# Patient Record
Sex: FEMALE | Race: WHITE | NOT HISPANIC OR LATINO | Employment: PART TIME | URBAN - NONMETROPOLITAN AREA
[De-identification: names, ages, dates, MRNs, and addresses within clinical notes are randomized per-mention and may not be internally consistent; named-entity substitution may affect disease eponyms.]

---

## 2017-01-10 ENCOUNTER — OFFICE VISIT (OUTPATIENT)
Dept: FAMILY MEDICINE | Facility: OTHER | Age: 37
End: 2017-01-10

## 2017-01-10 VITALS
RESPIRATION RATE: 12 BRPM | WEIGHT: 150 LBS | HEART RATE: 99 BPM | SYSTOLIC BLOOD PRESSURE: 122 MMHG | DIASTOLIC BLOOD PRESSURE: 66 MMHG | BODY MASS INDEX: 23.54 KG/M2 | TEMPERATURE: 98 F | HEIGHT: 67 IN | OXYGEN SATURATION: 100 %

## 2017-01-10 DIAGNOSIS — G60.9 IDIOPATHIC PERIPHERAL NEUROPATHY: ICD-10-CM

## 2017-01-10 DIAGNOSIS — R51.9 CHRONIC NONINTRACTABLE HEADACHE, UNSPECIFIED HEADACHE TYPE: Primary | ICD-10-CM

## 2017-01-10 DIAGNOSIS — E03.9 ACQUIRED HYPOTHYROIDISM: ICD-10-CM

## 2017-01-10 DIAGNOSIS — G89.29 CHRONIC NONINTRACTABLE HEADACHE, UNSPECIFIED HEADACHE TYPE: Primary | ICD-10-CM

## 2017-01-10 PROCEDURE — 99213 OFFICE O/P EST LOW 20 MIN: CPT | Performed by: INTERNAL MEDICINE

## 2017-01-10 ASSESSMENT — PAIN SCALES - GENERAL: PAINLEVEL: MODERATE PAIN (5)

## 2017-01-10 NOTE — PROGRESS NOTES
SUBJECTIVE:                                                    Mattie Banda is a 36 year old female who presents to clinic today for the following health issues:      Chief Complaint   Patient presents with     RECHECK     Headache     getting a little better            CHIEF COMPLAINT:    The patient is a 36-year-old female who has been having some migraine type headaches in conjunction with muscle tension headaches. She was recently started on low doses of propranolol and this may have decreased the frequency of the migraines slightly. She has not yet had her eyes examined as cause for possible headaches as well. She had concurrent muscle tension headache and was placed on Cymbalta for her chronic pain and stretching exercises were recommended. She was unable to do the stretching exercises and the Cymbalta made her nauseated so she has not subsequently had to discontinue it. She does have some anxiety and depression which really hasn't changed. Recent laboratory work for her diffuse muscle and joint pain was performed by another provider and reviewed. It demonstrates no significant autoimmune or inflammatory disease. After lengthy discussion, we've decided to request neurology opinion regarding her ongoing headaches. She states that she has them every single day and the last most of the day. They do not wake her from sleep and are not always associated with auras or visual changes. She still has some pain in the back of her neck but not nearly as much. The tension headache portion does seem to have improved slightly.                         PAST, FAMILY,SOCIAL HISTORY:     Medical  History:   has a past medical history of Urinary tract infection, site not specified; Thyroid disease; and Papanicolaou smear of cervix with low grade squamous intraepithelial lesion (LGSIL).     Surgical History:   has past surgical history that includes TOOTH EXTRACTION W/FORCEP; colonoscopy (08/06/07); COLONOSCOPY W BIOPSY  (02/06/08); colonoscopy (08/05/08); Laparoscopic tubal ligation (11/27/2012); and back surgery (6/2015 ).     Social History:   reports that she has been smoking Cigarettes.  She has a 8 pack-year smoking history. She has never used smokeless tobacco. She reports that she drinks alcohol. She reports that she does not use illicit drugs.     Family History:  family history includes Arthritis in her maternal grandmother and paternal grandmother; Breast Cancer in an other family member; CANCER in her maternal grandfather and maternal grandmother; Cancer - colorectal in her maternal grandfather; Cardiovascular in her maternal grandfather; Depression in her maternal grandmother; Genitourinary Problems in her maternal grandmother; HEART DISEASE in her maternal grandfather; Hypertension in her maternal grandfather, maternal grandmother, and paternal grandmother; Lipids in her maternal grandmother; OSTEOPOROSIS in her maternal grandmother; Respiratory in her maternal grandmother; Thyroid Disease in her maternal aunt. There is no history of DIABETES.            MEDICATIONS  Current Outpatient Prescriptions   Medication Sig Dispense Refill     propranolol (INDERAL) 10 MG tablet Take 1 tablet (10 mg) by mouth 2 times daily 28 tablet 0     Cyanocobalamin (B-12) 100 MCG TABS        albuterol (PROAIR HFA, PROVENTIL HFA, VENTOLIN HFA) 108 (90 BASE) MCG/ACT inhaler Inhale 2 puffs into the lungs every 6 hours as needed for shortness of breath / dyspnea or wheezing 1 Inhaler 0     lidocaine (LIDODERM) 5 % patch Apply up to 3 patches to painful area at once for up to 12 h within a 24 h period.  Remove after 12 hours. 30 patch 0     levothyroxine (SYNTHROID, LEVOTHROID) 175 MCG tablet Take 1 tablet (175 mcg) by mouth daily 90 tablet 3     traZODone (DESYREL) 50 MG tablet TAKE 1 TO 3 TABLETS BY MOUTH EVERY NIGHT AT BEDTIME 270 tablet 5     Multiple Minerals-Vitamins (CALCIUM CITRATE +) TABS 500 mg twice a day            --------------------------------------------------------------------------------------------------------------------                          REVIEW OF SYSTEMS:  Unchanged from that of the last visit and once again reviewed                            EXAMINATION:          Vital Signs:  B/P: 122/66, T: 98, P: 99, R: 12, BMI: Body mass index is 23.49 kg/(m^2).   Constitutional: The patient appears to be in no acute distress. The patient appears to be adequately hydrated. No acute respiratory or hemodynamic distress is noted at this time.   LUNGS: clear bilaterally, airflow is brisk, no intercostal retraction or stridor is noted. No coughing is noted during visit.   HEART:  regular without rubs, clicks, gallops, or murmurs. PMI is nondisplaced. Upstrokes are brisk. S1,S2 are heard.   NEURO: Pt is alert and appropriate. No neurologic lateralization is noted. Cranial nerves 2-12 are intact. Peripheral sensory and motor function are grossly normal                        DECISION MAKING:       #1 recurrent headache suspect combination musculoskeletal with triggered migraine   Discontinue Cymbalta for side effects   Continue beta blocker at low dose   Follow-up with neurology  #2 chronic back pain   Discontinue Cymbalta for side effects   Continue lidocaine patch   No narcotics recommended  #3 hypothyroidism   Continue thyroid supplementation                           FOLLOW UP    I have asked the patient to make an appointment for follow up with me on an as-needed basis.    I have carefully explained the diagnosis and treatment options with the patient. The patient has displayed an understanding of the above, and all subsequent questions were answered.         DO JENARO Otto    Portions of this note were produced using [employer]  Although every attempt at real-time proof reading has been made, occasional grammar/syntax errors may have been missed.

## 2017-01-10 NOTE — MR AVS SNAPSHOT
"              After Visit Summary   1/10/2017    Mattie Banda    MRN: [id number]           Patient Information     Date Of Birth          1980        Visit Information        Provider Department      1/10/2017 3:20 PM Eduardo Castro DO [facility]         Follow-ups after your visit        Who to contact     If you have questions or need follow up information about today's clinic visit or your schedule please contact [facility] directly at [phone].  Normal or non-critical lab and imaging results will be communicated to you by MyChart, letter or phone within 4 business days after the clinic has received the results. If you do not hear from us within 7 days, please contact the clinic through [employer] or phone. If you have a critical or abnormal lab result, we will notify you by phone as soon as possible.  Submit refill requests through [employer] or call your pharmacy and they will forward the refill request to us. Please allow 3 business days for your refill to be completed.          Additional Information About Your Visit        [facility] Information     [facility] lets you send messages to your doctor, view your test results, renew your prescriptions, schedule appointments and more. To sign up, go to [email or web address] . Click on \"Log in\" on the left side of the screen, which will take you to the Welcome page. Then click on \"Sign up Now\" on the right side of the page.     You will be asked to enter the access code listed below, as well as some personal information. Please follow the directions to create your username and password.     Your access code is: [email or web address]  Expires: 4/10/2017  3:34 PM     Your access code will  in 90 days. If you need help or a new code, please call your [facility] or [phone].        Care EveryWhere ID     This is your Care EveryWhere ID. This could be used by other organizations to access your [place] medical " "records  [id number]        Your Vitals Were     Pulse Temperature Respirations Height BMI (Body Mass Index) Pulse Oximetry    99 98  F (36.7  C) (Tympanic) 12 5' 7\" (1.702 m) 23.49 kg/m2 100%       Blood Pressure from Last 3 Encounters:   01/10/17 122/66   12/27/16 120/70   11/04/16 122/70    Weight from Last 3 Encounters:   01/10/17 150 lb (68.04 kg)   12/27/16 147 lb (66.679 kg)   11/04/16 145 lb (65.772 kg)              Today, you had the following     No orders found for display       Primary Care Provider Office Phone # Fax #    Yuan Iain Schofield [phone]       [place] [id number] [place] [phone]        Thank you!     Thank you for choosing [facility]  for your care. Our goal is always to provide you with excellent care. Hearing back from our patients is one way we can continue to improve our services. Please take a few minutes to complete the written survey that you may receive in the mail after your visit with us. Thank you!             Your Updated Medication List - Protect others around you: Learn how to safely use, store and throw away your medicines at www.disposemymeds.org.          This list is accurate as of: 1/10/17  3:34 PM.  Always use your most recent med list.                   Brand Name Dispense Instructions for use    albuterol 108 (90 BASE) MCG/ACT Inhaler    PROAIR HFA/PROVENTIL HFA/VENTOLIN HFA    1 Inhaler    Inhale 2 puffs into the lungs every 6 hours as needed for shortness of breath / dyspnea or wheezing       B-12 100 MCG Tabs          calcium citrate + Tabs      500 mg twice a day       DULoxetine 30 MG EC capsule    CYMBALTA    28 capsule    Take 1 capsule (30 mg) by mouth 2 times daily       levothyroxine 175 MCG tablet    SYNTHROID/LEVOTHROID    90 tablet    Take 1 tablet (175 mcg) by mouth daily       lidocaine 5 % Patch    LIDODERM    30 patch    Apply up to 3 patches to painful area at once for up to 12 h within " a 24 h period.  Remove after 12 hours.       propranolol 10 MG tablet    INDERAL    28 tablet    Take 1 tablet (10 mg) by mouth 2 times daily       traZODone 50 MG tablet    DESYREL    270 tablet    TAKE 1 TO 3 TABLETS BY MOUTH EVERY NIGHT AT BEDTIME

## 2017-01-11 ASSESSMENT — PATIENT HEALTH QUESTIONNAIRE - PHQ9: SUM OF ALL RESPONSES TO PHQ QUESTIONS 1-9: 8

## 2017-01-27 PROBLEM — G43.109 MIGRAINE WITH AURA AND WITHOUT STATUS MIGRAINOSUS, NOT INTRACTABLE: Status: ACTIVE | Noted: 2017-01-27

## 2017-01-30 ENCOUNTER — TELEPHONE (OUTPATIENT)
Dept: FAMILY MEDICINE | Facility: CLINIC | Age: 37
End: 2017-01-30

## 2017-01-30 NOTE — TELEPHONE ENCOUNTER
Patient informed we are not able to see patient today and was transferred to the apt desk to see who else may have openings that she could see that would be convenient for her VORB Dr. Yuan Schofield/Elizabeth Robles CMA ([facility])

## 2017-01-30 NOTE — TELEPHONE ENCOUNTER
Patient called back requesting this message be sent to Dr. Castro's team. Patient is will to go to [place] tomorrow. Please advise

## 2017-01-30 NOTE — TELEPHONE ENCOUNTER
Reason for Call:  Same Day Appointment, Requested Provider:  Yuan Schofield M.D.    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 Yes    Additional comments:     Can we leave a detailed message on this number? YES    Phone number patient can be reached at: Home number on file [phone] (home)    Best Time: any    Call taken on 1/30/2017 at 1:20 PM by Shameka Liu

## 2017-01-31 ENCOUNTER — OFFICE VISIT (OUTPATIENT)
Dept: FAMILY MEDICINE | Facility: OTHER | Age: 37
End: 2017-01-31

## 2017-01-31 VITALS
WEIGHT: 146 LBS | TEMPERATURE: 97.1 F | HEART RATE: 90 BPM | BODY MASS INDEX: 22.86 KG/M2 | RESPIRATION RATE: 16 BRPM | OXYGEN SATURATION: 99 %

## 2017-01-31 DIAGNOSIS — Z98.1 S/P LUMBAR FUSION: ICD-10-CM

## 2017-01-31 DIAGNOSIS — M54.50 CHRONIC MIDLINE LOW BACK PAIN WITHOUT SCIATICA: Primary | ICD-10-CM

## 2017-01-31 DIAGNOSIS — G89.29 CHRONIC MIDLINE LOW BACK PAIN WITHOUT SCIATICA: Primary | ICD-10-CM

## 2017-01-31 PROCEDURE — 99213 OFFICE O/P EST LOW 20 MIN: CPT | Performed by: INTERNAL MEDICINE

## 2017-01-31 RX ORDER — PREDNISONE 20 MG/1
TABLET ORAL
Qty: 20 TABLET | Refills: 0 | Status: SHIPPED | OUTPATIENT
Start: 2017-01-31 | End: 2017-05-12

## 2017-01-31 ASSESSMENT — PAIN SCALES - GENERAL: PAINLEVEL: EXTREME PAIN (8)

## 2017-01-31 NOTE — PROGRESS NOTES
SUBJECTIVE:                                                    Mattie Banda is a 36 year old female who presents to clinic today for the following health issues:      Back Pain      Duration: 3 days now        Specific cause: none    Description:   Location of pain: low back both  Character of pain: sharp  Pain radiation:none  New numbness or weakness in legs, not attributed to pain:  no     Intensity: Currently 8/10    History:   Pain interferes with job: YES,   History of back problems: no prior back problems  Any previous MRI or X-rays: None  Sees a specialist for back pain:  No  Therapies tried without relief: nothing    Alleviating factors:   Improved by: none      Precipitating factors:  Worsened by: Lifting, Bending, Standing, Sitting, Lying Flat and Walking    Functional and Psychosocial Screen (Luciana STarT Back):      Not performed today          CHIEF COMPLAINT:    The patient is a 36-year-old female who has persistent back pain. She has a previous history of degenerative disc disease and has had a fusion previously. She does have hardware in place over the area of her pain. She was previously placed on Cymbalta but had to discontinue it. She currently takes no narcotics for pain management.. She notes that she has had spinal injections but it was before the surgery many years ago. She notes that the pain does not seem to be getting better. She does have Lidoderm patch in place and this brought about no benefit as well. The pain is worsened with any bending or twisting or even laying flat. She has no radicular involvement. She denies any pain in the legs or weakness.                         PAST, FAMILY,SOCIAL HISTORY:     Medical  History:   has a past medical history of Urinary tract infection, site not specified; Thyroid disease; and Papanicolaou smear of cervix with low grade squamous intraepithelial lesion (LGSIL).     Surgical History:   has past surgical history that includes TOOTH EXTRACTION  W/FORCEP; colonoscopy (08/06/07); COLONOSCOPY W BIOPSY (02/06/08); colonoscopy (08/05/08); Laparoscopic tubal ligation (11/27/2012); and back surgery (6/2015 ).     Social History:   reports that she has been smoking Cigarettes.  She has a 8 pack-year smoking history. She has never used smokeless tobacco. She reports that she drinks alcohol. She reports that she does not use illicit drugs.     Family History:  family history includes Arthritis in her maternal grandmother and paternal grandmother; Breast Cancer in an other family member; CANCER in her maternal grandfather and maternal grandmother; Cancer - colorectal in her maternal grandfather; Cardiovascular in her maternal grandfather; Depression in her maternal grandmother; Genitourinary Problems in her maternal grandmother; HEART DISEASE in her maternal grandfather; Hypertension in her maternal grandfather, maternal grandmother, and paternal grandmother; Lipids in her maternal grandmother; OSTEOPOROSIS in her maternal grandmother; Respiratory in her maternal grandmother; Thyroid Disease in her maternal aunt. There is no history of DIABETES.            MEDICATIONS  Current Outpatient Prescriptions   Medication Sig Dispense Refill     predniSONE (DELTASONE) 20 MG tablet Take 3 tabs (60 mg) by mouth daily x 3 days, 2 tabs (40 mg) daily x 3 days, 1 tab (20 mg) daily x 3 days, then 1/2 tab (10 mg) x 3 days. 20 tablet 0     propranolol (INDERAL) 10 MG tablet Take 1 tablet (10 mg) by mouth 2 times daily 28 tablet 0     Cyanocobalamin (B-12) 100 MCG TABS        albuterol (PROAIR HFA, PROVENTIL HFA, VENTOLIN HFA) 108 (90 BASE) MCG/ACT inhaler Inhale 2 puffs into the lungs every 6 hours as needed for shortness of breath / dyspnea or wheezing 1 Inhaler 0     lidocaine (LIDODERM) 5 % patch Apply up to 3 patches to painful area at once for up to 12 h within a 24 h period.  Remove after 12 hours. 30 patch 0     levothyroxine (SYNTHROID, LEVOTHROID) 175 MCG tablet Take 1  tablet (175 mcg) by mouth daily 90 tablet 3     traZODone (DESYREL) 50 MG tablet TAKE 1 TO 3 TABLETS BY MOUTH EVERY NIGHT AT BEDTIME 270 tablet 5     Multiple Minerals-Vitamins (CALCIUM CITRATE +) TABS 500 mg twice a day           --------------------------------------------------------------------------------------------------------------------                          REVIEW OF SYSTEMS:         LUNGS: Pt denies: cough,excess sputum, hemoptysis, or shortness of breath.   HEART: Pt denies: chest pain, arrythmia, syncope, tachy or bradyarrhythmia or excess edema.   GI: Pt denies: nausea, vomitting, diarrhea, constipation, melena, or hematochezia.   NEURO: Pt denies: seizures, strokes, diplopia, weakness, paraesthesias, or paralysis.                          EXAMINATION:         Pulse 90  Temp(Src) 97.1  F (36.2  C) (Tympanic)  Resp 16  Wt 146 lb (66.225 kg)  SpO2 99%   Constitutional: The patient appears to be in moderate acute distress. The patient appears to be adequately hydrated. No acute respiratory or hemodynamic distress is noted at this time.   LUNGS: clear bilaterally, airflow is brisk, no intercostal retraction or stridor is noted. No coughing is noted during visit.   HEART:  regular without rubs, clicks, gallops, or murmurs. PMI is nondisplaced. Upstrokes are brisk. S1,S2 are heard.   GI: Abdomen is soft, without rebound, guarding or tenderness. Bowel sounds are appropriate. No renal bruits are heard.    NEURO: Pt is alert and appropriate. No neurologic lateralization is noted. Cranial nerves 2-12 are intact. Peripheral sensory and motor function are grossly normal. Straight leg raising is negative.   MS: Minimal crepitance is noted in the extremities. No deformity is present. Muscle strength is appropriate and equal bilaterally. No acute joint erythema or swelling is present. Significant pain with palpation over the spinous process of L4-L5 is noted.                          DECISION MAKING:     #1  chronic midline low back pain without sciatica   We'll start a prednisone burst/taper for symptomatic relief   Recommend orthopedic/back evaluation for possible injection  #2 history of migraine cephalgia currently stable                         FOLLOW UP    I have asked the patient to make an appointment for follow up with me following the orthopedic evaluation as needed.    I have carefully explained the diagnosis and treatment options with the patient. The patient has displayed an understanding of the above, and all subsequent questions were answered.       DO JENARO Otto    Portions of this note were produced using [employer]  Although every attempt at real-time proof reading has been made, occasional grammar/syntax errors may have been missed.

## 2017-01-31 NOTE — NURSING NOTE
"Chief Complaint   Patient presents with     Back Pain     3 days now       Initial Pulse 90  Temp(Src) 97.1  F (36.2  C) (Tympanic)  Resp 16  Wt 146 lb (66.225 kg)  SpO2 99% Estimated body mass index is 22.86 kg/(m^2) as calculated from the following:    Height as of 1/10/17: 5' 7\" (1.702 m).    Weight as of this encounter: 146 lb (66.225 kg).  BP completed using cuff size: regular    "

## 2017-01-31 NOTE — MR AVS SNAPSHOT
After Visit Summary   1/31/2017    Mattie Banda    MRN: [id number]           Patient Information     Date Of Birth          1980        Visit Information        Provider Department      1/31/2017 10:20 AM Eduardo Castro DO [facility]        Today's Diagnoses     Chronic midline low back pain without sciatica    -  1        Follow-ups after your visit        Additional Services     ORTHO  REFERRAL       [facility] is referring you to the Orthopedic  Services at [facility].       The  Representative will assist you in the coordination of your Orthopedic and Musculoskeletal Care as prescribed by your physician.    The  Representative will call you within 1 business day to help schedule your appointment, or you may contact the  Representative at:    All areas ~ [phone]     Type of Referral : Spine: Lumbar  **Choose Medical Spine Specialist (unless patient was seen by a Medical Spine Specialist within the past 6 months).**  Surgical Evaluation is advised if the patient presents with one or more of the following red flags: Evidence of Spinal Tumor, Infection or Fracture, Cauda Equina Syndrome, Sudden or Progressive Weakness, Loss of Bowel or Bladder Control, or any other documented emergent neurological condition resulting from a Lumbar Spinal Condition. Medical Spine Specialist        Timeframe requested: Within 2 weeks    Coverage of these services is subject to the terms and limitations of your health insurance plan.  Please call member services at your health plan with any benefit or coverage questions.      If X-rays, CT or MRI's have been performed, please contact the facility where they were done to arrange for , prior to your scheduled appointment.  Please bring this referral request to your appointment and present it to your specialist.                  Who to contact   "   If you have questions or need follow up information about today's clinic visit or your schedule please contact [facility] directly at [phone].  Normal or non-critical lab and imaging results will be communicated to you by MyChart, letter or phone within 4 business days after the clinic has received the results. If you do not hear from us within 7 days, please contact the clinic through Chem[employer]art or phone. If you have a critical or abnormal lab result, we will notify you by phone as soon as possible.  Submit refill requests through [employer] or call your pharmacy and they will forward the refill request to us. Please allow 3 business days for your refill to be completed.          Additional Information About Your Visit        Chem[employer]iharFlixw[employer] Information     [employer]Source lets you send messages to your doctor, view your test results, renew your prescriptions, schedule appointments and more. To sign up, go to [email or web address] . Click on \"Log in\" on the left side of the screen, which will take you to the Welcome page. Then click on \"Sign up Now\" on the right side of the page.     You will be asked to enter the access code listed below, as well as some personal information. Please follow the directions to create your username and password.     Your access code is: [email or web address]  Expires: 4/10/2017  3:34 PM     Your access code will  in 90 days. If you need help or a new code, please call your [facility] or [phone].        Care EveryWhere ID     This is your Care EveryWhere ID. This could be used by other organizations to access your [place] medical records  [id number]        Your Vitals Were     Pulse Temperature Respirations Pulse Oximetry          90 97.1  F (36.2  C) (Tympanic) 16 99%         Blood Pressure from Last 3 Encounters:   01/10/17 122/66   16 120/70   16 122/70    Weight from Last 3 Encounters:   17 146 lb (66.225 kg)   01/10/17 150 lb (68.04 kg)   16 147 " lb (66.679 kg)              We Performed the Following     ORTHO  REFERRAL          Today's Medication Changes          These changes are accurate as of: 1/31/17  4:24 PM.  If you have any questions, ask your nurse or doctor.               Start taking these medicines.        Dose/Directions    predniSONE 20 MG tablet   Commonly known as:  DELTASONE   Used for:  Chronic midline low back pain without sciatica   Started by:  Eduardo Castro, DO        Take 3 tabs (60 mg) by mouth daily x 3 days, 2 tabs (40 mg) daily x 3 days, 1 tab (20 mg) daily x 3 days, then 1/2 tab (10 mg) x 3 days.   Quantity:  20 tablet   Refills:  0            Where to get your medicines      These medications were sent to [id number] - [place], [place] - [address] [place] [address]  [address] [place] [address] [place] [address]    Hours:  M-F 8:30-6:30; Sat 9-4; closed Sunday Phone:  [phone]    - predniSONE 20 MG tablet             Primary Care Provider Office Phone # Fax #    Yuan Iain Schofield [phone]       [facility] [address] [place] [phone]        Thank you!     Thank you for choosing [facility]  for your care. Our goal is always to provide you with excellent care. Hearing back from our patients is one way we can continue to improve our services. Please take a few minutes to complete the written survey that you may receive in the mail after your visit with us. Thank you!             Your Updated Medication List - Protect others around you: Learn how to safely use, store and throw away your medicines at www.disposemymeds.org.          This list is accurate as of: 1/31/17  4:24 PM.  Always use your most recent med list.                   Brand Name Dispense Instructions for use    albuterol 108 (90 BASE) MCG/ACT Inhaler    PROAIR HFA/PROVENTIL HFA/VENTOLIN HFA    1 Inhaler    Inhale 2 puffs into the lungs every 6 hours as needed for shortness of breath / dyspnea or  wheezing       B-12 100 MCG Tabs          calcium citrate + Tabs      500 mg twice a day       levothyroxine 175 MCG tablet    SYNTHROID/LEVOTHROID    90 tablet    Take 1 tablet (175 mcg) by mouth daily       lidocaine 5 % Patch    LIDODERM    30 patch    Apply up to 3 patches to painful area at once for up to 12 h within a 24 h period.  Remove after 12 hours.       predniSONE 20 MG tablet    DELTASONE    20 tablet    Take 3 tabs (60 mg) by mouth daily x 3 days, 2 tabs (40 mg) daily x 3 days, 1 tab (20 mg) daily x 3 days, then 1/2 tab (10 mg) x 3 days.       propranolol 10 MG tablet    INDERAL    28 tablet    Take 1 tablet (10 mg) by mouth 2 times daily       traZODone 50 MG tablet    DESYREL    270 tablet    TAKE 1 TO 3 TABLETS BY MOUTH EVERY NIGHT AT BEDTIME

## 2017-02-01 ASSESSMENT — PATIENT HEALTH QUESTIONNAIRE - PHQ9: SUM OF ALL RESPONSES TO PHQ QUESTIONS 1-9: 5

## 2017-02-09 ENCOUNTER — OFFICE VISIT (OUTPATIENT)
Dept: ORTHOPEDICS | Facility: CLINIC | Age: 37
End: 2017-02-09
Payer: OTHER MISCELLANEOUS

## 2017-02-09 VITALS
HEART RATE: 82 BPM | DIASTOLIC BLOOD PRESSURE: 86 MMHG | BODY MASS INDEX: 22.91 KG/M2 | SYSTOLIC BLOOD PRESSURE: 132 MMHG | HEIGHT: 67 IN | WEIGHT: 146 LBS

## 2017-02-09 DIAGNOSIS — R22.2 PALPABLE MASS OF LOWER BACK: ICD-10-CM

## 2017-02-09 DIAGNOSIS — G89.29 CHRONIC BILATERAL LOW BACK PAIN WITH BILATERAL SCIATICA: Primary | ICD-10-CM

## 2017-02-09 DIAGNOSIS — M54.41 CHRONIC BILATERAL LOW BACK PAIN WITH BILATERAL SCIATICA: Primary | ICD-10-CM

## 2017-02-09 DIAGNOSIS — M54.42 CHRONIC BILATERAL LOW BACK PAIN WITH BILATERAL SCIATICA: Primary | ICD-10-CM

## 2017-02-09 PROCEDURE — 99244 OFF/OP CNSLTJ NEW/EST MOD 40: CPT | Performed by: PHYSICIAN ASSISTANT

## 2017-02-09 RX ORDER — CYCLOBENZAPRINE HCL 5 MG
5 TABLET ORAL 3 TIMES DAILY PRN
Qty: 45 TABLET | Refills: 1 | Status: SHIPPED | OUTPATIENT
Start: 2017-02-09 | End: 2017-05-12

## 2017-02-09 NOTE — PROGRESS NOTES
Sports Medicine Clinic Visit    PCP: Yuan Schofield Amwinter Banda is a 36 year old female who is seen  in consultation at the request of Dr. Castro presenting with low back pain.    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    Family history, medical history and surgical history have all been discussed with patient and appended to medical chart below.    Past Medical History   Diagnosis Date     Urinary tract infection, site not specified      Recurrent UTI's     Thyroid disease      Papanicolaou smear of cervix with low grade squamous intraepithelial lesion (LGSIL)      Past Surgical History   Procedure Laterality Date     Hc tooth extraction w/forcep       wisdom teeth removed     Colonoscopy  08/06/07     Hc colonoscopy w biopsy  02/06/08     Colonoscopy  08/05/08     Laparoscopic tubal ligation  11/27/2012     Procedure: LAPAROSCOPIC TUBAL LIGATION;  Laparoscopic Bilateral tubal sterilization/ fulgaration;  Surgeon: Sarbjit Whittaker MD;  Location: [facility] OR     Back surgery  6/2015      Family History   Problem Relation Age of Onset     Breast Cancer Other      Maternal great aunt     DIABETES No family hx of      Hypertension Maternal Grandmother      Arthritis Maternal Grandmother      CANCER Maternal Grandmother   "    MGGM     Depression Maternal Grandmother      Lipids Maternal Grandmother      OSTEOPOROSIS Maternal Grandmother      Respiratory Maternal Grandmother      emphysema     Genitourinary Problems Maternal Grandmother      Kidney Failure, liver      Hypertension Maternal Grandfather      HEART DISEASE Maternal Grandfather      MI     Cardiovascular Maternal Grandfather      Cancer - colorectal Maternal Grandfather      CANCER Maternal Grandfather      Hodgins Lymphoma     Hypertension Paternal Grandmother      Arthritis Paternal Grandmother      RA     Thyroid Disease Maternal Aunt      two aunts     Objective  /86 mmHg  Pulse 82  Ht 5' 7\" (1.702 m)  Wt 146 lb (66.225 kg)  BMI 22.86 kg/m2  Constitutional:well-developed, well-nourished, and in no distress.   Cardiovascular: Intact distal pulses.    Neurological: alert. Gait normal  Skin: Skin is warm and dry.   Psychiatric: Mood and affect normal.   Respiratory: unlabored, speaks in full sentences  Gastrointestinal: masses neg, deformities neg  Hematologic/Lymphatic/Immunologic: neg lymphadenopathy, neg lymphedema    Exam:  Back Exam:    Inspection:       no visible deformity in the low back       normal skin       normal vascular       normal lymphatic    ROM:       limited flexion due to pain       limited extension due to pain       Limited lateral rotation and lateral flexion    Tender/ Tissue Texture Abnormality:       paraspinal muscles       Palpable mobile masses bilateral back - tender to palpation       Iliac crest    Non Tender:       remainder of lumbar spine    Strength:       hip flexion 5/5       knee extension 5/5       ankle dorsiflexion 5/5       ankle plantarflexion 5/5       dorsiflexion of the great toe 5/5      Hip abduction: 4/5      Hip adduction:  4/5    Reflexes:       patellar (L3, L4) symmetric normal       achilles tendons (S1) symmetric normal    Sensation:      grossly intact throughout lower extremities    Special tests:      "  straight leg raise left neg        straight leg raise right neg             Radiology:  Results for orders placed or performed in visit on 06/06/16   XR Lumbar Spine 2/3 Views    Narrative    LUMBAR SPINE TWO TO THREE VIEWS   6/6/2016 11:54 AM     HISTORY: Pseudarthrosis after fusion or arthrodesis    COMPARISON: Lumbar spine x-rays dated 11/2/2015.    FINDINGS: Stable anterior and posterior operative L5-S1 fusion with  intervertebral spacers since the prior study. Disc height loss L4-L5  has slightly progressed. Remaining disc spaces are grossly  unremarkable and stable as compared to the prior study. Limbus  vertebra on the anterior superior endplate of L3 is stable in  appearance. SI joints are unremarkable. Pedicles are intact and psoas  margins are maintained.      Impression    IMPRESSION:  1. Worsening disc height loss at L4-L5.  2. Stable postoperative changes at L5-S1.    SUNIL LAY MD       I have personally reviewed images with patient    Assessment:  1. Chronic bilateral low back pain with bilateral sciatica    2. Palpable mass of lower back        Plan:  Discussed the assessment with the patient.  Topical Treatments: Ice, Heat or Topical Analgesics  Over the counter medication: Acetaminophen (Tylenol) 1000mg every 6 hours with food (Maximum of 3000mg/day)  Prescription Medication as directed: Relafen and flexeril  MRI of the lumbar spine for further evaluation of palpable masses on low back  Observe and monitor symptoms  Activity Modification: as tolerated  Rehab: [facility]:  OMT, strain/ counter strain, myofacial relese  Follow up: 4-6 weeks  Clinic will contact patient with MRI results              Hallie Singh PA-C  [facility]  [facility]      Disclaimer: This note consists of symbols derived from keyboarding, dictation and/or voice recognition software. As a result, there may be errors in the script that have gone undetected. Please  consider this when interpreting information found in this chart.

## 2017-02-09 NOTE — NURSING NOTE
"Initial /86 mmHg  Pulse 82  Ht 5' 7\" (1.702 m)  Wt 146 lb (66.225 kg)  BMI 22.86 kg/m2 Estimated body mass index is 22.86 kg/(m^2) as calculated from the following:    Height as of this encounter: 5' 7\" (1.702 m).    Weight as of this encounter: 146 lb (66.225 kg). .      "

## 2017-02-09 NOTE — PATIENT INSTRUCTIONS
To schedule your MRI with [facility], please call [phone].  Please allow 2-3 business days for clinic to obtain results.  We will contact you, review your results with you and update your treatment plan at that time.    Physical Therapy:  [facility]:  [phone]

## 2017-02-09 NOTE — MR AVS SNAPSHOT
After Visit Summary   2/9/2017    Mattie Banda    MRN: [id number]           Patient Information     Date Of Birth          1980        Visit Information        Provider Department      2/9/2017 4:20 PM Hallie Singh PA-C [facility]        Today's Diagnoses     Chronic bilateral low back pain with bilateral sciatica    -  1       Care Instructions    To schedule your MRI with [facility], please call [phone].  Please allow 2-3 business days for clinic to obtain results.  We will contact you, review your results with you and update your treatment plan at that time.    Physical Therapy:  [facility]:  [phone]        Follow-ups after your visit        Additional Services     PHYSICAL THERAPY REFERRAL       [facility]: [phone]    Treatment: Evaluation & Treatment  Special Instructions/Modalities: OMT, As indicated by physical therapist    Special Programs: As indicated by physical therapist      Please be aware that coverage of these services is subject to the terms and limitations of your health insurance plan.  Call member services at your health plan with any benefit or coverage questions.      **Note to Provider:  If you are referring outside of [facility] for the therapy appointment, please list the name of the location in the  special instructions  above, print the referral and give to the patient to schedule the appointment.                  Future tests that were ordered for you today     Open Future Orders        Priority Expected Expires Ordered    MR Lumbar Spine w/o Contrast Routine  2/9/2018 2/9/2017            Who to contact     If you have questions or need follow up information about today's clinic visit or your schedule please contact [facility] directly at [phone].  Normal or non-critical lab and imaging results will be communicated to you by MyChart, letter or phone within 4  "business days after the clinic has received the results. If you do not hear from us within 7 days, please contact the clinic through Ele[employer] or phone. If you have a critical or abnormal lab result, we will notify you by phone as soon as possible.  Submit refill requests through [employer] or call your pharmacy and they will forward the refill request to us. Please allow 3 business days for your refill to be completed.          Additional Information About Your Visit        Elephanti Information     [employer] gives you secure access to your electronic health record. If you see a primary care provider, you can also send messages to your care team and make appointments. If you have questions, please call your primary care clinic.  If you do not have a primary care provider, please call [phone] and they will assist you.        Care EveryWhere ID     This is your Care EveryWhere ID. This could be used by other organizations to access your [place] medical records  [id number]        Your Vitals Were     Pulse Height BMI (Body Mass Index)             82 5' 7\" (1.702 m) 22.86 kg/m2          Blood Pressure from Last 3 Encounters:   02/09/17 132/86   01/10/17 122/66   12/27/16 120/70    Weight from Last 3 Encounters:   02/09/17 146 lb (66.225 kg)   01/31/17 146 lb (66.225 kg)   01/10/17 150 lb (68.04 kg)              We Performed the Following     PHYSICAL THERAPY REFERRAL        Primary Care Provider Office Phone # Fax #    Yuan Schofield [phone]       [place] [address] [place] [phone]        Thank you!     Thank you for choosing [facility]  for your care. Our goal is always to provide you with excellent care. Hearing back from our patients is one way we can continue to improve our services. Please take a few minutes to complete the written survey that you may receive in the mail after your visit with us. Thank you!             Your " Updated Medication List - Protect others around you: Learn how to safely use, store and throw away your medicines at www.disposemymeds.org.          This list is accurate as of: 2/9/17  4:47 PM.  Always use your most recent med list.                   Brand Name Dispense Instructions for use    albuterol 108 (90 BASE) MCG/ACT Inhaler    PROAIR HFA/PROVENTIL HFA/VENTOLIN HFA    1 Inhaler    Inhale 2 puffs into the lungs every 6 hours as needed for shortness of breath / dyspnea or wheezing       B-12 100 MCG Tabs          calcium citrate + Tabs      500 mg twice a day       levothyroxine 175 MCG tablet    SYNTHROID/LEVOTHROID    90 tablet    Take 1 tablet (175 mcg) by mouth daily       lidocaine 5 % Patch    LIDODERM    30 patch    Apply up to 3 patches to painful area at once for up to 12 h within a 24 h period.  Remove after 12 hours.       predniSONE 20 MG tablet    DELTASONE    20 tablet    Take 3 tabs (60 mg) by mouth daily x 3 days, 2 tabs (40 mg) daily x 3 days, 1 tab (20 mg) daily x 3 days, then 1/2 tab (10 mg) x 3 days.       propranolol 10 MG tablet    INDERAL    28 tablet    Take 1 tablet (10 mg) by mouth 2 times daily       traZODone 50 MG tablet    DESYREL    270 tablet    TAKE 1 TO 3 TABLETS BY MOUTH EVERY NIGHT AT BEDTIME

## 2017-02-16 DIAGNOSIS — E03.8 OTHER SPECIFIED HYPOTHYROIDISM: ICD-10-CM

## 2017-02-16 DIAGNOSIS — G47.9 SLEEP DISORDER: ICD-10-CM

## 2017-02-17 NOTE — TELEPHONE ENCOUNTER
traZODone (DESYREL) 50 MG tablet    Last Written Prescription Date: 2/8/16  Last Fill Quantity: 270; # refills: 5  Last Office Visit with [facility] or [facility] prescribing provider:  1/31/17        Last PHQ-9 score on record=   PHQ-9 SCORE 1/31/2017   Total Score -   Total Score 5       Lab Results   Component Value Date    AST 53 06/19/2015     Lab Results   Component Value Date    ALT 60 06/19/2015        levothyroxine (SYNTHROID, LEVOTHROID) 175 MCG tablet  Last Written Prescription Date: 2/8/16  Last Quantity: 90, # refills: 3  Last Office Visit with [facility] or [facility] prescribing provider: 1/31/17        TSH   Date Value Ref Range Status   07/06/2015 1.90 0.40 - 4.00 mU/L Final

## 2017-02-21 RX ORDER — LEVOTHYROXINE SODIUM 175 UG/1
TABLET ORAL
Qty: 30 TABLET | Refills: 0 | Status: SHIPPED | OUTPATIENT
Start: 2017-02-21 | End: 2017-06-13 | Stop reason: DRUGHIGH

## 2017-02-21 RX ORDER — TRAZODONE HYDROCHLORIDE 50 MG/1
TABLET, FILM COATED ORAL
Qty: 90 TABLET | Refills: 5 | Status: SHIPPED | OUTPATIENT
Start: 2017-02-21 | End: 2017-06-12

## 2017-02-21 NOTE — TELEPHONE ENCOUNTER
Trazodone  Prescription approved per [facility] Refill Protocol.    Synthroid  Medication is being filled for 1 time refill only due to:  Patient needs labs TSH. Future labs ordered TSH.--routing to schedulers also    Collin Canela RN, BSN

## 2017-05-12 ENCOUNTER — OFFICE VISIT (OUTPATIENT)
Dept: URGENT CARE | Facility: RETAIL CLINIC | Age: 37
End: 2017-05-12

## 2017-05-12 VITALS
OXYGEN SATURATION: 96 % | TEMPERATURE: 99.3 F | SYSTOLIC BLOOD PRESSURE: 107 MMHG | HEART RATE: 76 BPM | DIASTOLIC BLOOD PRESSURE: 63 MMHG

## 2017-05-12 DIAGNOSIS — J20.9 ACUTE BRONCHITIS WITH COEXISTING CONDITION REQUIRING PROPHYLACTIC TREATMENT: ICD-10-CM

## 2017-05-12 DIAGNOSIS — J01.90 ACUTE SINUSITIS WITH COEXISTING CONDITION REQUIRING PROPHYLACTIC TREATMENT: ICD-10-CM

## 2017-05-12 DIAGNOSIS — R05.9 COUGH: Primary | ICD-10-CM

## 2017-05-12 PROCEDURE — 99213 OFFICE O/P EST LOW 20 MIN: CPT | Performed by: NURSE PRACTITIONER

## 2017-05-12 RX ORDER — CEFDINIR 300 MG/1
300 CAPSULE ORAL 2 TIMES DAILY
Qty: 20 CAPSULE | Refills: 0 | Status: SHIPPED | OUTPATIENT
Start: 2017-05-12 | End: 2017-05-22

## 2017-05-12 RX ORDER — CODEINE PHOSPHATE AND GUAIFENESIN 10; 100 MG/5ML; MG/5ML
1 SOLUTION ORAL EVERY 4 HOURS PRN
Qty: 120 ML | Refills: 0 | Status: SHIPPED | OUTPATIENT
Start: 2017-05-12 | End: 2017-06-12

## 2017-05-12 RX ORDER — ALBUTEROL SULFATE 0.83 MG/ML
1 SOLUTION RESPIRATORY (INHALATION) EVERY 6 HOURS PRN
Qty: 1 BOX | Refills: 0 | Status: SHIPPED | OUTPATIENT
Start: 2017-05-12 | End: 2018-04-04

## 2017-05-12 NOTE — LETTER
[facility] [place]  [address]  [facility] [address]  Phone: [phone]    May 12, 2017        Mattie Banda  [address] [facility] [address]          To whom it may concern:    RE: Mattie Banda    Patient was seen and treated today at our clinic and missed work.  She is potentially contagious and told to stay away from other people for 1 to 2 days to allow her antibiotic to decrease risk to others.    Please contact me for questions or concerns.      Sincerely,        Hitesh Barba MSN, APRN, Family NP-C  [facility]

## 2017-05-12 NOTE — MR AVS SNAPSHOT
After Visit Summary   5/12/2017    Mattie Banda    MRN: [id number]           Patient Information     Date Of Birth          1980        Visit Information        Provider Department      5/12/2017 7:00 PM Hitesh Barba APRN [facility]        Today's Diagnoses     Cough    -  1    Acute sinusitis with coexisting condition requiring prophylactic treatment        Acute bronchitis with coexisting condition requiring prophylactic treatment          Care Instructions    Afrin/Dristan nasal spray as needed for up to 3 days.  Apply warm facial packs for 5-10 minutes three times daily.  Attempt to quit smoking.  Drink plenty of fluids- 6 to 10 glasses of liquids each day.  Elevate head of the bed.  Increase the humidity level in the home.  Rest.  Saline drops or nasal sprays as needed.  Sudafed 60mg every 4-6 hours are needed for congestion.  Take warm, steamy showers to reduce congestion.  Tylenol or ibuprofen as needed for discomfort.  Contact primary care clinic for increasing pain, high fever, vision changes, worsening symptoms, or no relief from symptoms after 7-10 days.  May drink tea /c honey to sooth throat.    Symptomatic treatment or other home remedies as discussed & reviewed.   Use of a nasal flush discussed with Mattie Banda as a good treatment option.   OTC Mucinex (or generic) may help to loosen congestion as well.  Rest as needed.  Avoid items which you are or maybe allergic.  Add moisture to the air with a humidifier or a vaporizer &/or inhale steam from a basin of hot water or shower.  Follow up at: with PCP        Follow-ups after your visit        Your next 10 appointments already scheduled     Jun 12, 2017  3:45 PM CDT   Office Visit with Yuan Schofield MD   [facility] ([facility])    [address] [place] [address] [phone]           Bring a current list of meds and any records pertaining to  this visit.  For Physicals, please bring immunization records and any forms needing to be filled out.  Please arrive 10 minutes early to complete paperwork.              Who to contact     You can reach your care team any time of the day by calling [phone].  Notification of test results:  If you have an abnormal lab result, we will notify you by phone as soon as possible.         Additional Information About Your Visit        MyChart Information     [employer] gives you secure access to your electronic health record. If you see a primary care provider, you can also send messages to your care team and make appointments. If you have questions, please call your primary care clinic.  If you do not have a primary care provider, please call [phone] and they will assist you.        Care EveryWhere ID     This is your Care EveryWhere ID. This could be used by other organizations to access your [place] medical records  [id number]        Your Vitals Were     Pulse Temperature Pulse Oximetry             76 99.3  F (37.4  C) (Tympanic) 96%          Blood Pressure from Last 3 Encounters:   05/12/17 107/63   02/09/17 132/86   01/10/17 122/66    Weight from Last 3 Encounters:   02/09/17 146 lb (66.2 kg)   01/31/17 146 lb (66.2 kg)   01/10/17 150 lb (68 kg)              Today, you had the following     No orders found for display         Today's Medication Changes          These changes are accurate as of: 5/12/17  7:36 PM.  If you have any questions, ask your nurse or doctor.               Start taking these medicines.        Dose/Directions    cefdinir 300 MG capsule   Commonly known as:  OMNICEF   Used for:  Acute sinusitis with coexisting condition requiring prophylactic treatment, Acute bronchitis with coexisting condition requiring prophylactic treatment   Started by:  Hitesh Barba APRN CNP        Dose:  300 mg   Take 1 capsule (300 mg) by mouth 2 times daily for 10 days   Quantity:  20 capsule   Refills:  0        guaiFENesin-codeine 100-10 MG/5ML Soln solution   Commonly known as:  ROBITUSSIN AC   Used for:  Cough   Started by:  Hitesh Barba APRN CNP        Dose:  1 tsp.   Take 5 mLs by mouth every 4 hours as needed for cough   Quantity:  120 mL   Refills:  0         These medicines have changed or have updated prescriptions.        Dose/Directions    * albuterol 108 (90 BASE) MCG/ACT Inhaler   Commonly known as:  PROAIR HFA/PROVENTIL HFA/VENTOLIN HFA   This may have changed:  Another medication with the same name was added. Make sure you understand how and when to take each.   Used for:  Cough, Acute bronchospasm   Changed by:  Elizabeth Newman PA-C        Dose:  2 puff   Inhale 2 puffs into the lungs every 6 hours as needed for shortness of breath / dyspnea or wheezing   Quantity:  1 Inhaler   Refills:  0       * albuterol (2.5 MG/3ML) 0.083% neb solution   This may have changed:  You were already taking a medication with the same name, and this prescription was added. Make sure you understand how and when to take each.   Used for:  Acute bronchitis with coexisting condition requiring prophylactic treatment, Cough   Changed by:  Hitesh Barba APRN CNP        Dose:  1 vial   Take 1 vial (2.5 mg) by nebulization every 6 hours as needed for shortness of breath / dyspnea or wheezing   Quantity:  1 Box   Refills:  0       * Notice:  This list has 2 medication(s) that are the same as other medications prescribed for you. Read the directions carefully, and ask your doctor or other care provider to review them with you.         Where to get your medicines      These medications were sent to [facility] [address] [place] [address] - [address]  [address], [facility] [address]     Phone:  [phone]     albuterol (2.5 MG/3ML) 0.083% neb solution    cefdinir 300 MG capsule         Some of these will need a paper prescription and others can be bought over the counter.  Ask your nurse if you have questions.     Bring  a paper prescription for each of these medications     guaiFENesin-codeine 100-10 MG/5ML Soln solution                Primary Care Provider Office Phone # Fax #    Yuan Schofield [phone]       [facility] [address] [place] [address]        Thank you!     Thank you for choosing [facility] [place]  for your care. Our goal is always to provide you with excellent care. Hearing back from our patients is one way we can continue to improve our services. Please take a few minutes to complete the written survey that you may receive in the mail after your visit with us. Thank you!             Your Updated Medication List - Protect others around you: Learn how to safely use, store and throw away your medicines at www.disposemymeds.org.          This list is accurate as of: 5/12/17  7:36 PM.  Always use your most recent med list.                   Brand Name Dispense Instructions for use    * albuterol 108 (90 BASE) MCG/ACT Inhaler    PROAIR HFA/PROVENTIL HFA/VENTOLIN HFA    1 Inhaler    Inhale 2 puffs into the lungs every 6 hours as needed for shortness of breath / dyspnea or wheezing       * albuterol (2.5 MG/3ML) 0.083% neb solution     1 Box    Take 1 vial (2.5 mg) by nebulization every 6 hours as needed for shortness of breath / dyspnea or wheezing       B-12 100 MCG Tabs      Reported on 5/12/2017       calcium citrate + Tabs      Reported on 5/12/2017       cefdinir 300 MG capsule    OMNICEF    20 capsule    Take 1 capsule (300 mg) by mouth 2 times daily for 10 days       guaiFENesin-codeine 100-10 MG/5ML Soln solution    ROBITUSSIN AC    120 mL    Take 5 mLs by mouth every 4 hours as needed for cough       levothyroxine 175 MCG tablet    SYNTHROID/LEVOTHROID    30 tablet    TAKE 1 TABLET BY MOUTH EVERY DAY       lidocaine 5 % Patch    LIDODERM    30 patch    Reported on 5/12/2017       traZODone 50 MG tablet    DESYREL    90 tablet    TAKE 1 TO 3 TABLETS  BY MOUTH EVERY NIGHT AT BEDTIME       * Notice:  This list has 2 medication(s) that are the same as other medications prescribed for you. Read the directions carefully, and ask your doctor or other care provider to review them with you.

## 2017-05-13 NOTE — PROGRESS NOTES
SUBJECTIVE:   Mattie Banda is a 36 year old female presenting with a chief complaint of fever, nasal congestion (alejandra blows), rhinorrhea, cough green and pink at times, post tussive emesis, ear pain right, sore throat, facial pain/pressure, hoarse voice, headache, myalgias, malaise, had stomach ache and seemed to start /c allergy like symptoms.  Onset of symptoms was 2 week(s) ago.  Course of illness is worsening.    Severity severe  Current and Associated symptoms: above noted, lump feeling in throat.  Treatment measures tried include OTC medications (many), Inhaler (name: albuterol) and was using nebulizer.  Predisposing factors include seasonal allergies, tobacco abuse and exposures in nursing home.    Past Medical History:   Diagnosis Date     Papanicolaou smear of cervix with low grade squamous intraepithelial lesion (LGSIL)      Thyroid disease      Urinary tract infection, site not specified     Recurrent UTI's     Current Outpatient Prescriptions   Medication Sig Dispense Refill     traZODone (DESYREL) 50 MG tablet TAKE 1 TO 3 TABLETS BY MOUTH EVERY NIGHT AT BEDTIME 90 tablet 5     albuterol (PROAIR HFA, PROVENTIL HFA, VENTOLIN HFA) 108 (90 BASE) MCG/ACT inhaler Inhale 2 puffs into the lungs every 6 hours as needed for shortness of breath / dyspnea or wheezing 1 Inhaler 0     levothyroxine (SYNTHROID/LEVOTHROID) 175 MCG tablet TAKE 1 TABLET BY MOUTH EVERY DAY (Patient not taking: Reported on 5/12/2017) 30 tablet 0     nabumetone (RELAFEN) 750 MG tablet Take 1 tablet (750 mg) by mouth 2 times daily as needed for moderate pain (Patient not taking: Reported on 5/12/2017) 30 tablet 1     cyclobenzaprine (FLEXERIL) 5 MG tablet Take 1 tablet (5 mg) by mouth 3 times daily as needed for muscle spasms (Patient not taking: Reported on 5/12/2017) 45 tablet 1     predniSONE (DELTASONE) 20 MG tablet Take 3 tabs (60 mg) by mouth daily x 3 days, 2 tabs (40 mg) daily x 3 days, 1 tab (20 mg) daily x 3 days, then 1/2  tab (10 mg) x 3 days. (Patient not taking: Reported on 5/12/2017) 20 tablet 0     propranolol (INDERAL) 10 MG tablet Take 1 tablet (10 mg) by mouth 2 times daily (Patient not taking: Reported on 5/12/2017) 28 tablet 0     Cyanocobalamin (B-12) 100 MCG TABS Reported on 5/12/2017       lidocaine (LIDODERM) 5 % patch Reported on 5/12/2017 30 patch 0     Multiple Minerals-Vitamins (CALCIUM CITRATE +) TABS Reported on 5/12/2017       Social History   Substance Use Topics     Smoking status: Current Some Day Smoker     Packs/day: 1.00     Years: 8.00     Types: Cigarettes     Smokeless tobacco: Never Used     Alcohol use 0.0 oz/week     0 Standard drinks or equivalent per week      Comment: 1/mo     ROS:  Review of systems negative except as stated above.    OBJECTIVE:  /63 (BP Location: Right arm, Patient Position: Chair, Cuff Size: Adult Regular)  Pulse 76  Temp 99.3  F (37.4  C) (Tympanic)  SpO2 96%  GENERAL APPEARANCE: alert, moderate distress and cooperative  EYES: EOMI,  PERRL, conjunctiva clear  HENT: ear canals and TM's mostly normal.  Mouth without ulcers, erythema or lesions  HENT: nasal turbinates erythematous, swollen and maxillary sinus tenderness   NECK: bilateral anterior cervical adenopathy  RESP: expiratory wheezes bibasilar and decreased breath sounds  CV: regular rates and rhythm, normal S1 S2, no murmur noted  ABDOMEN:  soft, nontender, no HSM or masses and bowel sounds normal  NEURO: Normal strength and tone, sensory exam grossly normal,  normal speech and mentation  SKIN: no suspicious lesions or rashes    ASSESSMENT:     Cough  Acute sinusitis with coexisting condition requiring prophylactic treatment  Acute bronchitis with coexisting condition requiring prophylactic treatment      PLAN:  Current Outpatient Prescriptions   Medication     albuterol (2.5 MG/3ML) 0.083% neb solution     cefdinir (OMNICEF) 300 MG capsule     guaiFENesin-codeine (ROBITUSSIN AC) 100-10 MG/5ML SOLN solution      traZODone (DESYREL) 50 MG tablet     albuterol (PROAIR HFA, PROVENTIL HFA, VENTOLIN HFA) 108 (90 BASE) MCG/ACT inhaler     levothyroxine (SYNTHROID/LEVOTHROID) 175 MCG tablet     Cyanocobalamin (B-12) 100 MCG TABS     lidocaine (LIDODERM) 5 % patch     Multiple Minerals-Vitamins (CALCIUM CITRATE +) TABS     No current facility-administered medications for this visit.      Get plenty of rest & drink plenty of fluids (mainly water).  Take OTC, or medications prescribed to treat symptoms.  Mucinex is product known to help loosen congestion (generics are available.).   Dark Honey, such as Sen Wheat Honey has been shown to be helpful in cough management.  Avoid smoke (cigarettes or fireplace/wood burning stoves).  If you develop trouble breathing, swallowing or cough-up blood, immediately go to ER.  Using a vaporizer, humidifier, or steam from hot water to add moisture to the air can help  Follow-up with primary care provider if not improving with in 3 days or symptoms worsen.  A cough may last up to 2 weeks.    Hitesh Barba MSN, APRN, Family NP-C  [facility]  May 12, 2017

## 2017-05-13 NOTE — NURSING NOTE
"Chief Complaint   Patient presents with     Sinus Problem     x 2 weeks, congestion in nasal and lungs.       Initial /63 (BP Location: Right arm, Patient Position: Chair, Cuff Size: Adult Regular)  Pulse 76  Temp 99.3  F (37.4  C) (Tympanic)  SpO2 96% Estimated body mass index is 22.87 kg/(m^2) as calculated from the following:    Height as of 2/9/17: 5' 7\" (1.702 m).    Weight as of 2/9/17: 146 lb (66.2 kg).  Medication Reconciliation: complete   Ericka Guillen CMA     "

## 2017-05-13 NOTE — PATIENT INSTRUCTIONS
Afrin/Dristan nasal spray as needed for up to 3 days.  Apply warm facial packs for 5-10 minutes three times daily.  Attempt to quit smoking.  Drink plenty of fluids- 6 to 10 glasses of liquids each day.  Elevate head of the bed.  Increase the humidity level in the home.  Rest.  Saline drops or nasal sprays as needed.  Sudafed 60mg every 4-6 hours are needed for congestion.  Take warm, steamy showers to reduce congestion.  Tylenol or ibuprofen as needed for discomfort.  Contact primary care clinic for increasing pain, high fever, vision changes, worsening symptoms, or no relief from symptoms after 7-10 days.  May drink tea /c honey to sooth throat.    Symptomatic treatment or other home remedies as discussed & reviewed.   Use of a nasal flush discussed with Mattie Banda as a good treatment option.   OTC Mucinex (or generic) may help to loosen congestion as well.  Rest as needed.  Avoid items which you are or maybe allergic.  Add moisture to the air with a humidifier or a vaporizer &/or inhale steam from a basin of hot water or shower.  Follow up at: with PCP

## 2017-06-12 ENCOUNTER — OFFICE VISIT (OUTPATIENT)
Dept: FAMILY MEDICINE | Facility: CLINIC | Age: 37
End: 2017-06-12

## 2017-06-12 VITALS
SYSTOLIC BLOOD PRESSURE: 108 MMHG | HEART RATE: 82 BPM | DIASTOLIC BLOOD PRESSURE: 60 MMHG | BODY MASS INDEX: 24.28 KG/M2 | WEIGHT: 155 LBS | TEMPERATURE: 97.3 F | RESPIRATION RATE: 16 BRPM | OXYGEN SATURATION: 98 %

## 2017-06-12 DIAGNOSIS — B36.0 TINEA VERSICOLOR: ICD-10-CM

## 2017-06-12 DIAGNOSIS — G47.9 SLEEP DISORDER: ICD-10-CM

## 2017-06-12 DIAGNOSIS — F41.8 MIXED ANXIETY DEPRESSIVE DISORDER: ICD-10-CM

## 2017-06-12 DIAGNOSIS — E03.9 ACQUIRED HYPOTHYROIDISM: Primary | ICD-10-CM

## 2017-06-12 DIAGNOSIS — F17.200 TOBACCO USE DISORDER: ICD-10-CM

## 2017-06-12 DIAGNOSIS — K52.832 LYMPHOCYTIC COLITIS: ICD-10-CM

## 2017-06-12 DIAGNOSIS — Z98.1 S/P LUMBAR FUSION: ICD-10-CM

## 2017-06-12 PROCEDURE — 99214 OFFICE O/P EST MOD 30 MIN: CPT | Performed by: FAMILY MEDICINE

## 2017-06-12 RX ORDER — LEVOTHYROXINE SODIUM 200 UG/1
200 TABLET ORAL DAILY
Qty: 90 TABLET | Refills: 1 | Status: SHIPPED | OUTPATIENT
Start: 2017-06-12 | End: 2018-02-07

## 2017-06-12 RX ORDER — LEVOTHYROXINE SODIUM 175 UG/1
175 TABLET ORAL DAILY
Qty: 90 TABLET | Refills: 0 | Status: CANCELLED | COMMUNITY
Start: 2017-06-12

## 2017-06-12 RX ORDER — OMEGA-3 FATTY ACIDS/FISH OIL 300-1000MG
800 CAPSULE ORAL EVERY 4 HOURS PRN
Qty: 120 CAPSULE | COMMUNITY
Start: 2017-06-12 | End: 2018-09-20

## 2017-06-12 RX ORDER — TRAZODONE HYDROCHLORIDE 50 MG/1
TABLET, FILM COATED ORAL
Qty: 90 TABLET | Refills: 5 | Status: SHIPPED | OUTPATIENT
Start: 2017-06-12 | End: 2018-03-22

## 2017-06-12 ASSESSMENT — ANXIETY QUESTIONNAIRES
5. BEING SO RESTLESS THAT IT IS HARD TO SIT STILL: NEARLY EVERY DAY
7. FEELING AFRAID AS IF SOMETHING AWFUL MIGHT HAPPEN: MORE THAN HALF THE DAYS
GAD7 TOTAL SCORE: 20
1. FEELING NERVOUS, ANXIOUS, OR ON EDGE: NEARLY EVERY DAY
3. WORRYING TOO MUCH ABOUT DIFFERENT THINGS: NEARLY EVERY DAY
6. BECOMING EASILY ANNOYED OR IRRITABLE: NEARLY EVERY DAY
IF YOU CHECKED OFF ANY PROBLEMS ON THIS QUESTIONNAIRE, HOW DIFFICULT HAVE THESE PROBLEMS MADE IT FOR YOU TO DO YOUR WORK, TAKE CARE OF THINGS AT HOME, OR GET ALONG WITH OTHER PEOPLE: EXTREMELY DIFFICULT
2. NOT BEING ABLE TO STOP OR CONTROL WORRYING: NEARLY EVERY DAY

## 2017-06-12 ASSESSMENT — PAIN SCALES - GENERAL: PAINLEVEL: SEVERE PAIN (7)

## 2017-06-12 ASSESSMENT — PATIENT HEALTH QUESTIONNAIRE - PHQ9: 5. POOR APPETITE OR OVEREATING: NEARLY EVERY DAY

## 2017-06-12 NOTE — MR AVS SNAPSHOT
After Visit Summary   6/12/2017    Mattie Banda    MRN: [id number]           Patient Information     Date Of Birth          1980        Visit Information        Provider Department      6/12/2017 3:45 PM Yuan Schofield MD [facility]        Today's Diagnoses     Mixed anxiety depressive disorder    -  1    Other specified hypothyroidism        Sleep disorder        Tinea versicolor          Care Instructions    Use selenium containing shampoo daily for 2-3 minutes  Start new dose levothyroxine  Have blood work in late July  With your colitis, thyroid issues, and joint pain, there may be a rheumatologic component still.  When you have insurance let me know so I can consider some treatment options for you.   Sertraline 25 mg for 4 days at least, may increase to 50 mg any time.  This may still be a small dose. It should help anxiety, perhaps improve sleep and headache. Dose could be increased.          Follow-ups after your visit        Who to contact     If you have questions or need follow up information about today's clinic visit or your schedule please contact [facility] directly at [phone].  Normal or non-critical lab and imaging results will be communicated to you by [employer], letter or phone within 4 business days after the clinic has received the results. If you do not hear from us within 7 days, please contact the clinic through [employer] or phone. If you have a critical or abnormal lab result, we will notify you by phone as soon as possible.  Submit refill requests through [employer] or call your pharmacy and they will forward the refill request to us. Please allow 3 business days for your refill to be completed.          Additional Information About Your Visit        MyChart Information     [employer] gives you secure access to your electronic health record. If you see a primary care provider, you can also send messages to your care team and make  appointments. If you have questions, please call your primary care clinic.  If you do not have a primary care provider, please call [phone] and they will assist you.        Care EveryWhere ID     This is your Care EveryWhere ID. This could be used by other organizations to access your [place] medical records  [id number]        Your Vitals Were     Pulse Temperature Respirations Last Period Pulse Oximetry BMI (Body Mass Index)    82 97.3  F (36.3  C) (Temporal) 16 05/27/2017 98% 24.28 kg/m2       Blood Pressure from Last 3 Encounters:   06/12/17 108/60   05/12/17 107/63   02/09/17 132/86    Weight from Last 3 Encounters:   06/12/17 155 lb (70.3 kg)   02/09/17 146 lb (66.2 kg)   01/31/17 146 lb (66.2 kg)              We Performed the Following     DEPRESSION ACTION PLAN (DAP)          Today's Medication Changes          These changes are accurate as of: 6/12/17  5:04 PM.  If you have any questions, ask your nurse or doctor.               Start taking these medicines.        Dose/Directions    sertraline 50 MG tablet   Commonly known as:  ZOLOFT   Used for:  Mixed anxiety depressive disorder   Started by:  Yuan Schofield MD        Take 1/2 tablet (25 mg) for 1-2 weeks, then increase to 1 tablet orally daily   Quantity:  30 tablet   Refills:  0         These medicines have changed or have updated prescriptions.        Dose/Directions    * levothyroxine 175 MCG tablet   Commonly known as:  SYNTHROID/LEVOTHROID   This may have changed:  Another medication with the same name was added. Make sure you understand how and when to take each.   Used for:  Other specified hypothyroidism   Changed by:  Yuan Schofield MD        TAKE 1 TABLET BY MOUTH EVERY DAY   Quantity:  30 tablet   Refills:  0       * levothyroxine 200 MCG tablet   Commonly known as:  SYNTHROID/LEVOTHROID   This may have changed:  You were already taking a medication with the same name, and this prescription was added. Make sure you understand  how and when to take each.   Used for:  Other specified hypothyroidism, Tinea versicolor   Changed by:  Yuan Schofield MD        Dose:  200 mcg   Take 1 tablet (200 mcg) by mouth daily   Quantity:  90 tablet   Refills:  1       traZODone 50 MG tablet   Commonly known as:  DESYREL   This may have changed:  See the new instructions.   Used for:  Sleep disorder   Changed by:  Yuan Schofield MD        1 -3 tablets by mouth nightlty   Quantity:  90 tablet   Refills:  5       * Notice:  This list has 2 medication(s) that are the same as other medications prescribed for you. Read the directions carefully, and ask your doctor or other care provider to review them with you.         Where to get your medicines      These medications were sent to [id number] - [place], [place]  [address] [place] [address]  [address] [place] [address] [place] [address]    Hours:  M-F 8:30-6:30; Sat 9-4; closed Sunday Phone:  [phone]     levothyroxine 200 MCG tablet    sertraline 50 MG tablet    traZODone 50 MG tablet                Primary Care Provider Office Phone # Fax #    Yuan Schofield [phone]       [facility] [address] [place] [address]        Thank you!     Thank you for choosing [facility]  for your care. Our goal is always to provide you with excellent care. Hearing back from our patients is one way we can continue to improve our services. Please take a few minutes to complete the written survey that you may receive in the mail after your visit with us. Thank you!             Your Updated Medication List - Protect others around you: Learn how to safely use, store and throw away your medicines at www.disposemymeds.org.          This list is accurate as of: 6/12/17  5:04 PM.  Always use your most recent med list.                   Brand Name Dispense Instructions for use    * albuterol 108 (90 BASE) MCG/ACT Inhaler    PROAIR HFA/PROVENTIL HFA/VENTOLIN HFA    1 Inhaler     Inhale 2 puffs into the lungs every 6 hours as needed for shortness of breath / dyspnea or wheezing       * albuterol (2.5 MG/3ML) 0.083% neb solution     1 Box    Take 1 vial (2.5 mg) by nebulization every 6 hours as needed for shortness of breath / dyspnea or wheezing       ibuprofen 200 MG capsule     120 capsule    Take 800 mg by mouth every 4 hours as needed for fever       * levothyroxine 175 MCG tablet    SYNTHROID/LEVOTHROID    30 tablet    TAKE 1 TABLET BY MOUTH EVERY DAY       * levothyroxine 200 MCG tablet    SYNTHROID/LEVOTHROID    90 tablet    Take 1 tablet (200 mcg) by mouth daily       lidocaine 5 % Patch    LIDODERM    30 patch    Reported on 5/12/2017       sertraline 50 MG tablet    ZOLOFT    30 tablet    Take 1/2 tablet (25 mg) for 1-2 weeks, then increase to 1 tablet orally daily       traZODone 50 MG tablet    DESYREL    90 tablet    1 -3 tablets by mouth nightlty       * Notice:  This list has 4 medication(s) that are the same as other medications prescribed for you. Read the directions carefully, and ask your doctor or other care provider to review them with you.

## 2017-06-12 NOTE — NURSING NOTE
"Chief Complaint   Patient presents with     Anxiety     Recheck     Depression     Recheck     Thyroid Disease     Recheck       Initial /60 (BP Location: Right arm, Patient Position: Chair, Cuff Size: Adult Regular)  Pulse 82  Temp 97.3  F (36.3  C) (Temporal)  Resp 16  Wt 155 lb (70.3 kg)  LMP 05/27/2017  SpO2 98%  BMI 24.28 kg/m2 Estimated body mass index is 24.28 kg/(m^2) as calculated from the following:    Height as of 2/9/17: 5' 7\" (1.702 m).    Weight as of this encounter: 155 lb (70.3 kg).  Medication Reconciliation: complete  "

## 2017-06-12 NOTE — LETTER
My Depression Action Plan  Name: Mattie Banda   Date of Birth 1980  Date: 6/12/2017    My doctor: Yuan Schofield   My clinic: [address] [address] [phone]          GREEN    ZONE   Good Control    What it looks like:     Things are going generally well. You have normal up s and down s. You may even feel depressed from time to time, but bad moods usually last less than a day.   What you need to do:  1. Continue to care for yourself (see self care plan)  2. Check your depression survival kit and update it as needed  3. Follow your physician s recommendations including any medication.  4. Do not stop taking medication unless you consult with your physician first.           YELLOW         ZONE Getting Worse    What it looks like:     Depression is starting to interfere with your life.     It may be hard to get out of bed; you may be starting to isolate yourself from others.    Symptoms of depression are starting to last most all day and this has happened for several days.     You may have suicidal thoughts but they are not constant.   What you need to do:     1. Call your care team, your response to treatment will improve if you keep your care team informed of your progress. Yellow periods are signs an adjustment may need to be made.     2. Continue your self-care, even if you have to fake it!    3. Talk to someone in your support network    4. Open up your depression survival kit           RED    ZONE Medical Alert - Get Help    What it looks like:     Depression is seriously interfering with your life.     You may experience these or other symptoms: You can t get out of bed most days, can t work or engage in other necessary activities, you have trouble taking care of basic hygiene, or basic responsibilities, thoughts of suicide or death that will not go away, self-injurious behavior.     What you need to do:  1. Call your care team  and request a same-day appointment. If they are not available (weekends or after hours) call your local crisis line, emergency room or 911.      Electronically signed by: Elizabeth Robles, June 12, 2017    Depression Self Care Plan / Survival Kit    Self-Care for Depression  Here s the deal. Your body and mind are really not as separate as most people think.  What you do and think affects how you feel and how you feel influences what you do and think. This means if you do things that people who feel good do, it will help you feel better.  Sometimes this is all it takes.  There is also a place for medication and therapy depending on how severe your depression is, so be sure to consult with your medical provider and/ or Behavioral Health Consultant if your symptoms are worsening or not improving.     In order to better manage my stress, I will:    Exercise  Get some form of exercise, every day. This will help reduce pain and release endorphins, the  feel good  chemicals in your brain. This is almost as good as taking antidepressants!  This is not the same as joining a gym and then never going! (they count on that by the way ) It can be as simple as just going for a walk or doing some gardening, anything that will get you moving.      Hygiene   Maintain good hygiene (Get out of bed in the morning, Make your bed, Brush your teeth, Take a shower, and Get dressed like you were going to work, even if you are unemployed).  If your clothes don't fit try to get ones that do.    Diet  I will strive to eat foods that are good for me, drink plenty of water, and avoid excessive sugar, caffeine, alcohol, and other mood-altering substances.  Some foods that are helpful in depression are: complex carbohydrates, B vitamins, flaxseed, fish or fish oil, fresh fruits and vegetables.    Psychotherapy  I agree to participate in Individual Therapy (if recommended).    Medication  If prescribed medications, I agree to take them.  Missing doses  can result in serious side effects.  I understand that drinking alcohol, or other illicit drug use, may cause potential side effects.  I will not stop my medication abruptly without first discussing it with my provider.    Staying Connected With Others  I will stay in touch with my friends, family members, and my primary care provider/team.    Use your imagination  Be creative.  We all have a creative side; it doesn t matter if it s oil painting, sand castles, or mud pies! This will also kick up the endorphins.    Witness Beauty  (AKA stop and smell the roses) Take a look outside, even in mid-winter. Notice colors, textures. Watch the squirrels and birds.     Service to others  Be of service to others.  There is always someone else in need.  By helping others we can  get out of ourselves  and remember the really important things.  This also provides opportunities for practicing all the other parts of the program.    Humor  Laugh and be silly!  Adjust your TV habits for less news and crime-drama and more comedy.    Control your stress  Try breathing deep, massage therapy, biofeedback, and meditation. Find time to relax each day.     My support system    Clinic Contact:  Phone number:    Contact 1:  Phone number:    Contact 2:  Phone number:    [facility]/:  Phone number:    Therapist:  Phone number:    Local crisis center:    Phone number:    Other community support:  Phone number:

## 2017-06-12 NOTE — PROGRESS NOTES
SUBJECTIVE:                                                    Mattie Banda is a 36 year old female who presents to clinic today for the following health issues:        Depression and Anxiety Follow-Up    Status since last visit: Worse    Other associated symptoms:None    Complicating factors:     Significant life event: No     Current substance abuse: None    PHQ-9 SCORE 11/4/2016 1/10/2017 1/31/2017   Total Score - - -   Total Score 12 8 5     CRISTIANO-7 SCORE 2/8/2016 11/4/2016   Total Score 16 14        PHQ-9  English      PHQ-9   Any Language     GAD7     Hypothyroidism Follow-up      Since last visit, patient describes the following symptoms: Weight gain, no hair loss, skin changes, no constipation, no loose stools       Amount of exercise or physical activity: 6-7 days/week for an average of less than 15 minutes    Problems taking medications regularly: Yes,  Out of meds    Medication side effects: none    Diet: regular (no restrictions)      Patient's anxiety at times is very profound. It will affect her sleep. Trazodone as necessary for sleep.    Patient did run out of her thyroid medication more than one month ago. Apparently nobody made arrangements for her to have enough medication until she could be seen today. She has felt more tired and listless since then. Sometime in the last month she has noted that she has discoloration of the skin of her face especially her forehead. Someone suggested Selsun Blue shampoo but she does not know how she should be using this. She has tried nothing else. It does not itch. She has no other rash like this on her body. She continues with back pain and just tolerates it at this point. Mostly his low back but some does go into the legs. He continues to ache more than she desires. She did have a rheumatologic workup in the last year. This was all negative. She has on and off again problems with bowel movements with cramping in diarrhea. Sometimes there is blood. She has had  previous colonoscopies which indicated a lymphocytic colitis. She is wondering if we might do anything for these.    Problem list and histories reviewed & adjusted, as indicated.  Additional history: as documented    Patient Active Problem List   Diagnosis     Contraceptive management     CARDIOVASCULAR SCREENING; LDL GOAL LESS THAN 160     Degeneration of lumbar intervertebral disc     S/P lumbar fusion and discectomy June 2015     Low grade squamous intraepithelial lesion on cytologic smear of cervix (lgsil)     Labial lesion     Mixed anxiety depressive disorder     Tobacco use disorder     Idiopathic peripheral neuropathy     Acquired hypothyroidism     Migraine with aura and without status migrainosus, not intractable     Tinea versicolor     Past Surgical History:   Procedure Laterality Date     BACK SURGERY  6/2015      COLONOSCOPY  08/06/07     COLONOSCOPY  08/05/08     HC COLONOSCOPY W BIOPSY  02/06/08     HC TOOTH EXTRACTION W/FORCEP      wisdom teeth removed     LAPAROSCOPIC TUBAL LIGATION  11/27/2012    Procedure: LAPAROSCOPIC TUBAL LIGATION;  Laparoscopic Bilateral tubal sterilization/ fulgaration;  Surgeon: Sarbjit Whittaker MD;  Location:  OR       Social History   Substance Use Topics     Smoking status: Current Some Day Smoker     Packs/day: 1.00     Years: 8.00     Types: Cigarettes     Smokeless tobacco: Never Used     Alcohol use 0.0 oz/week     0 Standard drinks or equivalent per week      Comment: 1/mo     Family History   Problem Relation Age of Onset     Breast Cancer Other      Maternal great aunt     DIABETES No family hx of      Hypertension Maternal Grandmother      Arthritis Maternal Grandmother      CANCER Maternal Grandmother      MGGM     Depression Maternal Grandmother      Lipids Maternal Grandmother      OSTEOPOROSIS Maternal Grandmother      Respiratory Maternal Grandmother      emphysema     Genitourinary Problems Maternal Grandmother      Kidney Failure, liver       Hypertension Maternal Grandfather      HEART DISEASE Maternal Grandfather      MI     Cardiovascular Maternal Grandfather      Cancer - colorectal Maternal Grandfather      CANCER Maternal Grandfather      [place] Lymphoma     Hypertension Paternal Grandmother      Arthritis Paternal Grandmother      RA     Thyroid Disease Maternal Aunt      two aunts         Current Outpatient Prescriptions   Medication Sig Dispense Refill     ibuprofen 200 MG capsule Take 800 mg by mouth every 4 hours as needed for fever 120 capsule      traZODone (DESYREL) 50 MG tablet 1 -3 tablets by mouth nightlty 90 tablet 5     levothyroxine (SYNTHROID/LEVOTHROID) 200 MCG tablet Take 1 tablet (200 mcg) by mouth daily 90 tablet 1     sertraline (ZOLOFT) 50 MG tablet Take 1/2 tablet (25 mg) for 1-2 weeks, then increase to 1 tablet orally daily 30 tablet 0     albuterol (2.5 MG/3ML) 0.083% neb solution Take 1 vial (2.5 mg) by nebulization every 6 hours as needed for shortness of breath / dyspnea or wheezing 1 Box 0     albuterol (PROAIR HFA, PROVENTIL HFA, VENTOLIN HFA) 108 (90 BASE) MCG/ACT inhaler Inhale 2 puffs into the lungs every 6 hours as needed for shortness of breath / dyspnea or wheezing 1 Inhaler 0     [DISCONTINUED] levothyroxine (SYNTHROID/LEVOTHROID) 175 MCG tablet TAKE 1 TABLET BY MOUTH EVERY DAY 30 tablet 0     lidocaine (LIDODERM) 5 % patch Reported on 5/12/2017 30 patch 0       Reviewed and updated as needed this visit by clinical staff       Reviewed and updated as needed this visit by Provider         ROS:  Constitutional, HEENT, cardiovascular, pulmonary, gi and gu systems are negative, except as otherwise noted.    OBJECTIVE:                                                    /60 (BP Location: Right arm, Patient Position: Chair, Cuff Size: Adult Regular)  Pulse 82  Temp 97.3  F (36.3  C) (Temporal)  Resp 16  Wt 155 lb (70.3 kg)  LMP 05/27/2017  SpO2 98%  BMI 24.28 kg/m2  Body mass index is 24.28  kg/(m^2).  GENERAL: healthy, alert and no distress  EYES: Eyes grossly normal to inspection, PERRL and conjunctivae and sclerae normal  HENT: Moist mucous membranes  NECK: no adenopathy, no asymmetry, masses, or scars and thyroid normal to palpation  RESP: lungs clear to auscultation - no rales, rhonchi or wheezes  CV: regular rate and rhythm, normal S1 S2, no S3 or S4, no murmur, click or rub, no peripheral edema and peripheral pulses strong  ABDOMEN: soft, nontender, no hepatosplenomegaly, no masses and bowel sounds normal  MS: Grossly negative limbs. She does favor her low back with position changes. Does have normal straight leg raising  SKIN: Especially on her forehead but elsewhere slightly on the cheeks and chin area she has varying colors of skin with dark patches and light patches. Borders are slightly irregular/indistinct. There is no redness or inflammation  NEURO: Grossly negative  PSYCH: mentation appears normal, affect flat, fatigued, judgement and insight intact and appearance well groomed    Diagnostic Test Results:  Results for orders placed or performed in visit on 11/04/16   Antinuclear antibody screen by EIA   Result Value Ref Range    SAIDA Screen by EIA <1.0  Interpretation:  Negative   <1.0   CRP, inflammation   Result Value Ref Range    CRP Inflammation <2.9 0.0 - 8.0 mg/L   ESR: Erythrocyte sedimentation rate   Result Value Ref Range    Sed Rate 14 0 - 20 mm/h   CBC with platelets and differential   Result Value Ref Range    WBC 8.2 4.0 - 11.0 10e9/L    RBC Count 3.70 (L) 3.8 - 5.2 10e12/L    Hemoglobin 11.8 11.7 - 15.7 g/dL    Hematocrit 36.0 35.0 - 47.0 %    MCV 97 78 - 100 fl    MCH 31.9 26.5 - 33.0 pg    MCHC 32.8 31.5 - 36.5 g/dL    RDW 13.3 10.0 - 15.0 %    Platelet Count 287 150 - 450 10e9/L    Diff Method Automated Method     % Neutrophils 64.4 %    % Lymphocytes 25.6 %    % Monocytes 7.4 %    % Eosinophils 2.1 %    % Basophils 0.5 %    Absolute Neutrophil 5.3 1.6 - 8.3 10e9/L     "Absolute Lymphocytes 2.1 0.8 - 5.3 10e9/L    Absolute Monocytes 0.6 0.0 - 1.3 10e9/L    Absolute Eosinophils 0.2 0.0 - 0.7 10e9/L    Absolute Basophils 0.0 0.0 - 0.2 10e9/L   Lyme Disease Samanta with reflex to WB Serum   Result Value Ref Range    Lyme Disease Antibodies Serum 0.05 0.00 - 0.89   Rheumatoid factor   Result Value Ref Range    Rheumatoid Factor <20 <20 IU/mL   Basic metabolic panel  (Ca, Cl, CO2, Creat, Gluc, K, Na, BUN)   Result Value Ref Range    Sodium 137 133 - 144 mmol/L    Potassium 3.8 3.4 - 5.3 mmol/L    Chloride 104 94 - 109 mmol/L    Carbon Dioxide 28 20 - 32 mmol/L    Anion Gap 5 3 - 14 mmol/L    Glucose 76 70 - 99 mg/dL    Urea Nitrogen 13 7 - 30 mg/dL    Creatinine 0.97 0.52 - 1.04 mg/dL    GFR Estimate 65 >60 mL/min/1.7m2    GFR Estimate If Black 79 >60 mL/min/1.7m2    Calcium 8.8 8.5 - 10.1 mg/dL        ASSESSMENT/PLAN:                                                          Tobacco Cessation:   reports that she has been smoking Cigarettes.  She has a 8.00 pack-year smoking history. She has never used smokeless tobacco.  Tobacco Cessation Action Plan: Information offered: Patient not interested at this time    BMI:   Estimated body mass index is 24.28 kg/(m^2) as calculated from the following:    Height as of 2/9/17: 5' 7\" (1.702 m).    Weight as of this encounter: 155 lb (70.3 kg).         1. Acquired hypothyroidism  With out having thyroid for a month would not be appropriate to check TSH. Even when she was on thyroid she felt like the dose might be insufficient. I'm starting 200  g today with a slight increase from before. We will recheck TSH in about 2 months. Patient does not have insurance I do not want to incur great charges for her if I do not need to. Ideally I would have her come back sometime this summer as well. Because of our changes at work she has not qualified for insurance but she hopes to be able to have insurance later.  - levothyroxine (SYNTHROID/LEVOTHROID) 200 MCG " tablet; Take 1 tablet (200 mcg) by mouth daily  Dispense: 90 tablet; Refill: 1    2. Mixed anxiety depressive disorder  Sertraline is added because of the anxiety depressive disorder. She has been on SSRI medications in the past. This is a better choice than a benzodiazepine.  - DEPRESSION ACTION PLAN (DAP)  - sertraline (ZOLOFT) 50 MG tablet; Take 1/2 tablet (25 mg) for 1-2 weeks, then increase to 1 tablet orally daily  Dispense: 30 tablet; Refill: 0    3. Sleep disorder  Continue trazodone which has helped her sleep well.  - traZODone (DESYREL) 50 MG tablet; 1 -3 tablets by mouth nightlty  Dispense: 90 tablet; Refill: 5    4. Tinea versicolor  Skin rash appears to be tinea versicolor and she will use Selsun containing shampoo and instructed to lather it on and leave it in place. One of the reasons it may have just showed up as it developed now during the spring when she spends time outdoors. She understands now why it it developed in that it probably is not intake is now spreading.    5. S/P lumbar fusion and discectomy June 2015  Symptomatic treatment    6. Tobacco use disorder  At some point may need to have her consider being a nonsmoker. 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    7. Lymphocytic colitis  Perhaps sertraline will help this if she is less anxious and her thyroid also gets in control. I do suspect her aches and pains perhaps even her thyroid disorder are some type of immune disorder. This would be along with this problem. Perhaps some form of immune modulator like methotrexate, Plaquenil, other might be an appropriate choice to help with some of her symptoms. At this point I'll wait to see what happens with the thyroid dose increase. Also more workup might be considered if and when she gets insurance      Patient Instructions   Use selenium containing shampoo daily for 2-3 minutes  Start new dose levothyroxine  Have blood work in late July  With your colitis,  thyroid issues, and joint pain, there may be a rheumatologic component still.  When you have insurance let me know so I can consider some treatment options for you.   Sertraline 25 mg for 4 days at least, may increase to 50 mg any time.  This may still be a small dose. It should help anxiety, perhaps improve sleep and headache. Dose could be increased.      Yuan Schofield MD  [facility]

## 2017-06-12 NOTE — PATIENT INSTRUCTIONS
Use selenium containing shampoo daily for 2-3 minutes  Start new dose levothyroxine  Have blood work in late July  With your colitis, thyroid issues, and joint pain, there may be a rheumatologic component still.  When you have insurance let me know so I can consider some treatment options for you.   Sertraline 25 mg for 4 days at least, may increase to 50 mg any time.  This may still be a small dose. It should help anxiety, perhaps improve sleep and headache. Dose could be increased.

## 2017-06-13 PROBLEM — K52.832 LYMPHOCYTIC COLITIS: Status: ACTIVE | Noted: 2017-06-13

## 2017-06-13 ASSESSMENT — ANXIETY QUESTIONNAIRES: GAD7 TOTAL SCORE: 20

## 2017-06-13 ASSESSMENT — PATIENT HEALTH QUESTIONNAIRE - PHQ9: SUM OF ALL RESPONSES TO PHQ QUESTIONS 1-9: 9

## 2017-07-13 ENCOUNTER — TELEPHONE (OUTPATIENT)
Dept: FAMILY MEDICINE | Facility: CLINIC | Age: 37
End: 2017-07-13

## 2017-07-13 NOTE — TELEPHONE ENCOUNTER
": 1980  PHONE #'s: [phone] (home)     PRESENTING PROBLEM:  \" I have a hx of hemorrhoids. Has been dealing with a bout of colitis- solid diarrhea for the past 2 weeks and it is very bad. My Hemorrhoids are aggravated by the loose stools.  I feel a large lump by my rectum that looks purple and really hurts?  It is uncomfortable to walk.\"    NURSING ASSESSMENT  Description: \" My stools have been black for 2 weeks.  I am tired and it is very painful. I have been trying to use Hemorrhoid cream and soaking in a tub, but NOT helping. I am wondering if I need something for the colitis , too?\"  Onset/duration:  2 weeks, but BAD today.   Precip. factors:   As above.   Assoc. Sx:  As above.   Improves/worsens Sx:   WORSE  Pain scale (1-10)   6 to 7 out of 10  Sx specific meds:  Pepto Bismol and Kaopectate.   LMP/preg/breast feeding:   NA  Last exam/Tx:  Has NOT been seen for this.     RECOMMENDED DISPOSITION:  To ED, another person to drive - there an NO available appt in clinic. Her PCP is out of office today as well. [facility] is NOT the place for this either.   Will comply with recommendation: YES   If further questions/concerns or if Sx do not improve, worsen or new Sx develop, call your PCP or [facility] as soon as possible.    NOTES:  Disposition was determined by the first positive assessment question, therefore all previous assessment questions were negative.  Informed to check provider manual or call insurance company to assure coverage.    Guideline used:Hemorrhoids.  Telephone Triage Protocols for Nurses, Fifth Edition, Myesha Rocha RN    "

## 2018-02-05 ENCOUNTER — TELEPHONE (OUTPATIENT)
Dept: FAMILY MEDICINE | Facility: CLINIC | Age: 38
End: 2018-02-05

## 2018-02-05 NOTE — TELEPHONE ENCOUNTER
Reason for Call:  Same Day Appointment, Requested Provider:  Yuan Schofield M.D.    PCP: Yuan Schofield    Reason for visit: severe back pain, neck and shoulder pain.  She has had back surgery in the past but it is getting worse and she just can't take it and states it is effecting her job.    Duration of symptoms: ongoing    Have you been treated for this in the past? Yes    Additional comments: would like to be seen asap    Can we leave a detailed message on this number? YES    Phone number patient can be reached at: Home number on file [phone] (home)    Best Time: any    Call taken on 2/5/2018 at 1:00 PM by Jesus Hartley

## 2018-02-07 ENCOUNTER — OFFICE VISIT (OUTPATIENT)
Dept: FAMILY MEDICINE | Facility: CLINIC | Age: 38
End: 2018-02-07
Payer: MEDICAID

## 2018-02-07 VITALS
DIASTOLIC BLOOD PRESSURE: 60 MMHG | WEIGHT: 161 LBS | HEIGHT: 66 IN | RESPIRATION RATE: 16 BRPM | BODY MASS INDEX: 25.88 KG/M2 | HEART RATE: 72 BPM | TEMPERATURE: 97 F | SYSTOLIC BLOOD PRESSURE: 112 MMHG | OXYGEN SATURATION: 98 %

## 2018-02-07 DIAGNOSIS — F17.200 TOBACCO USE DISORDER: ICD-10-CM

## 2018-02-07 DIAGNOSIS — M50.30 DDD (DEGENERATIVE DISC DISEASE), CERVICAL: Primary | ICD-10-CM

## 2018-02-07 DIAGNOSIS — M51.369 DEGENERATION OF LUMBAR INTERVERTEBRAL DISC: ICD-10-CM

## 2018-02-07 DIAGNOSIS — F41.8 MIXED ANXIETY DEPRESSIVE DISORDER: ICD-10-CM

## 2018-02-07 DIAGNOSIS — N92.0 EXCESSIVE OR FREQUENT MENSTRUATION: ICD-10-CM

## 2018-02-07 DIAGNOSIS — Z98.1 S/P LUMBAR FUSION: ICD-10-CM

## 2018-02-07 DIAGNOSIS — E03.9 ACQUIRED HYPOTHYROIDISM: ICD-10-CM

## 2018-02-07 PROCEDURE — 99215 OFFICE O/P EST HI 40 MIN: CPT | Performed by: FAMILY MEDICINE

## 2018-02-07 RX ORDER — HYDROCODONE BITARTRATE AND ACETAMINOPHEN 5; 325 MG/1; MG/1
1-2 TABLET ORAL EVERY 8 HOURS PRN
Qty: 42 TABLET | Refills: 0 | Status: SHIPPED | OUTPATIENT
Start: 2018-02-07 | End: 2018-02-19

## 2018-02-07 RX ORDER — LORAZEPAM 1 MG/1
1 TABLET ORAL EVERY 8 HOURS PRN
Qty: 20 TABLET | Refills: 0 | Status: SHIPPED | OUTPATIENT
Start: 2018-02-07 | End: 2018-02-19

## 2018-02-07 RX ORDER — ESCITALOPRAM OXALATE 10 MG/1
10 TABLET ORAL DAILY
Qty: 30 TABLET | Refills: 1 | Status: SHIPPED | OUTPATIENT
Start: 2018-02-07 | End: 2018-02-26 | Stop reason: DRUGHIGH

## 2018-02-07 RX ORDER — LEVOTHYROXINE SODIUM 200 UG/1
200 TABLET ORAL DAILY
Qty: 90 TABLET | Refills: 1 | Status: SHIPPED | OUTPATIENT
Start: 2018-02-07 | End: 2018-07-11

## 2018-02-07 ASSESSMENT — ANXIETY QUESTIONNAIRES
7. FEELING AFRAID AS IF SOMETHING AWFUL MIGHT HAPPEN: NEARLY EVERY DAY
2. NOT BEING ABLE TO STOP OR CONTROL WORRYING: NEARLY EVERY DAY
6. BECOMING EASILY ANNOYED OR IRRITABLE: NEARLY EVERY DAY
GAD7 TOTAL SCORE: 21
3. WORRYING TOO MUCH ABOUT DIFFERENT THINGS: NEARLY EVERY DAY
1. FEELING NERVOUS, ANXIOUS, OR ON EDGE: NEARLY EVERY DAY
IF YOU CHECKED OFF ANY PROBLEMS ON THIS QUESTIONNAIRE, HOW DIFFICULT HAVE THESE PROBLEMS MADE IT FOR YOU TO DO YOUR WORK, TAKE CARE OF THINGS AT HOME, OR GET ALONG WITH OTHER PEOPLE: EXTREMELY DIFFICULT
5. BEING SO RESTLESS THAT IT IS HARD TO SIT STILL: NEARLY EVERY DAY

## 2018-02-07 ASSESSMENT — PAIN SCALES - GENERAL: PAINLEVEL: SEVERE PAIN (7)

## 2018-02-07 ASSESSMENT — PATIENT HEALTH QUESTIONNAIRE - PHQ9: 5. POOR APPETITE OR OVEREATING: NEARLY EVERY DAY

## 2018-02-07 NOTE — PATIENT INSTRUCTIONS
Schedule mri with [facility] as soon as you know for sure you have insurance  Use pain meds sparingly  Take lorazepam if a melt down and before MRI exam. There is some abuse potential with this medication is used daily. It also may be so effective, all other meds do not seem to work  Escitalopram once with food ok, use half tab if desired

## 2018-02-07 NOTE — MR AVS SNAPSHOT
After Visit Summary   2/7/2018    Mattie Banda    MRN: [id number]           Patient Information     Date Of Birth          1980        Visit Information        Provider Department      2/7/2018 1:15 PM Yuan Schofield MD [facility]        Today's Diagnoses     DDD (degenerative disc disease), cervical    -  1    Acquired hypothyroidism        Degeneration of lumbar intervertebral disc        S/P lumbar fusion and discectomy June 2015        Tobacco use disorder        Mixed anxiety depressive disorder        Excessive or frequent menstruation          Care Instructions    Schedule mri with x ray as soon as you know for sure you have insurance  Use pain meds sparingly  Take lorazepam if a melt down and before MRI exam. There is some abuse potential with this medication is used daily. It also may be so effective, all other meds do not seem to work  Escitalopram once with food ok, use half tab if desired          Follow-ups after your visit        Your next 10 appointments already scheduled     Feb 28, 2018  2:45 PM CST   Office Visit with Yuan Schofield MD   [facility] ([facility])    [address] [place] [id number] [phone]           Bring a current list of meds and any records pertaining to this visit. For Physicals, please bring immunization records and any forms needing to be filled out. Please arrive 10 minutes early to complete paperwork.              Future tests that were ordered for you today     Open Future Orders        Priority Expected Expires Ordered    MR Cervical Spine w/o Contrast Routine  2/7/2019 2/7/2018    MR Lumbar Spine w/o Contrast Routine  2/7/2019 2/7/2018            Who to contact     If you have questions or need follow up information about today's clinic visit or your schedule please contact [facility] directly at [phone].  Normal or non-critical lab and imaging results  "will be communicated to you by LinguaSyshart, letter or phone within 4 business days after the clinic has received the results. If you do not hear from us within 7 days, please contact the clinic through [employer] or phone. If you have a critical or abnormal lab result, we will notify you by phone as soon as possible.  Submit refill requests through [employer] or call your pharmacy and they will forward the refill request to us. Please allow 3 business days for your refill to be completed.          Additional Information About Your Visit        [employer] Information     [employer] gives you secure access to your electronic health record. If you see a primary care provider, you can also send messages to your care team and make appointments. If you have questions, please call your primary care clinic.  If you do not have a primary care provider, please call [phone] and they will assist you.        Care EveryWhere ID     This is your Care EveryWhere ID. This could be used by other organizations to access your [place] medical records  [id number]        Your Vitals Were     Pulse Temperature Respirations Height Last Period Pulse Oximetry    72 97  F (36.1  C) (Temporal) 16 5' 6\" (1.676 m) 01/24/2018 98%    BMI (Body Mass Index)                   25.99 kg/m2            Blood Pressure from Last 3 Encounters:   02/07/18 112/60   06/12/17 108/60   05/12/17 107/63    Weight from Last 3 Encounters:   02/07/18 161 lb (73 kg)   06/12/17 155 lb (70.3 kg)   02/09/17 146 lb (66.2 kg)                 Today's Medication Changes          These changes are accurate as of 2/7/18  4:56 PM.  If you have any questions, ask your nurse or doctor.               Start taking these medicines.        Dose/Directions    escitalopram 10 MG tablet   Commonly known as:  LEXAPRO   Used for:  Mixed anxiety depressive disorder   Started by:  Yuan Schofield MD        Dose:  10 mg   Take 1 tablet (10 mg) by mouth daily   Quantity:  30 tablet   Refills:  1 "       HYDROcodone-acetaminophen 5-325 MG per tablet   Commonly known as:  NORCO   Started by:  Yuan Schofield MD        Dose:  1-2 tablet   Take 1-2 tablets by mouth every 8 hours as needed for moderate to severe pain   Quantity:  42 tablet   Refills:  0       LORazepam 1 MG tablet   Commonly known as:  ATIVAN   Used for:  Mixed anxiety depressive disorder   Started by:  Yuan Schofield MD        Dose:  1 mg   Take 1 tablet (1 mg) by mouth every 8 hours as needed for anxiety   Quantity:  20 tablet   Refills:  0         Stop taking these medicines if you haven't already. Please contact your care team if you have questions.     sertraline 50 MG tablet   Commonly known as:  ZOLOFT   Stopped by:  Yuan Schofield MD                Where to get your medicines      These medications were sent to [id number] - [place], [place] - [address] [place] [address]  [address] [place] [address], [facility] [address]    Hours:  M-F 8:30-6:30; Sat 9-4; closed Sunday Phone:  [phone]     escitalopram 10 MG tablet    levothyroxine 200 MCG tablet         Some of these will need a paper prescription and others can be bought over the counter.  Ask your nurse if you have questions.     Bring a paper prescription for each of these medications     HYDROcodone-acetaminophen 5-325 MG per tablet    LORazepam 1 MG tablet                Primary Care Provider Office Phone # Fax #    Yuan Schofield [phone] [address] [place] [address]        Equal Access to Services     [facility] AH: Hadii aad ku hadasho Soomaali, waaxda luqadaha, qaybta kaalmada adeegyada, waxay idiin hayaan gena oglesbyarabrianne shaffer. So [facility] [phone].    ATENCIÓN: Si habla español, tiene a caruso disposición servicios gratuitos de asistencia lingüística. Llame al [phone].    We comply with applicable federal civil rights laws and [place] laws. We do not discriminate on the basis of race, color, national origin, age, disability, sex, sexual  orientation, or gender identity.            Thank you!     Thank you for choosing [facility]  for your care. Our goal is always to provide you with excellent care. Hearing back from our patients is one way we can continue to improve our services. Please take a few minutes to complete the written survey that you may receive in the mail after your visit with us. Thank you!             Your Updated Medication List - Protect others around you: Learn how to safely use, store and throw away your medicines at www.disposemymeds.org.          This list is accurate as of 2/7/18  4:56 PM.  Always use your most recent med list.                   Brand Name Dispense Instructions for use Diagnosis    * albuterol 108 (90 BASE) MCG/ACT Inhaler    PROAIR HFA/PROVENTIL HFA/VENTOLIN HFA    1 Inhaler    Inhale 2 puffs into the lungs every 6 hours as needed for shortness of breath / dyspnea or wheezing    Cough, Acute bronchospasm       * albuterol (2.5 MG/3ML) 0.083% neb solution     1 Box    Take 1 vial (2.5 mg) by nebulization every 6 hours as needed for shortness of breath / dyspnea or wheezing    Acute bronchitis with coexisting condition requiring prophylactic treatment, Cough       escitalopram 10 MG tablet    LEXAPRO    30 tablet    Take 1 tablet (10 mg) by mouth daily    Mixed anxiety depressive disorder       HYDROcodone-acetaminophen 5-325 MG per tablet    NORCO    42 tablet    Take 1-2 tablets by mouth every 8 hours as needed for moderate to severe pain        ibuprofen 200 MG capsule     120 capsule    Take 800 mg by mouth every 4 hours as needed for fever        levothyroxine 200 MCG tablet    SYNTHROID/LEVOTHROID    90 tablet    Take 1 tablet (200 mcg) by mouth daily    Acquired hypothyroidism       lidocaine 5 % Patch    LIDODERM    30 patch    Reported on 5/12/2017        LORazepam 1 MG tablet    ATIVAN    20 tablet    Take 1 tablet (1 mg) by mouth every 8 hours as needed for anxiety    Mixed anxiety  depressive disorder       NEW MED      Kratom daily        traZODone 50 MG tablet    DESYREL    90 tablet    1 -3 tablets by mouth nightlty    Sleep disorder       * Notice:  This list has 2 medication(s) that are the same as other medications prescribed for you. Read the directions carefully, and ask your doctor or other care provider to review them with you.

## 2018-02-07 NOTE — NURSING NOTE
"Chief Complaint   Patient presents with     Shoulder Pain     Back Pain     Anxiety     Recheck     Depression     Recheck     Thyroid Disease     Recheck       Initial /60 (BP Location: Left arm, Patient Position: Sitting, Cuff Size: Adult Regular)  Pulse 72  Temp 97  F (36.1  C) (Temporal)  Resp 16  Ht 5' 6\" (1.676 m)  Wt 161 lb (73 kg)  LMP 01/24/2018  SpO2 98%  BMI 25.99 kg/m2 Estimated body mass index is 25.99 kg/(m^2) as calculated from the following:    Height as of this encounter: 5' 6\" (1.676 m).    Weight as of this encounter: 161 lb (73 kg).   Patient declines flu injection today   Medication Reconciliation: complete  "

## 2018-02-07 NOTE — PROGRESS NOTES
SUBJECTIVE:   Mattie Banda is a 37 year old female who presents to clinic today for the following health issues:      Depression and Anxiety Follow-Up    Status since last visit: Worsened     Other associated symptoms:forgetting    Complicating factors:     Significant life event: No     Current substance abuse: Cannabis     PHQ-9 1/10/2017 1/31/2017 6/12/2017   Total Score 8 5 9   Q9: Suicide Ideation Not at all Not at all Not at all     CRISTIANO-7 SCORE 2/8/2016 11/4/2016 6/12/2017   Total Score 16 14 20     Patient has long history of anxiety.  She is not depressed except for her just general back pain, anxiety which inhibits what she can do.  She remains at her job and only in the last couple days and they noticed she has not been able to perform her work.  She has previous surgery on her low back and is developed moderate pain again which is very inhibiting.  She is having grinding in her neck with some radiating pain into both shoulders and down the arms.  There  was no injury here.  She does not notice weakness in hands but there is some tingling in them.  Pain radiates mostly down the right lower extremity to mid calf at times.  Urination is unremarkable but patient has been having irregular periods with bleeding for weeks at a time.  It is heavy.  Also is causing work performance issues.  Patient states that today she almost had a meltdown because she did not want to come out in public with her gray socks.  She states this as an example of how overwhelmed she can be.  Family is noticing, work is noticing and she knows something needs to be done.  PHQ-9  English  PHQ-9   Any Language  CRISTIANO-7  Suicide Assessment Five-step Evaluation and Treatment (SAFE-T)  Hypothyroidism Follow-up      Since last visit, patient describes the following symptoms: Weight changes,  hair loss, no skin changes,  constipation, no loose stools    C/o neck and shoulder pain    Amount of exercise or physical activity: 4-5 days/week for  "an average of 15-30 minutes    Problems taking medications regularly: Yes,  problems remembering to take    Medication side effects: none    Diet: regular (no restrictions)            Problem list and histories reviewed & adjusted, as indicated.  Additional history: as documented    BP Readings from Last 3 Encounters:   02/07/18 112/60   06/12/17 108/60   05/12/17 107/63    Wt Readings from Last 3 Encounters:   02/07/18 161 lb (73 kg)   06/12/17 155 lb (70.3 kg)   02/09/17 146 lb (66.2 kg)                  Labs reviewed in EPIC    Reviewed and updated as needed this visit by clinical staff       Reviewed and updated as needed this visit by Provider         ROS:  Constitutional, HEENT, cardiovascular, pulmonary, gi and gu systems are negative, except as otherwise noted.    OBJECTIVE:     /60 (BP Location: Left arm, Patient Position: Sitting, Cuff Size: Adult Regular)  Pulse 72  Temp 97  F (36.1  C) (Temporal)  Resp 16  Ht 5' 6\" (1.676 m)  Wt 161 lb (73 kg)  LMP 01/24/2018  SpO2 98%  BMI 25.99 kg/m2  Body mass index is 25.99 kg/(m^2).  GENERAL: healthy, alert and no distress  EYES: Eyes grossly normal to inspection, PERRL and conjunctivae and sclerae normal  NECK: no adenopathy, no asymmetry, masses, or scars and thyroid normal to palpation  RESP: lungs clear to auscultation - no rales, rhonchi or wheezes  CV: regular rate and rhythm, normal S1 S2, no S3 or S4, no murmur, click or rub, no peripheral edema and peripheral pulses strong  ABDOMEN: Diffuse tenderness with normal bowel sounds no masses  MS: Tender low back and it is apparent as she lays back and sits up that she is protecting her right lower back.  Straight leg raising on the right is more positive at about 15  and left is negative but pain is felt going down the right lower extremity.  Also tenderness in the paraspinal muscles into the shoulders and occiput.  Patient notes pain down into the shoulders perhaps to the elbows and the arms when " "she flexes her head.  There is more pain immediately in the neck when she hyper-extends.  Limbs otherwise seem unremarkable  NEURO: Reflexes present elbows and knees.  I did not check ankles.  She is able to stand and walk.  Moves upper extremities okay and strong grasp.  PSYCH: mentation appears normal, affect flat, tearful, judgement and insight intact and appearance well groomed  LYMPH: no cervical, supraclavicular, axillary, or inguinal adenopathy    Diagnostic Test Results:  No results found for this or any previous visit (from the past 24 hour(s)).    ASSESSMENT/PLAN:           Tobacco Cessation:   reports that she has been smoking Cigarettes.  She has a 8.00 pack-year smoking history. She has never used smokeless tobacco.  Tobacco Cessation Action Plan: Information offered: Patient not interested at this time    BMI:   Estimated body mass index is 25.99 kg/(m^2) as calculated from the following:    Height as of this encounter: 5' 6\" (1.676 m).    Weight as of this encounter: 161 lb (73 kg).   Weight management plan: Discussed healthy diet and exercise guidelines and patient will follow up in 3 months in clinic to re-evaluate.      1. Acquired hypothyroidism  Continue current medication for thyroid but in the near future we will do laboratory testing.  - levothyroxine (SYNTHROID/LEVOTHROID) 200 MCG tablet; Take 1 tablet (200 mcg) by mouth daily  Dispense: 90 tablet; Refill: 1    2. DDD (degenerative disc disease), cervical  With the testing below, we will decide next step which will most probably include consultation with surgical spine specialist  - MR Cervical Spine w/o Contrast; Future    3. Degeneration of lumbar intervertebral disc  Previous history of surgery so we need to determine what is happening with this right lower extremity discomfort.  Also surgical spine specialist consult  - MR Cervical Spine w/o Contrast; Future    4. S/P lumbar fusion and discectomy June 2015  As above.  Until we have " slightly better ideas of what happens with her spine, some necrotic pain medications are given.  She has previously used these and gotten off of them without difficulty.  She has no desire to be on them long-term.  - MR Lumbar Spine w/o Contrast; Future    5. Tobacco use disorder  She will have cessation of smoking in mind but understands with her current anxiety this is not a good choice to attempt at this time    6. Mixed anxiety depressive disorder  She has had nausea with other SSRIs but we will try Lexapro once again.  Occasional use lorazepam for the extreme is made for her.  She understands this is not to be a daily use medication  - LORazepam (ATIVAN) 1 MG tablet; Take 1 tablet (1 mg) by mouth every 8 hours as needed for anxiety  Dispense: 20 tablet; Refill: 0  - escitalopram (LEXAPRO) 10 MG tablet; Take 1 tablet (10 mg) by mouth daily  Dispense: 30 tablet; Refill: 1    7. Excessive or frequent menstruation  Eventually we will have her see gynecology.      Patient Instructions   Schedule mri with x ray as soon as you know for sure you have insurance  Use pain meds sparingly  Take lorazepam if a melt down and before MRI exam. There is some abuse potential with this medication is used daily. It also may be so effective, all other meds do not seem to work  Escitalopram once with food ok, use half tab if desired    Time spent with greater than 50% spent in discussion, making the plan, or filling out paperwork with patient for illness/treatments was 40 minutes or more.  I had not seen this patient in some time.  She now has insurance and this will allow me to review symptoms, do exam, make a plan and give her hope that things can be better.    Yuan Schofield MD  [facility]

## 2018-02-08 ASSESSMENT — ANXIETY QUESTIONNAIRES: GAD7 TOTAL SCORE: 21

## 2018-02-08 ASSESSMENT — PATIENT HEALTH QUESTIONNAIRE - PHQ9: SUM OF ALL RESPONSES TO PHQ QUESTIONS 1-9: 19

## 2018-02-14 ENCOUNTER — HOSPITAL ENCOUNTER (OUTPATIENT)
Dept: MRI IMAGING | Facility: CLINIC | Age: 38
Discharge: HOME | End: 2018-02-14
Attending: FAMILY MEDICINE | Admitting: FAMILY MEDICINE
Payer: MEDICAID

## 2018-02-14 DIAGNOSIS — M50.30 DDD (DEGENERATIVE DISC DISEASE), CERVICAL: ICD-10-CM

## 2018-02-14 DIAGNOSIS — Z98.1 S/P LUMBAR FUSION: ICD-10-CM

## 2018-02-14 DIAGNOSIS — M51.369 DEGENERATION OF LUMBAR INTERVERTEBRAL DISC: ICD-10-CM

## 2018-02-14 PROCEDURE — 25000128 H RX IP 250 OP 636: Performed by: RADIOLOGY

## 2018-02-14 PROCEDURE — 72158 MRI LUMBAR SPINE W/O & W/DYE: CPT

## 2018-02-14 PROCEDURE — A9585 GADOBUTROL INJECTION: HCPCS | Performed by: RADIOLOGY

## 2018-02-14 PROCEDURE — 72141 MRI NECK SPINE W/O DYE: CPT

## 2018-02-14 RX ORDER — GADOBUTROL 604.72 MG/ML
7.5 INJECTION INTRAVENOUS ONCE
Status: COMPLETED | OUTPATIENT
Start: 2018-02-14 | End: 2018-02-14

## 2018-02-14 RX ADMIN — GADOBUTROL 7.5 ML: 604.72 INJECTION INTRAVENOUS at 17:12

## 2018-02-16 NOTE — PROGRESS NOTES
Patient needs neurosurgical consultation for possible cervical spine surgery.  Please also see lumbar spine report.

## 2018-02-16 NOTE — PROGRESS NOTES
With these findings, patient should be seen by back surgery for consultation.  There is not appear to be acute disc or surgical findings today

## 2018-02-19 ENCOUNTER — MYC REFILL (OUTPATIENT)
Dept: FAMILY MEDICINE | Facility: CLINIC | Age: 38
End: 2018-02-19

## 2018-02-19 ENCOUNTER — TELEPHONE (OUTPATIENT)
Dept: FAMILY MEDICINE | Facility: CLINIC | Age: 38
End: 2018-02-19

## 2018-02-19 ENCOUNTER — TELEPHONE (OUTPATIENT)
Dept: INTERNAL MEDICINE | Facility: CLINIC | Age: 38
End: 2018-02-19

## 2018-02-19 DIAGNOSIS — F41.8 MIXED ANXIETY DEPRESSIVE DISORDER: ICD-10-CM

## 2018-02-19 DIAGNOSIS — M51.369 DDD (DEGENERATIVE DISC DISEASE), LUMBAR: ICD-10-CM

## 2018-02-19 DIAGNOSIS — M50.30 DDD (DEGENERATIVE DISC DISEASE), CERVICAL: Primary | ICD-10-CM

## 2018-02-19 DIAGNOSIS — M51.369 DEGENERATION OF LUMBAR INTERVERTEBRAL DISC: ICD-10-CM

## 2018-02-19 DIAGNOSIS — M50.30 DDD (DEGENERATIVE DISC DISEASE), CERVICAL: ICD-10-CM

## 2018-02-19 DIAGNOSIS — Z98.1 S/P LUMBAR FUSION: Primary | ICD-10-CM

## 2018-02-19 RX ORDER — LIDOCAINE 50 MG/G
PATCH TOPICAL
Qty: 30 PATCH | Refills: 0 | Status: SHIPPED | OUTPATIENT
Start: 2018-02-19 | End: 2018-03-02

## 2018-02-19 RX ORDER — HYDROCODONE BITARTRATE AND ACETAMINOPHEN 5; 325 MG/1; MG/1
1-2 TABLET ORAL EVERY 8 HOURS PRN
Qty: 42 TABLET | Refills: 0 | Status: SHIPPED | OUTPATIENT
Start: 2018-02-19 | End: 2018-02-26

## 2018-02-19 RX ORDER — LORAZEPAM 1 MG/1
1 TABLET ORAL EVERY 8 HOURS PRN
Qty: 20 TABLET | Refills: 0 | Status: SHIPPED | OUTPATIENT
Start: 2018-02-19 | End: 2018-03-08

## 2018-02-19 NOTE — TELEPHONE ENCOUNTER
Left message for patient to call  Back to [phone] to schedule a appointment with Mike Matson.

## 2018-02-19 NOTE — TELEPHONE ENCOUNTER
Patient returned call. I relayed message below and transferred to Specialty to schedule with Mike Matson.     Thank you  Fabio Jefferson  Patient Representative

## 2018-02-19 NOTE — TELEPHONE ENCOUNTER
Patient returned call. I relayed results message below and transferred to Specialty to schedule a consult. She had no further questions or concerns.     Thank you  Fabio Jefferson  Patient Representative

## 2018-02-19 NOTE — TELEPHONE ENCOUNTER
"Requested Prescriptions   Signed Prescriptions Disp Refills     lidocaine (LIDODERM) 5 % Patch 30 patch 0     Sig: Reported on 5/12/2017    Topical Anesthetic Agents Passed    2/19/2018 11:49 AM       Passed - Recent or future visit with authorizing provider's specialty    Patient had office visit in the last year or has a visit in the next 30 days with authorizing provider.  See \"Patient Info\" tab in inbasket, or \"Choose Columns\" in Meds & Orders section of the refill encounter.            Passed - Patient is age 2 or older        LORazepam (ATIVAN) 1 MG tablet 20 tablet 0     Sig: Take 1 tablet (1 mg) by mouth every 8 hours as needed for anxiety    There is no refill protocol information for this order        HYDROcodone-acetaminophen (NORCO) 5-325 MG per tablet 42 tablet 0     Sig: Take 1-2 tablets by mouth every 8 hours as needed for moderate to severe pain    There is no refill protocol information for this order        .  "

## 2018-02-19 NOTE — TELEPHONE ENCOUNTER
Notes Recorded by Yuan Schofield MD on 2/16/2018 at 12:17 PM  With these findings, patient should be seen by back surgery for consultation.  There is not appear to be acute disc or surgical findings today

## 2018-02-19 NOTE — TELEPHONE ENCOUNTER
Message from Gregoryhart:  Original authorizing provider: Yuan Schofield MD    Mattie CADETGerald Veronika would like a refill of the following medications:  lidocaine (LIDODERM) 5 % patch [Yuan Schofield MD]  LORazepam (ATIVAN) 1 MG tablet [Yuan Schofield MD]  HYDROcodone-acetaminophen (NORCO) 5-325 MG per tablet [Yuan Schofield MD]    Preferred pharmacy: [id number] - [place], [place] - [address] [place] [address]    Comment:        Last Written Prescription Date:  Patient reported for lidocaine, 2/7/18 for both lorazepam and hydrocodone  Last Fill Quantity: patient reported, 20, 42,   # refills: n/a, 0,0  Last Office Visit: 02/07/18  Future Office visit:    Next 5 appointments (look out 90 days)     Feb 28, 2018  2:45 PM CST   [facility]    [address] [place] [id number]   [phone]                   Routing refill request to provider for review/approval because:  Drug not on the [facility], [facility] or [facility] refill protocol or controlled substance

## 2018-02-19 NOTE — TELEPHONE ENCOUNTER
Patient is requesting to pick the hard copies of the controlled substance prescriptions up at the clinic when they have been approved. Please advise.     Thank you  Fabio Jefferson  Patient Representative

## 2018-02-21 ENCOUNTER — TELEPHONE (OUTPATIENT)
Dept: FAMILY MEDICINE | Facility: CLINIC | Age: 38
End: 2018-02-21

## 2018-02-21 NOTE — TELEPHONE ENCOUNTER
Prior Authorization Retail Medication Request  Medication/Dose: lidocaine (LIDODERM) 5 % Patch  Diagnosis and ICD code:   Degeneration of lumbar intervertebral disc [M51.36]       DDD (degenerative disc disease), cervical [M50.30]           New/Renewal/Insurance Change PA: ?  Previously Tried and Failed Therapies: norco, roxicodone, dilaudid, oxycontin, ultram    Covermymeds info  Key: [id number]  Patient last name: Veronika  : 1980

## 2018-02-22 ENCOUNTER — TELEPHONE (OUTPATIENT)
Dept: SURGERY | Facility: CLINIC | Age: 38
End: 2018-02-22

## 2018-02-22 ENCOUNTER — OFFICE VISIT (OUTPATIENT)
Dept: NEUROSURGERY | Facility: OTHER | Age: 38
End: 2018-02-22
Payer: MEDICAID

## 2018-02-22 DIAGNOSIS — M47.812 SPONDYLOSIS OF CERVICAL REGION WITHOUT MYELOPATHY OR RADICULOPATHY: Primary | ICD-10-CM

## 2018-02-22 PROCEDURE — 99243 OFF/OP CNSLTJ NEW/EST LOW 30: CPT | Performed by: NEUROLOGICAL SURGERY

## 2018-02-22 ASSESSMENT — PAIN SCALES - GENERAL: PAINLEVEL: SEVERE PAIN (6)

## 2018-02-22 NOTE — LETTER
2/22/2018         RE: Mattie Banda  [address] [facility] [address]        Dear Colleague,    Thank you for referring your patient, Mattie Banda, to the [facility]. Please see a copy of my visit note below.    I was asked by Dr. Schofield to see this patient in consultation    37F w/ neck pain, low back pain.  Prior L5-S1 ALIF with [facility].  Intermittent radiation to right arm and entire hand.  Worsening over last few months.  No conservative care.  No motor changes.       Past Medical History:   Diagnosis Date     Papanicolaou smear of cervix with low grade squamous intraepithelial lesion (LGSIL)      Thyroid disease      Urinary tract infection, site not specified     Recurrent UTI's     Past Surgical History:   Procedure Laterality Date     BACK SURGERY  6/2015      COLONOSCOPY  08/06/07     COLONOSCOPY  08/05/08     HC COLONOSCOPY W BIOPSY  02/06/08     HC TOOTH EXTRACTION W/FORCEP      wisdom teeth removed     LAPAROSCOPIC TUBAL LIGATION  11/27/2012    Procedure: LAPAROSCOPIC TUBAL LIGATION;  Laparoscopic Bilateral tubal sterilization/ fulgaration;  Surgeon: Sarbjit Whittaker MD;  Location:  OR     Social History     Social History     Marital status:      Spouse name: Darien     Number of children: 1     Years of education: 12     Occupational History     Unit Worker      [employer]     Social History Main Topics     Smoking status: Current Some Day Smoker     Packs/day: 1.00     Years: 8.00     Types: Cigarettes     Smokeless tobacco: Never Used     Alcohol use 0.0 oz/week     0 Standard drinks or equivalent per week      Comment: 1/mo     Drug use: No     Sexual activity: Yes     Partners: Male     Other Topics Concern      Service No     Blood Transfusions No     Caffeine Concern Yes     coffee/tea: 2c/d     Occupational Exposure No     Hobby Hazards No     Sleep Concern Yes     On meds     Stress Concern No     Weight Concern Yes     desire  wt loss     Special Diet No     Back Care Yes     hx surgery     Exercise No     Bike Helmet No     Seat Belt Yes     Self-Exams Yes     Social History Narrative     Family History   Problem Relation Age of Onset     Breast Cancer Other      Maternal great aunt     DIABETES No family hx of      Hypertension Maternal Grandmother      Arthritis Maternal Grandmother      CANCER Maternal Grandmother      MGGM     Depression Maternal Grandmother      Lipids Maternal Grandmother      OSTEOPOROSIS Maternal Grandmother      Respiratory Maternal Grandmother      emphysema     Genitourinary Problems Maternal Grandmother      Kidney Failure, liver      Hypertension Maternal Grandfather      HEART DISEASE Maternal Grandfather      MI     Cardiovascular Maternal Grandfather      Cancer - colorectal Maternal Grandfather      CANCER Maternal Grandfather      Hodgins Lymphoma     Hypertension Paternal Grandmother      Arthritis Paternal Grandmother      RA     Thyroid Disease Maternal Aunt      two aunts        ROS: 10 point ROS neg other than the symptoms noted above in the HPI.    Physical Exam  LMP 01/24/2018  HEENT:  Normocephalic, atraumatic.  PERRLA.  EOM s intact.  Visual fields full to gross exam  Neck:  Supple, non-tender, without lymphadenopathy.  Heart:  No peripheral edema  Lungs:  No SOB  Abdomen:  Non-distended.   Skin:  Warm and dry.  Extremities:  No edema, cyanosis or clubbing.  Psychiatric:  No apparent distress  Musculoskeletal:  Normal bulk and tone    NEUROLOGICAL EXAMINATION:     Mental status:  Alert and Oriented x 3, speech is fluent.  Cranial nerves:  II-XII intact.   Motor:    Shoulder Abduction:  Right:  5/5   Left:  5/5  Biceps:                      Right:  5/5   Left:  5/5  Triceps:                     Right:  5/5   Left:  5/5  Wrist Extensors:       Right:  5/5   Left:  5/5  Wrist Flexors:           Right:  5/5   Left:  5/5  interosseus :            Right:  5/5   Left:  5/5   Hip Flexor:                 Right: 5/5  Left:  5/5  Hip Adductor:             Right:  5/5  Left:  5/5  Hip Abductor:             Right:  5/5  Left:  5/5  Gastroc Soleus:        Right:  5/5  Left:  5/5  Tib/Ant:                      Right:  5/5  Left:  5/5  EHL:                     Right:  5/5  Left:  5/5  Sensation:  Intact  Reflexes:  Negative Babinski.  Negative Clonus.  Negative Saleh's.  Coordination:  Smooth finger to nose testing.   Negative pronator drift.  Smooth tandem walking.    A/P:  37F w/ neck pain, low back pain    I had a discussion with the patient, reviewing the history, symptoms, and imaging  MR L Spine without stenosis  MR C Spine with C5-6, C6-7 spondylotic change  Will start PT/Traction  C5-6 LEATHA         Again, thank you for allowing me to participate in the care of your patient.        Sincerely,        Everett Kingsley MD

## 2018-02-22 NOTE — PROGRESS NOTES
I was asked by Dr. Schofield to see this patient in consultation    37F w/ neck pain, low back pain.  Prior L5-S1 ALIF with [facility].  Intermittent radiation to right arm and entire hand.  Worsening over last few months.  No conservative care.  No motor changes.       Past Medical History:   Diagnosis Date     Papanicolaou smear of cervix with low grade squamous intraepithelial lesion (LGSIL)      Thyroid disease      Urinary tract infection, site not specified     Recurrent UTI's     Past Surgical History:   Procedure Laterality Date     BACK SURGERY  6/2015      COLONOSCOPY  08/06/07     COLONOSCOPY  08/05/08     HC COLONOSCOPY W BIOPSY  02/06/08     HC TOOTH EXTRACTION W/FORCEP      wisdom teeth removed     LAPAROSCOPIC TUBAL LIGATION  11/27/2012    Procedure: LAPAROSCOPIC TUBAL LIGATION;  Laparoscopic Bilateral tubal sterilization/ fulgaration;  Surgeon: Sarbjit Whittaker MD;  Location:  OR     Social History     Social History     Marital status:      Spouse name: Darien     Number of children: 1     Years of education: 12     Occupational History     Unit Worker      [employer]     Social History Main Topics     Smoking status: Current Some Day Smoker     Packs/day: 1.00     Years: 8.00     Types: Cigarettes     Smokeless tobacco: Never Used     Alcohol use 0.0 oz/week     0 Standard drinks or equivalent per week      Comment: 1/mo     Drug use: No     Sexual activity: Yes     Partners: Male     Other Topics Concern      Service No     Blood Transfusions No     Caffeine Concern Yes     coffee/tea: 2c/d     Occupational Exposure No     Hobby Hazards No     Sleep Concern Yes     On meds     Stress Concern No     Weight Concern Yes     desire wt loss     Special Diet No     Back Care Yes     hx surgery     Exercise No     Bike Helmet No     Seat Belt Yes     Self-Exams Yes     Social History Narrative     Family History   Problem Relation Age of Onset     Breast Cancer Other       Maternal great aunt     DIABETES No family hx of      Hypertension Maternal Grandmother      Arthritis Maternal Grandmother      CANCER Maternal Grandmother      MGGM     Depression Maternal Grandmother      Lipids Maternal Grandmother      OSTEOPOROSIS Maternal Grandmother      Respiratory Maternal Grandmother      emphysema     Genitourinary Problems Maternal Grandmother      Kidney Failure, liver      Hypertension Maternal Grandfather      HEART DISEASE Maternal Grandfather      MI     Cardiovascular Maternal Grandfather      Cancer - colorectal Maternal Grandfather      CANCER Maternal Grandfather      Hodgins Lymphoma     Hypertension Paternal Grandmother      Arthritis Paternal Grandmother      RA     Thyroid Disease Maternal Aunt      two aunts        ROS: 10 point ROS neg other than the symptoms noted above in the HPI.    Physical Exam  LMP 01/24/2018  HEENT:  Normocephalic, atraumatic.  PERRLA.  EOM s intact.  Visual fields full to gross exam  Neck:  Supple, non-tender, without lymphadenopathy.  Heart:  No peripheral edema  Lungs:  No SOB  Abdomen:  Non-distended.   Skin:  Warm and dry.  Extremities:  No edema, cyanosis or clubbing.  Psychiatric:  No apparent distress  Musculoskeletal:  Normal bulk and tone    NEUROLOGICAL EXAMINATION:     Mental status:  Alert and Oriented x 3, speech is fluent.  Cranial nerves:  II-XII intact.   Motor:    Shoulder Abduction:  Right:  5/5   Left:  5/5  Biceps:                      Right:  5/5   Left:  5/5  Triceps:                     Right:  5/5   Left:  5/5  Wrist Extensors:       Right:  5/5   Left:  5/5  Wrist Flexors:           Right:  5/5   Left:  5/5  interosseus :            Right:  5/5   Left:  5/5   Hip Flexor:                Right: 5/5  Left:  5/5  Hip Adductor:             Right:  5/5  Left:  5/5  Hip Abductor:             Right:  5/5  Left:  5/5  Gastroc Soleus:        Right:  5/5  Left:  5/5  Tib/Ant:                      Right:  5/5  Left:  5/5  EHL:                      Right:  5/5  Left:  5/5  Sensation:  Intact  Reflexes:  Negative Babinski.  Negative Clonus.  Negative Saleh's.  Coordination:  Smooth finger to nose testing.   Negative pronator drift.  Smooth tandem walking.    A/P:  37F w/ neck pain, low back pain    I had a discussion with the patient, reviewing the history, symptoms, and imaging  MR L Spine without stenosis  MR C Spine with C5-6, C6-7 spondylotic change  Will start PT/Traction  C5-6 LEATHA

## 2018-02-22 NOTE — NURSING NOTE
"Mattie Banda is a 37 year old female who presents for:  Chief Complaint   Patient presents with     Neurologic Problem     cervical radiculopathy        Initial Vitals:  LMP 01/24/2018 Estimated body mass index is 25.99 kg/(m^2) as calculated from the following:    Height as of 2/7/18: 5' 6\" (1.676 m).    Weight as of 2/7/18: 161 lb (73 kg).. There is no height or weight on file to calculate BSA. BP completed using cuff size: NA (Not Taken)  Severe Pain (6)    Do you feel safe in your environment?  Yes  Do you need any refills today?     Nursing Comments: pain in neck and shoulders, sometimes radiates to right arm. [facility] No PT/inj.        Cara Reynoso    "

## 2018-02-22 NOTE — MR AVS SNAPSHOT
"              After Visit Summary   2/22/2018    Mattie Banda    MRN: [id number]           Patient Information     Date Of Birth          1980        Visit Information        Provider Department      2/22/2018 10:20 AM Everett Kingsley MD [facility] Diagnoses     Cervical radiculopathy    -  1      Care Instructions    1. Referral for physical therapy and injection. They will call you to schedule.   2. If no improvement in symptoms, please call our clinic at [phone]            Follow-ups after your visit        Additional Services     PHYSICAL THERAPY REFERRAL       *This therapy referral will be filtered to a centralized scheduling office at [facility] and the patient will receive a call to schedule an appointment at a [place] location most convenient for them. *     [facility] provides Physical Therapy evaluation and treatment and many specialty services across the [place] system.  If requesting a specialty program, please choose from the list below.    If you have not heard from the scheduling office within 2 business days, please call [phone] for all locations, with the exception of [place], please call [phone] and [facility], please call [phone]  Treatment: Evaluation & Treatment  Special Instructions/Modalities:   Special Programs: None    Please be aware that coverage of these services is subject to the terms and limitations of your health insurance plan.  Call member services at your health plan with any benefit or coverage questions.      **Note to Provider:  If you are referring outside of [place] for the therapy appointment, please list the name of the location in the \"special instructions\" above, print the referral and give to the patient to schedule the appointment.                  Your next 10 appointments already scheduled     Feb 28, 2018  2:45 PM CST   [facility] MD "   [facility]    [address] [place] [id number] [phone]           Bring a current list of meds and any records pertaining to this visit. For Physicals, please bring immunization records and any forms needing to be filled out. Please arrive 10 minutes early to complete paperwork.              Future tests that were ordered for you today     Open Future Orders        Priority Expected Expires Ordered    XR Cervical/Thoracic Epidural Inj Incl Imaging Routine 2/22/2018 2/22/2019 2/22/2018            Who to contact     If you have questions or need follow up information about today's clinic visit or your schedule please contact [facility] directly at [phone].  Normal or non-critical lab and imaging results will be communicated to you by The car easily beathart, letter or phone within 4 business days after the clinic has received the results. If you do not hear from us within 7 days, please contact the clinic through The car easily beathart or phone. If you have a critical or abnormal lab result, we will notify you by phone as soon as possible.  Submit refill requests through [employer] or call your pharmacy and they will forward the refill request to us. Please allow 3 business days for your refill to be completed.          Additional Information About Your Visit        The car easily beatharHungry Local Information     [employer] gives you secure access to your electronic health record. If you see a primary care provider, you can also send messages to your care team and make appointments. If you have questions, please call your primary care clinic.  If you do not have a primary care provider, please call [phone] and they will assist you.        Care EveryWhere ID     This is your Care EveryWhere ID. This could be used by other organizations to access your [place] medical records  [id number]        Your Vitals Were     Last Period                   01/24/2018            Blood Pressure from  Last 3 Encounters:   02/07/18 112/60   06/12/17 108/60   05/12/17 107/63    Weight from Last 3 Encounters:   02/07/18 161 lb (73 kg)   06/12/17 155 lb (70.3 kg)   02/09/17 146 lb (66.2 kg)              We Performed the Following     PHYSICAL THERAPY REFERRAL        Primary Care Provider Office Phone # Fax #    Yuan Iain Schofield [phone]       [address] [place] [address]        Equal Access to Services     [facility]: Hadii aad ku hadasho Soomaali, waaxda luqadaha, qaybta kaalmada adeegyada, waxay julius hayleslye borges . So [facility] [phone].    ATENCIÓN: Si habla español, tiene a caruso disposición servicios gratuitos de asistencia lingüística. [facility] [phone].    We comply with applicable federal civil rights laws and [place] laws. We do not discriminate on the basis of race, color, national origin, age, disability, sex, sexual orientation, or gender identity.            Thank you!     Thank you for choosing [facility]  for your care. Our goal is always to provide you with excellent care. Hearing back from our patients is one way we can continue to improve our services. Please take a few minutes to complete the written survey that you may receive in the mail after your visit with us. Thank you!             Your Updated Medication List - Protect others around you: Learn how to safely use, store and throw away your medicines at www.disposemymeds.org.          This list is accurate as of 2/22/18 10:55 AM.  Always use your most recent med list.                   Brand Name Dispense Instructions for use Diagnosis    * albuterol 108 (90 BASE) MCG/ACT Inhaler    PROAIR HFA/PROVENTIL HFA/VENTOLIN HFA    1 Inhaler    Inhale 2 puffs into the lungs every 6 hours as needed for shortness of breath / dyspnea or wheezing    Cough, Acute bronchospasm       * albuterol (2.5 MG/3ML) 0.083% neb solution     1 Box    Take 1 vial (2.5 mg) by nebulization every 6  hours as needed for shortness of breath / dyspnea or wheezing    Acute bronchitis with coexisting condition requiring prophylactic treatment, Cough       escitalopram 10 MG tablet    LEXAPRO    30 tablet    Take 1 tablet (10 mg) by mouth daily    Mixed anxiety depressive disorder       HYDROcodone-acetaminophen 5-325 MG per tablet    NORCO    42 tablet    Take 1-2 tablets by mouth every 8 hours as needed for moderate to severe pain    Degeneration of lumbar intervertebral disc, DDD (degenerative disc disease), cervical       ibuprofen 200 MG capsule     120 capsule    Take 800 mg by mouth every 4 hours as needed for fever        levothyroxine 200 MCG tablet    SYNTHROID/LEVOTHROID    90 tablet    Take 1 tablet (200 mcg) by mouth daily    Acquired hypothyroidism       lidocaine 5 % Patch    LIDODERM    30 patch    Reported on 5/12/2017    Degeneration of lumbar intervertebral disc, DDD (degenerative disc disease), cervical       LORazepam 1 MG tablet    ATIVAN    20 tablet    Take 1 tablet (1 mg) by mouth every 8 hours as needed for anxiety    Mixed anxiety depressive disorder       NEW MED      Kratom daily        traZODone 50 MG tablet    DESYREL    90 tablet    1 -3 tablets by mouth nightlty    Sleep disorder       * Notice:  This list has 2 medication(s) that are the same as other medications prescribed for you. Read the directions carefully, and ask your doctor or other care provider to review them with you.

## 2018-02-22 NOTE — PATIENT INSTRUCTIONS
1. Referral for physical therapy and injection. They will call you to schedule.   2. If no improvement in symptoms, please call our clinic at [phone]

## 2018-02-23 NOTE — TELEPHONE ENCOUNTER
PRIOR AUTHORIZATION DENIED    Medication: lidocaine (LIDODERM) 5 % Patch - Denied     Denial Date: 2/23/2018    Denial Rational:          Appeal Information:  If provider would like to appeal please provide a letter of medical necessity and route back to PA team.

## 2018-02-23 NOTE — TELEPHONE ENCOUNTER
PA Initiation    Medication: lidocaine (LIDODERM) 5 % Patch - INITIATED  Insurance Company: [place] Medicaid ([facility]) - Phone [phone] Fax [phone]  Pharmacy Filling the Rx: [id number] - [place] - [address] [place] [address]  Filling Pharmacy Phone: [phone]  Filling Pharmacy Fax:    Start Date: 2/23/2018

## 2018-02-26 ENCOUNTER — TELEPHONE (OUTPATIENT)
Dept: FAMILY MEDICINE | Facility: CLINIC | Age: 38
End: 2018-02-26

## 2018-02-26 ENCOUNTER — MYC REFILL (OUTPATIENT)
Dept: FAMILY MEDICINE | Facility: CLINIC | Age: 38
End: 2018-02-26

## 2018-02-26 DIAGNOSIS — M51.369 DEGENERATION OF LUMBAR INTERVERTEBRAL DISC: ICD-10-CM

## 2018-02-26 DIAGNOSIS — F41.8 MIXED ANXIETY DEPRESSIVE DISORDER: ICD-10-CM

## 2018-02-26 DIAGNOSIS — M50.30 DDD (DEGENERATIVE DISC DISEASE), CERVICAL: ICD-10-CM

## 2018-02-26 RX ORDER — ESCITALOPRAM OXALATE 20 MG/1
20 TABLET ORAL DAILY
Qty: 30 TABLET | Refills: 1 | Status: SHIPPED | OUTPATIENT
Start: 2018-02-26 | End: 2018-05-17

## 2018-02-26 RX ORDER — HYDROCODONE BITARTRATE AND ACETAMINOPHEN 5; 325 MG/1; MG/1
1-2 TABLET ORAL EVERY 8 HOURS PRN
Qty: 42 TABLET | Refills: 0 | Status: SHIPPED | OUTPATIENT
Start: 2018-02-26 | End: 2018-03-05

## 2018-02-26 NOTE — TELEPHONE ENCOUNTER
Message from Gregoryhart:  Original authorizing provider: Yuan Schofield MD    Mattie CADETGerald Reecelito would like a refill of the following medications:  HYDROcodone-acetaminophen (NORCO) 5-325 MG per tablet [Yuan Schofield MD]    Preferred pharmacy: [id number] - [place], [place] - [address] [place] [address]    Comment:  I m also wondering If I can bump up the Lexapro to 20 msg daily? I will come to the clinic to  refill prescriptions. Thank you  Hydrocodone      Last Written Prescription Date:  02/19/18  Last Fill Quantity: 42,   # refills: 0  Last Office Visit: 02/07/18  Future Office visit:    Next 5 appointments (look out 90 days)     Feb 28, 2018  2:45 PM CST   [facility]    [address] [place] [id number]   [phone]                   Routing refill request to provider for review/approval because:  Drug not on the [facility], [facility] or [facility] refill protocol or controlled substance

## 2018-02-26 NOTE — TELEPHONE ENCOUNTER
Patient scheduled for MAGDA with Dr. Rubio on 3/8/18 at 200pm.  Patient instructed to have a  and an H&P.

## 2018-02-26 NOTE — TELEPHONE ENCOUNTER
I spoke with patient requesting she come earlier on Weds to complete anxiety, depression and preop forms and informed her we would see her on Weds for all three//Elizabeth Robles/JUNIOR(AAMA)

## 2018-02-26 NOTE — TELEPHONE ENCOUNTER
Reason for Call:  Other call back    Detailed comments: pt has injection scheduled for 3/8, and has appt on 2/28 with Schofield to recheck meds. Can you also do a pre op on 2/28 appt for the 3/8 appt? Do them both, or does she need a separate appt? Please call and advise.     Phone Number Patient can be reached at: Home number on file [phone] (home)    Best Time: anytime    Can we leave a detailed message on this number? YES    Call taken on 2/26/2018 at 11:03 AM by Savita Garvin

## 2018-02-28 ENCOUNTER — TELEPHONE (OUTPATIENT)
Dept: FAMILY MEDICINE | Facility: CLINIC | Age: 38
End: 2018-02-28

## 2018-02-28 NOTE — TELEPHONE ENCOUNTER
I scheduled patient for 3/5/18, Monday at 9:00 am but was unable to contact patient - I left her a message to call back and confirm appointment at that time//Elizabeth Robles/JUNIOR(MA)

## 2018-02-28 NOTE — TELEPHONE ENCOUNTER
Reason for Call:  Other appointment    Detailed comments: Pt cannot make her appt today due to work issues.  She will need to be rescheduled as a work in somewhere.  Pt is coming in for a few issues Med Check and Pre op - DOS 03/08.  Please call pt back with appt date and time.    Phone Number Patient can be reached at: Home number on file [phone] (home)    Best Time: any - Please leave detailed message on  Phone as she cannot answer during work  Hours.    Can we leave a detailed message on this number? YES    Call taken on 2/28/2018 at 11:29 AM by Mercy Oreilly

## 2018-03-02 ASSESSMENT — PATIENT HEALTH QUESTIONNAIRE - PHQ9: 5. POOR APPETITE OR OVEREATING: NEARLY EVERY DAY

## 2018-03-02 ASSESSMENT — ANXIETY QUESTIONNAIRES
3. WORRYING TOO MUCH ABOUT DIFFERENT THINGS: NEARLY EVERY DAY
6. BECOMING EASILY ANNOYED OR IRRITABLE: NEARLY EVERY DAY
GAD7 TOTAL SCORE: 19
1. FEELING NERVOUS, ANXIOUS, OR ON EDGE: NEARLY EVERY DAY
5. BEING SO RESTLESS THAT IT IS HARD TO SIT STILL: SEVERAL DAYS
7. FEELING AFRAID AS IF SOMETHING AWFUL MIGHT HAPPEN: NEARLY EVERY DAY
2. NOT BEING ABLE TO STOP OR CONTROL WORRYING: NEARLY EVERY DAY
IF YOU CHECKED OFF ANY PROBLEMS ON THIS QUESTIONNAIRE, HOW DIFFICULT HAVE THESE PROBLEMS MADE IT FOR YOU TO DO YOUR WORK, TAKE CARE OF THINGS AT HOME, OR GET ALONG WITH OTHER PEOPLE: VERY DIFFICULT

## 2018-03-02 NOTE — PROGRESS NOTES
[address] [address]  [phone]  Dept: [phone]    PRE-OP EVALUATION:  Today's date: 3/5/2018    Mattie Banda (: 1980) presents for pre-operative evaluation assessment as requested by Dr. Rubio.  She requires evaluation and anesthesia risk assessment prior to undergoing surgery/procedure for treatment of neck problem .    General Information   Date: 3/8/2018 Time:  2:00 PM Status: Scheduled   Location:  OR Room: [facility]: Anesthesiology   Patient class: Same Day Surgery Case classification:      Panel Information   Panel 1   Surgeon Role   Edgardo Rubio MD Primary    Procedure Laterality Anesthesia   INJECT EPIDURAL CERVICAL N/A Monitor Anesthesia Care   cervical 5-6 interlaminar epidural steroid injection             Primary Physician: Yuan Schofield  Type of Anesthesia Anticipated: Local with MAC    Patient has a Health Care Directive or Living Will:  NO    1. NO - Do you have a history of heart attack, stroke, stent, bypass or surgery on an artery in the head, neck, heart or legs?  2. Yes - Do you ever have any pain or discomfort in your chest?  3. NO - Do you have a history of  Heart Failure?  4. Yes - Are you troubled by shortness of breath when: walking on the level, up a slight hill or at night?  5. NO - Do you currently have a cold, bronchitis or other respiratory infection?  6. NO - Do you have a cough, shortness of breath or wheezing?  7. Yes - Do you sometimes get pains in the calves of your legs when you walk?  8. Yes - Do you or anyone in your family have previous history of blood clots?  9. NO - Do you or does anyone in your family have a serious bleeding problem such as prolonged bleeding following surgeries or cuts?  10. Yes - Have you ever had problems with anemia or been told to take iron pills?  11. Yes - Have you had any abnormal blood loss such as black, tarry or bloody stools, or abnormal vaginal  "bleeding?  12. NO - Have you ever had a blood transfusion?  13. NO - Have you or any of your relatives ever had problems with anesthesia?  14. Yes - Do you have sleep apnea, excessive snoring or daytime drowsiness?  15. NO - Do you have any prosthetic heart valves?  16. Yes (?) - Do you have prosthetic joints? \"back surgery\"  17. NO - Is there any chance that you may be pregnant?      HPI:     HPI related to upcoming procedure: Patient has degenerative cervical spine and will be having a injection      See problem list for active medical problems.  Problems all longstanding and stable, except as noted/documented.  See ROS for pertinent symptoms related to these conditions.                                                                                                  .    MEDICAL HISTORY:     Patient Active Problem List    Diagnosis Date Noted     Excessive or frequent menstruation 02/07/2018     Priority: Medium     DDD (degenerative disc disease), cervical 02/07/2018     Priority: Medium     Lymphocytic colitis 06/13/2017     Priority: Medium     Tinea versicolor 06/12/2017     Priority: Medium     Idiopathic peripheral neuropathy 01/10/2017     Priority: Medium     Acquired hypothyroidism 01/10/2017     Priority: Medium     Migraine with aura and without status migrainosus, not intractable 12/27/2016     Priority: Medium     Tobacco use disorder 09/12/2016     Priority: Medium     Low grade squamous intraepithelial lesion on cytologic smear of cervix (LGSIL) 02/08/2016     Priority: Medium     2015Possibly high-grade lesion. Done in [place] in 2015 2/8/16 pap NIL/neg HR HPV. Plan: cotest in 3 years. Tracking.       Labial lesion 02/08/2016     Priority: Medium     Superior folds of the vulva/labia right side       Mixed anxiety depressive disorder 02/08/2016     Priority: Medium     S/P lumbar fusion and discectomy June 2015 06/28/2015     Priority: Medium     Degeneration of lumbar intervertebral disc 04/05/2014 "     Priority: Medium     newly added to list on 4/4//14  but present for last months       CARDIOVASCULAR SCREENING; LDL GOAL LESS THAN 160 10/31/2010     Priority: Medium     Contraceptive management 07/02/2009     Priority: Medium      Past Medical History:   Diagnosis Date     Papanicolaou smear of cervix with low grade squamous intraepithelial lesion (LGSIL)      Thyroid disease      Urinary tract infection, site not specified     Recurrent UTI's     Past Surgical History:   Procedure Laterality Date     BACK SURGERY  6/2015      COLONOSCOPY  08/06/07     COLONOSCOPY  08/05/08     HC COLONOSCOPY W BIOPSY  02/06/08     HC TOOTH EXTRACTION W/FORCEP      wisdom teeth removed     LAPAROSCOPIC TUBAL LIGATION  11/27/2012    Procedure: LAPAROSCOPIC TUBAL LIGATION;  Laparoscopic Bilateral tubal sterilization/ fulgaration;  Surgeon: Sarbjit Whittaker MD;  Location:  OR     Current Outpatient Prescriptions   Medication Sig Dispense Refill     HYDROcodone-acetaminophen (NORCO) 5-325 MG per tablet Take 1-2 tablets by mouth every 8 hours as needed for moderate to severe pain 42 tablet 0     escitalopram (LEXAPRO) 20 MG tablet Take 1 tablet (20 mg) by mouth daily 30 tablet 1     lidocaine (LIDODERM) 5 % Patch Reported on 5/12/2017 30 patch 0     LORazepam (ATIVAN) 1 MG tablet Take 1 tablet (1 mg) by mouth every 8 hours as needed for anxiety 20 tablet 0     NEW MED Kratom daily       levothyroxine (SYNTHROID/LEVOTHROID) 200 MCG tablet Take 1 tablet (200 mcg) by mouth daily 90 tablet 1     ibuprofen 200 MG capsule Take 800 mg by mouth every 4 hours as needed for fever 120 capsule      traZODone (DESYREL) 50 MG tablet 1 -3 tablets by mouth nightlty 90 tablet 5     albuterol (2.5 MG/3ML) 0.083% neb solution Take 1 vial (2.5 mg) by nebulization every 6 hours as needed for shortness of breath / dyspnea or wheezing 1 Box 0     albuterol (PROAIR HFA, PROVENTIL HFA, VENTOLIN HFA) 108 (90 BASE) MCG/ACT inhaler Inhale 2 puffs  into the lungs every 6 hours as needed for shortness of breath / dyspnea or wheezing 1 Inhaler 0     OTC products: None, except as noted above    Allergies   Allergen Reactions     Bupropion Hcl      Wellbutrin      Latex Allergy: NO    Social History   Substance Use Topics     Smoking status: Current Some Day Smoker     Packs/day: 1.00     Years: 8.00     Types: Cigarettes     Smokeless tobacco: Never Used     Alcohol use 0.0 oz/week     0 Standard drinks or equivalent per week      Comment: 1/mo     History   Drug Use No       REVIEW OF SYSTEMS:   CONSTITUTIONAL: NEGATIVE for fever, chills, change in weight  ENT/MOUTH: NEGATIVE for ear, mouth and throat problems  RESP: NEGATIVE for significant cough or SOB  CV: NEGATIVE for chest pain, palpitations or peripheral edema  MUSCULOSKELETAL: Continues with both significant neck and back pain.  Hopes at some point we addressed the lumbar spine as well  PSYCHIATRIC: Current medications have helped some but she would like more help with anxiety and depression-like symptoms and anxiety    EXAM:   /62 (BP Location: Left arm, Patient Position: Sitting, Cuff Size: Adult Regular)  Pulse 72  Temp 96.8  F (36  C) (Temporal)  Resp 16  Wt 152 lb (68.9 kg)  LMP 02/26/2018  SpO2 96%  BMI 24.53 kg/m2  GENERAL APPEARANCE: healthy, alert and no distress  EYES: Eyes grossly normal to inspection, PERRL and conjunctivae and sclerae normal  HENT: ear canals and TM's normal and nose and mouth without ulcers or lesions  RESP: lungs clear to auscultation - no rales, rhonchi or wheezes  CV: regular rate and rhythm, normal S1 S2, no S3 or S4 and no murmur, click or rub   ABDOMEN: soft, nontender, no HSM or masses and bowel sounds normal  SKIN: no suspicious lesions or rashes  NEURO: Normal strength and tone, mentation intact and speech normal  PSYCH: mentation appears normal, anxious and fatigued    DIAGNOSTICS:   No labs or EKG required for low risk surgery (cataract, skin  procedure, breast biopsy, etc)    Recent Labs   Lab Test  11/04/16   1432  06/28/15   1135  06/19/15   2140   HGB  11.8  11.4*  9.8*   PLT  287  830*  389   NA  137   --   136   POTASSIUM  3.8   --   4.0   CR  0.97   --   0.70        IMPRESSION:   Reason for surgery/procedure: Chronic cervical disc issues requiring injection, epidural  Diagnosis/reason for consult:     ICD-10-CM    1. Preop general physical exam Z01.818    2. DDD (degenerative disc disease), cervical M50.30 HYDROcodone-acetaminophen (NORCO) 5-325 MG per tablet   3. Mixed anxiety depressive disorder F41.8 risperiDONE (RISPERDAL) 1 MG tablet   4. Degeneration of lumbar intervertebral disc M51.36 HYDROcodone-acetaminophen (NORCO) 5-325 MG per tablet         The proposed surgical procedure is considered LOW risk.    REVISED CARDIAC RISK INDEX  The patient has the following serious cardiovascular risks for perioperative complications such as (MI, PE, VFib and 3  AV Block):  No serious cardiac risks  INTERPRETATION: 0 risks: Class I (very low risk - 0.4% complication rate)    The patient has the following additional risks for perioperative complications:  No identified additional risks      ICD-10-CM    1. Preop general physical exam Z01.818    2. DDD (degenerative disc disease), cervical M50.30 HYDROcodone-acetaminophen (NORCO) 5-325 MG per tablet   3. Mixed anxiety depressive disorder F41.8 risperiDONE (RISPERDAL) 1 MG tablet   4. Degeneration of lumbar intervertebral disc M51.36 HYDROcodone-acetaminophen (NORCO) 5-325 MG per tablet       RECOMMENDATIONS:         --Patient is to take all scheduled medications on the day of surgery EXCEPT for modifications listed below.    APPROVAL GIVEN to proceed with proposed procedure, without further diagnostic evaluation       Signed Electronically by: Yuan Schofield MD    Copy of this evaluation report is provided to requesting physician.    [place] Preop Guidelines      SUBJECTIVE:   Mattie Banda is a 37  year old female who presents to clinic today for the following health issues:      Depression and Anxiety Follow-Up    Status since last visit: No change    Other associated symptoms:None    Complicating factors:     Significant life event: No     Current substance abuse: None    PHQ-9 6/12/2017 2/7/2018 3/2/2018   Total Score 9 19 8   Q9: Suicide Ideation Not at all Not at all Not at all     CRISTIANO-7 SCORE 6/12/2017 2/7/2018 3/2/2018   Total Score 20 21 19       PHQ-9  English  PHQ-9   Any Language  CRISTIANO-7  Suicide Assessment Five-step Evaluation and Treatment (SAFE-T)    Amount of exercise or physical activity: None    Problems taking medications regularly: Yes,  problems remembering to take    Medication side effects: none    Diet: regular (no restrictions)        Doing okay with current medications.  Remains a bit anxious    Problem list and histories reviewed & adjusted, as indicated.  Additional history: as documented        Reviewed and updated as needed this visit by clinical staff  Allergies       Reviewed and updated as needed this visit by Provider         ROS:  Constitutional, HEENT, cardiovascular, pulmonary, gi and gu systems are negative, except as otherwise noted.  Or as way above in the preop    OBJECTIVE:     /62 (BP Location: Left arm, Patient Position: Sitting, Cuff Size: Adult Regular)  Pulse 72  Temp 96.8  F (36  C) (Temporal)  Resp 16  Wt 152 lb (68.9 kg)  LMP 02/26/2018  SpO2 96%  BMI 24.53 kg/m2  Body mass index is 24.53 kg/(m^2).  Patient does appear slightly anxious today.  See above in the preop for physical exam    Diagnostic Test Results:  none     ASSESSMENT/PLAN:               ICD-10-CM    1. Preop general physical exam Z01.818    2. DDD (degenerative disc disease), cervical M50.30 HYDROcodone-acetaminophen (NORCO) 5-325 MG per tablet   3. Mixed anxiety depressive disorder F41.8 risperiDONE (RISPERDAL) 1 MG tablet   4. Degeneration of lumbar intervertebral disc M51.36  HYDROcodone-acetaminophen (NORCO) 5-325 MG per tablet       Added Risperdal for anxiety.  Starting at relatively low dose.    We will also add the patient has lost weight.  We need to monitor this.  She was given possible side effects of the medication above but did not talk about possible weight gain.  Patient Instructions     Before Your Surgery      Call your surgeon if there is any change in your health. This includes signs of a cold or flu (such as a sore throat, runny nose, cough, rash or fever).    Do not smoke, drink alcohol or take over the counter medicine (unless your surgeon or primary care doctor tells you to) for the 24 hours before and after surgery.    If you take prescribed drugs: Follow your doctor s orders about which medicines to take and which to stop until after surgery.    Eating and drinking prior to surgery: follow the instructions from your surgeon    Take a shower or bath the night before surgery. Use the soap your surgeon gave you to gently clean your skin. If you do not have soap from your surgeon, use your regular soap. Do not shave or scrub the surgery site.  Wear clean pajamas and have clean sheets on your bed.       Yuan Schofield MD  [facility]

## 2018-03-03 ASSESSMENT — ANXIETY QUESTIONNAIRES: GAD7 TOTAL SCORE: 19

## 2018-03-03 ASSESSMENT — PATIENT HEALTH QUESTIONNAIRE - PHQ9: SUM OF ALL RESPONSES TO PHQ QUESTIONS 1-9: 8

## 2018-03-05 ENCOUNTER — OFFICE VISIT (OUTPATIENT)
Dept: FAMILY MEDICINE | Facility: CLINIC | Age: 38
End: 2018-03-05
Payer: MEDICAID

## 2018-03-05 VITALS
DIASTOLIC BLOOD PRESSURE: 62 MMHG | SYSTOLIC BLOOD PRESSURE: 122 MMHG | RESPIRATION RATE: 16 BRPM | OXYGEN SATURATION: 96 % | HEART RATE: 72 BPM | TEMPERATURE: 96.8 F | BODY MASS INDEX: 24.53 KG/M2 | WEIGHT: 152 LBS

## 2018-03-05 DIAGNOSIS — M50.30 DDD (DEGENERATIVE DISC DISEASE), CERVICAL: ICD-10-CM

## 2018-03-05 DIAGNOSIS — Z01.818 PREOP GENERAL PHYSICAL EXAM: Primary | ICD-10-CM

## 2018-03-05 DIAGNOSIS — F41.8 MIXED ANXIETY DEPRESSIVE DISORDER: ICD-10-CM

## 2018-03-05 DIAGNOSIS — M51.369 DEGENERATION OF LUMBAR INTERVERTEBRAL DISC: ICD-10-CM

## 2018-03-05 PROCEDURE — 99214 OFFICE O/P EST MOD 30 MIN: CPT | Performed by: FAMILY MEDICINE

## 2018-03-05 RX ORDER — HYDROCODONE BITARTRATE AND ACETAMINOPHEN 5; 325 MG/1; MG/1
1-2 TABLET ORAL EVERY 8 HOURS PRN
Qty: 50 TABLET | Refills: 0 | Status: SHIPPED | OUTPATIENT
Start: 2018-03-05 | End: 2018-03-12

## 2018-03-05 RX ORDER — RISPERIDONE 1 MG/1
0.5 TABLET ORAL AT BEDTIME
Qty: 30 TABLET | Refills: 1 | Status: SHIPPED | OUTPATIENT
Start: 2018-03-05 | End: 2018-04-18

## 2018-03-05 ASSESSMENT — PAIN SCALES - GENERAL: PAINLEVEL: EXTREME PAIN (8)

## 2018-03-05 NOTE — LETTER
[address] [address]  Phone: [phone]  Fax: [phone]    March 5, 2018        Mattie Banda  [address] [place] [id number]          To whom it may concern:    RE: Mattie Banda    Patient was seen and treated today at our clinic.  She needed this exam and it was for a legitimate purpose.     Please contact me for questions or concerns.      Sincerely,        Yuan Iain Schofield MD

## 2018-03-05 NOTE — MR AVS SNAPSHOT
After Visit Summary   3/5/2018    Mattie Banda    MRN: [id number]           Patient Information     Date Of Birth          1980        Visit Information        Provider Department      3/5/2018 9:00 AM Yuan Schofield MD [facility]        Today's Diagnoses     Preop general physical exam    -  1    Mixed anxiety depressive disorder        Degeneration of lumbar intervertebral disc        DDD (degenerative disc disease), cervical          Care Instructions      Before Your Surgery      Call your surgeon if there is any change in your health. This includes signs of a cold or flu (such as a sore throat, runny nose, cough, rash or fever).    Do not smoke, drink alcohol or take over the counter medicine (unless your surgeon or primary care doctor tells you to) for the 24 hours before and after surgery.    If you take prescribed drugs: Follow your doctor s orders about which medicines to take and which to stop until after surgery.    Eating and drinking prior to surgery: follow the instructions from your surgeon    Take a shower or bath the night before surgery. Use the soap your surgeon gave you to gently clean your skin. If you do not have soap from your surgeon, use your regular soap. Do not shave or scrub the surgery site.  Wear clean pajamas and have clean sheets on your bed.           Follow-ups after your visit        Your next 10 appointments already scheduled     Mar 07, 2018  1:00 PM CST   Ortho Eval with Shalini Blakely, CASSIUS   [facility]    [address] [facility] [id number] [phone]            Mar 08, 2018   Procedure with Edgardo Rubio MD   [facility] ([facility])    [address] [place] [address] [place] [address]   [phone]           From [address]: Exit at [address] on south side of [place]. Turn right on [address]. Turn left at stoplight on [address] [facility] [address]. [place]  [facility] will be in view two blocks ahead            Mar 08, 2018  2:00 PM CST   XR SURGERY JESU LESS THAN 5 MIN FLUORO W STILLS with [email or web address]   [facility] ([facility])    [address] [place] [address] [phone]           Please bring a list of your current medicines to your exam. (Include vitamins, minerals and over-thecounter medicines.) Leave your valuables at home.  Tell your doctor if there is a chance you may be pregnant.  You do not need to do anything special for this exam.              Who to contact     If you have questions or need follow up information about today's clinic visit or your schedule please contact [facility] directly at [phone].  Normal or non-critical lab and imaging results will be communicated to you by BuildingLayerhart, letter or phone within 4 business days after the clinic has received the results. If you do not hear from us within 7 days, please contact the clinic through Staff Rankert or phone. If you have a critical or abnormal lab result, we will notify you by phone as soon as possible.  Submit refill requests through Almond[employer] or call your pharmacy and they will forward the refill request to us. Please allow 3 business days for your refill to be completed.          Additional Information About Your Visit        Almondy Information     [employer] gives you secure access to your electronic health record. If you see a primary care provider, you can also send messages to your care team and make appointments. If you have questions, please call your primary care clinic.  If you do not have a primary care provider, please call [phone] and they will assist you.        Care EveryWhere ID     This is your Care EveryWhere ID. This could be used by other organizations to access your [facility] medical records  [id number]        Your Vitals Were     Pulse Temperature Respirations Last Period Pulse Oximetry BMI (Body Mass Index)    72  96.8  F (36  C) (Temporal) 16 02/26/2018 96% 24.53 kg/m2       Blood Pressure from Last 3 Encounters:   03/05/18 122/62   02/07/18 112/60   06/12/17 108/60    Weight from Last 3 Encounters:   03/05/18 152 lb (68.9 kg)   02/07/18 161 lb (73 kg)   06/12/17 155 lb (70.3 kg)              Today, you had the following     No orders found for display         Today's Medication Changes          These changes are accurate as of 3/5/18  9:23 AM.  If you have any questions, ask your nurse or doctor.               Start taking these medicines.        Dose/Directions    risperiDONE 1 MG tablet   Commonly known as:  risperDAL   Used for:  Mixed anxiety depressive disorder   Started by:  Yuan Schofield MD        Dose:  0.5 mg   Take 0.5 tablets (0.5 mg) by mouth At Bedtime   Quantity:  30 tablet   Refills:  1            Where to get your medicines      These medications were sent to [id number] - [place], [place] - [address] [place] [address]  [address] [place] [id number]    Hours:  M-F 8:30-6:30; Sat 9-4; closed Sunday Phone:  [phone]     risperiDONE 1 MG tablet         Some of these will need a paper prescription and others can be bought over the counter.  Ask your nurse if you have questions.     Bring a paper prescription for each of these medications     HYDROcodone-acetaminophen 5-325 MG per tablet                Primary Care Provider Office Phone # Fax #    Yuan Schofield [phone]       [address] [place] [address]        Equal Access to Services     [facility]: Hadii myla francois hadasho Soomaali, waaxda luqadaha, qaybta kaalmada adeegsondra, adolfo idiin hayaan adeeg kharash la'aan . So [facility] [phone].    ATENCIÓN: Si habla español, tiene a caruso disposición servicios gratuitos de asistencia lingüística. Llame al [phone].    We comply with applicable federal civil rights laws and [place] laws. We do not discriminate on the basis of race, color, national origin, age,  disability, sex, sexual orientation, or gender identity.            Thank you!     Thank you for choosing [facility]  for your care. Our goal is always to provide you with excellent care. Hearing back from our patients is one way we can continue to improve our services. Please take a few minutes to complete the written survey that you may receive in the mail after your visit with us. Thank you!             Your Updated Medication List - Protect others around you: Learn how to safely use, store and throw away your medicines at www.disposemymeds.org.          This list is accurate as of 3/5/18  9:23 AM.  Always use your most recent med list.                   Brand Name Dispense Instructions for use Diagnosis    * albuterol 108 (90 BASE) MCG/ACT Inhaler    PROAIR HFA/PROVENTIL HFA/VENTOLIN HFA    1 Inhaler    Inhale 2 puffs into the lungs every 6 hours as needed for shortness of breath / dyspnea or wheezing    Cough, Acute bronchospasm       * albuterol (2.5 MG/3ML) 0.083% neb solution     1 Box    Take 1 vial (2.5 mg) by nebulization every 6 hours as needed for shortness of breath / dyspnea or wheezing    Acute bronchitis with coexisting condition requiring prophylactic treatment, Cough       escitalopram 20 MG tablet    LEXAPRO    30 tablet    Take 1 tablet (20 mg) by mouth daily    Mixed anxiety depressive disorder       HYDROcodone-acetaminophen 5-325 MG per tablet    NORCO    50 tablet    Take 1-2 tablets by mouth every 8 hours as needed for moderate to severe pain    Degeneration of lumbar intervertebral disc, DDD (degenerative disc disease), cervical       ibuprofen 200 MG capsule     120 capsule    Take 800 mg by mouth every 4 hours as needed for fever        levothyroxine 200 MCG tablet    SYNTHROID/LEVOTHROID    90 tablet    Take 1 tablet (200 mcg) by mouth daily    Acquired hypothyroidism       LORazepam 1 MG tablet    ATIVAN    20 tablet    Take 1 tablet (1 mg) by mouth every 8 hours as  needed for anxiety    Mixed anxiety depressive disorder       NEW MED      Kratom daily        risperiDONE 1 MG tablet    risperDAL    30 tablet    Take 0.5 tablets (0.5 mg) by mouth At Bedtime    Mixed anxiety depressive disorder       traZODone 50 MG tablet    DESYREL    90 tablet    1 -3 tablets by mouth nightlty    Sleep disorder       * Notice:  This list has 2 medication(s) that are the same as other medications prescribed for you. Read the directions carefully, and ask your doctor or other care provider to review them with you.

## 2018-03-05 NOTE — NURSING NOTE
"Chief Complaint   Patient presents with     Pre-Op Exam     Anxiety     recheck     Depression     Recheck       Initial /62 (BP Location: Left arm, Patient Position: Sitting, Cuff Size: Adult Regular)  Pulse 72  Temp 96.8  F (36  C) (Temporal)  Resp 16  Wt 152 lb (68.9 kg)  LMP 02/26/2018  SpO2 96%  BMI 24.53 kg/m2 Estimated body mass index is 24.53 kg/(m^2) as calculated from the following:    Height as of 2/7/18: 5' 6\" (1.676 m).    Weight as of this encounter: 152 lb (68.9 kg).  Medication Reconciliation: complete  "

## 2018-03-07 ENCOUNTER — HOSPITAL ENCOUNTER (OUTPATIENT)
Dept: PHYSICAL THERAPY | Facility: OTHER | Age: 38
Setting detail: THERAPIES SERIES
End: 2018-03-07
Attending: NEUROLOGICAL SURGERY
Payer: MEDICAID

## 2018-03-07 ENCOUNTER — ANESTHESIA EVENT (OUTPATIENT)
Dept: SURGERY | Facility: CLINIC | Age: 38
End: 2018-03-07
Payer: MEDICAID

## 2018-03-07 PROCEDURE — 97010 HOT OR COLD PACKS THERAPY: CPT | Mod: GP | Performed by: PHYSICAL THERAPIST

## 2018-03-07 PROCEDURE — 97161 PT EVAL LOW COMPLEX 20 MIN: CPT | Mod: GP | Performed by: PHYSICAL THERAPIST

## 2018-03-07 PROCEDURE — 97110 THERAPEUTIC EXERCISES: CPT | Mod: GP | Performed by: PHYSICAL THERAPIST

## 2018-03-07 PROCEDURE — 97012 MECHANICAL TRACTION THERAPY: CPT | Mod: GP | Performed by: PHYSICAL THERAPIST

## 2018-03-07 PROCEDURE — 40000718 ZZHC STATISTIC PT DEPARTMENT ORTHO VISIT: Performed by: PHYSICAL THERAPIST

## 2018-03-07 ASSESSMENT — LIFESTYLE VARIABLES: TOBACCO_USE: 1

## 2018-03-07 NOTE — PROGRESS NOTES
03/07/18 1300   General Information   Type of Visit Initial OP Ortho PT Evaluation   Start of Care Date 03/07/18   Referring Physician yamile murphy MD   Patient/Family Goals Statement neck pain    Orders Evaluate and Treat   Date of Order 02/22/18   Insurance Type MA   Medical Diagnosis spondylosis of cervical region without myelopathy or radiculopathy M47.812   Surgical/Medical history reviewed Yes   Precautions/Limitations no known precautions/limitations   Body Part(s)   Body Part(s) Cervical Spine   Presentation and Etiology   Pertinent history of current problem (include personal factors and/or comorbidities that impact the POC) patient reports that she has had neck pain 5-6 months and will have pain in neck and will have pain into right to hand / fingers and will numb/tingling 1x day inconsistent may 5minutes to 1-2 hours . but neck pain is constant . is right handed . PMH had lumbar fusion in 2015 . 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 Pain;L. Tingling;K. Numbness   Functional Limitations perform activities of daily living;perform required work activities   Symptom Location neck and right UE    Onset date of current episode/exacerbation 09/07/17   Chronicity Chronic   Pain rating (0-10 point scale) Best (/10);Worst (/10)   Best (/10) 5/10   Worst (/10) 9/10   Pain quality A. Sharp;B. Dull;C. Aching   Frequency of pain/symptoms A. Constant   Pain/symptoms exacerbated by L. Work tasks   Pain/symptoms eased by I. OTC medication(s);G. Heat   Progression of symptoms since onset: Worsened  (more pain and now constant)   Prior Level of Function   Functional Level Prior Comment walks 5 miles a day at work    Current Level of Function   Current Community Support Family/friend caregiver   Patient role/employment history A. Employed   Fall Risk Screen   Fall screen completed by PT   Have you fallen 2 or more times in the past year? No   Have you fallen and had an injury in the  past year? No   Is patient a fall risk? No   Cervical Spine   Cervical Flexion ROM 42   Cervical Extension ROM 47 more pain then flexion    Cervical Right Side Bending ROM 25 tight and pain    Cervical Left Side Bending ROM 25 tight and pain    Cervical Right Rotation ROM 62 tight and pain    Cervical Left Rotation ROM 62 tight and pain    Shoulder AROM Screen full and good strength in B UE    Spurling Test pain in neck but no radicular pain B    Palpation tender B neck and into B upper shoulders    Biceps Reflexes B equal    Brachioradialis Reflexes Bequal    Triceps Reflexes Bequal    Planned Therapy Interventions   Planned Therapy Interventions ADL retraining;ROM;strengthening   Planned Modality Interventions   Planned Modality Interventions Comments hot pack with traction    Clinical Impression   Criteria for Skilled Therapeutic Interventions Met yes, treatment indicated   PT Diagnosis neck pain with right UE pain and tingling in hand    Functional limitations due to impairments spadi 46/100   Clinical Presentation Stable/Uncomplicated   Clinical Decision Making (Complexity) Low complexity   Risk & Benefits of therapy have been explained Yes   Patient, Family & other staff in agreement with plan of care Yes   Clinical Impression Comments patient seen due to chronic neck and right UE pain and tingling in hand , Rx is trial of traction and instruction in HEP    Education Assessment   Preferred Learning Style Listening;Reading;Demonstration   Barriers to Learning No barriers   ORTHO GOALS   PT Ortho Eval Goals 1;2   Ortho Goal 1   Goal Identifier 1   Goal Description patient to have improved posture and body mechanics 90% of the time   Target Date 05/07/18   Ortho Goal 2   Goal Identifier 2   Goal Description patient to have reduction in pain level currently 5-9/10 and improved tolerance to activity NDI 46/100 goal is 1-5/10 NDI 20    Target Date 05/07/18   Total Evaluation Time   Total Evaluation Time 15   Therapy  Certification   Certification date from 03/07/18   Certification date to 05/07/18   Medical Diagnosis spondylosis of cervical region without myelopathy or radiculopathy M47.812

## 2018-03-07 NOTE — H&P (VIEW-ONLY)
[address] [address]  [phone]  Dept: [phone]    PRE-OP EVALUATION:  Today's date: 3/5/2018    Mattie Banda (: 1980) presents for pre-operative evaluation assessment as requested by Dr. Rubio.  She requires evaluation and anesthesia risk assessment prior to undergoing surgery/procedure for treatment of neck problem .    General Information   Date: 3/8/2018 Time:  2:00 PM Status: Scheduled   Location:  OR Room: [facility] Service: Anesthesiology   Patient class: Same Day Surgery Case classification:      Panel Information   Panel 1   Surgeon Role   Edgardo Rubio MD Primary    Procedure Laterality Anesthesia   INJECT EPIDURAL CERVICAL N/A Monitor Anesthesia Care   cervical 5-6 interlaminar epidural steroid injection             Primary Physician: Yuan Schofield  Type of Anesthesia Anticipated: Local with MAC    Patient has a Health Care Directive or Living Will:  NO    1. NO - Do you have a history of heart attack, stroke, stent, bypass or surgery on an artery in the head, neck, heart or legs?  2. Yes - Do you ever have any pain or discomfort in your chest?  3. NO - Do you have a history of  Heart Failure?  4. Yes - Are you troubled by shortness of breath when: walking on the level, up a slight hill or at night?  5. NO - Do you currently have a cold, bronchitis or other respiratory infection?  6. NO - Do you have a cough, shortness of breath or wheezing?  7. Yes - Do you sometimes get pains in the calves of your legs when you walk?  8. Yes - Do you or anyone in your family have previous history of blood clots?  9. NO - Do you or does anyone in your family have a serious bleeding problem such as prolonged bleeding following surgeries or cuts?  10. Yes - Have you ever had problems with anemia or been told to take iron pills?  11. Yes - Have you had any abnormal blood loss such as black, tarry or bloody stools, or abnormal vaginal  "bleeding?  12. NO - Have you ever had a blood transfusion?  13. NO - Have you or any of your relatives ever had problems with anesthesia?  14. Yes - Do you have sleep apnea, excessive snoring or daytime drowsiness?  15. NO - Do you have any prosthetic heart valves?  16. Yes (?) - Do you have prosthetic joints? \"back surgery\"  17. NO - Is there any chance that you may be pregnant?      HPI:     HPI related to upcoming procedure: Patient has degenerative cervical spine and will be having a injection      See problem list for active medical problems.  Problems all longstanding and stable, except as noted/documented.  See ROS for pertinent symptoms related to these conditions.                                                                                                  .    MEDICAL HISTORY:     Patient Active Problem List    Diagnosis Date Noted     Excessive or frequent menstruation 02/07/2018     Priority: Medium     DDD (degenerative disc disease), cervical 02/07/2018     Priority: Medium     Lymphocytic colitis 06/13/2017     Priority: Medium     Tinea versicolor 06/12/2017     Priority: Medium     Idiopathic peripheral neuropathy 01/10/2017     Priority: Medium     Acquired hypothyroidism 01/10/2017     Priority: Medium     Migraine with aura and without status migrainosus, not intractable 12/27/2016     Priority: Medium     Tobacco use disorder 09/12/2016     Priority: Medium     Low grade squamous intraepithelial lesion on cytologic smear of cervix (LGSIL) 02/08/2016     Priority: Medium     2015Possibly high-grade lesion. Done in [place] in 2015 2/8/16 pap NIL/neg HR HPV. Plan: cotest in 3 years. Tracking.       Labial lesion 02/08/2016     Priority: Medium     Superior folds of the vulva/labia right side       Mixed anxiety depressive disorder 02/08/2016     Priority: Medium     S/P lumbar fusion and discectomy June 2015 06/28/2015     Priority: Medium     Degeneration of lumbar intervertebral disc 04/05/2014 "     Priority: Medium     newly added to list on 4/4//14  but present for last months       CARDIOVASCULAR SCREENING; LDL GOAL LESS THAN 160 10/31/2010     Priority: Medium     Contraceptive management 07/02/2009     Priority: Medium      Past Medical History:   Diagnosis Date     Papanicolaou smear of cervix with low grade squamous intraepithelial lesion (LGSIL)      Thyroid disease      Urinary tract infection, site not specified     Recurrent UTI's     Past Surgical History:   Procedure Laterality Date     BACK SURGERY  6/2015      COLONOSCOPY  08/06/07     COLONOSCOPY  08/05/08     HC COLONOSCOPY W BIOPSY  02/06/08     HC TOOTH EXTRACTION W/FORCEP      wisdom teeth removed     LAPAROSCOPIC TUBAL LIGATION  11/27/2012    Procedure: LAPAROSCOPIC TUBAL LIGATION;  Laparoscopic Bilateral tubal sterilization/ fulgaration;  Surgeon: Sarbjit Whittaker MD;  Location:  OR     Current Outpatient Prescriptions   Medication Sig Dispense Refill     HYDROcodone-acetaminophen (NORCO) 5-325 MG per tablet Take 1-2 tablets by mouth every 8 hours as needed for moderate to severe pain 42 tablet 0     escitalopram (LEXAPRO) 20 MG tablet Take 1 tablet (20 mg) by mouth daily 30 tablet 1     lidocaine (LIDODERM) 5 % Patch Reported on 5/12/2017 30 patch 0     LORazepam (ATIVAN) 1 MG tablet Take 1 tablet (1 mg) by mouth every 8 hours as needed for anxiety 20 tablet 0     NEW MED Kratom daily       levothyroxine (SYNTHROID/LEVOTHROID) 200 MCG tablet Take 1 tablet (200 mcg) by mouth daily 90 tablet 1     ibuprofen 200 MG capsule Take 800 mg by mouth every 4 hours as needed for fever 120 capsule      traZODone (DESYREL) 50 MG tablet 1 -3 tablets by mouth nightlty 90 tablet 5     albuterol (2.5 MG/3ML) 0.083% neb solution Take 1 vial (2.5 mg) by nebulization every 6 hours as needed for shortness of breath / dyspnea or wheezing 1 Box 0     albuterol (PROAIR HFA, PROVENTIL HFA, VENTOLIN HFA) 108 (90 BASE) MCG/ACT inhaler Inhale 2 puffs  into the lungs every 6 hours as needed for shortness of breath / dyspnea or wheezing 1 Inhaler 0     OTC products: None, except as noted above    Allergies   Allergen Reactions     Bupropion Hcl      Wellbutrin      Latex Allergy: NO    Social History   Substance Use Topics     Smoking status: Current Some Day Smoker     Packs/day: 1.00     Years: 8.00     Types: Cigarettes     Smokeless tobacco: Never Used     Alcohol use 0.0 oz/week     0 Standard drinks or equivalent per week      Comment: 1/mo     History   Drug Use No       REVIEW OF SYSTEMS:   CONSTITUTIONAL: NEGATIVE for fever, chills, change in weight  ENT/MOUTH: NEGATIVE for ear, mouth and throat problems  RESP: NEGATIVE for significant cough or SOB  CV: NEGATIVE for chest pain, palpitations or peripheral edema  MUSCULOSKELETAL: Continues with both significant neck and back pain.  Hopes at some point we addressed the lumbar spine as well  PSYCHIATRIC: Current medications have helped some but she would like more help with anxiety and depression-like symptoms and anxiety    EXAM:   /62 (BP Location: Left arm, Patient Position: Sitting, Cuff Size: Adult Regular)  Pulse 72  Temp 96.8  F (36  C) (Temporal)  Resp 16  Wt 152 lb (68.9 kg)  LMP 02/26/2018  SpO2 96%  BMI 24.53 kg/m2  GENERAL APPEARANCE: healthy, alert and no distress  EYES: Eyes grossly normal to inspection, PERRL and conjunctivae and sclerae normal  HENT: ear canals and TM's normal and nose and mouth without ulcers or lesions  RESP: lungs clear to auscultation - no rales, rhonchi or wheezes  CV: regular rate and rhythm, normal S1 S2, no S3 or S4 and no murmur, click or rub   ABDOMEN: soft, nontender, no HSM or masses and bowel sounds normal  SKIN: no suspicious lesions or rashes  NEURO: Normal strength and tone, mentation intact and speech normal  PSYCH: mentation appears normal, anxious and fatigued    DIAGNOSTICS:   No labs or EKG required for low risk surgery (cataract, skin  procedure, breast biopsy, etc)    Recent Labs   Lab Test  11/04/16   1432  06/28/15   1135  06/19/15   2140   HGB  11.8  11.4*  9.8*   PLT  287  830*  389   NA  137   --   136   POTASSIUM  3.8   --   4.0   CR  0.97   --   0.70        IMPRESSION:   Reason for surgery/procedure: Chronic cervical disc issues requiring injection, epidural  Diagnosis/reason for consult:     ICD-10-CM    1. Preop general physical exam Z01.818    2. DDD (degenerative disc disease), cervical M50.30 HYDROcodone-acetaminophen (NORCO) 5-325 MG per tablet   3. Mixed anxiety depressive disorder F41.8 risperiDONE (RISPERDAL) 1 MG tablet   4. Degeneration of lumbar intervertebral disc M51.36 HYDROcodone-acetaminophen (NORCO) 5-325 MG per tablet         The proposed surgical procedure is considered LOW risk.    REVISED CARDIAC RISK INDEX  The patient has the following serious cardiovascular risks for perioperative complications such as (MI, PE, VFib and 3  AV Block):  No serious cardiac risks  INTERPRETATION: 0 risks: Class I (very low risk - 0.4% complication rate)    The patient has the following additional risks for perioperative complications:  No identified additional risks      ICD-10-CM    1. Preop general physical exam Z01.818    2. DDD (degenerative disc disease), cervical M50.30 HYDROcodone-acetaminophen (NORCO) 5-325 MG per tablet   3. Mixed anxiety depressive disorder F41.8 risperiDONE (RISPERDAL) 1 MG tablet   4. Degeneration of lumbar intervertebral disc M51.36 HYDROcodone-acetaminophen (NORCO) 5-325 MG per tablet       RECOMMENDATIONS:         --Patient is to take all scheduled medications on the day of surgery EXCEPT for modifications listed below.    APPROVAL GIVEN to proceed with proposed procedure, without further diagnostic evaluation       Signed Electronically by: Yuan Schofield MD    Copy of this evaluation report is provided to requesting physician.    [facility] Preop Guidelines      SUBJECTIVE:   Mattie Banda is a 37  year old female who presents to clinic today for the following health issues:      Depression and Anxiety Follow-Up    Status since last visit: No change    Other associated symptoms:None    Complicating factors:     Significant life event: No     Current substance abuse: None    PHQ-9 6/12/2017 2/7/2018 3/2/2018   Total Score 9 19 8   Q9: Suicide Ideation Not at all Not at all Not at all     CRISTIANO-7 SCORE 6/12/2017 2/7/2018 3/2/2018   Total Score 20 21 19       PHQ-9  English  PHQ-9   Any Language  CRISTIANO-7  Suicide Assessment Five-step Evaluation and Treatment (SAFE-T)    Amount of exercise or physical activity: None    Problems taking medications regularly: Yes,  problems remembering to take    Medication side effects: none    Diet: regular (no restrictions)        Doing okay with current medications.  Remains a bit anxious    Problem list and histories reviewed & adjusted, as indicated.  Additional history: as documented        Reviewed and updated as needed this visit by clinical staff  Allergies       Reviewed and updated as needed this visit by Provider         ROS:  Constitutional, HEENT, cardiovascular, pulmonary, gi and gu systems are negative, except as otherwise noted.  Or as way above in the preop    OBJECTIVE:     /62 (BP Location: Left arm, Patient Position: Sitting, Cuff Size: Adult Regular)  Pulse 72  Temp 96.8  F (36  C) (Temporal)  Resp 16  Wt 152 lb (68.9 kg)  LMP 02/26/2018  SpO2 96%  BMI 24.53 kg/m2  Body mass index is 24.53 kg/(m^2).  Patient does appear slightly anxious today.  See above in the preop for physical exam    Diagnostic Test Results:  none     ASSESSMENT/PLAN:               ICD-10-CM    1. Preop general physical exam Z01.818    2. DDD (degenerative disc disease), cervical M50.30 HYDROcodone-acetaminophen (NORCO) 5-325 MG per tablet   3. Mixed anxiety depressive disorder F41.8 risperiDONE (RISPERDAL) 1 MG tablet   4. Degeneration of lumbar intervertebral disc M51.36  HYDROcodone-acetaminophen (NORCO) 5-325 MG per tablet       Added Risperdal for anxiety.  Starting at relatively low dose.    We will also add the patient has lost weight.  We need to monitor this.  She was given possible side effects of the medication above but did not talk about possible weight gain.  Patient Instructions     Before Your Surgery      Call your surgeon if there is any change in your health. This includes signs of a cold or flu (such as a sore throat, runny nose, cough, rash or fever).    Do not smoke, drink alcohol or take over the counter medicine (unless your surgeon or primary care doctor tells you to) for the 24 hours before and after surgery.    If you take prescribed drugs: Follow your doctor s orders about which medicines to take and which to stop until after surgery.    Eating and drinking prior to surgery: follow the instructions from your surgeon    Take a shower or bath the night before surgery. Use the soap your surgeon gave you to gently clean your skin. If you do not have soap from your surgeon, use your regular soap. Do not shave or scrub the surgery site.  Wear clean pajamas and have clean sheets on your bed.       Yuan Schofield MD  [facility]

## 2018-03-07 NOTE — PROGRESS NOTES
[facility]          OUTPATIENT PHYSICAL THERAPY ORTHOPEDIC EVALUATION  PLAN OF TREATMENT FOR OUTPATIENT REHABILITATION  (COMPLETE FOR INITIAL CLAIMS ONLY)  Patient's Last Name, First Name, M.I.  YOB: 1980  VeronikaMattie  HELIO    Provider s Name:  [facility]   Medical Record No.  [id number]   Start of Care Date:  03/07/18   Onset Date:  09/07/17   Type:     _X__PT   ___OT   ___SLP Medical Diagnosis:  spondylosis of cervical region without myelopathy or radiculopathy M47.812     PT Diagnosis:  neck pain with right UE pain and tingling in hand    Visits from SOC:  1      _________________________________________________________________________________  Plan of Treatment/Functional Goals:  ADL retraining, ROM, strengthening        hot pack with traction   Goals  Goal Identifier: 1  Goal Description: patient to have improved posture and body mechanics 90% of the time  Target Date: 05/07/18    Goal Identifier: 2  Goal Description: patient to have reduction in pain level currently 5-9/10 and improved tolerance to activity NDI 46/100 goal is 1-5/10 NDI 20   Target Date: 05/07/18                                                                      Therapy Frequency:     Predicted Duration of Therapy Intervention:       Shalini Blakely, PT                 I CERTIFY THE NEED FOR THESE SERVICES FURNISHED UNDER        THIS PLAN OF TREATMENT AND WHILE UNDER MY CARE     (Physician co-signature of this document indicates review and certification of the therapy plan).                       Certification Date From:  03/07/18   Certification Date To:  05/07/18    Referring Provider:  yamile murphy MD    Initial Assessment        See Epic Evaluation Start of Care Date: 03/07/18

## 2018-03-07 NOTE — PROGRESS NOTES
03/07/18 1300   Neck Disability Index (  [employer]ALLACE. and Brent BLANK. 1991. All rights reserved.; used with permission)   SECTION 1 - PAIN INTENSITY 3   SECTION 2 - PERSONAL CARE 1   SECTION 3 - LIFTING 1   SECTION 4 - READING 3   SECTION 5 - HEADACHES 5  (has had headaches almost daily since child )   SECTION 6 - CONCENTRATION 1   SECTION 7 - WORK 2   SECTION 8 - DRIVING 2   SECTION 9 - SLEEPING 2   SECTION 10 - RECREATION 3   Count 10   Sum 23   Raw Score: /50 23   Neck Disability Index Score: (%) 46 %

## 2018-03-08 ENCOUNTER — TELEPHONE (OUTPATIENT)
Dept: FAMILY MEDICINE | Facility: CLINIC | Age: 38
End: 2018-03-08

## 2018-03-08 ENCOUNTER — HOSPITAL ENCOUNTER (OUTPATIENT)
Facility: CLINIC | Age: 38
Discharge: HOME | End: 2018-03-08
Attending: ANESTHESIOLOGY | Admitting: ANESTHESIOLOGY
Payer: MEDICAID

## 2018-03-08 ENCOUNTER — MYC REFILL (OUTPATIENT)
Dept: FAMILY MEDICINE | Facility: CLINIC | Age: 38
End: 2018-03-08

## 2018-03-08 ENCOUNTER — ANESTHESIA (OUTPATIENT)
Dept: SURGERY | Facility: CLINIC | Age: 38
End: 2018-03-08
Payer: MEDICAID

## 2018-03-08 ENCOUNTER — HOSPITAL ENCOUNTER (OUTPATIENT)
Dept: GENERAL RADIOLOGY | Facility: CLINIC | Age: 38
End: 2018-03-08
Attending: ANESTHESIOLOGY | Admitting: ANESTHESIOLOGY
Payer: MEDICAID

## 2018-03-08 VITALS
SYSTOLIC BLOOD PRESSURE: 105 MMHG | RESPIRATION RATE: 16 BRPM | TEMPERATURE: 98.2 F | DIASTOLIC BLOOD PRESSURE: 68 MMHG | HEART RATE: 61 BPM | OXYGEN SATURATION: 97 %

## 2018-03-08 DIAGNOSIS — M54.16 LUMBAR RADICULOPATHY: ICD-10-CM

## 2018-03-08 DIAGNOSIS — F41.8 MIXED ANXIETY DEPRESSIVE DISORDER: ICD-10-CM

## 2018-03-08 PROCEDURE — 37000008 ZZH ANESTHESIA TECHNICAL FEE, 1ST 30 MIN: Performed by: ANESTHESIOLOGY

## 2018-03-08 PROCEDURE — 25000128 H RX IP 250 OP 636: Performed by: NURSE ANESTHETIST, CERTIFIED REGISTERED

## 2018-03-08 PROCEDURE — 62321 NJX INTERLAMINAR CRV/THRC: CPT | Performed by: ANESTHESIOLOGY

## 2018-03-08 PROCEDURE — 25000128 H RX IP 250 OP 636: Performed by: ANESTHESIOLOGY

## 2018-03-08 PROCEDURE — 25000125 ZZHC RX 250: Performed by: NURSE ANESTHETIST, CERTIFIED REGISTERED

## 2018-03-08 PROCEDURE — 40000277 XR SURGERY CARM FLUORO LESS THAN 5 MIN W STILLS: Mod: TC

## 2018-03-08 RX ORDER — NALOXONE HYDROCHLORIDE 0.4 MG/ML
.1-.4 INJECTION, SOLUTION INTRAMUSCULAR; INTRAVENOUS; SUBCUTANEOUS
Status: DISCONTINUED | OUTPATIENT
Start: 2018-03-08 | End: 2018-03-08 | Stop reason: HOSPADM

## 2018-03-08 RX ORDER — SODIUM CHLORIDE, SODIUM LACTATE, POTASSIUM CHLORIDE, CALCIUM CHLORIDE 600; 310; 30; 20 MG/100ML; MG/100ML; MG/100ML; MG/100ML
INJECTION, SOLUTION INTRAVENOUS CONTINUOUS
Status: DISCONTINUED | OUTPATIENT
Start: 2018-03-08 | End: 2018-03-08 | Stop reason: HOSPADM

## 2018-03-08 RX ORDER — TRIAMCINOLONE ACETONIDE 40 MG/ML
INJECTION, SUSPENSION INTRA-ARTICULAR; INTRAMUSCULAR PRN
Status: DISCONTINUED | OUTPATIENT
Start: 2018-03-08 | End: 2018-03-08 | Stop reason: HOSPADM

## 2018-03-08 RX ORDER — ONDANSETRON 2 MG/ML
4 INJECTION INTRAMUSCULAR; INTRAVENOUS EVERY 30 MIN PRN
Status: DISCONTINUED | OUTPATIENT
Start: 2018-03-08 | End: 2018-03-08 | Stop reason: HOSPADM

## 2018-03-08 RX ORDER — PROPOFOL 10 MG/ML
INJECTION, EMULSION INTRAVENOUS PRN
Status: DISCONTINUED | OUTPATIENT
Start: 2018-03-08 | End: 2018-03-08

## 2018-03-08 RX ORDER — ONDANSETRON 4 MG/1
4 TABLET, ORALLY DISINTEGRATING ORAL EVERY 30 MIN PRN
Status: DISCONTINUED | OUTPATIENT
Start: 2018-03-08 | End: 2018-03-08 | Stop reason: HOSPADM

## 2018-03-08 RX ORDER — MEPERIDINE HYDROCHLORIDE 25 MG/ML
12.5 INJECTION INTRAMUSCULAR; INTRAVENOUS; SUBCUTANEOUS
Status: DISCONTINUED | OUTPATIENT
Start: 2018-03-08 | End: 2018-03-08 | Stop reason: HOSPADM

## 2018-03-08 RX ORDER — LIDOCAINE 40 MG/G
CREAM TOPICAL
Status: DISCONTINUED | OUTPATIENT
Start: 2018-03-08 | End: 2018-03-08 | Stop reason: HOSPADM

## 2018-03-08 RX ORDER — IOPAMIDOL 612 MG/ML
INJECTION, SOLUTION INTRATHECAL PRN
Status: DISCONTINUED | OUTPATIENT
Start: 2018-03-08 | End: 2018-03-08 | Stop reason: HOSPADM

## 2018-03-08 RX ADMIN — PROPOFOL 50 MG: 10 INJECTION, EMULSION INTRAVENOUS at 14:01

## 2018-03-08 RX ADMIN — LIDOCAINE HYDROCHLORIDE 1 ML: 10 INJECTION, SOLUTION EPIDURAL; INFILTRATION; INTRACAUDAL; PERINEURAL at 13:26

## 2018-03-08 NOTE — ANESTHESIA PREPROCEDURE EVALUATION
Anesthesia Evaluation     . Pt has had prior anesthetic. Type: General and MAC    No history of anesthetic complications          ROS/MED HX    ENT/Pulmonary:     (+)tobacco use, Current use , . .    Neurologic:  - neg neurologic ROS     Cardiovascular:  - neg cardiovascular ROS       METS/Exercise Tolerance:     Hematologic:     (+) History of blood clots pt is not anticoagulated, -      Musculoskeletal:  - neg musculoskeletal ROS       GI/Hepatic:  - neg GI/hepatic ROS      (-) GERD   Renal/Genitourinary:  - ROS Renal section negative       Endo:     (+) thyroid problem hypothyroidism, .      Psychiatric:  - neg psychiatric ROS       Infectious Disease:  - neg infectious disease ROS       Malignancy:      - no malignancy   Other:    (+) Possibly pregnant LMP: 2-, C-spine cleared: N/A, H/O Chronic Pain,H/O chronic opiod use , no other significant disability                    Physical Exam  Normal systems: cardiovascular, pulmonary and dental    Airway   Mallampati: II  TM distance: <3 FB  Neck ROM: full    Dental     Cardiovascular   Rhythm and rate: regular and normal      Pulmonary    breath sounds clear to auscultation                    Anesthesia Plan      History & Physical Review  History and physical reviewed and following examination; no interval change.    ASA Status:  2 .    NPO Status:  > 8 hours    Plan for MAC with Intravenous and Propofol induction. Maintenance will be TIVA.  Reason for MAC:  Deep or markedly invasive procedure (G8)  PONV prophylaxis:  Ondansetron (or other 5HT-3)       Postoperative Care  Postoperative pain management:  IV analgesics.      Consents  Anesthetic plan, risks, benefits and alternatives discussed with:  Patient.  Use of blood products discussed: No .   .                          .

## 2018-03-08 NOTE — IP AVS SNAPSHOT
[facility] Phase II    [address]     [place] [address]    Phone:  [phone]                                       After Visit Summary   3/8/2018    Mattie Banda    MRN: [id number]           After Visit Summary Signature Page     I have received my discharge instructions, and my questions have been answered. I have discussed any challenges I see with this plan with the nurse or doctor.    ..........................................................................................................................................  Patient/Patient Representative Signature      ..........................................................................................................................................  Patient Representative Print Name and Relationship to Patient    ..................................................               ................................................  Date                                            Time    ..........................................................................................................................................  Reviewed by Signature/Title    ...................................................              ..............................................  Date                                                            Time

## 2018-03-08 NOTE — ANESTHESIA CARE TRANSFER NOTE
Patient: Mattie Banda    Procedure(s):  cervical 6-7 interlaminar epidural steroid injection - Wound Class: I-Clean    Diagnosis: spondylosis of cervical region  Diagnosis Additional Information: No value filed.    Anesthesia Type:   MAC     Note:  Airway :Room Air  Patient transferred to:Phase II  Handoff Report: Identifed the Patient, Identified the Reponsible Provider, Reviewed the pertinent medical history, Discussed the surgical course, Reviewed Intra-OP anesthesia mangement and issues during anesthesia, Set expectations for post-procedure period and Allowed opportunity for questions and acknowledgement of understanding      Vitals: (Last set prior to Anesthesia Care Transfer)    CRNA VITALS  3/8/2018 1341 - 3/8/2018 1411      3/8/2018             Pulse: 56    SpO2: 98 %    Resp Rate (observed): 27                Electronically Signed By: SAUD Escobedo CRNA  March 8, 2018  2:11 PM

## 2018-03-08 NOTE — TELEPHONE ENCOUNTER
Reason for Call:  Form, our goal is to have forms completed with 72 hours, however, some forms may require a visit or additional information.    Type of letter, form or note:  FMLA    Who is the form from?:  [employer]    Where did the form come from: Patient or family brought in       What clinic location was the form placed at?: [facility]    Where the form was placed: 's Box    What number is listed as a contact on the form?:        Additional comments:     Call taken on 3/8/2018 at 2:53 PM by Natalie Briceño

## 2018-03-08 NOTE — IP AVS SNAPSHOT
MRN:[id number]                      After Visit Summary   3/8/2018    Mattie Banda    MRN: [id number]           Thank you!     Thank you for choosing [facility] for your care. Our goal is always to provide you with excellent care. Hearing back from our patients is one way we can continue to improve our services. Please take a few minutes to complete the written survey that you may receive in the mail after you visit with us. Thank you!        Patient Information     Date Of Birth          1980        About your hospital stay     You were admitted on:  March 8, 2018 You last received care in the:  [facility] Phase II    You were discharged on:  March 8, 2018       Who to Call     For medical emergencies, please call 911.  For non-urgent questions about your medical care, please call your primary care provider or clinic, [phone]  For questions related to your surgery, please call your surgery clinic        Attending Provider     Provider Specialty    Edgardo Rubio MD [facility]       Primary Care Provider Office Phone # Fax #    Yuan Schofield [phone]      After Care Instructions     Discharge Instructions       Review outpatient procedure discharge instructions as directed by Provider.                  Your next 10 appointments already scheduled     Mar 12, 2018 10:00 AM CDT   Ortho Treatment with Shalini F Peel, PT   [facility])    [address] [facility] [address]   [phone]            Mar 22, 2018  1:30 PM CDT   Ortho Treatment [facility]alini F [facility]el, PT   [facility]    [address] [facility] [address]   [phone]            Mar 26, 2018  1:00 PM CDT   Ortho Treatment with Shalini F Peel, PT   [facility]    [address] [facility] [address]   [phone]              Further instructions from your care team        Home Care Instructions                Procedure:  Epidural Steroid Injection or Joint injection    Activity:    Rest today    Do not work today    Resume normal activity tomorrow    Pain:    You may experience soreness at the injection site for one or two days    You may use an ice pack for 20 minutes every 2 hours for the first 24 hours    You may use a heating pad after the first 24 hours    You may use Tylenol  (acetaminophen) every 4 hours or other pain medicines as directed by your physician    Safety  Sedation medicine, if given may remain active for many hours.    It is important for the next 24 hours that you do not:    Drive a car    Operate machines or power tools    Consume alcohol, including beer    Sign any important papers or legal documents    You may experience numbness radiating into your legs or arms, (depending on the procedure location)  This numbness may last several hours.  Until the numb sensation returns to normal please use caution in walking, climbing stairs, stepping out of your vehicle, etc.    Common side effects of steroids:  Not everyone will experience corticosteroid side effects. If side effects are experienced they will gradually subside in the 7-10 day period following an injection.    Most common side effects include:    Flushed face and/or chest    Feeling of warmth, particularly in face but could be overall feeling of warmth    Increased blood sugar in diabetic patients    Menstrual irregularities may occur.  If taking hormone based birth control an alternate method of birth control is recommended    Sleep disturbances and/or mood swings are possible    Leg cramps    Please contact us if you have:  Severe pain   Fever more than 101.5 degrees Fahrenheit  Signs of infection (redness, swelling or drainage)      After office hours you can contact the on call provider by dialing [phone].  If you need immediate attention we recommend that you go to a hospital emergency room or  dial 911.                 Pending Results     No orders found from 3/6/2018 to 3/9/2018.            Admission Information     Date & Time Provider Department Dept. Phone    3/8/2018 Edgardo Rubio MD [facility] Phase [phone]      Your Vitals Were     Blood Pressure Pulse Temperature Respirations Last Period Pulse Oximetry    104/72 69 98.2  F (36.8  C) (Oral) 16 02/26/2018 97%      MyChart Information     [employer] gives you secure access to your electronic health record. If you see a primary care provider, you can also send messages to your care team and make appointments. If you have questions, please call your primary care clinic.  If you do not have a primary care provider, please call [phone] and they will assist you.        Care EveryWhere ID     This is your Care EveryWhere ID. This could be used by other organizations to access your [facility] medical records  [id number]        Equal Access to Services     KAELYN MYERS : Hadii myla Cortes, waarmando null, luis enrique garcia, adolfo shaffer. So [facility] [phone].    ATENCIÓN: Si habla español, tiene a caruso disposición servicios gratuitos de asistencia lingüística. Llame al [phone].    We comply with applicable federal civil rights laws and [place] laws. We do not discriminate on the basis of race, color, national origin, age, disability, sex, sexual orientation, or gender identity.               Review of your medicines      CONTINUE these medicines which have NOT CHANGED        Dose / Directions    * albuterol 108 (90 BASE) MCG/ACT Inhaler   Commonly known as:  PROAIR HFA/PROVENTIL HFA/VENTOLIN HFA   Used for:  Cough, Acute bronchospasm        Dose:  2 puff   Inhale 2 puffs into the lungs every 6 hours as needed for shortness of breath / dyspnea or wheezing   Quantity:  1 Inhaler   Refills:  0       * albuterol (2.5 MG/3ML) 0.083% neb solution   Used for:  Acute bronchitis with  coexisting condition requiring prophylactic treatment, Cough        Dose:  1 vial   Take 1 vial (2.5 mg) by nebulization every 6 hours as needed for shortness of breath / dyspnea or wheezing   Quantity:  1 Box   Refills:  0       escitalopram 20 MG tablet   Commonly known as:  LEXAPRO   Used for:  Mixed anxiety depressive disorder        Dose:  20 mg   Take 1 tablet (20 mg) by mouth daily   Quantity:  30 tablet   Refills:  1       HYDROcodone-acetaminophen 5-325 MG per tablet   Commonly known as:  NORCO   Used for:  Degeneration of lumbar intervertebral disc, DDD (degenerative disc disease), cervical        Dose:  1-2 tablet   Take 1-2 tablets by mouth every 8 hours as needed for moderate to severe pain   Quantity:  50 tablet   Refills:  0       ibuprofen 200 MG capsule        Dose:  800 mg   Take 800 mg by mouth every 4 hours as needed for fever   Quantity:  120 capsule   Refills:  0       levothyroxine 200 MCG tablet   Commonly known as:  SYNTHROID/LEVOTHROID   Used for:  Acquired hypothyroidism        Dose:  200 mcg   Take 1 tablet (200 mcg) by mouth daily   Quantity:  90 tablet   Refills:  1       LORazepam 1 MG tablet   Commonly known as:  ATIVAN   Used for:  Mixed anxiety depressive disorder        Dose:  1 mg   Take 1 tablet (1 mg) by mouth every 8 hours as needed for anxiety   Quantity:  20 tablet   Refills:  0       NEW MED        Kratom daily   Refills:  0       risperiDONE 1 MG tablet   Commonly known as:  risperDAL   Used for:  Mixed anxiety depressive disorder        Dose:  0.5 mg   Take 0.5 tablets (0.5 mg) by mouth At Bedtime   Quantity:  30 tablet   Refills:  1       traZODone 50 MG tablet   Commonly known as:  DESYREL   Used for:  Sleep disorder        1 -3 tablets by mouth nightlty   Quantity:  90 tablet   Refills:  5       * Notice:  This list has 2 medication(s) that are the same as other medications prescribed for you. Read the directions carefully, and ask your doctor or other care provider to  review them with you.             Protect others around you: Learn how to safely use, store and throw away your medicines at www.disposemymeds.org.             Medication List: This is a list of all your medications and when to take them. Check marks below indicate your daily home schedule. Keep this list as a reference.      Medications           Morning Afternoon Evening Bedtime As Needed    * albuterol 108 (90 BASE) MCG/ACT Inhaler   Commonly known as:  PROAIR HFA/PROVENTIL HFA/VENTOLIN HFA   Inhale 2 puffs into the lungs every 6 hours as needed for shortness of breath / dyspnea or wheezing                                * albuterol (2.5 MG/3ML) 0.083% neb solution   Take 1 vial (2.5 mg) by nebulization every 6 hours as needed for shortness of breath / dyspnea or wheezing                                escitalopram 20 MG tablet   Commonly known as:  LEXAPRO   Take 1 tablet (20 mg) by mouth daily                                HYDROcodone-acetaminophen 5-325 MG per tablet   Commonly known as:  NORCO   Take 1-2 tablets by mouth every 8 hours as needed for moderate to severe pain                                ibuprofen 200 MG capsule   Take 800 mg by mouth every 4 hours as needed for fever                                levothyroxine 200 MCG tablet   Commonly known as:  SYNTHROID/LEVOTHROID   Take 1 tablet (200 mcg) by mouth daily                                LORazepam 1 MG tablet   Commonly known as:  ATIVAN   Take 1 tablet (1 mg) by mouth every 8 hours as needed for anxiety                                NEW MED   Kratom daily                                risperiDONE 1 MG tablet   Commonly known as:  risperDAL   Take 0.5 tablets (0.5 mg) by mouth At Bedtime                                traZODone 50 MG tablet   Commonly known as:  DESYREL   1 -3 tablets by mouth nightlty                                * Notice:  This list has 2 medication(s) that are the same as other medications prescribed for you. Read the  directions carefully, and ask your doctor or other care provider to review them with you.

## 2018-03-08 NOTE — DISCHARGE INSTRUCTIONS
Home Care Instructions                Procedure:  Epidural Steroid Injection or Joint injection    Activity:    Rest today    Do not work today    Resume normal activity tomorrow    Pain:    You may experience soreness at the injection site for one or two days    You may use an ice pack for 20 minutes every 2 hours for the first 24 hours    You may use a heating pad after the first 24 hours    You may use Tylenol  (acetaminophen) every 4 hours or other pain medicines as directed by your physician    Safety  Sedation medicine, if given may remain active for many hours.    It is important for the next 24 hours that you do not:    Drive a car    Operate machines or power tools    Consume alcohol, including beer    Sign any important papers or legal documents    You may experience numbness radiating into your legs or arms, (depending on the procedure location)  This numbness may last several hours.  Until the numb sensation returns to normal please use caution in walking, climbing stairs, stepping out of your vehicle, etc.    Common side effects of steroids:  Not everyone will experience corticosteroid side effects. If side effects are experienced they will gradually subside in the 7-10 day period following an injection.    Most common side effects include:    Flushed face and/or chest    Feeling of warmth, particularly in face but could be overall feeling of warmth    Increased blood sugar in diabetic patients    Menstrual irregularities may occur.  If taking hormone based birth control an alternate method of birth control is recommended    Sleep disturbances and/or mood swings are possible    Leg cramps    Please contact us if you have:  Severe pain   Fever more than 101.5 degrees Fahrenheit  Signs of infection (redness, swelling or drainage)      After office hours you can contact the on call provider by dialing [phone].  If you need immediate attention we recommend that you go to a hospital emergency room or  dial 911.

## 2018-03-08 NOTE — OP NOTE
CHIEF COMPLAINT: Neck pain secondary to cervical spondylosis and cervical disc degeneration  PROCEDURE: C6-7 Interlaminar epidural steroid injection using fluoroscopic guidance with contrast dye.   PROCEDURE DETAILS: After written informed consent was obtained from the patient, the patient was escorted to the procedure room.  The patient was placed in the prone position.  A  time out  was conducted to verify patient identity, procedure to be performed, side, site, allergies and any special requirements.  The skin over the neck and upper back region were prepped and draped in normal sterile fashion. Fluoroscopy was used to identify the C6-7 interspace in an AP view and the skin was anesthetized with 2 mL of 1% lidocaine with bicarbonate buffer.  A 20-gauge 3-1/2 inch Tuohy needle was advanced using the loss of resistance technique with preservative free normal saline with fluoroscopic guidance. After negative aspiration for CSF and blood, 1.5 cc of Isovue contrast dye was injected revealing the appropriate cervical epidurogram without evidence of intrathecal or intravascular spread. Following this, a 3-mL solution of 40 mg of triamcinolone with 2 cc preservative-free normal saline was slowly injected.  After injection of the medication, as the needle tip was withdrawn, it was flushed with local anesthetic.  The patient was monitored with blood pressure and pulse oximetry machines with the assistance of an RN throughout the procedure.  The patient was alert and responsive to questions throughout the procedure.   The patient tolerated the procedure well and was observed in the post-procedural area.  The patient was dismissed without apparent complications.     DIAGNOSIS:  1. Neck pain secondary to cervical spondylosis and cervical disc degeneration  PLAN:  1. Performed a C6-7 interlaminar epidural steroid injection.   2. The patient was instructed to call [facility] at [phone] in  one week with a progress update.      Edgardo Rubio MD  Diplomate of the American Board of Anesthesiology, Pain Medicine

## 2018-03-08 NOTE — ANESTHESIA POSTPROCEDURE EVALUATION
Patient: Mattie Banda    Procedure(s):  cervical 6-7 interlaminar epidural steroid injection - Wound Class: I-Clean    Diagnosis:spondylosis of cervical region  Diagnosis Additional Information: No value filed.    Anesthesia Type:  MAC    Note:  Anesthesia Post Evaluation    Patient location during evaluation: Phase 2 and Bedside  Patient participation: Able to fully participate in evaluation  Level of consciousness: awake and alert  Pain management: adequate  Airway patency: patent  Cardiovascular status: acceptable  Respiratory status: acceptable  Hydration status: acceptable  PONV: none     Anesthetic complications: None    Comments: Pt was pleased with her care today. No complications noted. VSS. DC to home with grandmother.        Last vitals:  Vitals:    03/08/18 1314   BP: 106/63   Pulse: 69   Resp: 16   Temp: 98.2  F (36.8  C)   SpO2: 97%         Electronically Signed By: SAUD Escobedo CRNA  March 8, 2018  2:34 PM

## 2018-03-09 RX ORDER — LORAZEPAM 1 MG/1
1 TABLET ORAL EVERY 8 HOURS PRN
Qty: 20 TABLET | Refills: 0 | Status: SHIPPED | OUTPATIENT
Start: 2018-03-09 | End: 2018-03-22

## 2018-03-09 NOTE — TELEPHONE ENCOUNTER
Message from [place]:  Original authorizing provider: Yuan Schofield MD [place] CADETGerald Banda would like a refill of the following medications:  LORazepam (ATIVAN) 1 MG tablet [Yuanjuanita Schofield MD]    Preferred pharmacy: [id number] - [place], [place] - [address] [place] [address]    Comment:        Last Written Prescription Date:  02/19/18  Last Fill Quantity: 20,   # refills: 0  Last Office Visit: 03/05/18  Future Office visit:       Routing refill request to provider for review/approval because:  Drug not on the [facility], [facility] or [facility] refill protocol or controlled substance

## 2018-03-12 ENCOUNTER — MYC REFILL (OUTPATIENT)
Dept: FAMILY MEDICINE | Facility: CLINIC | Age: 38
End: 2018-03-12

## 2018-03-12 ENCOUNTER — HOSPITAL ENCOUNTER (OUTPATIENT)
Dept: PHYSICAL THERAPY | Facility: OTHER | Age: 38
Setting detail: THERAPIES SERIES
End: 2018-03-12
Attending: NEUROLOGICAL SURGERY
Payer: MEDICAID

## 2018-03-12 DIAGNOSIS — M51.369 DEGENERATION OF LUMBAR INTERVERTEBRAL DISC: ICD-10-CM

## 2018-03-12 DIAGNOSIS — J98.01 ACUTE BRONCHOSPASM: ICD-10-CM

## 2018-03-12 DIAGNOSIS — R05.9 COUGH: ICD-10-CM

## 2018-03-12 DIAGNOSIS — M50.30 DDD (DEGENERATIVE DISC DISEASE), CERVICAL: ICD-10-CM

## 2018-03-12 PROCEDURE — 97012 MECHANICAL TRACTION THERAPY: CPT | Mod: GP | Performed by: PHYSICAL THERAPIST

## 2018-03-12 PROCEDURE — 40000718 ZZHC STATISTIC PT DEPARTMENT ORTHO VISIT: Performed by: PHYSICAL THERAPIST

## 2018-03-12 PROCEDURE — 97010 HOT OR COLD PACKS THERAPY: CPT | Mod: GP | Performed by: PHYSICAL THERAPIST

## 2018-03-12 RX ORDER — HYDROCODONE BITARTRATE AND ACETAMINOPHEN 5; 325 MG/1; MG/1
1-2 TABLET ORAL EVERY 8 HOURS PRN
Qty: 50 TABLET | Refills: 0 | Status: CANCELLED | OUTPATIENT
Start: 2018-03-12

## 2018-03-12 NOTE — TELEPHONE ENCOUNTER
Message from [place]:  Original authorizing provider: Yuan Schofield MD    Mattie CADETGerald Aguilarblaynelito would like a refill of the following medications:  HYDROcodone-acetaminophen (NORCO) 5-325 MG per tablet [Yuanjuanita Schofield MD]    [id number] - [facility] [address] [place] [address]    Comment:        Last Written Prescription Date:  03/05/18  Last Fill Quantity: 30,   # refills: 0  Last Office Visit: 03/05/18  Future Office visit:       Routing refill request to provider for review/approval because:  Drug not on the [facility], [facility] or [facility] refill protocol or controlled substance

## 2018-03-12 NOTE — TELEPHONE ENCOUNTER
Message from Roseannt:  Original authorizing provider: MD Mattie Murray would like a refill of the following medications:  HYDROcodone-acetaminophen (NORCO) 5-325 MG per tablet [Yuanjuanita Schofield MD]    Pref[facility]: [facility] [address]    Comment:  I can  prescription, could I also get a refill on my albuteral inhaler? Thank you

## 2018-03-13 RX ORDER — ALBUTEROL SULFATE 90 UG/1
2 AEROSOL, METERED RESPIRATORY (INHALATION) EVERY 6 HOURS PRN
Qty: 1 INHALER | Refills: 0 | Status: SHIPPED | OUTPATIENT
Start: 2018-03-13 | End: 2018-04-26

## 2018-03-13 RX ORDER — HYDROCODONE BITARTRATE AND ACETAMINOPHEN 5; 325 MG/1; MG/1
1-2 TABLET ORAL EVERY 8 HOURS PRN
Qty: 50 TABLET | Refills: 0 | Status: SHIPPED | OUTPATIENT
Start: 2018-03-13 | End: 2018-03-19

## 2018-03-19 ENCOUNTER — MYC REFILL (OUTPATIENT)
Dept: FAMILY MEDICINE | Facility: CLINIC | Age: 38
End: 2018-03-19

## 2018-03-19 DIAGNOSIS — M50.30 DDD (DEGENERATIVE DISC DISEASE), CERVICAL: ICD-10-CM

## 2018-03-19 DIAGNOSIS — M51.369 DEGENERATION OF LUMBAR INTERVERTEBRAL DISC: ICD-10-CM

## 2018-03-19 NOTE — TELEPHONE ENCOUNTER
This needs to be mailed so so she is asking for this early so it gets there in time.     Message from [employer]:  Original authorizing provider: SAUD Newton CNP would like a refill of the following medications:  HYDROcodone-acetaminophen (NORCO) 5-325 MG per tablet [SAUD Newton CNP]    Preferred pharmacy: [id number] - [address]    Comment:        Last Written Prescription Date:  03/13/18  Last Fill Quantity: 50,   # refills: 0  Last Office Visit: 03/05/18  Future Office visit:       Routing refill request to provider for review/approval because:  Drug not on the [facility], [facility] or [facility] refill protocol or controlled substance

## 2018-03-20 RX ORDER — HYDROCODONE BITARTRATE AND ACETAMINOPHEN 5; 325 MG/1; MG/1
1-2 TABLET ORAL EVERY 8 HOURS PRN
Qty: 50 TABLET | Refills: 0 | Status: SHIPPED | OUTPATIENT
Start: 2018-04-13 | End: 2018-03-28

## 2018-03-22 ENCOUNTER — MYC REFILL (OUTPATIENT)
Dept: FAMILY MEDICINE | Facility: CLINIC | Age: 38
End: 2018-03-22

## 2018-03-22 DIAGNOSIS — G47.9 SLEEP DISORDER: ICD-10-CM

## 2018-03-22 DIAGNOSIS — E03.9 ACQUIRED HYPOTHYROIDISM: ICD-10-CM

## 2018-03-22 DIAGNOSIS — F41.8 MIXED ANXIETY DEPRESSIVE DISORDER: ICD-10-CM

## 2018-03-22 RX ORDER — LEVOTHYROXINE SODIUM 200 UG/1
200 TABLET ORAL DAILY
Qty: 90 TABLET | Refills: 1 | Status: CANCELLED | OUTPATIENT
Start: 2018-03-22

## 2018-03-22 RX ORDER — ESCITALOPRAM OXALATE 20 MG/1
20 TABLET ORAL DAILY
Qty: 30 TABLET | Refills: 1 | Status: CANCELLED | OUTPATIENT
Start: 2018-03-22

## 2018-03-22 NOTE — TELEPHONE ENCOUNTER
ativan      Last Written Prescription Date:  3/09/18  Last Fill Quantity: 20,   # refills: 0  Last Office Visit: 3/05/18  Future Office visit:    Next 5 appointments (look out 90 days)     Apr 18, 2018  9:30 AM CDT   Office Visit with Yuan Schofield MD   [address] [address]   [phone]                   desyrel  Last Written Prescription Date:  6/12/17  Last Fill Quantity: 90,  # refills: 5   Last office visit: 3/5/2018 with prescribing provider:  3/05/18   Future Office Visit:   Next 5 appointments (look out 90 days)     Apr 18, 2018  9:30 AM CDT   [facility] Yuan [facility]   [facility]    [address] [place] [address]   [phone]

## 2018-03-22 NOTE — TELEPHONE ENCOUNTER
Message from [place]:  Original authorizing provider: MD Mattie Murray would like a refill of the following medications:  traZODone (DESYREL) 50 MG tablet [Yuan Schofield MD]  levothyroxine (SYNTHROID/LEVOTHROID) 200 MCG tablet [Yuan Schofield MD]  escitalopram (LEXAPRO) 20 MG tablet [Yuan Schofield MD]  LORazepam (ATIVAN) 1 MG tablet [Yuan Schofield MD]    Preferred pharmacy: [id number] - [address]    Comment:

## 2018-03-26 ENCOUNTER — MYC MEDICAL ADVICE (OUTPATIENT)
Dept: FAMILY MEDICINE | Facility: CLINIC | Age: 38
End: 2018-03-26

## 2018-03-26 ENCOUNTER — HOSPITAL ENCOUNTER (OUTPATIENT)
Dept: PHYSICAL THERAPY | Facility: OTHER | Age: 38
Setting detail: THERAPIES SERIES
End: 2018-03-26
Attending: NEUROLOGICAL SURGERY
Payer: MEDICAID

## 2018-03-26 PROCEDURE — 40000718 ZZHC STATISTIC PT DEPARTMENT ORTHO VISIT: Performed by: PHYSICAL THERAPIST

## 2018-03-26 PROCEDURE — 97010 HOT OR COLD PACKS THERAPY: CPT | Mod: GP | Performed by: PHYSICAL THERAPIST

## 2018-03-26 PROCEDURE — 97012 MECHANICAL TRACTION THERAPY: CPT | Mod: GP | Performed by: PHYSICAL THERAPIST

## 2018-03-26 RX ORDER — TRAZODONE HYDROCHLORIDE 50 MG/1
TABLET, FILM COATED ORAL
Qty: 90 TABLET | Refills: 5 | Status: SHIPPED | OUTPATIENT
Start: 2018-03-26 | End: 2019-06-12

## 2018-03-26 RX ORDER — LORAZEPAM 1 MG/1
1 TABLET ORAL EVERY 8 HOURS PRN
Qty: 20 TABLET | Refills: 0 | Status: SHIPPED | OUTPATIENT
Start: 2018-03-26 | End: 2018-04-09

## 2018-03-26 NOTE — ADDENDUM NOTE
Encounter addended by: Shalini Blakely, PT on: 3/26/2018  1:19 PM<BR>     Actions taken: Flowsheet accepted

## 2018-03-26 NOTE — TELEPHONE ENCOUNTER
Patient called and is wondering if this prescription for the ativan is able to be faxed to her pharmacy or if a hard copy needs to be mailed. She is wondering if it needs to be mailed if someone could call her so she can pick a hard copy up to bring to her pharmacy. Please advise.     Thank you  Fabio Jefferson  Patient Representative

## 2018-03-26 NOTE — TELEPHONE ENCOUNTER
Routing refill request to provider for review/approval because:  Drug not on the [facility] refill protocol   Heaven Duke RN

## 2018-03-28 ENCOUNTER — MYC MEDICAL ADVICE (OUTPATIENT)
Dept: FAMILY MEDICINE | Facility: CLINIC | Age: 38
End: 2018-03-28

## 2018-03-28 DIAGNOSIS — M50.30 DDD (DEGENERATIVE DISC DISEASE), CERVICAL: ICD-10-CM

## 2018-03-28 DIAGNOSIS — M51.369 DEGENERATION OF LUMBAR INTERVERTEBRAL DISC: ICD-10-CM

## 2018-03-28 RX ORDER — HYDROCODONE BITARTRATE AND ACETAMINOPHEN 5; 325 MG/1; MG/1
1-2 TABLET ORAL EVERY 8 HOURS PRN
Qty: 50 TABLET | Refills: 0 | Status: SHIPPED | OUTPATIENT
Start: 2018-03-28 | End: 2018-04-04

## 2018-03-28 RX ORDER — HYDROCODONE BITARTRATE AND ACETAMINOPHEN 5; 325 MG/1; MG/1
1-2 TABLET ORAL EVERY 8 HOURS PRN
Qty: 50 TABLET | Refills: 0 | Status: CANCELLED | OUTPATIENT
Start: 2018-04-13

## 2018-03-28 NOTE — TELEPHONE ENCOUNTER
NORCO      Last Written Prescription Date:  3/20/18  Last Fill Quantity: 50,   # refills: 0  Last Office Visit: 3/05/18  Future Office visit:    Next 5 appointments (look out 90 days)     Apr 18, 2018  9:30 AM CDT   Office Visit with Yuan Schofield MD   [facility]    [address] [place] [id number]   [phone]                   Routing refill request to provider for review/approval because:  Drug not on the [facility], [facility] or  Health refill protocol or controlled substance

## 2018-03-28 NOTE — TELEPHONE ENCOUNTER
March 20, 2018   Annie Perales        9:15 AM   Note      Script brought up front for patient to .

## 2018-03-28 NOTE — TELEPHONE ENCOUNTER
After clarification from patient, she is appropriately requesting pain medication today.  Prescription signed and given to TC.

## 2018-03-28 NOTE — TELEPHONE ENCOUNTER
Patient called requesting the status of this. She was messaged back on Vsnap on 3/26 that the script was up front and ready for her to  but when she came to pick it up they said she was too early. She is wondering if this could be approved to she can pick it up after work. Please send a Vsnap message back to let her know when it's ready. Please advise.     Thank you  Fabio Jefferson  Patient Representative

## 2018-04-04 ENCOUNTER — MYC REFILL (OUTPATIENT)
Dept: FAMILY MEDICINE | Facility: CLINIC | Age: 38
End: 2018-04-04

## 2018-04-04 DIAGNOSIS — R05.9 COUGH: ICD-10-CM

## 2018-04-04 DIAGNOSIS — M51.369 DEGENERATION OF LUMBAR INTERVERTEBRAL DISC: ICD-10-CM

## 2018-04-04 DIAGNOSIS — M50.30 DDD (DEGENERATIVE DISC DISEASE), CERVICAL: ICD-10-CM

## 2018-04-04 DIAGNOSIS — J20.9 ACUTE BRONCHITIS WITH COEXISTING CONDITION REQUIRING PROPHYLACTIC TREATMENT: ICD-10-CM

## 2018-04-04 RX ORDER — ALBUTEROL SULFATE 0.83 MG/ML
1 SOLUTION RESPIRATORY (INHALATION) EVERY 6 HOURS PRN
Qty: 1 BOX | Refills: 0 | Status: SHIPPED | OUTPATIENT
Start: 2018-04-04 | End: 2019-10-01

## 2018-04-04 RX ORDER — HYDROCODONE BITARTRATE AND ACETAMINOPHEN 5; 325 MG/1; MG/1
1-2 TABLET ORAL EVERY 8 HOURS PRN
Qty: 50 TABLET | Refills: 0 | Status: SHIPPED | OUTPATIENT
Start: 2018-04-04 | End: 2018-04-09

## 2018-04-04 NOTE — TELEPHONE ENCOUNTER
Message from [employer]:  Original authorizing provider: MD Mattie Murray would like a refill of the following medications:  HYDROcodone-acetaminophen (NORCO) 5-325 MG per tablet [Yuanjuanita Schofield MD]    Preferred [facility]: [facility] [address]    Comment:  I can  hard copy.    LOV-03/05/18

## 2018-04-04 NOTE — TELEPHONE ENCOUNTER
Prescription approved per [facility] Refill Protocol.    Chasidy Lamb RN. . .  4/4/2018, 3:18 PM

## 2018-04-04 NOTE — TELEPHONE ENCOUNTER
"Requested Prescriptions   Pending Prescriptions Disp Refills     albuterol (2.5 MG/3ML) 0.083% neb solution 1 Box 0     Sig: Take 1 vial (2.5 mg) by nebulization every 6 hours as needed for shortness of breath / dyspnea or wheezing    Asthma Maintenance Inhalers - Anticholinergics Passed    4/4/2018  1:23 PM       Passed - Patient is age 12 years or older       Passed - Recent (12 mo) or future (30 days) visit within the authorizing provider's specialty    Patient had office visit in the last 12 months or has a visit in the next 30 days with authorizing provider or within the authorizing provider's specialty.  See \"Patient Info\" tab in inbasket, or \"Choose Columns\" in Meds & Orders section of the refill encounter.            Last Written Prescription Date:  3/13/18  Last Fill Quantity: 1,  # refills: 0   Last office visit: 3/5/2018 with prescribing provider:     Future Office Visit:   Next 5 appointments (look out 90 days)     Apr 18, 2018  9:30 AM CDT   [facility]    [address] [place] [address] [phone]                   "

## 2018-04-05 ENCOUNTER — HOSPITAL ENCOUNTER (OUTPATIENT)
Dept: PHYSICAL THERAPY | Facility: OTHER | Age: 38
Setting detail: THERAPIES SERIES
End: 2018-04-05
Attending: NEUROLOGICAL SURGERY
Payer: COMMERCIAL

## 2018-04-05 PROCEDURE — 97110 THERAPEUTIC EXERCISES: CPT | Mod: GP | Performed by: PHYSICAL THERAPIST

## 2018-04-05 PROCEDURE — 97010 HOT OR COLD PACKS THERAPY: CPT | Mod: GP | Performed by: PHYSICAL THERAPIST

## 2018-04-05 PROCEDURE — 40000718 ZZHC STATISTIC PT DEPARTMENT ORTHO VISIT: Performed by: PHYSICAL THERAPIST

## 2018-04-05 PROCEDURE — 97012 MECHANICAL TRACTION THERAPY: CPT | Mod: GP | Performed by: PHYSICAL THERAPIST

## 2018-04-05 NOTE — PROGRESS NOTES
04/05/18 0930   Signing Clinician's Name / Credentials   Signing clinician's name / credentials tamy royal PT   Session Number   Session Number 4   Ortho Goal 1   Goal Identifier 1   Goal Description patient to have improved posture and body mechanics 90% of the time   Target Date 05/07/18   Ortho Goal 2   Goal Identifier 2   Goal Description patient to have reduction in pain level currently 5-9/10 and improved tolerance to activity NDI 46/100 goal is 1-5/10 NDI 20    Target Date 05/07/18   Subjective Report   Subjective Report overall neck pain feels about the same but arm pain is better , has a pump up traction unit at home and does this now 2x , is very mindful of her posture and body mechanics and does HEP every 2 hours    Mechanical Traction   Minutes 25   Skilled Intervention instruction in use of modalities and decrease pain    Patient Response decrease pain    Treatment Detail supine hooklying cervical intermittent traction hold 30 rest 10 at 23 lbs with hot pack    Progress increase lbs on traction    Hydrocollator Packs   Minutes 25   Skilled Intervention instruction in use of modalities and decrease pain    Patient Response decrease pain    Treatment Detail supine hooklying cervical intermittent traction hold 30 rest 10 at 23 lbs with hot pack    Therapeutic Procedure/exercise   Minutes 10   Skilled Intervention instruction in HEP and POC   Patient Response very compliant with HEP    Treatment Detail 1 avoid flexion posture , 2 every 2 hours chin tucks and scapular squeezes 10x    Progress no significant change in NDI    Assessments Completed   Assessments Completed overall arm pain is better but neck pain mostly unchanged , is going to contact Dr Kaufman office    Education   Learner Patient   Readiness Acceptance   Method Booklet/handout;Explanation;Demonstration   Response Verbalizes Understanding;Demonstrates Understanding   Plan   Homework HEP   Home program 1 avoid flexion posture , 2 every 2 hours  chin tucks and scapular squeezes 10x    Plan for next session traction and HEP    Total Session Time   Timed Code Treatment Minutes 10   Total Treatment Time (sum of timed and untimed services) 35

## 2018-04-09 ENCOUNTER — MYC REFILL (OUTPATIENT)
Dept: FAMILY MEDICINE | Facility: CLINIC | Age: 38
End: 2018-04-09

## 2018-04-09 DIAGNOSIS — F41.8 MIXED ANXIETY DEPRESSIVE DISORDER: ICD-10-CM

## 2018-04-09 DIAGNOSIS — M50.30 DDD (DEGENERATIVE DISC DISEASE), CERVICAL: ICD-10-CM

## 2018-04-09 DIAGNOSIS — M51.369 DEGENERATION OF LUMBAR INTERVERTEBRAL DISC: ICD-10-CM

## 2018-04-09 RX ORDER — LORAZEPAM 1 MG/1
1 TABLET ORAL EVERY 8 HOURS PRN
Qty: 20 TABLET | Refills: 0 | Status: CANCELLED | OUTPATIENT
Start: 2018-04-09

## 2018-04-09 RX ORDER — HYDROCODONE BITARTRATE AND ACETAMINOPHEN 5; 325 MG/1; MG/1
1-2 TABLET ORAL EVERY 8 HOURS PRN
Qty: 50 TABLET | Refills: 0 | Status: SHIPPED | OUTPATIENT
Start: 2018-04-09 | End: 2018-04-12

## 2018-04-09 RX ORDER — LORAZEPAM 1 MG/1
1 TABLET ORAL EVERY 8 HOURS PRN
Qty: 20 TABLET | Refills: 0 | Status: SHIPPED | OUTPATIENT
Start: 2018-04-09 | End: 2018-04-18

## 2018-04-09 NOTE — TELEPHONE ENCOUNTER
Message from [facility]:  Original authorizing provider: Yuan Schofield MD    Mattie CADETGerald Banda would like a refill of the following medications:  LORazepam (ATIVAN) 1 MG tablet [Yuan Schofield MD]    Preferred pharmacy: [id number] - [address]    Comment:      Medication renewals requested in this message routed to other providers:  HYDROcodone-acetaminophen (NORCO) 5-325 MG per tablet [Dave Duke MD]

## 2018-04-09 NOTE — TELEPHONE ENCOUNTER
Message from [facility]:  Original authorizing provider: Dave Duke MD    Mattie ROSALBA Banda would like a refill of the following medications:  HYDROcodone-acetaminophen (NORCO) 5-325 MG per tablet [Dave uDke MD]    [id number] [address]    Comment:      Medication renewals requested in this message routed to other providers:  LORazepam (ATIVAN) 1 MG tablet [Yuan Iain Schofield MD]

## 2018-04-11 NOTE — TELEPHONE ENCOUNTER
Patient called requesting status of this. Was advised prescription was mailed.     Thank you  Fabio Jefferson  Patient Representative

## 2018-04-12 RX ORDER — HYDROCODONE BITARTRATE AND ACETAMINOPHEN 5; 325 MG/1; MG/1
1-2 TABLET ORAL EVERY 8 HOURS PRN
Qty: 50 TABLET | Refills: 0 | Status: SHIPPED | OUTPATIENT
Start: 2018-04-12 | End: 2018-04-18

## 2018-04-12 NOTE — TELEPHONE ENCOUNTER
Patient returned call. She is wondering if a covering provider is able to help with this today so she can  a hard copy of this prescription and have the script that was mailed to the pharmacy cancelled. She said she always picks it up, is currently out of the medication and it could take a week to get to the pharmacy. Please advise.     Thank you  Fabio Jefferson  Patient Representative

## 2018-04-18 ENCOUNTER — OFFICE VISIT (OUTPATIENT)
Dept: FAMILY MEDICINE | Facility: CLINIC | Age: 38
End: 2018-04-18
Payer: COMMERCIAL

## 2018-04-18 VITALS
SYSTOLIC BLOOD PRESSURE: 112 MMHG | HEART RATE: 79 BPM | BODY MASS INDEX: 25.63 KG/M2 | WEIGHT: 158.8 LBS | DIASTOLIC BLOOD PRESSURE: 62 MMHG | TEMPERATURE: 97.8 F | OXYGEN SATURATION: 98 %

## 2018-04-18 DIAGNOSIS — K52.832 LYMPHOCYTIC COLITIS: ICD-10-CM

## 2018-04-18 DIAGNOSIS — M50.30 DDD (DEGENERATIVE DISC DISEASE), CERVICAL: ICD-10-CM

## 2018-04-18 DIAGNOSIS — N92.1 MENOMETRORRHAGIA: ICD-10-CM

## 2018-04-18 DIAGNOSIS — F41.8 MIXED ANXIETY DEPRESSIVE DISORDER: Primary | ICD-10-CM

## 2018-04-18 DIAGNOSIS — M51.369 DEGENERATION OF LUMBAR INTERVERTEBRAL DISC: ICD-10-CM

## 2018-04-18 PROCEDURE — 99214 OFFICE O/P EST MOD 30 MIN: CPT | Performed by: FAMILY MEDICINE

## 2018-04-18 RX ORDER — RISPERIDONE 1 MG/1
1 TABLET ORAL DAILY
Qty: 30 TABLET | Refills: 1
Start: 2018-04-18 | End: 2018-07-09

## 2018-04-18 RX ORDER — HYDROCODONE BITARTRATE AND ACETAMINOPHEN 5; 325 MG/1; MG/1
1-2 TABLET ORAL EVERY 8 HOURS PRN
Qty: 100 TABLET | Refills: 0 | Status: SHIPPED | OUTPATIENT
Start: 2018-04-18 | End: 2018-04-30

## 2018-04-18 RX ORDER — LORAZEPAM 1 MG/1
1-2 TABLET ORAL EVERY 8 HOURS PRN
Qty: 20 TABLET | Refills: 0
Start: 2018-04-18 | End: 2018-05-06

## 2018-04-18 RX ORDER — MESALAMINE 800 MG/1
1600 TABLET, DELAYED RELEASE ORAL 3 TIMES DAILY
Qty: 90 TABLET | Refills: 1 | Status: SHIPPED | OUTPATIENT
Start: 2018-04-18 | End: 2019-10-01

## 2018-04-18 ASSESSMENT — PAIN SCALES - GENERAL: PAINLEVEL: SEVERE PAIN (6)

## 2018-04-18 NOTE — MR AVS SNAPSHOT
After Visit Summary   4/18/2018    Mattie Banda    MRN: [id number]           Patient Information     Date Of Birth          1980        Visit Information        Provider Department      4/18/2018 9:30 AM Yuan Schofield MD [facility]        Today's Diagnoses     DDD (degenerative disc disease), cervical    -  1    Mixed anxiety depressive disorder        Degeneration of lumbar intervertebral disc        Lymphocytic colitis        Menometrorrhagia          Care Instructions    Lorazepam may be 1-2 tabs as needed.  Try 2 next episode  risperidone take a full tab at bedtime, stop trazodone.  If this works it should help sleep, anxiety and improve depression.  Dose could go higher.   We will arrange back/neck follow up and gynecology consult.  Restart mesalamine for colitis.             Follow-ups after your visit        Additional Services     OB/GYN REFERRAL       Your provider has referred you to:  [facility]: [facility] [phone]   [email or web address]    Please be aware that coverage of these services is subject to the terms and limitations of your health insurance plan.  Call member services at your health plan with any benefit or coverage questions.      Please bring the following to your appointment:  >>   Any x-rays, CTs or MRIs which have been performed.  Contact the facility where they were done to arrange for  prior to your scheduled appointment.  Any new CT, MRI or other procedures ordered by your specialist must be performed at a [facility] facility or coordinated by your clinic's referral office.    >>   List of current medications   >>   This referral request   >>   Any documents/labs given to you for this referral                  Your next 10 appointments already scheduled     Apr 27, 2018 12:50 PM CDT   [facility]     [address] [place] [address] [id number] [phone]            May 10, 2018 11:30 AM CDT   Office Visit with Sarbjit Whittaker MD   [facility]    [address] [place] [id number] [phone]           Bring a current list of meds and any records pertaining to this visit. For Physicals, please bring immunization records and any forms needing to be filled out. Please arrive 10 minutes early to complete paperwork.              Who to contact     If you have questions or need follow up information about today's clinic visit or your schedule please contact [facility] directly at [phone].  Normal or non-critical lab and imaging results will be communicated to you by Ward[employer] Housekeeperhart, letter or phone within 4 business days after the clinic has received the results. If you do not hear from us within 7 days, please contact the clinic through Hotreadert or phone. If you have a critical or abnormal lab result, we will notify you by phone as soon as possible.  Submit refill requests through [employer] or call your pharmacy and they will forward the refill request to us. Please allow 3 business days for your refill to be completed.          Additional Information About Your Visit        [employer] Information     [employer] gives you secure access to your electronic health record. If you see a primary care provider, you can also send messages to your care team and make appointments. If you have questions, please call your primary care clinic.  If you do not have a primary care provider, please call [phone] and they will assist you.        Care EveryWhere ID     This is your Care EveryWhere ID. This could be used by other organizations to access your [facility] medical records  [id number]        Your Vitals Were     Pulse Temperature Last Period Pulse Oximetry BMI (Body Mass Index)       79 97.8  F (36.6  C) (Temporal) 04/05/2018 98% 25.63 kg/m2         Blood Pressure from Last 3 Encounters:   04/18/18 112/62   03/08/18 105/68   03/05/18 122/62    Weight from Last 3 Encounters:   04/18/18 158 lb 12.8 oz (72 kg)   03/05/18 152 lb (68.9 kg)   02/07/18 161 lb (73 kg)              We Performed the Following     OB/GYN REFERRAL          Today's Medication Changes          These changes are accurate as of 4/18/18 10:27 AM.  If you have any questions, ask your nurse or doctor.               Start taking these medicines.        Dose/Directions    mesalamine 800 MG EC tablet   Commonly known as:  ASACOL HD   Used for:  Lymphocytic colitis   Started by:  Yuan Schofield MD        Dose:  1600 mg   Take 2 tablets (1,600 mg) by mouth 3 times daily   Quantity:  90 tablet   Refills:  1         These medicines have changed or have updated prescriptions.        Dose/Directions    LORazepam 1 MG tablet   Commonly known as:  ATIVAN   This may have changed:  how much to take   Used for:  Mixed anxiety depressive disorder   Changed by:  Yuan Schofield MD        Dose:  1-2 mg   Take 1-2 tablets (1-2 mg) by mouth every 8 hours as needed for anxiety   Quantity:  20 tablet   Refills:  0       risperiDONE 1 MG tablet   Commonly known as:  risperDAL   This may have changed:    - how much to take  - when to take this   Used for:  Mixed anxiety depressive disorder   Changed by:  Yuan Schofield MD        Dose:  1 mg   Take 1 tablet (1 mg) by mouth daily   Quantity:  30 tablet   Refills:  1            Where to get your medicines      These medications were sent to [id number] - [place] - [address] [place] [address]  [address] [place] [address] [place] [address]    Hours:  M-F 8:30-6:30; Sat 9-4; closed Sunday Phone:  [phone]     mesalamine 800 MG EC tablet         Some of these will need a paper prescription and others can be bought over the counter.  Ask your nurse if you have questions.     Bring a paper prescription for each of these medications     HYDROcodone-acetaminophen 5-325  MG per tablet       You don't need a prescription for these medications     LORazepam 1 MG tablet    risperiDONE 1 MG tablet               Information about OPIOIDS     PRESCRIPTION OPIOIDS: WHAT YOU NEED TO KNOW   You have a prescription for an opioid (narcotic) pain medicine. Opioids can cause addiction. If you have a history of chemical dependency of any type, you are at a higher risk of becoming addicted to opioids. Only take this medicine after all other options have been tried. Take it for as short a time and as few doses as possible.     Do not:    Drive. If you drive while taking these medicines, you could be arrested for driving under the influence (DUI).    Operate heavy machinery    Do any other dangerous activities while taking these medicines.     Drink any alcohol while taking these medicines.      Take with any other medicines that contain acetaminophen. Read all labels carefully. Look for the word  acetaminophen  or  Tylenol.  Ask your pharmacist if you have questions or are unsure.    Store your pills in a secure place, locked if possible. We will not replace any lost or stolen medicine. If you don t finish your medicine, please throw away (dispose) as directed by your pharmacist. The [place] Pollution Control Agency has more information about safe disposal: https://www.pca.[facility].[place].us/living-green/managing-unwanted-medications    All opioids tend to cause constipation. Drink plenty of water and eat foods that have a lot of fiber, such as fruits, vegetables, prune juice, apple juice and high-fiber cereal. Take a laxative (Miralax, milk of magnesia, Colace, Senna) if you don t move your bowels at least every other day.          Primary Care Provider Office Phone # Fax #    Yuan Iain Schofield [phone]       [address] [place] [phone]        Equal Access to Services     [facility] AH: josé luis Malone qaybta kaalmada adeegyada, waxay  julius gutierreztaqueria erwin'aan ah. So [facility] [phone].    ATENCIÓN: Si luxla marco antonio, tiene a caruso disposición servicios gratuitos de asistencia lingüística. Melanie al [phone].    We comply with applicable federal civil rights laws and [place] laws. We do not discriminate on the basis of race, color, national origin, age, disability, sex, sexual orientation, or gender identity.            Thank you!     Thank you for choosing [facility]  for your care. Our goal is always to provide you with excellent care. Hearing back from our patients is one way we can continue to improve our services. Please take a few minutes to complete the written survey that you may receive in the mail after your visit with us. Thank you!             Your Updated Medication List - Protect others around you: Learn how to safely use, store and throw away your medicines at www.disposemymeds.org.          This list is accurate as of 4/18/18 10:27 AM.  Always use your most recent med list.                   Brand Name Dispense Instructions for use Diagnosis    * albuterol 108 (90 Base) MCG/ACT Inhaler    PROAIR HFA/PROVENTIL HFA/VENTOLIN HFA    1 Inhaler    Inhale 2 puffs into the lungs every 6 hours as needed for shortness of breath / dyspnea or wheezing    Cough, Acute bronchospasm       * albuterol (2.5 MG/3ML) 0.083% neb solution     1 Box    Take 1 vial (2.5 mg) by nebulization every 6 hours as needed for shortness of breath / dyspnea or wheezing    Acute bronchitis with coexisting condition requiring prophylactic treatment, Cough       escitalopram 20 MG tablet    LEXAPRO    30 tablet    Take 1 tablet (20 mg) by mouth daily    Mixed anxiety depressive disorder       HYDROcodone-acetaminophen 5-325 MG per tablet    NORCO    100 tablet    Take 1-2 tablets by mouth every 8 hours as needed for severe pain maximum 6 tablet(s) per day    Degeneration of lumbar intervertebral disc, DDD (degenerative disc disease), cervical        ibuprofen 200 MG capsule     120 capsule    Take 800 mg by mouth every 4 hours as needed for fever        levothyroxine 200 MCG tablet    SYNTHROID/LEVOTHROID    90 tablet    Take 1 tablet (200 mcg) by mouth daily    Acquired hypothyroidism       LORazepam 1 MG tablet    ATIVAN    20 tablet    Take 1-2 tablets (1-2 mg) by mouth every 8 hours as needed for anxiety    Mixed anxiety depressive disorder       mesalamine 800 MG EC tablet    ASACOL HD    90 tablet    Take 2 tablets (1,600 mg) by mouth 3 times daily    Lymphocytic colitis       NEW MED      [facility] daily        risperiDONE 1 MG tablet    risperDAL    30 tablet    Take 1 tablet (1 mg) by mouth daily    Mixed anxiety depressive disorder       traZODone 50 MG tablet    DESYREL    90 tablet    1 -3 tablets by mouth nightlty    Sleep disorder       * Notice:  This list has 2 medication(s) that are the same as other medications prescribed for you. Read the directions carefully, and ask your doctor or other care provider to review them with you.

## 2018-04-18 NOTE — PROGRESS NOTES
SUBJECTIVE:   Mattie Banda is a 37 year old female who presents to clinic today for the following health issues:      Follow up neck injection    Would like to increase dose of Lexapro. Ativan does not work, is there a different med to try.    Would like to start a med for colitis     Is it possible to get pain meds refilled every 2 weeks instead of every 1?      As noted above patient is continuing to have neck pain which is moderately limiting.  Also having the lumbar pain.  She would like to be reestablished with MDs that can offer epidural or other type injections.  She has been more functional after the last injections.  She has limited her hours and her workload to keep from having overwhelming pain.  She does continue to use narcotic pain medications.  She does not feel that these cause overwhelming problems.  She and I have discussed long-term use and addiction.  Previously on these medications and was able to easily get off them when pain was addressed adequately with injections or surgical treatment.    Depression seems to be present but anxiety is more of a problem.  1 lorazepam does not seem to help.  She still has some left.  Risperidone has not helped with anxiety.  He does not cause side effects either.  She has been using trazodone to continue help with sleep.    Patient has known lymphocytic colitis which improved with is a call.  She is wondering if this might happen will be started again.  She is having bowel movements at any time which she cannot control.  He even awaken her from sleep.  There is some blood mixed with them.    Problem list and histories reviewed & adjusted, as indicated.  Additional history:     BP Readings from Last 3 Encounters:   04/18/18 112/62   03/08/18 105/68   03/05/18 122/62    Wt Readings from Last 3 Encounters:   04/18/18 158 lb 12.8 oz (72 kg)   03/05/18 152 lb (68.9 kg)   02/07/18 161 lb (73 kg)                  Labs reviewed in EPIC    Reviewed and updated as  "needed this visit by clinical staff  Tobacco  Allergies  Meds  Med Hx  Surg Hx  Fam Hx  Soc Hx      Reviewed and updated as needed this visit by Provider         ROS:  Constitutional, HEENT, cardiovascular, pulmonary, gi and gu systems are negative, except as otherwise noted.    OBJECTIVE:     /62 (Cuff Size: Adult Regular)  Pulse 79  Temp 97.8  F (36.6  C) (Temporal)  Wt 158 lb 12.8 oz (72 kg)  LMP 04/05/2018  SpO2 98%  BMI 25.63 kg/m2  Body mass index is 25.63 kg/(m^2).  GENERAL: healthy, alert and mild physical distress  EYES: Eyes grossly normal to inspection, PERRL and conjunctivae and sclerae normal  HENT: Mostly negative  NECK: no adenopathy, no asymmetry, masses, or scars and thyroid normal to palpation  RESP: lungs clear to auscultation - no rales, rhonchi or wheezes  CV: regular rate and rhythm, normal S1 S2, no S3 or S4, no murmur, click or rub, no peripheral edema and peripheral pulses strong  ABDOMEN: Diffusely tender especially left abdominal area and into the suprapubic region.  Any bowel sounds  MS: Grossly negative extremities.  Protects neck and low back.  NEURO: Very grossly negative  PSYCH: mentation appears normal, fatigued, judgement and insight intact, appearance well groomed and it does not look to be overwhelmingly depressed and affect is only mildly flat, appears better than before    Diagnostic Test Results:  none     ASSESSMENT/PLAN:           Tobacco Cessation:   reports that she has been smoking Cigarettes.  She has a 8.00 pack-year smoking history. She has never used smokeless tobacco.  Tobacco Cessation Action Plan: Information offered: Patient not interested at this time    BMI:   Estimated body mass index is 25.63 kg/(m^2) as calculated from the following:    Height as of 2/7/18: 5' 6\" (1.676 m).    Weight as of this encounter: 158 lb 12.8 oz (72 kg).         1. Mixed anxiety depressive disorder  We will increase risperidone dosing and allow increased lorazepam " dose.  Does not appear that she is misusing the second medication.  She will continue on Lexapro at 20 mg daily.  Follow-up in about 1 month  - risperiDONE (RISPERDAL) 1 MG tablet; Take 1 tablet (1 mg) by mouth daily  Dispense: 30 tablet; Refill: 1  - LORazepam (ATIVAN) 1 MG tablet; Take 1-2 tablets (1-2 mg) by mouth every 8 hours as needed for anxiety  Dispense: 20 tablet; Refill: 0    2. DDD (degenerative disc disease), cervical  Continues with pain medication and given previous history with this patient in use of medication, abuses low potential.  I will authorize 2 weeks at a time and she will follow-up with spine clinic for further interventions.  - HYDROcodone-acetaminophen (NORCO) 5-325 MG per tablet; Take 1-2 tablets by mouth every 8 hours as needed for severe pain maximum 6 tablet(s) per day  Dispense: 100 tablet; Refill: 0    3. Degeneration of lumbar intervertebral disc  As above  - HYDROcodone-acetaminophen (NORCO) 5-325 MG per tablet; Take 1-2 tablets by mouth every 8 hours as needed for severe pain maximum 6 tablet(s) per day  Dispense: 100 tablet; Refill: 0    4. Lymphocytic colitis  Given known history and current symptoms, is a call will be started.  She is asked to read the potential side effect limitations.  - mesalamine (ASACOL HD) 800 MG EC tablet; Take 2 tablets (1,600 mg) by mouth 3 times daily  Dispense: 90 tablet; Refill: 1    5. Menometrorrhagia  Patient does continue with heavy periods and wants something done.  OB/GYN referral generated.  - OB/GYN REFERRAL    MEDICATIONS:  Continue current medications without change  Patient Instructions   Lorazepam may be 1-2 tabs as needed.  Try 2 next episode  risperidone take a full tab at bedtime, stop trazodone.  If this works it should help sleep, anxiety and improve depression.  Dose could go higher.   We will arrange back/neck follow up and gynecology consult.  Restart mesalamine for colitis.       Time spent with greater than 50% spent in  discussion, making the plan, or filling out paperwork with patient for illness/treatments was 25 minutes or more.    Yuan Schofield MD  [employer]

## 2018-04-18 NOTE — NURSING NOTE
"Chief Complaint   Patient presents with     RECHECK     neck injection        Initial /62 (Cuff Size: Adult Regular)  Pulse 79  Temp 97.8  F (36.6  C) (Temporal)  Wt 158 lb 12.8 oz (72 kg)  LMP 04/05/2018  SpO2 98%  BMI 25.63 kg/m2 Estimated body mass index is 25.63 kg/(m^2) as calculated from the following:    Height as of 2/7/18: 5' 6\" (1.676 m).    Weight as of this encounter: 158 lb 12.8 oz (72 kg).  Medication Reconciliation: complete   Vy Vidal CMA (AAMA)      "

## 2018-04-18 NOTE — PATIENT INSTRUCTIONS
Lorazepam may be 1-2 tabs as needed.  Try 2 next episode  risperidone take a full tab at bedtime, stop trazodone.  If this works it should help sleep, anxiety and improve depression.  Dose could go higher.   We will arrange back/neck follow up and gynecology consult.  Restart mesalamine for colitis.

## 2018-04-25 ENCOUNTER — TELEPHONE (OUTPATIENT)
Dept: FAMILY MEDICINE | Facility: CLINIC | Age: 38
End: 2018-04-25

## 2018-04-25 NOTE — TELEPHONE ENCOUNTER
Prior Authorization Retail Medication Request (Started on CoverMy Meds KEY: [id number])    Medication/Dose: Mesalamine  mg  ICD code (if different than what is on RX):    Previously Tried and Failed:    Rationale:      Insurance Name:  CASSI TothN: TWIN, BIN: [id number]  Insurance ID:  [id number]      Pharmacy Information (if different than what is on RX)  Name:    Phone:

## 2018-04-26 DIAGNOSIS — R05.9 COUGH: ICD-10-CM

## 2018-04-26 DIAGNOSIS — J98.01 ACUTE BRONCHOSPASM: ICD-10-CM

## 2018-04-26 NOTE — TELEPHONE ENCOUNTER
"Last Written Prescription Date:  3/13/18  Last Fill Quantity: 1,  # refills: 0   Last office visit: 4/18/2018 with prescribing provider:  4/18/18   Future Office Visit:   Next 5 appointments (look out 90 days)     Apr 27, 2018 12:50 PM CDT   Return Visit with Mike Matson PA-C   [facility]    [address] [place] [phone]   [phone]            May 10, 2018 11:30 AM CDT   Office Visit with Sarbjit Whittaker MD   [facility] ([facility])    [address] [place] [phone]   [phone]                 Requested Prescriptions   Pending Prescriptions Disp Refills     VENTOLIN  (90 Base) MCG/ACT Inhaler [Pharmacy Med Name: VENTOLIN HFA 90MCG/ACT INHALER] 18 g      Sig: INHALE 2 PUFFS INTO THE LUNGS EVERY 6 HOURS AS NEEDED FOR SHORTNESSOF BREATH / DYSPNEA OR WHEEZING    Asthma Maintenance Inhalers - Anticholinergics Passed    4/26/2018  1:55 PM       Passed - Patient is age 12 years or older       Passed - Recent (12 mo) or future (30 days) visit within the authorizing provider's specialty    Patient had office visit in the last 12 months or has a visit in the next 30 days with authorizing provider or within the authorizing provider's specialty.  See \"Patient Info\" tab in inbasket, or \"Choose Columns\" in Meds & Orders section of the refill encounter.              "

## 2018-04-27 ENCOUNTER — OFFICE VISIT (OUTPATIENT)
Dept: NEUROSURGERY | Facility: CLINIC | Age: 38
End: 2018-04-27
Payer: COMMERCIAL

## 2018-04-27 VITALS
DIASTOLIC BLOOD PRESSURE: 64 MMHG | HEIGHT: 66 IN | SYSTOLIC BLOOD PRESSURE: 110 MMHG | WEIGHT: 157.4 LBS | TEMPERATURE: 98 F | BODY MASS INDEX: 25.3 KG/M2

## 2018-04-27 DIAGNOSIS — M54.2 CERVICALGIA: Primary | ICD-10-CM

## 2018-04-27 DIAGNOSIS — G89.29 CHRONIC BILATERAL LOW BACK PAIN WITHOUT SCIATICA: ICD-10-CM

## 2018-04-27 DIAGNOSIS — M54.50 CHRONIC BILATERAL LOW BACK PAIN WITHOUT SCIATICA: ICD-10-CM

## 2018-04-27 PROCEDURE — 99213 OFFICE O/P EST LOW 20 MIN: CPT | Performed by: PHYSICIAN ASSISTANT

## 2018-04-27 RX ORDER — ALBUTEROL SULFATE 90 UG/1
AEROSOL, METERED RESPIRATORY (INHALATION)
Qty: 52 G | Refills: 3 | Status: SHIPPED | OUTPATIENT
Start: 2018-04-27 | End: 2019-11-14

## 2018-04-27 ASSESSMENT — PAIN SCALES - GENERAL: PAINLEVEL: SEVERE PAIN (6)

## 2018-04-27 NOTE — PROGRESS NOTES
"[facility]  Neurosurgery:    HPI: Mattie Banda is a 37 year old female who presents for follow up in regards to low back and neck issues.  She had previously seen Dr. Kingsley, and was started with physical therapy and sent for an epidural injection.  She states that she received good symptomatically relief from her injection, and would like to discuss further injections.    The patient denies any changes in bowel or bladder function, or any new problems with balance or coordination.     ROS negative for fever, chills, chest pain or shortness of breath.     Exam:  /64 (BP Location: Right arm, Patient Position: Chair, Cuff Size: Adult Regular)  Temp 98  F (36.7  C) (Temporal)  Ht 5' 6\" (1.676 m)  Wt 157 lb 6.4 oz (71.4 kg)  LMP 04/05/2018  BMI 25.41 kg/m2  Constitutional:  Alert, well nourished, NAD.  HEENT: Normocephalic, atraumatic.   Pulm:  Without shortness of breath, normal effort.   CV:  No pitting edema of BLE.   Skin: No rashes or lesions.     Neurological:  Awake  Alert  Oriented x 3  CN II-XII grossly intact.     Motor exam:     Shoulder Abduction:  Right:  5/5    Left:  5/5  Biceps:                      Right:  5/5    Left:  5/5  Triceps:                     Right:  5/5    Left:  5/5  Wrist Extensors:       Right:  5/5    Left:  5/5  Wrist Flexors:           Right:  5/5    Left:  5/5  Intrinsics:                  Right:  5/5    Left:  5/5     Hip Flexor:                Right: 5/5  Left:  5/5  Hip Adductor:             Right:  5/5  Left:  5/5  Hip Abductor:             Right:  5/5  Left:  5/5  Gastroc Soleus:        Right:  5/5  Left:  5/5  Tib/Ant:                      Right:  5/5  Left:  5/5  EHL:                     Right:  5/5  Left:  5/5     Able to spontaneously move U/E bilaterally  Sensation intact throughout all U/E dermatomes    There is no tenderness to palpation of the cervical/lumbar paraspinous musculature. No tenderness to SI joints or greater trochanters bilaterally. "     A/P:   1) cervicalgia  2) lumbalgia      I did have a discussed with the patient regarding her symptoms.  She is having increased neck pain and back pain.  She did respond well to injections previously.  I think she would benefit from consultation with a comprehensive pain service.  I [facility] [place].  She did agree with this and wished to proceed.  She will return to see Dr. Kingsley if conservative measures fail to provide her with symptomatic relief.    The patient voiced agreement and understanding of the plan of care. All questions were answered today.         Mike Matson PA-C  [facility]  [address] [address]    Tel [phone]

## 2018-04-27 NOTE — MR AVS SNAPSHOT
After Visit Summary   4/27/2018    Mattie Banda    MRN: [id number]           Patient Information     Date Of Birth          1980        Visit Information        Provider Department      4/27/2018 12:50 PM Mike Matson PA-C [facility]        Today's Diagnoses     Cervicalgia    -  1    Chronic bilateral low back pain without sciatica           Follow-ups after your visit        Additional Services     PAIN MANAGEMENT REFERRAL       [facility]                  Your next 10 appointments already scheduled     May 10, 2018 11:30 AM CDT   Office Visit with Sarbjit Whittaker MD   [facility]    [address] [place] [id number] [phone]           Bring a current list of meds and any records pertaining to this visit. For Physicals, please bring immunization records and any forms needing to be filled out. Please arrive 10 minutes early to complete paperwork.              Who to contact     If you have questions or need follow up information about today's clinic visit or your schedule please contact [facility] directly at [phone].  Normal or non-critical lab and imaging results will be communicated to you by [employer], letter or phone within 4 business days after the clinic has received the results. If you do not hear from us within 7 days, please contact the clinic through [employer] or phone. If you have a critical or abnormal lab result, we will notify you by phone as soon as possible.  Submit refill requests through [employer] or call your pharmacy and they will forward the refill request to us. Please allow 3 business days for your refill to be completed.          Additional Information About Your Visit        MyChart Information     [employer] gives you secure access to your electronic health record. If you see a primary care provider, you can also send messages to your care  "team and make appointments. If you have questions, please call your primary care clinic.  If you do not have a primary care provider, please call [phone] and they will assist you.        Care EveryWhere ID     This is your Care EveryWhere ID. This could be used by other organizations to access your [facility] medical records  [id number]        Your Vitals Were     Temperature Height Last Period BMI (Body Mass Index)          98  F (36.7  C) (Temporal) 5' 6\" (1.676 m) 04/05/2018 25.41 kg/m2         Blood Pressure from Last 3 Encounters:   04/27/18 110/64   04/18/18 112/62   03/08/18 105/68    Weight from Last 3 Encounters:   04/27/18 157 lb 6.4 oz (71.4 kg)   04/18/18 158 lb 12.8 oz (72 kg)   03/05/18 152 lb (68.9 kg)              We Performed the Following     PAIN MANAGEMENT REFERRAL        Primary Care Provider Office Phone # Fax #    Yuan Iain Schofield [phone]       [address] [place] [address]        Equal Access to Services     [facility] : Hadii myla ku hadasho Sozoila, waaxda luqadaha, qaybta kaalmada ademichael, adolfo borges . So [facility] [phone].    ATENCIÓN: Si habla español, tiene a caruso disposición servicios gratuitos de asistencia lingüística. [facility] [phone].    We comply with applicable federal civil rights laws and [place] laws. We do not discriminate on the basis of race, color, national origin, age, disability, sex, sexual orientation, or gender identity.            Thank you!     Thank you for choosing [facility]  for your care. Our goal is always to provide you with excellent care. Hearing back from our patients is one way we can continue to improve our services. Please take a few minutes to complete the written survey that you may receive in the mail after your visit with us. Thank you!             Your Updated Medication List - Protect others around you: Learn how to safely use, store and throw away your " medicines at www.disposemymeds.org.          This list is accurate as of 4/27/18  1:20 PM.  Always use your most recent med list.                   Brand Name Dispense Instructions for use Diagnosis    * albuterol (2.5 MG/3ML) 0.083% neb solution     1 Box    Take 1 vial (2.5 mg) by nebulization every 6 hours as needed for shortness of breath / dyspnea or wheezing    Acute bronchitis with coexisting condition requiring prophylactic treatment, Cough       * VENTOLIN  (90 Base) MCG/ACT Inhaler   Generic drug:  albuterol     52 g    INHALE 2 PUFFS INTO THE LUNGS EVERY 6 HOURS AS NEEDED FOR SHORTNESSOF BREATH / DYSPNEA OR WHEEZING    Cough, Acute bronchospasm       escitalopram 20 MG tablet    LEXAPRO    30 tablet    Take 1 tablet (20 mg) by mouth daily    Mixed anxiety depressive disorder       HYDROcodone-acetaminophen 5-325 MG per tablet    NORCO    100 tablet    Take 1-2 tablets by mouth every 8 hours as needed for severe pain maximum 6 tablet(s) per day    Degeneration of lumbar intervertebral disc, DDD (degenerative disc disease), cervical       ibuprofen 200 MG capsule     120 capsule    Take 800 mg by mouth every 4 hours as needed for fever        levothyroxine 200 MCG tablet    SYNTHROID/LEVOTHROID    90 tablet    Take 1 tablet (200 mcg) by mouth daily    Acquired hypothyroidism       LORazepam 1 MG tablet    ATIVAN    20 tablet    Take 1-2 tablets (1-2 mg) by mouth every 8 hours as needed for anxiety    Mixed anxiety depressive disorder       mesalamine 800 MG EC tablet    ASACOL HD    90 tablet    Take 2 tablets (1,600 mg) by mouth 3 times daily    Lymphocytic colitis       NEW MED      Kratom daily        risperiDONE 1 MG tablet    risperDAL    30 tablet    Take 1 tablet (1 mg) by mouth daily    Mixed anxiety depressive disorder       traZODone 50 MG tablet    DESYREL    90 tablet    1 -3 tablets by mouth nightlty    Sleep disorder       * Notice:  This list has 2 medication(s) that are the same as  other medications prescribed for you. Read the directions carefully, and ask your doctor or other care provider to review them with you.

## 2018-04-27 NOTE — LETTER
"    4/27/2018         RE: Mattie Banda  [address] [facility] [address]        Dear Colleague,    Thank you for referring your patient, Mattie Banda, to the [facility]. Please see a copy of my visit note below.    [facility]  Neurosurgery:    HPI: Mattie Banda is a 37 year old female who presents for follow up in regards to low back and neck issues.  She had previously seen Dr. Kingsley, and was started with physical therapy and sent for an epidural injection.  She states that she received good symptomatically relief from her injection, and would like to discuss further injections.    The patient denies any changes in bowel or bladder function, or any new problems with balance or coordination.     ROS negative for fever, chills, chest pain or shortness of breath.     Exam:  /64 (BP Location: Right arm, Patient Position: Chair, Cuff Size: Adult Regular)  Temp 98  F (36.7  C) (Temporal)  Ht 5' 6\" (1.676 m)  Wt 157 lb 6.4 oz (71.4 kg)  LMP 04/05/2018  BMI 25.41 kg/m2  Constitutional:  Alert, well nourished, NAD.  HEENT: Normocephalic, atraumatic.   Pulm:  Without shortness of breath, normal effort.   CV:  No pitting edema of BLE.   Skin: No rashes or lesions.     Neurological:  Awake  Alert  Oriented x 3  CN II-XII grossly intact.     Motor exam:     Shoulder Abduction:  Right:  5/5    Left:  5/5  Biceps:                      Right:  5/5    Left:  5/5  Triceps:                     Right:  5/5    Left:  5/5  Wrist Extensors:       Right:  5/5    Left:  5/5  Wrist Flexors:           Right:  5/5    Left:  5/5  Intrinsics:                  Right:  5/5    Left:  5/5     Hip Flexor:                Right: 5/5  Left:  5/5  Hip Adductor:             Right:  5/5  Left:  5/5  Hip Abductor:             Right:  5/5  Left:  5/5  Gastroc Soleus:        Right:  5/5  Left:  5/5  Tib/Ant:                      Right:  5/5  Left:  5/5  EHL:                     Right:  5/5  Left:  5/5   "   Able to spontaneously move U/E bilaterally  Sensation intact throughout all U/E dermatomes    There is no tenderness to palpation of the cervical/lumbar paraspinous musculature. No tenderness to SI joints or greater trochanters bilaterally.     A/P:   1) cervicalgia  2) lumbalgia      I did have a discussed with the patient regarding her symptoms.  She is having increased neck pain and back pain.  She did respond well to injections previously.  I think she would benefit from consultation with a comprehensive pain service.  I will [facility] [place].  She did agree with this and wished to proceed.  She will return to see Dr. Kingsley if conservative measures fail to provide her with symptomatic relief.    The patient voiced agreement and understanding of the plan of care. All questions were answered today.         Mike Matson PA-C  [facility]  [address] [address]    Tel [phone]      Again, thank you for allowing me to participate in the care of your patient.        Sincerely,        Mike Matson PA-C

## 2018-04-27 NOTE — TELEPHONE ENCOUNTER
Central Prior Authorization Team   Phone: [phone]      PA Initiation    Medication: Mesalamine  mg-Initiated  Insurance Company: [employer] - Phone [phone] Fax [phone]  Pharmacy Filling the Rx: [id number] - [place], [place] - [address] [place] [address]  Filling Pharmacy Phone: [phone]  Filling Pharmacy Fax:    Start Date: 4/27/2018

## 2018-04-30 ENCOUNTER — MYC REFILL (OUTPATIENT)
Dept: FAMILY MEDICINE | Facility: CLINIC | Age: 38
End: 2018-04-30

## 2018-04-30 DIAGNOSIS — M51.369 DEGENERATION OF LUMBAR INTERVERTEBRAL DISC: ICD-10-CM

## 2018-04-30 DIAGNOSIS — M50.30 DDD (DEGENERATIVE DISC DISEASE), CERVICAL: ICD-10-CM

## 2018-04-30 RX ORDER — HYDROCODONE BITARTRATE AND ACETAMINOPHEN 5; 325 MG/1; MG/1
1-2 TABLET ORAL EVERY 8 HOURS PRN
Qty: 100 TABLET | Refills: 0 | Status: SHIPPED | OUTPATIENT
Start: 2018-04-30 | End: 2018-05-15

## 2018-04-30 NOTE — TELEPHONE ENCOUNTER
Prior Authorization Approval    Authorization Effective Date: 4/26/2018  Authorization Expiration Date: 4/26/2019  Medication: Mesalamine  mg-APPROVED  Approved Dose/Quantity:   Reference #:     Insurance Company: [employer]AP - Phone [phone] Fax [phone]  Expected CoPay:       CoPay Card Available:      Foundation Assistance Needed:    Which Pharmacy is filling the prescription (Not needed for infusion/clinic administered): [id number] - [place], [place] - [address] [place] [address]  Pharmacy Notified: Yes  Patient Notified: No    Pharmacy will notify patient when medication is ready.

## 2018-04-30 NOTE — TELEPHONE ENCOUNTER
Message from MyChart:  Original authorizing provider: Yuan Schofield MD    Mattie Banda would like a refill of the following medications:  HYDROcodone-acetaminophen (NORCO) 5-325 MG per tablet [Yuanjuanita Schofield MD]    Preferred pharmacy: Other - I will be ip at [facility] on Friday. Thank you    Comment:  I can pick this up at the [facility] on friday. Thanks        Last Written Prescription Date:  04/18/18  Last Fill Quantity: 100,   # refills: 0  Last Office Visit: 04/18/18  Future Office visit:    Next 5 appointments (look out 90 days)     May 10, 2018 11:30 AM CDT   Office Visit with Sarbjit [facility]aker MD   [facility]    [address] [place] [id number] [phone]                   Routing refill request to provider for review/approval because:  Drug not on the [facility], UMP or  Health refill protocol or controlled substance

## 2018-05-06 ENCOUNTER — MYC REFILL (OUTPATIENT)
Dept: FAMILY MEDICINE | Facility: CLINIC | Age: 38
End: 2018-05-06

## 2018-05-06 DIAGNOSIS — F41.8 MIXED ANXIETY DEPRESSIVE DISORDER: ICD-10-CM

## 2018-05-07 RX ORDER — LORAZEPAM 1 MG/1
1-2 TABLET ORAL EVERY 8 HOURS PRN
Qty: 20 TABLET | Refills: 0 | Status: SHIPPED | OUTPATIENT
Start: 2018-05-07 | End: 2018-05-17

## 2018-05-07 NOTE — TELEPHONE ENCOUNTER
Message from MyChart:  Original authorizing provider: MD Mattie Murray would like a refill of the following medications:  LORazepam (ATIVAN) 1 MG tablet [Yuanjuanita Schofield MD]    Preferred pharmacy: [id number] - [place], [place] - [address] [place] [address]    Comment:

## 2018-05-07 NOTE — TELEPHONE ENCOUNTER
Ativan      Last Written Prescription Date:  4/18/2018  Last Fill Quantity: 20,   # refills: 0  Last Office Visit: 4/18/2018  Future Office visit:    Next 5 appointments (look out 90 days)     May 10, 2018 11:30 AM CDT   Office Visit with Sarbjit Whittaker MD   [facility]    [address] [place] [id number]   [phone]                   Routing refill request to provider for review/approval because:  Drug not on the [facility], UMP or  Health refill protocol or controlled substance

## 2018-05-08 NOTE — PROGRESS NOTES
Outpatient Physical Therapy Discharge Note     Patient: Mattie Banda  : 1980    Beginning/End Dates of Reporting Period:  3/7/2018 to 2018    Referring Provider: yamile murphy MD     Therapy Diagnosis: neck and arm pain      Client Self Report: overall neck pain feels about the same but arm pain is better , has a pump up traction unit at home and does this now 2x , is very mindful of her posture and body mechanics and does HEP every 2 hours     Objective Measurements:      patient seen for 4 Rx sessions for traction in clinic and progression of HEP 1 avoid flexion posture , 2 every 2 hours chin tucks and scapular squeezes 10x     She cancelled last Rx and has not followed up with PT      Goals:  Goal Identifier 1   Goal Description patient to have improved posture and body mechanics 90% of the time   Target Date 18   Date Met      Progress:     Goal Identifier 2   Goal Description patient to have reduction in pain level currently 5-9/10 and improved tolerance to activity NDI 46/100 goal is 1-5/10 NDI 20    Target Date 18   Date Met      Progress:       Progress Toward Goals:   Progress this reporting period: patient last seen in clinic on 2018 she cancelled her appt 2018 and as of 2018 she has made no further contact with PT           Plan:  Discharge from therapy.    Discharge:    Reason for Discharge: No further expectation of progress.    Equipment Issued: none    Discharge Plan: Patient to continue home program.

## 2018-05-08 NOTE — ADDENDUM NOTE
Encounter addended by: Shalini Blakely, PT on: 5/8/2018 11:17 AM<BR>     Actions taken: Episode resolved, Sign clinical note

## 2018-05-10 ENCOUNTER — OFFICE VISIT (OUTPATIENT)
Dept: FAMILY MEDICINE | Facility: CLINIC | Age: 38
End: 2018-05-10
Payer: COMMERCIAL

## 2018-05-10 VITALS
BODY MASS INDEX: 25.55 KG/M2 | HEIGHT: 66 IN | HEART RATE: 62 BPM | OXYGEN SATURATION: 97 % | WEIGHT: 159 LBS | TEMPERATURE: 96.9 F | SYSTOLIC BLOOD PRESSURE: 110 MMHG | DIASTOLIC BLOOD PRESSURE: 68 MMHG | RESPIRATION RATE: 14 BRPM

## 2018-05-10 DIAGNOSIS — R10.2 PELVIC PAIN IN FEMALE: ICD-10-CM

## 2018-05-10 DIAGNOSIS — N92.1 MENORRHAGIA WITH IRREGULAR CYCLE: Primary | ICD-10-CM

## 2018-05-10 LAB
ERYTHROCYTE [DISTWIDTH] IN BLOOD BY AUTOMATED COUNT: 14.8 % (ref 10–15)
HCT VFR BLD AUTO: 37.2 % (ref 35–47)
HGB BLD-MCNC: 11.9 G/DL (ref 11.7–15.7)
MCH RBC QN AUTO: 32.3 PG (ref 26.5–33)
MCHC RBC AUTO-ENTMCNC: 32 G/DL (ref 31.5–36.5)
MCV RBC AUTO: 101 FL (ref 78–100)
PLATELET # BLD AUTO: 301 10E9/L (ref 150–450)
RBC # BLD AUTO: 3.68 10E12/L (ref 3.8–5.2)
WBC # BLD AUTO: 10.7 10E9/L (ref 4–11)

## 2018-05-10 PROCEDURE — 88305 TISSUE EXAM BY PATHOLOGIST: CPT | Performed by: OBSTETRICS & GYNECOLOGY

## 2018-05-10 PROCEDURE — 85027 COMPLETE CBC AUTOMATED: CPT | Performed by: OBSTETRICS & GYNECOLOGY

## 2018-05-10 PROCEDURE — 88305 TISSUE EXAM BY PATHOLOGIST: CPT | Mod: 26 | Performed by: OBSTETRICS & GYNECOLOGY

## 2018-05-10 PROCEDURE — 58100 BIOPSY OF UTERUS LINING: CPT | Performed by: OBSTETRICS & GYNECOLOGY

## 2018-05-10 PROCEDURE — 36415 COLL VENOUS BLD VENIPUNCTURE: CPT | Performed by: OBSTETRICS & GYNECOLOGY

## 2018-05-10 PROCEDURE — 99214 OFFICE O/P EST MOD 30 MIN: CPT | Mod: 25 | Performed by: OBSTETRICS & GYNECOLOGY

## 2018-05-10 ASSESSMENT — PAIN SCALES - GENERAL: PAINLEVEL: NO PAIN (0)

## 2018-05-10 NOTE — PROGRESS NOTES
SUBJECTIVE:                                                      Referral from Yuan Schofield       Reason for consultation:  Menorrhagia and pain    History:  Mattie  Is a 37 year old female  3 para 2012( 2 SVDs )   who presents today because of  Menorrhagia for the past 5 years. Smokes- cant use ocps. She has severe pain with menses- for a week- and has menses every 2-3 weeks.            Patient Active Problem List   Diagnosis     Contraceptive management     CARDIOVASCULAR SCREENING; LDL GOAL LESS THAN 160     Degeneration of lumbar intervertebral disc     S/P lumbar fusion and discectomy 2015     Low grade squamous intraepithelial lesion on cytologic smear of cervix (LGSIL)     Labial lesion     Mixed anxiety depressive disorder     Tobacco use disorder     Idiopathic peripheral neuropathy     Acquired hypothyroidism     Migraine with aura and without status migrainosus, not intractable     Tinea versicolor     Lymphocytic colitis     Excessive or frequent menstruation     DDD (degenerative disc disease), cervical     Past Surgical History:   Procedure Laterality Date     BACK SURGERY  2015      COLONOSCOPY  07     COLONOSCOPY  08     HC COLONOSCOPY W BIOPSY  08     HC TOOTH EXTRACTION W/FORCEP      wisdom teeth removed     INJECT EPIDURAL CERVICAL N/A 3/8/2018    Procedure: INJECT EPIDURAL CERVICAL;  cervical 6-7 interlaminar epidural steroid injection;  Surgeon: Edgardo Rubio MD;  Location:  OR     LAPAROSCOPIC TUBAL LIGATION  2012    Procedure: LAPAROSCOPIC TUBAL LIGATION;  Laparoscopic Bilateral tubal sterilization/ fulgaration;  Surgeon: Sarbjit Whittaker MD;  Location:  OR       Social History   Substance Use Topics     Smoking status: Current Some Day Smoker     Packs/day: 1.00     Years: 8.00     Types: Cigarettes     Smokeless tobacco: Never Used     Alcohol use 0.0 oz/week     0 Standard drinks or equivalent per week      Comment:       Family History   Problem Relation Age of Onset     Hypertension Maternal Grandmother      Arthritis Maternal Grandmother      CANCER Maternal Grandmother      MGGM     Depression Maternal Grandmother      Lipids Maternal Grandmother      OSTEOPOROSIS Maternal Grandmother      Respiratory Maternal Grandmother      emphysema     Genitourinary Problems Maternal Grandmother      Kidney Failure, liver      Hypertension Maternal Grandfather      HEART DISEASE Maternal Grandfather      MI     Cardiovascular Maternal Grandfather      Cancer - colorectal Maternal Grandfather      CANCER Maternal Grandfather      [place] Lymphoma     Hypertension Paternal Grandmother      Arthritis Paternal Grandmother      RA     Breast Cancer Other      Maternal great aunt     Thyroid Disease Maternal Aunt      two aunts     DIABETES No family hx of          Current Outpatient Prescriptions   Medication Sig Dispense Refill     albuterol (2.5 MG/3ML) 0.083% neb solution Take 1 vial (2.5 mg) by nebulization every 6 hours as needed for shortness of breath / dyspnea or wheezing 1 Box 0     escitalopram (LEXAPRO) 20 MG tablet Take 1 tablet (20 mg) by mouth daily 30 tablet 1     HYDROcodone-acetaminophen (NORCO) 5-325 MG per tablet Take 1-2 tablets by mouth every 8 hours as needed for severe pain maximum 6 tablet(s) per day 100 tablet 0     ibuprofen 200 MG capsule Take 800 mg by mouth every 4 hours as needed for fever 120 capsule      levothyroxine (SYNTHROID/LEVOTHROID) 200 MCG tablet Take 1 tablet (200 mcg) by mouth daily 90 tablet 1     LORazepam (ATIVAN) 1 MG tablet Take 1-2 tablets (1-2 mg) by mouth every 8 hours as needed for anxiety 20 tablet 0     mesalamine (ASACOL HD) 800 MG EC tablet Take 2 tablets (1,600 mg) by mouth 3 times daily 90 tablet 1     NEW MED Kratom daily       risperiDONE (RISPERDAL) 1 MG tablet Take 1 tablet (1 mg) by mouth daily 30 tablet 1     traZODone (DESYREL) 50 MG tablet 1 -3 tablets by mouth nightlty 90 tablet 5  "    VENTOLIN  (90 Base) MCG/ACT Inhaler INHALE 2 PUFFS INTO THE LUNGS EVERY 6 HOURS AS NEEDED FOR SHORTNESSOF BREATH / DYSPNEA OR WHEEZING 52 g 3       ROS:  A 12 point systems review is negative except for what is listed above in the Subjective history.            OBJECTIVE:                                                    Vital signs: Blood pressure 110/68, pulse 62, temperature 96.9  F (36.1  C), temperature source Temporal, resp. rate 14, height 5' 6\" (1.676 m), weight 159 lb (72.1 kg), last menstrual period 04/26/2018, SpO2 97 %, not currently breastfeeding.        HEENT is normal.      Neck is supple, mobile, no adenoapthy or masses palpable. Normal range of motion noted.     Chest is clear to auscultation. No wheezes, rales or rhonchi heard.  cardiac exam is normal with s1, s2, no murmurs or adventitious sounds.Normal rate and rhythm is heard.     Abdomen is soft,  nondistended, No masses felt.No HSM. No guarding or rigidity or rebound noted. Palpation reveals  no    tenderness   Normal bowel sounds heard.     SHE HAS A HEALED MIDLINE INCISION FROM UMBILICUS TO PUBIS- FROM SPINE SURGERY    Pelvic exam:My nurse eJnifer   was present to chaperone the exam.    The external genitalia appeared normal.      The vaginal vault was without bleeding  or   discharge or odor.      The cervix was smooth and shiny and normal in appearance.      No vaginal support defects were noted,      Bimanual exam revealed a 7 week  sized uterus. It does  descend  Somewhat  well in the vaginal vault.      No adnexal masses were felt.      There was no  cervical motion tenderness.      Exam was NOT   limited by the patient's body habitus.        With my nurse present, and after receiving informed consent from the patient, I performed the endometrial biopsy in the usual fashion using a standard pipelle. The pipelle sounded to 6 cm. I sent the biopsy for pathology. She tolerated the procedure well. She was instructed to call or " present to clinic or ER if she has unusual amount of cramping, bleeding, fever or vaginal discharge.                 ASSESSMENT/PLAN:                                                        1.37 year old female  with  Menorrhagia. Differential diagnosis would include:  A. thyroid dysfunction(hypothroid or hyperthroid)- will check TSH      B. uterine leiomyomata(fibroids)- -will check pelvic US     C. endometrial hyperplasia -  await the endometrial biopsy results    D. anovulatory cycles, such as from PCO, stress, weight gain or loss, aging, etc  E.  With the menorrhagia, one must rule out anemia.- will check hgb            2. Severe pain with menses.  We discussed options for management of these problems. Ordinarily I would suggest Total laparoscopic hysterectomy  However she has a long midline incision in the lower abdomen from spine surgery so I anticipate significant lower abdominal adhesions. Therefore I suggested Hysteroscopy, dilatation and curettage and insertion of Mirena IUD.   At the same time we could place a laparoscope to evaluate just how severe the adhesions in the pelvis appear so that we can determine whether she is a candidate for future noninvasive approach to hysterectomy    -will discuss this furhter next week and make plans.                                                                   Thank you for this consultation.    Copy to  Yuan Schofield MD  [facility]

## 2018-05-10 NOTE — MR AVS SNAPSHOT
After Visit Summary   5/10/2018    Mattie Banda    MRN: [id number]           Patient Information     Date Of Birth          1980        Visit Information        Provider Department      5/10/2018 11:30 AM Sarbjit Whittaker MD [facility]        Today's Diagnoses     Menorrhagia with irregular cycle    -  1    Pelvic pain in female           Follow-ups after your visit        Your next 10 appointments already scheduled     May 21, 2018 11:30 AM CDT   US PELVIC COMPLETE W TRANSVAGINAL with PHUS1   [facility] ([facility])    [address] [id number] [phone]           Please bring a list of your medicines (including vitamins, minerals and over-the-counter drugs). Also, tell your doctor about any allergies you may have. Wear comfortable clothes and leave your valuables at home.  Adults: Drink six 8-ounce glasses of fluid one hour before your exam. Do NOT empty your bladder.  If you need to empty your bladder before your exam, try to release only a little bit of urine. Then, drink another 8oz glass of fluid.  Children: Children who are potty trained should drink at least 4 cups (32 oz) of liquid 45 minutes to one hour prior to the exam. The child s bladder must be full in order to achieve a diagnostic exam. If your child is very uncomfortable or has an urgent need to pee, please notify a technologist; they will try to find out how much longer the wait may be and provide instructions to help relieve the pressure. Occasionally it is medically necessary to insert a urinary catheter to fill the bladder.  Please call the Imaging Department at your exam site with any questions.            May 21, 2018  2:00 PM CDT   Office Visit with Sarbjit Whittaker MD   [facility]    [address] [id number] [phone]           Bring a current list of meds  "and any records pertaining to this visit. For Physicals, please bring immunization records and any forms needing to be filled out. Please arrive 10 minutes early to complete paperwork.              Future tests that were ordered for you today     Open Future Orders        Priority Expected Expires Ordered    US Pelvic Complete with Transvaginal Routine  5/11/2019 5/10/2018            Who to contact     If you have questions or need follow up information about today's clinic visit or your schedule please contact [facility] directly at [phone].  Normal or non-critical lab and imaging results will be communicated to you by Formotushart, letter or phone within 4 business days after the clinic has received the results. If you do not hear from us within 7 days, please contact the clinic through Slingjott or phone. If you have a critical or abnormal lab result, we will notify you by phone as soon as possible.  Submit refill requests through myZamana or call your pharmacy and they will forward the refill request to us. Please allow 3 business days for your refill to be completed.          Additional Information About Your Visit        [email or web address] Information     myZamana gives you secure access to your electronic health record. If you see a primary care provider, you can also send messages to your care team and make appointments. If you have questions, please call your primary care clinic.  If you do not have a primary care provider, please call [phone] and they will assist you.        Care EveryWhere ID     This is your Care EveryWhere ID. This could be used by other organizations to access your [place] medical records  [id number]        Your Vitals Were     Pulse Temperature Respirations Height Last Period Pulse Oximetry    62 96.9  F (36.1  C) (Temporal) 14 5' 6\" (1.676 m) 04/26/2018 97%    Breastfeeding? BMI (Body Mass Index)                No 25.66 kg/m2           Blood Pressure from Last 3 Encounters: "   05/10/18 110/68   04/27/18 110/64   04/18/18 112/62    Weight from Last 3 Encounters:   05/10/18 159 lb (72.1 kg)   04/27/18 157 lb 6.4 oz (71.4 kg)   04/18/18 158 lb 12.8 oz (72 kg)              We Performed the Following     CBC with platelets     ENDOMETRIAL BIOPSY W/O CERVICAL DILATION        Primary Care Provider Office Phone # Fax #    Yuan Iain Schofield [phone]       [address] [place] [address]        Equal Access to Services     [facility]: Hadii aad ku hadasho Soomaali, waaxda luqadaha, qaybta kaalmada ademichael, adolfo borges . So [facility] [phone].    ATENCIÓN: Si habla español, tiene a caruso disposición servicios gratuitos de asistencia lingüística. [facility] [phone].    We comply with applicable federal civil rights laws and [place] laws. We do not discriminate on the basis of race, color, national origin, age, disability, sex, sexual orientation, or gender identity.            Thank you!     Thank you for choosing [facility]  for your care. Our goal is always to provide you with excellent care. Hearing back from our patients is one way we can continue to improve our services. Please take a few minutes to complete the written survey that you may receive in the mail after your visit with us. Thank you!             Your Updated Medication List - Protect others around you: Learn how to safely use, store and throw away your medicines at www.disposemymeds.org.          This list is accurate as of 5/10/18 12:15 PM.  Always use your most recent med list.                   Brand Name Dispense Instructions for use Diagnosis    * albuterol (2.5 MG/3ML) 0.083% neb solution     1 Box    Take 1 vial (2.5 mg) by nebulization every 6 hours as needed for shortness of breath / dyspnea or wheezing    Acute bronchitis with coexisting condition requiring prophylactic treatment, Cough       * VENTOLIN  (90 Base) MCG/ACT Inhaler    Generic drug:  albuterol     52 g    INHALE 2 PUFFS INTO THE LUNGS EVERY 6 HOURS AS NEEDED FOR SHORTNESSOF BREATH / DYSPNEA OR WHEEZING    Cough, Acute bronchospasm       escitalopram 20 MG tablet    LEXAPRO    30 tablet    Take 1 tablet (20 mg) by mouth daily    Mixed anxiety depressive disorder       HYDROcodone-acetaminophen 5-325 MG per tablet    NORCO    100 tablet    Take 1-2 tablets by mouth every 8 hours as needed for severe pain maximum 6 tablet(s) per day    Degeneration of lumbar intervertebral disc, DDD (degenerative disc disease), cervical       ibuprofen 200 MG capsule     120 capsule    Take 800 mg by mouth every 4 hours as needed for fever        levothyroxine 200 MCG tablet    SYNTHROID/LEVOTHROID    90 tablet    Take 1 tablet (200 mcg) by mouth daily    Acquired hypothyroidism       LORazepam 1 MG tablet    ATIVAN    20 tablet    Take 1-2 tablets (1-2 mg) by mouth every 8 hours as needed for anxiety    Mixed anxiety depressive disorder       mesalamine 800 MG EC tablet    ASACOL HD    90 tablet    Take 2 tablets (1,600 mg) by mouth 3 times daily    Lymphocytic colitis       NEW MED      Kratom daily        risperiDONE 1 MG tablet    risperDAL    30 tablet    Take 1 tablet (1 mg) by mouth daily    Mixed anxiety depressive disorder       traZODone 50 MG tablet    DESYREL    90 tablet    1 -3 tablets by mouth nightlty    Sleep disorder       * Notice:  This list has 2 medication(s) that are the same as other medications prescribed for you. Read the directions carefully, and ask your doctor or other care provider to review them with you.

## 2018-05-15 ENCOUNTER — MYC REFILL (OUTPATIENT)
Dept: FAMILY MEDICINE | Facility: CLINIC | Age: 38
End: 2018-05-15

## 2018-05-15 DIAGNOSIS — M50.30 DDD (DEGENERATIVE DISC DISEASE), CERVICAL: ICD-10-CM

## 2018-05-15 DIAGNOSIS — M51.369 DEGENERATION OF LUMBAR INTERVERTEBRAL DISC: ICD-10-CM

## 2018-05-15 LAB — COPATH REPORT: NORMAL

## 2018-05-15 RX ORDER — OXYCODONE HYDROCHLORIDE 5 MG/1
5-10 TABLET ORAL EVERY 6 HOURS PRN
Qty: 30 TABLET | Refills: 0 | Status: SHIPPED | OUTPATIENT
Start: 2018-05-15 | End: 2018-05-23

## 2018-05-15 RX ORDER — HYDROCODONE BITARTRATE AND ACETAMINOPHEN 5; 325 MG/1; MG/1
1-2 TABLET ORAL EVERY 8 HOURS PRN
Qty: 100 TABLET | Refills: 0 | Status: SHIPPED | OUTPATIENT
Start: 2018-05-15 | End: 2018-06-04

## 2018-05-15 NOTE — TELEPHONE ENCOUNTER
Message from Triductorhart:  Original authorizing provider: Yuan Schofield MD    Mattie Banda would like a refill of the following medications:  HYDROcodone-acetaminophen (NORCO) 5-325 MG per tablet [Yuan Schofield MD]  oxyCODONE IR (ROXICODONE) 5 MG tablet [Yuan Schofield MD]    Preferred pharmacy: Other - I will  and bring to [facility]    Comment:  Can i get a longer perscription? I can  hard copy when ready . Thank you        Last Written Prescription Date:  04/30/18-hydrocodone, 05/11/18-oxycodone  Last Fill Quantity: 100, 30  # refills: 0, 0  Last Office Visit: 04/18/18  Future Office visit:    Next 5 appointments (look out 90 days)     May 21, 2018  2:00 PM CDT   Office Visit with Sarbjit Whittaker MD   [facility] ([address] [address]   [phone]            Jun 08, 2018 10:30 AM CDT   Office Visit with Yuan Schofield MD   [facility] ([address] [address]   [phone]                   Routing refill request to provider for review/approval because:  Drug not on the [facility], [facility] or [facility] refill protocol or controlled substance

## 2018-05-17 ENCOUNTER — MYC REFILL (OUTPATIENT)
Dept: FAMILY MEDICINE | Facility: CLINIC | Age: 38
End: 2018-05-17

## 2018-05-17 DIAGNOSIS — F41.8 MIXED ANXIETY DEPRESSIVE DISORDER: ICD-10-CM

## 2018-05-17 RX ORDER — LORAZEPAM 1 MG/1
1-2 TABLET ORAL EVERY 8 HOURS PRN
Qty: 20 TABLET | Refills: 0 | Status: SHIPPED | OUTPATIENT
Start: 2018-05-17 | End: 2018-05-30

## 2018-05-17 RX ORDER — ESCITALOPRAM OXALATE 20 MG/1
20 TABLET ORAL DAILY
Qty: 30 TABLET | Refills: 1 | Status: SHIPPED | OUTPATIENT
Start: 2018-05-17 | End: 2018-07-09 | Stop reason: DRUGHIGH

## 2018-05-17 NOTE — TELEPHONE ENCOUNTER
"Requested Prescriptions   Pending Prescriptions Disp Refills     risperiDONE (RISPERDAL) 1 MG tablet [Pharmacy Med Name: RISPERIDONE 1MG TAB] 15 tablet      Sig: TAKE 1/2 TABLET (0.5MG) BY MOUTH DAILY AT BEDTIME    Antipsychotic Medications Failed    5/17/2018  7:58 AM       Failed - Lipid panel on file within the past 12 months    Recent Labs   Lab Test 01/15/15   CHOL  222*   TRIG  129   HDL  38   LDL  158              Failed - A1c or Glucose on file in past 12 months    Recent Labs   Lab Test  11/04/16   1432   GLC  76       Please review patients last 3 weights. If a weight gain of >10 lbs exists, you may refill the prescription once after instructing the patient to schedule an appointment within the next 30 days.    Wt Readings from Last 3 Encounters:   05/10/18 159 lb (72.1 kg)   04/27/18 157 lb 6.4 oz (71.4 kg)   04/18/18 158 lb 12.8 oz (72 kg)            Passed - Blood pressure under 140/90 in past 12 months    BP Readings from Last 3 Encounters:   05/10/18 110/68   04/27/18 110/64   04/18/18 112/62                Passed - Patient is 12 years of age or older       Passed - CBC on file in past 12 months    Recent Labs   Lab Test  05/10/18   1215   WBC  10.7   RBC  3.68*   HGB  11.9   HCT  37.2   PLT  301            Passed - Heart Rate on file within past 12 months    Pulse Readings from Last 3 Encounters:   05/10/18 62   04/18/18 79   03/08/18 61              Passed - Patient is not pregnant       Passed - No positve pregnancy test on file in past 12 months       Passed - Recent (6 mo) or future (30 days) visit within the authorizing provider's specialty    Patient had office visit in the last 6 months or has a visit in the next 30 days with authorizing provider or within the authorizing provider's specialty.  See \"Patient Info\" tab in inbasket, or \"Choose Columns\" in Meds & Orders section of the refill encounter.            escitalopram (LEXAPRO) 20 MG tablet [Pharmacy Med Name: ESCITALOPRAM 20MG TABLET] 30 " "tablet      Sig: TAKE 1 TABLET BY MOUTH EVERY DAY    SSRIs Protocol Passed    5/17/2018  7:58 AM       Passed - Recent (12 mo) or future (30 days) visit within the authorizing provider's specialty    Patient had office visit in the last 12 months or has a visit in the next 30 days with authorizing provider or within the authorizing provider's specialty.  See \"Patient Info\" tab in inbasket, or \"Choose Columns\" in Meds & Orders section of the refill encounter.           Passed - Patient is age 18 or older       Passed - No active pregnancy on record       Passed - No positive pregnancy test in last 12 months          Last Written Prescription Date:  4/18/18  Last Fill Quantity: 30,  # refills: 1   Last Office Visit with [facility] or [facility] prescribing provider:  *5/10/18**   Future Office Visit:    Next 5 appointments (look out 90 days)     May 21, 2018  2:00 PM CDT   Office Visit with Sarbjit Whittaker MD   [facility] ([address] [address]   [phone]            Jun 08, 2018 10:30 AM CDT   Office Visit with Yuan Schofield MD   [facility] ([address] [address]   [phone]                 Lexapro       Last Written Prescription Date:  2/26/18  Last Fill Quantity: 30,   # refills: 1  Last Office Visit: 5/10/18  Future Office visit:        "

## 2018-05-17 NOTE — TELEPHONE ENCOUNTER
Message from Gregoryhart:  Original authorizing provider: Yuan Schofield MD    Mattie CADETGerald Veronika would like a refill of the following medications:  escitalopram (LEXAPRO) 20 MG tablet [Yuan Schofield MD]  LORazepam (ATIVAN) 1 MG tablet [Yuan Schofield MD]    [id number] [place] [place] [facility] [address] [place] [address]    Comment:        Last Written Prescription Date:  02/26/18-Escitalopram,  5/07/18-Lorazepam  Last Fill Quantity: 30, 20  # refills: 1, 0  Last Office Visit: 04/18/18  Future Office visit:    Next 5 appointments (look out 90 days)     May 21, 2018  2:00 PM CDT   Office Visit with Sarbjit Whittaker MD   [facility] ([address] [address]   [phone]            Jun 08, 2018 10:30 AM CDT   Office Visit with Yuan Schofield MD   [facility] ([address] [address]   [phone]                   Routing refill request to provider for review/approval because:  Drug not on the [facility], P or [facility] refill protocol or controlled substance

## 2018-05-21 ENCOUNTER — HOSPITAL ENCOUNTER (OUTPATIENT)
Dept: ULTRASOUND IMAGING | Facility: CLINIC | Age: 38
Discharge: HOME | End: 2018-05-21
Attending: OBSTETRICS & GYNECOLOGY | Admitting: OBSTETRICS & GYNECOLOGY
Payer: COMMERCIAL

## 2018-05-21 ENCOUNTER — OFFICE VISIT (OUTPATIENT)
Dept: FAMILY MEDICINE | Facility: CLINIC | Age: 38
End: 2018-05-21
Payer: COMMERCIAL

## 2018-05-21 VITALS
DIASTOLIC BLOOD PRESSURE: 62 MMHG | TEMPERATURE: 96.8 F | HEIGHT: 66 IN | WEIGHT: 164 LBS | OXYGEN SATURATION: 100 % | BODY MASS INDEX: 26.36 KG/M2 | HEART RATE: 72 BPM | RESPIRATION RATE: 16 BRPM | SYSTOLIC BLOOD PRESSURE: 106 MMHG

## 2018-05-21 DIAGNOSIS — R10.2 PELVIC PAIN IN FEMALE: ICD-10-CM

## 2018-05-21 DIAGNOSIS — N92.1 MENORRHAGIA WITH IRREGULAR CYCLE: Primary | ICD-10-CM

## 2018-05-21 DIAGNOSIS — N92.1 MENORRHAGIA WITH IRREGULAR CYCLE: ICD-10-CM

## 2018-05-21 PROCEDURE — 99213 OFFICE O/P EST LOW 20 MIN: CPT | Performed by: OBSTETRICS & GYNECOLOGY

## 2018-05-21 PROCEDURE — 93976 VASCULAR STUDY: CPT

## 2018-05-21 RX ORDER — RISPERIDONE 1 MG/1
TABLET ORAL
Qty: 15 TABLET | Refills: 3 | Status: SHIPPED | OUTPATIENT
Start: 2018-05-21 | End: 2018-07-09

## 2018-05-21 RX ORDER — ESCITALOPRAM OXALATE 20 MG/1
TABLET ORAL
Qty: 30 TABLET | Refills: 3 | Status: SHIPPED | OUTPATIENT
Start: 2018-05-21 | End: 2018-07-09

## 2018-05-21 ASSESSMENT — PAIN SCALES - GENERAL: PAINLEVEL: NO PAIN (0)

## 2018-05-21 NOTE — MR AVS SNAPSHOT
After Visit Summary   5/21/2018    Mattie Banda    MRN: [id number]           Patient Information     Date Of Birth          1980        Visit Information        Provider Department      5/21/2018 2:00 PM Sarbjit Whittaker MD [facility]        Today's Diagnoses     Menorrhagia with irregular cycle    -  1    Pelvic pain in female           Follow-ups after your visit        Additional Services     OB/GYN REFERRAL       Your provider has referred you to:  [facility]: [facility] [phone]   [email or web address]    Please be aware that coverage of these services is subject to the terms and limitations of your health insurance plan.  Call member services at your health plan with any benefit or coverage questions.      Please bring the following with you to your appointment:    (1) Any X-Rays, CTs or MRIs which have been performed.  Contact the facility where they were done to arrange for  prior to your scheduled appointment.   (2) List of current medications   (3) This referral request   (4) Any documents/labs given to you for this referral                  Your next 10 appointments already scheduled     Jun 08, 2018 10:30 AM CDT   [facility]    [address] [place] [address] [phone]           Bring a current list of meds and any records pertaining to this visit. For Physicals, please bring immunization records and any forms needing to be filled out. Please arrive 10 minutes early to complete paperwork.              Future tests that were ordered for you today     Open Future Orders        Priority Expected Expires Ordered    US Pelvic Complete w Transvaginal & Abd/Pel Duplex Limited Routine  5/11/2019 5/10/2018            Who to contact     If you have questions or need follow up  "information about today's clinic visit or your schedule please contact [facility] directly at [phone].  Normal or non-critical lab and imaging results will be communicated to you by MyChart, letter or phone within 4 business days after the clinic has received the results. If you do not hear from us within 7 days, please contact the clinic through GRR Systemshart or phone. If you have a critical or abnormal lab result, we will notify you by phone as soon as possible.  Submit refill requests through [employer] or call your pharmacy and they will forward the refill request to us. Please allow 3 business days for your refill to be completed.          Additional Information About Your Visit        GRR Systemshart Information     [employer] gives you secure access to your electronic health record. If you see a primary care provider, you can also send messages to your care team and make appointments. If you have questions, please call your primary care clinic.  If you do not have a primary care provider, please call [phone] and they will assist you.        Care EveryWhere ID     This is your Care EveryWhere ID. This could be used by other organizations to access your [place] medical records  [id number]        Your Vitals Were     Pulse Temperature Respirations Height Last Period Pulse Oximetry    72 96.8  F (36  C) (Temporal) 16 5' 6\" (1.676 m) 04/26/2018 100%    Breastfeeding? BMI (Body Mass Index)                No 26.47 kg/m2           Blood Pressure from Last 3 Encounters:   05/21/18 106/62   05/10/18 110/68   04/27/18 110/64    Weight from Last 3 Encounters:   05/21/18 164 lb (74.4 kg)   05/10/18 159 lb (72.1 kg)   04/27/18 157 lb 6.4 oz (71.4 kg)              We Performed the Following     OB/GYN REFERRAL        Primary Care Provider Office Phone # Fax #    Yuan Schofield [phone]       [address] [address]        Equal Access to Services     KAELYN MYERS AH: Hadii " myla Cortes, wachiquida luqadaha, qaybta kaalrenuka garcia, adolfo julius dwainejuanita gutierreztaqueria mendelmaynor laZanaleslye edmund. So [facility] [phone].    ATENCIÓN: Si habla marco antonio, tiene a caruso disposición servicios gratuitos de asistencia lingüística. Melanie al [phone].    We comply with applicable federal civil rights laws and [place] laws. We do not discriminate on the basis of race, color, national origin, age, disability, sex, sexual orientation, or gender identity.            Thank you!     Thank you for choosing [facility]  for your care. Our goal is always to provide you with excellent care. Hearing back from our patients is one way we can continue to improve our services. Please take a few minutes to complete the written survey that you may receive in the mail after your visit with us. Thank you!             Your Updated Medication List - Protect others around you: Learn how to safely use, store and throw away your medicines at www.disposemymeds.org.          This list is accurate as of 5/21/18  2:18 PM.  Always use your most recent med list.                   Brand Name Dispense Instructions for use Diagnosis    * albuterol (2.5 MG/3ML) 0.083% neb solution     1 Box    Take 1 vial (2.5 mg) by nebulization every 6 hours as needed for shortness of breath / dyspnea or wheezing    Acute bronchitis with coexisting condition requiring prophylactic treatment, Cough       * VENTOLIN  (90 Base) MCG/ACT Inhaler   Generic drug:  albuterol     52 g    INHALE 2 PUFFS INTO THE LUNGS EVERY 6 HOURS AS NEEDED FOR SHORTNESSOF BREATH / DYSPNEA OR WHEEZING    Cough, Acute bronchospasm       escitalopram 20 MG tablet    LEXAPRO    30 tablet    Take 1 tablet (20 mg) by mouth daily    Mixed anxiety depressive disorder       HYDROcodone-acetaminophen 5-325 MG per tablet    NORCO    100 tablet    Take 1-2 tablets by mouth every 8 hours as needed for severe pain maximum 6 tablet(s) per day    Degeneration of lumbar  intervertebral disc, DDD (degenerative disc disease), cervical       ibuprofen 200 MG capsule     120 capsule    Take 800 mg by mouth every 4 hours as needed for fever        levothyroxine 200 MCG tablet    SYNTHROID/LEVOTHROID    90 tablet    Take 1 tablet (200 mcg) by mouth daily    Acquired hypothyroidism       LORazepam 1 MG tablet    ATIVAN    20 tablet    Take 1-2 tablets (1-2 mg) by mouth every 8 hours as needed for anxiety    Mixed anxiety depressive disorder       mesalamine 800 MG EC tablet    ASACOL HD    90 tablet    Take 2 tablets (1,600 mg) by mouth 3 times daily    Lymphocytic colitis       NEW MED      Krato daily        oxyCODONE IR 5 MG tablet    ROXICODONE    30 tablet    Take 1-2 tablets (5-10 mg) by mouth every 6 hours as needed for severe pain Use sparingly    DDD (degenerative disc disease), cervical       risperiDONE 1 MG tablet    risperDAL    30 tablet    Take 1 tablet (1 mg) by mouth daily    Mixed anxiety depressive disorder       traZODone 50 MG tablet    DESYREL    90 tablet    1 -3 tablets by mouth nightlty    Sleep disorder       * Notice:  This list has 2 medication(s) that are the same as other medications prescribed for you. Read the directions carefully, and ask your doctor or other care provider to review them with you.

## 2018-05-21 NOTE — PROGRESS NOTES
Subjective: she is here to discuss the results of the US and embx. The embx is benign. The US showed:     FINDINGS:  The uterus measures 8.5 x 5.8 x 4.2 cm and is grossly of  normal echogenicity. Endometrial stripe measures 0.7 cm in thickness  and is within normal limits for a premenopausal female.     The ovaries measure 2.8 x 1.7 x 1.6 cm on the right and 2.9 x 2.0 x  2.1 cm on the left. Color and spectral Doppler evaluation demonstrate  arterial and venous waveforms in the bilateral ovaries. No abnormal  free fluid collections are identified.         IMPRESSION: Unremarkable pelvic ultrasound. Etiology for patient's  symptoms of pelvic pain and menorrhagia is not definitely seen.      The past medical history, social history, past surgical history and family history as shown below have been reviewed by me today.  Past Medical History:   Diagnosis Date     Papanicolaou smear of cervix with low grade squamous intraepithelial lesion (LGSIL)      Thyroid disease      Urinary tract infection, site not specified     Recurrent UTI's        Allergies   Allergen Reactions     Bupropion Hcl      Wellbutrin     Current Outpatient Prescriptions   Medication Sig Dispense Refill     albuterol (2.5 MG/3ML) 0.083% neb solution Take 1 vial (2.5 mg) by nebulization every 6 hours as needed for shortness of breath / dyspnea or wheezing 1 Box 0     escitalopram (LEXAPRO) 20 MG tablet Take 1 tablet (20 mg) by mouth daily 30 tablet 1     HYDROcodone-acetaminophen (NORCO) 5-325 MG per tablet Take 1-2 tablets by mouth every 8 hours as needed for severe pain maximum 6 tablet(s) per day 100 tablet 0     ibuprofen 200 MG capsule Take 800 mg by mouth every 4 hours as needed for fever 120 capsule      levothyroxine (SYNTHROID/LEVOTHROID) 200 MCG tablet Take 1 tablet (200 mcg) by mouth daily 90 tablet 1     LORazepam (ATIVAN) 1 MG tablet Take 1-2 tablets (1-2 mg) by mouth every 8 hours as needed for anxiety 20 tablet 0     mesalamine (ASACOL  HD) 800 MG EC tablet Take 2 tablets (1,600 mg) by mouth 3 times daily 90 tablet 1     NEW MED Kratom daily       oxyCODONE IR (ROXICODONE) 5 MG tablet Take 1-2 tablets (5-10 mg) by mouth every 6 hours as needed for severe pain Use sparingly 30 tablet 0     risperiDONE (RISPERDAL) 1 MG tablet Take 1 tablet (1 mg) by mouth daily 30 tablet 1     traZODone (DESYREL) 50 MG tablet 1 -3 tablets by mouth nightlty 90 tablet 5     VENTOLIN  (90 Base) MCG/ACT Inhaler INHALE 2 PUFFS INTO THE LUNGS EVERY 6 HOURS AS NEEDED FOR SHORTNESSOF BREATH / DYSPNEA OR WHEEZING 52 g 3     Past Surgical History:   Procedure Laterality Date     BACK SURGERY  6/2015      COLONOSCOPY  08/06/07     COLONOSCOPY  08/05/08     HC COLONOSCOPY W BIOPSY  02/06/08     HC TOOTH EXTRACTION W/FORCEP      wisdom teeth removed     INJECT EPIDURAL CERVICAL N/A 3/8/2018    Procedure: INJECT EPIDURAL CERVICAL;  cervical 6-7 interlaminar epidural steroid injection;  Surgeon: Edgardo Rubio MD;  Location:  OR     LAPAROSCOPIC TUBAL LIGATION  11/27/2012    Procedure: LAPAROSCOPIC TUBAL LIGATION;  Laparoscopic Bilateral tubal sterilization/ fulgaration;  Surgeon: Sarbjit Whittaker MD;  Location:  OR     Social History     Social History     Marital status:      Spouse name: Darien     Number of children: 1     Years of education: 12     Occupational History     Unit Worker      [employer]     Social History Main Topics     Smoking status: Current Some Day Smoker     Packs/day: 1.00     Years: 8.00     Types: Cigarettes     Smokeless tobacco: Never Used     Alcohol use 0.0 oz/week     0 Standard drinks or equivalent per week      Comment: 1/mo     Drug use: No     Sexual activity: Yes     Partners: Male     Other Topics Concern      Service No     Blood Transfusions No     Caffeine Concern Yes     coffee/tea: 2c/d     Occupational Exposure No     Hobby Hazards No     Sleep Concern Yes     On meds     Stress Concern No  "    Weight Concern Yes     desire wt loss     Special Diet No     Back Care Yes     hx surgery     Exercise No     Bike Helmet No     Seat Belt Yes     Self-Exams Yes     Social History Narrative     Family History   Problem Relation Age of Onset     Hypertension Maternal Grandmother      Arthritis Maternal Grandmother      CANCER Maternal Grandmother      MGGM     Depression Maternal Grandmother      Lipids Maternal Grandmother      OSTEOPOROSIS Maternal Grandmother      Respiratory Maternal Grandmother      emphysema     Genitourinary Problems Maternal Grandmother      Kidney Failure, liver      Hypertension Maternal Grandfather      HEART DISEASE Maternal Grandfather      MI     Cardiovascular Maternal Grandfather      Cancer - colorectal Maternal Grandfather      CANCER Maternal Grandfather      Hodgins Lymphoma     Hypertension Paternal Grandmother      Arthritis Paternal Grandmother      RA     Breast Cancer Other      Maternal great aunt     Thyroid Disease Maternal Aunt      two aunts     DIABETES No family hx of        ROS: A 12 point review of systems was done. Except for what is listed above in the HPI, the systems review is negative .      Objective: Vital signs: Blood pressure 106/62, pulse 72, temperature 96.8  F (36  C), temperature source Temporal, resp. rate 16, height 5' 6\" (1.676 m), weight 164 lb (74.4 kg), last menstrual period 04/26/2018, SpO2 100 %, not currently breastfeeding.            Assessment/Plan:A total of 20 minutes were spent face-to-face with this patient during today's consultation, with more than 50% of that time devoted to conversation and counseling about the management decisions.      1. 37 year old female with menorrhagia and chronic pelvic pain. She declined a D and C and Mirena IUD. In light of her previous spine surgery and long midline incision, I told her that if she wants a hysterectomy, it is less likely that I can complete this laparoscopically. She would much prefer " this laparoscopic route  if possible. Therefore I will make a referral to [facility] where perhaps they can offer her da harry surgery and have surgeon on standby for anticipated adhesions. I would not take this on by myself out here without an OB/GYN partner at this time.  Referral has been made. She will call to set up an appointment.        RIKKI Whittaker MD

## 2018-05-21 NOTE — TELEPHONE ENCOUNTER
Routing  Risperdal and Celexa refill request to provider for review/approval because:  Labs not current: according to refill protocol, when on Risperdal, need to have annual AIC or Blood sugar, Lipid, CBC, and BP done yearly.    PHQ9 score = 8   AnahyRN

## 2018-05-23 ENCOUNTER — MYC REFILL (OUTPATIENT)
Dept: FAMILY MEDICINE | Facility: CLINIC | Age: 38
End: 2018-05-23

## 2018-05-23 DIAGNOSIS — M50.30 DDD (DEGENERATIVE DISC DISEASE), CERVICAL: ICD-10-CM

## 2018-05-23 RX ORDER — OXYCODONE HYDROCHLORIDE 5 MG/1
5-10 TABLET ORAL EVERY 6 HOURS PRN
Qty: 30 TABLET | Refills: 0 | Status: SHIPPED | OUTPATIENT
Start: 2018-05-23 | End: 2018-05-30

## 2018-05-23 RX ORDER — OXYCODONE HYDROCHLORIDE 5 MG/1
5-10 TABLET ORAL EVERY 6 HOURS PRN
Qty: 30 TABLET | Refills: 0 | Status: CANCELLED | OUTPATIENT
Start: 2018-05-23

## 2018-05-23 NOTE — TELEPHONE ENCOUNTER
*ROXICODONE**      Last Written Prescription Date:  5/15/18  Last Fill Quantity: 30,   # refills: 0  Last Office Visit: 4/18/18  Future Office visit:    Next 5 appointments (look out 90 days)     Jun 08, 2018 10:30 AM CDT   Office Visit with Yuan Schofield MD   [facility]    [address] [place] [address]   [phone]                   Routing refill request to provider for review/approval because:  Drug not on the FMG, UMP or  Health refill protocol or controlled substance

## 2018-05-23 NOTE — TELEPHONE ENCOUNTER
Message from NGIhart:  Original authorizing provider: SAUD Newton CNP would like a refill of the following medications:  oxyCODONE IR (ROXICODONE) 5 MG tablet [SAUD Newton CNP]    Preferred pharmacy: [id number] - [address]    Comment:  I can  hars copy when ready Thanks

## 2018-05-23 NOTE — TELEPHONE ENCOUNTER
Message from Roseannt:  Original authorizing provider: SAUD Newton CNP would like a refill of the following medications:  oxyCODONE IR (ROXICODONE) 5 MG tablet [SAUD Newton CNP]    Preferred pharmacy: Other - [facility] .    Comment:  I have the presc for hydrocodone still its not effective thats why i am taking yhe oxycodone. I dont understand why it cant be refilled? Thanks

## 2018-05-23 NOTE — TELEPHONE ENCOUNTER
Message from Reading Rainbowhart:  Original authorizing provider: SAUD Newton CNP would like a refill of the following medications:  oxyCODONE IR (ROXICODONE) 5 MG tablet [SAUD Newton CNP]    Preferred pharmacy: [id number] - [facility] [address] [place] [address]    Comment:  I will  hard copy when ready. Thanks!        Last Written Prescription Date:  05/15/18  Last Fill Quantity: 30,   # refills: 0  Last Office Visit: 05/21/18  Future Office visit:    Next 5 appointments (look out 90 days)     Jun 08, 2018 10:30 AM CDT   Office Visit with Yuan Schofield MD   [facility] ([facility])    [address] [place] [phone] [phone]                   Routing refill request to provider for review/approval because:  Drug not on the FMG, UMP or  Health refill protocol or controlled substance

## 2018-05-23 NOTE — TELEPHONE ENCOUNTER
Find out if she will use Vicodin/hydrocodone medication at any time.  We needed remind her that pain medication has caused great concern throughout the nation and anytime someone gets to pain medications on the same day, red flags are raised.  It appears she received both hydrocodone-containing and oxycodone-containing medication.  Ideally she should be on one or the other but not both.  I am willing to approve either medication and then we should take the other off her list.  If she chooses oxycodone, there may be some concern because she had a 17 day supply of hydrocodone-containing medication.  We may not be able to change things within the system given state monitoring these days.

## 2018-05-23 NOTE — TELEPHONE ENCOUNTER
I have attempted to call the pt with the message below. I left message for pt to call back. I will call back another time. Elaina Ramos CMA ([facility])

## 2018-05-30 ENCOUNTER — MYC REFILL (OUTPATIENT)
Dept: FAMILY MEDICINE | Facility: CLINIC | Age: 38
End: 2018-05-30

## 2018-05-30 ENCOUNTER — TELEPHONE (OUTPATIENT)
Dept: FAMILY MEDICINE | Facility: CLINIC | Age: 38
End: 2018-05-30

## 2018-05-30 DIAGNOSIS — F41.8 MIXED ANXIETY DEPRESSIVE DISORDER: ICD-10-CM

## 2018-05-30 DIAGNOSIS — M50.30 DDD (DEGENERATIVE DISC DISEASE), CERVICAL: ICD-10-CM

## 2018-05-30 RX ORDER — OXYCODONE HYDROCHLORIDE 5 MG/1
5-10 TABLET ORAL EVERY 6 HOURS PRN
Qty: 30 TABLET | Refills: 0 | Status: SHIPPED | OUTPATIENT
Start: 2018-05-30 | End: 2018-06-04

## 2018-05-30 RX ORDER — LORAZEPAM 1 MG/1
1-2 TABLET ORAL EVERY 8 HOURS PRN
Qty: 20 TABLET | Refills: 0 | Status: SHIPPED | OUTPATIENT
Start: 2018-05-30 | End: 2018-06-11

## 2018-05-30 NOTE — TELEPHONE ENCOUNTER
Message from CloudTagshart:  Original authorizing provider: Yuan Schofield MD    Mattie CADETGerald Veronika would like a refill of the following medications:  oxyCODONE IR (ROXICODONE) 5 MG tablet [Yuan Schofield MD]    Preferred pharmacy: Other - I will  hard copy.    Comment:  I will only be using yhe oxycode. I will  hard copy. Yhanks        Last Written Prescription Date:  05/23/18  Last Fill Quantity: 30,   # refills: 0  Last Office Visit: 05/21/18  Future Office visit:    Next 5 appointments (look out 90 days)     Jun 08, 2018 10:30 AM CDT   Office Visit with Yuan Sch[facility]ield MD   [facility]    [address] [place] [id number] [phone]                   Routing refill request to provider for review/approval because:  Drug not on the FMG, UMP or  Health refill protocol or controlled substance

## 2018-05-31 NOTE — TELEPHONE ENCOUNTER
Mattie calls this morning to let someone know that her grandmother, Mary Aguilar, will be picking up this prescription for her.  Mattie dental surgery today.

## 2018-06-04 ENCOUNTER — MYC REFILL (OUTPATIENT)
Dept: FAMILY MEDICINE | Facility: CLINIC | Age: 38
End: 2018-06-04

## 2018-06-04 ENCOUNTER — TELEPHONE (OUTPATIENT)
Dept: NEUROSURGERY | Facility: CLINIC | Age: 38
End: 2018-06-04

## 2018-06-04 DIAGNOSIS — M50.30 DDD (DEGENERATIVE DISC DISEASE), CERVICAL: ICD-10-CM

## 2018-06-04 DIAGNOSIS — F41.8 MIXED ANXIETY DEPRESSIVE DISORDER: ICD-10-CM

## 2018-06-04 DIAGNOSIS — M51.369 DEGENERATION OF LUMBAR INTERVERTEBRAL DISC: ICD-10-CM

## 2018-06-04 RX ORDER — HYDROCODONE BITARTRATE AND ACETAMINOPHEN 5; 325 MG/1; MG/1
1-2 TABLET ORAL EVERY 8 HOURS PRN
Qty: 100 TABLET | Refills: 0 | Status: SHIPPED | OUTPATIENT
Start: 2018-06-04 | End: 2018-06-18

## 2018-06-04 RX ORDER — OXYCODONE HYDROCHLORIDE 5 MG/1
5-10 TABLET ORAL EVERY 6 HOURS PRN
Qty: 30 TABLET | Refills: 0 | Status: SHIPPED | OUTPATIENT
Start: 2018-06-04 | End: 2018-06-11

## 2018-06-04 NOTE — TELEPHONE ENCOUNTER
Message from [facility]:  Original authorizing provider: Yuan Schofield MD    Mattie CADETGerald Reecelito would like a refill of the following medications:  oxyCODONE IR (ROXICODONE) 5 MG tablet [Yuan Schofield MD]    Preferred pharmacy: Other - [facility]  hard copys    Comment:      Medication renewals requested in this message routed to other providers:  HYDROcodone-acetaminophen (NORCO) 5-325 MG per tablet [Zhanna Bustos, APRN CNP]

## 2018-06-04 NOTE — TELEPHONE ENCOUNTER
ROXICODONE      Last Written Prescription Date:  5/30/18  Last Fill Quantity: 30,   # refills: 0  Last Office Visit: 4/18/18  Future Office visit:    Next 5 appointments (look out 90 days)     Jun 08, 2018 10:30 AM CDT   Office Visit with Yuan Schofield MD   [facility])    [address] [place] [address]   [phone]                   Routing refill request to provider for review/approval because:  Drug not on the FMG, UMP or  Health refill protocol or controlled substance

## 2018-06-04 NOTE — TELEPHONE ENCOUNTER
Patient called to follow up on RX refills and states her message did not go through correctly she wanted to add notes-she takes the norco for the pain and if it doesn't help enough then she take the oxycodone.  She had dental surgery on Friday and had to take the oxycodone around the clock and needs that refilled  (has about 6 or 8 left from receiving 30 on Friday, took 2 every 6-8 hours).  Thank you,  Мария Gibbs  Patient Representative

## 2018-06-04 NOTE — TELEPHONE ENCOUNTER
Patient also requested her Norco.  I do understand her explanation that she takes 1 for lesser pain in the Percocet for greater degree of pain relief.  I will ask staff just to confirm that she is seeing specialty about her neck pain and a treatment plan is in place.  I also would suggest that I see her in June if I am continuing to prescribe pain medication.  Once I know we have contacted her, appointment has been made, I will approve this medication.  Norco has already been approved.  Yuan Schofield MD

## 2018-06-04 NOTE — TELEPHONE ENCOUNTER
Patient was calling because she was not understanding what the message said in Newzstandhart.  I did let her know that the Norco prescription was available for her to  at the , however the Percocet has not been approved by Dr Schofield yet.  I did speak with Kathi regarding her prescriptions and the messages and re-explained everything to Mattie and asked her about if she has been following up with the specialist.  She stated that she did see Dr Kingsley back in April and he referred her to the [facility], but they never called her and she never followed up.  She said she does have an appointment with Dr Schofield on Friday, but I did explain that he wants her to see specialists for her neck pain as she has been referred.  I explained that I would be updating Dr Schofield and that he would most likely not be able to even review this until he was back in clinic on Wednesday and that again, she needs to reach out to the specialists.  I transferred her to the specialty clinic to follow up with Dr Kingsley to verify if the referral was indeed sent, or to get the phone number for the pain clinic.    Thank you,  Naz SMITH

## 2018-06-04 NOTE — TELEPHONE ENCOUNTER
norco      Last Written Prescription Date:  5/15/18  Last Fill Quantity: 100,   # refills: 0  Last Office Visit: 04/18/18  Future Office visit:    Next 5 appointments (look out 90 days)     Jun 08, 2018 10:30 AM CDT   Office Visit with Yuan Schofield MD   [facility] ([facility])    [address] [place] [address]   [phone]                   Routing refill request to provider for review/approval because:  Drug not on the FMG, UMP or  Health refill protocol or controlled substance

## 2018-06-04 NOTE — TELEPHONE ENCOUNTER
Reason for Call:  Other call back    Detailed comments: Patient states the clinic in [place] has not yet received the referral for pain management.  Please send again.  Thanks    Phone Number Patient can be reached at: Home number on file [phone] (home)    Best Time: any    Can we leave a detailed message on this number? YES    Call taken on 6/4/2018 at 4:46 PM by Jennifer Ramirez

## 2018-06-05 ENCOUNTER — TELEPHONE (OUTPATIENT)
Dept: FAMILY MEDICINE | Facility: CLINIC | Age: 38
End: 2018-06-05

## 2018-06-05 NOTE — TELEPHONE ENCOUNTER
Reason for Call:  Same Day Appointment, Requested Provider:  Yuan Schofield M.D.    PCP: Yuan Schofield    Reason for visit: Patient was scheduled for a med FU this coming Friday, but she started a new job and is unable to come at that time. Could come later in the day if able. Appt was rescheduled, but she is wondering if it is OK with PCP that she waits that long to be seen for this. Please call her back once this has been addressed.     Duration of symptoms:     Have you been treated for this in the past? Yes    Additional comments: Is aware that PCP is out today and is OK with waiting for his return to hear anything back    Can we leave a detailed message on this number? YES    Phone number patient can be reached at: Home number on file [phone] (home)    Best Time: any    Call taken on 6/5/2018 at 8:42 AM by Candice Hubbard

## 2018-06-05 NOTE — TELEPHONE ENCOUNTER
As long as I see her this month, this will be ok.  Pain meds, both, have been approved and signed. I will continue to approve with planned visit.  Yuan Schofield MD

## 2018-06-08 ENCOUNTER — MYC REFILL (OUTPATIENT)
Dept: FAMILY MEDICINE | Facility: CLINIC | Age: 38
End: 2018-06-08

## 2018-06-08 DIAGNOSIS — F41.8 MIXED ANXIETY DEPRESSIVE DISORDER: ICD-10-CM

## 2018-06-08 NOTE — TELEPHONE ENCOUNTER
Message from MyChart:  Original authorizing provider: MD Mattie Murray would like a refill of the following medications:  LORazepam (ATIVAN) 1 MG tablet [Yuanjuanita Schofield MD]    Preferred pharmacy: [facility] - [place], [place] - [address] [place] [address]    Comment:

## 2018-06-11 ENCOUNTER — MYC REFILL (OUTPATIENT)
Dept: FAMILY MEDICINE | Facility: CLINIC | Age: 38
End: 2018-06-11

## 2018-06-11 DIAGNOSIS — M50.30 DDD (DEGENERATIVE DISC DISEASE), CERVICAL: ICD-10-CM

## 2018-06-11 DIAGNOSIS — F41.8 MIXED ANXIETY DEPRESSIVE DISORDER: ICD-10-CM

## 2018-06-11 RX ORDER — LORAZEPAM 1 MG/1
1-2 TABLET ORAL EVERY 8 HOURS PRN
Qty: 20 TABLET | Refills: 0 | Status: SHIPPED | OUTPATIENT
Start: 2018-06-11 | End: 2018-07-25

## 2018-06-11 RX ORDER — OXYCODONE HYDROCHLORIDE 5 MG/1
5-10 TABLET ORAL EVERY 6 HOURS PRN
Qty: 30 TABLET | Refills: 0 | Status: SHIPPED | OUTPATIENT
Start: 2018-06-11 | End: 2018-06-18

## 2018-06-11 RX ORDER — LORAZEPAM 1 MG/1
1-2 TABLET ORAL EVERY 8 HOURS PRN
Qty: 20 TABLET | Refills: 0 | Status: SHIPPED | OUTPATIENT
Start: 2018-06-11 | End: 2018-06-28

## 2018-06-11 NOTE — TELEPHONE ENCOUNTER
Message from Roseannt:  Original authorizing provider: Yuan Schofield MD    Mattie Banda would like a refill of the following medications:  LORazepam (ATIVAN) 1 MG tablet [Yuan Schofield MD]  oxyCODONE IR (ROXICODONE) 5 MG tablet [Yuna Schofield MD]    Preferred pharmacy: [facility] - [place], [place] - [address] [place] [address]    Comment:  I can  hard copy for oxy when ready, I will be leaving for vacation early Thursday and will only be taking this pain med with. Do I need a  note to fly/ travel with these types of meds? Thank you        Last Written Prescription Date:  05/30/18-lorazapam, 06/04/18  Last Fill Quantity: 20, 30   # refills: 0, 0  Last Office Visit: 05/21/18  Future Office visit:    Next 5 appointments (look out 90 days)     Jun 22, 2018  9:30 AM CDT   Office Visit with Yuan Schofield MD   [facility]    [address] [place] [id number]   [phone]                   Routing refill request to provider for review/approval because:  Drug not on the FMG, P or  Health refill protocol or controlled substance

## 2018-06-18 ENCOUNTER — MYC REFILL (OUTPATIENT)
Dept: FAMILY MEDICINE | Facility: CLINIC | Age: 38
End: 2018-06-18

## 2018-06-18 DIAGNOSIS — M51.369 DEGENERATION OF LUMBAR INTERVERTEBRAL DISC: ICD-10-CM

## 2018-06-18 DIAGNOSIS — M50.30 DDD (DEGENERATIVE DISC DISEASE), CERVICAL: ICD-10-CM

## 2018-06-19 NOTE — TELEPHONE ENCOUNTER
Message from Roseannt:  Original authorizing provider: MD Mattie Murray would like a refill of the following medications:  HYDROcodone-acetaminophen (NORCO) 5-325 MG per tablet [Yuan Schofield MD]  oxyCODONE IR (ROXICODONE) 5 MG tablet [Yuan Schofield MD]    Preferred pharmacy: Other - I will  hard copies     Comment:  I can  hard copies when ready thanks you

## 2018-06-19 NOTE — TELEPHONE ENCOUNTER
PAWANCO      Last Written Prescription Date:  6/04/18  Last Fill Quantity: 100,   # refills: 0  Last Office Visit: 5/21/18  Future Office visit:    Next 5 appointments (look out 90 days)     Jun 22, 2018  9:30 AM CDT   Office Visit with Yuan Schofield MD   [address] [address]   [phone]                   Routing refill request to provider for review/approval because:  Drug not on the FMG, [facility] or [facility] refill protocol or controlled substance    roxicodone      Last Written Prescription Date:  6/11/18  Last Fill Quantity: 30,   # refills: 0  Last Office Visit: 5/21/18  Future Office visit:    Next 5 appointments (look out 90 days)     Jun 22, 2018  9:30 AM CDT   Office Visit with Yuan Schofield MD   [facility] ([address] [address]   [phone]                   Routing refill request to provider for review/approval because:  Drug not on the FMG, UMP or [facility] refill protocol or controlled substance

## 2018-06-20 RX ORDER — OXYCODONE HYDROCHLORIDE 5 MG/1
5-10 TABLET ORAL EVERY 6 HOURS PRN
Qty: 30 TABLET | Refills: 0 | Status: SHIPPED | OUTPATIENT
Start: 2018-06-20 | End: 2018-06-28

## 2018-06-20 RX ORDER — HYDROCODONE BITARTRATE AND ACETAMINOPHEN 5; 325 MG/1; MG/1
1-2 TABLET ORAL EVERY 8 HOURS PRN
Qty: 100 TABLET | Refills: 0 | Status: SHIPPED | OUTPATIENT
Start: 2018-06-20 | End: 2018-07-02

## 2018-06-28 ENCOUNTER — MYC REFILL (OUTPATIENT)
Dept: FAMILY MEDICINE | Facility: CLINIC | Age: 38
End: 2018-06-28

## 2018-06-28 DIAGNOSIS — M50.30 DDD (DEGENERATIVE DISC DISEASE), CERVICAL: ICD-10-CM

## 2018-06-28 DIAGNOSIS — F41.8 MIXED ANXIETY DEPRESSIVE DISORDER: ICD-10-CM

## 2018-06-28 NOTE — TELEPHONE ENCOUNTER
Message from Gregoryhart:  Original authorizing provider: Yuan Schofield MD    Mattie CADETGerald Aguilarblaynelito would like a refill of the following medications:  oxyCODONE IR (ROXICODONE) 5 MG tablet [Yuan Schofield MD]    Preferred pharmacy: Other - I will  hard copy when ready, thank you!!    Comment:  I will  hard copy when ready, thank you!        Last Written Prescription Date:  06/20/18  Last Fill Quantity: 30,   # refills: 0  Last Office Visit: 05/21/18  Future Office visit:    Next 5 appointments (look out 90 days)     Jul 09, 2018  8:00 AM CDT   Office Visit with Yuan Schofield MD   [facility]    [address] [place] [address] [phone]                   Routing refill request to provider for review/approval because:  Drug not on the FMG, UMP or  Health refill protocol or controlled substance

## 2018-06-29 ENCOUNTER — MYC REFILL (OUTPATIENT)
Dept: FAMILY MEDICINE | Facility: CLINIC | Age: 38
End: 2018-06-29

## 2018-06-29 DIAGNOSIS — M50.30 DDD (DEGENERATIVE DISC DISEASE), CERVICAL: ICD-10-CM

## 2018-06-29 DIAGNOSIS — F41.8 MIXED ANXIETY DEPRESSIVE DISORDER: ICD-10-CM

## 2018-06-29 RX ORDER — OXYCODONE HYDROCHLORIDE 5 MG/1
5-10 TABLET ORAL EVERY 6 HOURS PRN
Qty: 30 TABLET | Refills: 0 | Status: CANCELLED | OUTPATIENT
Start: 2018-06-29

## 2018-06-29 RX ORDER — LORAZEPAM 1 MG/1
1-2 TABLET ORAL EVERY 8 HOURS PRN
Qty: 20 TABLET | Refills: 0 | Status: SHIPPED | OUTPATIENT
Start: 2018-06-29 | End: 2018-07-27

## 2018-06-29 RX ORDER — OXYCODONE HYDROCHLORIDE 5 MG/1
5-10 TABLET ORAL EVERY 6 HOURS PRN
Qty: 30 TABLET | Refills: 0 | Status: SHIPPED | OUTPATIENT
Start: 2018-06-29 | End: 2018-07-09

## 2018-06-29 RX ORDER — LORAZEPAM 1 MG/1
1-2 TABLET ORAL EVERY 8 HOURS PRN
Qty: 20 TABLET | Refills: 0 | Status: CANCELLED | OUTPATIENT
Start: 2018-06-29

## 2018-06-29 NOTE — TELEPHONE ENCOUNTER
Message from Roseannt:  Original authorizing provider: MD Mattie Murray would like a refill of the following medications:  LORazepam (ATIVAN) 1 MG tablet [Yuan Schofield MD]  oxyCODONE IR (ROXICODONE) 5 MG tablet [Yuan Schofield MD]    Preferred pharmacy: [facility] - [address]    Comment:  I will  hard copy. Thank you!

## 2018-06-29 NOTE — TELEPHONE ENCOUNTER
Message from MyChart:  Original authorizing provider: MD Mattie Murray would like a refill of the following medications:  LORazepam (ATIVAN) 1 MG tablet [Yuanjuanita Schofield MD]    Preferred pharmacy: [facility] - [place], [place] - [address] [place] [address]    Comment:

## 2018-06-29 NOTE — TELEPHONE ENCOUNTER
Ativan       Last Written Prescription Date:  6-11-18  Last Fill Quantity: 20,   # refills: 0  Last Office Visit: 5/10/18  Future Office visit:    Next 5 appointments (look out 90 days)     Jul 09, 2018  8:00 AM CDT   Office Visit with Yuan Schofield MD   [facility] ([facility])    [address] [place] [address]   [phone]                   Routing refill request to provider for review/approval because:  Drug not on the FMG, UMP or  Health refill protocol or controlled substance

## 2018-07-02 ENCOUNTER — MYC REFILL (OUTPATIENT)
Dept: FAMILY MEDICINE | Facility: CLINIC | Age: 38
End: 2018-07-02

## 2018-07-02 DIAGNOSIS — M51.369 DEGENERATION OF LUMBAR INTERVERTEBRAL DISC: ICD-10-CM

## 2018-07-02 DIAGNOSIS — M50.30 DDD (DEGENERATIVE DISC DISEASE), CERVICAL: ICD-10-CM

## 2018-07-02 RX ORDER — HYDROCODONE BITARTRATE AND ACETAMINOPHEN 5; 325 MG/1; MG/1
1-2 TABLET ORAL EVERY 8 HOURS PRN
Qty: 100 TABLET | Refills: 0 | Status: SHIPPED | OUTPATIENT
Start: 2018-07-02 | End: 2018-07-13

## 2018-07-02 NOTE — TELEPHONE ENCOUNTER
norco      Last Written Prescription Date:  6/20/18  Last Fill Quantity: 100,   # refills: 0  Last Office Visit: 4/18/18  Future Office visit:    Next 5 appointments (look out 90 days)     Jul 09, 2018  8:00 AM CDT   Office Visit with Yuan Schofield MD   [facility]    [address] [place] [address]   [phone]                   Routing refill request to provider for review/approval because:  Drug not on the FMG, UMP or [facility] refill protocol or controlled substance

## 2018-07-02 NOTE — TELEPHONE ENCOUNTER
Message from Roseannt:  Original authorizing provider: Yuan Schofield MD Amwinter CADETGerald Banda would like a refill of the following medications:  HYDROcodone-acetaminophen (NORCO) 5-325 MG per tablet [Yuan Schofield MD]    Preferred pharmacy: Other - I will  hard copy when ready. Thank you!    Comment:  I will  hard copy when ready, thank you!

## 2018-07-09 ENCOUNTER — OFFICE VISIT (OUTPATIENT)
Dept: FAMILY MEDICINE | Facility: CLINIC | Age: 38
End: 2018-07-09
Payer: COMMERCIAL

## 2018-07-09 VITALS
RESPIRATION RATE: 16 BRPM | TEMPERATURE: 96.7 F | WEIGHT: 166.7 LBS | OXYGEN SATURATION: 98 % | DIASTOLIC BLOOD PRESSURE: 68 MMHG | BODY MASS INDEX: 26.91 KG/M2 | HEART RATE: 74 BPM | SYSTOLIC BLOOD PRESSURE: 108 MMHG

## 2018-07-09 DIAGNOSIS — G89.4 CHRONIC PAIN SYNDROME: Primary | ICD-10-CM

## 2018-07-09 DIAGNOSIS — E03.9 HYPOTHYROIDISM, UNSPECIFIED TYPE: ICD-10-CM

## 2018-07-09 DIAGNOSIS — F41.8 MIXED ANXIETY DEPRESSIVE DISORDER: ICD-10-CM

## 2018-07-09 DIAGNOSIS — G43.109 MIGRAINE WITH AURA AND WITHOUT STATUS MIGRAINOSUS, NOT INTRACTABLE: ICD-10-CM

## 2018-07-09 DIAGNOSIS — M50.30 DDD (DEGENERATIVE DISC DISEASE), CERVICAL: ICD-10-CM

## 2018-07-09 DIAGNOSIS — N92.0 EXCESSIVE OR FREQUENT MENSTRUATION: ICD-10-CM

## 2018-07-09 LAB
T4 FREE SERPL-MCNC: 0.32 NG/DL (ref 0.76–1.46)
TSH SERPL DL<=0.005 MIU/L-ACNC: 93.69 MU/L (ref 0.4–4)

## 2018-07-09 PROCEDURE — 80307 DRUG TEST PRSMV CHEM ANLYZR: CPT | Mod: 90 | Performed by: FAMILY MEDICINE

## 2018-07-09 PROCEDURE — 84439 ASSAY OF FREE THYROXINE: CPT | Performed by: FAMILY MEDICINE

## 2018-07-09 PROCEDURE — 84443 ASSAY THYROID STIM HORMONE: CPT | Performed by: FAMILY MEDICINE

## 2018-07-09 PROCEDURE — 99000 SPECIMEN HANDLING OFFICE-LAB: CPT | Performed by: FAMILY MEDICINE

## 2018-07-09 PROCEDURE — 99214 OFFICE O/P EST MOD 30 MIN: CPT | Performed by: FAMILY MEDICINE

## 2018-07-09 PROCEDURE — 36415 COLL VENOUS BLD VENIPUNCTURE: CPT | Performed by: FAMILY MEDICINE

## 2018-07-09 RX ORDER — ESCITALOPRAM OXALATE 20 MG/1
20 TABLET ORAL DAILY
Qty: 90 TABLET | Refills: 3 | Status: SHIPPED | OUTPATIENT
Start: 2018-07-09 | End: 2018-09-26

## 2018-07-09 RX ORDER — RISPERIDONE 1 MG/1
TABLET ORAL
Qty: 45 TABLET | Refills: 3 | Status: SHIPPED | OUTPATIENT
Start: 2018-07-09 | End: 2019-08-05

## 2018-07-09 RX ORDER — OXYCODONE HYDROCHLORIDE 5 MG/1
5-10 TABLET ORAL EVERY 6 HOURS PRN
Qty: 30 TABLET | Refills: 0 | Status: SHIPPED | OUTPATIENT
Start: 2018-07-09 | End: 2018-07-13

## 2018-07-09 RX ORDER — TRAZODONE HYDROCHLORIDE 100 MG/1
TABLET ORAL
Qty: 45 TABLET | Refills: 3 | Status: SHIPPED | OUTPATIENT
Start: 2018-07-09 | End: 2018-07-25

## 2018-07-09 ASSESSMENT — ANXIETY QUESTIONNAIRES
GAD7 TOTAL SCORE: 20
7. FEELING AFRAID AS IF SOMETHING AWFUL MIGHT HAPPEN: NEARLY EVERY DAY
6. BECOMING EASILY ANNOYED OR IRRITABLE: MORE THAN HALF THE DAYS
1. FEELING NERVOUS, ANXIOUS, OR ON EDGE: NEARLY EVERY DAY
3. WORRYING TOO MUCH ABOUT DIFFERENT THINGS: NEARLY EVERY DAY
2. NOT BEING ABLE TO STOP OR CONTROL WORRYING: NEARLY EVERY DAY
IF YOU CHECKED OFF ANY PROBLEMS ON THIS QUESTIONNAIRE, HOW DIFFICULT HAVE THESE PROBLEMS MADE IT FOR YOU TO DO YOUR WORK, TAKE CARE OF THINGS AT HOME, OR GET ALONG WITH OTHER PEOPLE: VERY DIFFICULT
5. BEING SO RESTLESS THAT IT IS HARD TO SIT STILL: NEARLY EVERY DAY

## 2018-07-09 ASSESSMENT — PAIN SCALES - GENERAL: PAINLEVEL: SEVERE PAIN (6)

## 2018-07-09 ASSESSMENT — PATIENT HEALTH QUESTIONNAIRE - PHQ9: 5. POOR APPETITE OR OVEREATING: NEARLY EVERY DAY

## 2018-07-09 NOTE — PATIENT INSTRUCTIONS
Keep the same and if you have not heard from pain center by end of next week call. Once you start seeing them, the goal is to find different pain treatment so you will need less or no pain meds.

## 2018-07-09 NOTE — LETTER
My Migraine Action Plan      Date: 7/9/2018     My Name: Mattie Banda   YOB: 1980  My Pharmacy: [id number] - [facility], [place] - [address] [place] [address]       My (Preventative) Control Medicine: None        My Rescue Medicine: Over-the-counter medication   My Doctor: Yuan Schofield     My Clinic: [address] [address] [phone]        GREEN ZONE = Good Control    My headache plan is working.   I can do what I need to do.           I WILL:     ? Keep managing my triggers.  ? Write in my migraine diary each time I have a headache.  ? Keep taking my preventive (controller) medicine daily.  ? Take my relief and rescue medicine as needed.             YELLOW ZONE = Not Enough Control    My headache plan isn t always working.   My headaches keep me from doing   some of the things I need to do.       I WILL:     ? Set goals to control my triggers and act on them.  ? Write in my migraine diary each time I have a headache and review it for                      patterns or new triggers.  ? Keep taking my preventive (controller) medicine daily.  ? Take my relief and rescue medicine as needed.  ? Call my doctor or clinic at if I stay in the Yellow Zone.             RED ZONE = Poor or No Control    My headache plan has  failed. I can t do anything  when I have one. My  medicines aren t working.           I WILL:   ? Set goals to control my triggers and act on them.  ? Write in my migraine diary each time I have a headache and review it for                      patterns or new triggers.  ? Keep taking my preventive (controller) medicine daily.  ? Take my relief and rescue medicine as needed.  ? Call my doctor or clinic or go to urgent care or an ER if I m having the worst                  headache of my life.  ? Call my doctor or clinic or go to urgent care or an ER if my medicine doesn t work.  ? Let my doctor or clinic know within 2 weeks if I have gone to  an urgent care or             emergency department.          Provider specific instructions: None

## 2018-07-09 NOTE — PROGRESS NOTES
SUBJECTIVE:   Mattie Banda is a 37 year old female who presents to clinic today for the following health issues:    Chronic Pain Follow-Up       Type / Location of Pain: neck and back  Analgesia/pain control:       Recent changes:  same      Overall control: Fully effective control  Activity level/function:      Daily activities:  Can do most things most days, with some rest    Work:  Pain does not limit any  work  Adverse effects:  No  Adherance    Taking medication as directed?  Yes    Participating in other treatments: no -patient was to have received contact from the pain center.  She has not.  She is more than interested in going.  Risk Factors:    Sleep:  Poor    Mood/anxiety:  controlled    Recent family or social stressors:  none noted    Other aggravating factors: prolonged sitting, prolonged standing and repetitive activities - lifting  PHQ-9 SCORE 6/12/2017 2/7/2018 3/2/2018   Total Score - - -   Total Score 9 19 8     CRISTIANO-7 SCORE 6/12/2017 2/7/2018 3/2/2018   Total Score 20 21 19     Encounter-Level CSA:     There are no encounter-level csa.          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  This has helped.  She does not feel she could function without medication.  She does not feel any drug effect from it.  She is using medication as ordered.  Previously patient has used narcotics for her pain and desires to be off of them again.  She was off for a long time.    Problem list and histories reviewed & adjusted, as indicated.  Additional history: as documented    BP Readings from Last 3 Encounters:   07/09/18 108/68   05/21/18 106/62   05/10/18 110/68    Wt Readings from Last 3 Encounters:   07/09/18 166 lb 11.2 oz (75.6 kg)   05/21/18 164 lb (74.4 kg)    05/10/18 159 lb (72.1 kg)                  Labs reviewed in EPIC    Reviewed and updated as needed this visit by clinical staff  Tobacco  Allergies  Meds  Med Hx  Surg Hx  Fam Hx  Soc Hx      Reviewed and updated as needed this visit by Provider         ROS:  Constitutional, HEENT, cardiovascular, pulmonary, gi and gu systems are negative, except as otherwise noted.  Continues to have heavy periods and thought she might be referred to the [facility].  She has not heard anything on this either.  Patient's thyroid seems to be okay but has not had blood work now for some time.    OBJECTIVE:     /68  Pulse 74  Temp 96.7  F (35.9  C) (Temporal)  Resp 16  Wt 166 lb 11.2 oz (75.6 kg)  LMP 07/03/2018  SpO2 98%  BMI 26.91 kg/m2  Body mass index is 26.91 kg/(m^2).  GENERAL: healthy, alert and no distress  EYES: Eyes grossly normal to inspection, PERRL and conjunctivae and sclerae normal  NECK: no adenopathy, no asymmetry, masses, or scars and thyroid normal to palpation  RESP: lungs clear to auscultation - no rales, rhonchi or wheezes  CV: regular rate and rhythm, normal S1 S2, no S3 or S4, no murmur, click or rub, no peripheral edema and peripheral pulses strong  ABDOMEN: soft, nontender, no hepatosplenomegaly, no masses and bowel sounds normal  MS: Grossly normal extremities.  Nearly full range of motion cervical spine with some discomfort.  Protects back changing position.  SKIN: no suspicious lesions or rashes  PSYCH: mentation appears normal, affect normal/bright    Diagnostic Test Results:  Results for orders placed or performed in visit on 07/09/18 (from the past 24 hour(s))   TSH with free T4 reflex   Result Value Ref Range    TSH 93.69 (H) 0.40 - 4.00 mU/L   T4 free   Result Value Ref Range    T4 Free 0.32 (L) 0.76 - 1.46 ng/dL       ASSESSMENT/PLAN:     Hypothyroidism; patient remains hypothyroid   Plan:  Medications: Will confirm that patient is taking 200 mcg of Synthroid daily.   "If she is, dose adjustment very much needed.        Tobacco Cessation:   reports that she has been smoking Cigarettes.  She has a 8.00 pack-year smoking history. She has never used smokeless tobacco.  Tobacco Cessation Action Plan: Information offered: Patient not interested at this time    BMI:   Estimated body mass index is 26.91 kg/(m^2) as calculated from the following:    Height as of 5/21/18: 5' 6\" (1.676 m).    Weight as of this encounter: 166 lb 11.2 oz (75.6 kg).   Weight management plan: Discussed healthy diet and exercise guidelines and patient will follow up in 6 months in clinic to re-evaluate.      1. Chronic pain syndrome  Currently using pain medications appropriately but she needs to have better treatment for problems #2 below.  - Drug  Screen Comprehensive , Urine with Reported Meds (MedTox) (Pain Care Package)  - T4 free    2. DDD (degenerative disc disease), cervical  Continue current plan but have her get in contact with the pain center in [place]  - oxyCODONE IR (ROXICODONE) 5 MG tablet; Take 1-2 tablets (5-10 mg) by mouth every 6 hours as needed for severe pain Use sparingly  Dispense: 30 tablet; Refill: 0    3. Mixed anxiety depressive disorder  Still some anxiety.  Adding trazodone back in to help her sleep.  - risperiDONE (RISPERDAL) 1 MG tablet; TAKE 1/2 TABLET (0.5MG) BY MOUTH DAILY AT BEDTIME  Dispense: 45 tablet; Refill: 3  - escitalopram (LEXAPRO) 20 MG tablet; Take 1 tablet (20 mg) by mouth daily  Dispense: 90 tablet; Refill: 3  - traZODone (DESYREL) 100 MG tablet; 1-1 1/2 tabs nightly at bedtime as needed.  Dispense: 45 tablet; Refill: 3    4. Hypothyroidism, unspecified type  Markedly elevated TSH.  Adjustment plan but confirmation of dosing and break in treatment will be determined first.  - TSH with free T4 reflex    5. Migraine with aura and without status migrainosus, not intractable  No issues i given migraine plan basically to treat as she does  6. Excessive or frequent " menstruation  Truly if her thyroid is out of balance, but is no wonder she is having excessive menstruation.  Otherwise we will make sure she gets in contact with the [facility].      Patient Instructions   Keep the same and if you have not heard from pain center by end of next week call. Once you start seeing them, the goal is to find different pain treatment so you will need less or no pain meds.       I will see patient in 3 months regardless of any of the above.  Yuan Schofield MD  [facility]

## 2018-07-09 NOTE — MR AVS SNAPSHOT
After Visit Summary   7/9/2018    Mattie Banda    MRN: [id number]           Patient Information     Date Of Birth          1980        Visit Information        Provider Department      7/9/2018 8:00 AM Yuan Schofield MD [facility]        Today's Diagnoses     Chronic pain syndrome    -  1    DDD (degenerative disc disease), cervical        Mixed anxiety depressive disorder        Hypothyroidism, unspecified type        Migraine with aura and without status migrainosus, not intractable        Excessive or frequent menstruation          Care Instructions    Keep the same and if you have not heard from pain center by end of next week call. Once you start seeing them, the goal is to find different pain treatment so you will need less or no pain meds.             Follow-ups after your visit        Additional Services     PAIN MANAGEMENT REFERRAL       Your provider [facility] to: [facility] Sheri Ko [phone]   [email or web address]       Please call clinic directly to schedule appointment.    **ANY DIAGNOSTIC TESTS THAT ARE NOT IN EPIC SHOULD BE SENT TO THE PAIN CENTER**    REGARDING OPIOID MEDICATIONS:  The discussion of opioids management, appropriateness of therapy, and dosing will be discussed in patients being seen for evaluation.  The pain management clinics are not long-term prescribing clinics, with transition of prescribing of medications ultimately going back to the referring provider/PCP.  If prescribing is taken over at the pain clinic, it is in actively involved patients whom are appropriate for opioids, urine drug screening is completed, and long-term prescribing plan has been determined.  Therefore, we will not be automatically taking over prescribing at the patient's first visit.  Is this agreeable to you? agrees.     Please be aware that coverage of these services is subject to the terms and limitations of your  health insurance plan.  Call member services at your health plan with any benefit or coverage questions.      Please bring the following with you to your appointment:    (1) Any X-Rays, CTs or MRIs which have been performed.  Contact the facility where they were done to arrange for  prior to your scheduled appointment.    (2) List of current medications   (3) This referral request   (4) Any documents/labs given to you for this referral                  Follow-up notes from your care team     Return in about 3 months (around 10/9/2018).      Your next 10 appointments already scheduled     Jul 17, 2018 11:00 AM CDT   New Patient Visit with SAUD Valdes [facility] ([facility])    [facility] [address]  [address]  [facility] [address] [phone]              Who to contact     If you have questions or need follow up information about today's clinic visit or your schedule please contact [facility] directly at [phone].  Normal or non-critical lab and imaging results will be communicated to you by MyChart, letter or phone within 4 business days after the clinic has received the results. If you do not hear from us within 7 days, please contact the clinic through MyChart or phone. If you have a critical or abnormal lab result, we will notify you by phone as soon as possible.  Submit refill requests through [facility] or call your pharmacy and they will forward the refill request to us. Please allow 3 business days for your refill to be completed.          Additional Information About Your Visit        [employer] Information     [facility] gives you secure access to your electronic health record. If you see a primary care provider, you can also send messages to your care team and make appointments. If you have questions, please call your primary care clinic.  If you do not have a primary care provider, please call  [phone] and they will assist you.        Care EveryWhere ID     This is your Care EveryWhere ID. This could be used by other organizations to access your [facility] medical records  [id number]        Your Vitals Were     Pulse Temperature Respirations Last Period Pulse Oximetry BMI (Body Mass Index)    74 96.7  F (35.9  C) (Temporal) 16 07/03/2018 98% 26.91 kg/m2       Blood Pressure from Last 3 Encounters:   07/09/18 108/68   05/21/18 106/62   05/10/18 110/68    Weight from Last 3 Encounters:   07/09/18 166 lb 11.2 oz (75.6 kg)   05/21/18 164 lb (74.4 kg)   05/10/18 159 lb (72.1 kg)              We Performed the Following     Drug  Screen Comprehensive , Urine with Reported Meds (MedTox) (Pain Care Package)     PAIN MANAGEMENT REFERRAL     T4 free     TSH with free T4 reflex          Today's Medication Changes          These changes are accurate as of 7/9/18 11:59 PM.  If you have any questions, ask your nurse or doctor.               These medicines have changed or have updated prescriptions.        Dose/Directions    escitalopram 20 MG tablet   Commonly known as:  LEXAPRO   This may have changed:    - See the new instructions.  - Another medication with the same name was removed. Continue taking this medication, and follow the directions you see here.   Used for:  Mixed anxiety depressive disorder   Changed by:  Yuan Schofield MD        Dose:  20 mg   Take 1 tablet (20 mg) by mouth daily   Quantity:  90 tablet   Refills:  3       * traZODone 50 MG tablet   Commonly known as:  DESYREL   This may have changed:  Another medication with the same name was added. Make sure you understand how and when to take each.   Used for:  Sleep disorder   Changed by:  Yuan Schofield MD        1 -3 tablets by mouth nightlty   Quantity:  90 tablet   Refills:  5       * traZODone 100 MG tablet   Commonly known as:  DESYREL   This may have changed:  You were already taking a medication with the same name, and this  prescription was added. Make sure you understand how and when to take each.   Used for:  Mixed anxiety depressive disorder   Changed by:  Yuan Schofield MD        1-1 1/2 tabs nightly at bedtime as needed.   Quantity:  45 tablet   Refills:  3       * Notice:  This list has 2 medication(s) that are the same as other medications prescribed for you. Read the directions carefully, and ask your doctor or other care provider to review them with you.         Where to get your medicines      These medications were sent to [id number] - [place], [place] - [address] [place] [address]  [address] [place] [id number]    Hours:  M-F 8:30-6:30; Sat 9-4; closed Sunday Phone:  [phone]     escitalopram 20 MG tablet    risperiDONE 1 MG tablet    traZODone 100 MG tablet         Some of these will need a paper prescription and others can be bought over the counter.  Ask your nurse if you have questions.     Bring a paper prescription for each of these medications     oxyCODONE IR 5 MG tablet               Information about OPIOIDS     PRESCRIPTION OPIOIDS: WHAT YOU NEED TO KNOW   We gave you an opioid (narcotic) pain medicine. It is important to manage your pain, but opioids are not always the best choice. You should first try all the other options your care team gave you. Take this medicine for as short a time (and as few doses) as possible.     These medicines have risks:    DO NOT drive when on new or higher doses of pain medicine. These medicines can affect your alertness and reaction times, and you could be arrested for driving under the influence (DUI). If you need to use opioids long-term, talk to your care team about driving.    DO NOT operate heave machinery    DO NOT do any other dangerous activities while taking these medicines.     DO NOT drink any alcohol while taking these medicines.      If the opioid prescribed includes acetaminophen, DO NOT take with any other medicines that contain acetaminophen. Read all labels  carefully. Look for the word  acetaminophen  or  Tylenol.  Ask your pharmacist if you have questions or are unsure.    You can get addicted to pain medicines, especially if you have a history of addiction (chemical, alcohol or substance dependence). Talk to your care team about ways to reduce this risk.    Store your pills in a secure place, locked if possible. We will not replace any lost or stolen medicine. If you don t finish your medicine, please throw away (dispose) as directed by your pharmacist. The [place] Pollution Control Agency has more information about safe disposal: https://www.pca.[facility].[place].us/living-green/managing-unwanted-medications.     All opioids tend to cause constipation. Drink plenty of water and eat foods that have a lot of fiber, such as fruits, vegetables, prune juice, apple juice and high-fiber cereal. Take a laxative (Miralax, milk of magnesia, Colace, Senna) if you don t move your bowels at least every other day.          Primary Care Provider Office Phone # Fax #    Yuan Iain Schofield [phone]       [address] [place] [address]        Equal Access to Services     [facility] HospitalRIKKI : Hadii myla francois hadasho Sozoila, waaxda luqadaha, qaybta kaalmada ademichael, adolfo borges . So [facility] [phone].    ATENCIÓN: Si habla español, tiene a caruso disposición servicios gratuitos de asistencia lingüística. Melanie al [phone].    We comply with applicable federal civil rights laws and [place] laws. We do not discriminate on the basis of race, color, national origin, age, disability, sex, sexual orientation, or gender identity.            Thank you!     Thank you for choosing [facility]  for your care. Our goal is always to provide you with excellent care. Hearing back from our patients is one way we can continue to improve our services. Please take a few minutes to complete the written survey that you may receive in the mail  after your visit with us. Thank you!             Your Updated Medication List - Protect others around you: Learn how to safely use, store and throw away your medicines at www.disposemymeds.org.          This list is accurate as of 7/9/18 11:59 PM.  Always use your most recent med list.                   Brand Name Dispense Instructions for use Diagnosis    * albuterol (2.5 MG/3ML) 0.083% neb solution     1 Box    Take 1 vial (2.5 mg) by nebulization every 6 hours as needed for shortness of breath / dyspnea or wheezing    Acute bronchitis with coexisting condition requiring prophylactic treatment, Cough       * VENTOLIN  (90 Base) MCG/ACT Inhaler   Generic drug:  albuterol     52 g    INHALE 2 PUFFS INTO THE LUNGS EVERY 6 HOURS AS NEEDED FOR SHORTNESSOF BREATH / DYSPNEA OR WHEEZING    Cough, Acute bronchospasm       escitalopram 20 MG tablet    LEXAPRO    90 tablet    Take 1 tablet (20 mg) by mouth daily    Mixed anxiety depressive disorder       HYDROcodone-acetaminophen 5-325 MG per tablet    NORCO    100 tablet    Take 1-2 tablets by mouth every 8 hours as needed for severe pain maximum 6 tablet(s) per day    Degeneration of lumbar intervertebral disc, DDD (degenerative disc disease), cervical       ibuprofen 200 MG capsule     120 capsule    Take 800 mg by mouth every 4 hours as needed for fever        levothyroxine 200 MCG tablet    SYNTHROID/LEVOTHROID    90 tablet    Take 1 tablet (200 mcg) by mouth daily    Acquired hypothyroidism       * LORazepam 1 MG tablet    ATIVAN    20 tablet    Take 1-2 tablets (1-2 mg) by mouth every 8 hours as needed for anxiety    Mixed anxiety depressive disorder       * LORazepam 1 MG tablet    ATIVAN    20 tablet    Take 1-2 tablets (1-2 mg) by mouth every 8 hours as needed for anxiety    Mixed anxiety depressive disorder       mesalamine 800 MG EC tablet    ASACOL HD    90 tablet    Take 2 tablets (1,600 mg) by mouth 3 times daily    Lymphocytic colitis       NEW MED       Kratom daily        oxyCODONE IR 5 MG tablet    ROXICODONE    30 tablet    Take 1-2 tablets (5-10 mg) by mouth every 6 hours as needed for severe pain Use sparingly    DDD (degenerative disc disease), cervical       risperiDONE 1 MG tablet    risperDAL    45 tablet    TAKE 1/2 TABLET (0.5MG) BY MOUTH DAILY AT BEDTIME    Mixed anxiety depressive disorder       * traZODone 50 MG tablet    DESYREL    90 tablet    1 -3 tablets by mouth nightlty    Sleep disorder       * traZODone 100 MG tablet    DESYREL    45 tablet    1-1 1/2 tabs nightly at bedtime as needed.    Mixed anxiety depressive disorder       * Notice:  This list has 6 medication(s) that are the same as other medications prescribed for you. Read the directions carefully, and ask your doctor or other care provider to review them with you.

## 2018-07-10 ASSESSMENT — ANXIETY QUESTIONNAIRES: GAD7 TOTAL SCORE: 20

## 2018-07-10 ASSESSMENT — PATIENT HEALTH QUESTIONNAIRE - PHQ9: SUM OF ALL RESPONSES TO PHQ QUESTIONS 1-9: 12

## 2018-07-11 ENCOUNTER — TELEPHONE (OUTPATIENT)
Dept: FAMILY MEDICINE | Facility: CLINIC | Age: 38
End: 2018-07-11

## 2018-07-11 DIAGNOSIS — E03.9 ACQUIRED HYPOTHYROIDISM: ICD-10-CM

## 2018-07-11 LAB — PAIN DRUG SCR UR W RPTD MEDS: NORMAL

## 2018-07-11 RX ORDER — LEVOTHYROXINE SODIUM 100 UG/1
100 TABLET ORAL DAILY
Qty: 90 TABLET | Refills: 3 | Status: SHIPPED | OUTPATIENT
Start: 2018-07-11 | End: 2019-01-08

## 2018-07-11 RX ORDER — LEVOTHYROXINE SODIUM 200 UG/1
200 TABLET ORAL DAILY
Qty: 90 TABLET | Refills: 1 | Status: SHIPPED | OUTPATIENT
Start: 2018-07-11 | End: 2019-01-08

## 2018-07-11 NOTE — TELEPHONE ENCOUNTER
Patient informed she stated that she was off the medication for 1 week but has been back on it now. She would like to do the increase dose and sent to [facility] in [place].   Bhavani Ochoa MA

## 2018-07-11 NOTE — TELEPHONE ENCOUNTER
Patient's TSH is markedly elevated which implies inadequate replacement of her thyroid.  I will have staff contact her and make sure that there has not been a lapse in her levothyroxine medication.  If a lapse was present and she was not taking it for a long time, this could account for a high TSH.  If she has recently resumed, no changes need to be made at this time.  If she has been taking it, we need to increase her dose and given this value, I would increase dose to 300 mcg daily.  This lab value remains low thyroid may account for a number of her symptoms and her lack of improvement.  We should recheck a TSH in about 6 weeks regardless whether she keeps the same dose or we increase.  Yuan Schofield MD

## 2018-07-11 NOTE — TELEPHONE ENCOUNTER
I have tried many time to get Marquis to set an appt up to see Dr. Whittaker.  Will hope she calls back.

## 2018-07-13 ENCOUNTER — MYC REFILL (OUTPATIENT)
Dept: FAMILY MEDICINE | Facility: CLINIC | Age: 38
End: 2018-07-13

## 2018-07-13 DIAGNOSIS — M51.369 DEGENERATION OF LUMBAR INTERVERTEBRAL DISC: ICD-10-CM

## 2018-07-13 DIAGNOSIS — M50.30 DDD (DEGENERATIVE DISC DISEASE), CERVICAL: ICD-10-CM

## 2018-07-13 RX ORDER — HYDROCODONE BITARTRATE AND ACETAMINOPHEN 5; 325 MG/1; MG/1
1-2 TABLET ORAL EVERY 8 HOURS PRN
Qty: 100 TABLET | Refills: 0 | Status: SHIPPED | OUTPATIENT
Start: 2018-07-13 | End: 2018-07-25 | Stop reason: ALTCHOICE

## 2018-07-13 RX ORDER — OXYCODONE HYDROCHLORIDE 5 MG/1
5-10 TABLET ORAL EVERY 6 HOURS PRN
Qty: 30 TABLET | Refills: 0 | Status: SHIPPED | OUTPATIENT
Start: 2018-07-13 | End: 2018-07-24

## 2018-07-13 NOTE — TELEPHONE ENCOUNTER
PAWANCO      Last Written Prescription Date:  7/2/18  Last Fill Quantity: 100,   # refills: 0  Last Office Visit: 7/9/18  Future Office visit:       Routing refill request to provider for review/approval because:  Drug not on the [facility], [facility] or [facility] refill protocol or controlled substance      OXYCODONE IR      Last Written Prescription Date:  7/9/18  Last Fill Quantity: 30,   # refills: 0  Last Office Visit: 7/9/18  Future Office visit:       Routing refill request to provider for review/approval because:  Drug not on the [facility], [facility] or [facility] refill protocol or controlled substance

## 2018-07-13 NOTE — TELEPHONE ENCOUNTER
Message from Gregoryhart:  Original authorizing provider: Yuan Schofield MD Amanda ROSALBA Banda would like a refill of the following medications:  HYDROcodone-acetaminophen (NORCO) 5-325 MG per tablet [Yuan Schofield MD]  oxyCODONE IR (ROXICODONE) 5 MG tablet [Yuan Schofield MD]    Preferred pharmacy: Other - I will  hard copies, thanks.    Comment:

## 2018-07-20 ENCOUNTER — TRANSFERRED RECORDS (OUTPATIENT)
Dept: HEALTH INFORMATION MANAGEMENT | Facility: CLINIC | Age: 38
End: 2018-07-20

## 2018-07-24 ENCOUNTER — MYC MEDICAL ADVICE (OUTPATIENT)
Dept: FAMILY MEDICINE | Facility: CLINIC | Age: 38
End: 2018-07-24

## 2018-07-24 ENCOUNTER — MYC REFILL (OUTPATIENT)
Dept: FAMILY MEDICINE | Facility: CLINIC | Age: 38
End: 2018-07-24

## 2018-07-24 DIAGNOSIS — M50.30 DDD (DEGENERATIVE DISC DISEASE), CERVICAL: ICD-10-CM

## 2018-07-24 NOTE — TELEPHONE ENCOUNTER
ROXICODONE      Last Written Prescription Date:  7/13/18  Last Fill Quantity: 30,   # refills: 0  Last Office Visit: 7/09/18  Future Office visit:       Routing refill request to provider for review/approval because:  Drug not on the [facility], [facility] or [facility] refill protocol or controlled substance

## 2018-07-24 NOTE — TELEPHONE ENCOUNTER
Message from MyChart:  Original authorizing provider: Yuan Schofield MD Amanda ROSALBA Banda would like a refill of the following medications:  oxyCODONE IR (ROXICODONE) 5 MG tablet [Yuanjuanita Schofield MD]    Preferred pharmacy: Other - I will  hard copy when ready, thank you.    Comment:

## 2018-07-25 ENCOUNTER — OFFICE VISIT (OUTPATIENT)
Dept: URGENT CARE | Facility: RETAIL CLINIC | Age: 38
End: 2018-07-25
Payer: COMMERCIAL

## 2018-07-25 VITALS
HEART RATE: 74 BPM | DIASTOLIC BLOOD PRESSURE: 65 MMHG | OXYGEN SATURATION: 91 % | SYSTOLIC BLOOD PRESSURE: 100 MMHG | TEMPERATURE: 96.3 F

## 2018-07-25 DIAGNOSIS — M50.30 DDD (DEGENERATIVE DISC DISEASE), CERVICAL: ICD-10-CM

## 2018-07-25 DIAGNOSIS — M51.369 DEGENERATION OF LUMBAR INTERVERTEBRAL DISC: ICD-10-CM

## 2018-07-25 DIAGNOSIS — N30.00 ACUTE CYSTITIS WITHOUT HEMATURIA: ICD-10-CM

## 2018-07-25 DIAGNOSIS — R30.0 DYSURIA: Primary | ICD-10-CM

## 2018-07-25 PROCEDURE — 99213 OFFICE O/P EST LOW 20 MIN: CPT | Performed by: FAMILY MEDICINE

## 2018-07-25 PROCEDURE — 87086 URINE CULTURE/COLONY COUNT: CPT | Performed by: FAMILY MEDICINE

## 2018-07-25 RX ORDER — OXYCODONE HYDROCHLORIDE 5 MG/1
5-10 TABLET ORAL EVERY 6 HOURS PRN
Qty: 30 TABLET | Refills: 0 | Status: SHIPPED | OUTPATIENT
Start: 2018-07-25 | End: 2018-07-30

## 2018-07-25 RX ORDER — NITROFURANTOIN 25; 75 MG/1; MG/1
100 CAPSULE ORAL 2 TIMES DAILY
Qty: 14 CAPSULE | Refills: 0 | Status: SHIPPED | OUTPATIENT
Start: 2018-07-25 | End: 2018-09-04

## 2018-07-25 RX ORDER — HYDROCODONE BITARTRATE AND ACETAMINOPHEN 5; 325 MG/1; MG/1
1-2 TABLET ORAL EVERY 8 HOURS PRN
Qty: 100 TABLET | Refills: 0 | COMMUNITY
Start: 2018-07-25 | End: 2018-09-04

## 2018-07-25 ASSESSMENT — PAIN SCALES - GENERAL: PAINLEVEL: MODERATE PAIN (5)

## 2018-07-25 NOTE — MR AVS SNAPSHOT
"              After Visit Summary   7/25/2018    Mattie Banda    MRN: [id number]           Patient Information     Date Of Birth          1980        Visit Information        Provider Department      7/25/2018 5:20 PM Benji Casarez MD [facility]        Today's Diagnoses     Dysuria    -  1    Degeneration of lumbar intervertebral disc        DDD (degenerative disc disease), cervical        Acute cystitis without hematuria          Care Instructions      Dysuria     Painful urination (dysuria) is often caused by a problem in the urinary tract.   Dysuria is pain felt during urination. It is often described as a burning. Learn more about this problem and how it can be treated.  What causes dysuria?  Possible causes include:    Infection with a bacteria or virus such as a urinary tract infection (UTI or a sexually transmitted infection (STI)    Sensitivity or allergy to chemicals such as those found in lotions and other products    Prostate or bladder problems    Radiation therapy to the pelvic area  How is dysuria diagnosed?  Your healthcare provider will examine you. He or she will ask about your symptoms and health. After talking with you and doing a physical exam, your healthcare provider may know what is causing your dysuria. He or she will usually request  a sample of your urine. Tests of your urine, or a \"urinalysis,\" are done. A urinalysis may include:    Looking at the urine sample (visual exam)    Checking for substances (chemical exam)    Looking at a small amount under a microscope (microscopic exam)  Some parts of the urinalysis may be done in the provider's office and some in a lab. And, the urine sample may be checked for bacteria and yeast (urine culture). Your healthcare provider will tell you more about these tests if they are needed.  How is dysuria treated?  Treatment depends on the cause. If you have a bacterial infection, you may need antibiotics. You may be given " "medicines to make it easier for you to urinate and help relieve pain. Your healthcare provider can tell you more about your treatment options. Untreated, symptoms may get worse.  When to call your healthcare provider  Call the healthcare provider right away if you have any of the following:    Fever of 100.4 F (38 C) or higher     No improvement after three days of treatment    Trouble urinating because of pain    New or increased discharge from the vagina or penis    Rash or joint pain    Increased back or abdominal pain    Enlarged painful lymph nodes (lumps) in the groin   Date Last Reviewed: 1/1/2017 2000-2017 [employer]. [address] [place], [place] [id number]. All rights reserved. This information is not intended as a substitute for professional medical care. Always follow your healthcare professional's instructions.        Dysuria     Painful urination (dysuria) is often caused by a problem in the urinary tract.   Dysuria is pain felt during urination. It is often described as a burning. Learn more about this problem and how it can be treated.  What causes dysuria?  Possible causes include:    Infection with a bacteria or virus such as a urinary tract infection (UTI or a sexually transmitted infection (STI)    Sensitivity or allergy to chemicals such as those found in lotions and other products    Prostate or bladder problems    Radiation therapy to the pelvic area  How is dysuria diagnosed?  Your healthcare provider will examine you. He or she will ask about your symptoms and health. After talking with you and doing a physical exam, your healthcare provider may know what is causing your dysuria. He or she will usually request  a sample of your urine. Tests of your urine, or a \"urinalysis,\" are done. A urinalysis may include:    Looking at the urine sample (visual exam)    Checking for substances (chemical exam)    Looking at a small amount under a microscope (microscopic exam)  Some parts " of the urinalysis may be done in the provider's office and some in a lab. And, the urine sample may be checked for bacteria and yeast (urine culture). Your healthcare provider will tell you more about these tests if they are needed.  How is dysuria treated?  Treatment depends on the cause. If you have a bacterial infection, you may need antibiotics. You may be given medicines to make it easier for you to urinate and help relieve pain. Your healthcare provider can tell you more about your treatment options. Untreated, symptoms may get worse.  When to call your healthcare provider  Call the healthcare provider right away if you have any of the following:    Fever of 100.4 F (38 C) or higher     No improvement after three days of treatment    Trouble urinating because of pain    New or increased discharge from the vagina or penis    Rash or joint pain    Increased back or abdominal pain    Enlarged painful lymph nodes (lumps) in the groin   Date Last Reviewed: 1/1/2017 2000-2017 [employer]. [address] [place] [address]. All rights reserved. This information is not intended as a substitute for professional medical care. Always follow your healthcare professional's instructions.                Follow-ups after your visit        Who to contact     You can reach your care team any time of the day by calling [phone].  Notification of test results:  If you have an abnormal lab result, we will notify you by phone as soon as possible.         Additional Information About Your Visit        MyChart Information     [facility] gives you secure access to your electronic health record. If you see a primary care provider, you can also send messages to your care team and make appointments. If you have questions, please call your primary care clinic.  If you do not have a primary care provider, please call [phone] and they will assist you.        Care EveryWhere ID     This is your Care EveryWhere  ID. This could be used by other organizations to access your [facility] medical records  [id number]        Your Vitals Were     Pulse Temperature Last Period Pulse Oximetry          74 96.3  F (35.7  C) (Temporal) 07/03/2018 91%         Blood Pressure from Last 3 Encounters:   07/25/18 100/65   07/09/18 108/68   05/21/18 106/62    Weight from Last 3 Encounters:   07/09/18 166 lb 11.2 oz (75.6 kg)   05/21/18 164 lb (74.4 kg)   05/10/18 159 lb (72.1 kg)              We Performed the Following     Urine Culture Aerobic Bacterial          Today's Medication Changes          These changes are accurate as of 7/25/18  5:39 PM.  If you have any questions, ask your nurse or doctor.               Start taking these medicines.        Dose/Directions    nitroFURantoin (macrocrystal-monohydrate) 100 MG capsule   Commonly known as:  MACROBID   Used for:  Acute cystitis without hematuria   Started by:  Benji Casarez MD        Dose:  100 mg   Take 1 capsule (100 mg) by mouth 2 times daily   Quantity:  14 capsule   Refills:  0            Where to get your medicines      These medications were sent to [id number] - [place], [place] - [address] [place] [address]  [address] [place] [id number]    Hours:  M-F 8:30-6:30; Sat 9-4; closed Sunday Phone:  [phone]     nitroFURantoin (macrocrystal-monohydrate) 100 MG capsule               Information about OPIOIDS     PRESCRIPTION OPIOIDS: WHAT YOU NEED TO KNOW   We gave you an opioid (narcotic) pain medicine. It is important to manage your pain, but opioids are not always the best choice. You should first try all the other options your care team gave you. Take this medicine for as short a time (and as few doses) as possible.     These medicines have risks:    DO NOT drive when on new or higher doses of pain medicine. These medicines can affect your alertness and reaction times, and you could be arrested for driving under the influence (DUI). If you need to use opioids long-term, talk to  your care team about driving.    DO NOT operate heave machinery    DO NOT do any other dangerous activities while taking these medicines.     DO NOT drink any alcohol while taking these medicines.      If the opioid prescribed includes acetaminophen, DO NOT take with any other medicines that contain acetaminophen. Read all labels carefully. Look for the word  acetaminophen  or  Tylenol.  Ask your pharmacist if you have questions or are unsure.    You can get addicted to pain medicines, especially if you have a history of addiction (chemical, alcohol or substance dependence). Talk to your care team about ways to reduce this risk.    Store your pills in a secure place, locked if possible. We will not replace any lost or stolen medicine. If you don t finish your medicine, please throw away (dispose) as directed by your pharmacist. The [place] Pollution Control Agency has more information about safe disposal: https://www.pca.[facility].[place]/living-green/managing-unwanted-medications.     All opioids tend to cause constipation. Drink plenty of water and eat foods that have a lot of fiber, such as fruits, vegetables, prune juice, apple juice and high-fiber cereal. Take a laxative (Miralax, milk of magnesia, Colace, Senna) if you don t move your bowels at least every other day.          Primary Care Provider Office Phone # Fax #    Yuan Iain Schofield [phone]       [address] [place] [address]        Equal Access to Services     [facility] : Hadii myla francois hadasho Sopenelopeali, waaxda luqadaha, qaybta kaalmada ademichael, adolfo shaffer. So [facility] [phone].    ATENCIÓN: Si habla español, tiene a caruso disposición servicios gratuitos de asistencia lingüística. Melanie al [phone].    We comply with applicable federal civil rights laws and [place] laws. We do not discriminate on the basis of race, color, national origin, age, disability, sex, sexual orientation, or  gender identity.            Thank you!     Thank you for choosing [facility]  for your care. Our goal is always to provide you with excellent care. Hearing back from our patients is one way we can continue to improve our services. Please take a few minutes to complete the written survey that you may receive in the mail after your visit with us. Thank you!             Your Updated Medication List - Protect others around you: Learn how to safely use, store and throw away your medicines at www.disposemymeds.org.          This list is accurate as of 7/25/18  5:39 PM.  Always use your most recent med list.                   Brand Name Dispense Instructions for use Diagnosis    * albuterol (2.5 MG/3ML) 0.083% neb solution     1 Box    Take 1 vial (2.5 mg) by nebulization every 6 hours as needed for shortness of breath / dyspnea or wheezing    Acute bronchitis with coexisting condition requiring prophylactic treatment, Cough       * VENTOLIN  (90 Base) MCG/ACT Inhaler   Generic drug:  albuterol     52 g    INHALE 2 PUFFS INTO THE LUNGS EVERY 6 HOURS AS NEEDED FOR SHORTNESSOF BREATH / DYSPNEA OR WHEEZING    Cough, Acute bronchospasm       escitalopram 20 MG tablet    LEXAPRO    90 tablet    Take 1 tablet (20 mg) by mouth daily    Mixed anxiety depressive disorder       HYDROcodone-acetaminophen 5-325 MG per tablet    NORCO    100 tablet    Take 1-2 tablets by mouth every 8 hours as needed for severe pain maximum 6 tablet(s) per day    Degeneration of lumbar intervertebral disc, DDD (degenerative disc disease), cervical       ibuprofen 200 MG capsule     120 capsule    Take 800 mg by mouth every 4 hours as needed for fever        * levothyroxine 200 MCG tablet    SYNTHROID/LEVOTHROID    90 tablet    Take 1 tablet (200 mcg) by mouth daily Take with 100 mcg tablet for daily total of 300 mcg    Acquired hypothyroidism       * levothyroxine 100 MCG tablet    SYNTHROID/LEVOTHROID    90 tablet    Take 1  tablet (100 mcg) by mouth daily Take with 200 mcg tablet for daily total of 300 mcg    Acquired hypothyroidism       LORazepam 1 MG tablet    ATIVAN    20 tablet    Take 1-2 tablets (1-2 mg) by mouth every 8 hours as needed for anxiety    Mixed anxiety depressive disorder       mesalamine 800 MG EC tablet    ASACOL HD    90 tablet    Take 2 tablets (1,600 mg) by mouth 3 times daily    Lymphocytic colitis       NEW MED      Kratom daily        nitroFURantoin (macrocrystal-monohydrate) 100 MG capsule    MACROBID    14 capsule    Take 1 capsule (100 mg) by mouth 2 times daily    Acute cystitis without hematuria       oxyCODONE IR 5 MG tablet    ROXICODONE    30 tablet    Take 1-2 tablets (5-10 mg) by mouth every 6 hours as needed for severe pain Use sparingly    DDD (degenerative disc disease), cervical       risperiDONE 1 MG tablet    risperDAL    45 tablet    TAKE 1/2 TABLET (0.5MG) BY MOUTH DAILY AT BEDTIME    Mixed anxiety depressive disorder       traZODone 50 MG tablet    DESYREL    90 tablet    1 -3 tablets by mouth nightlty    Sleep disorder       * Notice:  This list has 4 medication(s) that are the same as other medications prescribed for you. Read the directions carefully, and ask your doctor or other care provider to review them with you.

## 2018-07-25 NOTE — NURSING NOTE
Chief Complaint   Patient presents with     Dysuria     3 days now. She is taking the Azo and took it this morning.     MP/MA

## 2018-07-25 NOTE — PROGRESS NOTES
SUBJECTIVE:  Mattie Banda is a 37 year old female who  presents today for a possible UTI. Symptoms of dysuria, frequency, burning, suprapubic pain and pressure and voiding in small amounts have been going on for 3day(s).  Hematuria no.  gradual onsetand moderate.  There is no history of fever, chills, nausea or vomiting.  No history of vaginal or penile discharge. This patient does  have a history of urinary tract infections. Patient denies long duration, flank pain, temperature > 101 degrees F. and Vomiting, significant nausea or diarrhea or vaginal discharge     Past Medical History:   Diagnosis Date     Papanicolaou smear of cervix with low grade squamous intraepithelial lesion (LGSIL)      Thyroid disease      Urinary tract infection, site not specified     Recurrent UTI's     Current Outpatient Prescriptions   Medication Sig Dispense Refill     albuterol (2.5 MG/3ML) 0.083% neb solution Take 1 vial (2.5 mg) by nebulization every 6 hours as needed for shortness of breath / dyspnea or wheezing 1 Box 0     escitalopram (LEXAPRO) 20 MG tablet Take 1 tablet (20 mg) by mouth daily 90 tablet 3     HYDROcodone-acetaminophen (NORCO) 5-325 MG per tablet Take 1-2 tablets by mouth every 8 hours as needed for severe pain maximum 6 tablet(s) per day 100 tablet 0     ibuprofen 200 MG capsule Take 800 mg by mouth every 4 hours as needed for fever 120 capsule      levothyroxine (SYNTHROID/LEVOTHROID) 100 MCG tablet Take 1 tablet (100 mcg) by mouth daily Take with 200 mcg tablet for daily total of 300 mcg 90 tablet 3     levothyroxine (SYNTHROID/LEVOTHROID) 200 MCG tablet Take 1 tablet (200 mcg) by mouth daily Take with 100 mcg tablet for daily total of 300 mcg 90 tablet 1     LORazepam (ATIVAN) 1 MG tablet Take 1-2 tablets (1-2 mg) by mouth every 8 hours as needed for anxiety 20 tablet 0     mesalamine (ASACOL HD) 800 MG EC tablet Take 2 tablets (1,600 mg) by mouth 3 times daily 90 tablet 1     NEW MED Kratom daily        nitroFURantoin, macrocrystal-monohydrate, (MACROBID) 100 MG capsule Take 1 capsule (100 mg) by mouth 2 times daily 14 capsule 0     oxyCODONE IR (ROXICODONE) 5 MG tablet Take 1-2 tablets (5-10 mg) by mouth every 6 hours as needed for severe pain Use sparingly 30 tablet 0     risperiDONE (RISPERDAL) 1 MG tablet TAKE 1/2 TABLET (0.5MG) BY MOUTH DAILY AT BEDTIME 45 tablet 3     traZODone (DESYREL) 50 MG tablet 1 -3 tablets by mouth nightlty 90 tablet 5     VENTOLIN  (90 Base) MCG/ACT Inhaler INHALE 2 PUFFS INTO THE LUNGS EVERY 6 HOURS AS NEEDED FOR SHORTNESSOF BREATH / DYSPNEA OR WHEEZING 52 g 3     [DISCONTINUED] traZODone (DESYREL) 100 MG tablet 1-1 1/2 tabs nightly at bedtime as needed. 45 tablet 3     Social History   Substance Use Topics     Smoking status: Current Some Day Smoker     Packs/day: 1.00     Years: 8.00     Types: Cigarettes     Smokeless tobacco: Never Used     Alcohol use 0.0 oz/week     0 Standard drinks or equivalent per week      Comment: 1/mo       ROS:   MUSCULOSKELETAL: NEGATIVE for significant arthralgias or myalgia and back pain    OBJECTIVE:  /65  Pulse 74  Temp 96.3  F (35.7  C) (Temporal)  LMP 07/03/2018  SpO2 91%  GENERAL APPEARANCE: healthy, alert and no distress  RESP: lungs clear to auscultation - no rales, rhonchi or wheezes  CV: regular rates and rhythm, normal S1 S2, no murmur noted  ABDOMEN: tenderness moderate suprapubic  BACK: No CVA tenderness  SKIN: no suspicious lesions or rashes    ASSESSMENT:   Lower, uncomplicated urinary tract infection. Based on sx and exam.  UA unreliable 2ndary to AZO    PLAN:  Drink plenty of fluids.  Prevention and treatment of UTI's discussed.Signs and symptoms of pyelonephritis mentioned.  Follow up with primary care provider if not improving.

## 2018-07-25 NOTE — TELEPHONE ENCOUNTER
One cannot trust voice recognition.  I signed a prescription for her pain medication. I do not know how numerous was interpreted from what is said.  So basically she needs to know the medicine is approved. [facility]ill should go to [facility].  If it did not, let her know where.  Yuan Schofield MD .

## 2018-07-25 NOTE — PATIENT INSTRUCTIONS
"  Dysuria     Painful urination (dysuria) is often caused by a problem in the urinary tract.   Dysuria is pain felt during urination. It is often described as a burning. Learn more about this problem and how it can be treated.  What causes dysuria?  Possible causes include:    Infection with a bacteria or virus such as a urinary tract infection (UTI or a sexually transmitted infection (STI)    Sensitivity or allergy to chemicals such as those found in lotions and other products    Prostate or bladder problems    Radiation therapy to the pelvic area  How is dysuria diagnosed?  Your healthcare provider will examine you. He or she will ask about your symptoms and health. After talking with you and doing a physical exam, your healthcare provider may know what is causing your dysuria. He or she will usually request  a sample of your urine. Tests of your urine, or a \"urinalysis,\" are done. A urinalysis may include:    Looking at the urine sample (visual exam)    Checking for substances (chemical exam)    Looking at a small amount under a microscope (microscopic exam)  Some parts of the urinalysis may be done in the provider's office and some in a lab. And, the urine sample may be checked for bacteria and yeast (urine culture). Your healthcare provider will tell you more about these tests if they are needed.  How is dysuria treated?  Treatment depends on the cause. If you have a bacterial infection, you may need antibiotics. You may be given medicines to make it easier for you to urinate and help relieve pain. Your healthcare provider can tell you more about your treatment options. Untreated, symptoms may get worse.  When to call your healthcare provider  Call the healthcare provider right away if you have any of the following:    Fever of 100.4 F (38 C) or higher     No improvement after three days of treatment    Trouble urinating because of pain    New or increased discharge from the vagina or penis    Rash or joint " "pain    Increased back or abdominal pain    Enlarged painful lymph nodes (lumps) in the groin   Date Last Reviewed: 1/1/2017 2000-2017 [employer]. [address] [place] [address], [place] [address]. All rights reserved. This information is not intended as a substitute for professional medical care. Always follow your healthcare professional's instructions.        Dysuria     Painful urination (dysuria) is often caused by a problem in the urinary tract.   Dysuria is pain felt during urination. It is often described as a burning. Learn more about this problem and how it can be treated.  What causes dysuria?  Possible causes include:    Infection with a bacteria or virus such as a urinary tract infection (UTI or a sexually transmitted infection (STI)    Sensitivity or allergy to chemicals such as those found in lotions and other products    Prostate or bladder problems    Radiation therapy to the pelvic area  How is dysuria diagnosed?  Your healthcare provider will examine you. He or she will ask about your symptoms and health. After talking with you and doing a physical exam, your healthcare provider may know what is causing your dysuria. He or she will usually request  a sample of your urine. Tests of your urine, or a \"urinalysis,\" are done. A urinalysis may include:    Looking at the urine sample (visual exam)    Checking for substances (chemical exam)    Looking at a small amount under a microscope (microscopic exam)  Some parts of the urinalysis may be done in the provider's office and some in a lab. And, the urine sample may be checked for bacteria and yeast (urine culture). Your healthcare provider will tell you more about these tests if they are needed.  How is dysuria treated?  Treatment depends on the cause. If you have a bacterial infection, you may need antibiotics. You may be given medicines to make it easier for you to urinate and help relieve pain. Your healthcare provider can tell you more " about your treatment options. Untreated, symptoms may get worse.  When to call your healthcare provider  Call the healthcare provider right away if you have any of the following:    Fever of 100.4 F (38 C) or higher     No improvement after three days of treatment    Trouble urinating because of pain    New or increased discharge from the vagina or penis    Rash or joint pain    Increased back or abdominal pain    Enlarged painful lymph nodes (lumps) in the groin   Date Last Reviewed: 1/1/2017 2000-2017 [employer]. [address] [place] [address]. All rights reserved. This information is not intended as a substitute for professional medical care. Always follow your healthcare professional's instructions.

## 2018-07-27 ENCOUNTER — MYC REFILL (OUTPATIENT)
Dept: FAMILY MEDICINE | Facility: CLINIC | Age: 38
End: 2018-07-27

## 2018-07-27 ENCOUNTER — TRANSFERRED RECORDS (OUTPATIENT)
Dept: HEALTH INFORMATION MANAGEMENT | Facility: CLINIC | Age: 38
End: 2018-07-27

## 2018-07-27 DIAGNOSIS — F41.8 MIXED ANXIETY DEPRESSIVE DISORDER: Primary | ICD-10-CM

## 2018-07-27 LAB
BACTERIA SPEC CULT: NO GROWTH
Lab: NORMAL
SPECIMEN SOURCE: NORMAL

## 2018-07-27 RX ORDER — LORAZEPAM 1 MG/1
1-2 TABLET ORAL EVERY 8 HOURS PRN
Qty: 20 TABLET | Refills: 0 | Status: SHIPPED | OUTPATIENT
Start: 2018-07-27 | End: 2018-08-10

## 2018-07-27 NOTE — TELEPHONE ENCOUNTER
Message from MyChart:  Original authorizing provider: MD Mattie Murray would like a refill of the following medications:  LORazepam (ATIVAN) 1 MG tablet [Yuanjuanita Schofield MD]    Preferred pharmacy: [id number] - [place], [place] - [address] [place] [address]    Comment:

## 2018-07-27 NOTE — TELEPHONE ENCOUNTER
Ativan        Last Written Prescription Date:  6/29/18  Last Fill Quantity: 20,   # refills: 0  Last Office Visit: 6/29/18  Future Office visit:       Routing refill request to provider for review/approval because:  Drug not on the [facility], [facility] or [facility] refill protocol or controlled substance

## 2018-07-30 ENCOUNTER — MYC REFILL (OUTPATIENT)
Dept: FAMILY MEDICINE | Facility: CLINIC | Age: 38
End: 2018-07-30

## 2018-07-30 DIAGNOSIS — M51.369 DEGENERATION OF LUMBAR INTERVERTEBRAL DISC: Primary | ICD-10-CM

## 2018-07-30 DIAGNOSIS — M50.30 DDD (DEGENERATIVE DISC DISEASE), CERVICAL: ICD-10-CM

## 2018-07-30 RX ORDER — OXYCODONE HYDROCHLORIDE 5 MG/1
5-10 TABLET ORAL EVERY 6 HOURS PRN
Qty: 42 TABLET | Refills: 0 | Status: SHIPPED | OUTPATIENT
Start: 2018-07-30 | End: 2018-08-03

## 2018-07-30 RX ORDER — OXYCODONE HYDROCHLORIDE 5 MG/1
5-10 TABLET ORAL EVERY 6 HOURS PRN
Qty: 30 TABLET | Refills: 0 | Status: CANCELLED | OUTPATIENT
Start: 2018-07-30

## 2018-07-30 NOTE — TELEPHONE ENCOUNTER
Message from [facility]:  Original authorizing provider: Yuan Schofield MD [place] CADETGerald Banda would like a refill of the following medications:  oxyCODONE IR (ROXICODONE) 5 MG tablet [Yuan Schofield MD]    Preferred pharmacy: Other - I will  hard copy. Thank you     Comment:  I will  hard copy. Thank you

## 2018-07-30 NOTE — TELEPHONE ENCOUNTER
Pt calling to check status of this refill. Please call when ready for  at .   Thank you,  Lakesha Goodwin- Pt Rep.

## 2018-07-30 NOTE — TELEPHONE ENCOUNTER
Pt was contacted and advised that her RX is ready to be picked up at . Pt was in agreement. RX placed at .

## 2018-07-30 NOTE — TELEPHONE ENCOUNTER
ROXICODONE      Last Written Prescription Date:  7/25/18  Last Fill Quantity: 30,   # refills: 0  Last Office Visit: 7/09/18  Future Office visit:       Routing refill request to provider for review/approval because:  Drug not on the [facility], [facility] or [facility] refill protocol or controlled substance

## 2018-08-01 NOTE — TELEPHONE ENCOUNTER
Patient wanted me to send a reminder that the Oxycodone is due as well. She would like to come and  the hard copy at the .  Thank you,  Marcela Hubbard   for [facility]

## 2018-08-02 ENCOUNTER — TELEPHONE (OUTPATIENT)
Dept: FAMILY MEDICINE | Facility: CLINIC | Age: 38
End: 2018-08-02

## 2018-08-02 DIAGNOSIS — M50.30 DDD (DEGENERATIVE DISC DISEASE), CERVICAL: ICD-10-CM

## 2018-08-02 NOTE — TELEPHONE ENCOUNTER
Reason for Call:  Medication or medication refill:    Do you use a [facility]?  Name of the pharmacy and phone number for the current request: patient wants to pick script up    Name of the medication requested: HYDROcodone-acetaminophen (NORCO) 5-325 MG per tablet    Other request: patient states that she takes this along with the oxycodone because it works better. Would like to know if another provider would address this today as she is going back to [place] for a few days     Can we leave a detailed message on this number? YES    Phone number patient can be reached at: Home number on file [phone] (home)    Best Time: any    Call taken on 8/2/2018 at 12:18 PM by Shameka Liu

## 2018-08-02 NOTE — TELEPHONE ENCOUNTER
Per last Mychart note pt reported to you that the Norco wasn't working with the Oxycodone. She is now requesting a refill on Norco because it does work better with the Oxycodone.

## 2018-08-03 NOTE — TELEPHONE ENCOUNTER
I have attempted to call the pt with the following message. I left message for pt to call back. I will call back another time. Elaina Ramos CMA ([facility])

## 2018-08-03 NOTE — TELEPHONE ENCOUNTER
She is correct from our discussion during her last visit but I may have misunderstood some of our interactions last week.  I thought we chose to use only one of the two medications to prevent concerns.   I will refill oxycodone approved today for Monday. It will be at the desk Monday morning. Yuan Schofield MD

## 2018-08-03 NOTE — TELEPHONE ENCOUNTER
"Mattie states that she had been taking both the Beatrice and the Norco because \"that is what Dr schofield had been prescribing me to do.\"  RN explained that the combination is not safe for her and that was not what Dr Schofield intent.  She would like to continue taking the TAMEKA and will be out soon.  She will  the paper script on Monday at the clinic.    Appt has been made for one week to review neck and back pain/meds.    Heaven Duke RN      "

## 2018-08-03 NOTE — TELEPHONE ENCOUNTER
Pt notified of the message below. She is fine with waiting until Monday to . Elaina Ramos CMA ([facility])

## 2018-08-03 NOTE — TELEPHONE ENCOUNTER
I will have RN team call to discuss.  I really am not comfortable to continue both Nroco with hydrocodone and oxycodone. She may need a visit. If we can determine how she is using these medications and why both seem to work well for her but individually they are not, a better plan could be developed. This is not a concern for abuse with her but one which we need to keep her regimen simple and in compliance with current concerns for narcotic use. Yuan Schofield MD

## 2018-08-06 RX ORDER — OXYCODONE HYDROCHLORIDE 5 MG/1
5-10 TABLET ORAL EVERY 6 HOURS PRN
Qty: 42 TABLET | Refills: 0 | Status: SHIPPED | OUTPATIENT
Start: 2018-08-06 | End: 2018-08-20

## 2018-08-08 ENCOUNTER — MYC MEDICAL ADVICE (OUTPATIENT)
Dept: FAMILY MEDICINE | Facility: CLINIC | Age: 38
End: 2018-08-08

## 2018-08-08 DIAGNOSIS — M51.369 DEGENERATION OF LUMBAR INTERVERTEBRAL DISC: ICD-10-CM

## 2018-08-08 DIAGNOSIS — M50.30 DDD (DEGENERATIVE DISC DISEASE), CERVICAL: Primary | ICD-10-CM

## 2018-08-08 NOTE — TELEPHONE ENCOUNTER
Spoke with the patient and read her Dr. Schofield message. She is okay with trying the long acting medication with a short acting PRN. Please respond with which medications you would like to prescribe - she will pick these up at the pharmacy here in the clinic.     Chasidy Lamb RN. . .  8/8/2018, 1:25 PM

## 2018-08-08 NOTE — TELEPHONE ENCOUNTER
RN tired calling pt, but NA. Left message to please call us back at [phone]. She may speak with any RN.  SHMUEL Higginbotham

## 2018-08-08 NOTE — TELEPHONE ENCOUNTER
Please see if you can reach her. I would like to use only one short acting narcotic medication and that is all that has been used so far.   I would like to use only oxycodone as her narcotic or hydrocodone but not both together.  If we can determine what is ineffective about oxycodone alone or what makes the combination work for her I can decide.  It may be that she needs an extended release oxycodone-containing medication with short acting supplementation.  Hydrocodone does not offer the  long-acting choice.  If I would use them together it would be a long-acting oxycodone with a short acting supplement hydrocodone. Perhaps though it could be both types of oxycodone. We need to explain this to her.Yuan Schofield MD

## 2018-08-09 ENCOUNTER — MYC REFILL (OUTPATIENT)
Dept: FAMILY MEDICINE | Facility: CLINIC | Age: 38
End: 2018-08-09

## 2018-08-09 DIAGNOSIS — M50.30 DDD (DEGENERATIVE DISC DISEASE), CERVICAL: ICD-10-CM

## 2018-08-09 RX ORDER — OXYCODONE HYDROCHLORIDE 5 MG/1
5-10 TABLET ORAL EVERY 6 HOURS PRN
Qty: 42 TABLET | Refills: 0 | Status: CANCELLED | OUTPATIENT
Start: 2018-08-09

## 2018-08-09 NOTE — TELEPHONE ENCOUNTER
Patient calling back, is confused about what medications she is getting, as she wants both a long and short acting medication, patient is aware that provider is out of the office today, patient is ok to wait until tomorrow.

## 2018-08-09 NOTE — TELEPHONE ENCOUNTER
Message from CAILabshart:  Original authorizing provider: MD Mattie Murray would like a refill of the following medications:  oxyCODONE IR (ROXICODONE) 5 MG tablet [Yuanjuanita Schofield MD]    Preferred pharmacy: Other - Will  hard copy.     Comment:

## 2018-08-10 DIAGNOSIS — F41.8 MIXED ANXIETY DEPRESSIVE DISORDER: ICD-10-CM

## 2018-08-10 RX ORDER — LORAZEPAM 1 MG/1
1-2 TABLET ORAL EVERY 8 HOURS PRN
Qty: 20 TABLET | Refills: 0 | Status: SHIPPED | OUTPATIENT
Start: 2018-08-10 | End: 2018-11-05

## 2018-08-10 RX ORDER — OXYCODONE HCL 10 MG/1
10 TABLET, FILM COATED, EXTENDED RELEASE ORAL EVERY 12 HOURS
Qty: 60 TABLET | Refills: 0 | Status: SHIPPED | OUTPATIENT
Start: 2018-08-10 | End: 2018-08-20

## 2018-08-10 RX ORDER — OXYCODONE HYDROCHLORIDE 5 MG/1
TABLET ORAL
Qty: 60 TABLET | Refills: 0 | Status: SHIPPED | OUTPATIENT
Start: 2018-08-10 | End: 2018-08-20

## 2018-08-10 NOTE — TELEPHONE ENCOUNTER
She originally was on today's schedule, I believe.  I was going to speak to her in person and clarify requests and medication.  Today I will order both medications with long-acting to be taken twice daily and to use the short acting exactly as ordered and only when needed.  These will be ordered for 4 tablets daily but fewer tablets if she can manage.  I would like to see her sometime in the next month due to these changes.    Yuan Schofield MD

## 2018-08-10 NOTE — TELEPHONE ENCOUNTER
Lorazepam  Last Written Prescription Date:  7/27/18  Last Fill Quantity: 20,  # refills: 0   Last office visit: 7/9/2018 with prescribing provider:  Chandu   Future Office Visit:  None  Requested Prescriptions   Pending Prescriptions Disp Refills     LORazepam (ATIVAN) 1 MG tablet 20 tablet 0     Sig: Take 1-2 tablets (1-2 mg) by mouth every 8 hours as needed for anxiety    There is no refill protocol information for this order          Ciera Ochoa CMA ([facility])

## 2018-08-15 ENCOUNTER — MYC MEDICAL ADVICE (OUTPATIENT)
Dept: FAMILY MEDICINE | Facility: CLINIC | Age: 38
End: 2018-08-15

## 2018-08-15 NOTE — TELEPHONE ENCOUNTER
Please see My Chart message sent the patient.  I would like to see her next Monday and please help arrange this appointment.  Please ask how she is doing with her current short acting medication and what we can do for her until I see her.  On another message, there is information that we have to document multiple things for extended release oxycodone and only some of these things for extended release morphine.  Unfortunately these medications are not equivalent and I will renew patient tolerated oxycodone.  Therefore extended release oxycodone was asked for first.

## 2018-08-15 NOTE — TELEPHONE ENCOUNTER
RN TRIAGE CALL:    Patient Contact    Attempt # 1    Was call answered?  No.  Left message on voicemail with information to call me back.  Heaven Duke RN

## 2018-08-20 ENCOUNTER — OFFICE VISIT (OUTPATIENT)
Dept: FAMILY MEDICINE | Facility: CLINIC | Age: 38
End: 2018-08-20
Payer: COMMERCIAL

## 2018-08-20 VITALS
HEART RATE: 121 BPM | DIASTOLIC BLOOD PRESSURE: 68 MMHG | RESPIRATION RATE: 18 BRPM | TEMPERATURE: 97.1 F | OXYGEN SATURATION: 99 % | WEIGHT: 166.6 LBS | BODY MASS INDEX: 26.89 KG/M2 | SYSTOLIC BLOOD PRESSURE: 100 MMHG

## 2018-08-20 DIAGNOSIS — E03.9 ACQUIRED HYPOTHYROIDISM: ICD-10-CM

## 2018-08-20 DIAGNOSIS — M50.30 DDD (DEGENERATIVE DISC DISEASE), CERVICAL: ICD-10-CM

## 2018-08-20 DIAGNOSIS — M51.369 DEGENERATION OF LUMBAR INTERVERTEBRAL DISC: ICD-10-CM

## 2018-08-20 DIAGNOSIS — G89.4 CHRONIC PAIN SYNDROME: ICD-10-CM

## 2018-08-20 LAB
T4 FREE SERPL-MCNC: 1.58 NG/DL (ref 0.76–1.46)
TSH SERPL DL<=0.005 MIU/L-ACNC: 0.13 MU/L (ref 0.4–4)

## 2018-08-20 PROCEDURE — 84439 ASSAY OF FREE THYROXINE: CPT | Performed by: FAMILY MEDICINE

## 2018-08-20 PROCEDURE — 99215 OFFICE O/P EST HI 40 MIN: CPT | Performed by: FAMILY MEDICINE

## 2018-08-20 PROCEDURE — 84443 ASSAY THYROID STIM HORMONE: CPT | Performed by: FAMILY MEDICINE

## 2018-08-20 PROCEDURE — 36415 COLL VENOUS BLD VENIPUNCTURE: CPT | Performed by: FAMILY MEDICINE

## 2018-08-20 RX ORDER — MORPHINE SULFATE 15 MG/1
15 TABLET, FILM COATED, EXTENDED RELEASE ORAL EVERY 12 HOURS
Qty: 60 TABLET | Refills: 0 | Status: SHIPPED | OUTPATIENT
Start: 2018-08-20 | End: 2018-09-12

## 2018-08-20 RX ORDER — OXYCODONE HYDROCHLORIDE 5 MG/1
TABLET ORAL
Qty: 60 TABLET | Refills: 0 | Status: SHIPPED | OUTPATIENT
Start: 2018-08-20 | End: 2018-08-29

## 2018-08-20 ASSESSMENT — PAIN SCALES - GENERAL: PAINLEVEL: SEVERE PAIN (6)

## 2018-08-20 NOTE — PATIENT INSTRUCTIONS
Follow the rules, and see if MS ER every 12 hours.  If it does what we want, you will have more consistent pain control and only needed oxycodone containing tabs when pain is overwhelming.   Come back in about one month to review.

## 2018-08-20 NOTE — MR AVS SNAPSHOT
After Visit Summary   8/20/2018    Mattie Banda    MRN: [id number]           Patient Information     Date Of Birth          1980        Visit Information        Provider Department      8/20/2018 2:45 PM Yuan Schofield MD [facility]        Today's Diagnoses     DDD (degenerative disc disease), cervical        Degeneration of lumbar intervertebral disc        Acquired hypothyroidism          Care Instructions    Follow the rules, and see if MS ER every 12 hours.  If it does what we want, you will have more consistent pain control and only needed oxycodone containing tabs when pain is overwhelming.   Come back in about one month to review.            Follow-ups after your visit        Who to contact     If you have questions or need follow up information about today's clinic visit or your schedule please contact [facility] directly at [phone].  Normal or non-critical lab and imaging results will be communicated to you by [employer], letter or phone within 4 business days after the clinic has received the results. If you do not hear from us within 7 days, please contact the clinic through [employer] or phone. If you have a critical or abnormal lab result, we will notify you by phone as soon as possible.  Submit refill requests through [employer] or call your pharmacy and they will forward the refill request to us. Please allow 3 business days for your refill to be completed.          Additional Information About Your Visit        [employer] Information     [employer] gives you secure access to your electronic health record. If you see a primary care provider, you can also send messages to your care team and make appointments. If you have questions, please call your primary care clinic.  If you do not have a primary care provider, please call [phone] and they will assist you.        Care EveryWhere ID     This is your Care EveryWhere ID. This could be used by other  organizations to access your [place] medical records  [id number]        Your Vitals Were     Pulse Temperature Respirations Last Period Pulse Oximetry BMI (Body Mass Index)    121 97.1  F (36.2  C) (Temporal) 18 08/03/2018 (Approximate) 99% 26.89 kg/m2       Blood Pressure from Last 3 Encounters:   08/20/18 100/68   07/25/18 100/65   07/09/18 108/68    Weight from Last 3 Encounters:   08/20/18 166 lb 9.6 oz (75.6 kg)   07/09/18 166 lb 11.2 oz (75.6 kg)   05/21/18 164 lb (74.4 kg)              We Performed the Following     **TSH with free T4 reflex FUTURE 2mo          Today's Medication Changes          These changes are accurate as of 8/20/18  4:01 PM.  If you have any questions, ask your nurse or doctor.               Start taking these medicines.        Dose/Directions    morphine 15 MG 12 hr tablet   Commonly known as:  MS CONTIN   Used for:  DDD (degenerative disc disease), cervical, Degeneration of lumbar intervertebral disc   Started by:  Yuan Schofield MD        Dose:  15 mg   Take 1 tablet (15 mg) by mouth every 12 hours maximum 2 tablet(s) per day   Quantity:  60 tablet   Refills:  0         These medicines have changed or have updated prescriptions.        Dose/Directions    oxyCODONE IR 5 MG tablet   Commonly known as:  ROXICODONE   This may have changed:  Another medication with the same name was removed. Continue taking this medication, and follow the directions you see here.   Used for:  DDD (degenerative disc disease), cervical, Degeneration of lumbar intervertebral disc   Changed by:  Yuan Schofield MD        1-2 tablets 4 times daily for breakthrough pain.  Use sparingly.  No more than 4 daily.   Quantity:  60 tablet   Refills:  0            Where to get your medicines      Some of these will need a paper prescription and others can be bought over the counter.  Ask your nurse if you have questions.     Bring a paper prescription for each of these medications     morphine 15 MG 12 hr  tablet    oxyCODONE IR 5 MG tablet               Information about OPIOIDS     PRESCRIPTION OPIOIDS: WHAT YOU NEED TO KNOW   We gave you an opioid (narcotic) pain medicine. It is important to manage your pain, but opioids are not always the best choice. You should first try all the other options your care team gave you. Take this medicine for as short a time (and as few doses) as possible.    Some activities can increase your pain, such as bandage changes or therapy sessions. It may help to take your pain medicine 30 to 60 minutes before these activities. Reduce your stress by getting enough sleep, working on hobbies you enjoy and practicing relaxation or meditation. Talk to your care team about ways to manage your pain beyond prescription opioids.    These medicines have risks:    DO NOT drive when on new or higher doses of pain medicine. These medicines can affect your alertness and reaction times, and you could be arrested for driving under the influence (DUI). If you need to use opioids long-term, talk to your care team about driving.    DO NOT operate heavy machinery    DO NOT do any other dangerous activities while taking these medicines.    DO NOT drink any alcohol while taking these medicines.     If the opioid prescribed includes acetaminophen, DO NOT take with any other medicines that contain acetaminophen. Read all labels carefully. Look for the word  acetaminophen  or  Tylenol.  Ask your pharmacist if you have questions or are unsure.    You can get addicted to pain medicines, especially if you have a history of addiction (chemical, alcohol or substance dependence). Talk to your care team about ways to reduce this risk.    All opioids tend to cause constipation. Drink plenty of water and eat foods that have a lot of fiber, such as fruits, vegetables, prune juice, apple juice and high-fiber cereal. Take a laxative (Miralax, milk of magnesia, Colace, Senna) if you don t move your bowels at least every  other day. Other side effects include upset stomach, sleepiness, dizziness, throwing up, tolerance (needing more of the medicine to have the same effect), physical dependence and slowed breathing.    Store your pills in a secure place, locked if possible. We will not replace any lost or stolen medicine. If you don t finish your medicine, please throw away (dispose) as directed by your pharmacist. The [place] Pollution Control Agency has more information about safe disposal: https://[email or web address].[place].us/living-green/managing-unwanted-medications         Primary Care Provider Office Phone # Fax #    Yuan Iain Schofield [phone]        [address] [place] [address]        Equal Access to Services     KAELYN MYERS : Zakia Cortes, wachiquida tennille, qaybta kaalmada radha, adolfo borges . So [facility] [phone].    ATENCIÓN: Si habla español, tiene a caruso disposición servicios gratuitos de asistencia lingüística. [facility] [phone].    We comply with applicable federal civil rights laws and [place] laws. We do not discriminate on the basis of race, color, national origin, age, disability, sex, sexual orientation, or gender identity.            Thank you!     Thank you for choosing [facility]  for your care. Our goal is always to provide you with excellent care. Hearing back from our patients is one way we can continue to improve our services. Please take a few minutes to complete the written survey that you may receive in the mail after your visit with us. Thank you!             Your Updated Medication List - Protect others around you: Learn how to safely use, store and throw away your medicines at www.disposemymeds.org.          This list is accurate as of 8/20/18  4:01 PM.  Always use your most recent med list.                   Brand Name Dispense Instructions for use Diagnosis    * albuterol (2.5 MG/3ML) 0.083% neb solution      1 Box    Take 1 vial (2.5 mg) by nebulization every 6 hours as needed for shortness of breath / dyspnea or wheezing    Acute bronchitis with coexisting condition requiring prophylactic treatment, Cough       * VENTOLIN  (90 Base) MCG/ACT inhaler   Generic drug:  albuterol     52 g    INHALE 2 PUFFS INTO THE LUNGS EVERY 6 HOURS AS NEEDED FOR SHORTNESSOF BREATH / DYSPNEA OR WHEEZING    Cough, Acute bronchospasm       escitalopram 20 MG tablet    LEXAPRO    90 tablet    Take 1 tablet (20 mg) by mouth daily    Mixed anxiety depressive disorder       HYDROcodone-acetaminophen 5-325 MG per tablet    NORCO    100 tablet    Take 1-2 tablets by mouth every 8 hours as needed for severe pain maximum 6 tablet(s) per day    Degeneration of lumbar intervertebral disc, DDD (degenerative disc disease), cervical       ibuprofen 200 MG capsule     120 capsule    Take 800 mg by mouth every 4 hours as needed for fever        * levothyroxine 200 MCG tablet    SYNTHROID/LEVOTHROID    90 tablet    Take 1 tablet (200 mcg) by mouth daily Take with 100 mcg tablet for daily total of 300 mcg    Acquired hypothyroidism       * levothyroxine 100 MCG tablet    SYNTHROID/LEVOTHROID    90 tablet    Take 1 tablet (100 mcg) by mouth daily Take with 200 mcg tablet for daily total of 300 mcg    Acquired hypothyroidism       LORazepam 1 MG tablet    ATIVAN    20 tablet    Take 1-2 tablets (1-2 mg) by mouth every 8 hours as needed for anxiety    Mixed anxiety depressive disorder       mesalamine 800 MG EC tablet    ASACOL HD    90 tablet    Take 2 tablets (1,600 mg) by mouth 3 times daily    Lymphocytic colitis       morphine 15 MG 12 hr tablet    MS CONTIN    60 tablet    Take 1 tablet (15 mg) by mouth every 12 hours maximum 2 tablet(s) per day    DDD (degenerative disc disease), cervical, Degeneration of lumbar intervertebral disc       NEW MED      Armanitozandra daily        nitroFURantoin (macrocrystal-monohydrate) 100 MG capsule    MACROBID     14 capsule    Take 1 capsule (100 mg) by mouth 2 times daily    Acute cystitis without hematuria       oxyCODONE IR 5 MG tablet    ROXICODONE    60 tablet    1-2 tablets 4 times daily for breakthrough pain.  Use sparingly.  No more than 4 daily.    DDD (degenerative disc disease), cervical, Degeneration of lumbar intervertebral disc       risperiDONE 1 MG tablet    risperDAL    45 tablet    TAKE 1/2 TABLET (0.5MG) BY MOUTH DAILY AT BEDTIME    Mixed anxiety depressive disorder       traZODone 50 MG tablet    DESYREL    90 tablet    1 -3 tablets by mouth nightlty    Sleep disorder       * Notice:  This list has 4 medication(s) that are the same as other medications prescribed for you. Read the directions carefully, and ask your doctor or other care provider to review them with you.

## 2018-08-20 NOTE — LETTER
[facility]    08/20/18    Patient: Mattie Banda  YOB: 1980  Medical Record Number: [id number]                                                                  Controlled Substance Agreement  I understand that my care provider has prescribed controlled substances (narcotics, tranquilizers, and/or stimulants) to help manage my condition(s).  I am taking this medicine to help me function or work.  I know that this is strong medicine.  It could have serious side effects and even cause a dependency on the drug.  If I stop these medicines suddenly, I could have severe withdrawal symptoms.    The risks, benefits, and side effects of these medication(s) were explained to me.  I agree that:  1. I will take part in other treatments as advised by my provider.  This may be psychiatry or counseling, physical therapy, behavioral therapy, group treatment, or a referral to a pain clinic.  I will reduce or stop my medicine when my provider tells me to do so.   2. I will take my medicines as prescribed.  I will not change the dose or schedule unless my provider tells me to.  There will be no refills if I  run out early.   I may be contacted at any time without warning and asked to complete a drug test or pill count.   3. I will keep all my appointments at the clinic.  If I miss appointments or fail to follow instructions, my provider may stop my medicine.  4. I will not ask other providers to prescribe controlled substances. And I will not accept controlled substances from other people. If I need another prescribed controlled substance for a new reason, I will notify my provider within one business day.  5. If I enroll in the [place] Medical Marijuana program, I will tell my provider.  I will also sign an agreement to share my medical records with my provider.  6. I will use one pharmacy to fill all of my controlled substance prescriptions.  If my prescription is mailed to my  pharmacy, it may take 5 to 7 days for my medicine to be ready.  7. I understand that my provider, clinic care team, and pharmacy can track controlled substance prescriptions from other providers through a central database (prescription monitoring program).  8. I will bring in my list of medications (or my medicine bottles) each time I come to the clinic.  [id number] REV-  07/2018                                                                                                                                   Page 1 of 2      [facility]    08/20/18    Patient: Mattie Banda  YOB: 1980  Medical Record Number: [id number]    9. Refills of controlled substances will be made only during office hours.  It is up to me to make sure that I do not run out of my medicines on weekends or holidays.    10. I am responsible for my prescriptions.  If the medicine/prescription is lost or stolen, it will not be replaced.   I also agree not to share these medicines with anyone.  11. I agree to not use ANY illegal or recreational drugs.  This includes marijuana, cocaine, bath salts or other drugs.  I agree not to use alcohol unless my provider says I may.  I agree to give urine samples whenever asked.  If I fail to give a urine sample, the provider may stop my medicine.     12. I will tell my nurse or provider right away if I become pregnant or have a new medical problem treated outside of [facility].  13. I understand that this medicine can affect my thinking and judgment.  It may be unsafe for me to drive, use machinery and do dangerous tasks.  I will not do any of these things until I know how the medicine affects me.  If my dose changes, I will wait to see how it affects me.  I will contact my provider if I have concerns about medicine side effects.  I understand that if I do not follow any of the conditions above, my prescriptions or treatment may be stopped.    I agree that my  provider, clinic care team, and pharmacy may work with any city, state or federal law enforcement agency that investigates the misuse, sale, or other diversion of my controlled medicine. I will allow my provider to discuss my care with or share a copy of this agreement with any other treating provider, pharmacy or emergency room where I receive care.  I agree to give up (waive) any right of privacy or confidentiality with respect to these authorizations.   I have read this agreement and have asked questions about anything I did not understand.   ___________________________________    ___________________________  Patient Signature                                                           Date and Time  ___________________________________     ____________________________  Witness                                                                            Date and Time  ___________________________________  Yuan Schofield MD  [id number] REV-  07/2018                                                                                                                                                   Page 2 of 2

## 2018-08-20 NOTE — PROGRESS NOTES
SUBJECTIVE:   Mattie Banda is a 37 year old female who presents to clinic today for the following health issues:      Medication Followup of  Pain medication     Taking Medication as prescribed: yes    Side Effects:  None    Medication Helping Symptoms:  yes     Patient has had off-and-on significant degenerative spine disease in both cervical and lumbar spine.  She has been using alternating Norco versus Percocet.  She finds Percocet works better but does not last long.  In in attempt to get OxyContin and oxycodone medication, denied secondary to insurance regulations.  They want us to try morphine standard release first.  Patient is here today to discuss long-term chronic pain and use narcotics.  We have had this discussion before.  She continues to have pain relief with the medication.  When she runs short she limits the amount she is using and understands she will be in pain.  Her activity is improved when she takes the pain pills.  She does not have any drug effect from them.  In the past she has discontinued medication when pain levels became more tolerable.  Patient has changed her job to allow her to have less pain.  She has found a job change appropriate.  She is very aware of how narcotics can be abused and does not want to be that person.  She does not use illicit chemicals.    Patient is feeling better with change in thyroid.  She has been using her medication routinely now and would like thyroid to be tested today as well.      Problem list and histories reviewed & adjusted, as indicated.  Additional history: as documented    BP Readings from Last 3 Encounters:   08/20/18 100/68   07/25/18 100/65   07/09/18 108/68    Wt Readings from Last 3 Encounters:   08/20/18 166 lb 9.6 oz (75.6 kg)   07/09/18 166 lb 11.2 oz (75.6 kg)   05/21/18 164 lb (74.4 kg)                  Labs reviewed in EPIC    Reviewed and updated as needed this visit by clinical staff  Tobacco  Allergies  Meds       Reviewed and  updated as needed this visit by Provider         ROS:  Constitutional, HEENT, cardiovascular, pulmonary, gi and gu systems are negative, except as otherwise noted.    OBJECTIVE:     /68  Pulse 121  Temp 97.1  F (36.2  C) (Temporal)  Resp 18  Wt 166 lb 9.6 oz (75.6 kg)  LMP 08/03/2018 (Approximate)  SpO2 99%  BMI 26.89 kg/m2  Body mass index is 26.89 kg/(m^2).  GENERAL: healthy, alert and no distress  EYES: Eyes grossly normal to inspection, PERRL and conjunctivae and sclerae normal  HENT: normal cephalic/atraumatic and oral mucous membranes moist  NECK: no adenopathy, no asymmetry, masses, or scars and thyroid normal to palpation  RESP: lungs clear to auscultation - no rales, rhonchi or wheezes  CV: regular rate and rhythm, normal S1 S2, no S3 or S4, no murmur, click or rub, no peripheral edema and peripheral pulses strong  ABDOMEN: soft, nontender, no hepatosplenomegaly, no masses and bowel sounds normal  MS: She is limited in flexion and extension and other head motion.  She is limited in lumbar flexion and extension.  Straight leg raising does cause some pain into her extremities the feet bilaterally.  Strength seems okay.  She does have positive MRI findings both neck and low back.  NEURO: mentation intact and reflexes are equal lower extremities  PSYCH: mentation appears normal, affect normal/bright  LYMPH: no cervical, supraclavicular, axillary, or inguinal adenopathy    Diagnostic Test Results:  Results for orders placed or performed in visit on 08/20/18   **TSH with free T4 reflex FUTURE 2mo   Result Value Ref Range    TSH 0.13 (L) 0.40 - 4.00 mU/L   T4 free   Result Value Ref Range    T4 Free 1.58 (H) 0.76 - 1.46 ng/dL       ASSESSMENT/PLAN:     Hypothyroidism; controlled euthyroid now but slightly at risk for overmedication.  Patient has been or will be contacted the lower dose with recheck in about 8 weeks or less   Plan:  Other: As above    1. DDD (degenerative disc disease), cervical  1  discussion initiating both letter for chronic pain medication use and use of pain medications long-term.  I have known this woman for more than 20 years and she has used pain medications appropriately intermittently over time and sometimes for long-term.  We will recheck everything as needed.  Today dire score is good.  Checking BMP site indicates that she only receives medications at one pharmacy and from our clinic.  - oxyCODONE IR (ROXICODONE) 5 MG tablet; 1-2 tablets 4 times daily for breakthrough pain.  Use sparingly.  No more than 4 daily.  Dispense: 60 tablet; Refill: 0  - morphine (MS CONTIN) 15 MG 12 hr tablet; Take 1 tablet (15 mg) by mouth every 12 hours maximum 2 tablet(s) per day  Dispense: 60 tablet; Refill: 0  - T4 free    2. Degeneration of lumbar intervertebral disc    - oxyCODONE IR (ROXICODONE) 5 MG tablet; 1-2 tablets 4 times daily for breakthrough pain.  Use sparingly.  No more than 4 daily.  Dispense: 60 tablet; Refill: 0  - morphine (MS CONTIN) 15 MG 12 hr tablet; Take 1 tablet (15 mg) by mouth every 12 hours maximum 2 tablet(s) per day  Dispense: 60 tablet; Refill: 0    3. Acquired hypothyroidism  See discussion above  - **TSH with free T4 reflex FUTURE 2mo    MEDICATIONS:        - Trial of for insurance purposes we will try MS extended release tablets and see if she can get satisfactory pain with just occasional use of the oxycodone.  We might consider increasing morphine dose if she is getting partial relief.  He may ask for PA for OxyContin if necessary.  The goal is to have her take routine doses of extended release medication.  She is comfortable with our plan.  She will continue physical therapy       -   Patient Instructions   Follow the rules, and see if MS ER every 12 hours.  If it does what we want, you will have more consistent pain control and only needed oxycodone containing tabs when pain is overwhelming.   Come back in about one month to review.      Time spent with greater  than 50% spent in discussion, making the plan, or filling out paperwork with patient for illness/treatments was 40 minutes or more.    Yuan Iain Schofield MD  [facility]

## 2018-08-21 ASSESSMENT — PATIENT HEALTH QUESTIONNAIRE - PHQ9: SUM OF ALL RESPONSES TO PHQ QUESTIONS 1-9: 16

## 2018-08-29 ENCOUNTER — MYC REFILL (OUTPATIENT)
Dept: FAMILY MEDICINE | Facility: CLINIC | Age: 38
End: 2018-08-29

## 2018-08-29 DIAGNOSIS — M50.30 DDD (DEGENERATIVE DISC DISEASE), CERVICAL: ICD-10-CM

## 2018-08-29 DIAGNOSIS — M51.369 DEGENERATION OF LUMBAR INTERVERTEBRAL DISC: ICD-10-CM

## 2018-08-29 RX ORDER — OXYCODONE HYDROCHLORIDE 5 MG/1
TABLET ORAL
Qty: 60 TABLET | Refills: 0 | Status: SHIPPED | OUTPATIENT
Start: 2018-08-29 | End: 2018-09-12

## 2018-08-29 NOTE — TELEPHONE ENCOUNTER
Message from [employer]:  Original authorizing provider: Yuan Schofield MD    Mattie Banda would like a refill of the following medications:  oxyCODONE IR (ROXICODONE) 5 MG tablet [Yuanjuanita Schofield MD]    Preferred pharmacy: Other - I will  hard copy when ready. Thank you     Comment:  I will  hard copy when ready. Thank you        Last Written Prescription Date:  08/20/18  Last Fill Quantity: 60,   # refills: 0  Last Office Visit: 8/20/18  Future Office visit:       Routing refill request to provider for review/approval because:  Drug not on the [facility], [facility] or [facility] refill protocol or controlled substance

## 2018-09-04 ENCOUNTER — OFFICE VISIT (OUTPATIENT)
Dept: URGENT CARE | Facility: RETAIL CLINIC | Age: 38
End: 2018-09-04
Payer: COMMERCIAL

## 2018-09-04 VITALS
OXYGEN SATURATION: 97 % | HEART RATE: 74 BPM | TEMPERATURE: 97.9 F | SYSTOLIC BLOOD PRESSURE: 108 MMHG | DIASTOLIC BLOOD PRESSURE: 70 MMHG

## 2018-09-04 DIAGNOSIS — R11.2 NON-INTRACTABLE VOMITING WITH NAUSEA, UNSPECIFIED VOMITING TYPE: Primary | ICD-10-CM

## 2018-09-04 DIAGNOSIS — R11.0 NAUSEA: ICD-10-CM

## 2018-09-04 PROCEDURE — 99213 OFFICE O/P EST LOW 20 MIN: CPT | Performed by: PHYSICIAN ASSISTANT

## 2018-09-04 RX ORDER — ONDANSETRON 4 MG/1
4 TABLET, FILM COATED ORAL EVERY 8 HOURS PRN
Qty: 15 TABLET | Refills: 0 | Status: SHIPPED | OUTPATIENT
Start: 2018-09-04 | End: 2018-09-20

## 2018-09-04 RX ORDER — MUPIROCIN 20 MG/G
OINTMENT TOPICAL
Refills: 0 | COMMUNITY
Start: 2018-08-22 | End: 2019-01-08

## 2018-09-04 NOTE — PROGRESS NOTES
Chief Complaint   Patient presents with     Vomiting     x 3 days     Derm Problem     impetigo, is on meds for this         SUBJECTIVE:   Pt. presenting to [facility] -  with a chief complaint of nausea with vomiting x 3 days. Symptoms wax and wane but appetite is low. No diarrhea. Afebrile. Energy level is <    Denies preg - has her period and SP tubal ligation.    See CC.  Onset of symptoms gradual  Course of illness is waxing and waning.    Severity moderate  Current and Associated symptoms: nausea and vomiting  Treatment measures tried include None tried.  Predisposing factors include None.    Last V few hours ago.    ROS:  Afebrile   Energy level is <  ENT - denies ear pain, throat pain. No nasal congestion  CP - no cough,SOB or chest pain   GI- - appetite decreased. No  diarrhea.   No bowel or bladder changes. States urine is clear to light yellow.  MSK - no joint pain or swelling   Skin: No rashes  Past Medical History:   Diagnosis Date     Papanicolaou smear of cervix with low grade squamous intraepithelial lesion (LGSIL)      Thyroid disease      Urinary tract infection, site not specified     Recurrent UTI's     Past Surgical History:   Procedure Laterality Date     BACK SURGERY  6/2015      COLONOSCOPY  08/06/07     COLONOSCOPY  08/05/08      COLONOSCOPY W BIOPSY  02/06/08      TOOTH EXTRACTION W/FORCEP      wisdom teeth removed     INJECT EPIDURAL CERVICAL N/A 3/8/2018    Procedure: INJECT EPIDURAL CERVICAL;  cervical 6-7 interlaminar epidural steroid injection;  Surgeon: Edgardo Rubio MD;  Location:  OR     LAPAROSCOPIC TUBAL LIGATION  11/27/2012    Procedure: LAPAROSCOPIC TUBAL LIGATION;  Laparoscopic Bilateral tubal sterilization/ fulgaration;  Surgeon: Sarbjit Whittaker MD;  Location:  OR     Patient Active Problem List   Diagnosis     Contraceptive management     CARDIOVASCULAR SCREENING; LDL GOAL LESS THAN 160     Degeneration of lumbar intervertebral disc      S/P lumbar fusion and discectomy June 2015     Low grade squamous intraepithelial lesion on cytologic smear of cervix (LGSIL)     Labial lesion     Mixed anxiety depressive disorder     Tobacco use disorder     Idiopathic peripheral neuropathy     Hypothyroidism, unspecified type     Migraine with aura and without status migrainosus, not intractable     Tinea versicolor     Lymphocytic colitis     Excessive or frequent menstruation     DDD (degenerative disc disease), cervical     Chronic pain syndrome     Current Outpatient Prescriptions   Medication     albuterol (2.5 MG/3ML) 0.083% neb solution     escitalopram (LEXAPRO) 20 MG tablet     ibuprofen 200 MG capsule     levothyroxine (SYNTHROID/LEVOTHROID) 100 MCG tablet     levothyroxine (SYNTHROID/LEVOTHROID) 200 MCG tablet     LORazepam (ATIVAN) 1 MG tablet     mesalamine (ASACOL HD) 800 MG EC tablet     morphine (MS CONTIN) 15 MG 12 hr tablet     NEW MED     risperiDONE (RISPERDAL) 1 MG tablet     VENTOLIN  (90 Base) MCG/ACT Inhaler     mupirocin (BACTROBAN) 2 % ointment     oxyCODONE IR (ROXICODONE) 5 MG tablet     traZODone (DESYREL) 50 MG tablet     No current facility-administered medications for this visit.        OBJECTIVE:  /70  Pulse 74  Temp 97.9  F (36.6  C) (Oral)  SpO2 97%    GENERAL APPEARANCE: cooperative, alert and no distress. Appears well hydrated.  EYES: conjunctiva clear  HENT: Rt ear canal  clear and TM normal   Lt ear canal clear and TM normal   Nose no congestion. no discharge  Mouth without ulcers or lesions. no erythema. no exudate.   NECK: supple, no palp  ant nodes. No  posterior nodes.  RESP: lungs clear to auscultation - no rales, rhonchi or wheezes. Breathing easily.  CV: regular rates and rhythm  ABDOMEN:  soft, nontender, no HSM or masses and bowel sounds normal   SKIN: sl raised pink areas aeround her mouth - appears to be resolving impetigo. No open or crusty areas.  No tenderness to palpate over  sinus  areas.      ASSESSMENT:     Non-intractable vomiting with nausea, unspecified vomiting type  Nausea        PLAN:  Symptomatic measures   Prescriptions as below. Discussed indications, dosing, side affects and adverse reactions of medications with  Patient -zofran.    Hold all liquids and solids until no vomiting x 1 hour. Then start with small sips of clear liquids - wait 10 minutes and if no vomiting gradually increase amount of fluids every 10 minutes and advance diet as tolerated.    If having diarrhea continue offering fluids in small amounts and frequently. Try to eat some yogurt daily (or take a probiotic). Advance diet as tolerated.    Gatorade     Contagiousness and hygiene discussed.  Fever and pain  control measures discussed.   If unable to swallow, any breathing difficulty to go to ED . Reviewed s/s dehydration with patient - to UC/ED.  Should be seen for FOLLOW UP if not improving next 24 hours.  See letter for work  AVS given and discussed:  Patient Instructions   Hold all liquids and solids until no vomiting x 1 hour. Then start with small sips of clear liquids - wait 10 minutes and if no vomiting gradually increase amount of fluids every 10 minutes and advance diet as tolerated.    If having diarrhea continue offering fluids in small amounts and frequently. Try to eat some yogurt daily (or take a probiotic). Advance diet as tolerated.    Gatorade    .................................    Please FOLLOW UP at primary care clinic if not improving, new symptoms, worse or this does not resolve.  [facility]  [phone]       Patient is comfortable with this plan.  Electronically signed,  HERBIE Newman, PAC

## 2018-09-04 NOTE — MR AVS SNAPSHOT
After Visit Summary   9/4/2018    Mattie Banda    MRN: [id number]           Patient Information     Date Of Birth          1980        Visit Information        Provider Department      9/4/2018 12:00 PM Elizabeth Newman PA-C [facility] [place]        Today's Diagnoses     Non-intractable vomiting with nausea, unspecified vomiting type    -  1    Nausea          Care Instructions    Hold all liquids and solids until no vomiting x 1 hour. Then start with small sips of clear liquids - wait 10 minutes and if no vomiting gradually increase amount of fluids every 10 minutes and advance diet as tolerated.    If having diarrhea continue offering fluids in small amounts and frequently. Try to eat some yogurt daily (or take a probiotic). Advance diet as tolerated.    Gatorade    .................................    Please FOLLOW UP at primary care clinic if not improving, new symptoms, worse or this does not resolve.  [facility]  [phone]            Follow-ups after your visit        Who to contact     You can reach your care team any time of the day by calling [phone].  Notification of test results:  If you have an abnormal lab result, we will notify you by phone as soon as possible.         Additional Information About Your Visit        MyChart Information     [employer] gives you secure access to your electronic health record. If you see a primary care provider, you can also send messages to your care team and make appointments. If you have questions, please call your primary care clinic.  If you do not have a primary care provider, please call [phone] and they will assist you.        Care EveryWhere ID     This is your Care EveryWhere ID. This could be used by other organizations to access your [facility] medical records  [id number]        Your Vitals Were     Pulse Temperature Pulse Oximetry             74 97.9  F (36.6  C) (Oral) 97%          Blood Pressure  from Last 3 Encounters:   09/04/18 108/70   08/20/18 100/68   07/25/18 100/65    Weight from Last 3 Encounters:   08/20/18 166 lb 9.6 oz (75.6 kg)   07/09/18 166 lb 11.2 oz (75.6 kg)   05/21/18 164 lb (74.4 kg)              Today, you had the following     No orders found for display         Today's Medication Changes          These changes are accurate as of 9/4/18 12:40 PM.  If you have any questions, ask your nurse or doctor.               Start taking these medicines.        Dose/Directions    ondansetron 4 MG tablet   Commonly known as:  ZOFRAN   Used for:  Nausea   Started by:  Elizabeth Newman PA-C        Dose:  4 mg   Take 1 tablet (4 mg) by mouth every 8 hours as needed for nausea   Quantity:  15 tablet   Refills:  0            Where to get your medicines      These medications were sent to [id number] - [place], [place] - [address] [place] [address]  [address] [place] [id number]    Hours:  M-F 8:30-6:30; Sat 9-4; closed Sunday Phone:  [phone]     ondansetron 4 MG tablet                Primary Care Provider Office Phone # Fax #    Yuan Iain Schofield [phone]       [address] [place] [address]        Equal Access to Services     [facility] : Hadii myla francois hadasho Soomaali, waaxda luqadaha, qaybta kaalmada adeegyada, waxay julius shaffer. So [facility] [phone].    ATENCIÓN: Si habla español, tiene a caruso disposición servicios gratuitos de asistencia lingüística. Melanie al [phone].    We comply with applicable federal civil rights laws and [place] laws. We do not discriminate on the basis of race, color, national origin, age, disability, sex, sexual orientation, or gender identity.            Thank you!     Thank you for choosing [facility]  for your care. Our goal is always to provide you with excellent care. Hearing back from our patients is one way we can continue to improve our services. Please take a few minutes to complete  the written survey that you may receive in the mail after your visit with us. Thank you!             Your Updated Medication List - Protect others around you: Learn how to safely use, store and throw away your medicines at www.disposemymeds.org.          This list is accurate as of 9/4/18 12:40 PM.  Always use your most recent med list.                   Brand Name Dispense Instructions for use Diagnosis    * albuterol (2.5 MG/3ML) 0.083% neb solution     1 Box    Take 1 vial (2.5 mg) by nebulization every 6 hours as needed for shortness of breath / dyspnea or wheezing    Acute bronchitis with coexisting condition requiring prophylactic treatment, Cough       * VENTOLIN  (90 Base) MCG/ACT inhaler   Generic drug:  albuterol     52 g    INHALE 2 PUFFS INTO THE LUNGS EVERY 6 HOURS AS NEEDED FOR SHORTNESSOF BREATH / DYSPNEA OR WHEEZING    Cough, Acute bronchospasm       escitalopram 20 MG tablet    LEXAPRO    90 tablet    Take 1 tablet (20 mg) by mouth daily    Mixed anxiety depressive disorder       ibuprofen 200 MG capsule     120 capsule    Take 800 mg by mouth every 4 hours as needed for fever        * levothyroxine 200 MCG tablet    SYNTHROID/LEVOTHROID    90 tablet    Take 1 tablet (200 mcg) by mouth daily Take with 100 mcg tablet for daily total of 300 mcg    Acquired hypothyroidism       * levothyroxine 100 MCG tablet    SYNTHROID/LEVOTHROID    90 tablet    Take 1 tablet (100 mcg) by mouth daily Take with 200 mcg tablet for daily total of 300 mcg    Acquired hypothyroidism       LORazepam 1 MG tablet    ATIVAN    20 tablet    Take 1-2 tablets (1-2 mg) by mouth every 8 hours as needed for anxiety    Mixed anxiety depressive disorder       mesalamine 800 MG EC tablet    ASACOL HD    90 tablet    Take 2 tablets (1,600 mg) by mouth 3 times daily    Lymphocytic colitis       morphine 15 MG 12 hr tablet    MS CONTIN    60 tablet    Take 1 tablet (15 mg) by mouth every 12 hours maximum 2 tablet(s) per day    DDD  (degenerative disc disease), cervical, Degeneration of lumbar intervertebral disc       mupirocin 2 % ointment    BACTROBAN     APPLY TO AFFECTED AREA(S) TOPICALLY TWO TIMES A DAY FOR 10 DAYS        NEW MED      Kratom daily        ondansetron 4 MG tablet    ZOFRAN    15 tablet    Take 1 tablet (4 mg) by mouth every 8 hours as needed for nausea    Nausea       oxyCODONE IR 5 MG tablet    ROXICODONE    60 tablet    1-2 tablets 4 times daily for breakthrough pain.  Use sparingly.  No more than 4 daily.    DDD (degenerative disc disease), cervical, Degeneration of lumbar intervertebral disc       risperiDONE 1 MG tablet    risperDAL    45 tablet    TAKE 1/2 TABLET (0.5MG) BY MOUTH DAILY AT BEDTIME    Mixed anxiety depressive disorder       traZODone 50 MG tablet    DESYREL    90 tablet    1 -3 tablets by mouth nightlty    Sleep disorder       * Notice:  This list has 4 medication(s) that are the same as other medications prescribed for you. Read the directions carefully, and ask your doctor or other care provider to review them with you.

## 2018-09-04 NOTE — LETTER
[address] [address]        9/4/2018    Mattie Phelps was seen 9/4/2018 at the [facility] [place]. Please excuse Mattie from work today and possibly tomorrow  due to illness.         Cordially,        Elizabeth Newman, PAC

## 2018-09-04 NOTE — PATIENT INSTRUCTIONS
Hold all liquids and solids until no vomiting x 1 hour. Then start with small sips of clear liquids - wait 10 minutes and if no vomiting gradually increase amount of fluids every 10 minutes and advance diet as tolerated.    If having diarrhea continue offering fluids in small amounts and frequently. Try to eat some yogurt daily (or take a probiotic). Advance diet as tolerated.    Gatorade    .................................    Please FOLLOW UP at primary care clinic if not improving, new symptoms, worse or this does not resolve.  [facility]  [phone]

## 2018-09-12 ENCOUNTER — TELEPHONE (OUTPATIENT)
Dept: FAMILY MEDICINE | Facility: CLINIC | Age: 38
End: 2018-09-12

## 2018-09-12 ENCOUNTER — MYC REFILL (OUTPATIENT)
Dept: FAMILY MEDICINE | Facility: CLINIC | Age: 38
End: 2018-09-12

## 2018-09-12 DIAGNOSIS — M50.30 DDD (DEGENERATIVE DISC DISEASE), CERVICAL: ICD-10-CM

## 2018-09-12 DIAGNOSIS — M51.369 DEGENERATION OF LUMBAR INTERVERTEBRAL DISC: ICD-10-CM

## 2018-09-12 RX ORDER — OXYCODONE HYDROCHLORIDE 5 MG/1
TABLET ORAL
Qty: 60 TABLET | Refills: 0 | Status: SHIPPED | OUTPATIENT
Start: 2018-09-12 | End: 2018-09-26

## 2018-09-12 RX ORDER — MORPHINE SULFATE 15 MG/1
15 TABLET, FILM COATED, EXTENDED RELEASE ORAL EVERY 12 HOURS
Qty: 60 TABLET | Refills: 0 | Status: SHIPPED | OUTPATIENT
Start: 2018-09-12 | End: 2018-10-04

## 2018-09-12 NOTE — TELEPHONE ENCOUNTER
Mattie Banda is a 37 year old who calls today with constipation.    NURSING ASSESSMENT:  Patient reports that 10 days ago she developed vomiting that lasted 5 days.  Her last normal BM was 13 days ago (3 days prior to the vomiting). She has since had 5 days with no vomiting but still feels nauseated intermittently. She states that a few days ago she used a suppository which produced a very small amount of yael colored, soft stool.  She states she has not been passing gas for a few days now and feels like her belly is very distended (but not firm). She denies any abdominal pain aside from when she feels nauseated.  She has barely eaten any food the past week as she has not felt hungry but feels she is staying hydrated. She drinks  oz of fluids/day but notes only urinating 2 times per day.  She notes having headaches daily.  Denies any fevers, dizziness, SOB, etc. She also notes that she has had vaginal bleeding for a month and a half that varies from heavy to light which she states is abnormal for her.  She has not tried anything OTC for the constipation with the exception of the 1 suppository. Will route to PCP to advise given she is not passing gas and stool was yael colored.     Last exam/Treatment:  8/20/18     ALLERGIES:      Allergies   Allergen Reactions     Bupropion Hcl      Wellbutrin         NURSING PLAN: Routed to provider Yes    RECOMMENDED DISPOSITION PER PROTOCOL:  Routing to PCP to advise   Will the patient comply with the recommendation: Yes    I educated the patient about the signs/symptoms that would warrant seeking emergent care. The patient verbalized understanding.     The patient was instructed that if further questions or concerns arise, or if symptoms do not improve, worsen or new symptoms develop, they should call their PCP or a [facility] Nurse Advisor as soon as possible.    Guideline used:  Telephone Triage Protocols for Nurses, Fifth Edition, Myesha Lamb,  RN

## 2018-09-12 NOTE — TELEPHONE ENCOUNTER
Went straight to voicemail, will try back in a few minutes.     Chasidy Lamb RN. . .  9/12/2018, 10:16 AM

## 2018-09-12 NOTE — TELEPHONE ENCOUNTER
Reason for call:  Patient reporting a symptom    Symptom or request: Having trouble with bowel movements.  Was vomiting for 5 days couldn't keep anything down.  Has been able to eat little amounts for about 5 days now.  Has only had one very small and soft BM since.  Had to use suppository for this.  Pt states she has a history of Colitis so is very uncommon for her.    Duration (how long have symptoms been present): all of this approx 10days    Have you been treated for this before? No    Additional comments: NA    Phone Number patient can be reached at:  Home number on file [phone] (home)    Best Time:  any    Can we leave a detailed message on this number:  YES    Call taken on 9/12/2018 at 10:06 AM by Mercy Oreilly

## 2018-09-12 NOTE — TELEPHONE ENCOUNTER
Message from [employer]:  Original authorizing provider: Yuan Schofield MD    Mattie Banda would like a refill of the following medications:  morphine (MS CONTIN) 15 MG 12 hr tablet [Yuan Schofield MD]  oxyCODONE IR (ROXICODONE) 5 MG tablet [Yuan Schofield MD]    Preferred pharmacy: Other - I will  hard copy when ready.    Comment:  I will  christina copies when ready, Thank you!        Last Written Prescription Date:  08/20/18-morphine, 08/29/18-oxy  Last Fill Quantity: 60, 60  # refills: 0, 0  Last Office Visit: 08/20/18  Future Office visit:       Routing refill request to provider for review/approval because:  Drug not on the [facility], [facility] or [facility] refill protocol or controlled substance

## 2018-09-12 NOTE — TELEPHONE ENCOUNTER
Per verbal request during last conversation with pt a detailed message was left with Dr. Schofield recommendations and was told to call back with any questions or concerns.     Chasidy Lamb RN. . .  9/12/2018, 2:10 PM

## 2018-09-12 NOTE — TELEPHONE ENCOUNTER
Because she is using narcotic pain medications and not using something to soften stools, constipation may be a factor.  She also is on mesalamine/Asacol for GI problems.  Generally the mesalamine may cause some nausea or constipation or diarrhea.  However if she tolerated it initially then it should not be a problem.  I would suggest she try an enema as a fleets, soapsuds, other type before anything else.  We could order a fleets or 2 for her.  If she has some results and feels better, then she should take Senokot-S 2-4 tablets daily while she is taking narcotics. She may need to see somebody in the next couple days regardless if she does not improve or seems very uncomfortable.      Regarding the vaginal bleeding, it may or may not be related and may need evaluation by someone if persistently heavy.    Since I will be out of the office for almost 10 days, and will have a full schedule when I come back, she should not wait to see somebody if issues persist.  Yuan Schofield MD

## 2018-09-20 ENCOUNTER — OFFICE VISIT (OUTPATIENT)
Dept: FAMILY MEDICINE | Facility: OTHER | Age: 38
End: 2018-09-20
Payer: COMMERCIAL

## 2018-09-20 ENCOUNTER — RADIANT APPOINTMENT (OUTPATIENT)
Dept: GENERAL RADIOLOGY | Facility: OTHER | Age: 38
End: 2018-09-20
Attending: NURSE PRACTITIONER
Payer: COMMERCIAL

## 2018-09-20 ENCOUNTER — TELEPHONE (OUTPATIENT)
Dept: FAMILY MEDICINE | Facility: OTHER | Age: 38
End: 2018-09-20

## 2018-09-20 VITALS
BODY MASS INDEX: 28.08 KG/M2 | DIASTOLIC BLOOD PRESSURE: 70 MMHG | RESPIRATION RATE: 20 BRPM | OXYGEN SATURATION: 97 % | SYSTOLIC BLOOD PRESSURE: 106 MMHG | HEART RATE: 79 BPM | TEMPERATURE: 97.2 F | WEIGHT: 174 LBS

## 2018-09-20 DIAGNOSIS — K59.00 CONSTIPATION, UNSPECIFIED CONSTIPATION TYPE: Primary | ICD-10-CM

## 2018-09-20 DIAGNOSIS — K59.00 CONSTIPATION, UNSPECIFIED CONSTIPATION TYPE: ICD-10-CM

## 2018-09-20 DIAGNOSIS — R10.84 ABDOMINAL PAIN, GENERALIZED: ICD-10-CM

## 2018-09-20 DIAGNOSIS — R11.0 NAUSEA: ICD-10-CM

## 2018-09-20 DIAGNOSIS — L01.00 IMPETIGO: Primary | ICD-10-CM

## 2018-09-20 LAB
ALBUMIN SERPL-MCNC: 3.7 G/DL (ref 3.4–5)
ALP SERPL-CCNC: 65 U/L (ref 40–150)
ALT SERPL W P-5'-P-CCNC: 20 U/L (ref 0–50)
AMYLASE SERPL-CCNC: 44 U/L (ref 30–110)
ANION GAP SERPL CALCULATED.3IONS-SCNC: 6 MMOL/L (ref 3–14)
AST SERPL W P-5'-P-CCNC: 13 U/L (ref 0–45)
BASOPHILS # BLD AUTO: 0 10E9/L (ref 0–0.2)
BASOPHILS NFR BLD AUTO: 0.4 %
BILIRUB SERPL-MCNC: 0.1 MG/DL (ref 0.2–1.3)
BUN SERPL-MCNC: 12 MG/DL (ref 7–30)
CALCIUM SERPL-MCNC: 8.9 MG/DL (ref 8.5–10.1)
CHLORIDE SERPL-SCNC: 106 MMOL/L (ref 94–109)
CO2 SERPL-SCNC: 28 MMOL/L (ref 20–32)
CREAT SERPL-MCNC: 0.83 MG/DL (ref 0.52–1.04)
DIFFERENTIAL METHOD BLD: ABNORMAL
EOSINOPHIL # BLD AUTO: 0.3 10E9/L (ref 0–0.7)
EOSINOPHIL NFR BLD AUTO: 2.9 %
ERYTHROCYTE [DISTWIDTH] IN BLOOD BY AUTOMATED COUNT: 13.7 % (ref 10–15)
GFR SERPL CREATININE-BSD FRML MDRD: 77 ML/MIN/1.7M2
GLUCOSE SERPL-MCNC: 85 MG/DL (ref 70–99)
HCT VFR BLD AUTO: 40.9 % (ref 35–47)
HGB BLD-MCNC: 13.2 G/DL (ref 11.7–15.7)
LIPASE SERPL-CCNC: 125 U/L (ref 73–393)
LYMPHOCYTES # BLD AUTO: 1.7 10E9/L (ref 0.8–5.3)
LYMPHOCYTES NFR BLD AUTO: 19.7 %
MCH RBC QN AUTO: 32.4 PG (ref 26.5–33)
MCHC RBC AUTO-ENTMCNC: 32.3 G/DL (ref 31.5–36.5)
MCV RBC AUTO: 101 FL (ref 78–100)
MONOCYTES # BLD AUTO: 0.6 10E9/L (ref 0–1.3)
MONOCYTES NFR BLD AUTO: 6.6 %
NEUTROPHILS # BLD AUTO: 6 10E9/L (ref 1.6–8.3)
NEUTROPHILS NFR BLD AUTO: 70.4 %
PLATELET # BLD AUTO: 338 10E9/L (ref 150–450)
POTASSIUM SERPL-SCNC: 4.5 MMOL/L (ref 3.4–5.3)
PROT SERPL-MCNC: 7 G/DL (ref 6.8–8.8)
RBC # BLD AUTO: 4.07 10E12/L (ref 3.8–5.2)
SODIUM SERPL-SCNC: 140 MMOL/L (ref 133–144)
WBC # BLD AUTO: 8.5 10E9/L (ref 4–11)

## 2018-09-20 PROCEDURE — 80053 COMPREHEN METABOLIC PANEL: CPT | Performed by: NURSE PRACTITIONER

## 2018-09-20 PROCEDURE — 85025 COMPLETE CBC W/AUTO DIFF WBC: CPT | Performed by: NURSE PRACTITIONER

## 2018-09-20 PROCEDURE — 82150 ASSAY OF AMYLASE: CPT | Performed by: NURSE PRACTITIONER

## 2018-09-20 PROCEDURE — 83690 ASSAY OF LIPASE: CPT | Performed by: NURSE PRACTITIONER

## 2018-09-20 PROCEDURE — 36415 COLL VENOUS BLD VENIPUNCTURE: CPT | Performed by: NURSE PRACTITIONER

## 2018-09-20 PROCEDURE — 99214 OFFICE O/P EST MOD 30 MIN: CPT | Performed by: NURSE PRACTITIONER

## 2018-09-20 PROCEDURE — 74018 RADEX ABDOMEN 1 VIEW: CPT | Mod: FY

## 2018-09-20 RX ORDER — MUPIROCIN 20 MG/G
OINTMENT TOPICAL 3 TIMES DAILY
Qty: 22 G | Refills: 1 | Status: SHIPPED | OUTPATIENT
Start: 2018-09-20 | End: 2018-09-25

## 2018-09-20 RX ORDER — ONDANSETRON 4 MG/1
4 TABLET, FILM COATED ORAL EVERY 8 HOURS PRN
Qty: 30 TABLET | Refills: 1 | Status: SHIPPED | OUTPATIENT
Start: 2018-09-20 | End: 2019-01-08

## 2018-09-20 ASSESSMENT — PAIN SCALES - GENERAL: PAINLEVEL: SEVERE PAIN (6)

## 2018-09-20 NOTE — TELEPHONE ENCOUNTER
----- Message from SAUD Stephen CNP sent at 9/20/2018 12:32 PM CDT -----  Please call patient and advise that all her labs were normal.  She should use the Zofran and treat the constipation as advised and follow up if her symptoms fail to resolve.     Electronically signed by Catherine Calderon CNP.

## 2018-09-20 NOTE — TELEPHONE ENCOUNTER
Please call her.  Is she asking for an antibiotic cream?  We don't typically do oral antibiotics for eczema.     Electronically signed by Catherine Calderon CNP.

## 2018-09-20 NOTE — TELEPHONE ENCOUNTER
Reason for Call:  Other prescription    Detailed comments: antibiotic requested for eczema.  She forgot to ask Kareen when she was here this morning.  Uses [facility].    Phone Number Patient can be reached at: Home number on file [phone] (home)    Best Time: any time.  Please notify patient if called in.    Can we leave a detailed message on this number? YES    Call taken on 9/20/2018 at 7:52 AM by Lexii Staley

## 2018-09-20 NOTE — TELEPHONE ENCOUNTER
Mattie returns call, is given both results. She continues to struggle with constipation and will call if no improvement.

## 2018-09-20 NOTE — MR AVS SNAPSHOT
After Visit Summary   9/20/2018    Mattie Banda    MRN: [id number]           Patient Information     Date Of Birth          1980        Visit Information        Provider Department      9/20/2018 7:00 AM Catherine Calderon APRN [facility]        Today's Diagnoses     Abdominal pain, generalized    -  1    Constipation, unspecified constipation type          Care Instructions    Come back at 8:30 for x-ray          Follow-ups after your visit        Follow-up notes from your care team     Return in about 1 year (around 9/20/2019) for Physical Exam.      Future tests that were ordered for you today     Open Future Orders        Priority Expected Expires Ordered    XR Abdomen 1 View Routine 9/20/2018 9/20/2019 9/20/2018            Who to contact     If you have questions or need follow up information about today's clinic visit or your schedule please contact [facility] directly at [phone].  Normal or non-critical lab and imaging results will be communicated to you by MyChart, letter or phone within 4 business days after the clinic has received the results. If you do not hear from us within 7 days, please contact the clinic through [employer] or phone. If you have a critical or abnormal lab result, we will notify you by phone as soon as possible.  Submit refill requests through [employer] or call your pharmacy and they will forward the refill request to us. Please allow 3 business days for your refill to be completed.          Additional Information About Your Visit        MyChart Information     [employer] gives you secure access to your electronic health record. If you see a primary care provider, you can also send messages to your care team and make appointments. If you have questions, please call your primary care clinic.  If you do not have a primary care provider, please call [phone] and they will assist you.        Care EveryWhere ID     This is your Care  EveryWhere ID. This could be used by other organizations to access your [place] medical records  [id number]        Your Vitals Were     Pulse Temperature Respirations Last Period Pulse Oximetry Breastfeeding?    79 97.2  F (36.2  C) (Tympanic) 20 09/03/2018 97% No    BMI (Body Mass Index)                   28.08 kg/m2            Blood Pressure from Last 3 Encounters:   09/20/18 106/70   09/04/18 108/70   08/20/18 100/68    Weight from Last 3 Encounters:   09/20/18 174 lb (78.9 kg)   08/20/18 166 lb 9.6 oz (75.6 kg)   07/09/18 166 lb 11.2 oz (75.6 kg)              We Performed the Following     Amylase     CBC with platelets and differential     Comprehensive metabolic panel (BMP + Alb, Alk Phos, ALT, AST, Total. Bili, TP)     Lipase        Primary Care Provider Office Phone # Fax #    Yuan Iain Schofield [phone]       [address] [place] [address]        Equal Access to Services     J[facility] Hospital[facility]KK[facility] : Hadii aad ku hadasho Soomaali, waaxda luqadaha, qaybta kaalmada adeegyada, adolfo borges . So [facility] [phone].    ATENCIÓN: Si habla español, tiene a caruso disposición servicios gratuitos de asistencia lingüística. Llame al [phone].    We comply with applicable federal civil rights laws and [place] laws. We do not discriminate on the basis of race, color, national origin, age, disability, sex, sexual orientation, or gender identity.            Thank you!     Thank you for choosing [facility]  for your care. Our goal is always to provide you with excellent care. Hearing back from our patients is one way we can continue to improve our services. Please take a few minutes to complete the written survey that you may receive in the mail after your visit with us. Thank you!             Your Updated Medication List - Protect others around you: Learn how to safely use, store and throw away your medicines at www.disposemymeds.org.          This list is  accurate as of 9/20/18  7:35 AM.  Always use your most recent med list.                   Brand Name Dispense Instructions for use Diagnosis    * albuterol (2.5 MG/3ML) 0.083% neb solution     1 Box    Take 1 vial (2.5 mg) by nebulization every 6 hours as needed for shortness of breath / dyspnea or wheezing    Acute bronchitis with coexisting condition requiring prophylactic treatment, Cough       * VENTOLIN  (90 Base) MCG/ACT inhaler   Generic drug:  albuterol     52 g    INHALE 2 PUFFS INTO THE LUNGS EVERY 6 HOURS AS NEEDED FOR SHORTNESSOF BREATH / DYSPNEA OR WHEEZING    Cough, Acute bronchospasm       escitalopram 20 MG tablet    LEXAPRO    90 tablet    Take 1 tablet (20 mg) by mouth daily    Mixed anxiety depressive disorder       * levothyroxine 200 MCG tablet    SYNTHROID/LEVOTHROID    90 tablet    Take 1 tablet (200 mcg) by mouth daily Take with 100 mcg tablet for daily total of 300 mcg    Acquired hypothyroidism       * levothyroxine 100 MCG tablet    SYNTHROID/LEVOTHROID    90 tablet    Take 1 tablet (100 mcg) by mouth daily Take with 200 mcg tablet for daily total of 300 mcg    Acquired hypothyroidism       LORazepam 1 MG tablet    ATIVAN    20 tablet    Take 1-2 tablets (1-2 mg) by mouth every 8 hours as needed for anxiety    Mixed anxiety depressive disorder       mesalamine 800 MG EC tablet    ASACOL HD    90 tablet    Take 2 tablets (1,600 mg) by mouth 3 times daily    Lymphocytic colitis       morphine 15 MG 12 hr tablet    MS CONTIN    60 tablet    Take 1 tablet (15 mg) by mouth every 12 hours maximum 2 tablet(s) per day    DDD (degenerative disc disease), cervical, Degeneration of lumbar intervertebral disc       mupirocin 2 % ointment    BACTROBAN     APPLY TO AFFECTED AREA(S) TOPICALLY TWO TIMES A DAY FOR 10 DAYS        ondansetron 4 MG tablet    ZOFRAN    15 tablet    Take 1 tablet (4 mg) by mouth every 8 hours as needed for nausea    Nausea       oxyCODONE IR 5 MG tablet    ROXICODONE     60 tablet    1-2 tablets 4 times daily for breakthrough pain.  Use sparingly.  No more than 4 daily.    DDD (degenerative disc disease), cervical, Degeneration of lumbar intervertebral disc       risperiDONE 1 MG tablet    risperDAL    45 tablet    TAKE 1/2 TABLET (0.5MG) BY MOUTH DAILY AT BEDTIME    Mixed anxiety depressive disorder       traZODone 50 MG tablet    DESYREL    90 tablet    1 -3 tablets by mouth nightlty    Sleep disorder       * Notice:  This list has 4 medication(s) that are the same as other medications prescribed for you. Read the directions carefully, and ask your doctor or other care provider to review them with you.

## 2018-09-20 NOTE — TELEPHONE ENCOUNTER
Rx sent.  Please also advise her her x-ray did not show any obstruction, just a large amount of stool.  She should continue to use laxatives as needed.  I will let her know about the lab tests when we get them.     Electronically signed by Catherine Calderon CNP.

## 2018-09-20 NOTE — PROGRESS NOTES
SUBJECTIVE:   Mattie Banda is a 37 year old female who presents to clinic today for the following health issues:      Gastrointestinal symptoms      Duration: 2 weeks    Description:           N&V, soft stools    Intensity:  moderate    Accompanying signs and symptoms:  nausea, vomitting, constipation, bloating     History  Previous similar problem: YES- hx colitis    Aggravating factors: none    Alleviating factors: lying down    Other Therapies tried: stool softeners, TUMS, zantac daily    She has been ill for about 2 weeks with intermittent nausea, vomiting and abdominal pain.  Only two bowel movements in the last 2 weeks.  They were soft.  She used laxatives to have these.  Is on chronic narcotics, however has not had issues with constipation in the past.  Typically will have loose bowel movements as she has a history of colitis.  Vomiting is 1-4 times daily, nothing yet today.  No blood in the emesis or stool.      No fevers, has been chilled.      Problem list and histories reviewed & adjusted, as indicated.  Additional history: as documented    Patient Active Problem List   Diagnosis     Contraceptive management     CARDIOVASCULAR SCREENING; LDL GOAL LESS THAN 160     Degeneration of lumbar intervertebral disc     S/P lumbar fusion and discectomy June 2015     Low grade squamous intraepithelial lesion on cytologic smear of cervix (LGSIL)     Labial lesion     Mixed anxiety depressive disorder     Tobacco use disorder     Idiopathic peripheral neuropathy     Hypothyroidism, unspecified type     Migraine with aura and without status migrainosus, not intractable     Tinea versicolor     Lymphocytic colitis     Excessive or frequent menstruation     DDD (degenerative disc disease), cervical     Chronic pain syndrome     Past Surgical History:   Procedure Laterality Date     BACK SURGERY  6/2015      COLONOSCOPY  08/06/07     COLONOSCOPY  08/05/08      COLONOSCOPY W BIOPSY  02/06/08      TOOTH EXTRACTION  W/FORCEP      wisdom teeth removed     INJECT EPIDURAL CERVICAL N/A 3/8/2018    Procedure: INJECT EPIDURAL CERVICAL;  cervical 6-7 interlaminar epidural steroid injection;  Surgeon: Edgardo Rubio MD;  Location: [facility] OR     LAPAROSCOPIC TUBAL LIGATION  11/27/2012    Procedure: LAPAROSCOPIC TUBAL LIGATION;  Laparoscopic Bilateral tubal sterilization/ fulgaration;  Surgeon: Sarbjit Whittaker MD;  Location: [facility] OR       Social History   Substance Use Topics     Smoking status: Current Some Day Smoker     Packs/day: 1.00     Years: 8.00     Types: Cigarettes     Smokeless tobacco: Never Used     Alcohol use 0.0 oz/week     0 Standard drinks or equivalent per week      Comment: 1/mo     Family History   Problem Relation Age of Onset     Hypertension Maternal Grandmother      Arthritis Maternal Grandmother      Cancer Maternal Grandmother      MGGM     Depression Maternal Grandmother      Lipids Maternal Grandmother      Osteoporosis Maternal Grandmother      Respiratory Maternal Grandmother      emphysema     Genitourinary Problems Maternal Grandmother      Kidney Failure, liver      Hypertension Maternal Grandfather      HEART DISEASE Maternal Grandfather      MI     Cardiovascular Maternal Grandfather      Cancer - colorectal Maternal Grandfather      Cancer Maternal Grandfather      [place]od[place] Lymphoma     Hypertension Paternal Grandmother      Arthritis Paternal Grandmother      RA     Breast Cancer Other      Maternal great aunt     Thyroid Disease Maternal Aunt      two aunts     Diabetes No family hx of          Current Outpatient Prescriptions   Medication Sig Dispense Refill     albuterol (2.5 MG/3ML) 0.083% neb solution Take 1 vial (2.5 mg) by nebulization every 6 hours as needed for shortness of breath / dyspnea or wheezing 1 Box 0     escitalopram (LEXAPRO) 20 MG tablet Take 1 tablet (20 mg) by mouth daily 90 tablet 3     levothyroxine (SYNTHROID/LEVOTHROID) 200 MCG tablet Take 1 tablet (200 mcg) by mouth  daily Take with 100 mcg tablet for daily total of 300 mcg 90 tablet 1     LORazepam (ATIVAN) 1 MG tablet Take 1-2 tablets (1-2 mg) by mouth every 8 hours as needed for anxiety 20 tablet 0     mesalamine (ASACOL HD) 800 MG EC tablet Take 2 tablets (1,600 mg) by mouth 3 times daily 90 tablet 1     morphine (MS CONTIN) 15 MG 12 hr tablet Take 1 tablet (15 mg) by mouth every 12 hours maximum 2 tablet(s) per day 60 tablet 0     mupirocin (BACTROBAN) 2 % ointment APPLY TO AFFECTED AREA(S) TOPICALLY TWO TIMES A DAY FOR 10 DAYS  0     ondansetron (ZOFRAN) 4 MG tablet Take 1 tablet (4 mg) by mouth every 8 hours as needed for nausea 15 tablet 0     oxyCODONE IR (ROXICODONE) 5 MG tablet 1-2 tablets 4 times daily for breakthrough pain.  Use sparingly.  No more than 4 daily. 60 tablet 0     risperiDONE (RISPERDAL) 1 MG tablet TAKE 1/2 TABLET (0.5MG) BY MOUTH DAILY AT BEDTIME 45 tablet 3     traZODone (DESYREL) 50 MG tablet 1 -3 tablets by mouth nightlty 90 tablet 5     VENTOLIN  (90 Base) MCG/ACT Inhaler INHALE 2 PUFFS INTO THE LUNGS EVERY 6 HOURS AS NEEDED FOR SHORTNESSOF BREATH / DYSPNEA OR WHEEZING 52 g 3     levothyroxine (SYNTHROID/LEVOTHROID) 100 MCG tablet Take 1 tablet (100 mcg) by mouth daily Take with 200 mcg tablet for daily total of 300 mcg 90 tablet 3     Allergies   Allergen Reactions     Bupropion Hcl      Wellbutrin     BP Readings from Last 3 Encounters:   09/20/18 106/70   09/04/18 108/70   08/20/18 100/68    Wt Readings from Last 3 Encounters:   09/20/18 174 lb (78.9 kg)   08/20/18 166 lb 9.6 oz (75.6 kg)   07/09/18 166 lb 11.2 oz (75.6 kg)                    Reviewed and updated as needed this visit by clinical staff  Tobacco  Allergies  Meds  Med Hx  Surg Hx  Fam Hx  Soc Hx      Reviewed and updated as needed this visit by Provider         ROS:  Constitutional, HEENT, cardiovascular, pulmonary, GI, , musculoskeletal, neuro, skin, endocrine and psych systems are negative, except as otherwise  noted.    OBJECTIVE:     /70  Pulse 79  Temp 97.2  F (36.2  C) (Tympanic)  Resp 20  Wt 174 lb (78.9 kg)  LMP 09/03/2018  SpO2 97%  Breastfeeding? No  BMI 28.08 kg/m2  Body mass index is 28.08 kg/(m^2).  GENERAL: healthy, alert and no distress  NECK: no adenopathy, no asymmetry, masses, or scars and thyroid normal to palpation  RESP: lungs clear to auscultation - no rales, rhonchi or wheezes  CV: regular rate and rhythm, normal S1 S2, no S3 or S4, no murmur, click or rub, no peripheral edema and peripheral pulses strong  ABDOMEN: soft, nontender, no hepatosplenomegaly, no masses and bowel sounds normal  MS: no gross musculoskeletal defects noted, no edema    Diagnostic Test Results:  Results for orders placed or performed in visit on 09/20/18 (from the past 24 hour(s))   CBC with platelets and differential   Result Value Ref Range    WBC 8.5 4.0 - 11.0 10e9/L    RBC Count 4.07 3.8 - 5.2 10e12/L    Hemoglobin 13.2 11.7 - 15.7 g/dL    Hematocrit 40.9 35.0 - 47.0 %     (H) 78 - 100 fl    MCH 32.4 26.5 - 33.0 pg    MCHC 32.3 31.5 - 36.5 g/dL    RDW 13.7 10.0 - 15.0 %    Platelet Count 338 150 - 450 10e9/L    % Neutrophils 70.4 %    % Lymphocytes 19.7 %    % Monocytes 6.6 %    % Eosinophils 2.9 %    % Basophils 0.4 %    Absolute Neutrophil 6.0 1.6 - 8.3 10e9/L    Absolute Lymphocytes 1.7 0.8 - 5.3 10e9/L    Absolute Monocytes 0.6 0.0 - 1.3 10e9/L    Absolute Eosinophils 0.3 0.0 - 0.7 10e9/L    Absolute Basophils 0.0 0.0 - 0.2 10e9/L    Diff Method Automated Method    Comprehensive metabolic panel (BMP + Alb, Alk Phos, ALT, AST, Total. Bili, TP)   Result Value Ref Range    Sodium 140 133 - 144 mmol/L    Potassium 4.5 3.4 - 5.3 mmol/L    Chloride 106 94 - 109 mmol/L    Carbon Dioxide 28 20 - 32 mmol/L    Anion Gap 6 3 - 14 mmol/L    Glucose 85 70 - 99 mg/dL    Urea Nitrogen 12 7 - 30 mg/dL    Creatinine 0.83 0.52 - 1.04 mg/dL    GFR Estimate 77 >60 mL/min/1.7m2    GFR Estimate If Black >90 >60  mL/min/1.7m2    Calcium 8.9 8.5 - 10.1 mg/dL    Bilirubin Total 0.1 (L) 0.2 - 1.3 mg/dL    Albumin 3.7 3.4 - 5.0 g/dL    Protein Total 7.0 6.8 - 8.8 g/dL    Alkaline Phosphatase 65 40 - 150 U/L    ALT 20 0 - 50 U/L    AST 13 0 - 45 U/L   Lipase   Result Value Ref Range    Lipase 125 73 - 393 U/L   Amylase   Result Value Ref Range    Amylase 44 30 - 110 U/L     X-ray abdomen: moderate amount of stool, no obstruction, see report.     ASSESSMENT/PLAN:         1. Constipation, unspecified constipation type  Labs and x-ray reassuring, no sign of obstruction or infection.  Will treat with OTC laxatives and Zofran PRN for nausea.  Follow up with PCP if symptoms fail to resolve within the next week.    - XR Abdomen 1 View; Future    2. Abdominal pain, generalized  - CBC with platelets and differential  - Comprehensive metabolic panel (BMP + Alb, Alk Phos, ALT, AST, Total. Bili, TP)  - Lipase  - Amylase    3. Nausea  - ondansetron (ZOFRAN) 4 MG tablet; Take 1 tablet (4 mg) by mouth every 8 hours as needed for nausea  Dispense: 30 tablet; Refill: 1    Follow up with PCP in 1-2 weeks if symptoms not improving.  Return sooner for worsening symptoms.     SAUD Stephen [facility]

## 2018-09-20 NOTE — LETTER
[facility]  [address] [facility] [address]  Phone: [phone]    September 20, 2018        Mattie Banda  [address] [facility] [address]          To whom it may concern:    RE: Mattie Banda    Patient was seen and treated today at our clinic and missed work as she has been ill.    Please contact me for questions or concerns.      Sincerely,        SAUD Stephen CNP

## 2018-09-20 NOTE — TELEPHONE ENCOUNTER
----- Message from SAUD Stephen CNP sent at 9/20/2018 12:35 PM CDT -----  Please notify patient, call or letter    X-ray only showed a large amount of stool, no obstruction.  Continue on over the counter laxatives.     Electronically signed by Catherine Calderon CNP.

## 2018-09-26 ENCOUNTER — MYC REFILL (OUTPATIENT)
Dept: FAMILY MEDICINE | Facility: CLINIC | Age: 38
End: 2018-09-26

## 2018-09-26 DIAGNOSIS — M50.30 DDD (DEGENERATIVE DISC DISEASE), CERVICAL: ICD-10-CM

## 2018-09-26 DIAGNOSIS — M51.369 DEGENERATION OF LUMBAR INTERVERTEBRAL DISC: ICD-10-CM

## 2018-09-26 DIAGNOSIS — F41.8 MIXED ANXIETY DEPRESSIVE DISORDER: ICD-10-CM

## 2018-09-27 NOTE — TELEPHONE ENCOUNTER
Message from [place]:  Original authorizing provider: Yuan Schofield MD    Mattie CADETGerald Veronika would like a refill of the following medications:   oxyCODONE IR (ROXICODONE) 5 MG tablet [Yuanjuanita Schofield MD]    Preferred pharmacy: [id number] - [place], [place] - [address] [place] [address]    Comment:  I will  hard copies when ready, thank you!        Last Written Prescription Date:  09/12/18  Last Fill Quantity: 60,   # refills: 0  Last Office Visit: 09/20/18  Future Office visit:       Routing refill request to provider for review/approval because:  Drug not on the [facility], P or  Health refill protocol or controlled substance

## 2018-09-28 RX ORDER — OXYCODONE HYDROCHLORIDE 5 MG/1
TABLET ORAL
Qty: 60 TABLET | Refills: 0 | Status: SHIPPED | OUTPATIENT
Start: 2018-09-28 | End: 2018-10-04

## 2018-09-28 RX ORDER — ESCITALOPRAM OXALATE 20 MG/1
20 TABLET ORAL DAILY
Qty: 90 TABLET | Refills: 3 | Status: SHIPPED | OUTPATIENT
Start: 2018-09-28 | End: 2019-08-05 | Stop reason: DRUGHIGH

## 2018-10-04 ENCOUNTER — MYC REFILL (OUTPATIENT)
Dept: FAMILY MEDICINE | Facility: CLINIC | Age: 38
End: 2018-10-04

## 2018-10-04 ENCOUNTER — APPOINTMENT (OUTPATIENT)
Dept: CT IMAGING | Facility: CLINIC | Age: 38
End: 2018-10-04
Attending: FAMILY MEDICINE
Payer: COMMERCIAL

## 2018-10-04 ENCOUNTER — HOSPITAL ENCOUNTER (EMERGENCY)
Facility: CLINIC | Age: 38
Discharge: HOME | End: 2018-10-04
Attending: FAMILY MEDICINE | Admitting: FAMILY MEDICINE
Payer: COMMERCIAL

## 2018-10-04 VITALS
SYSTOLIC BLOOD PRESSURE: 107 MMHG | OXYGEN SATURATION: 98 % | DIASTOLIC BLOOD PRESSURE: 77 MMHG | TEMPERATURE: 97 F | RESPIRATION RATE: 14 BRPM | HEART RATE: 68 BPM

## 2018-10-04 DIAGNOSIS — M51.369 DEGENERATION OF LUMBAR INTERVERTEBRAL DISC: ICD-10-CM

## 2018-10-04 DIAGNOSIS — M50.30 DDD (DEGENERATIVE DISC DISEASE), CERVICAL: ICD-10-CM

## 2018-10-04 DIAGNOSIS — S16.1XXA STRAIN OF NECK MUSCLE, INITIAL ENCOUNTER: ICD-10-CM

## 2018-10-04 PROCEDURE — 70491 CT SOFT TISSUE NECK W/DYE: CPT

## 2018-10-04 PROCEDURE — 99285 EMERGENCY DEPT VISIT HI MDM: CPT | Mod: 25 | Performed by: FAMILY MEDICINE

## 2018-10-04 PROCEDURE — 99282 EMERGENCY DEPT VISIT SF MDM: CPT | Mod: Z6 | Performed by: FAMILY MEDICINE

## 2018-10-04 PROCEDURE — 25000128 H RX IP 250 OP 636: Performed by: FAMILY MEDICINE

## 2018-10-04 PROCEDURE — 25000125 ZZHC RX 250: Performed by: FAMILY MEDICINE

## 2018-10-04 RX ORDER — IOPAMIDOL 755 MG/ML
500 INJECTION, SOLUTION INTRAVASCULAR ONCE
Status: COMPLETED | OUTPATIENT
Start: 2018-10-04 | End: 2018-10-04

## 2018-10-04 RX ADMIN — SODIUM CHLORIDE 68 ML: 9 INJECTION, SOLUTION INTRAVENOUS at 16:25

## 2018-10-04 RX ADMIN — IOPAMIDOL 97 ML: 755 INJECTION, SOLUTION INTRAVENOUS at 16:26

## 2018-10-04 NOTE — DISCHARGE INSTRUCTIONS
As we discussed, I believe that you had some tenderness involving the muscle in her neck all the sterno clieno mastoid muscle.  Is could have been irritated while you are sleeping and is just feeling tight and sore since then.  At this time I would recommend what you are doing, apply heat, rest it, and to take ibuprofen for the discomfort as well.  If is getting worse, he might benefit with some physical therapy so if doing so, please contact your primary care doctor to get this set up.

## 2018-10-04 NOTE — ED PROVIDER NOTES
History     Chief Complaint   Patient presents with     Neck Pain     HPI  Mattie Banda is a 37 year old female who presents with several day history of increasing neck pain.  The pain will radiate from her right neck on the front up to the right ear.  She also came on after sleeping funny on her side a few days ago.Otherwise feels well, denies fever, chills, sore throat, ear pain.  She has chronic low back pain for which she is seeing neurosurgery and has had procedures as well as takes chronic pain meds.    Problem List:    Patient Active Problem List    Diagnosis Date Noted     Chronic pain syndrome 07/09/2018     Priority: Medium     Patient is very low risk to abuse  Pain medication.       Excessive or frequent menstruation 02/07/2018     Priority: Medium     DDD (degenerative disc disease), cervical 02/07/2018     Priority: Medium     Lymphocytic colitis 06/13/2017     Priority: Medium     Tinea versicolor 06/12/2017     Priority: Medium     Idiopathic peripheral neuropathy 01/10/2017     Priority: Medium     Hypothyroidism, unspecified type 01/10/2017     Priority: Medium     Migraine with aura and without status migrainosus, not intractable 12/27/2016     Priority: Medium     Tobacco use disorder 09/12/2016     Priority: Medium     Low grade squamous intraepithelial lesion on cytologic smear of cervix (LGSIL) 02/08/2016     Priority: Medium     2015Possibly high-grade lesion. Done in [place] in 2015 2/8/16 pap NIL/neg HR HPV. Plan: cotest in 3 years. Tracking.       Labial lesion 02/08/2016     Priority: Medium     Superior folds of the vulva/labia right side       Mixed anxiety depressive disorder 02/08/2016     Priority: Medium     S/P lumbar fusion and discectomy June 2015 06/28/2015     Priority: Medium     Degeneration of lumbar intervertebral disc 04/05/2014     Priority: Medium     newly added to list on 4/4//14  but present for last months       CARDIOVASCULAR SCREENING; LDL GOAL LESS THAN 160  10/31/2010     Priority: Medium     Contraceptive management 07/02/2009     Priority: Medium        Past Medical History:    Past Medical History:   Diagnosis Date     Papanicolaou smear of cervix with low grade squamous intraepithelial lesion (LGSIL)      Thyroid disease      Urinary tract infection, site not specified        Past Surgical History:    Past Surgical History:   Procedure Laterality Date     BACK SURGERY  6/2015      COLONOSCOPY  08/06/07     COLONOSCOPY  08/05/08      COLONOSCOPY W BIOPSY  02/06/08      TOOTH EXTRACTION W/FORCEP      wisdom teeth removed     INJECT EPIDURAL CERVICAL N/A 3/8/2018    Procedure: INJECT EPIDURAL CERVICAL;  cervical 6-7 interlaminar epidural steroid injection;  Surgeon: Edgardo Rubio MD;  Location: [facility] OR     LAPAROSCOPIC TUBAL LIGATION  11/27/2012    Procedure: LAPAROSCOPIC TUBAL LIGATION;  Laparoscopic Bilateral tubal sterilization/ fulgaration;  Surgeon: Sarbjit Whittaker MD;  Location: [facility] OR       Family History:    Family History   Problem Relation Age of Onset     Hypertension Maternal Grandmother      Arthritis Maternal Grandmother      Cancer Maternal Grandmother      MGGM     Depression Maternal Grandmother      Lipids Maternal Grandmother      Osteoporosis Maternal Grandmother      Respiratory Maternal Grandmother      emphysema     Genitourinary Problems Maternal Grandmother      Kidney Failure, liver      Hypertension Maternal Grandfather      HEART DISEASE Maternal Grandfather      MI     Cardiovascular Maternal Grandfather      Cancer - colorectal Maternal Grandfather      Cancer Maternal Grandfather      Hodgins Lymphoma     Hypertension Paternal Grandmother      Arthritis Paternal Grandmother      RA     Breast Cancer Other      Maternal great aunt     Thyroid Disease Maternal Aunt      two aunts     Diabetes No family hx of        Social History:  Marital Status:   [4]  Social History   Substance Use Topics     Smoking status:  Current Some Day Smoker     Packs/day: 1.00     Years: 8.00     Types: Cigarettes     Smokeless tobacco: Never Used     Alcohol use 0.0 oz/week     0 Standard drinks or equivalent per week      Comment: 1/mo        Medications:      albuterol (2.5 MG/3ML) 0.083% neb solution   escitalopram (LEXAPRO) 20 MG tablet   levothyroxine (SYNTHROID/LEVOTHROID) 100 MCG tablet   levothyroxine (SYNTHROID/LEVOTHROID) 200 MCG tablet   LORazepam (ATIVAN) 1 MG tablet   mesalamine (ASACOL HD) 800 MG EC tablet   morphine (MS CONTIN) 15 MG 12 hr tablet   mupirocin (BACTROBAN) 2 % ointment   ondansetron (ZOFRAN) 4 MG tablet   oxyCODONE IR (ROXICODONE) 5 MG tablet   risperiDONE (RISPERDAL) 1 MG tablet   traZODone (DESYREL) 50 MG tablet   VENTOLIN  (90 Base) MCG/ACT Inhaler         Review of Systems   All other systems reviewed and are negative.      Physical Exam   BP: 108/62  Heart Rate: 61  Temp: 97  F (36.1  C)  Resp: 14  SpO2: 99 %      Physical Exam   Constitutional: She appears well-developed and well-nourished. No distress.   HENT:   Head: Normocephalic and atraumatic.   Right Ear: External ear normal.   Left Ear: External ear normal.   Nose: Nose normal.   Mouth/Throat: Oropharynx is clear and moist.   Eyes: Conjunctivae and EOM are normal. Pupils are equal, round, and reactive to light.   Neck: Normal range of motion.   Tender as I palpate along the sternocleidomastoid muscle.  There is no obvious swelling, redness or heat to the area.  Palpation reproduces her pain.  She does have possibly some tenderness as I palpate over the carotid artery lymph nodes do not seem to be particularly enlarged.   Cardiovascular: Normal rate and regular rhythm.    Pulmonary/Chest: Effort normal and breath sounds normal. No respiratory distress. She has no wheezes.   Abdominal: Soft. Bowel sounds are normal.   Skin: Skin is warm and dry. No rash noted.   Psychiatric: She has a normal mood and affect. Her behavior is normal. Judgment and  thought content normal.   Nursing note and vitals reviewed.      ED Course     ED Course     Procedures              Critical Care time:               Results for orders placed or performed during the hospital encounter of 10/04/18 (from the past 24 hour(s))   Soft tissue neck CT w contrast    Narrative    CT OF THE NECK WITH CONTRAST  10/4/2018 4:36 PM     COMPARISON: None.    HISTORY: Increasing neck pain.    TECHNIQUE: Axial CT images of the neck were acquired after the  intravenous administration of 97mL, Isovue-370 nonionic iodinated  contrast material. Coronal reconstructions were created.    FINDINGS: There is no cervical lymphadenopathy. There are no fluid  collections or abscesses in the neck. The salivary glands are  unremarkable. There is no evidence for mucosal space lesion. The  thyroid gland appears atrophied. The lung apices are clear. There is  no sinusitis or mastoiditis. The cervical arterial vasculature is  within normal limits. There are no significant degenerative changes of  the cervical spine.      Impression    IMPRESSION: Probable atrophy of the thyroid gland. Otherwise, normal  soft tissue neck CT.      Radiation dose for this scan was reduced using automated exposure  control, adjustment of the mA and/or kV according to patient size, or  iterative reconstruction technique       Medications   sodium chloride (PF) 0.9% PF flush 3 mL (23 mLs Intravenous Given 10/4/18 1625)   iopamidol (ISOVUE-370) solution 500 mL (97 mLs Intravenous Given 10/4/18 1626)   Saline 100mL Bag (68 mLs Intravenous Given 10/4/18 1625)       Assessments & Plan (with Medical Decision Making)  37-year-old female with persistent pain in her right neck that she has had for the past couple days.  Came on after sleeping funny.  Was referred to the emergency room by urgent care.  Exam she has tenderness over the sternocleidomastoid muscle.  Range of motion neck was full although with stretching of the muscle it does become  sore.  CT imaging of the neck is showing no other abnormalities.  She will be discharged home to continue symptomatic cares with rest, heat, use of ibuprofen.  She is chronic pain meds including MS Contin and oxycodone that she takes for chronic low back pain.  If not improving, would recommend follow-up with primary care, and might benefit with some physical therapy.     I have reviewed the nursing notes.    I have reviewed the findings, diagnosis, plan and need for follow up with the patient.      New Prescriptions    No medications on file       Final diagnoses:   Strain of neck muscle, initial encounter       10/4/2018   [facility] EMERGENCY DEPARTMENT     Alpesh Guevara MD  10/04/18 0805

## 2018-10-04 NOTE — ED AVS SNAPSHOT
[facility] Emergency Department    [address] [place] [address]    Phone:  [phone]    Fax:  [phone]                                       Mattie Banda   MRN: [id number]    Department:  [facility] Emergency Department   Date of Visit:  10/4/2018           Patient Information     Date Of Birth          1980        Your diagnoses for this visit were:     Strain of neck muscle, initial encounter        You were seen by Alpesh Guevara MD.      Follow-up Information     Follow up with Yuan Schofield MD.    Specialty:  Family Practice    Why:  If symptoms worsen    Contact information:    [address] [place] [id number] [phone]          Discharge Instructions       As we discussed, I believe that you had some tenderness involving the muscle in her neck all the sterno clieno mastoid muscle.  Is could have been irritated while you are sleeping and is just feeling tight and sore since then.  At this time I would recommend what you are doing, apply heat, rest it, and to take ibuprofen for the discomfort as well.  If is getting worse, he might benefit with some physical therapy so if doing so, please contact your primary care doctor to get this set up.    Discharge References/Attachments     (S) GOING HOME WITH A NECK INJURY (ENGLISH)      24 Hour Appointment Hotline       To make an appointment at any [facility], call [phone] ([phone]). If you don't have a family doctor or clinic, we will help you find one. [facility] clinics are conveniently located to serve the needs of you and your family.             Review of your medicines      Our records show that you are taking the medicines listed below. If these are incorrect, please call your family doctor or clinic.        Dose / Directions Last dose taken    * albuterol (2.5 MG/3ML) 0.083% neb solution   Dose:  1 vial   Quantity:  1 Box        Take 1 vial (2.5 mg) by nebulization every  6 hours as needed for shortness of breath / dyspnea or wheezing   Refills:  0        * VENTOLIN  (90 Base) MCG/ACT inhaler   Quantity:  52 g   Generic drug:  albuterol        INHALE 2 PUFFS INTO THE LUNGS EVERY 6 HOURS AS NEEDED FOR SHORTNESSOF BREATH / DYSPNEA OR WHEEZING   Refills:  3        escitalopram 20 MG tablet   Commonly known as:  LEXAPRO   Dose:  20 mg   Quantity:  90 tablet        Take 1 tablet (20 mg) by mouth daily   Refills:  3        * levothyroxine 200 MCG tablet   Commonly known as:  SYNTHROID/LEVOTHROID   Dose:  200 mcg   Quantity:  90 tablet        Take 1 tablet (200 mcg) by mouth daily Take with 100 mcg tablet for daily total of 300 mcg   Refills:  1        * levothyroxine 100 MCG tablet   Commonly known as:  SYNTHROID/LEVOTHROID   Dose:  100 mcg   Quantity:  90 tablet        Take 1 tablet (100 mcg) by mouth daily Take with 200 mcg tablet for daily total of 300 mcg   Refills:  3        LORazepam 1 MG tablet   Commonly known as:  ATIVAN   Dose:  1-2 mg   Quantity:  20 tablet        Take 1-2 tablets (1-2 mg) by mouth every 8 hours as needed for anxiety   Refills:  0        mesalamine 800 MG EC tablet   Commonly known as:  ASACOL HD   Dose:  1600 mg   Quantity:  90 tablet        Take 2 tablets (1,600 mg) by mouth 3 times daily   Refills:  1        morphine 15 MG 12 hr tablet   Commonly known as:  MS CONTIN   Dose:  15 mg   Quantity:  60 tablet        Take 1 tablet (15 mg) by mouth every 12 hours maximum 2 tablet(s) per day   Refills:  0        mupirocin 2 % ointment   Commonly known as:  BACTROBAN        APPLY TO AFFECTED AREA(S) TOPICALLY TWO TIMES A DAY FOR 10 DAYS   Refills:  0        ondansetron 4 MG tablet   Commonly known as:  ZOFRAN   Dose:  4 mg   Quantity:  30 tablet        Take 1 tablet (4 mg) by mouth every 8 hours as needed for nausea   Refills:  1        oxyCODONE IR 5 MG tablet   Commonly known as:  ROXICODONE   Quantity:  60 tablet        1-2 tablets 4 times daily for  breakthrough pain.  Use sparingly.  No more than 4 daily.   Refills:  0        risperiDONE 1 MG tablet   Commonly known as:  risperDAL   Quantity:  45 tablet        TAKE 1/2 TABLET (0.5MG) BY MOUTH DAILY AT BEDTIME   Refills:  3        traZODone 50 MG tablet   Commonly known as:  DESYREL   Quantity:  90 tablet        1 -3 tablets by mouth nightlty   Refills:  5        * Notice:  This list has 4 medication(s) that are the same as other medications prescribed for you. Read the directions carefully, and ask your doctor or other care provider to review them with you.            Procedures and tests performed during your visit     Peripheral IV catheter    Soft tissue neck CT w contrast      Orders Needing Specimen Collection     None      Pending Results     Date and Time Order Name Status Description    10/4/2018 1542 Soft tissue neck CT w contrast Preliminary             Pending Culture Results     No orders found from 10/2/2018 to 10/5/2018.            Pending Results Instructions     If you had any lab results that were not finalized at the time of your Discharge, you can call the ED Lab Result RN at [phone]. You will be contacted by this team for any positive Lab results or changes in treatment. The nurses are available 7 days a week from 10A to 6:30P.  You can leave a message 24 hours per day and they will return your call.        Thank you for choosing [facility]       Thank you for choosing [facility] for your care. Our goal is always to provide you with excellent care. Hearing back from our patients is one way we can continue to improve our services. Please take a few minutes to complete the written survey that you may receive in the mail after you visit with us. Thank you!        [facility] Information     [facility] gives you secure access to your electronic health record. If you see a primary care provider, you can also send messages to your care team and make appointments. If you have questions, please call your  primary care clinic.  If you do not have a primary care provider, please call [phone] and they will assist you.        Care EveryWhere ID     This is your Care EveryWhere ID. This could be used by other organizations to access your [facility] medical records  [id number]        Equal Access to Services     KAELYN MYERS : Zakia Cortes, josé luis null, adolfo presley. So [facility] [phone].    ATENCIÓN: Si habla español, tiene a caruso disposición servicios gratuitos de asistencia lingüística. Llame al [phone].    We comply with applicable federal civil rights laws and [place] laws. We do not discriminate on the basis of race, color, national origin, age, disability, sex, sexual orientation, or gender identity.            After Visit Summary       This is your record. Keep this with you and show to your community pharmacist(s) and doctor(s) at your next visit.

## 2018-10-04 NOTE — TELEPHONE ENCOUNTER
Message from Gregoryhart:  Original authorizing provider: Yuan Schofield MD    Mattie Banda would like a refill of the following medications:  morphine (MS CONTIN) 15 MG 12 hr tablet [Yuan Schofield MD]  oxyCODONE IR (ROXICODONE) 5 MG tablet [Yuan Schofield MD]    Preferred pharmacy: Other - I will  hard copies when they are ready.     Comment:  I will  hard copies when they are ready. Thank You!        Last Written Prescription Date:  09/12/18=morphine, 09/28/18  Last Fill Quantity: 60, 60  # refills: 0, 0  Last Office Visit: 09/20/18  Future Office visit:       Routing refill request to provider for review/approval because:  Drug not on the [facility], [facility] or [facility] refill protocol or controlled substance

## 2018-10-04 NOTE — ED NOTES
Patient wondering if she can take her own pain meds that she has with her.  Takes 15 mg long acting morphine and 5 mg oxycodone.  Dr. Guevara aware and patient is taking her own medication at this time. Marisol Infante RN

## 2018-10-04 NOTE — ED AVS SNAPSHOT
[facility] Emergency Department    [address] [place] [address]    Phone:  [phone]    Fax:  [phone]                                       Mattie Banda   MRN: [id number]    Department:  [facility] Emergency Department   Date of Visit:  10/4/2018           After Visit Summary Signature Page     I have received my discharge instructions, and my questions have been answered. I have discussed any challenges I see with this plan with the nurse or doctor.    ..........................................................................................................................................  Patient/Patient Representative Signature      ..........................................................................................................................................  Patient Representative Print Name and Relationship to Patient    ..................................................               ................................................  Date                                   Time    ..........................................................................................................................................  Reviewed by Signature/Title    ...................................................              ..............................................  Date                                               Time          22EPIC Rev 08/18

## 2018-10-04 NOTE — ED TRIAGE NOTES
Here with neck pain that started a few days ago. States she was sent from [facility] and they stated she may need an MRI. Reports discomfort to anterior neck and radiates up behind right ear

## 2018-10-04 NOTE — LETTER
[facility] EMERGENCY DEPARTMENT  [address] [place] [address]  Phone: [phone]  Fax: [phone]    October 4, 2018        Mattie Banda  [address] [place] [address]          To whom it may concern:    RE: Mattie Banda    Patient was seen and treated today at our emergency department and will miss work today October 4 and tomorrow October 5.    Please contact me for questions or concerns.      Sincerely,        KAITLIN Guevara MD

## 2018-10-05 RX ORDER — MORPHINE SULFATE 15 MG/1
15 TABLET, FILM COATED, EXTENDED RELEASE ORAL EVERY 12 HOURS
Qty: 60 TABLET | Refills: 0 | Status: SHIPPED | OUTPATIENT
Start: 2018-10-05 | End: 2018-11-05

## 2018-10-05 RX ORDER — OXYCODONE HYDROCHLORIDE 5 MG/1
TABLET ORAL
Qty: 60 TABLET | Refills: 0 | Status: SHIPPED | OUTPATIENT
Start: 2018-10-05 | End: 2018-10-22

## 2018-10-22 ENCOUNTER — MYC REFILL (OUTPATIENT)
Dept: FAMILY MEDICINE | Facility: CLINIC | Age: 38
End: 2018-10-22

## 2018-10-22 DIAGNOSIS — M50.30 DDD (DEGENERATIVE DISC DISEASE), CERVICAL: ICD-10-CM

## 2018-10-22 DIAGNOSIS — M51.369 DEGENERATION OF LUMBAR INTERVERTEBRAL DISC: ICD-10-CM

## 2018-10-22 NOTE — TELEPHONE ENCOUNTER
ROXICODONE      Last Written Prescription Date:  10/05/18  Last Fill Quantity: 60,   # refills: 0  Last Office Visit: 8/20/18  Future Office visit:       Routing refill request to provider for review/approval because:  Drug not on the [facility], [facility] or [facility] refill protocol or controlled substance

## 2018-10-22 NOTE — TELEPHONE ENCOUNTER
Message from Scurrihart:  Original authorizing provider: Yuan Schofield MD [place] CADETGerald Banda would like a refill of the following medications:  oxyCODONE IR (ROXICODONE) 5 MG tablet [Yuanjuanita Schofield MD]    Preferred pharmacy: Other - I will  hard copy when ready. Thanks!    Comment:  I will  hard copy when ready.

## 2018-10-24 RX ORDER — OXYCODONE HYDROCHLORIDE 5 MG/1
TABLET ORAL
Qty: 60 TABLET | Refills: 0 | Status: SHIPPED | OUTPATIENT
Start: 2018-10-24 | End: 2018-11-05

## 2018-11-05 ENCOUNTER — MYC REFILL (OUTPATIENT)
Dept: FAMILY MEDICINE | Facility: CLINIC | Age: 38
End: 2018-11-05

## 2018-11-05 DIAGNOSIS — F41.8 MIXED ANXIETY DEPRESSIVE DISORDER: ICD-10-CM

## 2018-11-05 DIAGNOSIS — M51.369 DEGENERATION OF LUMBAR INTERVERTEBRAL DISC: ICD-10-CM

## 2018-11-05 DIAGNOSIS — M50.30 DDD (DEGENERATIVE DISC DISEASE), CERVICAL: ICD-10-CM

## 2018-11-05 RX ORDER — LORAZEPAM 1 MG/1
1-2 TABLET ORAL EVERY 8 HOURS PRN
Qty: 20 TABLET | Refills: 0 | Status: SHIPPED | OUTPATIENT
Start: 2018-11-05 | End: 2018-12-19

## 2018-11-05 RX ORDER — OXYCODONE HYDROCHLORIDE 5 MG/1
TABLET ORAL
Qty: 60 TABLET | Refills: 0 | Status: SHIPPED | OUTPATIENT
Start: 2018-11-05 | End: 2018-11-20

## 2018-11-05 RX ORDER — MORPHINE SULFATE 15 MG/1
15 TABLET, FILM COATED, EXTENDED RELEASE ORAL EVERY 12 HOURS
Qty: 60 TABLET | Refills: 0 | Status: SHIPPED | OUTPATIENT
Start: 2018-11-05 | End: 2018-11-28

## 2018-11-05 NOTE — TELEPHONE ENCOUNTER
Ativan       Last Written Prescription Date:  8/10/2018  Last Fill Quantity: 20,   # refills: 0  Last Office Visit: 8/20/2018  Future Office visit:       Routing refill request to provider for review/approval because:  Drug not on the [facility], [facility] or [facility] refill protocol or controlled substance    MS Contin       Last Written Prescription Date:  10/5/2018  Last Fill Quantity: 60,   # refills: 0    Routing refill request to provider for review/approval because:  Drug not on the [facility], [facility] or [facility] refill protocol or controlled substance    Oxycodone      Last Written Prescription Date:  10/24/2018  Last Fill Quantity: 60,   # refills: 0      Routing refill request to provider for review/approval because:  Drug not on the [facility], [facility] or [facility] refill protocol or controlled substance

## 2018-11-05 NOTE — TELEPHONE ENCOUNTER
Message from Roseannt:  Original authorizing provider: Yuan Schofield MD [place] CADETGerald Reecelito would like a refill of the following medications:  LORazepam (ATIVAN) 1 MG tablet [Yuan Schofield MD]  morphine (MS CONTIN) 15 MG 12 hr tablet [Yuan Schofield MD]  oxyCODONE IR (ROXICODONE) 5 MG tablet [Yuan Schofield MD]    Preferred pharmacy: Other - I will  hard copies when ready. Thanks     Comment:  I will  the hard copies when they are ready. Thanks

## 2018-11-16 ENCOUNTER — TELEPHONE (OUTPATIENT)
Dept: FAMILY MEDICINE | Facility: CLINIC | Age: 38
End: 2018-11-16

## 2018-11-16 NOTE — TELEPHONE ENCOUNTER
Mattie Banda is a 38 year old female who calls with multiple concerns.    1.  Back pain   2. Back of calf discolored and feels like a leno horse.  She went tanning and burnt the area so is unable to tell if still discolored. Does not notice any red streaking coming off the area and is able to walk.  Smokes and is a bit SOB.   3.She states she has gained 15lbs in 5 days.  Swelling is feet, calves, abdomen, face.      Allergies:   Allergies   Allergen Reactions     Bupropion Hcl      Wellbutrin       RECOMMENDED DISPOSITION:  See in 72 hours - Would recommend she be seem Monday and in ED over weekend if worsens.   Will comply with recommendation: No- Barriers to comply with plan of care She is unable to make an appt next week due to employer issues..  If further questions/concerns or if symptoms do not improve, worsen or new symptoms develop, call your PCP or [facility] as soon as possible.      Guideline used:  Telephone Triage Protocols for Nurses, Fifth Edition, Myesha Duke RN

## 2018-11-20 ENCOUNTER — MYC REFILL (OUTPATIENT)
Dept: FAMILY MEDICINE | Facility: CLINIC | Age: 38
End: 2018-11-20

## 2018-11-20 DIAGNOSIS — M50.30 DDD (DEGENERATIVE DISC DISEASE), CERVICAL: ICD-10-CM

## 2018-11-20 DIAGNOSIS — M51.369 DEGENERATION OF LUMBAR INTERVERTEBRAL DISC: ICD-10-CM

## 2018-11-20 NOTE — TELEPHONE ENCOUNTER
ROXICODONE      Last Written Prescription Date:  11/05/18  Last Fill Quantity: 60,   # refills: 0  Last Office Visit: 8/20/18  Future Office visit:       Routing refill request to provider for review/approval because:  Drug not on the [facility], [facility] or [facility] refill protocol or controlled substance

## 2018-11-20 NOTE — TELEPHONE ENCOUNTER
Message from The Resumator[employer]:  Original authorizing provider: Yuan Schofield MD    Mattie CADETGerald Reecelito would like a refill of the following medications:  oxyCODONE IR (ROXICODONE) 5 MG tablet [Yuanjuanita Schofield MD]    Preferred pharmacy: Other - I will  hard copy when ready. Thank you.    Comment:  I will  hard copy when ready. Thank you

## 2018-11-21 RX ORDER — OXYCODONE HYDROCHLORIDE 5 MG/1
TABLET ORAL
Qty: 60 TABLET | Refills: 0 | Status: SHIPPED | OUTPATIENT
Start: 2018-11-21 | End: 2018-12-03

## 2018-11-28 ENCOUNTER — MYC REFILL (OUTPATIENT)
Dept: FAMILY MEDICINE | Facility: CLINIC | Age: 38
End: 2018-11-28

## 2018-11-28 DIAGNOSIS — M51.369 DEGENERATION OF LUMBAR INTERVERTEBRAL DISC: ICD-10-CM

## 2018-11-28 DIAGNOSIS — M50.30 DDD (DEGENERATIVE DISC DISEASE), CERVICAL: ICD-10-CM

## 2018-11-28 RX ORDER — MORPHINE SULFATE 15 MG/1
15 TABLET, FILM COATED, EXTENDED RELEASE ORAL EVERY 12 HOURS
Qty: 60 TABLET | Refills: 0 | Status: SHIPPED | OUTPATIENT
Start: 2018-11-28 | End: 2018-12-26

## 2018-11-28 NOTE — TELEPHONE ENCOUNTER
Message from AdYouNetchristinat:  Original authorizing provider: Yuan Schofield MD    Mattie Banda would like a refill of the following medications:  morphine (MS CONTIN) 15 MG 12 hr tablet [Yuanjuanita Schofield MD]    Preferred pharmacy: Other - I will  hard copies when ready. Thanks    Comment:  I will  hard copy when ready. Thanks        Last Written Prescription Date:  11/05/18  Last Fill Quantity: 60,   # refills: 0  Last Office Visit: 09/20/18  Future Office visit:       Routing refill request to provider for review/approval because:  Drug not on the [facility], [facility] or [facility] refill protocol or controlled substance

## 2018-12-03 ENCOUNTER — MYC REFILL (OUTPATIENT)
Dept: FAMILY MEDICINE | Facility: CLINIC | Age: 38
End: 2018-12-03

## 2018-12-03 DIAGNOSIS — M51.369 DEGENERATION OF LUMBAR INTERVERTEBRAL DISC: ICD-10-CM

## 2018-12-03 DIAGNOSIS — M50.30 DDD (DEGENERATIVE DISC DISEASE), CERVICAL: ICD-10-CM

## 2018-12-03 RX ORDER — OXYCODONE HYDROCHLORIDE 5 MG/1
TABLET ORAL
Qty: 60 TABLET | Refills: 0 | Status: SHIPPED | OUTPATIENT
Start: 2018-12-03 | End: 2018-12-19

## 2018-12-03 NOTE — TELEPHONE ENCOUNTER
ROXICODONE      Last Written Prescription Date:  11/21/18  Last Fill Quantity: 60,   # refills: 0  Last Office Visit: 8/20/18  Future Office visit:       Routing refill request to provider for review/approval because:  Drug not on the [facility], [facility] or [facility] refill protocol or controlled substance

## 2018-12-16 ENCOUNTER — MYC REFILL (OUTPATIENT)
Dept: FAMILY MEDICINE | Facility: CLINIC | Age: 38
End: 2018-12-16

## 2018-12-16 DIAGNOSIS — F41.8 MIXED ANXIETY DEPRESSIVE DISORDER: ICD-10-CM

## 2018-12-16 DIAGNOSIS — M51.369 DEGENERATION OF LUMBAR INTERVERTEBRAL DISC: ICD-10-CM

## 2018-12-16 DIAGNOSIS — M50.30 DDD (DEGENERATIVE DISC DISEASE), CERVICAL: ICD-10-CM

## 2018-12-18 NOTE — TELEPHONE ENCOUNTER
lorazepam  Last Written Prescription Date:  2108  Last Fill Quantity: 20,  # refills: 0   Last office visit: 2018 with prescribing provider:      Future Office Visit:      Requested Prescriptions   Pending Prescriptions Disp Refills     LORazepam (ATIVAN) 1 MG tablet 20 tablet 0     Sig: Take 1-2 tablets (1-2 mg) by mouth every 8 hours as needed for anxiety    There is no refill protocol information for this order     Oxycodone  Last Written Prescription Date:  12/3/2018  Last Fill Quantity: 60,  # refills: 0   Last office visit: 2018 with prescribing provider:      Future Office Visit:           oxyCODONE (ROXICODONE) 5 MG tablet 60 tablet 0     Si-2 tablets 4 times daily for breakthrough pain.  Use sparingly.  No more than 4 daily.    There is no refill protocol information for this order          Routing refill request to provider for review/approval because:  Drug not on the [facility] refill protocol           Heaven Duke RN on 2018 at 2:34 PM

## 2018-12-19 ENCOUNTER — MYC REFILL (OUTPATIENT)
Dept: FAMILY MEDICINE | Facility: CLINIC | Age: 38
End: 2018-12-19

## 2018-12-19 DIAGNOSIS — M50.30 DDD (DEGENERATIVE DISC DISEASE), CERVICAL: ICD-10-CM

## 2018-12-19 DIAGNOSIS — F41.8 MIXED ANXIETY DEPRESSIVE DISORDER: ICD-10-CM

## 2018-12-19 DIAGNOSIS — M51.369 DEGENERATION OF LUMBAR INTERVERTEBRAL DISC: ICD-10-CM

## 2018-12-20 RX ORDER — LORAZEPAM 1 MG/1
1-2 TABLET ORAL EVERY 8 HOURS PRN
Qty: 20 TABLET | Refills: 0 | Status: SHIPPED | OUTPATIENT
Start: 2018-12-20 | End: 2019-02-08

## 2018-12-20 RX ORDER — OXYCODONE HYDROCHLORIDE 5 MG/1
TABLET ORAL
Qty: 60 TABLET | Refills: 0 | Status: SHIPPED | OUTPATIENT
Start: 2018-12-20 | End: 2018-12-26

## 2018-12-20 NOTE — TELEPHONE ENCOUNTER
Oxycodone       Last Written Prescription Date:  12/3/2018  Last Fill Quantity: 60,   # refills: 0  Last Office Visit: 8/20/2018  Future Office visit:       Routing refill request to provider for review/approval because:  Drug not on the [facility], [facility] or [facility] refill protocol or controlled substance      Ativan       Last Written Prescription Date:  11/5/2018  Last Fill Quantity: 20,   # refills: 0  Last Office Visit: 8/20/2018  Future Office visit:       Routing refill request to provider for review/approval because:  Drug not on the [facility], [facility] or [facility] refill protocol or controlled substance

## 2018-12-20 NOTE — TELEPHONE ENCOUNTER
Mattie calls to inquire about the status of this refill request.  Mattie states this is the second request she has submitted.    Mattie is out of her anxiety meds and only has one more dose of her pain meds.    Mattie is asking if these prescriptions can be sent to [facility].  Please call when they are ready.

## 2018-12-21 RX ORDER — LORAZEPAM 1 MG/1
1-2 TABLET ORAL EVERY 8 HOURS PRN
Qty: 20 TABLET | Refills: 0 | OUTPATIENT
Start: 2018-12-21

## 2018-12-21 RX ORDER — OXYCODONE HYDROCHLORIDE 5 MG/1
TABLET ORAL
Qty: 60 TABLET | Refills: 0 | OUTPATIENT
Start: 2018-12-21

## 2018-12-26 ENCOUNTER — MYC REFILL (OUTPATIENT)
Dept: FAMILY MEDICINE | Facility: CLINIC | Age: 38
End: 2018-12-26

## 2018-12-26 DIAGNOSIS — M51.369 DEGENERATION OF LUMBAR INTERVERTEBRAL DISC: ICD-10-CM

## 2018-12-26 DIAGNOSIS — M50.30 DDD (DEGENERATIVE DISC DISEASE), CERVICAL: ICD-10-CM

## 2018-12-26 NOTE — TELEPHONE ENCOUNTER
Routing refill request to provider for review/approval because:  Drug not on the [facility] refill protocol     oxycodone  Last Written Prescription Date:  2018  Last Fill Quantity: 60,  # refills: 0   Last office visit: 2018 with prescribing provider:   Future Office Visit:      Requested Prescriptions   Pending Prescriptions Disp Refills     oxyCODONE (ROXICODONE) 5 MG tablet 60 tablet 0     Si-2 tablets 4 times daily for breakthrough pain.  Use sparingly.  No more than 4 daily.    There is no refill protocol information for this order        Steph Champion RN on 2018 at 11:12 AM

## 2018-12-26 NOTE — TELEPHONE ENCOUNTER
Routing refill request to provider for review/approval because:  Drug not on the [facility] refill protocol     MS Contin  Last Written Prescription Date:  11/28/2018  Last Fill Quantity: 60,  # refills: 0   Last office visit: 9/20/2018 with prescribing provider:  8/20/2018   Future Office Visit:      Requested Prescriptions   Pending Prescriptions Disp Refills     morphine (MS CONTIN) 15 MG CR tablet 60 tablet 0     Sig: Take 1 tablet (15 mg) by mouth every 12 hours maximum 2 tablet(s) per day    There is no refill protocol information for this order        Steph Champion RN on 12/26/2018 at 11:14 AM

## 2018-12-27 RX ORDER — OXYCODONE HYDROCHLORIDE 5 MG/1
TABLET ORAL
Qty: 60 TABLET | Refills: 0 | Status: SHIPPED | OUTPATIENT
Start: 2018-12-27 | End: 2019-01-14

## 2018-12-27 RX ORDER — MORPHINE SULFATE 15 MG/1
15 TABLET, FILM COATED, EXTENDED RELEASE ORAL EVERY 12 HOURS
Qty: 60 TABLET | Refills: 0 | Status: SHIPPED | OUTPATIENT
Start: 2018-12-27 | End: 2019-01-14

## 2019-01-08 ENCOUNTER — TELEPHONE (OUTPATIENT)
Dept: ORTHOPEDICS | Facility: CLINIC | Age: 39
End: 2019-01-08

## 2019-01-08 ENCOUNTER — OFFICE VISIT (OUTPATIENT)
Dept: FAMILY MEDICINE | Facility: OTHER | Age: 39
End: 2019-01-08
Payer: COMMERCIAL

## 2019-01-08 ENCOUNTER — HOSPITAL ENCOUNTER (OUTPATIENT)
Dept: MRI IMAGING | Facility: CLINIC | Age: 39
Discharge: HOME | End: 2019-01-08
Attending: PHYSICAL MEDICINE & REHABILITATION | Admitting: PHYSICAL MEDICINE & REHABILITATION
Payer: COMMERCIAL

## 2019-01-08 ENCOUNTER — ANCILLARY PROCEDURE (OUTPATIENT)
Dept: GENERAL RADIOLOGY | Facility: OTHER | Age: 39
End: 2019-01-08
Payer: COMMERCIAL

## 2019-01-08 ENCOUNTER — OFFICE VISIT (OUTPATIENT)
Dept: ORTHOPEDICS | Facility: CLINIC | Age: 39
End: 2019-01-08
Payer: COMMERCIAL

## 2019-01-08 VITALS
BODY MASS INDEX: 29.01 KG/M2 | HEART RATE: 84 BPM | SYSTOLIC BLOOD PRESSURE: 107 MMHG | HEIGHT: 66 IN | DIASTOLIC BLOOD PRESSURE: 56 MMHG | TEMPERATURE: 97.4 F | WEIGHT: 180.5 LBS

## 2019-01-08 VITALS
HEART RATE: 88 BPM | RESPIRATION RATE: 16 BRPM | TEMPERATURE: 97.6 F | DIASTOLIC BLOOD PRESSURE: 66 MMHG | SYSTOLIC BLOOD PRESSURE: 107 MMHG

## 2019-01-08 DIAGNOSIS — M25.552 ACUTE HIP PAIN, LEFT: ICD-10-CM

## 2019-01-08 DIAGNOSIS — M25.552 HIP PAIN, LEFT: Primary | ICD-10-CM

## 2019-01-08 DIAGNOSIS — M25.552 HIP PAIN, LEFT: ICD-10-CM

## 2019-01-08 DIAGNOSIS — R10.11 RIGHT UPPER QUADRANT PAIN: ICD-10-CM

## 2019-01-08 DIAGNOSIS — E03.9 ACQUIRED HYPOTHYROIDISM: ICD-10-CM

## 2019-01-08 DIAGNOSIS — M25.552 ACUTE HIP PAIN, LEFT: Primary | ICD-10-CM

## 2019-01-08 DIAGNOSIS — M25.552 LEFT HIP PAIN: Primary | ICD-10-CM

## 2019-01-08 LAB
BASOPHILS # BLD AUTO: 0 10E9/L (ref 0–0.2)
BASOPHILS NFR BLD AUTO: 0.2 %
DIFFERENTIAL METHOD BLD: ABNORMAL
EOSINOPHIL # BLD AUTO: 0.3 10E9/L (ref 0–0.7)
EOSINOPHIL NFR BLD AUTO: 1.9 %
ERYTHROCYTE [DISTWIDTH] IN BLOOD BY AUTOMATED COUNT: 14.8 % (ref 10–15)
ERYTHROCYTE [SEDIMENTATION RATE] IN BLOOD BY WESTERGREN METHOD: 21 MM/H (ref 0–20)
HCT VFR BLD AUTO: 39.7 % (ref 35–47)
HGB BLD-MCNC: 12.8 G/DL (ref 11.7–15.7)
LYMPHOCYTES # BLD AUTO: 2.1 10E9/L (ref 0.8–5.3)
LYMPHOCYTES NFR BLD AUTO: 12.1 %
MCH RBC QN AUTO: 31.5 PG (ref 26.5–33)
MCHC RBC AUTO-ENTMCNC: 32.2 G/DL (ref 31.5–36.5)
MCV RBC AUTO: 98 FL (ref 78–100)
MONOCYTES # BLD AUTO: 0.9 10E9/L (ref 0–1.3)
MONOCYTES NFR BLD AUTO: 5.3 %
NEUTROPHILS # BLD AUTO: 13.7 10E9/L (ref 1.6–8.3)
NEUTROPHILS NFR BLD AUTO: 80.5 %
PLATELET # BLD AUTO: 355 10E9/L (ref 150–450)
RBC # BLD AUTO: 4.06 10E12/L (ref 3.8–5.2)
WBC # BLD AUTO: 17.1 10E9/L (ref 4–11)

## 2019-01-08 PROCEDURE — 85025 COMPLETE CBC W/AUTO DIFF WBC: CPT | Performed by: FAMILY MEDICINE

## 2019-01-08 PROCEDURE — 73721 MRI JNT OF LWR EXTRE W/O DYE: CPT | Mod: LT

## 2019-01-08 PROCEDURE — 85652 RBC SED RATE AUTOMATED: CPT | Performed by: FAMILY MEDICINE

## 2019-01-08 PROCEDURE — 73502 X-RAY EXAM HIP UNI 2-3 VIEWS: CPT | Mod: FY

## 2019-01-08 PROCEDURE — 99213 OFFICE O/P EST LOW 20 MIN: CPT | Performed by: FAMILY MEDICINE

## 2019-01-08 PROCEDURE — 36415 COLL VENOUS BLD VENIPUNCTURE: CPT | Performed by: FAMILY MEDICINE

## 2019-01-08 PROCEDURE — 99244 OFF/OP CNSLTJ NEW/EST MOD 40: CPT | Performed by: PHYSICAL MEDICINE & REHABILITATION

## 2019-01-08 RX ORDER — LEVOTHYROXINE SODIUM 200 UG/1
200 TABLET ORAL DAILY
Qty: 90 TABLET | Refills: 1 | COMMUNITY
Start: 2019-01-08 | End: 2019-06-05

## 2019-01-08 ASSESSMENT — MIFFLIN-ST. JEOR: SCORE: 1516.49

## 2019-01-08 ASSESSMENT — PAIN SCALES - GENERAL: PAINLEVEL: SEVERE PAIN (6)

## 2019-01-08 NOTE — LETTER
January 8, 2019      Mattie Banda  [address] [place] [id number]        To Whom It May Concern:    Mattie Banda  was seen on 1/8/19 for hip pain. She should remain off work until her follow up appointment on 1/10/19.  At that time, workability will be reassessed. Thank you for your help in advance.       Sincerely,        Sierra Mcmillan MD, CAQ

## 2019-01-08 NOTE — PROGRESS NOTES
"Sports Medicine Clinic Visit    PCP: Yuan Schofield    CC: Patient presents with:  Left Hip - Pain      HPI:  Mattie Banda is a 38 year old female who is seen in consultation at the request of Dr. Wiggins.   She notes left hip pain that began 1-2 months ago. The pain began just at night when she was changing positions. She notes that last night the pain became excruciating. She notes today is the first time the pain has lasted into the day. She notes that she has \"lighting strikes\" that go down the leg that last 30-60 seconds. She also notes an altered sensation in the upper lateral thigh.  She rates the pain at a 10/10 at its worst and a 7/10 currently.  Symptoms are relieved with nothing. Symptoms are worsened by changing positions while landing. She endorses cold sweats with shooting pain, popping (has been there for years), numbness and tingling.   She denies fevers, swelling, bruising, grinding, catching, locking, instability, weakness.  Other treatment has included cold compresses, heat, ibuprofen and pain medication. Of note, she has had L5-S1 discectomy and fusion. She does have chronic pain syndrome.      Review of Systems:  Musculoskeletal: as above  Remainder of review of systems is negative including constitutional, eyes, ENT, CV, pulmonary, GI, , endocrine, skin, hematologic, and neurologic except as noted in HPI or medical history.    History reviewed. No pertinent past surgical/medical/family/social history other than as mentioned in HPI.    Patient Active Problem List   Diagnosis     Contraceptive management     CARDIOVASCULAR SCREENING; LDL GOAL LESS THAN 160     Degeneration of lumbar intervertebral disc     S/P lumbar fusion and discectomy June 2015     Low grade squamous intraepithelial lesion on cytologic smear of cervix (LGSIL)     Labial lesion     Mixed anxiety depressive disorder     Tobacco use disorder     Idiopathic peripheral neuropathy     Hypothyroidism, unspecified type     " Migraine with aura and without status migrainosus, not intractable     Tinea versicolor     Lymphocytic colitis     Excessive or frequent menstruation     DDD (degenerative disc disease), cervical     Chronic pain syndrome     Past Medical History:   Diagnosis Date     Papanicolaou smear of cervix with low grade squamous intraepithelial lesion (LGSIL)      Thyroid disease      Urinary tract infection, site not specified     Recurrent UTI's     Past Surgical History:   Procedure Laterality Date     BACK SURGERY  6/2015      COLONOSCOPY  08/06/07     COLONOSCOPY  08/05/08     HC COLONOSCOPY W BIOPSY  02/06/08     HC TOOTH EXTRACTION W/FORCEP      wisdom teeth removed     INJECT EPIDURAL CERVICAL N/A 3/8/2018    Procedure: INJECT EPIDURAL CERVICAL;  cervical 6-7 interlaminar epidural steroid injection;  Surgeon: Edgardo Rubio MD;  Location: [facility] OR     LAPAROSCOPIC TUBAL LIGATION  11/27/2012    Procedure: LAPAROSCOPIC TUBAL LIGATION;  Laparoscopic Bilateral tubal sterilization/ fulgaration;  Surgeon: Sarbjit Whittaker MD;  Location: [facility] OR     Family History   Problem Relation Age of Onset     Hypertension Maternal Grandmother      Arthritis Maternal Grandmother      Cancer Maternal Grandmother         MGGM     Depression Maternal Grandmother      Lipids Maternal Grandmother      Osteoporosis Maternal Grandmother      Respiratory Maternal Grandmother         emphysema     Genitourinary Problems Maternal Grandmother         Kidney Failure, liver      Hypertension Maternal Grandfather      Heart Disease Maternal Grandfather         MI     Cardiovascular Maternal Grandfather      Cancer - colorectal Maternal Grandfather      Cancer Maternal Grandfather         Hodgins Lymphoma     Hypertension Paternal Grandmother      Arthritis Paternal Grandmother         RA     Breast Cancer Other         Maternal great aunt     Thyroid Disease Maternal Aunt         two aunts     Diabetes No family hx of      Social History      Socioeconomic History     Marital status:      Spouse name: Darien     Number of children: 1     Years of education: 12     Highest education level: Not on file   Social Needs     Financial resource strain: Not on file     Food insecurity - worry: Not on file     Food insecurity - inability: Not on file     Transportation needs - medical: Not on file     Transportation needs - non-medical: Not on file   Occupational History     Occupation: Unit Worker     Comment: [employer]   Tobacco Use     Smoking status: Current Every Day Smoker     Packs/day: 1.00     Years: 8.00     Pack years: 8.00     Types: Cigarettes     Smokeless tobacco: Never Used   Substance and Sexual Activity     Alcohol use: Yes     Alcohol/week: 0.0 oz     Comment: 1/mo     Drug use: No     Sexual activity: Yes     Partners: Male   Other Topics Concern     Parent/sibling w/ CABG, MI or angioplasty before 65F 55M? 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    Current Outpatient Medications   Medication     albuterol (2.5 MG/3ML) 0.083% neb solution     escitalopram (LEXAPRO) 20 MG tablet     levothyroxine (SYNTHROID/LEVOTHROID) 200 MCG tablet     LORazepam (ATIVAN) 1 MG tablet     mesalamine (ASACOL HD) 800 MG EC tablet     morphine (MS CONTIN) 15 MG CR tablet     oxyCODONE (ROXICODONE) 5 MG tablet     risperiDONE (RISPERDAL) 1 MG tablet     traZODone (DESYREL) 50 MG tablet     VENTOLIN  (90 Base) MCG/ACT Inhaler     No current facility-administered medications for this  "visit.      Allergies   Allergen Reactions     Bupropion Hcl      Wellbutrin         Objective:  /56   Pulse 84   Temp 97.4  F (36.3  C)   Ht 1.678 m (5' 6.06\")   Wt 81.9 kg (180 lb 8 oz)   BMI 29.08 kg/m      General: Alert and in mild distress  Head: Normocephalic, atraumatic  Eyes: no scleral icterus or conjunctival erythema   Oropharynx:  Mucous membranes moist  Skin: no erythema, petechiae, or jaundice  CV: regular rhythm by palpation, 2+ distal pulses  Resp: normal respiratory effort without conversational dyspnea   Psych: normal mood and affect    Gait: Antalgic  Neuro: Motor strength and sensation as noted below    Musculoskeletal:    Bilateral hip exam    Inspection:      no edema or ecchymosis in hip area    Tender:   Left anterolateral hip, greater trochanteric bursa, and left lateral thigh    ROM: Left hip ROM is full but extremely painful.    Strength: 5/5 bilateral hip extension/abduction/adduction, knee extension/flexion, ankle dorsiflexion/plantarflexion, great toe extension, toe flexion.  Left hip flexion 4+/5, right hip flexion 5/5.    Sensation: grossly intact to light touch in the lower extremities bilaterally    Radiology:  Independent visualization of images performed.    Recent Results (from the past 24 hour(s))   XR Pelvis and Hip Left 1 View    Narrative    PELVIS AND HIP LEFT ONE VIEW   1/8/2019 11:14 AM     HISTORY: Hip pain, left.    COMPARISON: Abdominal x-rays dated 9/20/2018    FINDINGS: No acute fractures or dislocations. Alignment is anatomic.  Soft tissues are normal. Hip and SI joints are normal in appearance.  Lower lumbosacral spine fusion hardware is stable in appearance.      Impression    IMPRESSION:   1. No significant abnormality of the left hip is identified. No acute  fracture or malalignment is seen.  2. Postop changes lower lumbar spine/sacrum are stable.  3. No abnormality of the pelvis is identified.    I discussed these findings with Dr. Wiggins on 1/8/2019 at " 1117 a.m.       Assessment:  1. Acute hip pain, left        Plan:  Discussed the assessment with the patient and developed a plan together:  -Given acute severe left hip pain and leukocytosis, will order a stat left hip MRI to evaluate for infection.  She is scheduled this afternoon.  We will call her with the results.  She is in agreement with this plan.      Noy Mcmillan MD, [facility]  [facility]

## 2019-01-08 NOTE — TELEPHONE ENCOUNTER
Sierra Mcmillan MD Pederson, Carly, ATC             No findings to suggest injection.  Possible inflammation around the SI joints.  Trace amount of fluid in the right trochanteric bursa could indicate mild bursitis.  Would recommend physical therapy.  Could also consider SI joint injection or greater trochanteric bursa injection.      Spoke with patient regarding results. She would like to try the GTB injection and is scheduled for Thursday in [place]. She also wanted a note to be off of work until then. Dr. Mcmillan was okay with this. I will leave the letter at the registration desk at [facility] for patient . She also agreed to begin physical therapy. Order placed.     Iris Ozuna M.Ed., ATR, ATC  Clinic Coordinator for Dr. Noy Mcmillan

## 2019-01-08 NOTE — PROGRESS NOTES
SUBJECTIVE:   Mattie Banda is a 38 year old female who presents to clinic today for the following health issues:  Chief Complaint   Patient presents with     Musculoskeletal Problem     lt hip pain x's 2 months, no known injury     Severe pain left hip area and down leg to just above the knee, no obvious reason. She does have some kind of diffuse neuropathy and does have  a chronic pain syndrome.  Exam is negative, including vital signs, CVR, abdomen;   xray nl. Wbc [id number].  IMP severe left hip and leg pain, wbc [id number]    Concern for septic joint, necrotizing fascitis   PLAN: ortho consult, may need MR, hip jt aspiration  dbue    Add: see ortho note  still not clear why the wbc is up, pt denies HA, respiratory, urinary or bowel symptoms   will repeat wbc thurs

## 2019-01-08 NOTE — LETTER
"    1/8/2019         RE: Mattie Banda  [address] [facility] [address]        Dear Colleague,    Thank you for referring your patient, Mattie Banda, to the [facility]. Please see a copy of my visit note below.    Sports Medicine Clinic Visit    PCP: Yuan Schofield    CC: Patient presents with:  Left Hip - Pain      HPI:  Mattie Banda is a 38 year old female who is seen in consultation at the request of Dr. Wiggins.   She notes left hip pain that began 1-2 months ago. The pain began just at night when she was changing positions. She notes that last night the pain became excruciating. She notes today is the first time the pain has lasted into the day. She notes that she has \"lighting strikes\" that go down the leg that last 30-60 seconds. She also notes an altered sensation in the upper lateral thigh.  She rates the pain at a 10/10 at its worst and a 7/10 currently.  Symptoms are relieved with nothing. Symptoms are worsened by changing positions while landing. She endorses cold sweats with shooting pain, popping (has been there for years), numbness and tingling.   She denies fevers, swelling, bruising, grinding, catching, locking, instability, weakness.  Other treatment has included cold compresses, heat, ibuprofen and pain medication. Of note, she has had L5-S1 discectomy and fusion. She does have chronic pain syndrome.      Review of Systems:  Musculoskeletal: as above  Remainder of review of systems is negative including constitutional, eyes, ENT, CV, pulmonary, GI, , endocrine, skin, hematologic, and neurologic except as noted in HPI or medical history.    History reviewed. No pertinent past surgical/medical/family/social history other than as mentioned in HPI.    Patient Active Problem List   Diagnosis     Contraceptive management     CARDIOVASCULAR SCREENING; LDL GOAL LESS THAN 160     Degeneration of lumbar intervertebral disc     S/P lumbar fusion and discectomy June 2015     Low " grade squamous intraepithelial lesion on cytologic smear of cervix (LGSIL)     Labial lesion     Mixed anxiety depressive disorder     Tobacco use disorder     Idiopathic peripheral neuropathy     Hypothyroidism, unspecified type     Migraine with aura and without status migrainosus, not intractable     Tinea versicolor     Lymphocytic colitis     Excessive or frequent menstruation     DDD (degenerative disc disease), cervical     Chronic pain syndrome     Past Medical History:   Diagnosis Date     Papanicolaou smear of cervix with low grade squamous intraepithelial lesion (LGSIL)      Thyroid disease      Urinary tract infection, site not specified     Recurrent UTI's     Past Surgical History:   Procedure Laterality Date     BACK SURGERY  6/2015      COLONOSCOPY  08/06/07     COLONOSCOPY  08/05/08      COLONOSCOPY W BIOPSY  02/06/08      TOOTH EXTRACTION W/FORCEP      wisdom teeth removed     INJECT EPIDURAL CERVICAL N/A 3/8/2018    Procedure: INJECT EPIDURAL CERVICAL;  cervical 6-7 interlaminar epidural steroid injection;  Surgeon: Edgardo Rubio MD;  Location:  OR     LAPAROSCOPIC TUBAL LIGATION  11/27/2012    Procedure: LAPAROSCOPIC TUBAL LIGATION;  Laparoscopic Bilateral tubal sterilization/ fulgaration;  Surgeon: Sarbjit Whittaker MD;  Location:  OR     Family History   Problem Relation Age of Onset     Hypertension Maternal Grandmother      Arthritis Maternal Grandmother      Cancer Maternal Grandmother         MGGM     Depression Maternal Grandmother      Lipids Maternal Grandmother      Osteoporosis Maternal Grandmother      Respiratory Maternal Grandmother         emphysema     Genitourinary Problems Maternal Grandmother         Kidney Failure, liver      Hypertension Maternal Grandfather      Heart Disease Maternal Grandfather         MI     Cardiovascular Maternal Grandfather      Cancer - colorectal Maternal Grandfather      Cancer Maternal Grandfather         Hodgins Lymphoma      Hypertension Paternal Grandmother      Arthritis Paternal Grandmother         RA     Breast Cancer Other         Maternal great aunt     Thyroid Disease Maternal Aunt         two aunts     Diabetes No family hx of      Social History     Socioeconomic History     Marital status:      Spouse name: Darien     Number of children: Kostas     Years of education: 12     Highest education level: Not on file   Social Needs     Financial resource strain: Not on file     Food insecurity - worry: Not on file     Food insecurity - inability: Not on file     Transportation needs - medical: Not on file     Transportation needs - non-medical: Not on file   Occupational History     Occupation: Unit Worker     Comment: [employer]   Tobacco Use     Smoking status: Current Every Day Smoker     Packs/day: 1.00     Years: 8.00     Pack years: 8.00     Types: Cigarettes     Smokeless tobacco: Never Used   Substance and Sexual Activity     Alcohol use: Yes     Alcohol/week: 0.0 oz     Comment: 1/mo     Drug use: No     Sexual activity: Yes     Partners: Male   Other Topics Concern     Parent/sibling w/ CABG, MI or angioplasty before 65F 55M? 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    Current Outpatient Medications   Medication     albuterol (2.5 MG/3ML) 0.083% neb solution     escitalopram (LEXAPRO) 20 MG tablet     levothyroxine (SYNTHROID/LEVOTHROID) 200 MCG tablet     LORazepam (ATIVAN) 1 MG tablet     mesalamine (ASACOL HD) 800 MG EC tablet     morphine (MS CONTIN) 15  "MG CR tablet     oxyCODONE (ROXICODONE) 5 MG tablet     risperiDONE (RISPERDAL) 1 MG tablet     traZODone (DESYREL) 50 MG tablet     VENTOLIN  (90 Base) MCG/ACT Inhaler     No current facility-administered medications for this visit.      Allergies   Allergen Reactions     Bupropion Hcl      Wellbutrin         Objective:  /56   Pulse 84   Temp 97.4  F (36.3  C)   Ht 1.678 m (5' 6.06\")   Wt 81.9 kg (180 lb 8 oz)   BMI 29.08 kg/m       General: Alert and in mild distress  Head: Normocephalic, atraumatic  Eyes: no scleral icterus or conjunctival erythema   Oropharynx:  Mucous membranes moist  Skin: no erythema, petechiae, or jaundice  CV: regular rhythm by palpation, 2+ distal pulses  Resp: normal respiratory effort without conversational dyspnea   Psych: normal mood and affect    Gait: Antalgic  Neuro: Motor strength and sensation as noted below    Musculoskeletal:    Bilateral hip exam    Inspection:      no edema or ecchymosis in hip area    Tender:   Left anterolateral hip, greater trochanteric bursa, and left lateral thigh    ROM: Left hip ROM is full but extremely painful.    Strength: 5/5 bilateral hip extension/abduction/adduction, knee extension/flexion, ankle dorsiflexion/plantarflexion, great toe extension, toe flexion.  Left hip flexion 4+/5, right hip flexion 5/5.    Sensation: grossly intact to light touch in the lower extremities bilaterally    Radiology:  Independent visualization of images performed.    Recent Results (from the past 24 hour(s))   XR Pelvis and Hip Left 1 View    Narrative    PELVIS AND HIP LEFT ONE VIEW   1/8/2019 11:14 AM     HISTORY: Hip pain, left.    COMPARISON: Abdominal x-rays dated 9/20/2018    FINDINGS: No acute fractures or dislocations. Alignment is anatomic.  Soft tissues are normal. Hip and SI joints are normal in appearance.  Lower lumbosacral spine fusion hardware is stable in appearance.      Impression    IMPRESSION:   1. No significant abnormality of " the left hip is identified. No acute  fracture or malalignment is seen.  2. Postop changes lower lumbar spine/sacrum are stable.  3. No abnormality of the pelvis is identified.    I discussed these findings with Dr. Wiggins on 1/8/2019 at 1117 a.m.       Assessment:  1. Acute hip pain, left        Plan:  Discussed the assessment with the patient and developed a plan together:  -Given acute severe left hip pain and leukocytosis, will order a stat left hip MRI to evaluate for infection.  She is scheduled this afternoon.  We will call her with the results.  She is in agreement with this plan.      Noy Mcmillan MD, [facility] Medicine  [facility]      Again, thank you for allowing me to participate in the care of your patient.        Sincerely,        Sierra Mcmillan MD

## 2019-01-08 NOTE — PATIENT INSTRUCTIONS
Today's Plan of Care:  -MRI of the left hip @ 2:45 today.     Follow Up: Will call with the results. Please call with any questions or concerns.

## 2019-01-10 ENCOUNTER — TELEPHONE (OUTPATIENT)
Dept: FAMILY MEDICINE | Facility: OTHER | Age: 39
End: 2019-01-10

## 2019-01-10 ENCOUNTER — OFFICE VISIT (OUTPATIENT)
Dept: ORTHOPEDICS | Facility: OTHER | Age: 39
End: 2019-01-10
Payer: COMMERCIAL

## 2019-01-10 ENCOUNTER — HOSPITAL ENCOUNTER (OUTPATIENT)
Dept: ULTRASOUND IMAGING | Facility: CLINIC | Age: 39
Discharge: HOME | End: 2019-01-10
Attending: NURSE PRACTITIONER | Admitting: NURSE PRACTITIONER
Payer: COMMERCIAL

## 2019-01-10 VITALS
BODY MASS INDEX: 29.01 KG/M2 | SYSTOLIC BLOOD PRESSURE: 94 MMHG | DIASTOLIC BLOOD PRESSURE: 64 MMHG | HEIGHT: 66 IN | WEIGHT: 180.5 LBS

## 2019-01-10 DIAGNOSIS — M25.552 ACUTE HIP PAIN, LEFT: Primary | ICD-10-CM

## 2019-01-10 DIAGNOSIS — R10.11 RIGHT UPPER QUADRANT PAIN: ICD-10-CM

## 2019-01-10 DIAGNOSIS — M70.62 GREATER TROCHANTERIC BURSITIS OF LEFT HIP: ICD-10-CM

## 2019-01-10 DIAGNOSIS — M25.552 HIP PAIN, LEFT: ICD-10-CM

## 2019-01-10 LAB — WBC # BLD AUTO: 10.3 10E9/L (ref 4–11)

## 2019-01-10 PROCEDURE — 20610 DRAIN/INJ JOINT/BURSA W/O US: CPT | Mod: LT | Performed by: PHYSICAL MEDICINE & REHABILITATION

## 2019-01-10 PROCEDURE — 36415 COLL VENOUS BLD VENIPUNCTURE: CPT | Performed by: FAMILY MEDICINE

## 2019-01-10 PROCEDURE — 76705 ECHO EXAM OF ABDOMEN: CPT

## 2019-01-10 PROCEDURE — 99213 OFFICE O/P EST LOW 20 MIN: CPT | Mod: 25 | Performed by: PHYSICAL MEDICINE & REHABILITATION

## 2019-01-10 PROCEDURE — 85048 AUTOMATED LEUKOCYTE COUNT: CPT | Performed by: FAMILY MEDICINE

## 2019-01-10 RX ORDER — BUPIVACAINE HYDROCHLORIDE 5 MG/ML
3 INJECTION, SOLUTION EPIDURAL; INTRACAUDAL
Status: DISCONTINUED | OUTPATIENT
Start: 2019-01-10 | End: 2019-02-13

## 2019-01-10 RX ORDER — LIDOCAINE HYDROCHLORIDE 10 MG/ML
3 INJECTION, SOLUTION INFILTRATION; PERINEURAL
Status: DISCONTINUED | OUTPATIENT
Start: 2019-01-10 | End: 2019-02-13

## 2019-01-10 RX ORDER — TRIAMCINOLONE ACETONIDE 40 MG/ML
40 INJECTION, SUSPENSION INTRA-ARTICULAR; INTRAMUSCULAR
Status: DISCONTINUED | OUTPATIENT
Start: 2019-01-10 | End: 2019-02-13

## 2019-01-10 RX ADMIN — TRIAMCINOLONE ACETONIDE 40 MG: 40 INJECTION, SUSPENSION INTRA-ARTICULAR; INTRAMUSCULAR at 12:04

## 2019-01-10 RX ADMIN — LIDOCAINE HYDROCHLORIDE 3 ML: 10 INJECTION, SOLUTION INFILTRATION; PERINEURAL at 12:04

## 2019-01-10 RX ADMIN — BUPIVACAINE HYDROCHLORIDE 3 ML: 5 INJECTION, SOLUTION EPIDURAL; INTRACAUDAL at 12:04

## 2019-01-10 ASSESSMENT — MIFFLIN-ST. JEOR: SCORE: 1516.44

## 2019-01-10 NOTE — LETTER
January 8, 2019      Mattie Banda  [address] [place] [id number]        To Whom It May Concern:    Mattie Banda  was seen on 1/8/19 and 1/10/19 for hip pain. Please excuse her absence for work due to this issue from 1/8 to 1/10.  Thank you for your help in advance.       Sincerely,        Sierra Mcmillan MD, CAQ

## 2019-01-10 NOTE — TELEPHONE ENCOUNTER
Called   wbc now nl; cause of this is unclear. She has no new symptoms, and no  symptoms of infection  GB US was also nl  Recommended to  call back  if new or increased symptoms.   recommended to f/u with Dr Schofield in 7-10 dd

## 2019-01-10 NOTE — PATIENT INSTRUCTIONS
Today's Plan of Care:  -Steroid injection performed today.  Take it easy over the next few days. Keep in mind that the steroid may take up to 3 days to start working and up to 2 weeks to reach maximal effect.  Ice 15-20 minutes as needed for soreness.  Patient's preferred over the counter medication as needed for pain as directed on packaging.      Follow Up: As needed if symptoms fail to improve or worsen. Please call with any questions or concerns.

## 2019-01-10 NOTE — PROGRESS NOTES
"Sports Medicine Clinic Visit - Interim History January 10, 2019    Initial Visit Date 1/8/2019    PCP: Yuan Schofield    Mattie Banda is a 38 year old female who is seen in follow up for acute left hip pain. Since last visit on 1/8/2019 patient has not had any changes in her pain.  She continues to have pain over the anterior/lateral hip and down the lateral thigh. She notes that she has had more constant \"lightning bolt\" sensations down the thigh.  She rates the pain at a 7/10 currently.  Symptoms are relieved with nothing.  Symptoms are worsened by changing positions while lying down. She has tomorrow through Sunday off and returns to work on Monday.     Review of Systems  Musculoskeletal: as above  Remainder of review of systems is negative including constitutional, eyes, ENT, CV, pulmonary, GI, , endocrine, skin, hematologic, and neurologic except as noted in HPI or medical history.    History reviewed. No pertinent past surgical/medical/family/social history other than as mentioned in HPI.    Patient Active Problem List   Diagnosis     Contraceptive management     CARDIOVASCULAR SCREENING; LDL GOAL LESS THAN 160     Degeneration of lumbar intervertebral disc     S/P lumbar fusion and discectomy June 2015     Low grade squamous intraepithelial lesion on cytologic smear of cervix (LGSIL)     Labial lesion     Mixed anxiety depressive disorder     Tobacco use disorder     Idiopathic peripheral neuropathy     Hypothyroidism, unspecified type     Migraine with aura and without status migrainosus, not intractable     Tinea versicolor     Lymphocytic colitis     Excessive or frequent menstruation     DDD (degenerative disc disease), cervical     Chronic pain syndrome     Past Medical History:   Diagnosis Date     Papanicolaou smear of cervix with low grade squamous intraepithelial lesion (LGSIL)      Thyroid disease      Urinary tract infection, site not specified     Recurrent UTI's     Past Surgical " History:   Procedure Laterality Date     BACK SURGERY  6/2015      COLONOSCOPY  08/06/07     COLONOSCOPY  08/05/08     HC COLONOSCOPY W BIOPSY  02/06/08     HC TOOTH EXTRACTION W/FORCEP      wisdom teeth removed     INJECT EPIDURAL CERVICAL N/A 3/8/2018    Procedure: INJECT EPIDURAL CERVICAL;  cervical 6-7 interlaminar epidural steroid injection;  Surgeon: Edgardo Rubio MD;  Location: [facility] OR     LAPAROSCOPIC TUBAL LIGATION  11/27/2012    Procedure: LAPAROSCOPIC TUBAL LIGATION;  Laparoscopic Bilateral tubal sterilization/ fulgaration;  Surgeon: Sarbjit Whittaker MD;  Location: [facility] OR     Family History   Problem Relation Age of Onset     Hypertension Maternal Grandmother      Arthritis Maternal Grandmother      Cancer Maternal Grandmother         MGGM     Depression Maternal Grandmother      Lipids Maternal Grandmother      Osteoporosis Maternal Grandmother      Respiratory Maternal Grandmother         emphysema     Genitourinary Problems Maternal Grandmother         Kidney Failure, liver      Hypertension Maternal Grandfather      Heart Disease Maternal Grandfather         MI     Cardiovascular Maternal Grandfather      Cancer - colorectal Maternal Grandfather      Cancer Maternal Grandfather         [place] Lymphoma     Hypertension Paternal Grandmother      Arthritis Paternal Grandmother         RA     Breast Cancer Other         Maternal great aunt     Thyroid Disease Maternal Aunt         two aunts     Diabetes No family hx of      Social History     Socioeconomic History     Marital status:      Spouse name: Darien     Number of children: 1     Years of education: 12     Highest education level: Not on file   Social Needs     Financial resource strain: Not on file     Food insecurity - worry: Not on file     Food insecurity - inability: Not on file     Transportation needs - medical: Not on file     Transportation needs - non-medical: Not on file   Occupational History     Occupation: Unit Worker  "    Comment: [employer]   Tobacco Use     Smoking status: Current Every Day Smoker     Packs/day: 1.00     Years: 8.00     Pack years: 8.00     Types: Cigarettes     Smokeless tobacco: Never Used   Substance and Sexual Activity     Alcohol use: Yes     Alcohol/week: 0.0 oz     Comment: 1/mo     Drug use: No     Sexual activity: Yes     Partners: Male   Other Topics Concern     Parent/sibling w/ CABG, MI or angioplasty before 65F 55M? 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    Current Outpatient Medications   Medication     albuterol (2.5 MG/3ML) 0.083% neb solution     escitalopram (LEXAPRO) 20 MG tablet     levothyroxine (SYNTHROID/LEVOTHROID) 200 MCG tablet     LORazepam (ATIVAN) 1 MG tablet     mesalamine (ASACOL HD) 800 MG EC tablet     morphine (MS CONTIN) 15 MG CR tablet     oxyCODONE (ROXICODONE) 5 MG tablet     risperiDONE (RISPERDAL) 1 MG tablet     traZODone (DESYREL) 50 MG tablet     VENTOLIN  (90 Base) MCG/ACT Inhaler     No current facility-administered medications for this visit.      Allergies   Allergen Reactions     Bupropion Hcl      Wellbutrin         Objective:  BP 94/64   Ht 1.678 m (5' 6.06\")   Wt 81.9 kg (180 lb 8 oz)   BMI 29.08 kg/m      General: Alert and in no distress    Head: Normocephalic, atraumatic  Eyes: no scleral icterus or conjunctival erythema   Oropharynx:  Mucous membranes moist  Skin: no erythema, petechiae, or jaundice  CV: regular rhythm by palpation, 2+ distal pulses  Resp: normal respiratory effort " without conversational dyspnea   Psych: normal mood and affect    Gait: Antalgic    Radiology:  Independent visualization of images performed and reviewed with Mattie.    MR LEFT HIP WITHOUT CONTRAST  1/8/2019 3:39 PM     HISTORY:  Two-month history of left hip pain with elevated white blood  cell count. No specific injury. Evaluate for septic arthritis.     TECHNIQUE:  Multiplanar, multisequence without contrast.     COMPARISON: None.     FINDINGS:   Osseous and Cartilaginous Structures:  No acute-appearing bony  abnormality. In particular, the left hip shows no evidence for  fracture, bone marrow edema or significant degenerative change. The  right hip also shows no significant degenerative change. However,  there is some mild bone marrow edema about the sacroiliac joints  bilaterally which may be related to sacroiliitis (images 14 and 15 of  series 5 and images 24 and 25 of series 3). Metallic artifact from  prior anterior interbody fusion at the lumbosacral junction. The  remainder of the bony structures are unremarkable.      Acetabular Labrum: No juxtaacetabular cyst.  No obvious labral tear is  appreciated, allowing for the large FOV technique.  If indicated  clinically, MR arthrography would be considered the study of choice in  this regard.     Hip joint space:  No significant joint effusion.      Trochanteric and Iliopsoas Bursae: A trace amount of fluid in the  right trochanteric bursa could indicate trochanteric bursitis, and  clinical correlation is recommended. No fluid in the left trochanteric  bursa or iliopsoas bursa bilaterally.     Common Hamstring Tendon: No evidence of tear or significant  tendinosis.     Additional Findings: Muscles and other soft tissues around the hips  and pelvis appear normal.  The gluteus medius and minimus tendons  appear unremarkable.      Internal Pelvic Structures: No acute-appearing abnormality or free  fluid.                                                                       IMPRESSION:   1. There are no MRI findings to suggest septic arthritis of the left  hip. In particular, there is no evidence for joint space effusion or  bone marrow edema.  2. Possible bilateral sacroiliitis.  3. Trace amount of fluid in the right trochanteric bursa could  indicate mild bursitis, and clinical correlation is recommended.     MOLLY DONOVAN MD    Large Joint Injection/Arthocentesis: L greater trochanteric bursa  Date/Time: 1/10/2019 12:04 PM  Performed by: Sierra Mcmillan MD  Authorized by: Sierra Mcmillan MD     Indications:  Pain  Needle Size:  22 G  Guidance: landmark guided    Approach:  Lateral  Location:  Hip  Site:  L greater trochanteric bursa  Medications:  3 mL lidocaine 1 %; 3 mL bupivacaine (PF) 0.5 %; 40 mg triamcinolone 40 MG/ML  Outcome:  Tolerated well, no immediate complications  Procedure discussed: discussed risks, benefits, and alternatives    Consent Given by:  Patient            Assessment:  1. Acute hip pain, left    2. Greater trochanteric bursitis of left hip        Plan:  Discussed the assessment with the patient and developed a plan together:  -Steroid injection performed today.  Take it easy over the next few days. Keep in mind that the steroid may take up to 3 days to start working and up to 2 weeks to reach maximal effect.  Ice 15-20 minutes as needed for soreness.  Patient's preferred over the counter medication as needed for pain as directed on packaging.  -Updated work letter provided.    -Follow Up: As needed if symptoms fail to improve or worsen. Please call with any questions or concerns.     Noy Mcmillan MD, CAQ Sports Medicine  [facility]

## 2019-01-14 ENCOUNTER — MYC REFILL (OUTPATIENT)
Dept: FAMILY MEDICINE | Facility: CLINIC | Age: 39
End: 2019-01-14

## 2019-01-14 DIAGNOSIS — M51.369 DEGENERATION OF LUMBAR INTERVERTEBRAL DISC: ICD-10-CM

## 2019-01-14 DIAGNOSIS — M50.30 DDD (DEGENERATIVE DISC DISEASE), CERVICAL: ICD-10-CM

## 2019-01-14 RX ORDER — MORPHINE SULFATE 15 MG/1
15 TABLET, FILM COATED, EXTENDED RELEASE ORAL EVERY 12 HOURS
Qty: 60 TABLET | Refills: 0 | Status: SHIPPED | OUTPATIENT
Start: 2019-01-14 | End: 2019-02-08

## 2019-01-14 RX ORDER — OXYCODONE HYDROCHLORIDE 5 MG/1
TABLET ORAL
Qty: 60 TABLET | Refills: 0 | Status: SHIPPED | OUTPATIENT
Start: 2019-01-14 | End: 2019-01-28

## 2019-01-14 RX ORDER — OXYCODONE HYDROCHLORIDE 5 MG/1
TABLET ORAL
Qty: 60 TABLET | Refills: 0 | Status: SHIPPED | OUTPATIENT
Start: 2019-01-14 | End: 2019-02-13

## 2019-01-14 RX ORDER — MORPHINE SULFATE 15 MG/1
15 TABLET, FILM COATED, EXTENDED RELEASE ORAL EVERY 12 HOURS
Qty: 60 TABLET | Refills: 0 | Status: SHIPPED | OUTPATIENT
Start: 2019-01-14 | End: 2019-02-13

## 2019-01-14 NOTE — TELEPHONE ENCOUNTER
Morphine 15 MG       Last Written Prescription Date:  12/27/18  Last Fill Quantity: 60,   # refills: 0  Last Office Visit: 8/20/18  Future Office visit:       Routing refill request to provider for review/approval because:  Drug not on the [facility], [facility] or [facility] refill protocol or controlled substance  Oxycodone       Last Written Prescription Date:  12/27/18  Last Fill Quantity: 60,   # refills: 0  Last Office Visit: 8/20/18  Future Office visit:       Routing refill request to provider for review/approval because:  Drug not on the [facility], [facility] or [facility] refill protocol or controlled substance

## 2019-01-14 NOTE — TELEPHONE ENCOUNTER
Oxycodone and morphine      Last Written Prescription Date:  12/27/18 for both  Last Fill Quantity: 60,   # refills: 0  Last Office Visit: 01/08/19  Future Office visit:       Routing refill request to provider for review/approval because:  Drug not on the [facility], [facility] or [facility] refill protocol or controlled substance

## 2019-01-16 ENCOUNTER — TELEPHONE (OUTPATIENT)
Dept: FAMILY MEDICINE | Facility: CLINIC | Age: 39
End: 2019-01-16

## 2019-01-16 DIAGNOSIS — M25.552 HIP PAIN, LEFT: Primary | ICD-10-CM

## 2019-01-16 NOTE — TELEPHONE ENCOUNTER
Reason for Call:  Other prescription    Detailed comments:pt called and states her pain meds are not working for her anymore. Sill in a lot of pain even after hip injection. Was advised by Dr. Mcmillan to see PCP if pain not better. Would like to be worked in to see Dr. Alberto.     Phone Number Patient can be reached at: Home number on file [phone] (home)    Best Time: any    Can we leave a detailed message on this number? YES    Call taken on 1/16/2019 at 8:41 AM by Candice Chun

## 2019-01-16 NOTE — TELEPHONE ENCOUNTER
We should order left hip arthrogram and include in clinical info recent MRI report suggested this further exam but need to know if she is claustrophobic and needs treatment for it.  Yuan Schofield MD

## 2019-01-16 NOTE — TELEPHONE ENCOUNTER
I have contacted pt and scheduled an appt for Monday, 1/21/2019. She is wondering what else she can do in the meantime for the pain. She said it is unbearable. Elaina Ramos CMA ([facility])

## 2019-01-16 NOTE — TELEPHONE ENCOUNTER
I have talked to pt. She would like to go ahead with the arthrography. She said she has tried everything else. Please place the order then we will call to schedule pt. Elaina Ramos CMA ([facility])

## 2019-01-16 NOTE — TELEPHONE ENCOUNTER
If pain is not relieved when inactive and non weight bearing, and a  Position cannot be found that results in pain relief, I am not sure what I can add  if I if I see her. I have no openings this week and Monday would be the first I could see her. What relief do her chronic meds give?  it reads as if none. Maybe Orthopedics would be a choice for visit.  MRI suggests possible need for joint arthrography.  Maybe we need to arrange this test and should do so and/or steroid joint injection or both. This might be therapeutic and diagnostic. Please see if she can see orthopedics or the tests and/or injections can be ordered.  This hip pain might be coming from her back. Does she think so? This may need to be considered and she would see neurosurgical team this week or have MRI/CT scan of lumbar spine depending on what type she can have after previous surgery. Yuan Schofield MD

## 2019-01-24 ENCOUNTER — HOSPITAL ENCOUNTER (OUTPATIENT)
Dept: MRI IMAGING | Facility: CLINIC | Age: 39
End: 2019-01-24
Attending: FAMILY MEDICINE
Payer: COMMERCIAL

## 2019-01-24 ENCOUNTER — HOSPITAL ENCOUNTER (OUTPATIENT)
Dept: GENERAL RADIOLOGY | Facility: CLINIC | Age: 39
Discharge: HOME | End: 2019-01-24
Attending: FAMILY MEDICINE | Admitting: FAMILY MEDICINE
Payer: COMMERCIAL

## 2019-01-24 DIAGNOSIS — M25.552 HIP PAIN, LEFT: ICD-10-CM

## 2019-01-24 PROCEDURE — A9585 GADOBUTROL INJECTION: HCPCS | Performed by: RADIOLOGY

## 2019-01-24 PROCEDURE — 73722 MRI JOINT OF LWR EXTR W/DYE: CPT | Mod: LT

## 2019-01-24 PROCEDURE — 25500064 ZZH RX 255 OP 636: Performed by: RADIOLOGY

## 2019-01-24 PROCEDURE — 25000125 ZZHC RX 250: Performed by: RADIOLOGY

## 2019-01-24 PROCEDURE — 27093 INJECTION FOR HIP X-RAY: CPT

## 2019-01-24 RX ORDER — IOPAMIDOL 408 MG/ML
50 INJECTION, SOLUTION INTRAVASCULAR ONCE
Status: COMPLETED | OUTPATIENT
Start: 2019-01-24 | End: 2019-01-24

## 2019-01-24 RX ORDER — GADOBUTROL 604.72 MG/ML
7.5 INJECTION INTRAVENOUS ONCE
Status: COMPLETED | OUTPATIENT
Start: 2019-01-24 | End: 2019-01-24

## 2019-01-24 RX ORDER — LIDOCAINE HYDROCHLORIDE 10 MG/ML
20 INJECTION, SOLUTION INFILTRATION; PERINEURAL ONCE
Status: COMPLETED | OUTPATIENT
Start: 2019-01-24 | End: 2019-01-24

## 2019-01-24 RX ADMIN — LIDOCAINE HYDROCHLORIDE 6.25 ML: 10 INJECTION, SOLUTION INFILTRATION; PERINEURAL at 14:39

## 2019-01-24 RX ADMIN — IOPAMIDOL 5 ML: 408 INJECTION, SOLUTION INTRAVASCULAR at 14:42

## 2019-01-24 RX ADMIN — GADOBUTROL 0.05 ML: 604.72 INJECTION INTRAVENOUS at 14:42

## 2019-01-28 ENCOUNTER — MYC REFILL (OUTPATIENT)
Dept: FAMILY MEDICINE | Facility: CLINIC | Age: 39
End: 2019-01-28

## 2019-01-28 DIAGNOSIS — M51.369 DEGENERATION OF LUMBAR INTERVERTEBRAL DISC: ICD-10-CM

## 2019-01-28 DIAGNOSIS — M50.30 DDD (DEGENERATIVE DISC DISEASE), CERVICAL: ICD-10-CM

## 2019-01-28 RX ORDER — OXYCODONE HYDROCHLORIDE 5 MG/1
TABLET ORAL
Qty: 60 TABLET | Refills: 0 | Status: SHIPPED | OUTPATIENT
Start: 2019-01-28 | End: 2019-02-08

## 2019-01-28 NOTE — TELEPHONE ENCOUNTER
Oxycodone-there is 2 of these on her med list with the same dates.   Last Written Prescription Date:  01/14/19  Last Fill Quantity: 60,   # refills: 0  Last Office Visit: 01/08/19  Future Office visit:    Next 5 appointments (look out 90 days)    Feb 13, 2019 10:00 AM VIDYA Gonzalez with Yuan Iain Schofield MD  [facility] ([facility]) [address] [place] [address] [id number] [phone]           Routing refill request to provider for review/approval because:  Drug not on the [facility], [facility] or  [facility] refill protocol or controlled substance

## 2019-02-08 ENCOUNTER — MYC REFILL (OUTPATIENT)
Dept: FAMILY MEDICINE | Facility: CLINIC | Age: 39
End: 2019-02-08

## 2019-02-08 DIAGNOSIS — M50.30 DDD (DEGENERATIVE DISC DISEASE), CERVICAL: ICD-10-CM

## 2019-02-08 DIAGNOSIS — M51.369 DEGENERATION OF LUMBAR INTERVERTEBRAL DISC: ICD-10-CM

## 2019-02-08 DIAGNOSIS — F41.8 MIXED ANXIETY DEPRESSIVE DISORDER: ICD-10-CM

## 2019-02-08 RX ORDER — LORAZEPAM 1 MG/1
1-2 TABLET ORAL EVERY 8 HOURS PRN
Qty: 20 TABLET | Refills: 0 | Status: CANCELLED | OUTPATIENT
Start: 2019-02-08

## 2019-02-08 RX ORDER — OXYCODONE HYDROCHLORIDE 5 MG/1
TABLET ORAL
Qty: 60 TABLET | Refills: 0 | Status: SHIPPED | OUTPATIENT
Start: 2019-02-08 | End: 2019-02-18

## 2019-02-08 RX ORDER — LORAZEPAM 1 MG/1
1-2 TABLET ORAL EVERY 8 HOURS PRN
Qty: 20 TABLET | Refills: 0 | Status: SHIPPED | OUTPATIENT
Start: 2019-02-08 | End: 2019-02-08

## 2019-02-08 RX ORDER — LORAZEPAM 1 MG/1
1-2 TABLET ORAL EVERY 8 HOURS PRN
Qty: 20 TABLET | Refills: 0 | Status: SHIPPED | OUTPATIENT
Start: 2019-02-08 | End: 2019-03-04

## 2019-02-08 RX ORDER — MORPHINE SULFATE 15 MG/1
15 TABLET, FILM COATED, EXTENDED RELEASE ORAL EVERY 12 HOURS
Qty: 60 TABLET | Refills: 0 | Status: CANCELLED | OUTPATIENT
Start: 2019-02-08

## 2019-02-08 RX ORDER — MORPHINE SULFATE 15 MG/1
15 TABLET, FILM COATED, EXTENDED RELEASE ORAL EVERY 12 HOURS
Qty: 60 TABLET | Refills: 0 | Status: SHIPPED | OUTPATIENT
Start: 2019-02-08 | End: 2019-03-04

## 2019-02-08 RX ORDER — OXYCODONE HYDROCHLORIDE 5 MG/1
TABLET ORAL
Qty: 60 TABLET | Refills: 0 | Status: CANCELLED | OUTPATIENT
Start: 2019-02-08

## 2019-02-08 NOTE — TELEPHONE ENCOUNTER
Requested Prescriptions   Pending Prescriptions Disp Refills     morphine (MS CONTIN) 15 MG CR tablet 60 tablet 0     Sig: Take 1 tablet (15 mg) by mouth every 12 hours maximum 2 tablet(s) per day    There is no refill protocol information for this order   Last Written Prescription Date:  19  Last Fill Quantity: 60,  # refills: 0   Last office visit :18    Future Office Visit:   Next 5 appointments (look out 90 days)    2019 10:00 AM VIDYA Gonzalez with Yuan Schofield MD  [facility] ([address] [id number]  [phone]       Routing refill request to provider for review/approval because:  Drug not on the [facility] refill protocol              oxyCODONE (ROXICODONE) 5 MG tablet 60 tablet 0     Si-2 tablets 4 times daily for breakthrough pain.  Use sparingly.  No more than 4 daily.    There is no refill protocol information for this order      Last Written Prescription Date:  19  Last Fill Quantity: 60,  # refills: 0   Last office visit: 18    Future Office Visit:   Next 5 appointments (look out 90 days)    2019 10:00 AM VIDYA Gonzalez with Yuan Schofield MD  [facility] ([address] [id number]  [phone]       Routing refill request to provider for review/approval because:  Drug not on the [facility] refill protocol     Estefanía Prado RN

## 2019-02-08 NOTE — TELEPHONE ENCOUNTER
lorazepam      Last Written Prescription Date:  12/20/18  Last Fill Quantity: 20,   # refills: 0  Last Office Visit: 01/08/19  Future Office visit:    Next 5 appointments (look out 90 days)    Feb 13, 2019 10:00 AM VIDYA Gonzalez with Yuan Iani Schofield MD  [facility] ([facility]) [address] [place] [id number]  [phone]           Routing refill request to provider for review/approval because:  Drug not on the [facility], [facility] or [facility] refill protocol or controlled substance

## 2019-02-13 ENCOUNTER — OFFICE VISIT (OUTPATIENT)
Dept: FAMILY MEDICINE | Facility: CLINIC | Age: 39
End: 2019-02-13
Payer: COMMERCIAL

## 2019-02-13 VITALS
TEMPERATURE: 96.3 F | WEIGHT: 183.9 LBS | BODY MASS INDEX: 29.63 KG/M2 | DIASTOLIC BLOOD PRESSURE: 62 MMHG | SYSTOLIC BLOOD PRESSURE: 98 MMHG | OXYGEN SATURATION: 97 % | RESPIRATION RATE: 18 BRPM | HEART RATE: 89 BPM

## 2019-02-13 DIAGNOSIS — Z23 NEED FOR PROPHYLACTIC VACCINATION AND INOCULATION AGAINST INFLUENZA: ICD-10-CM

## 2019-02-13 DIAGNOSIS — M50.30 DDD (DEGENERATIVE DISC DISEASE), CERVICAL: ICD-10-CM

## 2019-02-13 DIAGNOSIS — M51.369 DEGENERATION OF LUMBAR INTERVERTEBRAL DISC: Primary | ICD-10-CM

## 2019-02-13 PROCEDURE — 90471 IMMUNIZATION ADMIN: CPT | Performed by: FAMILY MEDICINE

## 2019-02-13 PROCEDURE — 90688 IIV4 VACCINE SPLT 0.5 ML IM: CPT | Performed by: FAMILY MEDICINE

## 2019-02-13 PROCEDURE — 99214 OFFICE O/P EST MOD 30 MIN: CPT | Mod: 25 | Performed by: FAMILY MEDICINE

## 2019-02-13 ASSESSMENT — ANXIETY QUESTIONNAIRES
6. BECOMING EASILY ANNOYED OR IRRITABLE: SEVERAL DAYS
2. NOT BEING ABLE TO STOP OR CONTROL WORRYING: NEARLY EVERY DAY
GAD7 TOTAL SCORE: 18
3. WORRYING TOO MUCH ABOUT DIFFERENT THINGS: NEARLY EVERY DAY
IF YOU CHECKED OFF ANY PROBLEMS ON THIS QUESTIONNAIRE, HOW DIFFICULT HAVE THESE PROBLEMS MADE IT FOR YOU TO DO YOUR WORK, TAKE CARE OF THINGS AT HOME, OR GET ALONG WITH OTHER PEOPLE: EXTREMELY DIFFICULT
5. BEING SO RESTLESS THAT IT IS HARD TO SIT STILL: MORE THAN HALF THE DAYS
1. FEELING NERVOUS, ANXIOUS, OR ON EDGE: NEARLY EVERY DAY
7. FEELING AFRAID AS IF SOMETHING AWFUL MIGHT HAPPEN: NEARLY EVERY DAY

## 2019-02-13 ASSESSMENT — PATIENT HEALTH QUESTIONNAIRE - PHQ9
5. POOR APPETITE OR OVEREATING: NEARLY EVERY DAY
SUM OF ALL RESPONSES TO PHQ QUESTIONS 1-9: 14

## 2019-02-13 ASSESSMENT — PAIN SCALES - GENERAL: PAINLEVEL: SEVERE PAIN (7)

## 2019-02-13 NOTE — PATIENT INSTRUCTIONS
Citalopram take every day for at least 8 weeks  See [facility] chiropractor and she will keep us informed with your progrss

## 2019-02-13 NOTE — PROGRESS NOTES
SUBJECTIVE:   Mattie Banda is a 38 year old female who presents to clinic today for the following health issues:      Chief Complaint   Patient presents with     Pain     hip and thigh      Results     hip scan     Orders     injections for back and neck          Amount of exercise or physical activity: None    Problems taking medications regularly: No    Medication side effects: none    Diet: regular (no restrictions)    Patient describes this hip area groin pain as being sudden, stabbing and lightninglike and extending down the anterior thigh even beyond the knee.  It would happen most often at night in bed when she was shifting position.  Injection of the hip has helped some of this pain.  There would be a numb sensation and/or creepy crawly type sensation on the anterior thigh and into the lateral thigh at the same time more lasting for hours after.  Mostly now just the now slightly creepy crawly sensation lateral thigh to the knee on the left.  Groin pain has nearly disappeared.  She continues to have somewhat limiting neck and shoulder pain.  She has been seeing pain clinic's but currently just dealing with it.  She is wondering what other options we have for her at this time.  Pain medication still doesHelp her.  She does not feel she has side effects from it.  Going forward she might like to have injections again.        Problem list and histories reviewed & adjusted, as indicated.  Additional history: as documented    Patient Active Problem List   Diagnosis     Contraceptive management     CARDIOVASCULAR SCREENING; LDL GOAL LESS THAN 160     Degeneration of lumbar intervertebral disc     S/P lumbar fusion and discectomy June 2015     Low grade squamous intraepithelial lesion on cytologic smear of cervix (LGSIL)     Labial lesion     Mixed anxiety depressive disorder     Tobacco use disorder     Idiopathic peripheral neuropathy     Hypothyroidism, unspecified type     Migraine with aura and without  status migrainosus, not intractable     Tinea versicolor     Lymphocytic colitis     Excessive or frequent menstruation     DDD (degenerative disc disease), cervical     Chronic pain syndrome     Past Surgical History:   Procedure Laterality Date     BACK SURGERY  6/2015      COLONOSCOPY  08/06/07     COLONOSCOPY  08/05/08     HC COLONOSCOPY W BIOPSY  02/06/08     HC TOOTH EXTRACTION W/FORCEP      wisdom teeth removed     INJECT EPIDURAL CERVICAL N/A 3/8/2018    Procedure: INJECT EPIDURAL CERVICAL;  cervical 6-7 interlaminar epidural steroid injection;  Surgeon: Edgardo Rubio MD;  Location: [facility]     LAPAROSCOPIC TUBAL LIGATION  11/27/2012    Procedure: LAPAROSCOPIC TUBAL LIGATION;  Laparoscopic Bilateral tubal sterilization/ fulgaration;  Surgeon: Sarbjit Whittaker MD;  Location: [facility] OR       Social History     Tobacco Use     Smoking status: Current Every Day Smoker     Packs/day: 1.00     Years: 8.00     Pack years: 8.00     Types: Cigarettes     Smokeless tobacco: Never Used   Substance Use Topics     Alcohol use: Yes     Alcohol/week: 0.0 oz     Comment: 1/mo     Family History   Problem Relation Age of Onset     Hypertension Maternal Grandmother      Arthritis Maternal Grandmother      Cancer Maternal Grandmother         MGGM     Depression Maternal Grandmother      Lipids Maternal Grandmother      Osteoporosis Maternal Grandmother      Respiratory Maternal Grandmother         emphysema     Genitourinary Problems Maternal Grandmother         Kidney Failure, liver      Hypertension Maternal Grandfather      Heart Disease Maternal Grandfather         MI     Cardiovascular Maternal Grandfather      Cancer - colorectal Maternal Grandfather      Cancer Maternal Grandfather         [place] Lymphoma     Hypertension Paternal Grandmother      Arthritis Paternal Grandmother         RA     Breast Cancer Other         Maternal great aunt     Thyroid Disease Maternal Aunt         two aunts     Diabetes No family  hx of          Current Outpatient Medications   Medication Sig Dispense Refill     albuterol (2.5 MG/3ML) 0.083% neb solution Take 1 vial (2.5 mg) by nebulization every 6 hours as needed for shortness of breath / dyspnea or wheezing 1 Box 0     escitalopram (LEXAPRO) 20 MG tablet Take 1 tablet (20 mg) by mouth daily 90 tablet 3     levothyroxine (SYNTHROID/LEVOTHROID) 200 MCG tablet Take 1 tablet (200 mcg) by mouth daily 90 tablet 1     LORazepam (ATIVAN) 1 MG tablet Take 1-2 tablets (1-2 mg) by mouth every 8 hours as needed for anxiety 20 tablet 0     mesalamine (ASACOL HD) 800 MG EC tablet Take 2 tablets (1,600 mg) by mouth 3 times daily 90 tablet 1     morphine (MS CONTIN) 15 MG CR tablet Take 1 tablet (15 mg) by mouth every 12 hours maximum 2 tablet(s) per day 60 tablet 0     oxyCODONE (ROXICODONE) 5 MG tablet 1-2 tablets 4 times daily for breakthrough pain.  Use sparingly.  No more than 4 daily. 60 tablet 0     risperiDONE (RISPERDAL) 1 MG tablet TAKE 1/2 TABLET (0.5MG) BY MOUTH DAILY AT BEDTIME 45 tablet 3     traZODone (DESYREL) 50 MG tablet 1 -3 tablets by mouth nightlty 90 tablet 5     VENTOLIN  (90 Base) MCG/ACT Inhaler INHALE 2 PUFFS INTO THE LUNGS EVERY 6 HOURS AS NEEDED FOR SHORTNESSOF BREATH / DYSPNEA OR WHEEZING 52 g 3     BP Readings from Last 3 Encounters:   02/13/19 98/62   01/10/19 94/64   01/08/19 107/56    Wt Readings from Last 3 Encounters:   02/13/19 83.4 kg (183 lb 14.4 oz)   01/10/19 81.9 kg (180 lb 8 oz)   01/08/19 81.9 kg (180 lb 8 oz)                  Labs reviewed in EPIC    Reviewed and updated as needed this visit by clinical staff  Tobacco  Allergies  Meds       Reviewed and updated as needed this visit by Provider         ROS:  Constitutional, HEENT, cardiovascular, pulmonary, gi and gu systems are negative, except as otherwise noted.  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  She tolerates this okay regarding pain.  He does not require lifting bending and  "twisting  Patient is not some much depressed is just anxious about things.  She has not taken citalopram routinely and never more than a couple weeks at a time.    OBJECTIVE:     BP 98/62   Pulse 89   Temp 96.3  F (35.7  C) (Temporal)   Resp 18   Wt 83.4 kg (183 lb 14.4 oz)   LMP 02/02/2019 (Exact Date)   SpO2 97%   BMI 29.63 kg/m     Body mass index is 29.63 kg/m .  GENERAL: healthy, alert and no distress  NECK: no adenopathy, no asymmetry, masses, or scars and thyroid normal to palpation  RESP: lungs clear to auscultation - no rales, rhonchi or wheezes  MS: There is tightness in her posterior neck muscles but she does have full range of motion.  She states it feels to her as if it should crack.  Regarding lumbar spine rocking the pelvis is a little bit discomforting.  SI joint palpation was not.  Flexing extending and internally and externally rotating the right hip was negative.  Little more pain on the internal and external rotation left hip.  No masses in either hip or groin region.  There is no true straight leg raising difficulty but she preferred having her knees bent when she was on her back.  She was slow to go down and come back up.  SKIN: no suspicious lesions or rashes  NEURO: Grossly negative with good strength symmetrically lower extremities  PSYCH: mentation appears normal, affect normal/bright    Diagnostic Test Results:  No results found for this or any previous visit (from the past 24 hour(s)).    ASSESSMENT/PLAN:           Tobacco Cessation:   reports that she has been smoking cigarettes.  She has a 8.00 pack-year smoking history. she has never used smokeless tobacco.  Tobacco Cessation Action Plan: Information offered: Patient not interested at this time    BMI:   Estimated body mass index is 29.63 kg/m  as calculated from the following:    Height as of 1/10/19: 1.678 m (5' 6.06\").    Weight as of this encounter: 83.4 kg (183 lb 14.4 oz).   Weight management plan: Discussed healthy diet " and exercise guidelines Patient acknowledges she has gained weight with decreased activity secondary to pain.      1. Degeneration of lumbar intervertebral disc  Referral to [facility] chiropractor.  We could consider referral for injections again locally.  She would be willing to do this.  We might consider pain management locally as well.  I expect this was the source of the stabbing shooting fiery pain that extended down the leg when she tried to turn over.  Since it has improved we will treat more symptomatically.  We discussed long-term use of pain medications again.  They seem to be allowing her to function better.  She is due for CSA later this year.  We will follow-up more specifically at that time.  I have known this individual for a long time and she really is low risk to abuse.  - CHIROPRACTIC REFERRAL    2. DDD (degenerative disc disease), cervical  As above  - CHIROPRACTIC REFERRAL    3. Need for prophylactic vaccination and inoculation against influenza  Immunized appropriately  - FLU VACCINE, IM (QUADRIVALENT W/PRESERVATIVES/MULTI-DOSE) [72891]- >3 YRS  - Vaccine Administration, Initial [91791]    Patient Instructions   Citalopram take every day for at least 8 weeks  See [facility] chiropractor and she will keep us informed with your progrss    Time spent with greater than 50% spent in discussion, making the plan, or filling out paperwork with patient for illness/treatments was 25 minutes or more.  I am not seen her for some time and we needed to review hip pain that she was having and treatments started in completed by others.  Also how effective the pain medication may or may not be for her.  We discussed how chronic pain medication sometimes becomes ineffective and at some point it does make sense to stop it.  We will see how chiropractic care helps.    Yuan Schofield MD  [facility]  Injectable Influenza Immunization Documentation    1.  Is the person to be vaccinated sick today?    No    2. Does the person to be vaccinated have an allergy to a component   of the vaccine?   No  Egg Allergy Algorithm Link    3. Has the person to be vaccinated ever had a serious reaction   to influenza vaccine in the past?   No    4. Has the person to be vaccinated ever had Guillain-Barré syndrome?   No    Form completed by Brittney Higgins CMA

## 2019-02-14 ASSESSMENT — ANXIETY QUESTIONNAIRES: GAD7 TOTAL SCORE: 18

## 2019-02-18 ENCOUNTER — MYC REFILL (OUTPATIENT)
Dept: FAMILY MEDICINE | Facility: CLINIC | Age: 39
End: 2019-02-18

## 2019-02-18 DIAGNOSIS — M50.30 DDD (DEGENERATIVE DISC DISEASE), CERVICAL: ICD-10-CM

## 2019-02-18 DIAGNOSIS — M51.369 DEGENERATION OF LUMBAR INTERVERTEBRAL DISC: ICD-10-CM

## 2019-02-18 RX ORDER — OXYCODONE HYDROCHLORIDE 5 MG/1
TABLET ORAL
Qty: 60 TABLET | Refills: 0 | Status: SHIPPED | OUTPATIENT
Start: 2019-02-18 | End: 2019-03-04

## 2019-02-18 NOTE — TELEPHONE ENCOUNTER
Oxycodone 5 MG       Last Written Prescription Date:  2/8/19  Last Fill Quantity: 60,   # refills: 0  Last Office Visit: 2/13/19  Future Office visit:       Routing refill request to provider for review/approval because:  Drug not on the [facility], [facility] or [facility] refill protocol or controlled substance

## 2019-03-04 ENCOUNTER — MYC REFILL (OUTPATIENT)
Dept: FAMILY MEDICINE | Facility: CLINIC | Age: 39
End: 2019-03-04

## 2019-03-04 DIAGNOSIS — F41.8 MIXED ANXIETY DEPRESSIVE DISORDER: ICD-10-CM

## 2019-03-04 DIAGNOSIS — M50.30 DDD (DEGENERATIVE DISC DISEASE), CERVICAL: ICD-10-CM

## 2019-03-04 DIAGNOSIS — M51.369 DEGENERATION OF LUMBAR INTERVERTEBRAL DISC: ICD-10-CM

## 2019-03-04 RX ORDER — OXYCODONE HYDROCHLORIDE 5 MG/1
TABLET ORAL
Qty: 60 TABLET | Refills: 0 | Status: SHIPPED | OUTPATIENT
Start: 2019-03-04 | End: 2019-03-20

## 2019-03-04 RX ORDER — MORPHINE SULFATE 15 MG/1
15 TABLET, FILM COATED, EXTENDED RELEASE ORAL EVERY 12 HOURS
Qty: 60 TABLET | Refills: 0 | Status: SHIPPED | OUTPATIENT
Start: 2019-03-04 | End: 2019-04-05

## 2019-03-04 RX ORDER — LORAZEPAM 1 MG/1
1-2 TABLET ORAL EVERY 8 HOURS PRN
Qty: 20 TABLET | Refills: 0 | Status: SHIPPED | OUTPATIENT
Start: 2019-03-04 | End: 2019-04-09

## 2019-03-04 NOTE — TELEPHONE ENCOUNTER
Oxycodone 5 MG       Last Written Prescription Date:  2/18/19  Last Fill Quantity: 60,   # refills: 0  Last Office Visit: 2/13/19  Future Office visit:       Routing refill request to provider for review/approval because:  Drug not on the [facility], [facility] or [facility] refill protocol or controlled substance  Lorazepam 1 MG       Last Written Prescription Date:  2/8/19  Last Fill Quantity: 20,   # refills: 0  Last Office Visit: 2/13/19  Future Office visit:       Routing refill request to provider for review/approval because:  Drug not on the [facility], [facility] or [facility] refill protocol or controlled substance  Morphine 15 MG       Last Written Prescription Date:  2/8/19  Last Fill Quantity: 60,   # refills: 0  Last Office Visit: 2/13/19  Future Office visit:       Routing refill request to provider for review/approval because:  Drug not on the [facility], [facility] or [facility] refill protocol or controlled substance

## 2019-03-20 ENCOUNTER — MYC REFILL (OUTPATIENT)
Dept: FAMILY MEDICINE | Facility: CLINIC | Age: 39
End: 2019-03-20

## 2019-03-20 DIAGNOSIS — M50.30 DDD (DEGENERATIVE DISC DISEASE), CERVICAL: ICD-10-CM

## 2019-03-20 DIAGNOSIS — M51.369 DEGENERATION OF LUMBAR INTERVERTEBRAL DISC: ICD-10-CM

## 2019-03-20 RX ORDER — OXYCODONE HYDROCHLORIDE 5 MG/1
TABLET ORAL
Qty: 60 TABLET | Refills: 0 | Status: SHIPPED | OUTPATIENT
Start: 2019-03-20 | End: 2019-04-05

## 2019-03-20 NOTE — TELEPHONE ENCOUNTER
Roxicodon3 5 MG       Last Written Prescription Date:  3/4/19  Last Fill Quantity: 60,   # refills: 0  Last Office Visit: 2/13/19  Future Office visit:       Routing refill request to provider for review/approval because:  Drug not on the [facility], [facility] or [facility] refill protocol or controlled substance

## 2019-03-26 DIAGNOSIS — F41.8 MIXED ANXIETY DEPRESSIVE DISORDER: ICD-10-CM

## 2019-03-27 RX ORDER — TRAZODONE HYDROCHLORIDE 100 MG/1
TABLET ORAL
Qty: 45 TABLET | Refills: 3 | Status: SHIPPED | OUTPATIENT
Start: 2019-03-27 | End: 2019-08-05

## 2019-03-27 NOTE — TELEPHONE ENCOUNTER
TRAZADONE( Desyrel)  Last Written Prescription Date:  3/26/18  Last Fill Quantity: 90,( taking 3 at HS)   # refills: 5   Last office visit: 2/13/2019 with prescribing provider:     Future Office Visit:  NONE  Routing refill request to provider for review/approval because:  A break in medication ( see above )  SHMUEL Higginbotham

## 2019-04-05 ENCOUNTER — MYC REFILL (OUTPATIENT)
Dept: FAMILY MEDICINE | Facility: CLINIC | Age: 39
End: 2019-04-05

## 2019-04-05 DIAGNOSIS — M51.369 DEGENERATION OF LUMBAR INTERVERTEBRAL DISC: ICD-10-CM

## 2019-04-05 DIAGNOSIS — M50.30 DDD (DEGENERATIVE DISC DISEASE), CERVICAL: ICD-10-CM

## 2019-04-08 ENCOUNTER — APPOINTMENT (OUTPATIENT)
Dept: CT IMAGING | Facility: CLINIC | Age: 39
End: 2019-04-08
Attending: FAMILY MEDICINE
Payer: COMMERCIAL

## 2019-04-08 ENCOUNTER — HOSPITAL ENCOUNTER (EMERGENCY)
Facility: CLINIC | Age: 39
Discharge: HOME | End: 2019-04-08
Attending: FAMILY MEDICINE | Admitting: FAMILY MEDICINE
Payer: COMMERCIAL

## 2019-04-08 VITALS
SYSTOLIC BLOOD PRESSURE: 117 MMHG | HEART RATE: 60 BPM | DIASTOLIC BLOOD PRESSURE: 70 MMHG | OXYGEN SATURATION: 95 % | RESPIRATION RATE: 20 BRPM | TEMPERATURE: 97.9 F

## 2019-04-08 DIAGNOSIS — R10.12 ABDOMINAL PAIN, LEFT UPPER QUADRANT: ICD-10-CM

## 2019-04-08 LAB
ALBUMIN SERPL-MCNC: 4.1 G/DL (ref 3.4–5)
ALBUMIN UR-MCNC: NEGATIVE MG/DL
ALP SERPL-CCNC: 61 U/L (ref 40–150)
ALT SERPL W P-5'-P-CCNC: 18 U/L (ref 0–50)
ANION GAP SERPL CALCULATED.3IONS-SCNC: 8 MMOL/L (ref 3–14)
APPEARANCE UR: CLEAR
AST SERPL W P-5'-P-CCNC: 8 U/L (ref 0–45)
BASOPHILS # BLD AUTO: 0.1 10E9/L (ref 0–0.2)
BASOPHILS NFR BLD AUTO: 0.5 %
BILIRUB SERPL-MCNC: 0.2 MG/DL (ref 0.2–1.3)
BILIRUB UR QL STRIP: NEGATIVE
BUN SERPL-MCNC: 12 MG/DL (ref 7–30)
CALCIUM SERPL-MCNC: 9.1 MG/DL (ref 8.5–10.1)
CHLORIDE SERPL-SCNC: 104 MMOL/L (ref 94–109)
CO2 SERPL-SCNC: 27 MMOL/L (ref 20–32)
COLOR UR AUTO: NORMAL
CREAT SERPL-MCNC: 0.86 MG/DL (ref 0.52–1.04)
DIFFERENTIAL METHOD BLD: ABNORMAL
EOSINOPHIL NFR BLD AUTO: 2.4 %
ERYTHROCYTE [DISTWIDTH] IN BLOOD BY AUTOMATED COUNT: 14.8 % (ref 10–15)
GFR SERPL CREATININE-BSD FRML MDRD: 86 ML/MIN/{1.73_M2}
GLUCOSE SERPL-MCNC: 104 MG/DL (ref 70–99)
GLUCOSE UR STRIP-MCNC: NEGATIVE MG/DL
HCG UR QL: NEGATIVE
HCT VFR BLD AUTO: 36 % (ref 35–47)
HGB BLD-MCNC: 12 G/DL (ref 11.7–15.7)
HGB UR QL STRIP: NEGATIVE
IMM GRANULOCYTES # BLD: 0 10E9/L (ref 0–0.4)
IMM GRANULOCYTES NFR BLD: 0.3 %
KETONES UR STRIP-MCNC: NEGATIVE MG/DL
LEUKOCYTE ESTERASE UR QL STRIP: NEGATIVE
LIPASE SERPL-CCNC: 185 U/L (ref 73–393)
LYMPHOCYTES # BLD AUTO: 2.5 10E9/L (ref 0.8–5.3)
LYMPHOCYTES NFR BLD AUTO: 21.8 %
MCH RBC QN AUTO: 32.5 PG (ref 26.5–33)
MCHC RBC AUTO-ENTMCNC: 33.3 G/DL (ref 31.5–36.5)
MCV RBC AUTO: 98 FL (ref 78–100)
MONOCYTES # BLD AUTO: 1 10E9/L (ref 0–1.3)
MONOCYTES NFR BLD AUTO: 8.7 %
NEUTROPHILS # BLD AUTO: 7.6 10E9/L (ref 1.6–8.3)
NEUTROPHILS NFR BLD AUTO: 66.3 %
NITRATE UR QL: NEGATIVE
NRBC # BLD AUTO: 0 10*3/UL
NRBC BLD AUTO-RTO: 0 /100
PH UR STRIP: 7 PH (ref 5–7)
PLATELET # BLD AUTO: 347 10E9/L (ref 150–450)
POTASSIUM SERPL-SCNC: 4 MMOL/L (ref 3.4–5.3)
PROT SERPL-MCNC: 7.1 G/DL (ref 6.8–8.8)
RBC # BLD AUTO: 3.69 10E12/L (ref 3.8–5.2)
SODIUM SERPL-SCNC: 139 MMOL/L (ref 133–144)
SOURCE: NORMAL
SP GR UR STRIP: 1.02 (ref 1–1.03)
UROBILINOGEN UR STRIP-MCNC: 0 MG/DL (ref 0–2)
WBC # BLD AUTO: 11.5 10E9/L (ref 4–11)

## 2019-04-08 PROCEDURE — 85025 COMPLETE CBC W/AUTO DIFF WBC: CPT | Performed by: FAMILY MEDICINE

## 2019-04-08 PROCEDURE — 25000128 H RX IP 250 OP 636: Performed by: FAMILY MEDICINE

## 2019-04-08 PROCEDURE — 80053 COMPREHEN METABOLIC PANEL: CPT | Performed by: FAMILY MEDICINE

## 2019-04-08 PROCEDURE — 96375 TX/PRO/DX INJ NEW DRUG ADDON: CPT | Performed by: FAMILY MEDICINE

## 2019-04-08 PROCEDURE — 81025 URINE PREGNANCY TEST: CPT | Performed by: FAMILY MEDICINE

## 2019-04-08 PROCEDURE — 83690 ASSAY OF LIPASE: CPT | Performed by: FAMILY MEDICINE

## 2019-04-08 PROCEDURE — 81003 URINALYSIS AUTO W/O SCOPE: CPT | Performed by: FAMILY MEDICINE

## 2019-04-08 PROCEDURE — 74177 CT ABD & PELVIS W/CONTRAST: CPT

## 2019-04-08 PROCEDURE — 96361 HYDRATE IV INFUSION ADD-ON: CPT | Performed by: FAMILY MEDICINE

## 2019-04-08 PROCEDURE — 99285 EMERGENCY DEPT VISIT HI MDM: CPT | Mod: 25 | Performed by: FAMILY MEDICINE

## 2019-04-08 PROCEDURE — 96374 THER/PROPH/DIAG INJ IV PUSH: CPT | Mod: 59 | Performed by: FAMILY MEDICINE

## 2019-04-08 PROCEDURE — 99285 EMERGENCY DEPT VISIT HI MDM: CPT | Mod: Z6 | Performed by: FAMILY MEDICINE

## 2019-04-08 RX ORDER — IOPAMIDOL 755 MG/ML
100 INJECTION, SOLUTION INTRAVASCULAR ONCE
Status: COMPLETED | OUTPATIENT
Start: 2019-04-08 | End: 2019-04-08

## 2019-04-08 RX ORDER — KETOROLAC TROMETHAMINE 30 MG/ML
30 INJECTION, SOLUTION INTRAMUSCULAR; INTRAVENOUS ONCE
Status: COMPLETED | OUTPATIENT
Start: 2019-04-08 | End: 2019-04-08

## 2019-04-08 RX ORDER — ONDANSETRON 2 MG/ML
4 INJECTION INTRAMUSCULAR; INTRAVENOUS EVERY 30 MIN PRN
Status: DISCONTINUED | OUTPATIENT
Start: 2019-04-08 | End: 2019-04-09 | Stop reason: HOSPADM

## 2019-04-08 RX ORDER — MORPHINE SULFATE 15 MG/1
15 TABLET, FILM COATED, EXTENDED RELEASE ORAL EVERY 12 HOURS
Qty: 60 TABLET | Refills: 0 | Status: SHIPPED | OUTPATIENT
Start: 2019-04-08 | End: 2019-05-08

## 2019-04-08 RX ORDER — ONDANSETRON 4 MG/1
4 TABLET, ORALLY DISINTEGRATING ORAL EVERY 8 HOURS PRN
Qty: 10 TABLET | Refills: 0 | Status: SHIPPED | OUTPATIENT
Start: 2019-04-08 | End: 2019-08-05

## 2019-04-08 RX ORDER — SODIUM CHLORIDE 9 MG/ML
1000 INJECTION, SOLUTION INTRAVENOUS CONTINUOUS
Status: DISCONTINUED | OUTPATIENT
Start: 2019-04-08 | End: 2019-04-09 | Stop reason: HOSPADM

## 2019-04-08 RX ORDER — HYDROMORPHONE HYDROCHLORIDE 1 MG/ML
0.5 INJECTION, SOLUTION INTRAMUSCULAR; INTRAVENOUS; SUBCUTANEOUS
Status: DISCONTINUED | OUTPATIENT
Start: 2019-04-08 | End: 2019-04-09 | Stop reason: HOSPADM

## 2019-04-08 RX ORDER — OXYCODONE HYDROCHLORIDE 5 MG/1
TABLET ORAL
Qty: 60 TABLET | Refills: 0 | Status: SHIPPED | OUTPATIENT
Start: 2019-04-08 | End: 2019-04-17

## 2019-04-08 RX ADMIN — ONDANSETRON 4 MG: 2 INJECTION INTRAMUSCULAR; INTRAVENOUS at 21:06

## 2019-04-08 RX ADMIN — HYDROMORPHONE HYDROCHLORIDE 0.5 MG: 1 INJECTION, SOLUTION INTRAMUSCULAR; INTRAVENOUS; SUBCUTANEOUS at 21:54

## 2019-04-08 RX ADMIN — SODIUM CHLORIDE 1000 ML: 9 INJECTION, SOLUTION INTRAVENOUS at 20:59

## 2019-04-08 RX ADMIN — KETOROLAC TROMETHAMINE 30 MG: 30 INJECTION, SOLUTION INTRAMUSCULAR; INTRAVENOUS at 21:07

## 2019-04-08 RX ADMIN — IOPAMIDOL 90 ML: 755 INJECTION, SOLUTION INTRAVENOUS at 21:55

## 2019-04-08 NOTE — TELEPHONE ENCOUNTER
Oxycodone 5 MG        Last Written Prescription Date:  3/20/19  Last Fill Quantity: 60,   # refills: 0  Last Office Visit: 2/13/19  Future Office visit:       Routing refill request to provider for review/approval because:  Drug not on the [facility], [facility] or [facility] refill protocol or controlled substance  Morphine 15 MG       Last Written Prescription Date:  3/4/19  Last Fill Quantity: 60,   # refills: 0  Last Office Visit: 2/13/19  Future Office visit:       Routing refill request to provider for review/approval because:  Drug not on the [facility], [facility] or [facility] refill protocol or controlled substance

## 2019-04-08 NOTE — ED AVS SNAPSHOT
[facility]  [address] [place] [address]  Phone:  [phone]  Fax:  [phone]                                    Mattie Banda   MRN: [id number]    Department:  [facility]   Date of Visit:  4/8/2019           After Visit Summary Signature Page    I have received my discharge instructions, and my questions have been answered. I have discussed any challenges I see with this plan with the nurse or doctor.    ..........................................................................................................................................  Patient/Patient Representative Signature      ..........................................................................................................................................  Patient Representative Print Name and Relationship to Patient    ..................................................               ................................................  Date                                   Time    ..........................................................................................................................................  Reviewed by Signature/Title    ...................................................              ..............................................  Date                                               Time          22EPIC Rev 08/18

## 2019-04-09 ENCOUNTER — TELEPHONE (OUTPATIENT)
Dept: FAMILY MEDICINE | Facility: CLINIC | Age: 39
End: 2019-04-09

## 2019-04-09 DIAGNOSIS — F41.8 MIXED ANXIETY DEPRESSIVE DISORDER: ICD-10-CM

## 2019-04-09 RX ORDER — LORAZEPAM 1 MG/1
1-2 TABLET ORAL EVERY 8 HOURS PRN
Qty: 20 TABLET | Refills: 0 | Status: SHIPPED | OUTPATIENT
Start: 2019-04-09 | End: 2019-04-12

## 2019-04-09 NOTE — ED PROVIDER NOTES
History     Chief Complaint   Patient presents with     Abdominal Pain     HPI  Mattie Banda is a 38 year old female who presents with concerns of pretty severe left-sided abdominal pain that started a few hours ago.  Patient has a history of colitis but is not currently on any medications.  She states that she has been having problems with constipation recently but in the last day or 2 she developed what she thought was a stomach flu and has had a lot of diarrhea.  She thought the diarrhea was starting to slow down and then this evening she ate some regular food and then after that the pain got worse.  She has had some nausea but no vomiting.  Denies any fevers or chills.  Patient denies any dysuria or hematuria.  Patient denies any increased blood in her stools although she states she always has some blood.    Allergies:  Allergies   Allergen Reactions     Bupropion Hcl      Wellbutrin       Problem List:    Patient Active Problem List    Diagnosis Date Noted     Chronic pain syndrome 07/09/2018     Priority: Medium     Patient is very low risk to abuse  Pain medication.       Excessive or frequent menstruation 02/07/2018     Priority: Medium     DDD (degenerative disc disease), cervical 02/07/2018     Priority: Medium     Lymphocytic colitis 06/13/2017     Priority: Medium     Tinea versicolor 06/12/2017     Priority: Medium     Idiopathic peripheral neuropathy 01/10/2017     Priority: Medium     Hypothyroidism, unspecified type 01/10/2017     Priority: Medium     Migraine with aura and without status migrainosus, not intractable 12/27/2016     Priority: Medium     Tobacco use disorder 09/12/2016     Priority: Medium     Low grade squamous intraepithelial lesion on cytologic smear of cervix (LGSIL) 02/08/2016     Priority: Medium     2015Possibly high-grade lesion. Done in [place] in 2015 2/8/16 pap NIL/neg HR HPV. Plan: cotest in 3 years. Tracking.       Labial lesion 02/08/2016     Priority: Medium      Superior folds of the vulva/labia right side       Mixed anxiety depressive disorder 02/08/2016     Priority: Medium     S/P lumbar fusion and discectomy June 2015 06/28/2015     Priority: Medium     Degeneration of lumbar intervertebral disc 04/05/2014     Priority: Medium     newly added to list on 4/4//14  but present for last months       CARDIOVASCULAR SCREENING; LDL GOAL LESS THAN 160 10/31/2010     Priority: Medium     Contraceptive management 07/02/2009     Priority: Medium        Past Medical History:    Past Medical History:   Diagnosis Date     Papanicolaou smear of cervix with low grade squamous intraepithelial lesion (LGSIL)      Thyroid disease      Urinary tract infection, site not specified        Past Surgical History:    Past Surgical History:   Procedure Laterality Date     BACK SURGERY  6/2015      COLONOSCOPY  08/06/07     COLONOSCOPY  08/05/08      COLONOSCOPY W BIOPSY  02/06/08      TOOTH EXTRACTION W/FORCEP      wisdom teeth removed     INJECT EPIDURAL CERVICAL N/A 3/8/2018    Procedure: INJECT EPIDURAL CERVICAL;  cervical 6-7 interlaminar epidural steroid injection;  Surgeon: Edgardo Rubio MD;  Location:  OR     LAPAROSCOPIC TUBAL LIGATION  11/27/2012    Procedure: LAPAROSCOPIC TUBAL LIGATION;  Laparoscopic Bilateral tubal sterilization/ fulgaration;  Surgeon: Sarbjit Whittaker MD;  Location: [facility]       Family History:    Family History   Problem Relation Age of Onset     Hypertension Maternal Grandmother      Arthritis Maternal Grandmother      Cancer Maternal Grandmother         MGGM     Depression Maternal Grandmother      Lipids Maternal Grandmother      Osteoporosis Maternal Grandmother      Respiratory Maternal Grandmother         emphysema     Genitourinary Problems Maternal Grandmother         Kidney Failure, liver      Hypertension Maternal Grandfather      Heart Disease Maternal Grandfather         MI     Cardiovascular Maternal Grandfather      Cancer -  colorectal Maternal Grandfather      Cancer Maternal Grandfather         [place] Lymphoma     Hypertension Paternal Grandmother      Arthritis Paternal Grandmother         RA     Breast Cancer Other         Maternal great aunt     Thyroid Disease Maternal Aunt         two aunts     Diabetes No family hx of        Social History:  Marital Status:   [4]  Social History     Tobacco Use     Smoking status: Current Every Day Smoker     Packs/day: 1.00     Years: 8.00     Pack years: 8.00     Types: Cigarettes     Smokeless tobacco: Never Used   Substance Use Topics     Alcohol use: Yes     Alcohol/week: 0.0 oz     Comment: 1/mo     Drug use: No        Medications:      LORazepam (ATIVAN) 1 MG tablet   morphine (MS CONTIN) 15 MG CR tablet   oxyCODONE (ROXICODONE) 5 MG tablet   albuterol (2.5 MG/3ML) 0.083% neb solution   escitalopram (LEXAPRO) 20 MG tablet   levothyroxine (SYNTHROID/LEVOTHROID) 200 MCG tablet   mesalamine (ASACOL HD) 800 MG EC tablet   risperiDONE (RISPERDAL) 1 MG tablet   traZODone (DESYREL) 100 MG tablet   traZODone (DESYREL) 50 MG tablet   VENTOLIN  (90 Base) MCG/ACT Inhaler         Review of Systems   All other systems reviewed and are negative.      Physical Exam   BP: 128/82  Pulse: 63  Heart Rate: 70  Temp: 98.1  F (36.7  C)  Resp: 18  SpO2: 97 %      Physical Exam   Constitutional: She is oriented to person, place, and time. She appears well-developed and well-nourished. No distress.   HENT:   Head: Normocephalic and atraumatic.   Nose: Nose normal.   Mouth/Throat: Oropharynx is clear and moist. No oropharyngeal exudate.   Eyes: Conjunctivae are normal.   Neck: Normal range of motion. Neck supple.   Cardiovascular: Normal rate, regular rhythm and normal heart sounds. Exam reveals no friction rub.   No murmur heard.  Pulmonary/Chest: Effort normal and breath sounds normal. No stridor. No respiratory distress. She has no wheezes. She has no rales.   Abdominal: Soft. Bowel sounds are  normal. She exhibits no distension and no mass. There is tenderness (luq). There is no guarding.   Musculoskeletal: Normal range of motion. She exhibits no tenderness.   Neurological: She is alert and oriented to person, place, and time.   Skin: Skin is warm and dry. Capillary refill takes less than 2 seconds. She is not diaphoretic. No erythema.   Psychiatric: Judgment normal.   Nursing note and vitals reviewed.      ED Course        Procedures        Results for orders placed or performed during the hospital encounter of 04/08/19 (from the past 24 hour(s))   CBC with platelets differential   Result Value Ref Range    WBC 11.5 (H) 4.0 - 11.0 10e9/L    RBC Count 3.69 (L) 3.8 - 5.2 10e12/L    Hemoglobin 12.0 11.7 - 15.7 g/dL    Hematocrit 36.0 35.0 - 47.0 %    MCV 98 78 - 100 fl    MCH 32.5 26.5 - 33.0 pg    MCHC 33.3 31.5 - 36.5 g/dL    RDW 14.8 10.0 - 15.0 %    Platelet Count 347 150 - 450 10e9/L    Diff Method Automated Method     % Neutrophils 66.3 %    % Lymphocytes 21.8 %    % Monocytes 8.7 %    % Eosinophils 2.4 %    % Basophils 0.5 %    % Immature Granulocytes 0.3 %    Nucleated RBCs 0 0 /100    Absolute Neutrophil 7.6 1.6 - 8.3 10e9/L    Absolute Lymphocytes 2.5 0.8 - 5.3 10e9/L    Absolute Monocytes 1.0 0.0 - 1.3 10e9/L    Absolute Basophils 0.1 0.0 - 0.2 10e9/L    Abs Immature Granulocytes 0.0 0 - 0.4 10e9/L    Absolute Nucleated RBC 0.0    Comprehensive metabolic panel   Result Value Ref Range    Sodium 139 133 - 144 mmol/L    Potassium 4.0 3.4 - 5.3 mmol/L    Chloride 104 94 - 109 mmol/L    Carbon Dioxide 27 20 - 32 mmol/L    Anion Gap 8 3 - 14 mmol/L    Glucose 104 (H) 70 - 99 mg/dL    Urea Nitrogen 12 7 - 30 mg/dL    Creatinine 0.86 0.52 - 1.04 mg/dL    GFR Estimate 86 >60 mL/min/[1.73_m2]    GFR Estimate If Black >90 >60 mL/min/[1.73_m2]    Calcium 9.1 8.5 - 10.1 mg/dL    Bilirubin Total 0.2 0.2 - 1.3 mg/dL    Albumin 4.1 3.4 - 5.0 g/dL    Protein Total 7.1 6.8 - 8.8 g/dL    Alkaline Phosphatase 61 40  - 150 U/L    ALT 18 0 - 50 U/L    AST 8 0 - 45 U/L   Lipase   Result Value Ref Range    Lipase 185 73 - 393 U/L   CT Abdomen Pelvis w Contrast    Narrative    CT ABDOMEN PELVIS WITH CONTRAST 4/8/2019 9:56 PM     HISTORY: Left upper quadrant abdominal pain, bloating, vomiting.    CONTRAST DOSE:  Isovue 370, 90mL    Radiation dose for this scan was reduced using automated exposure  control, adjustment of the mA and/or kV according to patient size, or  iterative reconstruction technique.    FINDINGS:  The gallbladder wall is mildly prominent, likely related to  relative contraction. The liver, spleen, adrenal glands, kidneys, and  pancreas appear within normal limits. There is no pericolonic  inflammatory stranding. No evidence of bowel obstruction. A normal  gas-filled appendix is noted. No free peritoneal fluid or air.  Bilateral ovarian follicles are suspected. The endometrium appears  prominent with endometrial hypodensity which may represent fluid.  There is no apparent free pelvic fluid. Pelvic contents are otherwise  unremarkable.      Impression    IMPRESSION:  1. Endometrial prominence and hypodensity which may represent  endometrial fluid. This would be better evaluated with ultrasound if  clinically warranted.  2. No CT evidence of an acute inflammatory process in the abdomen or  pelvis.    UA reflex to Microscopic and Culture   Result Value Ref Range    Color Urine Straw     Appearance Urine Clear     Glucose Urine Negative NEG^Negative mg/dL    Bilirubin Urine Negative NEG^Negative    Ketones Urine Negative NEG^Negative mg/dL    Specific Gravity Urine 1.021 1.003 - 1.035    Blood Urine Negative NEG^Negative    pH Urine 7.0 5.0 - 7.0 pH    Protein Albumin Urine Negative NEG^Negative mg/dL    Urobilinogen mg/dL 0.0 0.0 - 2.0 mg/dL    Nitrite Urine Negative NEG^Negative    Leukocyte Esterase Urine Negative NEG^Negative    Source Unspecified Urine    HCG qualitative urine (UPT)   Result Value Ref Range    HCG  Qual Urine Negative NEG^Negative       Medications   0.9% sodium chloride BOLUS (0 mLs Intravenous Stopped 4/8/19 2132)     Followed by   sodium chloride 0.9% infusion (has no administration in time range)   ondansetron (ZOFRAN) injection 4 mg (4 mg Intravenous Given 4/8/19 2106)   HYDROmorphone (PF) (DILAUDID) injection 0.5 mg (0.5 mg Intravenous Given 4/8/19 2154)   ketorolac (TORADOL) injection 30 mg (30 mg Intravenous Given 4/8/19 2107)   sodium chloride (PF) 0.9% PF flush 100 mL (60 mLs Intracatheter Given 4/8/19 2155)   sodium chloride (PF) 0.9% PF flush 3 mL (3 mLs Intravenous Given 4/8/19 2155)   iopamidol (ISOVUE-370) solution 100 mL (90 mLs Intravenous Given 4/8/19 2155)     Labs are reviewed and patient has had a barely elevated white count.  I did a CT scan because of the severe left-sided abdominal pain but there was no acute findings noted in the abdomen.  They did notice a thickened endometrium and fluid in the uterus.  Patient has been having some increasing irregular periods.  I recommend that she follow-up with her doctor about this.  Concerning her abdominal pain, this could be a resolving gastroenteritis.  Recommend that she stick with clear liquids for the next 24 hours and then slowly advance as tolerated.  At this point I think it is safe to discharge the patient home.  I will send her home with some Zofran to use if her nausea does return.  I think sticking with the clear liquids will improve this.  Assessments & Plan (with Medical Decision Making)  Abdominal pain     I have reviewed the nursing notes.    I have reviewed the findings, diagnosis, plan and need for follow up with the patient.              4/8/2019   [facility] EMERGENCY DEPARTMENT     Davion Fontana MD  04/08/19 1532

## 2019-04-09 NOTE — DISCHARGE INSTRUCTIONS
1.  I would follow up with your doctor about that finding on the CT scan of the fluid in your uterus and the thickened endometrium.  I do not think this has anything to do with the pain that she had today though.  2.  Please do a clear liquid diet for the next 12-24 hours and then slowly advance as tolerated.  3.  If things have not improve with this in the next couple days please follow-up with your doctor.

## 2019-04-09 NOTE — NURSING NOTE
"Chief Complaint   Patient presents with     RECHECK     Headache     getting a little better        Initial /66 mmHg  Pulse 99  Temp(Src) 98  F (36.7  C) (Tympanic)  Resp 12  Ht 5' 7\" (1.702 m)  Wt 150 lb (68.04 kg)  BMI 23.49 kg/m2  SpO2 100% Estimated body mass index is 23.49 kg/(m^2) as calculated from the following:    Height as of this encounter: 5' 7\" (1.702 m).    Weight as of this encounter: 150 lb (68.04 kg).  BP completed using cuff size: regular    " Otitis media

## 2019-04-09 NOTE — TELEPHONE ENCOUNTER
Patient is calling giving consent for her daughter Phong Donovan to come and  the prescriptions.   Thank you,  Marcela Hubbard   for [facility]

## 2019-04-09 NOTE — ED TRIAGE NOTES
Pt presents with concerns abdominal pain.  Pt states that she has been sick for the last week (diarrhea, headache, vomiting).  Pt states that she was constipated prior for the past 6 months.  Pt complaining of diarrhea and left sided abdominal pain.  Pt states that her abdomen is bloated.  Pt states that she has colitis.

## 2019-04-09 NOTE — TELEPHONE ENCOUNTER
"Reason for Call:  Medication or medication refill:    Do you use a [facility]?  Name of the pharmacy and phone number for the current request:  [facility] - [phone]    Name of the medication requested: Lorazapam     Other request: Pt states she had a death in the family this morning and wondering if she can get this filled today? She knows Dr. Schofield out today. She took her last one this morning. \"If there's anything stronger you can prescribe, I will take it\". \"I am going out of my mind right now\". Please call her and advise. She knows the 3 business day rule.     Can we leave a detailed message on this number? YES    Phone number patient can be reached at: Home number on file [phone] (home)    Best Time: anytime    Call taken on 4/9/2019 at 1:50 PM by Savita Garvin      "

## 2019-04-12 ENCOUNTER — TELEPHONE (OUTPATIENT)
Dept: FAMILY MEDICINE | Facility: CLINIC | Age: 39
End: 2019-04-12

## 2019-04-12 DIAGNOSIS — G47.9 SLEEP DISORDER: ICD-10-CM

## 2019-04-12 DIAGNOSIS — F41.8 MIXED ANXIETY DEPRESSIVE DISORDER: Primary | ICD-10-CM

## 2019-04-12 RX ORDER — ALPRAZOLAM 2 MG
TABLET ORAL
Qty: 21 TABLET | Refills: 0 | Status: SHIPPED | OUTPATIENT
Start: 2019-04-12 | End: 2019-05-07

## 2019-04-12 NOTE — TELEPHONE ENCOUNTER
"Reason for Call:  Medication or medication refill:    Do you use a [facility]?  Name of the pharmacy and phone number for the current request:  [facility] - [phone]    Name of the medication requested: Something similar Ativan     Other request: Pt calling and states is currently taking Ativan but she would like to be prescribed something else that is \"stronger\". States her grandma passed away on Tuesday and she needs something to help her cope. Please advise.     Can we leave a detailed message on this number? YES    Phone number patient can be reached at: Home number on file [phone] (home)    Best Time: any    Call taken on 4/12/2019 at 10:12 AM by Natalie Briceño    "

## 2019-04-12 NOTE — TELEPHONE ENCOUNTER
Patient returned call.  She states that she is currently needing to use two tablets of the Ativan every 8 hours to help with the anxiety and coping due to the death of her Grandmother.  Its was explained that a stronger medication would not be given at this time and message from Dr. Schofield was given.  Patient is again asking provider to reconsider.  She is wondering if he would consider Xanax instead of Ativan.     Will route to PCP.     Rehana Preciado RN

## 2019-04-12 NOTE — TELEPHONE ENCOUNTER
"RN has attempted to contact this patient by phone to return their call, but there is no response.  Left message to \"call clinic at earliest convenience\".  Will try again later.     EFRAIN JacksonN, RN      "

## 2019-04-12 NOTE — TELEPHONE ENCOUNTER
Pt notified that script will be sent to her pharmacy for Xanax. She was told to take either the Xanax or Ativan but not both. She uses [facility] in [place]. Elaina Ramos CMA (FADI)

## 2019-04-12 NOTE — TELEPHONE ENCOUNTER
I am sorry to hear her grandmother passed away.  Please call and have a discussion with her about medication.  I most probably did discuss benzodiazepine and narcotic medications used together could result in lack of breathing and therefore death.  At current dosing this felt to be a minimal issue.  Increasing a dose or using something stronger or longer acting in this family medication may be obviously very harmful to her.  Many other potential sedatives may have similar effect although not quite as directed correlation.  Yuan Schofield MD

## 2019-04-16 NOTE — TELEPHONE ENCOUNTER
Endocrinology follow-up-diabetes mellitus       Patient: Sylvia Parnell Date of Service: 2019   : 1951 MRN: [id number]     SUBJECTIVE:   HISTORY OF PRESENT ILLNESS:  Sylvia Parnell is a 68 year old female who presents today for a follow-up for management of diabetes mellitus   Type II since .  Patient seen about 2 weeks ago for initial consult.    Referring physician:Sugey Ngo MD    HPI     Blood Glucose Monitoring : checks BG 1-2 times per day   Bought Log/ glucometer - no. Recall   Blood glucose range : Fasting RANGE - pre/Post meals before dinner 200s  RANGE -  Hypoglycemia : none recently.  Oral medication : Now tolerating metformin extended release 2 tablets twice daily without diarrhea.  Insulin use : Novolin 70/30 40 units before breakfast on days of prednisone and 30 units on days without prednisone.  30 units before dinner.    Side effects and compliance with medication: Did not tolerate metformin in the past.    Hemoglobin A1C (%)   Date Value   2019 8.7 (H)   2018 6 (H)   10/30/2017 8.6 (H)      No results found for: [id number]    Following Carb restriction -has been trying to do carb restriction.  Does not eat a big portion size.    Getting enough protein and veggies.   No snacking in  between meals.  Exercise :   Very limited.  Recent hernia surgery.  Factors affecting  glycemic control: Obesity, recent start of prednisone for possible polymyalgia.  Presently taking prednisone 2.5 mg  every other day.    Patient with history of diabetic nephropathy -has microalbuminuria, no diabetic neuropathy  or diabetic retinopathy    Diabetes Eye Exam      2018  Completion Done - No retinopathy       Lipids and urine microalbumin being checked annually.    GFR Estimate, Non  (no units)   Date Value   2019 81       No macrovascular complications of CAD, Stroke or PVD.    Weight management : Recently has lost some weight after the hernia surgery but  I have attempted to call the pt with the following message. I left message for pt to call back. I will call back another time. Elaina Ramos CMA ([facility])     is gaining back.  BMI Readings from Last 3 Encounters:   04/16/19 39.85 kg/m²   04/02/19 38.29 kg/m²   03/25/19 38.71 kg/m²     Wt Readings from Last 3 Encounters:   04/16/19 115.4 kg (254 lb 6.6 oz)   04/02/19 110.9 kg (244 lb 7.8 oz)   03/25/19 112.1 kg (247 lb 2.2 oz)                 PAST MEDICAL HISTORY:     Patient Active Problem List   Diagnosis   • Diabetes mellitus type 2, insulin dependent (CMS/[facility])   • Anemia   • Arthritis of lumbar spine   • Benign essential hypertension   • Breast cancer, left (CMS/[facility])   • GERD (gastroesophageal reflux disease)   • Hyperlipidemia   • Incisional hernia   • Laryngeal polyp   • Lumbar disc disease   • Lower leg edema   • Obesity (BMI 30-39.9)   • Obstructive sleep apnea, adult   • Osteoarthritis   • History of ovarian cancer   • Proteinuria   • Muscle cramps   • Myalgia           ALLERGIES:  ALLERGIES:   Allergen Reactions   • Atorvastatin MYALGIA       PAST SURGICAL HISTORY:  Past Surgical History:   Procedure Laterality Date   • Cardiac cath lab      February 2018, negative   • Colonoscopy      2016, Dr. sutton   • Hernia repair      Ventral hernia, Dr. Dubose   • Hysterectomy      Questionable history of ovarian cancer, total hysterectomy with bilateral oophorectomy   • Mastectomy      Left, 1998   • Sinus surgery     • Tonsillectomy         FAMILY HISTORY:  Family History   Problem Relation Age of Onset   • Cancer, Lung Mother         Patient parents and sister were smoker   • Cancer, Lung Father    • Cancer, Lung Sister         Patient parents and sister were smoker   • Cancer, Prostate Brother        SOCIAL HISTORY:  Social History     Tobacco Use   • Smoking status: Never Smoker   • Smokeless tobacco: Never Used   Substance Use Topics   • Alcohol use: Yes     Comment: Occasionally   • Drug use: No     Comment: Used to do marijuana, now less frequent       Review of Systems   Constitutional: Negative for appetite change, diaphoresis, fatigue, fever and unexpected  weight change.   HENT: Negative for hearing loss, trouble swallowing and voice change.    Eyes: Negative for pain, redness and visual disturbance.        No proptosis   Respiratory: Negative for cough, shortness of breath, wheezing and stridor.    Cardiovascular: Negative for chest pain, palpitations and leg swelling.   Gastrointestinal: Negative for abdominal pain, constipation, diarrhea, nausea and vomiting.   Endocrine: Negative for cold intolerance, heat intolerance, polydipsia, polyphagia and polyuria.   Genitourinary: Negative.    Musculoskeletal: Positive for back pain. Negative for gait problem and joint swelling.   Skin: Negative.         Large abdominal pannus with scars from hernia surgery.   Neurological: Negative for dizziness, speech difficulty, weakness and numbness.   Psychiatric/Behavioral: Negative for behavioral problems and sleep disturbance.         OBJECTIVE:     Visit Vitals  /80   Pulse 80   Resp 18   Ht 5' 7\" (1.702 m)   Wt 115.4 kg (254 lb 6.6 oz)   LMP 01/01/2005 (Approximate)   BMI 39.85 kg/m²       Physical Exam   Constitutional: She is oriented to person, place, and time. She appears well-developed and well-nourished.   HENT:   Head: Normocephalic.   Eyes: Pupils are equal, round, and reactive to light. Conjunctivae and EOM are normal.   No Proptosis or lid lag    Neck: Normal range of motion. No tracheal deviation present. No thyromegaly present.   Cardiovascular: Normal rate, regular rhythm and normal heart sounds.   Pulmonary/Chest: Effort normal and breath sounds normal. No stridor.   Abdominal: Soft. Bowel sounds are normal. She exhibits no distension. There is no tenderness.   Large abdominal pannus with surgical scars from hernia surgery.   Musculoskeletal: She exhibits no edema or deformity.        Right foot: Normal. There is no deformity.        Left foot: Normal.   Lymphadenopathy:     She has no cervical adenopathy.   Neurological: She is alert and oriented to person,  place, and time. No cranial nerve deficit.   Skin: Skin is warm and dry. No rash noted.   Psychiatric: She has a normal mood and affect. Her behavior is normal. Judgment and thought content normal.           DIAGNOSTIC STUDIES:   LAB RESULTS:  Sodium (mmol/L)   Date Value   03/25/2019 141   ,  Potassium (mmol/L)   Date Value   03/25/2019 3.9   ,  Chloride (mmol/L)   Date Value   03/25/2019 107     Glucose (mg/dL)   Date Value   03/25/2019 170 (H)   ,  CALCIUM (mg/dL)   Date Value   03/25/2019 9.4   ,  Carbon Dioxide (mmol/L)   Date Value   03/25/2019 28     BUN (mg/dL)   Date Value   03/25/2019 16   ,  Creatinine (mg/dL)   Date Value   03/25/2019 0.76   ,    Assessment AND PLAN:   This is a 68 year old year-old female who presents with uncontrolled type 2 diabetes mellitus    1. Diabetes mellitus type 2, insulin dependent (CMS/[facility])      Type 2 Diabetes  -recently out of control most likely due to prednisone use.    Continue 70/ 30 insulin 40 units before breakfast on days of prednisone and 30 units before breakfast and days with her prednisone.  Decrease predinner dose to 25 units before dinner.  We will try metformin extended release and see if she tolerates able to titrate up on the dose.  Encouraged to be compliant with diet restriction  Advised on more activity.    Discussed foot and nail care and needed follow up with Opthalmology yearly.  Advised to bring  Blood glucose meter or log  to each clinic visit   Check BG  daily 2-3 times per day   call clinic if BG remain consistently > 200 or < 70.        Hyperlipidemia : LDL goal < 100  CALCULATED LDL   Date Value   12/27/2018 63 mg/dL   03/09/2018 41 mg/dl     On statin tolerating well.      BP monitoring :  BP slightly elevated today.  BP Readings from Last 1 Encounters:   04/16/19 130/80         MICROALBUMIN/CREATININE   Date Value   03/25/2019 224.4 mg/g (H)   03/09/2018 39.1 mcg/mg (H)     On valsartan.    Orders Placed This Encounter   • POCT Blood Sugar,  In-Office       MEDICATIONS AT END OF VISIT :  Current Outpatient Medications   Medication Sig   • insulin regular 70-30 (NOVOLIN 70/30) (70-30) 100 UNIT/ML injectable suspension Twice daily . 40 units before breakfast on days of prednisone but  30  Units before breakfast on days without prednisone. Take  25  units before dinner .   • metformin (GLUCOPHAGE-XR) 500 MG 24 hr tablet Take 2 tablets by mouth 2 times daily (before meals). Indications: Type 2 Diabetes, intolerance to plain metformin before breakfast and before dinner   • amlodipine-valsartan (EXFORGE)  MG per tablet TAKE ONE TABLET BY MOUTH ONE TIME DAILY    • baclofen (LIORESAL) 10 MG tablet TAKE AS NEEDED BEFORE BEDTIME FOR MUSCLE TENSION CAN CAUSE DROWSINESS   • predniSONE (DELTASONE) 2.5 MG tablet Take 1 tablet by mouth every other day.   • pravastatin (PRAVACHOL) 20 MG tablet Take 1 tablet by mouth daily.   • cloNIDine (CATAPRES) 0.2 MG tablet Take 1 tablet by mouth 2 times daily. TAKE ONE TABLET BY MOUTH TWICE DAILY   • aspirin (ECOTRIN) 325 MG EC tablet Take 1 tablet by mouth daily.   • latanoprost (XALATAN) 0.005 % ophthalmic solution Apply 1 drop to eye daily. 1 drop in each eye daily   • Magnesium Oxide 400 (240 Mg) MG Tab Take by mouth daily.   • Insulin Syringe-Needle U-100 (B-D INSULIN SYRINGE) 31G X 5/16\" 0.5 ML Misc USE AS DIRECTED, Twice daily   • famotidine (PEPCID) 40 MG tablet Take 40 mg by mouth daily as needed. TAKE ONE TABLET BY MOUTH DAILY AS NEEDED for acid    • blood glucose (FREESTYLE LITE) test strip 2 times daily. TEST TWICE DAILY. or as directed    • hydrochlorothiazide (HYDRODIURIL) 25 MG tablet Take 1 tablet by mouth daily.   • potassium chloride (KLOR-CON M20) 20 MEQ aracely ER tablet TAKE 1 TABLET BY MOUTH DAILY   • metoPROLOL tartrate (LOPRESSOR) 50 MG tablet Take 1 tablet by mouth 2 times daily.     No current facility-administered medications for this visit.        Return in about 2 months (around  6/25/2019).    Instructions provided as documented in the AVS.          The patient indicated understanding of the diagnosis and agreed with the plan of care.

## 2019-04-19 ENCOUNTER — MYC MEDICAL ADVICE (OUTPATIENT)
Dept: FAMILY MEDICINE | Facility: CLINIC | Age: 39
End: 2019-04-19

## 2019-04-22 ENCOUNTER — TELEPHONE (OUTPATIENT)
Dept: FAMILY MEDICINE | Facility: CLINIC | Age: 39
End: 2019-04-22

## 2019-05-03 ENCOUNTER — HEALTH MAINTENANCE LETTER (OUTPATIENT)
Age: 39
End: 2019-05-03

## 2019-05-07 ENCOUNTER — MYC REFILL (OUTPATIENT)
Dept: FAMILY MEDICINE | Facility: CLINIC | Age: 39
End: 2019-05-07

## 2019-05-07 DIAGNOSIS — M51.369 DEGENERATION OF LUMBAR INTERVERTEBRAL DISC: ICD-10-CM

## 2019-05-07 DIAGNOSIS — M50.30 DDD (DEGENERATIVE DISC DISEASE), CERVICAL: ICD-10-CM

## 2019-05-07 DIAGNOSIS — E03.9 ACQUIRED HYPOTHYROIDISM: ICD-10-CM

## 2019-05-07 DIAGNOSIS — G47.9 SLEEP DISORDER: ICD-10-CM

## 2019-05-07 DIAGNOSIS — F41.8 MIXED ANXIETY DEPRESSIVE DISORDER: ICD-10-CM

## 2019-05-07 RX ORDER — TRAZODONE HYDROCHLORIDE 100 MG/1
TABLET ORAL
Qty: 45 TABLET | Refills: 3 | Status: CANCELLED | OUTPATIENT
Start: 2019-05-07

## 2019-05-07 RX ORDER — ESCITALOPRAM OXALATE 20 MG/1
20 TABLET ORAL DAILY
Qty: 90 TABLET | Refills: 3 | Status: CANCELLED | OUTPATIENT
Start: 2019-05-07

## 2019-05-07 NOTE — TELEPHONE ENCOUNTER
Reason for Call:  Other call back    Detailed comments: Patient called back and stated that she dosen't have an refills of the Lexapro according to the pharmacy and the patient was also wondering about all her other medications. Patient stated that she is in so much pain and her anxiety is really high. Please advice ASAP.      Phone Number Patient can be reached at: Cell number on file:    Telephone Information:   Mobile [phone]       Best Time: any    Can we leave a detailed message on this number? YES    Call taken on 5/7/2019 at 5:32 PM by Fang Mercado

## 2019-05-08 ENCOUNTER — MYC MEDICAL ADVICE (OUTPATIENT)
Dept: FAMILY MEDICINE | Facility: CLINIC | Age: 39
End: 2019-05-08

## 2019-05-08 DIAGNOSIS — M51.369 DEGENERATION OF LUMBAR INTERVERTEBRAL DISC: ICD-10-CM

## 2019-05-08 DIAGNOSIS — M50.30 DDD (DEGENERATIVE DISC DISEASE), CERVICAL: ICD-10-CM

## 2019-05-08 RX ORDER — MORPHINE SULFATE 15 MG/1
15 TABLET, FILM COATED, EXTENDED RELEASE ORAL EVERY 12 HOURS
Qty: 60 TABLET | Refills: 0 | Status: SHIPPED | OUTPATIENT
Start: 2019-05-08 | End: 2019-06-05

## 2019-05-08 RX ORDER — OXYCODONE HYDROCHLORIDE 5 MG/1
TABLET ORAL
Qty: 120 TABLET | Refills: 0 | Status: SHIPPED | OUTPATIENT
Start: 2019-05-08 | End: 2019-05-10

## 2019-05-08 NOTE — TELEPHONE ENCOUNTER
Please call pt when ready for  at . She would also like a call to discuss how to get insurance to pay for these as they are technically early? She states they will not let her pay out of pocket. She does have the TSA claim # if needed. Please call.  kidney stone

## 2019-05-09 RX ORDER — OXYCODONE HYDROCHLORIDE 5 MG/1
TABLET ORAL
Qty: 120 TABLET | Refills: 0 | OUTPATIENT
Start: 2019-05-09

## 2019-05-09 RX ORDER — LEVOTHYROXINE SODIUM 200 UG/1
200 TABLET ORAL DAILY
Qty: 90 TABLET | Refills: 1 | OUTPATIENT
Start: 2019-05-09

## 2019-05-09 RX ORDER — MORPHINE SULFATE 15 MG/1
15 TABLET, FILM COATED, EXTENDED RELEASE ORAL EVERY 12 HOURS
Qty: 60 TABLET | Refills: 0 | OUTPATIENT
Start: 2019-05-09

## 2019-05-09 NOTE — TELEPHONE ENCOUNTER
"Requested Prescriptions   Pending Prescriptions Disp Refills     levothyroxine (SYNTHROID/LEVOTHROID) 200 MCG tablet 90 tablet 1     Sig: Take 1 tablet (200 mcg) by mouth daily   Last Written Prescription Date:  1/8/19  Last Fill Quantity: 90,  # refills: 1   Last office visit: 2/13/2019 with prescribing provider:     Future Office Visit:   Next 5 appointments (look out 90 days)    Jun 05, 2019 10:00 AM CDT  Kolton Gonzalez with Yuan Iain Schofield MD  [facility] ([address] [phone]  [phone]             Thyroid Protocol Failed - 5/8/2019 12:22 PM        Failed - Normal TSH on file in past 12 months     Recent Labs   Lab Test 08/20/18  1611   TSH 0.13*            Passed - Patient is 12 years or older        Passed - Recent (12 mo) or future (30 days) visit within the authorizing provider's specialty     Patient had office visit in the last 12 months or has a visit in the next 30 days with authorizing provider or within the authorizing provider's specialty.  See \"Patient Info\" tab in inbasket, or \"Choose Columns\" in Meds & Orders section of the refill encounter.            Passed - Medication is active on med list        Passed - No active pregnancy on record     If patient is pregnant or has had a positive pregnancy test, please check TSH.          Passed - No positive pregnancy test in past 12 months     If patient is pregnant or has had a positive pregnancy test, please check TSH.   Denied   Refill request too early           morphine (MS CONTIN) 15 MG CR tablet 60 tablet 0     Sig: Take 1 tablet (15 mg) by mouth every 12 hours maximum 2 tablet(s) per day       There is no refill protocol information for this order   Last Written Prescription Date:  5/8/19  Last Fill Quantity: 60,  # refills: 0   Last office visit: 2/13/2019 with prescribing provider:     Future Office Visit:   Next 5 appointments (look out 90 days)    Jun 05, 2019 10:00 AM " YONAS Gonzalez with Yuan Sch[facility]ield MD  [facility] [address] [place] [phone]  [phone]      Denied   Duplicate           ALPRAZolam (XANAX) 2 MG tablet 21 tablet 0     Si/2 to 1 tablet three times daily as needed for stress/sleep   Last Written Prescription Date:  19  Last Fill Quantity: 21,  # refills: 0   Last office visit: 2019 with prescribing provider:     Future Office Visit:   Next 5 appointments (look out 90 days)    2019 10:00 AM YONAS Gonzalez with Yuan Schofield MD  [address] [phone] [phone]       Routing refill request to provider for review/approval because:  Drug not on the [facility] refill protocol             There is no refill protocol information for this order        oxyCODONE (ROXICODONE) 5 MG tablet 120 tablet 0     Si-2 tablets 4 times daily for breakthrough pain.  Use sparingly.  No more than 4 daily.   Last Written Prescription Date:  19  Last Fill Quantity: 120,  # refills: 0   Last office visit: 2019 with prescribing provider:     Future Office Visit:   Next 5 appointments (look out 90 days)    2019 10:00 AM YONAS Gonzalez with Yuan Schofield MD  [facility] ([address] [phone]             There is no refill protocol information for this order      Denied  Duplicate    Please see note below on patient call and questions    Estefanía Prado RN

## 2019-05-10 RX ORDER — ALPRAZOLAM 2 MG
TABLET ORAL
Qty: 21 TABLET | Refills: 0 | Status: SHIPPED | OUTPATIENT
Start: 2019-05-10 | End: 2019-05-20

## 2019-05-10 RX ORDER — MORPHINE SULFATE 15 MG/1
15 TABLET, FILM COATED, EXTENDED RELEASE ORAL EVERY 12 HOURS
Qty: 60 TABLET | Refills: 0 | Status: SHIPPED | OUTPATIENT
Start: 2019-05-10 | End: 2019-05-15

## 2019-05-10 RX ORDER — OXYCODONE HYDROCHLORIDE 5 MG/1
TABLET ORAL
Qty: 120 TABLET | Refills: 0 | Status: SHIPPED | OUTPATIENT
Start: 2019-05-10 | End: 2019-06-05

## 2019-05-10 NOTE — TELEPHONE ENCOUNTER
In review of records, even with stolen medications or lost luggage, she would be due for pain medication today.  Prescriptions are approved and given to TC.   Skin normal color for race, warm, dry and intact. No evidence of rash.

## 2019-05-13 ENCOUNTER — MYC MEDICAL ADVICE (OUTPATIENT)
Dept: FAMILY MEDICINE | Facility: CLINIC | Age: 39
End: 2019-05-13

## 2019-05-13 DIAGNOSIS — J20.9 ACUTE BRONCHITIS WITH COEXISTING CONDITION REQUIRING PROPHYLACTIC TREATMENT: ICD-10-CM

## 2019-05-13 DIAGNOSIS — R05.9 COUGH: ICD-10-CM

## 2019-05-13 DIAGNOSIS — M51.369 DEGENERATION OF LUMBAR INTERVERTEBRAL DISC: ICD-10-CM

## 2019-05-13 RX ORDER — ALBUTEROL SULFATE 0.83 MG/ML
2.5 SOLUTION RESPIRATORY (INHALATION) EVERY 6 HOURS PRN
Qty: 1 BOX | Refills: 0 | Status: CANCELLED | OUTPATIENT
Start: 2019-05-13

## 2019-05-13 NOTE — TELEPHONE ENCOUNTER
I have contacted the pharmacy and they stated that the patient was told it was too early for the oxycodone and morphine. She took the scripts with her and said she would fill them elsewhere. They said they wouldn't be able to fill until 5/20/2019. Elaina Ramos CMA ([facility])

## 2019-05-13 NOTE — TELEPHONE ENCOUNTER
Norco      Last Written Prescription Date:  5/08/19  Last Fill Quantity: 60,   # refills: 0  Last Office Visit: 2/13/19  Future Office visit:    Next 5 appointments (look out 90 days)    Jun 05, 2019 10:00 AM CDT  Kolton Gonzalez with Yuanjuanita Schofield MD  [facility] [address] [place] [phone]  [phone]           Routing refill request to provider for review/approval because:  Drug not on the [facility], [facility] or  Health refill protocol or controlled substance

## 2019-05-13 NOTE — TELEPHONE ENCOUNTER
I will complete official chronic pain paperwork but feel best to complete after visit. I filled hydrocodone only because of loss of her other meds after travel.  I will not refill this medication and immediate release oxycodone.  It will be 1 or the other.  Someone needs to confirm that oxycodone ordered May 10 was not refilled, however if insurance allows, this would be my choice for short acting.  More information needs to be obtained from pharmacy first and if needed patient second.

## 2019-05-15 DIAGNOSIS — M50.30 DDD (DEGENERATIVE DISC DISEASE), CERVICAL: ICD-10-CM

## 2019-05-15 DIAGNOSIS — M51.369 DEGENERATION OF LUMBAR INTERVERTEBRAL DISC: ICD-10-CM

## 2019-05-15 RX ORDER — OXYCODONE HYDROCHLORIDE 5 MG/1
TABLET ORAL
Qty: 120 TABLET | Refills: 0 | Status: CANCELLED | OUTPATIENT
Start: 2019-05-15

## 2019-05-15 RX ORDER — HYDROCODONE BITARTRATE AND ACETAMINOPHEN 5; 325 MG/1; MG/1
1-2 TABLET ORAL EVERY 6 HOURS PRN
Qty: 60 TABLET | Refills: 0 | Status: CANCELLED | OUTPATIENT
Start: 2019-05-15

## 2019-05-15 RX ORDER — HYDROCODONE BITARTRATE AND ACETAMINOPHEN 5; 325 MG/1; MG/1
1-2 TABLET ORAL EVERY 6 HOURS PRN
Qty: 60 TABLET | Refills: 0 | Status: SHIPPED | OUTPATIENT
Start: 2019-05-15 | End: 2019-05-31

## 2019-05-15 NOTE — TELEPHONE ENCOUNTER
Pt was advised of the recommendations and verbalized understanding. Pt reports she picked up the MS contin yesterday but cannot  the Oxycodone until 5/21. Rx pending for the Hydrocodone. Pt would like RX placed at the [facility] pharmacy for her  to .

## 2019-05-15 NOTE — TELEPHONE ENCOUNTER
Reason for Call:  Medication or medication refill:    Do you use a [facility]?  Name of the pharmacy and phone number for the current request:  Will  at  when ready    Name of the medication requested: Hydrocodone     Other request: Pt cannot  Oxycodone until 5/21. She is hoping you can refill another week worth. Please call when ready for .     Can we leave a detailed message on this number? YES    Phone number patient can be reached at: Home number on file [phone] (home)    Best Time: any     Call taken on 5/15/2019 at 9:12 AM by Lakesha Goodwin

## 2019-05-15 NOTE — TELEPHONE ENCOUNTER
Patient was given scripts for her MS Contin and her oxycodone after loss/theft of medications while traveling.  If they are no longer valid when she can obtain medications, please refill medications appropriately.  It is my understanding she did receive MS Contin already but will need short acting oxycodone 21.  We have bridge the gap with short acting hydrocodone and I will be seeing her in June.  Yuan Schofield MD

## 2019-05-20 ENCOUNTER — MYC REFILL (OUTPATIENT)
Dept: FAMILY MEDICINE | Facility: CLINIC | Age: 39
End: 2019-05-20

## 2019-05-20 DIAGNOSIS — G47.9 SLEEP DISORDER: ICD-10-CM

## 2019-05-20 DIAGNOSIS — F41.8 MIXED ANXIETY DEPRESSIVE DISORDER: ICD-10-CM

## 2019-05-21 NOTE — TELEPHONE ENCOUNTER
Controlled Substance Refill Request for alprazolam  Problem List Complete:    No     PROVIDER TO CONSIDER COMPLETION OF PROBLEM LIST AND OVERVIEW/CONTROLLED SUBSTANCE AGREEMENT    Last Written Prescription Date:  5/10/2019  Last Fill Quantity: 21,   # refills: 0    THE MOST RECENT OFFICE VISIT MUST BE WITHIN THE PAST 3 MONTHS. AT LEAST ONE FACE TO FACE VISIT MUST OCCUR EVERY 6 MONTHS. ADDITIONAL VISITS CAN BE VIRTUAL.  (THIS STATEMENT SHOULD BE DELETED.)    Last Office Visit with [facility] primary care provider: 2/13/2019    Future Office visit:   Next 5 appointments (look out 90 days)    Jun 05, 2019 10:00 AM CDT  Kolton Gonzalez with Yuanjuanita Schofield MD  [facility] [address] [place] [phone] [phone]          Controlled substance agreement:   Encounter-Level CSA - 08/20/2018:    Controlled Substance Agreement - Scan on 8/24/2018 10:37 AM: CONTROLLED SUBSTANCE AGREEMENT (below)       Patient-Level CSA:    There are no patient-level csa.         Last Urine Drug Screen:   Pain Drug SCR UR W RPTD Meds   Date Value Ref Range Status   07/09/2018 FINAL  Final     Comment:     (Note)  ====================================================================  TOXASSURE COMP DRUG ANALYSIS,UR  ====================================================================  Test                             Result       Flag       Units        Drug Present and Declared for Prescription Verification   Lorazepam                      557          EXPECTED   ng/mg creat    Source of lorazepam is a scheduled prescription medication.   Hydrocodone                    2186         EXPECTED   ng/mg creat   Hydromorphone                  244          EXPECTED   ng/mg creat   Dihydrocodeine                 408          EXPECTED   ng/mg creat   Norhydrocodone                 5264         EXPECTED   ng/mg creat    Sources of hydrocodone include scheduled prescription    medications.  Hydromorphone, dihydrocodeine and norhydrocodone are    expected metabolites of hydrocodone. Hydromorphone and    dihydrocodeine are also available as scheduled prescription    medications.   Citalopram                     PRESENT      EXPECTED                 Desmethylcitalopram            PRESENT      EXPECTED                  Desmethylcitalopram is an expected metabolite of citalopram or    the enantiomeric form, escitalopram.   Acetaminophen                  PRESENT      EXPECTED                Drug Present not Declared for Prescription Verification   Carboxy-THC                    30           UNEXPECTED ng/mg creat    Carboxy-THC is a metabolite of tetrahydrocannabinol  (THC).    Source of THC is most commonly illicit, but THC is also present    in a scheduled prescription medication.  Drug Absent but Declared for Prescription Verification   Oxycodone                      Not Detected UNEXPECTED ng/mg creat  ====================================================================  Test                      Result    Flag   Units      Ref Range        Creatinine              86               mg/dL      >=20            ====================================================================  Declared Medications:  The flagging and interpretation on this report are based on the  following declared medications.  Unexpected results may arise from  inaccuracies in the declared medications.  **Note: The testing scope of this panel includes these medications:  Citalopram (Lexapro)  Hydrocodone (Norco)  Lorazepam  Oxycodone  **Note: The testing scope of this panel does not include small to  moderate amounts of these reported medications:  Acetaminophen (Norco)  ====================================================================  For clinical consultation, please call [phone].  ====================================================================  Analysis performed by [employer] [place] [address]     ,  No results found for: COMDAT, No results found for: THC13, PCP13, COC13, MAMP13, OPI13, AMP13, BZO13, TCA13, MTD13, BAR13, OXY13, PPX13, BUP13       [email or web address]       checked in past 3 months?  Yes 5/13/2019

## 2019-05-22 RX ORDER — ALPRAZOLAM 2 MG
TABLET ORAL
Qty: 21 TABLET | Refills: 0 | Status: SHIPPED | OUTPATIENT
Start: 2019-05-22 | End: 2019-06-05

## 2019-05-22 NOTE — TELEPHONE ENCOUNTER
It appears this should last a month.  Last time she was from April 12 to May 10, that would make this refill too early.  Please check with her as I don't know her and primary is out.

## 2019-05-30 ENCOUNTER — TELEPHONE (OUTPATIENT)
Dept: FAMILY MEDICINE | Facility: CLINIC | Age: 39
End: 2019-05-30

## 2019-05-30 NOTE — TELEPHONE ENCOUNTER
Reason for call:  Other   Patient called regarding (reason for call): call back  Additional comments: Patient would like to talk to Dr. Schofield about switching her pain medication, what she is currently on, is not working.     Patient would like to go back on Hydrocodone -       Phone number to reach patient:  Cell number on file:    Telephone Information:   Mobile [phone]       Best Time:  asap     Can we leave a detailed message on this number?  YES

## 2019-05-31 ENCOUNTER — MYC REFILL (OUTPATIENT)
Dept: FAMILY MEDICINE | Facility: CLINIC | Age: 39
End: 2019-05-31

## 2019-05-31 DIAGNOSIS — M51.369 DEGENERATION OF LUMBAR INTERVERTEBRAL DISC: ICD-10-CM

## 2019-05-31 RX ORDER — HYDROCODONE BITARTRATE AND ACETAMINOPHEN 5; 325 MG/1; MG/1
1-2 TABLET ORAL EVERY 6 HOURS PRN
Qty: 60 TABLET | Refills: 0 | Status: SHIPPED | OUTPATIENT
Start: 2019-05-31 | End: 2019-06-05 | Stop reason: ALTCHOICE

## 2019-05-31 NOTE — TELEPHONE ENCOUNTER
Please call and get details on her pain medications.  I am willing to use hydrocodone instead of oxycodone if it works however it depends upon due dates of medication.  It is also somewhat complicated because she had a prescription lost this past month.  There is much regulation of use of pain medications..  I also would like to know if she is using MS Contin.  Confirm that she plans to keep her follow-up appointment already scheduled.  Also I do not want her to run out of medication and have withdrawal.   Yuan Schofield MD

## 2019-05-31 NOTE — TELEPHONE ENCOUNTER
I have contacted the pt. She said she has enough to last her to her next visit on Wednesday and will wait until then. Elaina Ramos CMA ([facility])

## 2019-05-31 NOTE — TELEPHONE ENCOUNTER
Norco   - pt wants to get a month's worth.   Last Written Prescription Date:  5/15/2019  Last Fill Quantity: 60,   # refills: 0  Last Office Visit: 2/13/2019  Future Office visit:    Next 5 appointments (look out 90 days)    Jun 05, 2019 10:00 AM CDT  Kolton Gonzalez with Yuanjuanita Schofield MD  [facility] [address] [place] [phone]           Routing refill request to provider for review/approval because:  Drug not on the [facility], [facility] or [facility] refill protocol or controlled substance

## 2019-06-05 ENCOUNTER — MYC REFILL (OUTPATIENT)
Dept: FAMILY MEDICINE | Facility: CLINIC | Age: 39
End: 2019-06-05

## 2019-06-05 ENCOUNTER — TELEPHONE (OUTPATIENT)
Dept: FAMILY MEDICINE | Facility: CLINIC | Age: 39
End: 2019-06-05

## 2019-06-05 ENCOUNTER — OFFICE VISIT (OUTPATIENT)
Dept: FAMILY MEDICINE | Facility: CLINIC | Age: 39
End: 2019-06-05
Payer: COMMERCIAL

## 2019-06-05 VITALS
HEART RATE: 104 BPM | RESPIRATION RATE: 14 BRPM | BODY MASS INDEX: 29.29 KG/M2 | OXYGEN SATURATION: 98 % | WEIGHT: 181.8 LBS | SYSTOLIC BLOOD PRESSURE: 90 MMHG | DIASTOLIC BLOOD PRESSURE: 62 MMHG | TEMPERATURE: 96.8 F

## 2019-06-05 DIAGNOSIS — G47.9 SLEEP DISORDER: ICD-10-CM

## 2019-06-05 DIAGNOSIS — F41.8 MIXED ANXIETY DEPRESSIVE DISORDER: ICD-10-CM

## 2019-06-05 DIAGNOSIS — M51.369 DEGENERATION OF LUMBAR INTERVERTEBRAL DISC: Primary | ICD-10-CM

## 2019-06-05 DIAGNOSIS — F17.200 TOBACCO USE DISORDER: ICD-10-CM

## 2019-06-05 DIAGNOSIS — N92.0 EXCESSIVE OR FREQUENT MENSTRUATION: ICD-10-CM

## 2019-06-05 DIAGNOSIS — E03.9 HYPOTHYROIDISM, UNSPECIFIED TYPE: ICD-10-CM

## 2019-06-05 DIAGNOSIS — Z13.6 CARDIOVASCULAR SCREENING; LDL GOAL LESS THAN 160: ICD-10-CM

## 2019-06-05 DIAGNOSIS — Z98.1 S/P LUMBAR FUSION: ICD-10-CM

## 2019-06-05 DIAGNOSIS — G89.4 CHRONIC PAIN SYNDROME: ICD-10-CM

## 2019-06-05 DIAGNOSIS — K52.832 LYMPHOCYTIC COLITIS: ICD-10-CM

## 2019-06-05 DIAGNOSIS — E03.9 ACQUIRED HYPOTHYROIDISM: ICD-10-CM

## 2019-06-05 DIAGNOSIS — M50.30 DDD (DEGENERATIVE DISC DISEASE), CERVICAL: ICD-10-CM

## 2019-06-05 LAB
ALBUMIN SERPL-MCNC: 3.8 G/DL (ref 3.4–5)
ALP SERPL-CCNC: 90 U/L (ref 40–150)
ALT SERPL W P-5'-P-CCNC: 27 U/L (ref 0–50)
ANION GAP SERPL CALCULATED.3IONS-SCNC: 5 MMOL/L (ref 3–14)
AST SERPL W P-5'-P-CCNC: 15 U/L (ref 0–45)
BILIRUB SERPL-MCNC: 0.2 MG/DL (ref 0.2–1.3)
BUN SERPL-MCNC: 12 MG/DL (ref 7–30)
CALCIUM SERPL-MCNC: 9.3 MG/DL (ref 8.5–10.1)
CHLORIDE SERPL-SCNC: 108 MMOL/L (ref 94–109)
CHOLEST SERPL-MCNC: 187 MG/DL
CO2 SERPL-SCNC: 26 MMOL/L (ref 20–32)
CREAT SERPL-MCNC: 0.71 MG/DL (ref 0.52–1.04)
ERYTHROCYTE [DISTWIDTH] IN BLOOD BY AUTOMATED COUNT: 14.5 % (ref 10–15)
ERYTHROCYTE [SEDIMENTATION RATE] IN BLOOD BY WESTERGREN METHOD: 28 MM/H (ref 0–20)
FSH SERPL-ACNC: 7.3 IU/L
GFR SERPL CREATININE-BSD FRML MDRD: >90 ML/MIN/{1.73_M2}
GLUCOSE SERPL-MCNC: 95 MG/DL (ref 70–99)
HCT VFR BLD AUTO: 38 % (ref 35–47)
HDLC SERPL-MCNC: 29 MG/DL
HGB BLD-MCNC: 12.2 G/DL (ref 11.7–15.7)
LDLC SERPL CALC-MCNC: 113 MG/DL
LH SERPL-ACNC: 9.2 IU/L
MCH RBC QN AUTO: 31.8 PG (ref 26.5–33)
MCHC RBC AUTO-ENTMCNC: 32.1 G/DL (ref 31.5–36.5)
MCV RBC AUTO: 99 FL (ref 78–100)
NONHDLC SERPL-MCNC: 158 MG/DL
PLATELET # BLD AUTO: 405 10E9/L (ref 150–450)
POTASSIUM SERPL-SCNC: 4.7 MMOL/L (ref 3.4–5.3)
PROT SERPL-MCNC: 7.2 G/DL (ref 6.8–8.8)
RBC # BLD AUTO: 3.84 10E12/L (ref 3.8–5.2)
SODIUM SERPL-SCNC: 139 MMOL/L (ref 133–144)
T4 FREE SERPL-MCNC: 2.09 NG/DL (ref 0.76–1.46)
TRIGL SERPL-MCNC: 223 MG/DL
TSH SERPL DL<=0.005 MIU/L-ACNC: 0.06 MU/L (ref 0.4–4)
WBC # BLD AUTO: 11.2 10E9/L (ref 4–11)

## 2019-06-05 PROCEDURE — 80061 LIPID PANEL: CPT | Performed by: FAMILY MEDICINE

## 2019-06-05 PROCEDURE — 80053 COMPREHEN METABOLIC PANEL: CPT | Performed by: FAMILY MEDICINE

## 2019-06-05 PROCEDURE — 83001 ASSAY OF GONADOTROPIN (FSH): CPT | Performed by: FAMILY MEDICINE

## 2019-06-05 PROCEDURE — 80307 DRUG TEST PRSMV CHEM ANLYZR: CPT | Mod: 90 | Performed by: FAMILY MEDICINE

## 2019-06-05 PROCEDURE — 85027 COMPLETE CBC AUTOMATED: CPT | Performed by: FAMILY MEDICINE

## 2019-06-05 PROCEDURE — 85652 RBC SED RATE AUTOMATED: CPT | Performed by: FAMILY MEDICINE

## 2019-06-05 PROCEDURE — 99215 OFFICE O/P EST HI 40 MIN: CPT | Performed by: FAMILY MEDICINE

## 2019-06-05 PROCEDURE — 84439 ASSAY OF FREE THYROXINE: CPT | Performed by: FAMILY MEDICINE

## 2019-06-05 PROCEDURE — 99000 SPECIMEN HANDLING OFFICE-LAB: CPT | Performed by: FAMILY MEDICINE

## 2019-06-05 PROCEDURE — 36415 COLL VENOUS BLD VENIPUNCTURE: CPT | Performed by: FAMILY MEDICINE

## 2019-06-05 PROCEDURE — 83002 ASSAY OF GONADOTROPIN (LH): CPT | Performed by: FAMILY MEDICINE

## 2019-06-05 PROCEDURE — 84443 ASSAY THYROID STIM HORMONE: CPT | Performed by: FAMILY MEDICINE

## 2019-06-05 RX ORDER — ESCITALOPRAM OXALATE 10 MG/1
10 TABLET ORAL DAILY
Qty: 30 TABLET | Refills: 1 | Status: SHIPPED | OUTPATIENT
Start: 2019-06-05 | End: 2019-08-28

## 2019-06-05 RX ORDER — LEVOTHYROXINE SODIUM 200 UG/1
TABLET ORAL
Qty: 90 TABLET | Refills: 1 | COMMUNITY
Start: 2019-06-05 | End: 2019-06-19

## 2019-06-05 RX ORDER — MORPHINE SULFATE 15 MG/1
15 TABLET, FILM COATED, EXTENDED RELEASE ORAL EVERY 12 HOURS
Qty: 60 TABLET | Refills: 0 | Status: SHIPPED | OUTPATIENT
Start: 2019-06-05 | End: 2019-06-24

## 2019-06-05 RX ORDER — OXYCODONE HYDROCHLORIDE 5 MG/1
TABLET ORAL
Qty: 120 TABLET | Refills: 0 | Status: SHIPPED | OUTPATIENT
Start: 2019-06-05 | End: 2019-06-13

## 2019-06-05 ASSESSMENT — PATIENT HEALTH QUESTIONNAIRE - PHQ9: SUM OF ALL RESPONSES TO PHQ QUESTIONS 1-9: 24

## 2019-06-05 ASSESSMENT — PAIN SCALES - GENERAL: PAINLEVEL: EXTREME PAIN (8)

## 2019-06-05 NOTE — TELEPHONE ENCOUNTER
[facility] states patient has been on 20 mg on Escitalopram (Lexapro). Pharmacy wants clarification on 10 mg decrease was intentional?    Please clarify.    Boaz Page, CMA

## 2019-06-05 NOTE — TELEPHONE ENCOUNTER
Pharmacist states that patient did confirm that she had not been taking the medication for some time. She will be taking the Lexapro (Escitalopram) 10 mg. They will fill the 10 mg of Lexapro prescription.    Boaz Page CMA

## 2019-06-05 NOTE — PROGRESS NOTES
Subjective     Mattie Banda is a 38 year old female who presents to clinic today for the following health issues:    HPI   Medication Followup of All    Taking Medication as prescribed: yes    Side Effects:  None    Medication Helping Symptoms:  NO     Patient better explains what happened while traveling.  At the [place] airport when bags were being screened they ran out of tubs for patient's individual property.  Things were falling off a conveyor's and falling onto the floor.  Patient had all of her medications in a make-up bag and another bag.  When she returned home, make-up bag was missing.  She is still working with the airline.  She has not used her MS Contin now for some time because she did not have any.  She was using Norco or its equivalent because she could not feel oxycodone.  She has been having more pain with less relief.  She did go through withdrawals some day.  On discussion today patient reported she was not able to take Lexapro 20 mg daily risperidone half milligram at bedtime  because she did not have these medications and was not sure if she could obtain them.  She is struggling with mood and depression because her grandmother who is the stable adults in her life recently .  Her  has been living in [place] working for the last 8 months in the reason she traveled there was to see him.  She is not sure how stable this relationship is and whether it will continue.  She is physically missed a lot of work because of pain and her grandma's  and depression.  This is also causing a strain and stress in her life.  She continues with significant low back pain radiating to her left hip especially and on her legs beyond her knees.  [facility] but they did not think they can do any more injections.  She is not sure if they meant they cannot do anything more or just could not do more injections.  She knows something needs to be done so that  physically she can function better.  She cannot lay on her back secondary to this pain.  Sitting is slightly better.  Regardless if she is up and about she has to get off her feet and rest or the pain is overwhelming.  She is been having some abdominal pain and wonders if her lymphocytic colitis is flared up.  She is smoking more than usual.  She continues to have heavy periods and they are regular.  She has gained weight during this time and is not sure if it is change in eating, decreased activity, medication, other        Reviewed and updated as needed this visit by Provider         Review of Systems   ROS COMP: Constitutional, HEENT, cardiovascular, pulmonary, gi and gu systems are negative, except as otherwise noted.      Objective    BP 90/62   Pulse 104   Temp 96.8  F (36  C) (Temporal)   Resp 14   Wt 82.5 kg (181 lb 12.8 oz)   SpO2 98%   BMI 29.29 kg/m    There is no height or weight on file to calculate BMI.  Physical Exam   GENERAL: healthy, alert, fatigued and moderate emotional and physical distress  EYES: Eyes grossly normal to inspection, PERRL and conjunctivae and sclerae normal  NECK: no adenopathy, no asymmetry, masses, or scars and thyroid normal to palpation  RESP: lungs clear to auscultation - no rales, rhonchi or wheezes  CV: regular rate and rhythm, normal S1 S2, no S3 or S4, no murmur, click or rub, no peripheral edema and peripheral pulses strong  ABDOMEN: Tender lower abdomen but no distinct abnormalities.  MS: Patient did seem to have issues with pain radiating straight leg raising bilaterally left more than right.  She very much was slow and deliberate and needed assistance sitting up from being on her back on the exam table.  She is very slow to lay down and used her arms until she was almost flat and then just dropped.  She is palpably tender through the low back with tight muscles including SI joint.  With flexion, extension internal and external rotation of both hips she seemed  to be bothered in the low back more than either hip and the left was more bothersome than the right.  SKIN: no suspicious lesions or rashes  NEURO: Actually fairly grossly intact with relatively normal strength lower extremities  PSYCH: mentation appears normal, affect flat, tearful, anxious, fatigued, judgement and insight intact and appearance well groomed  LYMPH: no cervical, supraclavicular, axillary, or inguinal adenopathy    Diagnostic Test Results:  Labs reviewed in Epic  Results for orders placed or performed in visit on 06/05/19 (from the past 24 hour(s))   Comprehensive metabolic panel   Result Value Ref Range    Sodium 139 133 - 144 mmol/L    Potassium 4.7 3.4 - 5.3 mmol/L    Chloride 108 94 - 109 mmol/L    Carbon Dioxide 26 20 - 32 mmol/L    Anion Gap 5 3 - 14 mmol/L    Glucose 95 70 - 99 mg/dL    Urea Nitrogen 12 7 - 30 mg/dL    Creatinine 0.71 0.52 - 1.04 mg/dL    GFR Estimate >90 >60 mL/min/[1.73_m2]    GFR Estimate If Black >90 >60 mL/min/[1.73_m2]    Calcium 9.3 8.5 - 10.1 mg/dL    Bilirubin Total 0.2 0.2 - 1.3 mg/dL    Albumin 3.8 3.4 - 5.0 g/dL    Protein Total 7.2 6.8 - 8.8 g/dL    Alkaline Phosphatase 90 40 - 150 U/L    ALT 27 0 - 50 U/L    AST 15 0 - 45 U/L   Erythrocyte sedimentation rate auto   Result Value Ref Range    Sed Rate 28 (H) 0 - 20 mm/h   CBC with platelets   Result Value Ref Range    WBC 11.2 (H) 4.0 - 11.0 10e9/L    RBC Count 3.84 3.8 - 5.2 10e12/L    Hemoglobin 12.2 11.7 - 15.7 g/dL    Hematocrit 38.0 35.0 - 47.0 %    MCV 99 78 - 100 fl    MCH 31.8 26.5 - 33.0 pg    MCHC 32.1 31.5 - 36.5 g/dL    RDW 14.5 10.0 - 15.0 %    Platelet Count 405 150 - 450 10e9/L   TSH with free T4 reflex   Result Value Ref Range    TSH 0.06 (L) 0.40 - 4.00 mU/L   Follicle stimulating hormone   Result Value Ref Range    FSH 7.3 IU/L   Lipid Profile   Result Value Ref Range    Cholesterol 187 <200 mg/dL    Triglycerides 223 (H) <150 mg/dL    HDL Cholesterol 29 (L) >49 mg/dL    LDL Cholesterol Calculated  113 (H) <100 mg/dL    Non HDL Cholesterol 158 (H) <130 mg/dL   Lutropin   Result Value Ref Range    Lutropin 9.2 IU/L   T4 free   Result Value Ref Range    T4 Free 2.09 (H) 0.76 - 1.46 ng/dL           Assessment & Plan     1. Degeneration of lumbar intervertebral disc  I will resume previous pain medication of MS Contin and oxycodone.  She had moderate relief at least with these medications.  We will arrange a consultation with [facility].  In the process we will ask if they would want to new MRI and where they would like it done.  I suspect they must have some more options in addition to previous injections.  - oxyCODONE (ROXICODONE) 5 MG tablet; 1-2 tablets 4 times daily for breakthrough pain.  Use sparingly.  No more than 4 daily.  Dispense: 120 tablet; Refill: 0  - morphine (MS CONTIN) 15 MG CR tablet; Take 1 tablet (15 mg) by mouth every 12 hours maximum 2 tablet(s) per day  Dispense: 60 tablet; Refill: 0  - Drug  Screen Comprehensive , Urine with Reported Meds (MedTox) (Pain Care Package)  - T4 free  - T4 free    2. DDD (degenerative disc disease), cervical  See above.  Currently the neck is not nearly so uncomfortable as the lumbar spine.  - oxyCODONE (ROXICODONE) 5 MG tablet; 1-2 tablets 4 times daily for breakthrough pain.  Use sparingly.  No more than 4 daily.  Dispense: 120 tablet; Refill: 0  - morphine (MS CONTIN) 15 MG CR tablet; Take 1 tablet (15 mg) by mouth every 12 hours maximum 2 tablet(s) per day  Dispense: 60 tablet; Refill: 0  - Drug  Screen Comprehensive , Urine with Reported Meds (MedTox) (Pain Care Package)    3. Excessive or frequent menstruation  With patient's excess thyroid replacement, this may account for her excessive menstruation.  We will contact her and let her know to reduce thyroid medication  - Comprehensive metabolic panel  - CBC with platelets  - TSH with free T4 reflex  - Follicle stimulating hormone  - Lutropin    4. Mixed anxiety depressive disorder  It  "seemed and I thought I confirmed that patient stop this medication either because the medication was lost or she was not sure if it was working.  Therefore I ordered 10 mg dosing but pharmacy is now question this.  I would only resume 20 mg if she has been on it and only discontinued it less than a week ago.  - escitalopram (LEXAPRO) 10 MG tablet; Take 1 tablet (10 mg) by mouth daily  Dispense: 30 tablet; Refill: 1    5. CARDIOVASCULAR SCREENING; LDL GOAL LESS THAN 160  Lipids need some adjustment but at this point this is low on priority.  - Lipid Profile    6. S/P lumbar fusion and discectomy June 2015  We will have her see pain specialist again    7. Tobacco use disorder  Discussed ways to quit smoking and therefore consider medication like bupropion.  She did not tolerate this before and remembers specifically it caused problems.  I do not think she is a candidate for Chantix at this time    8. Lymphocytic colitis  Since she is not having excessive symptoms, no treatment and perhaps again her overmedicated thyroid is contributing  - Comprehensive metabolic panel  - Erythrocyte sedimentation rate auto    9. Hypothyroidism, unspecified type  TSH is very low and T4 is high.  I will have her take half doses levothyroxine at least for right now.       Tobacco Cessation:   reports that she has been smoking cigarettes.  She has a 8.00 pack-year smoking history. She has never used smokeless tobacco.  Tobacco Cessation Action Plan: Self help information given to patient      BMI:   Estimated body mass index is 29.29 kg/m  as calculated from the following:    Height as of 1/10/19: 1.678 m (5' 6.06\").    Weight as of this encounter: 82.5 kg (181 lb 12.8 oz).   Weight management plan: Discussed healthy diet and exercise guidelines        Patient Instructions   We will have you see pain center again, expect a call in a week or less  Use pain meds as ordered, MS contin twice daily and try to hold oxycodone to 4 " daily      Return in about 1 month (around 7/3/2019) for MSK problem.  Time spent with greater than 50% spent in discussion, making the plan, or filling out paperwork with patient for illness/treatments was 40 minutes or more.  Multiple problems and much needed to be clarified or determined.    Yuanjuanita Schofield MD  [facility]

## 2019-06-05 NOTE — PATIENT INSTRUCTIONS
We will have you see pain center again, expect a call in a week or less  Use pain meds as ordered, MS contin twice daily and try to hold oxycodone to 4 daily

## 2019-06-05 NOTE — TELEPHONE ENCOUNTER
It was my impression she has been off this for a few weeks or more.  If this is correct, than 10 mg dose of escitalopram is correct for her.  If she was taking 20 mg within the last 2 weeks, we should resume 20 mg.  Yuan Schofield MD

## 2019-06-05 NOTE — RESULT ENCOUNTER NOTE
Notified via [email or web address] that thyroid is out of balance but we will continue to work on all other symptoms and problems.

## 2019-06-05 NOTE — LETTER
[facility]  06/05/19    Patient: Mattie Banda  YOB: 1980  Medical Record Number: [id number]                                                                  Opioid / Opioid Plus Controlled Substance Agreement    I understand that my care provider has prescribed an opioid (narcotic) controlled substance to help manage my condition(s). I am taking this medicine to help me function or work. I know this is strong medicine, and that it can cause serious side effects. Opioid medicine can be sedating, addicting and may cause a dependency on the drug. They can affect my ability to drive or think, and cause depression. They need to be taken exactly as prescribed. Combining opioids with certain medicines or chemicals (such as cocaine, sedatives and tranquilizers, sleeping pills, meth) can be dangerous or even fatal. Also, if I stop opioids suddenly, I may have severe withdrawal symptoms. Last, I understand that opioids do not work for all types of pain nor for all patients. If not helpful, I may be asked to stop them.        The risks, benefits, and side effects of these medicine(s) were explained to me. I agree that:    1. I will take part in other treatments as advised by my care team. This may be psychiatry or counseling, physical therapy, behavioral therapy, group treatment or a referral to a pain clinic. I will reduce or stop my medicine when my care team tells me to do so.  2. I will take my medicines as prescribed. I will not change the dose or schedule unless my care team tells me to. There will be no refills if I  run out early.   I may be contactedwithout warning and asked to complete a urine drug test or pill count at any time.   3. I will keep all my appointments, and understand this is part of the monitoring of opioids. My care team may require an office visit for EVERY opioid/controlled substance refill. If I miss appointments or don t follow instructions, my  care team may stop my medicine.  4. I will not ask other providers to prescribe controlled substances, and I will not accept controlled substances from other people. If I need another prescribed controlled substance for a new reason, I will tell my care team within 1 business day.  5. I will use one pharmacy to fill all of my controlled substance prescriptions, and it is up to me to make sure that I do not run out of my medicines on weekends or holidays. If my care team is willing to refill my opioid prescription without a visit, I must request refills only during office hours, refills may take up to 3 days to process, and it may take up to 5 to 7 days for my medicine to be mailed and ready at my pharmacy. Prescriptions will not be mailed anywhere except my pharmacy.        [id number]  Rev 12/18         Registration to scan to EHR                             Page 1 of 2               Controlled Substance Agreement Opioid        [facility]  06/05/19  Patient: Mattie aBnda  YOB: 1980  Medical Record Number: [id number]                                                                  6. I am responsible for my prescriptions. If the medicine/prescription is lost or stolen, it will not be replaced. I also agree not to share controlled substance medicines with anyone.  7. I agree to not use ANY illegal or recreational drugs. This includes marijuana, cocaine, bath salts or other drugs. I agree not to use alcohol unless my care team says I may.          I agree to give urine samples whenever asked. If I don t give a urine sample, the care team may stop my medicine.    8. If I enroll in the [place] Medical Marijuana program, I will tell my care team. I will also sign an agreement to share my medical records with my care team.   9. I will bring in my list of medicines (or my medicine bottles) each time I come to the clinic.   10. I will tell my care team right away if I become  pregnant or have a new medical problem treated outside of my regular clinic.  11. I understand that this medicine can affect my thinking and judgment. It may be unsafe for me to drive, use machinery and do dangerous tasks. I will not do any of these things until I know how the medicine affects me. If my dose changes, I will wait to see how it affects me. I will contact my care team if I have concerns about medicine side effects.    I understand that if I do not follow any of the conditions above, my prescriptions or treatment may be stopped.      I agree that my provider, clinic care team, and pharmacy may work with any city, state or federal law enforcement agency that investigates the misuse, sale, or other diversion of my controlled medicine. I will allow my provider to discuss my care with or share a copy of this agreement with any other treating provider, pharmacy or emergency room where I receive care. I agree to give up (waive) any right of privacy or confidentiality with respect to these consents.     I have read this agreement and have asked questions about anything I did not understand.      ________________________________________________________________________  Patient signature - Date/Time -  Mattie Banda                                      ________________________________________________________________________  Witness signature                                                            ________________________________________________________________________  Provider signature - Yuanjuanita Schofield MD      [id number]  Rev 12/18         Registration to scan to EHR                         Page 2 of 2                   Controlled Substance Agreement Opioid           Page 1 of 2  Opioid Pain Medicines (also known as Narcotics)  What You Need to Know    What are opioids?   Opioids are pain medicines that must be prescribed by a doctor.  They are also known as narcotics.    Examples are:     morphine (MS  Contin, Mounika)    oxycodone (Oxycontin)    oxycodone and acetaminophen (Percocet)    hydrocodone and acetaminophen (Vicodin, Norco)     fentanyl patch (Duragesic)     hydromorphone (Dilaudid)     methadone     What do opioids do well?   Opioids are best for short-term pain after a surgery or injury. They also work well for cancer pain. Unlike other pain medicines, they do not cause liver or kidney failure or ulcers. They may help some people with long-lasting (chronic) pain.     What do opioids NOT do well?   Opioids never get rid of pain entirely, and they do not work well for most patients with chronic pain. Opioids do not reduce swelling, one of the causes of pain. They also don t work well for nerve pain.                           For informational purposes only.  Not to replace the advice of your care provider.  Copyright [facility]. All right reserved. [employer] [id number] 02/18.      Page 2 of 2    Risks and side effects   Talk to your doctor before you start or decide to keep taking one of these medicines. Side effects include:    Lowering your breathing rate enough to cause death    Overdose, including death, especially if taking higher than prescribed doses    Long-term opioid use    Worse depression symptoms; less pleasure in things you usually enjoy    Feeling tired or sluggish    Slower thoughts or cloudy thinking    Being more sensitive to pain over time; pain is harder to control    Trouble sleeping or restless sleep    Changes in hormone levels (for example, less testosterone)    Changes in sex drive or ability to have sex    Constipation    Unsafe driving    Itching and sweating    Feeling dizzy    Nausea, vomiting and dry mouth    What else should I know about opioids?  When someone takes opioids for too long or too often, they become dependent. This means that if you stop or reduce the medicine too quickly, you will have withdrawal symptoms.    Dependence is not the same as  addiction. Addiction is when people keep using a substance that harms their body, their mind or their relations with others. If you have a history of drug or alcohol abuse, taking opioids can cause a relapse.    Over time, opioids don t work as well. Most people will need higher and higher doses. The higher the dose, the more serious the side effects. We don t know the long-term effects of opioids.      Prescribed opioids aren't the best way to manage chronic pain    Other ways to manage pain include:      Ibuprofen or acetaminophen.  You should always try this first.      Treat health problems that may be causing pain.      acupuncture or massage, deep breathing, meditation, visual imagery, aromatherapy.      Use heat or ice at the pain site      Physical therapy and exercise      Stop smoking      See a counselor or therapist                                                  People who have used opioids for a long time may have a lower quality of life, worse depression, higher levels of pain and more visits to doctors.    Never share your opioids with others. Be sure to store opioids in a secure place, locked if possible.Young children can easily swallow them and overdose.     You can overdose on opioids.  Signs of overdose include decrease or loss of consciousness, slowed breathing, trouble waking and blue lips.  If someone is worried about overdose, they should call 911.    If you are at risk for overdose, you may get naloxone (Narcan, a medicine that reverses the effects of opioids.  If you overdose, a friend or family member can give you Narcan while waiting for the ambulance.  They need to know the signs of overdose and how to give Narcan.    While you're taking opioids:    Don't use alcohol or street drugs. Taking them together can cause death.    Don't take any of these medicines unless your doctor says its okay.  Taking these with opioids can cause death.    Benzodiazepines (such as lorazepam         or  diazepam)    Muscle relaxers (such as cyclobenzaprine)    sleeping pills    other opioids    Safe disposal of opioids  Find your area drug take-back program, your pharmacy mail-back program, buy a special disposal bag (such as Deterra) from your pharmacy or flush them down the toilet.  Use the guidelines at:  www.fda.gov/drugs/resourcesforyou

## 2019-06-06 NOTE — TELEPHONE ENCOUNTER
alprazolam      Last Written Prescription Date:  05/22/19  Last Fill Quantity: 21,   # refills: 0  Last Office Visit: yesterday  Future Office visit:       Routing refill request to provider for review/approval because:  Drug not on the [facility], P or [facility] refill protocol or controlled substance

## 2019-06-07 ENCOUNTER — TELEPHONE (OUTPATIENT)
Dept: PALLIATIVE MEDICINE | Facility: CLINIC | Age: 39
End: 2019-06-07

## 2019-06-07 RX ORDER — ALPRAZOLAM 2 MG
TABLET ORAL
Qty: 21 TABLET | Refills: 0 | Status: SHIPPED | OUTPATIENT
Start: 2019-06-07 | End: 2019-06-24

## 2019-06-07 NOTE — TELEPHONE ENCOUNTER
[facility] Referral      1. Confirmed address with patient? Yes  2. Confirmed phone number with patient? Yes  3. Confirmed referring provider? Yes  4. Is the PCP the same as the referring provider? Yes  5. Has the patient been to any previous pain clinics? Yes  (If yes, send STEPHANIE with welcome letter)  6. Which insurance are we to bill for this appointment?  [employer]    7. Informed pt of cancellation (48 hour) policy? Yes    REGARDING OPIOID MEDICATIONS: We will always address appropriateness of opioid pain medications, but we generally will not automatically take on a prescribing role. When we do take on prescribing of opioids for chronic pain, it is in collaboration with the referring physician for an intermediate period of time (months), with an expectation that the primary physician or provider will assume the prescribing role if medications are effective at stable doses with demonstrated compliance. Therefore, please do not assume that your prescribing responsibilities end on the day of pain clinic consultation.  8. Informed pt of prescribing policy? Yes    9.Please be aware that once you are established with a pain provider and location, you will need to continue have all future visits with that provider and location. It is best to determine what location is the most convenient for you and schedule with that one.    ** PATIENT INFORMED OF THIS POLICY Yes      9. Referring Provider: Yuan Schofield    [facility]

## 2019-06-09 LAB — PAIN DRUG SCR UR W RPTD MEDS: NORMAL

## 2019-06-10 NOTE — RESULT ENCOUNTER NOTE
Notified via [employer] . all medications present were expected including hydrocodone and its derivatives.  We had been using this until insurance would recover oxycodone and MS Contin.
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
## 2019-06-12 ENCOUNTER — OFFICE VISIT (OUTPATIENT)
Dept: PALLIATIVE MEDICINE | Facility: CLINIC | Age: 39
End: 2019-06-12
Payer: COMMERCIAL

## 2019-06-12 VITALS
BODY MASS INDEX: 29.32 KG/M2 | HEIGHT: 66 IN | SYSTOLIC BLOOD PRESSURE: 98 MMHG | WEIGHT: 182.4 LBS | TEMPERATURE: 97.5 F | DIASTOLIC BLOOD PRESSURE: 64 MMHG

## 2019-06-12 DIAGNOSIS — F11.90 CHRONIC, CONTINUOUS USE OF OPIOIDS: ICD-10-CM

## 2019-06-12 DIAGNOSIS — G89.4 CHRONIC PAIN SYNDROME: ICD-10-CM

## 2019-06-12 DIAGNOSIS — M79.18 MYOFASCIAL PAIN: ICD-10-CM

## 2019-06-12 DIAGNOSIS — M54.2 CERVICALGIA: ICD-10-CM

## 2019-06-12 DIAGNOSIS — M54.59 LUMBAR FACET JOINT PAIN: Primary | ICD-10-CM

## 2019-06-12 DIAGNOSIS — M70.62 TROCHANTERIC BURSITIS OF LEFT HIP: ICD-10-CM

## 2019-06-12 PROCEDURE — 99244 OFF/OP CNSLTJ NEW/EST MOD 40: CPT | Mod: 25 | Performed by: PHYSICIAN ASSISTANT

## 2019-06-12 PROCEDURE — 20610 DRAIN/INJ JOINT/BURSA W/O US: CPT | Mod: LT | Performed by: PHYSICIAN ASSISTANT

## 2019-06-12 ASSESSMENT — PAIN SCALES - GENERAL: PAINLEVEL: EXTREME PAIN (8)

## 2019-06-12 ASSESSMENT — MIFFLIN-ST. JEOR: SCORE: 1524.11

## 2019-06-12 NOTE — PATIENT INSTRUCTIONS
After Visit Instructions:     Thank you for coming to [facility] for your care. It is my goal to partner with you to help you reach your optimal state of health.     I am recommending multidisciplinary care at this time.  The focus of care will be to continue gradual rehabilitation and pain management with medication adjustments as needed.    Continue daily self-care, identifying contributing factors, and monitoring variations in pain level. Continue to integrate self-care into your life.        1. Schedule physical therapy assessment/visit: consider pool therapy  2. Schedule follow-up with Danica Last PA-C 8 weeks. You will need to make this appointment.   3. Procedures recommended: referred for a lumbar medial branch block preceding to a radiofrequency ablation   4. Medication recommendations:   1. We could work on slowly coming off of the Morphine and Oxycodone. If we were to do this, I typically start with having you slowly come off the morphine before the oxycodone.   2. Consider gabapentin or lyrica.       Danica Last PA-C  [facility]  [facility]    Contact information: [facility] [facility]  Clinic Number:  [phone]   Call this number with any questions about your care and for scheduling assistance. Calls are returned Monday through Friday between 8 AM and 4:30 PM. We usually get back to you within 2 business days depending on the issue/request.           Medication Refills Policy:    For non-narcotic medications, please your pharmacy directly to request a refill and the pharmacy will call the Pain Management Center for authorization. Please allow 3-4 days for these refills.    For narcotic refills, call the nurse triage line and leave a message requesting your refill along with the name of the pharmacy that you use. Narcotic prescriptions will be mailed to your pharmacy or you may pick them up at the clinic.  Please call 7-10  days before your refill is due  The above policy allows adequate time so that you do not run out of medication.    No Show - Late Cancellation - Late Arrival Policy  We believe regular attendance is key to your success in our program.    Any time you are unable to keep your appointment we ask that you call us at least 24 hours in advance to let us know. This will allow us to offer the appointment time to another patient. The following is our policy for missed appointments. This also applies to appointments cancelled with less than 24 hours notice.    After missing 3 appointments without calling first, we will cancel all of your future appointments at [facility].    At that point, you will not be able to resume services unless approved by your care team  We understand that unforseen circumstances arise, however, out of respect for all concerned and to provide this appointment to another patient, this policy will be enforced.    Please note that most follow up appointments are 30 minutes long. If you arrive late, your provider may not be able to see you for the entire 30 minutes. Please also note that if you arrive more than 15 minutes for any appointment, it may be rescheduled.      [facility]   Post Procedure Instructions    Today you had:   bursa injection      Medications used:  lidocaine   bupivicaine  kenalog      Go to the emergency room if you develop any shortness of breath    Monitor the injection sites for signs and symptoms of infection-fever, chills, redness, swelling, warmth, or drainage to areas.    You may have soreness at injection sites for up to 24 hours.    You may apply ice to the painful areas to help minimize the discomfort of the needle pokes.    Do not apply heat to sites for at least 12 hours.    You may use anti-inflammatory medications or Tylenol for pain control if necessary    Pain Clinic phone number during work hours Monday-Friday:   [phone]    After hours provider line: [phone]

## 2019-06-13 ENCOUNTER — TELEPHONE (OUTPATIENT)
Dept: PALLIATIVE MEDICINE | Facility: CLINIC | Age: 39
End: 2019-06-13

## 2019-06-13 ENCOUNTER — MYC REFILL (OUTPATIENT)
Dept: FAMILY MEDICINE | Facility: CLINIC | Age: 39
End: 2019-06-13

## 2019-06-13 ENCOUNTER — TELEPHONE (OUTPATIENT)
Dept: SURGERY | Facility: CLINIC | Age: 39
End: 2019-06-13

## 2019-06-13 DIAGNOSIS — M50.30 DDD (DEGENERATIVE DISC DISEASE), CERVICAL: ICD-10-CM

## 2019-06-13 DIAGNOSIS — M51.369 DEGENERATION OF LUMBAR INTERVERTEBRAL DISC: ICD-10-CM

## 2019-06-13 NOTE — TELEPHONE ENCOUNTER
Requested Prescriptions   Pending Prescriptions Disp Refills     oxyCODONE (ROXICODONE) 5 MG tablet 120 tablet 0     Si-2 tablets 4 times daily for breakthrough pain.  Use sparingly.  No more than 4 daily.       There is no refill protocol information for this order        Last Written Prescription Date:  2019  Last Fill Quantity: 120,  # refills: 0   Last office visit: 2019 with prescribing provider:     Future Office Visit:   Next 5 appointments (look out 90 days)    Aug 07, 2019 11:00 AM CDT  Return Visit with Danica Last PA-C  [facility] ([facility] [address] [place] [id number] [phone]         Routing refill request to provider for review/approval because:  Drug not on the [facility] refill protocol     Estefanía Prado RN

## 2019-06-13 NOTE — TELEPHONE ENCOUNTER
Left detailed message asking patient for return call if she has any questions or concerns regarding the injection she received in clinic yesterday.  Myesha Noe CMA

## 2019-06-14 RX ORDER — OXYCODONE HYDROCHLORIDE 5 MG/1
TABLET ORAL
Qty: 120 TABLET | Refills: 0 | Status: SHIPPED | OUTPATIENT
Start: 2019-06-14 | End: 2019-06-24

## 2019-06-17 ENCOUNTER — TELEPHONE (OUTPATIENT)
Dept: FAMILY MEDICINE | Facility: CLINIC | Age: 39
End: 2019-06-17

## 2019-06-17 DIAGNOSIS — E03.9 ACQUIRED HYPOTHYROIDISM: Primary | ICD-10-CM

## 2019-06-17 NOTE — TELEPHONE ENCOUNTER
Fax from [facility] states that patient states she's had a direction change on this medication. Please send current order.     Per Dr. Schofield office notes on 6/5/19 he states that he will have her take half doses levothyroxine at least for right now.     Boaz Page, [facility]

## 2019-06-17 NOTE — TELEPHONE ENCOUNTER
"Carolyn from [facility] called in regards to pt's Oxycodone script. They are saying the pt told them that \"no more than 4 daily\" should not be on the script. I have asked Dr. Schofield and he said that is correct. It should not say \"no more than 4 daily\". Pharmacy notified of this. Elaina Ramos CMA ([facility])    "

## 2019-06-19 RX ORDER — LEVOTHYROXINE SODIUM 200 UG/1
TABLET ORAL
Qty: 90 TABLET | Refills: 1 | Status: SHIPPED | OUTPATIENT
Start: 2019-06-19 | End: 2019-09-05

## 2019-06-19 NOTE — TELEPHONE ENCOUNTER
"Synthroid  Last Written Prescription Date:  6/5/2019  Last Fill Quantity: 90,  # refills: 1   Last office visit: 6/5/2019 with prescribing provider:  maged   Future Office Visit:   Next 5 appointments (look out 90 days)    Aug 07, 2019 11:00 AM CDT  Return Visit with Danica Last PA-C  [facility] ([facility]) [address] [place] [id number] [phone]           Requested Prescriptions   Pending Prescriptions Disp Refills     levothyroxine (SYNTHROID/LEVOTHROID) 200 MCG tablet 90 tablet 1     Sig: Half tablet daily or 1 tablet every other day.       Thyroid Protocol Failed - 6/19/2019  1:46 PM        Failed - Normal TSH on file in past 12 months     Recent Labs   Lab Test 06/05/19  1121   TSH 0.06*              Passed - Patient is 12 years or older        Passed - Recent (12 mo) or future (30 days) visit within the authorizing provider's specialty     Patient had office visit in the last 12 months or has a visit in the next 30 days with authorizing provider or within the authorizing provider's specialty.  See \"Patient Info\" tab in inbasket, or \"Choose Columns\" in Meds & Orders section of the refill encounter.              Passed - Medication is active on med list        Passed - No active pregnancy on record     If patient is pregnant or has had a positive pregnancy test, please check TSH.          Passed - No positive pregnancy test in past 12 months     If patient is pregnant or has had a positive pregnancy test, please check TSH.          Routing refill request to provider for review/approval because:  Labs out of range:  TSH  Medication is reported/historical    Steph Champion RN on 6/19/2019 at 2:01 PM    "

## 2019-06-21 ENCOUNTER — HOSPITAL ENCOUNTER (OUTPATIENT)
Facility: CLINIC | Age: 39
Discharge: HOME | End: 2019-06-21
Attending: ANESTHESIOLOGY | Admitting: ANESTHESIOLOGY
Payer: COMMERCIAL

## 2019-06-21 ENCOUNTER — HOSPITAL ENCOUNTER (OUTPATIENT)
Dept: GENERAL RADIOLOGY | Facility: CLINIC | Age: 39
End: 2019-06-21
Attending: ANESTHESIOLOGY | Admitting: ANESTHESIOLOGY
Payer: COMMERCIAL

## 2019-06-21 VITALS
RESPIRATION RATE: 18 BRPM | TEMPERATURE: 98.3 F | DIASTOLIC BLOOD PRESSURE: 70 MMHG | OXYGEN SATURATION: 100 % | SYSTOLIC BLOOD PRESSURE: 95 MMHG

## 2019-06-21 DIAGNOSIS — M54.59 LUMBAR FACET JOINT PAIN: ICD-10-CM

## 2019-06-21 PROCEDURE — 64495 INJ PARAVERT F JNT L/S 3 LEV: CPT | Performed by: ANESTHESIOLOGY

## 2019-06-21 PROCEDURE — 64494 INJ PARAVERT F JNT L/S 2 LEV: CPT | Mod: 50 | Performed by: ANESTHESIOLOGY

## 2019-06-21 PROCEDURE — 25000128 H RX IP 250 OP 636: Performed by: ANESTHESIOLOGY

## 2019-06-21 PROCEDURE — 40000277 XR FLUORO NEEDLE PLACEMENT SPINE (WITH PROCEDURE)

## 2019-06-21 PROCEDURE — 77003 FLUOROGUIDE FOR SPINE INJECT: CPT | Mod: TC,59

## 2019-06-21 PROCEDURE — 64493 INJ PARAVERT F JNT L/S 1 LEV: CPT | Mod: 50 | Performed by: ANESTHESIOLOGY

## 2019-06-21 RX ORDER — BUPIVACAINE HYDROCHLORIDE 7.5 MG/ML
INJECTION, SOLUTION EPIDURAL; RETROBULBAR PRN
Status: DISCONTINUED | OUTPATIENT
Start: 2019-06-21 | End: 2019-06-21 | Stop reason: HOSPADM

## 2019-06-21 RX ORDER — IOPAMIDOL 612 MG/ML
INJECTION, SOLUTION INTRATHECAL PRN
Status: DISCONTINUED | OUTPATIENT
Start: 2019-06-21 | End: 2019-06-21 | Stop reason: HOSPADM

## 2019-06-21 NOTE — DISCHARGE INSTRUCTIONS
Home Care Instructions     Please fax or mail this form to the [facility] fax: [phone]. The mailing address is : [facility], [address], [place] [address]            Procedure:  Diagnostic Block (which includes medial branch blocks, SI joint injection blocks, and nerve root injections)    Activity:  The injection was used to identify the cause of your pain:    Resume normal activity  You are to engage in activity that would have normally caused you discomfort to determine the effectiveness of the block/injection.  It is imperative to evaluate and rate your pain the first 2 hours after the injection.     Pain:    You may experience soreness at the injection site for one or two days    You may use an ice pack for 20 minutes every 2 hours for the first 24 hours    You may use a heating pad after the first 24 hours    You may use Tylenol  (acetaminophen) every 4 hours or other pain medicines as directed by your physician after your block wears off.    Safety  You may experience numbness radiating into your legs or arms, (depending on the procedure location)  This numbness may last several hours.  Until the numb sensation returns to normal please use caution in walking, climbing stairs, stepping out of your vehicle, etc.    Please contact us if you have:  Severe pain   Fever more than 101.5 degrees Fahrenheit  Signs of infection (redness, swelling or drainage)       If you need immediate attention we recommend that you go to a hospital emergency room or dial 911.    _____ Please contact our office one week after your injection to report your percent of pain relief.   See attached One Week Procedure Injection Report  __X__ Please return your Nerve Block Pain Relief Form the day after your injection.     See attached Nerve Block Pain Relief Form     Please fax or mail this form to the [facility] fax: [phone]. Address [address], [place] [address].      (If you do not have access to a fax machine, return form by mail to the [facility])        ## PLEASE SEND NERVE BLOCK PAIN RELIEF REPORT WITH PATIENT ##

## 2019-06-24 ENCOUNTER — MYC REFILL (OUTPATIENT)
Dept: FAMILY MEDICINE | Facility: CLINIC | Age: 39
End: 2019-06-24

## 2019-06-24 DIAGNOSIS — M51.369 DEGENERATION OF LUMBAR INTERVERTEBRAL DISC: ICD-10-CM

## 2019-06-24 DIAGNOSIS — M50.30 DDD (DEGENERATIVE DISC DISEASE), CERVICAL: ICD-10-CM

## 2019-06-24 DIAGNOSIS — G47.9 SLEEP DISORDER: ICD-10-CM

## 2019-06-24 DIAGNOSIS — F41.8 MIXED ANXIETY DEPRESSIVE DISORDER: ICD-10-CM

## 2019-06-26 ENCOUNTER — MYC MEDICAL ADVICE (OUTPATIENT)
Dept: FAMILY MEDICINE | Facility: CLINIC | Age: 39
End: 2019-06-26

## 2019-06-26 DIAGNOSIS — M50.30 DDD (DEGENERATIVE DISC DISEASE), CERVICAL: ICD-10-CM

## 2019-06-26 DIAGNOSIS — M51.369 DEGENERATION OF LUMBAR INTERVERTEBRAL DISC: ICD-10-CM

## 2019-06-26 RX ORDER — OXYCODONE HYDROCHLORIDE 5 MG/1
TABLET ORAL
Qty: 120 TABLET | Refills: 0 | Status: SHIPPED | OUTPATIENT
Start: 2019-06-26 | End: 2019-07-03

## 2019-06-26 RX ORDER — ALPRAZOLAM 2 MG
TABLET ORAL
Qty: 21 TABLET | Refills: 0 | Status: SHIPPED | OUTPATIENT
Start: 2019-06-26 | End: 2019-07-10

## 2019-06-26 RX ORDER — MORPHINE SULFATE 15 MG/1
15 TABLET, FILM COATED, EXTENDED RELEASE ORAL EVERY 12 HOURS
Qty: 60 TABLET | Refills: 0 | Status: SHIPPED | OUTPATIENT
Start: 2019-06-26 | End: 2019-07-23

## 2019-06-26 NOTE — TELEPHONE ENCOUNTER
Based on dosing, 1 month of second release MS Contin and short acting oxycodone was approved today.  Patient did have an injection this week.  Hopefully pain does improve after injection.

## 2019-06-27 ENCOUNTER — TELEPHONE (OUTPATIENT)
Dept: FAMILY MEDICINE | Facility: CLINIC | Age: 39
End: 2019-06-27

## 2019-06-27 NOTE — TELEPHONE ENCOUNTER
"oxyCODONE (ROXICODONE) 5 Si-2 tablets 4 times daily for breakthrough pain.  Use sparingly.  No more than 4 daily.    Pharmacist Collin states that the patient is saying that her prescription should NOT say \"use sparingly. No more than 4 times daily.\" The patient wants this taken off the prescription.     Please advise.     Boaz Page [facility]    "

## 2019-06-27 NOTE — TELEPHONE ENCOUNTER
Pt is calling to check the status of this refill. Please call when ready for  at .  Thank you,  Lakesha Goodwin- Patient Representative

## 2019-06-28 ENCOUNTER — TELEPHONE (OUTPATIENT)
Dept: FAMILY MEDICINE | Facility: CLINIC | Age: 39
End: 2019-06-28

## 2019-06-28 NOTE — TELEPHONE ENCOUNTER
Oxycodone is been ordered the exact same way for many months.  I do not understand the concern for the change now.  We perhaps should talk to the patient and find out why there is a concern and request for a change.  Yuan Schofield MD

## 2019-06-28 NOTE — TELEPHONE ENCOUNTER
Patient was informed that oxycodone has been ordered the exact same way for many months. Dr. Schofield does not understand the concern for the the change now. Patient kept insisting that Dr. Schofield had crossed off the directions in red on his directions on her script and then her last prescription the pharmacy had called and he had approved the change over the pharmacy. Patient was informed over the phone that his notes do not states the change and that My suggestion is that she schedule an appointment with him and that they discuss it so there is no confusion with the directions or the dose. That way it is documented in her chart and they will both understand exactly what the directions are and what the dose is that she should be taking. Patient stated understanding. She was transferred to scheduling.     Boaz Page CMA

## 2019-06-28 NOTE — TELEPHONE ENCOUNTER
Reason for Call:  Work in Appointment, Requested Provider:  Yuan Schofield M.D.    PCP: Yuan Schofield    Reason for visit: Face to face to discuss medication change    Duration of symptoms:     Have you been treated for this in the past? Yes    Additional comments: Please advise when pt can be seen as soon as possible.     Can we leave a detailed message on this number? YES    Phone number patient can be reached at: Home number on file [phone] (home)    Best Time: any    Call taken on 6/28/2019 at 4:06 PM by Natalie Briceño

## 2019-06-28 NOTE — TELEPHONE ENCOUNTER
I have contacted the pt and scheduled an appt for 7/05/2019 at 1:45 pm. Elaina Ramos CMA ([facility])

## 2019-07-03 RX ORDER — OXYCODONE HYDROCHLORIDE 5 MG/1
TABLET ORAL
Qty: 120 TABLET | Refills: 0 | COMMUNITY
Start: 2019-06-30 | End: 2019-07-10

## 2019-07-10 ENCOUNTER — MYC REFILL (OUTPATIENT)
Dept: FAMILY MEDICINE | Facility: CLINIC | Age: 39
End: 2019-07-10

## 2019-07-10 DIAGNOSIS — G47.9 SLEEP DISORDER: ICD-10-CM

## 2019-07-10 DIAGNOSIS — M50.30 DDD (DEGENERATIVE DISC DISEASE), CERVICAL: ICD-10-CM

## 2019-07-10 DIAGNOSIS — M51.369 DEGENERATION OF LUMBAR INTERVERTEBRAL DISC: ICD-10-CM

## 2019-07-10 DIAGNOSIS — F41.8 MIXED ANXIETY DEPRESSIVE DISORDER: ICD-10-CM

## 2019-07-12 RX ORDER — ALPRAZOLAM 2 MG
TABLET ORAL
Qty: 21 TABLET | Refills: 0 | Status: SHIPPED | OUTPATIENT
Start: 2019-07-12 | End: 2019-07-23

## 2019-07-12 RX ORDER — OXYCODONE HYDROCHLORIDE 5 MG/1
TABLET ORAL
Qty: 120 TABLET | Refills: 0 | Status: SHIPPED | OUTPATIENT
Start: 2019-07-12 | End: 2019-07-23

## 2019-07-12 NOTE — TELEPHONE ENCOUNTER
Reason for Call:  Other prescription    Detailed comments: Mattie calling to inquire about the status of these refills.  Mattie states she is out of Xanax and almost out of Oxycodone.    Phone Number Patient can be reached at: Home number on file [phone] (home)    Best Time: any    Can we leave a detailed message on this number? YES    Call taken on 2019 at 12:06 PM by Mercy Oreilly    Requested Prescriptions   Pending Prescriptions Disp Refills     ALPRAZolam (XANAX) 2 MG tablet 21 tablet 0     Si/2 to 1 tablet three times daily as needed for stress/sleep       There is no refill protocol information for this order   Last Written Prescription Date:  2019  Last Fill Quantity: 26,  # refills: 0   Last office visit: 2019 with prescribing provider:     Future Office Visit:   Next 5 appointments (look out 90 days)    Aug 07, 2019 11:00 AM CDT  Return Visit with Danica Last PA-C  [facility] ([address] [id number] [phone]              oxyCODONE (ROXICODONE) 5 MG tablet 120 tablet 0     Si-2 tablets 4 times daily for breakthrough pain.  Use sparingly.       There is no refill protocol information for this order      Last Written Prescription Date:  2019  Last Fill Quantity: 120,  # refills: 0   Last office visit: 2019 with prescribing provider:     Future Office Visit:   Next 5 appointments (look out 90 days)    Aug 07, 2019 11:00 AM CDT  Return Visit with Danica Last PA-C  [address] [id number] [phone]         DDD (degenerative disc disease), cervical [M50.30]       Degeneration of lumbar intervertebral disc [M51.36]           Routing refill request to provider for review/approval because:  Drug not on the [facility] refill protocol       Estefanía Prado RN

## 2019-07-23 ENCOUNTER — MYC REFILL (OUTPATIENT)
Dept: FAMILY MEDICINE | Facility: CLINIC | Age: 39
End: 2019-07-23

## 2019-07-23 DIAGNOSIS — G47.9 SLEEP DISORDER: ICD-10-CM

## 2019-07-23 DIAGNOSIS — M50.30 DDD (DEGENERATIVE DISC DISEASE), CERVICAL: ICD-10-CM

## 2019-07-23 DIAGNOSIS — M51.369 DEGENERATION OF LUMBAR INTERVERTEBRAL DISC: ICD-10-CM

## 2019-07-23 DIAGNOSIS — F41.8 MIXED ANXIETY DEPRESSIVE DISORDER: ICD-10-CM

## 2019-07-24 RX ORDER — MORPHINE SULFATE 15 MG/1
15 TABLET, FILM COATED, EXTENDED RELEASE ORAL EVERY 12 HOURS
Qty: 60 TABLET | Refills: 0 | Status: SHIPPED | OUTPATIENT
Start: 2019-07-24 | End: 2019-08-05

## 2019-07-24 RX ORDER — OXYCODONE HYDROCHLORIDE 5 MG/1
TABLET ORAL
Qty: 120 TABLET | Refills: 0 | Status: SHIPPED | OUTPATIENT
Start: 2019-07-24 | End: 2019-08-05

## 2019-07-24 RX ORDER — ALPRAZOLAM 2 MG
TABLET ORAL
Qty: 21 TABLET | Refills: 0 | Status: SHIPPED | OUTPATIENT
Start: 2019-07-24 | End: 2019-08-05

## 2019-07-24 NOTE — TELEPHONE ENCOUNTER
MS CONTIN 15 mg  Last Written Prescription Date:  6/26/19  Last Fill Quantity: 60,  # refills: 0   Last office visit: 6/5/2019 with prescribing provider:     Future Office Visit:   Next 5 appointments (look out 90 days)    Aug 07, 2019 11:00 AM CDT  Return Visit with Danica Last PA-C  [facility] ([address] [phone]         XANAX 2 mg  Last Written Prescription Date:  7/12/19  Last Fill Quantity: 21,  # refills: 0   Last office visit: 6/5/2019 with prescribing provider:     Future Office Visit:   Next 5 appointments (look out 90 days)    Aug 07, 2019 11:00 AM CDT  Return Visit with Danica Last PA-C  [facility] ([address] [phone]         Oxycodone  Last Written Prescription Date:  7/12/19  Last Fill Quantity: 120,  # refills: 0   Last office visit: 6/5/2019 with prescribing provider:     Future Office Visit:   Next 5 appointments (look out 90 days)    Aug 07, 2019 11:00 AM CDT  Return Visit with Danica Last PA-C  [facility] ([facility]) [address] [place] [id number] [phone]       Routing refill request to provider for review/approval because:  Drug not on the [facility] refill protocol   SHMUEL Higginbotham

## 2019-07-26 ENCOUNTER — TELEPHONE (OUTPATIENT)
Dept: FAMILY MEDICINE | Facility: CLINIC | Age: 39
End: 2019-07-26

## 2019-07-26 DIAGNOSIS — M51.369 DEGENERATION OF LUMBAR INTERVERTEBRAL DISC: Primary | ICD-10-CM

## 2019-07-26 NOTE — TELEPHONE ENCOUNTER
Patient was given Dr. Schofield message that she can be seen by the end of September to continue her refills. He did mean that she had to be seen in 2 months not 2 weeks. He apologizes for the miscommunication. Patient is scheduled with pain clinic ([facility]) on 8/7/19 and she is scheduled with Dr. Schofield on 9/4/19.     Patient is wondering about physical therapy and pool therapy. She states that it was brought up at her last visit but has not heard from anyone. There is no referral placed.    Orders have been teed up if alright please review, approve or deny.    Boaz Page, CMA

## 2019-07-26 NOTE — TELEPHONE ENCOUNTER
Reason for Call:  Same Day Appointment, Requested Provider:  Yuan Schofield M.D.    PCP: Yuan Schofield    Reason for visit: per new laws regarding refills patient needs appt with  within 2 wks has 2 meds that need to be refilled     Duration of symptoms: n/a    Have you been treated for this in the past? Yes    Additional comments: narcotic and anxiety med per patient     Can we leave a detailed message on this number? YES    Phone number patient can be reached at: Home number on file [phone] (home)    Best Time: anytime     Call taken on 7/26/2019 at 1:49 PM by Jennifer Duke

## 2019-07-27 ENCOUNTER — HOSPITAL ENCOUNTER (EMERGENCY)
Facility: CLINIC | Age: 39
Discharge: HOME | End: 2019-07-27
Attending: FAMILY MEDICINE | Admitting: FAMILY MEDICINE
Payer: COMMERCIAL

## 2019-07-27 VITALS
DIASTOLIC BLOOD PRESSURE: 44 MMHG | OXYGEN SATURATION: 98 % | RESPIRATION RATE: 12 BRPM | HEART RATE: 70 BPM | SYSTOLIC BLOOD PRESSURE: 97 MMHG | TEMPERATURE: 96.8 F

## 2019-07-27 DIAGNOSIS — M19.90 ACUTE ARTHRITIS: ICD-10-CM

## 2019-07-27 LAB
ERYTHROCYTE [SEDIMENTATION RATE] IN BLOOD BY WESTERGREN METHOD: 18 MM/H (ref 0–20)
URATE SERPL-MCNC: 6.4 MG/DL (ref 2.6–6)

## 2019-07-27 PROCEDURE — 36415 COLL VENOUS BLD VENIPUNCTURE: CPT | Performed by: FAMILY MEDICINE

## 2019-07-27 PROCEDURE — 84550 ASSAY OF BLOOD/URIC ACID: CPT | Performed by: FAMILY MEDICINE

## 2019-07-27 PROCEDURE — 99284 EMERGENCY DEPT VISIT MOD MDM: CPT | Mod: Z6 | Performed by: FAMILY MEDICINE

## 2019-07-27 PROCEDURE — 86039 ANTINUCLEAR ANTIBODIES (ANA): CPT | Performed by: FAMILY MEDICINE

## 2019-07-27 PROCEDURE — 86060 ANTISTREPTOLYSIN O TITER: CPT | Performed by: FAMILY MEDICINE

## 2019-07-27 PROCEDURE — 99283 EMERGENCY DEPT VISIT LOW MDM: CPT | Performed by: FAMILY MEDICINE

## 2019-07-27 PROCEDURE — 86038 ANTINUCLEAR ANTIBODIES: CPT | Performed by: FAMILY MEDICINE

## 2019-07-27 PROCEDURE — 85652 RBC SED RATE AUTOMATED: CPT | Performed by: FAMILY MEDICINE

## 2019-07-27 PROCEDURE — 86200 CCP ANTIBODY: CPT | Performed by: FAMILY MEDICINE

## 2019-07-27 PROCEDURE — 86431 RHEUMATOID FACTOR QUANT: CPT | Performed by: FAMILY MEDICINE

## 2019-07-27 RX ORDER — PREDNISONE 20 MG/1
20 TABLET ORAL 2 TIMES DAILY
Qty: 10 TABLET | Refills: 0 | Status: SHIPPED | OUTPATIENT
Start: 2019-07-27 | End: 2019-08-05

## 2019-07-27 NOTE — ED PROVIDER NOTES
History     Chief Complaint   Patient presents with     Hand Pain     HPI  Mattie Banda is a 38 year old female who is seen in the emergency department with severe left hand pain particularly over the joint of the left MP joint of her ring finger.  The rest of the hand is diffusely swollen.  She just woke up with it this morning.  There is been no injury to her hand or fingers.  No fever or chills.  She has been having increasing difficulty with her joints lately.  She has known osteoarthritis.  She has a history of colitis and psoriasis.  No history of rheumatoid arthritis, gout or lupus.  Family history of gout in the men.    Allergies:  Allergies   Allergen Reactions     Bupropion Hcl Hives     Wellbutrin       Problem List:    Patient Active Problem List    Diagnosis Date Noted     Chronic pain syndrome 07/09/2018     Priority: Medium     Patient is very low risk to abuse  Pain medication.  Patient is followed by Yuan Schofield MD for ongoing prescription of pain medication.  All refills should only be approved by this provider, or covering partner.    Medication(s): ms contin and oxydodone ir.   Maximum quantity per month: #60 ms contin, #180 oxycodone  Clinic visit frequency required: Q 3 months      Controlled substance agreement:  Encounter-Level CSA - 08/20/2018:    Controlled Substance Agreement - Scan on 8/24/2018 10:37 AM: CONTROLLED SUBSTANCE AGREEMENT (below)       Patient-Level CSA:    There are no patient-level csa.       Pain Clinic evaluation in the past: Yes       Date/Location:  [facility] and will go there soon, 6/6/2019.    DIRE Total Score(s):    6/6/2019   Total Score 19       Last [facility] website verification:  In last 2 -3 weeks 6/6/2019   [email or web address]             Excessive or frequent menstruation 02/07/2018     Priority: Medium     DDD (degenerative disc disease), cervical 02/07/2018     Priority: Medium     Lymphocytic colitis 06/13/2017      Priority: Medium     Tinea versicolor 06/12/2017     Priority: Medium     Idiopathic peripheral neuropathy 01/10/2017     Priority: Medium     Hypothyroidism, unspecified type 01/10/2017     Priority: Medium     Migraine with aura and without status migrainosus, not intractable 12/27/2016     Priority: Medium     Tobacco use disorder 09/12/2016     Priority: Medium     Low grade squamous intraepithelial lesion on cytologic smear of cervix (LGSIL) 02/08/2016     Priority: Medium     2015Possibly high-grade lesion. Done in [place] in 2015 2/8/16 pap NIL/neg HR HPV. Plan: cotest in 3 years. Tracking.       Labial lesion 02/08/2016     Priority: Medium     Superior folds of the vulva/labia right side       Mixed anxiety depressive disorder 02/08/2016     Priority: Medium     S/P lumbar fusion and discectomy June 2015 06/28/2015     Priority: Medium     Degeneration of lumbar intervertebral disc 04/05/2014     Priority: Medium     newly added to list on 4/4//14  but present for last months       CARDIOVASCULAR SCREENING; LDL GOAL LESS THAN 160 10/31/2010     Priority: Medium     Contraceptive management 07/02/2009     Priority: Medium        Past Medical History:    Past Medical History:   Diagnosis Date     Papanicolaou smear of cervix with low grade squamous intraepithelial lesion (LGSIL)      Thyroid disease      Urinary tract infection, site not specified        Past Surgical History:    Past Surgical History:   Procedure Laterality Date     BACK SURGERY  6/2015      COLONOSCOPY  08/06/07     COLONOSCOPY  08/05/08      COLONOSCOPY W BIOPSY  02/06/08      TOOTH EXTRACTION W/FORCEP      wisdom teeth removed     INJECT BLOCK MEDIAL BRANCH CERVICAL/THORACIC/LUMBAR N/A 6/21/2019    Procedure: BLOCK, NERVE, SPINAL, MEDIAL BRANCH lumbar 2, 3, 4;  Surgeon: Edgardo Rubio MD;  Location: [facility] OR     INJECT EPIDURAL CERVICAL N/A 3/8/2018    Procedure: INJECT EPIDURAL CERVICAL;  cervical 6-7 interlaminar epidural steroid  injection;  Surgeon: Edgardo Rubio MD;  Location: [facility] OR     LAPAROSCOPIC TUBAL LIGATION  11/27/2012    Procedure: LAPAROSCOPIC TUBAL LIGATION;  Laparoscopic Bilateral tubal sterilization/ fulgaration;  Surgeon: Sarbjit Whittaker MD;  Location: [facility] OR       Family History:    Family History   Problem Relation Age of Onset     Hypertension Maternal Grandmother      Arthritis Maternal Grandmother      Cancer Maternal Grandmother         MGGM     Depression Maternal Grandmother      Lipids Maternal Grandmother      Osteoporosis Maternal Grandmother      Respiratory Maternal Grandmother         emphysema     Genitourinary Problems Maternal Grandmother         Kidney Failure, liver      Hypertension Maternal Grandfather      Heart Disease Maternal Grandfather         MI     Cardiovascular Maternal Grandfather      Cancer - colorectal Maternal Grandfather      Cancer Maternal Grandfather         Hod[place] Lymphoma     Hypertension Paternal Grandmother      Arthritis Paternal Grandmother         RA     Breast Cancer Other         Maternal great aunt     Thyroid Disease Maternal Aunt         two aunts     Diabetes No family hx of        Social History:  Marital Status:   [4]  Social History     Tobacco Use     Smoking status: Current Every Day Smoker     Packs/day: 1.50     Years: 8.00     Pack years: 12.00     Types: Cigarettes     Smokeless tobacco: Never Used   Substance Use Topics     Alcohol use: Yes     Alcohol/week: 0.0 oz     Comment: 1/mo     Drug use: No        Medications:      predniSONE (DELTASONE) 20 MG tablet   albuterol (2.5 MG/3ML) 0.083% neb solution   ALPRAZolam (XANAX) 2 MG tablet   escitalopram (LEXAPRO) 10 MG tablet   escitalopram (LEXAPRO) 20 MG tablet   levothyroxine (SYNTHROID/LEVOTHROID) 200 MCG tablet   mesalamine (ASACOL HD) 800 MG EC tablet   morphine (MS CONTIN) 15 MG CR tablet   naloxone (NARCAN) 4 MG/0.1ML nasal spray   oxyCODONE (ROXICODONE) 5 MG tablet   risperiDONE  (RISPERDAL) 1 MG tablet   traZODone (DESYREL) 100 MG tablet   VENTOLIN  (90 Base) MCG/ACT Inhaler         Review of Systems   All other systems reviewed and are negative.      Physical Exam   BP: 114/66  Pulse: 62  Temp: 96.8  F (36  C)  Resp: 12  SpO2: 98 %      Physical Exam   Constitutional: She is oriented to person, place, and time. She appears well-developed and well-nourished.   Alert pleasant well-nourished well-hydrated female who appears in no acute distress and does not appear toxic or ill.BP 97/44   Pulse 70   Temp 96.8  F (36  C) (Temporal)   Resp 12   SpO2 98%      HENT:   Head: Normocephalic and atraumatic.   Eyes: Conjunctivae are normal.   Neck: Normal range of motion.   Cardiovascular: Normal rate.   Pulmonary/Chest: Effort normal.   Musculoskeletal:   Left hand is diffusely mildly swollen as compared to the right hand.  Most of the swelling is near the left ring finger MP joint.  At this joint it is red warm and swollen and exquisitely tender.  Appears very suspicious for gout.   Neurological: She is alert and oriented to person, place, and time.   Skin: Skin is warm and dry. Capillary refill takes less than 2 seconds.   Psychiatric: She has a normal mood and affect.   Nursing note and vitals reviewed.      ED Course        Procedures               Critical Care time:  none               No results found for this or any previous visit (from the past 24 hour(s)).    Medications - No data to display    Assessments & Plan (with Medical Decision Making)   MDM--38-year-old female who presents the emergency room with sudden onset of severe pain to her left hand and more specifically her left MP joint of her ring finger.  She has no personal history of gout but has a strong family history of gout.  She has been evaluated for other arthritic-like conditions.  She reports that most of her joints are very stiff and sore in the morning and improve as a day goes on.  She has never had a specific  joint swell up and become red warm and hot like this.  There is no injury to this hand.  No trauma or overuse.  On examination it appears to be an acute arthritis principally of the left ring finger MP joint which would also be consistent with gout.  I discussed the arthritis sees with the patient.  We sent off some labs so she could follow-up with her primary care provider next week.  Recommended we get her on prednisone 20 mg twice daily 5 days.  She is taking this in the past for colitis.  I also checked her for rheumatoid arthritis, lupus.  There is no evidence of infection.  I have reviewed the nursing notes.    I have reviewed the findings, diagnosis, plan and need for follow up with the patient.             Medication List      Started    predniSONE 20 MG tablet  Commonly known as:  DELTASONE  20 mg, Oral, 2 TIMES DAILY            Final diagnoses:   Acute arthritis       7/27/2019   [facility] EMERGENCY DEPARTMENT     [place], Edgardo MORALES MD  07/27/19 1024

## 2019-07-27 NOTE — DISCHARGE INSTRUCTIONS
Labs have been checked and sent out.  Follow-up with your primary care provider for results and discussion.  Take the prednisone for 5 days.

## 2019-07-27 NOTE — ED AVS SNAPSHOT
[facility] Emergency Department  [address] [place] [address]  Phone:  [phone]  Fax:  [phone]                                    Mattie Banda   MRN: [id number]    Department:  [facility] Emergency Department   Date of Visit:  7/27/2019           After Visit Summary Signature Page    I have received my discharge instructions, and my questions have been answered. I have discussed any challenges I see with this plan with the nurse or doctor.    ..........................................................................................................................................  Patient/Patient Representative Signature      ..........................................................................................................................................  Patient Representative Print Name and Relationship to Patient    ..................................................               ................................................  Date                                   Time    ..........................................................................................................................................  Reviewed by Signature/Title    ...................................................              ..............................................  Date                                               Time          22EPIC Rev 08/18

## 2019-07-29 DIAGNOSIS — M19.90 INFLAMMATION OF JOINT: Primary | ICD-10-CM

## 2019-07-29 LAB
ANA PAT SER IF-IMP: ABNORMAL
ANA SER QL IF: ABNORMAL
ANA TITR SER IF: ABNORMAL {TITER}
ASO AB SERPL-ACNC: 93 IU/ML (ref 0–120)
CCP AB SER IA-ACNC: 1 U/ML
RHEUMATOID FACT SER NEPH-ACNC: <20 IU/ML (ref 0–20)

## 2019-07-30 ENCOUNTER — TELEPHONE (OUTPATIENT)
Dept: EMERGENCY MEDICINE | Facility: CLINIC | Age: 39
End: 2019-07-30

## 2019-07-30 ENCOUNTER — MYC MEDICAL ADVICE (OUTPATIENT)
Dept: FAMILY MEDICINE | Facility: CLINIC | Age: 39
End: 2019-07-30

## 2019-07-30 ENCOUNTER — TELEPHONE (OUTPATIENT)
Dept: FAMILY MEDICINE | Facility: CLINIC | Age: 39
End: 2019-07-30

## 2019-07-30 NOTE — TELEPHONE ENCOUNTER
"[facility] Emergency Department Lab result notification     Patient/parent Name  Mattie    Reason for call  Emergency Dept received call about Patient's lab results.  Patient requesting lab result    Lab Result  Uric Acid and SAIDA are both abnormal results  Current symptoms  \"I'm feeling much better since being on the Prednisone.\"    Recommendations/Instructions  ED provider advised her to f/u with her PCP within 1 week (8/3/19).  Both Uric Acid and SAIDA results were relayed to Dr Schofield.  She will contact her clinic to see if she can get an appt to discuss results.    [RN Name]  Adams Tracey RN  [employer] [facility]  Emergency Dept Lab Result RN  Epic pool (ED late result f/u RN): [id number]  [facility] INCIDENTAL RADIOLOGY F/U NURSES: P [id number]  # [phone]    "

## 2019-07-30 NOTE — TELEPHONE ENCOUNTER
Reason for Call:  Same Day Appointment, Requested Provider:  Yuan Schofield M.D.    PCP: Yuan Schofield    Reason for visit: ED follow up     Duration of symptoms: yes but not as severe, on Prednisone.     Have you been treated for this in the past? No    Additional comments: ED Doctor said to follow up with PCP in 1 week.     Can we leave a detailed message on this number? YES    Phone number patient can be reached at: Cell number on file:    Telephone Information:   Mobile [phone]       Best Time: any     Call taken on 7/30/2019 at 11:36 AM by Candice Chun

## 2019-08-01 NOTE — PROGRESS NOTES
[facility]    CHIEF COMPLAINT:   Low back pain    INTERVAL HISTORY:  Last seen on 6/12/19.        Recommendations/plan at the last visit included:  1. Physical Therapy: Not at this time but would encourage the patient to consider pool therapy  2. Clinical Health Psychologist to address issues of relaxation, behavorial change, coping style, and other factors important to improvement: Patient to continue working with her current therapist.  3. Diagnostic Studies: None at this time  4. Medication Management:   1. Patient will continue her opiate pain medications with her primary care provider.  She will let me know if she would like me to take over these medications.  If I were to take over these medications we will work on tapering off of them while trying to maximize nonopiate pain medications  5. Potential procedures: Patient was referred for lumbar medial branch blocks proceeding to radiofrequency ablation.  We also did a left trochanteric bursa injection today.  See procedure note below.   6. Recommendations to PCP: see above     Follow up: 8 weeks, sooner if needed     Since her last visit, Mattie Banda reports:  - Overall she is doing about the same.     She had lumbar medial branch blocks on 6/21/2019 with Dr. Rubio. She states that she did not get any pain relief with the medial branch blocks. The bursa injection she got, lasted until about last week. The majority of her pain today continues to be her back and neck.     She has not been able to come down on her pain medications at all. She saw Dr. Galvez yesterday and will be starting pool therapy. She states that she tested borderline positive for lupus and got gout. The plan is to watch her symptoms and decide if she needs to see rheumatology.     She reports worsening migraines. She states that she has halos prior to her headaches. She knows that if this happens she needs to take ibuprofen. She feels that she has been overtaking  "ibuprofen for her headaches.  She also states when she does get a headache she does occasionally have nausea and vomiting associated with it.  She does report sensitivity to light and sound.  She states that when she does have a migraine she needs to sit in a dark room.    Pain Information:   Pain quality: Aching, Burning, Exhausting, Miserable, Nagging, Numb, Penetrating, Sharp, Shooting, Stabbing, Tender, Throbbing and Tiring    Pain rating: intensity ranges from 4/10 to 8/10, and averages 7/10 on a 0-10 scale.   Pain today 6/10    SELF CARE:   How often do you practice SELF-CARE (relaxing, stretching, pacing, monitoring posture, taking mini-breaks) in a typical day:  Is going to be starting physical therapy soon    CURRENT RELEVANT PAIN MEDICATIONS:   MS Contin 15mg-takes 2/day (in AM and in PM)  Oxycodone 5mg-takes 2 pills every 6 hours  Xanax 2mg-depends, sometimes 1/2 pill once a day, sometimes doesn't take it at all    Patient is using the medication as prescribed:  YES  Is your medication helpful? somewhat   Medication side effects? no side effect    Previous Medications: (H--helped; HI--Helped initially; SWH-- somewhat helpful, NH--No help; W--worse; SE--side effects)   Opiates: Hydrocodone NH, Morphine H, Oxycodone H  NSAIDS: Ibuprofen NH, Naproxen NH  Anti-migraine mediations: Prednisone H, Sumatriptan NH  Muscle Relaxants: Valium H, Flexeril H, Robaxin NH  Neuropathics: Gabapentin ?  Anti-depressants: Effexor NH  Anxiolytics: Xanax H, Valium H, Ativan NH  Topicals: Lidocaine Patches NH  Other medications not covered above: Tylenol NH    Past Pain Treatments:  Pain Clinic:   Yes, she was seen in [facility]   PT: Yes, last done in 2018, completed a full PT session  \"a long time ago\". Has not done aquatic therapy.   Psychologist: No, but she does have therapist  Relaxation techniques/biofeedback: No, but she is starting therapy for anxiety and they are going to work on " relaxing  Chiropractor: Yes, it has been awhile (she was told to never go back to one)  Acupuncture: No  Pharmacotherapy:               Opioids: Yes                Non-opioids:    Yes   TENs Unit:Yes, hasn't been working lately  Injections: Yes, for neck and low back. Last injection was in the neck 07/2018 for neck. 03/2018 neck epidural  Self-care:   Yes, ice packs and heating pads, essential oils, hot baths  Surgeries related to pain: Yes, Posterior spinal fusion with instrumentation on June 17, 2015 L5-S1.  Iesha 10, 2015 she underwent an anterior lumbar interbody fusion at L5-S1.     [place] Board of Pharmacy Data Base Reviewed:    YES; As expected, no concern for misuse/abuse of controlled medications based on this report.        THE 4 As OF OPIOID MAINTENANCE ANALGESIA    Analgesia: Is pain relief clinically significant? YES   Activity: Is patient functional and able to perform Activities of Daily Living? YES   Adverse effects: Is patient free from adverse side effects from opiates? YES   Adherence to Rx protocol: Is patient adhering to Controlled Substance Agreement and taking medications ONLY as ordered? YES       Is Narcan prescribed for opiate use >50 MME daily? YES      Daily MME: 90    Medications:  Current Outpatient Medications   Medication Sig Dispense Refill     ALPRAZolam (XANAX) 2 MG tablet 1/2 to 1 tablet three times daily as needed for stress/sleep 21 tablet 0     escitalopram (LEXAPRO) 10 MG tablet Take 1 tablet (10 mg) by mouth daily 30 tablet 1     Glucosamine Sulfate 1000 MG TABS        levothyroxine (SYNTHROID/LEVOTHROID) 200 MCG tablet Half tablet daily or 1 tablet every other day. 90 tablet 1     mesalamine (ASACOL HD) 800 MG EC tablet Take 2 tablets (1,600 mg) by mouth 3 times daily (Patient taking differently: Take 1,600 mg by mouth 3 times daily as needed ) 90 tablet 1     morphine (MS CONTIN) 15 MG CR tablet Take 1 tablet (15 mg) by mouth every 12 hours maximum 2 tablet(s) per day 60  "tablet 0     oxyCODONE (ROXICODONE) 5 MG tablet 1-2 tablets 4 times daily for breakthrough pain.  Use sparingly. 240 tablet 0     VENTOLIN  (90 Base) MCG/ACT Inhaler INHALE 2 PUFFS INTO THE LUNGS EVERY 6 HOURS AS NEEDED FOR SHORTNESSOF BREATH / DYSPNEA OR WHEEZING 52 g 3     albuterol (2.5 MG/3ML) 0.083% neb solution Take 1 vial (2.5 mg) by nebulization every 6 hours as needed for shortness of breath / dyspnea or wheezing (Patient not taking: Reported on 8/5/2019) 1 Box 0     naloxone (NARCAN) 4 MG/0.1ML nasal spray Spray 1 spray (4 mg) into one nostril alternating nostrils once as needed for opioid reversal every 2-3 minutes until assistance arrives (Patient not taking: Reported on 6/5/2019) 0.2 mL 0       Review of Systems: A 10-point review of systems was negative, with the exception of chronic pain issues, fever/chills, malaise, fatigue, weight gain, headache, vision changes, swelling in feet, palpitations, chest pain, abdominal pain, constipation, steroid use, thyroid disease, depression, anxiety, stress and mood swings.     Social History: Reviewed; unchanged from previous consultation.      Family history: Reviewed; unchanged from previous consultation.     PHYSICAL EXAM:     Vitals:   /74   Temp 98.7  F (37.1  C) (Temporal)   Ht 1.676 m (5' 6\")   Wt 84.8 kg (187 lb)   BMI 30.18 kg/m    Body mass index is 30.18 kg/m .  5' 6\"  187 lbs 0 oz      Constitutional: healthy, alert and no distress  HEENT: Head atraumatic, normocephalic. Eyes without conjunctival injection or jaundice. Neck supple. No obvious neck masses.  Skin: No rash, lesions, or petechiae of exposed skin.   Psychiatric/mental status: Alert, without lethargy or stupor. Appropriate affect. Mood normal.       DIAGNOSTIC TESTS:  Imaging Studies:   No new imaging to review today    Assessment:  Mattie Banda is a 38 year old female who presents today for follow up regarding her:    1. Chronic bilateral low back pain with bilateral " sciatica  2. Migraine with aura without status migrainosus, not intractable  3. Chronic SI joint pain  4. Myofascial pain  5. Left trochanteric bursitis    Unfortunately the patient did not get any pain relief with the medial branch blocks.  We discussed chronic SI joint pain and the potential pain pattern that people help with SI joint pain.  She does feel that this follows the pain that she has.  She is interested in SI joint injections.  Unfortunately she has not been able to decrease her narcotic pain medications.  She does report having chronic migraines.  We did discuss Topamax and how this medication can help her chronic pain as well as the migraines.  She is very interested in this.  She denies any history of glaucoma or kidney stones.  We did talk about the potential side effects of this medication including paresthesias and word finding trouble.  Educated the patient on the importance of drinking a lot of water with this medication.    Patient is also interested in having her left trochanteric bursa injected again.    Plan:    Diagnosis reviewed, treatment option addressed, and risk/benifits discussed.  Self-care instructions given.  I am recommending a multidisciplinary treatment plan to help this patient better manage pain.      1. Physical Therapy:  YES, she will schedule pool therapy as was suggested by Dr. Schofield.  2. Clinical Health Psychologist:  NO    3. Diagnostic Studies:  None at this time  4. Medication Management:    1. Topamax 25 mg: Take 1 at bedtime for 1 week and then take 1 twice daily  5. Further procedures recommended: Referred today for bilateral SI joint injections  6. Recommendations to PCP.  Please see above      Follow up with this provider:  6-8 Weeks     Total time spent face to face was 20 minutes and more than 50% of face to face time was spent in counseling and/or coordination of care regarding the diagnosis and recommendations above.      Danica Last PA-C   [facility]

## 2019-08-05 ENCOUNTER — OFFICE VISIT (OUTPATIENT)
Dept: FAMILY MEDICINE | Facility: CLINIC | Age: 39
End: 2019-08-05
Payer: COMMERCIAL

## 2019-08-05 VITALS
OXYGEN SATURATION: 97 % | WEIGHT: 188.4 LBS | DIASTOLIC BLOOD PRESSURE: 68 MMHG | TEMPERATURE: 96.7 F | RESPIRATION RATE: 16 BRPM | SYSTOLIC BLOOD PRESSURE: 112 MMHG | BODY MASS INDEX: 30.41 KG/M2 | HEART RATE: 92 BPM

## 2019-08-05 DIAGNOSIS — G47.9 SLEEP DISORDER: ICD-10-CM

## 2019-08-05 DIAGNOSIS — M50.30 DDD (DEGENERATIVE DISC DISEASE), CERVICAL: ICD-10-CM

## 2019-08-05 DIAGNOSIS — G89.4 CHRONIC PAIN SYNDROME: ICD-10-CM

## 2019-08-05 DIAGNOSIS — M51.369 DEGENERATION OF LUMBAR INTERVERTEBRAL DISC: ICD-10-CM

## 2019-08-05 DIAGNOSIS — F41.8 MIXED ANXIETY DEPRESSIVE DISORDER: ICD-10-CM

## 2019-08-05 PROCEDURE — 99215 OFFICE O/P EST HI 40 MIN: CPT | Performed by: FAMILY MEDICINE

## 2019-08-05 RX ORDER — OXYCODONE HYDROCHLORIDE 5 MG/1
TABLET ORAL
Qty: 240 TABLET | Refills: 0 | Status: SHIPPED | OUTPATIENT
Start: 2019-08-05 | End: 2019-08-28

## 2019-08-05 RX ORDER — ALPRAZOLAM 2 MG
TABLET ORAL
Qty: 21 TABLET | Refills: 0 | Status: SHIPPED | OUTPATIENT
Start: 2019-08-05 | End: 2019-08-28

## 2019-08-05 RX ORDER — MORPHINE SULFATE 15 MG/1
15 TABLET, FILM COATED, EXTENDED RELEASE ORAL EVERY 12 HOURS
Qty: 60 TABLET | Refills: 0 | Status: SHIPPED | OUTPATIENT
Start: 2019-08-05 | End: 2019-08-28

## 2019-08-05 ASSESSMENT — PAIN SCALES - GENERAL: PAINLEVEL: SEVERE PAIN (7)

## 2019-08-05 NOTE — PROGRESS NOTES
Subjective     Mattie Banda is a 38 year old female who presents to clinic today for the following health issues:    HPI   ED/UC Followup:    Facility:  [facility]  Date of visit: 7/27/2019  Reason for visit: hand pain  Current Status: still having pain in both hands       Patient had marked hand pain and went to the emergency room.  She did have a weakly positive JASON.  Since being in emergency she has her usual back pain.  Medications are used to help her function and work.  She does not believe she could be employed without the medication.  Sleep is disrupted because of pain.  Her hands remain stiff but they are much more functional.  She describes that she could not close her hands to make a fist when they were at their worst.  Laboratory testing also showed slightly elevated uric acid.    Patient Active Problem List   Diagnosis     Contraceptive management     CARDIOVASCULAR SCREENING; LDL GOAL LESS THAN 160     Degeneration of lumbar intervertebral disc     S/P lumbar fusion and discectomy June 2015     Low grade squamous intraepithelial lesion on cytologic smear of cervix (LGSIL)     Labial lesion     Mixed anxiety depressive disorder     Tobacco use disorder     Idiopathic peripheral neuropathy     Hypothyroidism, unspecified type     Migraine with aura and without status migrainosus, not intractable     Tinea versicolor     Lymphocytic colitis     Excessive or frequent menstruation     DDD (degenerative disc disease), cervical     Chronic pain syndrome     Past Surgical History:   Procedure Laterality Date     BACK SURGERY  6/2015      COLONOSCOPY  08/06/07     COLONOSCOPY  08/05/08     HC COLONOSCOPY W BIOPSY  02/06/08     HC TOOTH EXTRACTION W/FORCEP      wisdom teeth removed     INJECT BLOCK MEDIAL BRANCH CERVICAL/THORACIC/LUMBAR N/A 6/21/2019    Procedure: BLOCK, NERVE, SPINAL, MEDIAL BRANCH lumbar 2, 3, 4;  Surgeon: Edgardo Rubio MD;  Location: [facility] OR     INJECT EPIDURAL CERVICAL N/A  3/8/2018    Procedure: INJECT EPIDURAL CERVICAL;  cervical 6-7 interlaminar epidural steroid injection;  Surgeon: Edgardo Rubio MD;  Location: [facility] OR     LAPAROSCOPIC TUBAL LIGATION  11/27/2012    Procedure: LAPAROSCOPIC TUBAL LIGATION;  Laparoscopic Bilateral tubal sterilization/ fulgaration;  Surgeon: Sarbjit Whittaker MD;  Location: [facility] OR       Social History     Tobacco Use     Smoking status: Current Every Day Smoker     Packs/day: 1.50     Years: 8.00     Pack years: 12.00     Types: Cigarettes     Smokeless tobacco: Never Used   Substance Use Topics     Alcohol use: Yes     Alcohol/week: 0.0 oz     Comment: 1/mo     Family History   Problem Relation Age of Onset     Hypertension Maternal Grandmother      Arthritis Maternal Grandmother      Cancer Maternal Grandmother         MGGM     Depression Maternal Grandmother      Lipids Maternal Grandmother      Osteoporosis Maternal Grandmother      Respiratory Maternal Grandmother         emphysema     Genitourinary Problems Maternal Grandmother         Kidney Failure, liver      Hypertension Maternal Grandfather      Heart Disease Maternal Grandfather         MI     Cardiovascular Maternal Grandfather      Cancer - colorectal Maternal Grandfather      Cancer Maternal Grandfather         [place] Lymphoma     Hypertension Paternal Grandmother      Arthritis Paternal Grandmother         RA     Breast Cancer Other         Maternal great aunt     Thyroid Disease Maternal Aunt         two aunts     Diabetes No family hx of          BP Readings from Last 3 Encounters:   08/07/19 104/74   08/05/19 112/68   07/27/19 97/44    Wt Readings from Last 3 Encounters:   08/07/19 84.8 kg (187 lb)   08/05/19 85.5 kg (188 lb 6.4 oz)   06/12/19 82.7 kg (182 lb 6.4 oz)                      Reviewed and updated as needed this visit by Provider         Review of Systems   ROS COMP: Constitutional, HEENT, cardiovascular, pulmonary, gi and gu systems are negative, except as  otherwise noted.      Objective    /68   Pulse 92   Temp 96.7  F (35.9  C) (Temporal)   Resp 16   Wt 85.5 kg (188 lb 6.4 oz)   SpO2 97%   BMI 30.41 kg/m    Body mass index is 30.41 kg/m .  Physical Exam   GENERAL: healthy, alert and no distress  EYES: Eyes grossly normal to inspection, PERRL and conjunctivae and sclerae normal  HENT: normal cephalic/atraumatic and oral mucous membranes moist  NECK: no adenopathy, no asymmetry, masses, or scars and thyroid normal to palpation  RESP: lungs clear to auscultation - no rales, rhonchi or wheezes  CV: regular rate and rhythm, normal S1 S2, no S3 or S4, no murmur, click or rub, no peripheral edema and peripheral pulses strong  ABDOMEN: soft, nontender, no hepatosplenomegaly, no masses and bowel sounds normal  MS: Limited range of motion of lumbar spine and protection of the lumbar spine with any activity.  There might be a little synovial thickening of the backs of both hands.  She is able to make a full fist today.  There are no red or swollen joints and patient states this is never been an issue.  There was a bit of a swollen joint right hand second and third fingers when she had problems.  It was not red.  Feet are not sore or tender.  There is no edema of the legs.  NEURO: Grossly negative  PSYCH: mentation appears normal, fatigued, judgement and insight intact and appearance well groomed    Diagnostic Test Results:  Labs reviewed in Epic  Results for orders placed or performed during the hospital encounter of 07/27/19   Anti Nuclear [place] IgG by IFA with Reflex   Result Value Ref Range    SAIDA interpretation Borderline Positive (A) NEG^Negative    SAIDA pattern 1 SPECKLED     SAIDA titer 1 1:40    Antistreptolysin O   Result Value Ref Range    Antistreptolysin O 93 0 - 120 IU/mL   Erythrocyte sedimentation rate auto   Result Value Ref Range    Sed Rate 18 0 - 20 mm/h   Rheumatoid factor   Result Value Ref Range    Rheumatoid Factor <20 <20 IU/mL   Uric acid    Result Value Ref Range    Uric Acid 6.4 (H) 2.6 - 6.0 mg/dL   Cyclic Citrullinated Peptide Antibody IgG   Result Value Ref Range    Cyclic Citrullinated Peptide Antibody, IgG 1 <7 U/mL           Assessment & Plan     1. Mixed anxiety depressive disorder  Continue these medications and pain medications.  They are used appropriately and she is involved with the pain center.  - ALPRAZolam (XANAX) 2 MG tablet; 1/2 to 1 tablet three times daily as needed for stress/sleep  Dispense: 21 tablet; Refill: 0    2. Sleep disorder  He does not think she can sleep without this medication.  - ALPRAZolam (XANAX) 2 MG tablet; 1/2 to 1 tablet three times daily as needed for stress/sleep  Dispense: 21 tablet; Refill: 0    3. DDD (degenerative disc disease), cervical  As noted above, she uses them as ordered.  She is aware of the risk with alprazolam that she might stop breathing and die.  She is working with pain management team and will have an injection soon.  - morphine (MS CONTIN) 15 MG CR tablet; Take 1 tablet (15 mg) by mouth every 12 hours maximum 2 tablet(s) per day  Dispense: 60 tablet; Refill: 0  - oxyCODONE (ROXICODONE) 5 MG tablet; 1-2 tablets 4 times daily for breakthrough pain.  Use sparingly.  Dispense: 240 tablet; Refill: 0    4. Degeneration of lumbar intervertebral disc  As above.  Physical therapy also will be involved as appropriate.  - morphine (MS CONTIN) 15 MG CR tablet; Take 1 tablet (15 mg) by mouth every 12 hours maximum 2 tablet(s) per day  Dispense: 60 tablet; Refill: 0  - oxyCODONE (ROXICODONE) 5 MG tablet; 1-2 tablets 4 times daily for breakthrough pain.  Use sparingly.  Dispense: 240 tablet; Refill: 0    5. Chronic pain syndrome  As above  We also had discussion about inflammatory arthritis.  Perhaps with the painful hand with swelling weakly positive JASON there may be some underlying issue here.  Gout may or may not have been part of this.  At this point no specific treatment will be entertained but  "at some point we may consider repeating some of these tests.       Tobacco Cessation:   reports that she has been smoking cigarettes.  She has a 12.00 pack-year smoking history. She has never used smokeless tobacco.  Tobacco Cessation Action Plan: Self help information given to patient      BMI:   Estimated body mass index is 30.41 kg/m  as calculated from the following:    Height as of 6/12/19: 1.676 m (5' 6\").    Weight as of this encounter: 85.5 kg (188 lb 6.4 oz).   Weight management plan: Discussed healthy diet and exercise guidelines        MEDICATIONS:  Continue current medications without change  Patient Instructions   Although it is not felt you have a definite inflammatory problem, there remains suspicion.   Keep following with Physical Therapy and back team.    Ideally you need less narcotic pain med in the next months.      Return in about 6 weeks (around 9/16/2019) for MSK problem.  Time spent with greater than 50% spent in discussion, making the plan, or filling out paperwork with patient for illness/treatments was 40 minutes or more.    Patient is cleared for procedure/surgery anytime for the next 30 days.  Yuanjuanita Schofield MD  [facility]      "

## 2019-08-05 NOTE — PATIENT INSTRUCTIONS
Although it is not felt you have a definite inflammatory problem, there remains suspicion.   Keep following with Physical Therapy and back team.    Ideally you need less narcotic pain med in the next months.

## 2019-08-07 ENCOUNTER — TELEPHONE (OUTPATIENT)
Dept: FAMILY MEDICINE | Facility: OTHER | Age: 39
End: 2019-08-07

## 2019-08-07 ENCOUNTER — OFFICE VISIT (OUTPATIENT)
Dept: PALLIATIVE MEDICINE | Facility: CLINIC | Age: 39
End: 2019-08-07
Payer: COMMERCIAL

## 2019-08-07 VITALS
BODY MASS INDEX: 30.05 KG/M2 | HEIGHT: 66 IN | SYSTOLIC BLOOD PRESSURE: 104 MMHG | WEIGHT: 187 LBS | TEMPERATURE: 98.7 F | DIASTOLIC BLOOD PRESSURE: 74 MMHG

## 2019-08-07 DIAGNOSIS — M53.3 CHRONIC SI JOINT PAIN: ICD-10-CM

## 2019-08-07 DIAGNOSIS — M54.41 CHRONIC BILATERAL LOW BACK PAIN WITH BILATERAL SCIATICA: Primary | ICD-10-CM

## 2019-08-07 DIAGNOSIS — G89.29 CHRONIC BILATERAL LOW BACK PAIN WITH BILATERAL SCIATICA: Primary | ICD-10-CM

## 2019-08-07 DIAGNOSIS — G43.109 MIGRAINE WITH AURA AND WITHOUT STATUS MIGRAINOSUS, NOT INTRACTABLE: ICD-10-CM

## 2019-08-07 DIAGNOSIS — M79.18 MYOFASCIAL PAIN: ICD-10-CM

## 2019-08-07 DIAGNOSIS — M53.3 CHRONIC SI JOINT PAIN: Primary | ICD-10-CM

## 2019-08-07 DIAGNOSIS — M70.62 TROCHANTERIC BURSITIS OF LEFT HIP: ICD-10-CM

## 2019-08-07 DIAGNOSIS — G89.29 CHRONIC SI JOINT PAIN: ICD-10-CM

## 2019-08-07 DIAGNOSIS — G89.29 CHRONIC SI JOINT PAIN: Primary | ICD-10-CM

## 2019-08-07 DIAGNOSIS — M54.42 CHRONIC BILATERAL LOW BACK PAIN WITH BILATERAL SCIATICA: Primary | ICD-10-CM

## 2019-08-07 PROCEDURE — 99214 OFFICE O/P EST MOD 30 MIN: CPT | Performed by: PHYSICIAN ASSISTANT

## 2019-08-07 RX ORDER — TOPIRAMATE 25 MG/1
TABLET, FILM COATED ORAL
Qty: 60 TABLET | Refills: 0 | Status: SHIPPED | OUTPATIENT
Start: 2019-08-07 | End: 2019-08-28

## 2019-08-07 ASSESSMENT — PAIN SCALES - GENERAL: PAINLEVEL: SEVERE PAIN (6)

## 2019-08-07 ASSESSMENT — MIFFLIN-ST. JEOR: SCORE: 1544.98

## 2019-08-07 NOTE — TELEPHONE ENCOUNTER
Reason for Call:  Other call back    Detailed comments: Pt wants to know if you can put an order in for Livia at a different location. [facility] is booked out till the 22 and she does not want to wait that long.     Phone Number Patient can be reached at: Home number on file [phone] (home)    Best Time: NA    Can we leave a detailed message on this number? YES    Call taken on 8/7/2019 at 12:02 PM by Maira Spann

## 2019-08-07 NOTE — PATIENT INSTRUCTIONS
After Visit Instructions:     Thank you for coming to [facility] for your care. It is my goal to partner with you to help you reach your optimal state of health.     I am recommending multidisciplinary care at this time.  The focus of care will be to continue gradual rehabilitation and pain management with medication adjustments as needed.    Continue daily self-care, identifying contributing factors, and monitoring variations in pain level. Continue to integrate self-care into your life.          Schedule physical therapy assessment/visit: Start pool therapy as previously recommended    Schedule follow-up with Danica Last PA-C in 6-8 weeks. You will need to make this appointment.   Procedures recommended: bilateral SI joint injections. To call and schedule your procedure, you can call: [phone]   Schedule for a trochanteric bursa injection with me on Friday    Medication recommendations:     Topamax 25mg: take 1 at bedtime for 1 week, then take 1 twice daily. DRINK LOTS AND LOTS OF WATER      Danica Last PA-C  [facility]    Contact information: [facility]  Clinic Number:  [phone]     Call with any questions about your care and for scheduling assistance.     Calls are returned Monday through Friday between 8 AM and 4:30 PM. We usually get back to you within 2 business days depending on the issue/request.    If we are prescribing your medications:    For opioid medication refills, call the clinic or send a [employer] message 7 days in advance.  Please include:    Name of requested medication    Name of the pharmacy.    For non-opioid medications, call your pharmacy directly to request a refill. Please allow 3-4 days to be processed.     Per [place] State Law:    All controlled substance prescriptions must be filled within 30 days of being written.      For those controlled substances allowing refills, pickup must occur  within 30 days of last fill.      We believe regular attendance is key to your success in our program!      Any time you are unable to keep your appointment we ask that you call us at least 24 hours in advance to cancel.This will allow us to offer the appointment time to another patient.   Multiple missed appointments may lead to dismissal from the clinic.

## 2019-08-07 NOTE — TELEPHONE ENCOUNTER
Order placed for injection to be done with [facility]. Please let patient know that I placed the order for an injection to be done in [place].     Danica Last PA-C on 8/7/2019 at 1:14 PM

## 2019-08-08 ENCOUNTER — TELEPHONE (OUTPATIENT)
Dept: FAMILY MEDICINE | Facility: CLINIC | Age: 39
End: 2019-08-08

## 2019-08-08 NOTE — TELEPHONE ENCOUNTER
Reason for Call:  Other Pre Op    Detailed comments: pt is scheduled for an injection 8/19. Pt needs a pre op. Stated she seen DM on 8/5. Wondering if that visit could be used for her pre op. Please call. Aware DM out of clinic today.  OK to wait til tomorrow.    Phone Number Patient can be reached at:     Best Time:     Can we leave a detailed message on this number? YES    Call taken on 8/8/2019 at 9:12 AM by Sushila Andrade

## 2019-08-09 ENCOUNTER — OFFICE VISIT (OUTPATIENT)
Dept: PALLIATIVE MEDICINE | Facility: CLINIC | Age: 39
End: 2019-08-09
Payer: COMMERCIAL

## 2019-08-09 VITALS — SYSTOLIC BLOOD PRESSURE: 100 MMHG | DIASTOLIC BLOOD PRESSURE: 64 MMHG | TEMPERATURE: 97.6 F

## 2019-08-09 DIAGNOSIS — M70.62 TROCHANTERIC BURSITIS OF LEFT HIP: Primary | ICD-10-CM

## 2019-08-09 PROCEDURE — 20610 DRAIN/INJ JOINT/BURSA W/O US: CPT | Performed by: PHYSICIAN ASSISTANT

## 2019-08-09 RX ORDER — BUPIVACAINE HYDROCHLORIDE 5 MG/ML
2.5 INJECTION, SOLUTION PERINEURAL ONCE
Status: COMPLETED | OUTPATIENT
Start: 2019-08-09 | End: 2019-08-09

## 2019-08-09 RX ADMIN — BUPIVACAINE HYDROCHLORIDE 12.5 MG: 5 INJECTION, SOLUTION PERINEURAL at 13:47

## 2019-08-09 ASSESSMENT — PAIN SCALES - GENERAL: PAINLEVEL: SEVERE PAIN (7)

## 2019-08-09 NOTE — PATIENT INSTRUCTIONS
[facility]   Post Procedure Instructions    Today you had: left  bursa injection      Medications used:  lidocaine   bupivicaine   kenolog           Go to the emergency room if you develop any shortness of breath    Monitor the injection sites for signs and symptoms of infection-fever, chills, redness, swelling, warmth, or drainage to areas.    You may have soreness at injection sites for up to 24 hours.    You may apply ice to the painful areas to help minimize the discomfort of the needle pokes.    Do not apply heat to sites for at least 12 hours.    You may use anti-inflammatory medications or Tylenol for pain control if necessary    Pain Clinic phone number during work hours Monday-Friday:  [phone]    After hours provider line: [phone]

## 2019-08-09 NOTE — TELEPHONE ENCOUNTER
I did visit note this week and indicated this exam was appropriate for preoperative exam.  Procedure may be completed as planned.  Yuan Schofield MD

## 2019-08-09 NOTE — TELEPHONE ENCOUNTER
Patient was informed that the visit note was completed this week and indicated the exam was appropriate for preoperative exam. The procedure may be completed as planned.    Boaz Page CMA

## 2019-08-09 NOTE — PROGRESS NOTES
Pre procedure Diagnosis: trochanteric bursitis  Post procedure Diagnosis: Same  Procedure performed: left trochanteric bursa injection  Anesthesia: none  Complications: none  Operators: Danica ALCALA    Indications:   Mattie Banda is a 38 year old female here for trochanteric bursa injection.  Exam shows tenderness on the left trochanteric and they have tried conservative treatment including physical therapy and medications.    Options/alternatives, benefits and risks were discussed with the patient including bleeding, infection, tissue trauma including tendons and muscles, and weakness.  Questions were answered to her satisfaction and she agrees to proceed. Voluntary informed consent was obtained and signed.     Vitals were reviewed: Yes  Allergies were reviewed:  Yes   Medications were reviewed:  Yes   Pre-procedure pain score: 7/10    Procedure:  After getting informed consent, patient was placed in a prone position on the procedure table.   A Pause for the Cause was performed.  Patient was prepped in sterile fashion.     The area over the left trochanter was palpated, and the tender area was identified, corresponding to the area of the trochanteric bursa.  The area was cleaned with Chloroprep. A 25G 3.5 inch needle was inserted, aiming towards the trochanter.  When bone was encountered, the needle was slightly drawn.  A solution containing local anesthesic and steroid was injected.  The needle was removed.  Hemostasis was achieved. Bandaids were placed as appropriate.      In total, 2.5ml of 0.5% bupivacaine, 2.5ml of 1% lidocaine, and 5mg of kenalog was injected.    Hemostasis was achieved, the area was cleaned, and bandaids were placed when appropriate.  The patient tolerated the procedure well.    Post-procedure pain score: 7/10  Follow-up includes:   -f/u prn    Danica Last, PAC  [facility]

## 2019-08-12 ENCOUNTER — TELEPHONE (OUTPATIENT)
Dept: PALLIATIVE MEDICINE | Facility: CLINIC | Age: 39
End: 2019-08-12

## 2019-08-12 NOTE — TELEPHONE ENCOUNTER
Left detailed message asking for return call to ask what current pain level is post TBI 8/9/19.  Myesha Noe, CMA

## 2019-08-12 NOTE — TELEPHONE ENCOUNTER
----- Message from Myesha Noe MA sent at 8/9/2019  1:50 PM CDT -----  Call patient to ask pain level.

## 2019-08-13 NOTE — TELEPHONE ENCOUNTER
Left detailed message asking patient to return call and report current pain level.  Myesha Noe, [facility]

## 2019-08-19 ENCOUNTER — RADIOLOGY INJECTION OFFICE VISIT (OUTPATIENT)
Dept: PALLIATIVE MEDICINE | Facility: CLINIC | Age: 39
End: 2019-08-19
Attending: PHYSICIAN ASSISTANT
Payer: COMMERCIAL

## 2019-08-19 ENCOUNTER — ANCILLARY PROCEDURE (OUTPATIENT)
Dept: RADIOLOGY | Facility: CLINIC | Age: 39
End: 2019-08-19
Attending: PHYSICIAN ASSISTANT
Payer: COMMERCIAL

## 2019-08-19 VITALS
OXYGEN SATURATION: 97 % | DIASTOLIC BLOOD PRESSURE: 52 MMHG | RESPIRATION RATE: 16 BRPM | SYSTOLIC BLOOD PRESSURE: 95 MMHG | HEART RATE: 66 BPM

## 2019-08-19 DIAGNOSIS — M53.3 SI (SACROILIAC) JOINT DYSFUNCTION: Primary | ICD-10-CM

## 2019-08-19 DIAGNOSIS — G89.29 CHRONIC SI JOINT PAIN: ICD-10-CM

## 2019-08-19 DIAGNOSIS — M53.3 CHRONIC SI JOINT PAIN: ICD-10-CM

## 2019-08-19 PROCEDURE — 27096 INJECT SACROILIAC JOINT: CPT | Mod: 50 | Performed by: PSYCHIATRY & NEUROLOGY

## 2019-08-19 ASSESSMENT — PAIN SCALES - GENERAL: PAINLEVEL: EXTREME PAIN (8)

## 2019-08-19 NOTE — PROGRESS NOTES
Pre procedure Diagnosis: SI joint dysfunction    Post procedure Diagnosis: Same  Procedure performed: bilateral SI joint injection  Anesthesia: none  Complications: none  Operators: Leonarda Wilson MD and Sonia Solano MD    Indications:   Mattie Banda is a 38 year old female was sent by [facility] for SI joint injection.  They have a history of chronic axial low back and buttock pain bilaterally.  Exam shows tenderness to palpation of bilateral SIJ with reproduction of her pain and they have tried conservative treatment including medications.    Options/alternatives, benefits and risks were discussed with the patient including bleeding, infection, tissue trauma, exposure to radiation, reaction to medications including seizure, nerve injury, weakness, and numbness.  Questions were answered to her satisfaction and she agrees to proceed. Voluntary informed consent was obtained and signed.     Vitals were reviewed: Yes  Allergies were reviewed:  Yes   Medications were reviewed:  Yes   Pre-procedure pain score: 8/10    Procedure:  After getting informed consent, patient was brought into the procedure suite and was placed in a prone position on the procedure table.   A Pause for the Cause was performed.  Patient was prepped and draped in sterile fashion.     After identifying the bilateral SI joints, the C-arm was rotated to a obliquely to obtain the best view of the inferior angle of the joint.  A total of 6 ml of Lidocaine 1%  was used to anesthetize the skin at a skin entry site coaxial with the fluoroscopy beam at this location.  A 22gauge 3.5 inch needle was advanced under intermittent fluoroscopy until it was felt to enter the SI joint.    A total of 4ml of Omnipaque-300 was injected, confirming appropriate position, with spread into the intraarticular space, with no intravascular uptake noted.  6ml was wasted. Location was verified in lateral view.    3 ml of 0.2% ropivacaine with 80mg of kenalog  was injected, divided equally between the two sides.  The needle was flushed with lidocaine and removed.     Hemostasis was achieved, the area was cleaned, and bandaids were placed when appropriate.  The patient tolerated the procedure well, and was taken to the recovery room.    Images were saved to PACS.    Post-procedure pain score: 8/10  Follow-up includes:   -f/u with referring provider    Leonarda Wilson MD  [facility]

## 2019-08-19 NOTE — PATIENT INSTRUCTIONS
[facility]   Procedure Discharge Instructions    Today you saw:    Dr. Leena Wilson      You had an: sacroiliac joint injection        Medications used:  Lidocaine   Omnipaque  Ropivicaine   Kenalog              If you were holding your blood thinning medication, please restart taking it: N/A    Be cautious when walking. Numbness and/or weakness in the lower extremities may occur for up to 6-8 hours after the procedure due to effect of the local anesthetic    Do not drive for 6 hours. The effect of the local anesthetic could slow your reflexes.     You may resume your regular activities after 24 hours    Avoid strenuous activity for the first 24 hours    You may shower, however avoid swimming, tub baths or hot tubs for 24 hours following your procedure    You may have a mild to moderate increase in pain for several days following the injection.    It may take up to 14 days for the steroid medication to start working although you may feel the effect as early as a few days after the procedure.       You may use ice packs for 10-15 minutes, 3 to 4 times a day at the injection site for comfort    Do not use heat to painful areas for 6 to 8 hours. This will give the local anesthetic time to wear off and prevent you from accidentally burning your skin.     Unless you have been directed to avoid the use of anti-inflammatory medications (NSAIDS), you may use medications such as ibuprofen, Aleve or Tylenol for pain control if needed.     Possible side effects of steroids that you may experience include flushing, elevated blood pressure, increased appetite, mild headaches and restlessness.  All of these symptoms will get better with time.    If you experience any of the following, call the Pain Clinic during work hours at [phone] or the Provider Line after hours at [phone]:  -Fever over 100 degree F  -Swelling, bleeding, redness, drainage, warmth at the injection site  -Progressive weakness  or numbness in your legs   -Unusual new onset of pain that is not improving

## 2019-08-19 NOTE — NURSING NOTE
Discharge Information    IV Discontiued Time:  NA    Amount of Fluid Infused:  NA    Discharge Criteria = When patient returns to baseline or as per MD order    Consciousness:  Pt is fully awake    Circulation:  BP +/- 20% of pre-procedure level    Respiration:  Patient is able to breathe deeply    O2 Sat:  Patient is able to maintain O2 Sat >92% on room air    Activity:  Moves 4 extremities on command    Ambulation:  Patient is able to stand and walk or stand and pivot into wheelchair    Dressing:  Clean/dry or No Dressing    Notes:   Discharge instructions and AVS given to patient    Patient meets criteria for discharge?  YES    Admitted to PCU?  No    Responsible adult present to accompany patient home?  Yes    Signature/Title:    Emeterio Christensen RN Care Coordinator  [facility]

## 2019-08-19 NOTE — NURSING NOTE
Pre-procedure Intake    Have you been fasting? NA    If yes, for how long? NA    Are you taking a prescribed blood thinner such as coumadin, Plavix, Xarelto?    No    If yes, when did you take your last dose? NA    Do you take aspirin?  No    If cervical procedure, have you held aspirin for 6 days?   NA    Do you have any allergies to contrast dye, iodine, steroid and/or numbing medications?  NO    Are you currently taking antibiotics or have an active infection?  NO    Have you had a fever/elevated temperature within the past week? NO    Are you currently taking oral steroids? NO    Do you have a ? Yes       Are you pregnant or breastfeeding?  NO    Are the vital signs normal?  Yes      Josephine Guy CMA ([facility])

## 2019-08-26 ENCOUNTER — HOSPITAL ENCOUNTER (OUTPATIENT)
Dept: PHYSICAL THERAPY | Facility: CLINIC | Age: 39
Setting detail: THERAPIES SERIES
End: 2019-08-26
Attending: NEUROLOGICAL SURGERY
Payer: COMMERCIAL

## 2019-08-26 PROCEDURE — 97110 THERAPEUTIC EXERCISES: CPT | Mod: GP | Performed by: PHYSICAL THERAPIST

## 2019-08-26 PROCEDURE — 97162 PT EVAL MOD COMPLEX 30 MIN: CPT | Mod: GP | Performed by: PHYSICAL THERAPIST

## 2019-08-28 ENCOUNTER — MYC REFILL (OUTPATIENT)
Dept: PALLIATIVE MEDICINE | Facility: CLINIC | Age: 39
End: 2019-08-28

## 2019-08-28 ENCOUNTER — MYC REFILL (OUTPATIENT)
Dept: FAMILY MEDICINE | Facility: CLINIC | Age: 39
End: 2019-08-28

## 2019-08-28 DIAGNOSIS — M50.30 DDD (DEGENERATIVE DISC DISEASE), CERVICAL: ICD-10-CM

## 2019-08-28 DIAGNOSIS — G89.29 CHRONIC BILATERAL LOW BACK PAIN WITH BILATERAL SCIATICA: ICD-10-CM

## 2019-08-28 DIAGNOSIS — M51.369 DEGENERATION OF LUMBAR INTERVERTEBRAL DISC: ICD-10-CM

## 2019-08-28 DIAGNOSIS — E03.9 ACQUIRED HYPOTHYROIDISM: ICD-10-CM

## 2019-08-28 DIAGNOSIS — F41.8 MIXED ANXIETY DEPRESSIVE DISORDER: ICD-10-CM

## 2019-08-28 DIAGNOSIS — M54.42 CHRONIC BILATERAL LOW BACK PAIN WITH BILATERAL SCIATICA: ICD-10-CM

## 2019-08-28 DIAGNOSIS — G47.9 SLEEP DISORDER: ICD-10-CM

## 2019-08-28 DIAGNOSIS — M54.41 CHRONIC BILATERAL LOW BACK PAIN WITH BILATERAL SCIATICA: ICD-10-CM

## 2019-08-28 DIAGNOSIS — G43.109 MIGRAINE WITH AURA AND WITHOUT STATUS MIGRAINOSUS, NOT INTRACTABLE: ICD-10-CM

## 2019-08-28 RX ORDER — LEVOTHYROXINE SODIUM 200 UG/1
TABLET ORAL
Qty: 90 TABLET | Refills: 1 | Status: CANCELLED | OUTPATIENT
Start: 2019-08-28

## 2019-08-28 RX ORDER — ESCITALOPRAM OXALATE 10 MG/1
20 TABLET ORAL DAILY
Qty: 90 TABLET | Refills: 1 | Status: SHIPPED | OUTPATIENT
Start: 2019-08-28 | End: 2019-10-21

## 2019-08-28 RX ORDER — TOPIRAMATE 25 MG/1
25 TABLET, FILM COATED ORAL 2 TIMES DAILY
Qty: 60 TABLET | Refills: 1 | Status: SHIPPED | OUTPATIENT
Start: 2019-08-28 | End: 2019-09-27

## 2019-08-28 RX ORDER — ALPRAZOLAM 2 MG
TABLET ORAL
Qty: 21 TABLET | Refills: 0 | Status: SHIPPED | OUTPATIENT
Start: 2019-08-28 | End: 2019-09-09

## 2019-08-28 RX ORDER — OXYCODONE HYDROCHLORIDE 5 MG/1
TABLET ORAL
Qty: 240 TABLET | Refills: 0 | Status: SHIPPED | OUTPATIENT
Start: 2019-08-28 | End: 2019-09-20

## 2019-08-28 RX ORDER — MORPHINE SULFATE 15 MG/1
15 TABLET, FILM COATED, EXTENDED RELEASE ORAL EVERY 12 HOURS
Qty: 60 TABLET | Refills: 0 | Status: SHIPPED | OUTPATIENT
Start: 2019-08-28 | End: 2019-09-20

## 2019-08-28 NOTE — TELEPHONE ENCOUNTER
Oxycodone, morphine, alprazolam, and escitalopram  Last Written Prescription Date:  08/05/19-oxy, 08/05/19-morphine, 08/05/19-alprazolam, 06/05/19-escitalopram  Last Fill Quantity: 240,60,21, 30   # refills: 0,0,0,1  Last Office Visit: 08/05/19  Future Office visit:    Next 5 appointments (look out 90 days)    Sep 09, 2019 10:30 AM CDT  [facility] [address] [place] [address] [id number] [phone]           Routing refill request to provider for review/approval because:  Drug not on the [facility], [facility] or [facility] refill protocol or controlled substance

## 2019-08-28 NOTE — TELEPHONE ENCOUNTER
Received MyChart from patient  requesting refill(s) for Topamax 25 MG      Patient reports she is taking 1 tab bid. Sig is updated to reflect current use.    Last refilled on 8/7/19    Pt last seen on 8/7/19  Next appt scheduled for none    Will facilitate refill.    EFRAIN BiggsN, RN  Care Coordinator  [facility]

## 2019-08-29 NOTE — PROGRESS NOTES
08/26/19 1145   General Information   Type of Visit Initial OP Ortho PT Evaluation   Start of Care Date 08/26/19   Referring Physician Dr. Schofield   Patient/Family Goals Statement improve lumbar flexibility, decreased pain, ride motorcycle longer than 20 minutes and participate in  duties with daughter.   Orders Evaluate and Treat   Orders Comment Pool therapy    Date of Order 07/26/19   Certification Required? No   Medical Diagnosis Degeneration of lumbar intervertebral disc   Surgical/Medical history reviewed Yes   Precautions/Limitations no known precautions/limitations   Weight-Bearing Status - LUE full weight-bearing   Weight-Bearing Status - RUE full weight-bearing   Weight-Bearing Status - LLE full weight-bearing   Weight-Bearing Status - RLE full weight-bearing   General Information Comments Patient is pleasant, however anxious. Reports previous episodes of low back physical therapy with no improvement.        Present No   Body Part(s)   Body Part(s) Lumbar Spine/SI   Presentation and Etiology   Pertinent history of current problem (include personal factors and/or comorbidities that impact the POC) Patient is a 38 year old female, presents this date with low back pain. Patient reports several stints of low back pain rehab with no progress, she previously received traction without change. She reports a hisotyr of SI joint dysfunction with good outcomes when this was corrected however this did not last. She reports back was not relieved by her surgery: L5-S1 discectomy and fusion June 2015.Currently she reports back pain local the low lumbar spine from right to left, no specific location and occasionally into her butt bilaterally and posterior thighs. She admits to occasional pain into both feet. She previously had severe left hip pain described as lighting strike from the left lateral hip to mid thigh. She notes increased sensitivity and warmth throughout this distribution and  reports improvement since the bursa injection. 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 She reports increased pain with prolonged activity: sitting, standing, lying on either side. Patient reports no position of relief of her back pain and is unable to lie on her stomach.  She has a previous medical history of chronic pain syndrome, mixed anxiety and depressive disorder, idiopathic peripheral neuropathy, migraine with aura, DDD of cervical spine, thyroid disease and is a current smoker. Patient received left greater trochanter bursa injection 8/9/19 and bilateral SI joint injections 8/19/19.   Impairments A. Pain;E. Decreased flexibility;D. Decreased ROM;G. Impaired balance;F. Decreased strength and endurance   Functional Limitations perform activities of daily living;perform required work activities   Symptom Location across lumbar spine    How/Where did it occur From Degenerative Joint Disease   Onset date of current episode/exacerbation 09/02/13   Chronicity Chronic   Pain rating (0-10 point scale) Best (/10);Worst (/10)   Best (/10) 4  (with pain medications)   Worst (/10) 10  (with medication)   Pain quality A. Sharp;B. Dull;C. Aching;D. Burning;E. Shooting;F. Stabbing   Frequency of pain/symptoms A. Constant   Pain/symptoms are: Worse in the morning   Pain/symptoms exacerbated by I. Bending;B. Walking;A. Sitting  (vacuuming or sweeping)   Pain/symptoms eased by E. Changing positions;I. OTC medication(s);G. Heat   Progression of symptoms since onset: Worsened   Prior Level of Function   Prior Level of Function-Mobility IND   Prior Level of Function-ADLs IND   Functional Level Prior Comment IND   Current Level of Function   Patient role/employment history A. Employed   Employment Comments CNA at group home   Living environment [place]  (2 flights of stairs with bilat hand rails)   Current  equipment-Gait/Locomotion None   Fall Risk Screen   Fall screen completed by PT   Have you fallen 2 or more times in the past year? Yes   Have you fallen and had an injury in the past year? Yes   Is patient a fall risk? Yes   Fall screen comments fell during the winter, landed on knees and butt no serious injury. notes impaired balance due to back. Single limb stance left 13 seconds, right 10 seconds with foot touch down bilat. fearful of falling   Hip Objective Findings   Gait/Locomotion Left retroversion.    Balance/Proprioception (Single Leg Stance) 13 left, 10 right   Functional Closed Chain Screen antalgic sit to/from stand, requires hand support on thighs for transition   Femoral Nerve Stretch Test positive   ALVAREZ Test postive bilat   FADIR Test negative bilat   Lumbar Spine/SI Objective Findings   Observation patient anxious regarding back pain and previous back treatment. Stiff at rest however frequently fidgeting   Integumentary no concerns   Posture Posterior pelvic tilt in stancewith knees locked and passive stance. Forward head and rounded shoulders with increased thoracic kyphosis, right shoulder depressed. No evidence of lateral deviation of the spine, decreased lumbar lordosis.    Gait/Locomotion Static stance with left foot retroversion, increased WB and weight shift to RLE. Stiff pelvis throughout gait with minimal pelvic nutation and decreased arm swing   Balance/Proprioception (Single Leg Stance) 13 sec left, 10 sec right.    Flexion ROM 25% with muscle tension limiting and pain down posterior legs   Extension ROM 10% with pain   Right Side Bending ROM 30%   Left Side Bending ROM 20% with right side pain   Repeated Extension-Standing ROM localized pain across lumbar spine, increased motion to 30%    Repeated Flexion-Standing ROM pain to upper calf bilaterally, increased lumbar pain centrally and motion improved to 70%.    Pelvic Screen hypomobile SI joints bilaterally.    Transversus Abdominus  Strength (Hubert Leg Lowering-deg) poor   Hip Flexion (L2) Strength 4/5 bilat   Hip Abduction Strength 4-/5 bilat   Hip Adduction Strength 4/5 bilat   Hip Extension Strength 4/5 bilat with right hamstring cramp   Knee Flexion Strength 4/5 in short sitting   Knee Extension (L3) Strength 4+/5   Ankle Dorsiflexion (L4) Strength 4+/5   Great Toe Extension (L5) Strength 4/5   Ankle Plantar Flexion (S1) Strength 4+/5   Hamstring Flexibility WNL   Quadricep Flexibility WNL   Piriformis Flexibility decreased bilaterally, left > right   Ely Test negative   Crossover SLR negative   Repeated Extension Prone negative   Slump Test positive   Spring Test positive L3,4,5 and S1   Lumbar/SI Special Tests Comments + Gowers sign.    Segmental Mobility hypermobile lumbar spine and hypomobile sacroiliac joint and L5-S1   Palpation tender to bilateral lumbar paraspinals, quadratus lumborum, kuneals, left deep external rotators, left hamstring, right anterior thigh    Planned Therapy Interventions   Planned Therapy Interventions balance training;gait training;manual therapy;ROM;strengthening;stretching;neuromuscular re-education   Planned Modality Interventions   Planned Modality Interventions Hot packs;Electrical stimulation;Cryotherapy  (Pool Therapy)   Clinical Impression   Criteria for Skilled Therapeutic Interventions Met yes, treatment indicated   PT Diagnosis core weakness, muscle tension, joint stiffness, lack of lumbar stability, poor muscular endurance, poor posture   Influenced by the following impairments extensive medical history (see above), faulty movement patterns, poor core engagement    Functional limitations due to impairments pain, fatigue, inability to complete home management tasks, increased rest breaks during daily routine    Clinical Presentation Stable/Uncomplicated   Clinical Presentation Rationale Patient without significant increase in pain or chief complaint symptoms with specific exercise, however  demonstrates poor muscular endurance and lack of stabilization about the spine consistent with Sybil's low back pain treatment classification of stabliization. She did not demonstrate signifcant change in symptoms with intervention thus presentation is stable at this time.    Clinical Decision Making (Complexity) Moderate complexity   Therapy Frequency 2 times/Week   Predicted Duration of Therapy Intervention (days/wks) 8-10 weeks   Risk & Benefits of therapy have been explained Yes   Patient, Family & other staff in agreement with plan of care Yes   Clinical Impression Comments Patient presents this date as motivated to improve her pain and eager to complete pool rehab. Patient was receptive to education regrading core stabilization and strengthening prior to progressing to the pool. Patient previously failed several physical therapy episodes of care, however remains optomistic that this episode of care can help her symptoms. Anticipate patient's previous experiences and expectations to cloud her progress in this episode of care, her anxiety and depression as well as chronic pain and DDD to impact her tolerance of land therapy prior to progressing to pool.    Education Assessment   Preferred Learning Style Pictures/video;Demonstration   Barriers to Learning Emotional   ORTHO GOALS   PT Ortho Eval Goals 1;2;3;4   Ortho Goal 1   Goal Identifier LEFS   Goal Description Patient will report a LEFS score of 60/80 or better by discharge from outpatient episode of care as evidence of improved tolerance of ADLs for improved quality of life.    Target Date 11/04/19   Ortho Goal 2   Goal Identifier Standing tolerance   Goal Description Patient will report ability to stand for 30 minutes without needing to lean on an surface as evidence of improve core engagement with ADLs in order to progress her towards tolerance of assisting her daughter with  provision and for improved quality of life.    Target Date 10/14/19    Ortho Goal 3   Goal Identifier Bridge   Goal Description Patient will demonstrate pain free bridge with proper abdominal bracing 10 times without pain as evidence of improved core engagement for progression to pool therapy.    Target Date 09/18/19   Ortho Goal 4   Goal Identifier Pool   Goal Description Patient will complete 10 minutes of continuous water walking against turbulance at chest deep water as evidence of improved activity tolerance and muscular endurance in order to progress to land endurance activities in her HEP.   Target Date 10/16/19   Total Evaluation Time   PT Eval, Moderate Complexity Minutes (21178) 20     Thank you for your referral.    Kera Sarkar, PT, DPT, ATC    [facility]    O: [phone]  E: [email or web address]

## 2019-09-03 DIAGNOSIS — E03.9 ACQUIRED HYPOTHYROIDISM: ICD-10-CM

## 2019-09-05 DIAGNOSIS — E03.9 ACQUIRED HYPOTHYROIDISM: ICD-10-CM

## 2019-09-05 NOTE — TELEPHONE ENCOUNTER
"Routing refill request to provider for review/approval because:  Labs out of range:  TSH  T'd up 1 month for provider review.      Requested Prescriptions   Pending Prescriptions Disp Refills     levothyroxine (SYNTHROID/LEVOTHROID) 200 MCG tablet 90 tablet 1     Sig: Half tablet daily or 1 tablet every other day.   Last Written Prescription Date:  6/19/2019  Last Fill Quantity: 90,  # refills: 1   Last office visit: 8/5/2019 with prescribing provider:     Future Office Visit:   Next 5 appointments (look out 90 days)    Sep 09, 2019 10:30 AM CDT  Office Visit with Yuan Schofield MD  [facility]) [address] [place] [id number] [phone]             Thyroid Protocol Failed - 9/3/2019  2:55 PM        Failed - Normal TSH on file in past 12 months     Recent Labs   Lab Test 06/05/19  1121   TSH 0.06*           Passed - Patient is 12 years or older        Passed - Recent (12 mo) or future (30 days) visit within the authorizing provider's specialty     Patient had office visit in the last 12 months or has a visit in the next 30 days with authorizing provider or within the authorizing provider's specialty.  See \"Patient Info\" tab in inbasket, or \"Choose Columns\" in Meds & Orders section of the refill encounter.          Passed - Medication is active on med list        Passed - No active pregnancy on record     If patient is pregnant or has had a positive pregnancy test, please check TSH.          Passed - No positive pregnancy test in past 12 months     If patient is pregnant or has had a positive pregnancy test, please check TSH.        Estefanía Prado RN      "

## 2019-09-06 ENCOUNTER — HOSPITAL ENCOUNTER (OUTPATIENT)
Dept: PHYSICAL THERAPY | Facility: CLINIC | Age: 39
Setting detail: THERAPIES SERIES
End: 2019-09-06
Attending: NEUROLOGICAL SURGERY
Payer: COMMERCIAL

## 2019-09-06 PROCEDURE — 97110 THERAPEUTIC EXERCISES: CPT | Mod: GP | Performed by: PHYSICAL THERAPIST

## 2019-09-06 PROCEDURE — 97140 MANUAL THERAPY 1/> REGIONS: CPT | Mod: GP | Performed by: PHYSICAL THERAPIST

## 2019-09-06 RX ORDER — LEVOTHYROXINE SODIUM 200 UG/1
TABLET ORAL
Qty: 30 TABLET | Refills: 0 | Status: SHIPPED | OUTPATIENT
Start: 2019-09-06 | End: 2019-11-15 | Stop reason: DRUGHIGH

## 2019-09-06 RX ORDER — LEVOTHYROXINE SODIUM 200 UG/1
TABLET ORAL
Qty: 90 TABLET | Refills: 1 | OUTPATIENT
Start: 2019-09-06

## 2019-09-06 NOTE — TELEPHONE ENCOUNTER
"Denied  Refill current  Requested Prescriptions   Pending Prescriptions Disp Refills     levothyroxine (SYNTHROID/LEVOTHROID) 200 MCG tablet [Pharmacy Med Name: LEVOTHYROXINE 200MCG TABLET] 90 tablet 1     Sig: TAKE 1/2 TABLET BY MOUTH ONCE DAILY OR TAKE 1 TABLET EVERY OTHER DAY   Last Written Prescription Date:  6/19/2019  Last Fill Quantity: 90,  # refills: 1   Last office visit: 8/5/2019 with prescribing provider:     Future Office Visit:   Next 5 appointments (look out 90 days)    Sep 09, 2019 10:30 AM CDT  [facility] [address] [place] [phone]  [phone]             Thyroid Protocol Failed - 9/5/2019 10:32 AM        Failed - Normal TSH on file in past 12 months     Recent Labs   Lab Test 06/05/19  1121   TSH 0.06*            Passed - Patient is 12 years or older        Passed - Recent (12 mo) or future (30 days) visit within the authorizing provider's specialty     Patient had office visit in the last 12 months or has a visit in the next 30 days with authorizing provider or within the authorizing provider's specialty.  See \"Patient Info\" tab in inbasket, or \"Choose Columns\" in Meds & Orders section of the refill encounter.              Passed - Medication is active on med list        Passed - No active pregnancy on record     If patient is pregnant or has had a positive pregnancy test, please check TSH.          Passed - No positive pregnancy test in past 12 months     If patient is pregnant or has had a positive pregnancy test, please check TSH.        Estefanía Prado RN    "

## 2019-09-09 ENCOUNTER — OFFICE VISIT (OUTPATIENT)
Dept: FAMILY MEDICINE | Facility: CLINIC | Age: 39
End: 2019-09-09
Payer: COMMERCIAL

## 2019-09-09 ENCOUNTER — HOSPITAL ENCOUNTER (OUTPATIENT)
Dept: PHYSICAL THERAPY | Facility: CLINIC | Age: 39
Setting detail: THERAPIES SERIES
End: 2019-09-09
Attending: NEUROLOGICAL SURGERY
Payer: COMMERCIAL

## 2019-09-09 VITALS
RESPIRATION RATE: 16 BRPM | OXYGEN SATURATION: 95 % | HEART RATE: 88 BPM | WEIGHT: 178.8 LBS | TEMPERATURE: 96.1 F | BODY MASS INDEX: 28.86 KG/M2 | SYSTOLIC BLOOD PRESSURE: 108 MMHG | DIASTOLIC BLOOD PRESSURE: 64 MMHG

## 2019-09-09 DIAGNOSIS — F41.8 MIXED ANXIETY DEPRESSIVE DISORDER: ICD-10-CM

## 2019-09-09 DIAGNOSIS — M51.369 DEGENERATION OF LUMBAR INTERVERTEBRAL DISC: ICD-10-CM

## 2019-09-09 DIAGNOSIS — N92.0 EXCESSIVE OR FREQUENT MENSTRUATION: Primary | ICD-10-CM

## 2019-09-09 DIAGNOSIS — M50.30 DDD (DEGENERATIVE DISC DISEASE), CERVICAL: ICD-10-CM

## 2019-09-09 DIAGNOSIS — G47.9 SLEEP DISORDER: ICD-10-CM

## 2019-09-09 PROCEDURE — 99214 OFFICE O/P EST MOD 30 MIN: CPT | Performed by: FAMILY MEDICINE

## 2019-09-09 PROCEDURE — 97140 MANUAL THERAPY 1/> REGIONS: CPT | Mod: GP | Performed by: PHYSICAL THERAPIST

## 2019-09-09 PROCEDURE — 97110 THERAPEUTIC EXERCISES: CPT | Mod: GP | Performed by: PHYSICAL THERAPIST

## 2019-09-09 RX ORDER — ALPRAZOLAM 2 MG
TABLET ORAL
Qty: 90 TABLET | Refills: 1 | Status: SHIPPED | OUTPATIENT
Start: 2019-09-09 | End: 2019-11-01

## 2019-09-09 ASSESSMENT — PATIENT HEALTH QUESTIONNAIRE - PHQ9: SUM OF ALL RESPONSES TO PHQ QUESTIONS 1-9: 18

## 2019-09-09 ASSESSMENT — PAIN SCALES - GENERAL: PAINLEVEL: SEVERE PAIN (7)

## 2019-09-09 NOTE — PROGRESS NOTES
Subjective     Mattie Banda is a 38 year old female who presents to clinic today for the following health issues:    HPI   Chronic Pain Follow-Up       Type / Location of Pain: back pain  Analgesia/pain control:       Recent changes:  same      Overall control: Tolerable with discomfort  Activity level/function:      Daily activities:  Able to do moderate activities    Work:  Able to work part time with limitations  Adverse effects:  No  Adherance    Taking medication as directed?  Yes    Participating in other treatments: yes  Risk Factors:    Sleep:  Poor    Mood/anxiety:  improved    Recent family or social stressors:  none noted    Other aggravating factors: prolonged sitting, prolonged standing and repetitive activities - depends on activity  PHQ-9 SCORE 8/20/2018 2/13/2019 6/5/2019   PHQ-9 Total Score - - -   PHQ-9 Total Score 16 14 24     CRISTIANO-7 SCORE 3/2/2018 7/9/2018 2/13/2019   Total Score 19 20 18     Encounter-Level CSA - 08/20/2018:    Controlled Substance Agreement - Scan on 8/24/2018 10:37 AM: CONTROLLED SUBSTANCE AGREEMENT (below)       Patient-Level CSA:    Controlled Substance Agreement - Opioid - Scan on 6/13/2019  6:05 PM (below)    Controlled Substance Agreement - Opioid - Scan on 6/13/2019  6:03 PM: 06/05/2019 (below)             How many servings of fruits and vegetables do you eat daily?  0-1    On average, how many sweetened beverages do you drink each day (soda, juice, sweet tea, etc)?   1    How many days per week do you miss taking your medication? 0        Patient did have back injection and she thinks things are somewhat better she has more mobility in her back although still has pain.  She continues with therapy.  Continues to use medications as ordered.    Patient has been having irregular menstrual cycles and they are coming about every 2 weeks with very heavy bleeding.  She was actually on her way here today for the appointment and had to go back because she soaked through her  close in her car.  Heavy pads and tampons barely contain the blood flow.  The one point hysterectomy was considered but it would be necessary for her to go to the [facility] for evaluation.  She is at the point now or she is missing work because of this, willing to have what ever it takes to get things to improve, missed work and then be able to return.    Patient Active Problem List   Diagnosis     Contraceptive management     CARDIOVASCULAR SCREENING; LDL GOAL LESS THAN 160     Degeneration of lumbar intervertebral disc     S/P lumbar fusion and discectomy June 2015     Low grade squamous intraepithelial lesion on cytologic smear of cervix (LGSIL)     Labial lesion     Mixed anxiety depressive disorder     Tobacco use disorder     Idiopathic peripheral neuropathy     Hypothyroidism, unspecified type     Migraine with aura and without status migrainosus, not intractable     Tinea versicolor     Lymphocytic colitis     Excessive or frequent menstruation     DDD (degenerative disc disease), cervical     Chronic pain syndrome     Past Surgical History:   Procedure Laterality Date     BACK SURGERY  6/2015      COLONOSCOPY  08/06/07     COLONOSCOPY  08/05/08     HC COLONOSCOPY W BIOPSY  02/06/08     HC TOOTH EXTRACTION W/FORCEP      wisdom teeth removed     INJECT BLOCK MEDIAL BRANCH CERVICAL/THORACIC/LUMBAR N/A 6/21/2019    Procedure: BLOCK, NERVE, SPINAL, MEDIAL BRANCH lumbar 2, 3, 4;  Surgeon: Edgardo Rubio MD;  [facility]     INJECT EPIDURAL CERVICAL N/A 3/8/2018    Procedure: INJECT EPIDURAL CERVICAL;  cervical 6-7 interlaminar epidural steroid injection;  Surgeon: Edgardo Rubio MD;  Location[facility]     LAPAROSCOPIC TUBAL LIGATION  11/27/2012    Procedure: LAPAROSCOPIC TUBAL LIGATION;  Laparoscopic Bilateral tubal sterilization/ fulgaration;  Surgeon: Sarbjit Whittaker MD;  Location: [facility] OR       Social History     Tobacco Use     Smoking status: Current Every Day Smoker     Packs/day:  1.50     Years: 8.00     Pack years: 12.00     Types: Cigarettes     Smokeless tobacco: Never Used   Substance Use Topics     Alcohol use: Yes     Alcohol/week: 0.0 oz     Comment: 1/mo     Family History   Problem Relation Age of Onset     Hypertension Maternal Grandmother      Arthritis Maternal Grandmother      Cancer Maternal Grandmother         MGGM     Depression Maternal Grandmother      Lipids Maternal Grandmother      Osteoporosis Maternal Grandmother      Respiratory Maternal Grandmother         emphysema     Genitourinary Problems Maternal Grandmother         Kidney Failure, liver      Hypertension Maternal Grandfather      Heart Disease Maternal Grandfather         MI     Cardiovascular Maternal Grandfather      Cancer - colorectal Maternal Grandfather      Cancer Maternal Grandfather         Hodgins Lymphoma     Hypertension Paternal Grandmother      Arthritis Paternal Grandmother         RA     Breast Cancer Other         Maternal great aunt     Thyroid Disease Maternal Aunt         two aunts     Diabetes No family hx of          BP Readings from Last 3 Encounters:   09/09/19 108/64   08/19/19 95/52   08/09/19 100/64    Wt Readings from Last 3 Encounters:   09/09/19 81.1 kg (178 lb 12.8 oz)   08/07/19 84.8 kg (187 lb)   08/05/19 85.5 kg (188 lb 6.4 oz)                      Reviewed and updated as needed this visit by Provider         Review of Systems   ROS COMP: Constitutional, HEENT, cardiovascular, pulmonary, gi and gu systems are negative, except as otherwise noted.      Objective    /64   Pulse 88   Temp 96.1  F (35.6  C) (Temporal)   Resp 16   Wt 81.1 kg (178 lb 12.8 oz)   LMP 09/09/2019 (Exact Date)   SpO2 95%   BMI 28.86 kg/m    Body mass index is 28.86 kg/m .  Physical Exam   GENERAL: healthy, alert and fatigued  MS: Seems less bothered by back and lower extremity issues since the injection.  She actually moves reasonably comfortably in the chair.  NEURO: Grossly negative  PSYCH:  "Insight intact and she seems relatively positive    Diagnostic Test Results:  Labs reviewed in Epic        Assessment & Plan     1. Mixed anxiety depressive disorder  Alprazolam is working for her along with pain medication.  She is cognizant of respiratory depression on both medications.  Previously and recently  site has been reviewed.  Will continue with physical therapy and see if the injection continues to help lumbar disorder.  - ALPRAZolam (XANAX) 2 MG tablet; 1/2 to 1 tablet three times daily as needed for stress/sleep  Dispense: 90 tablet; Refill: 1    2. Sleep disorder  Helps as needed.  - ALPRAZolam (XANAX) 2 MG tablet; 1/2 to 1 tablet three times daily as needed for stress/sleep  Dispense: 90 tablet; Refill: 1    3. Excessive or frequent menstruation  Since she is willing to be seen at  of  now for possible surgery we will work on this plan.  Perhaps if hormonal issues are addressed, some of her back pain and other issues may improve.    4. Degeneration of lumbar intervertebral disc  Basically as above with recent injection and physical therapy    5. DDD (degenerative disc disease), cervical  Not as limiting now his other.       Tobacco Cessation:   reports that she has been smoking cigarettes.  She has a 12.00 pack-year smoking history. She has never used smokeless tobacco.  Tobacco Cessation Action Plan: Information offered: Patient not interested at this time      BMI:   Estimated body mass index is 28.86 kg/m  as calculated from the following:    Height as of 8/7/19: 1.676 m (5' 6\").    Weight as of this encounter: 81.1 kg (178 lb 12.8 oz).   Weight management plan: Discussed healthy diet and exercise guidelines        Patient Instructions   Keep working with Physical Therapy and I will work on getting something done for the periods.      Return for MSK problem, Chronic pain.    Yuan Iain Schofield MD  [facility]      "

## 2019-09-17 ENCOUNTER — TELEPHONE (OUTPATIENT)
Dept: FAMILY MEDICINE | Facility: CLINIC | Age: 39
End: 2019-09-17

## 2019-09-17 NOTE — TELEPHONE ENCOUNTER
----- Message from Kimberly Lane sent at 9/13/2019  2:56 PM CDT -----  Regarding: FW: [facility] consult gynecology   Scheduled 9/17/19 at 11:00am at [facility], [place] [facility] [address], [address], [address]     I left a message for her to call back.    ----- Message -----  From: Yuan Schofield MD  Sent: 9/11/2019   5:29 PM  To: Kimberly Lane  Subject: [facility] consult gynecology       I realize I left her most recent visit open because we need to have her see [facility] gynecology for heavy periods.  I do not know if I told you that or not.  Thanks.  Yuan Schofield MD

## 2019-09-18 ASSESSMENT — ENCOUNTER SYMPTOMS
SYNCOPE: 0
NAIL CHANGES: 0
POOR WOUND HEALING: 0
FATIGUE: 1
LEG PAIN: 1
ALTERED TEMPERATURE REGULATION: 1
SEIZURES: 0
DIZZINESS: 1
BREAST MASS: 1
RECTAL PAIN: 1
NUMBNESS: 1
PARALYSIS: 0
ARTHRALGIAS: 1
NIGHT SWEATS: 1
HEADACHES: 1
CHILLS: 1
BACK PAIN: 1
DISTURBANCES IN COORDINATION: 1
MUSCLE CRAMPS: 1
TINGLING: 1
POLYDIPSIA: 0
EYE PAIN: 1
PANIC: 1
HEARTBURN: 1
WEIGHT LOSS: 1
NERVOUS/ANXIOUS: 1
HYPERTENSION: 0
NECK PAIN: 1
DEPRESSION: 1
DIARRHEA: 1
EYE REDNESS: 0
MYALGIAS: 1
SPEECH CHANGE: 0
JOINT SWELLING: 1
DECREASED CONCENTRATION: 1
SLEEP DISTURBANCES DUE TO BREATHING: 0
POLYPHAGIA: 0
HYPOTENSION: 1
FEVER: 0
HALLUCINATIONS: 0
DOUBLE VISION: 1
ORTHOPNEA: 0
EYE WATERING: 0
LIGHT-HEADEDNESS: 1
WEIGHT GAIN: 1
BOWEL INCONTINENCE: 1
TREMORS: 0
NAUSEA: 1
BLOATING: 1
PALPITATIONS: 0
MEMORY LOSS: 1
MUSCLE WEAKNESS: 1
EYE IRRITATION: 1
INCREASED ENERGY: 1
LOSS OF CONSCIOUSNESS: 0
DECREASED LIBIDO: 1
ABDOMINAL PAIN: 1
WEAKNESS: 1
HOT FLASHES: 1
VOMITING: 0
DECREASED APPETITE: 0
BLOOD IN STOOL: 1
STIFFNESS: 1
INSOMNIA: 1
BREAST PAIN: 1
CONSTIPATION: 1
SKIN CHANGES: 1
EXERCISE INTOLERANCE: 1
JAUNDICE: 0

## 2019-09-18 ASSESSMENT — ANXIETY QUESTIONNAIRES
3. WORRYING TOO MUCH ABOUT DIFFERENT THINGS: NEARLY EVERY DAY
4. TROUBLE RELAXING: NEARLY EVERY DAY
7. FEELING AFRAID AS IF SOMETHING AWFUL MIGHT HAPPEN: MORE THAN HALF THE DAYS
GAD7 TOTAL SCORE: 16
7. FEELING AFRAID AS IF SOMETHING AWFUL MIGHT HAPPEN: MORE THAN HALF THE DAYS
5. BEING SO RESTLESS THAT IT IS HARD TO SIT STILL: MORE THAN HALF THE DAYS
6. BECOMING EASILY ANNOYED OR IRRITABLE: NOT AT ALL
GAD7 TOTAL SCORE: 16
2. NOT BEING ABLE TO STOP OR CONTROL WORRYING: NEARLY EVERY DAY
1. FEELING NERVOUS, ANXIOUS, OR ON EDGE: NEARLY EVERY DAY

## 2019-09-26 DIAGNOSIS — G89.29 CHRONIC BILATERAL LOW BACK PAIN WITH BILATERAL SCIATICA: ICD-10-CM

## 2019-09-26 DIAGNOSIS — G43.109 MIGRAINE WITH AURA AND WITHOUT STATUS MIGRAINOSUS, NOT INTRACTABLE: ICD-10-CM

## 2019-09-26 DIAGNOSIS — M54.42 CHRONIC BILATERAL LOW BACK PAIN WITH BILATERAL SCIATICA: ICD-10-CM

## 2019-09-26 DIAGNOSIS — M54.41 CHRONIC BILATERAL LOW BACK PAIN WITH BILATERAL SCIATICA: ICD-10-CM

## 2019-09-26 NOTE — TELEPHONE ENCOUNTER
topiramate (TOPAMAX) 25 MG tablet    LOV: 8/9/2019  Last refill date: 8/28/2019  Last fill date: 9/5/2019  No future appointment scheduled.    Yarely Banks CMA, September 26, 2019

## 2019-09-27 ENCOUNTER — HOSPITAL ENCOUNTER (OUTPATIENT)
Dept: PHYSICAL THERAPY | Facility: CLINIC | Age: 39
Setting detail: THERAPIES SERIES
End: 2019-09-27
Attending: NEUROLOGICAL SURGERY
Payer: COMMERCIAL

## 2019-09-27 PROCEDURE — 97110 THERAPEUTIC EXERCISES: CPT | Mod: GP | Performed by: PHYSICAL THERAPIST

## 2019-09-27 RX ORDER — TOPIRAMATE 25 MG/1
25 TABLET, FILM COATED ORAL 2 TIMES DAILY
Qty: 60 TABLET | Refills: 1 | Status: SHIPPED | OUTPATIENT
Start: 2019-09-27 | End: 2019-10-25

## 2019-09-27 NOTE — PROGRESS NOTES
Outpatient Physical Therapy Discharge Note     Patient: Mattie Banda  : 1980    Beginning/End Dates of Reporting Period:  19 to 2019. Patient has been seen in outpatient physical therapy 5 times over the past  weeks.     Referring Provider: Dr. Alberto    Therapy Diagnosis: core weakness, muscle tension, joint stiffness, lack of lumbar stability, poor muscular endurance, poor posture     Client Self Report: Patient reports to physical therapy this date with lingering right hip pain, stiffness and feeling that is needs to pop. Patient reports overall feeling more limber in her left hip, back and legs and feeling her back and legs are stronger overall. Patient has not been seen in 3 weeks.     Objective Measurements:  Objective Measure: Symptoms  Details: Right hip tension    Objective Measure: HEP  Details: no questions    Objective Measure: Range of motion/ SI joint  Details: improving right hip range of motion, continues to express right deep hip pain and stiffness      Outcome Measures (most recent score):  Luciana STarT Sub-Score (Q5-9): 5  Luciana STarT Total Score (all 9): 8  Oswestry Score (%): 44 %    CARMEN  ( Venus Chaudhary: Used with Permission) 2019   a. Any of your usual work, housework, or school activities. 1-Quite a Bit of Difficulty   b. Your usual hobbies, recreational or sporting activities.  1-Quite a Bit of Difficulty   c. Getting into or out of the bath. 2-Moderate Difficulty   d. Walking between rooms. 3-A Little Bit of Difficulty   e. Putting on your shoes or socks. 2-Moderate Difficulty   f. Squatting. 1-Quite a Bit of Difficulty   g. Lifting an object, like a bag of groceries from the floor.  3-A Little Bit of Difficulty   h. Performing light activities around your home.  3-A Little Bit of Difficulty   i. Performing heavy activities around your home. 1-Quite a Bit of Difficulty   j. Getting into or out of a car. 3-A Little Bit of Difficulty   k. Walking 2 blocks.  2-Moderate Difficulty   l. Walking a mile. 1-Quite a Bit of Difficulty   m. Going up or down 10 stairs (about 1 flight of stairs). 1-Quite a Bit of Difficulty   n. Standing for 1 hour. 2-Moderate Difficulty   o. Sitting for 1 hour. 2-Moderate Difficulty   p. Running on even ground. 0-Extreme Difficulty or Unable to Perform Activity   q. Running on uneven ground. 0-Extreme Difficulty or Unable to Perform Activity   r. Making sharp turns while running fast. 0-Extreme Difficulty or Unable to Perform Activity   s. Hopping. 1-Quite a Bit of Difficulty   t. Rolling over in bed. 1-Quite a Bit of Difficulty   Column Totals: /80 30       Goals:  Goal Identifier LEFS   Goal Description Patient will report a LEFS score of 60/80 or better by discharge from outpatient episode of care as evidence of improved tolerance of ADLs for improved quality of life.    Target Date 11/04/19   Date Met     Progress: decline, patient notes globally improving however     Goal Identifier Standing tolerance   Goal Description Patient will report ability to stand for 30 minutes without needing to lean on an surface as evidence of improve core engagement with ADLs in order to progress her towards tolerance of assisting her daughter with  provision and for improved quality of life.    Target Date 10/14/19   Date Met  09/27/19   Progress: reports ability to tolerate 1 hour of standing w/o support     Goal Identifier Bridge   Goal Description Patient will demonstrate pain free bridge with proper abdominal bracing 10 times without pain as evidence of improved core engagement for progression to pool therapy.    Target Date 09/18/19   Date Met     Progress: right hip pain with task     Goal Identifier Pool   Goal Description Patient will complete 10 minutes of continuous water walking against turbulance at chest deep water as evidence of improved activity tolerance and muscular endurance in order to progress to land endurance activities in her  HEP.   Target Date 10/16/19   Date Met     Progress: attempting to schedule for pool treatments however unable to coordinate patient's schedule and appointments. Patient planning to have operative procedure which will leave her out of pool access for at least 6 weeks. Will need to further assess this goal once patient's surgery is scheduled.       Progress Toward Goals:   Progress this reporting period: While patient's subjective report of symptoms, tolerance of ADLs and function remains poor she verbally reports to PT that she feels stronger at work, is able to function in her daily routine better and globally feels much better than when she first started PT. Patient has been moderately compliant with her HEP and demonstrates fair core stability. Questions remain as to the source of the right hip pain which has not responded to treatment. Patient continues to desire to progress to pool rehab however, unable to coordinate patient's schedule and appointments. Patient planning to have operative procedure which will leave her out of pool access for at least 6 weeks. Will need to further assess this goal once patient's surgery is scheduled. Patient encouraged to continue her current HEP until after her pre-operative visit and follow up with this PT to establish treatment plan moving forward based on surgical plan.    12/13/19: Patient has failed to schedule and attend future appointments following her last physical therapy progress note. She has failed to return check in phone calls. This episode of care pertaining to her low back pain is now closed and she will require additional physical therapy orders to pursue further treatment.     Plan:  Continue therapy per current plan of care.    Discharge:    Reason for Discharge: Patient chooses to discontinue therapy.  Patient has failed to schedule further appointments.    Equipment Issued:     Discharge Plan: Patient to continue home program.  Thank you for your  referral.    Kera Sarkar, PT, DPT, ATC    [facility]    O: [phone]  E: [email or web address]

## 2019-09-28 ENCOUNTER — HEALTH MAINTENANCE LETTER (OUTPATIENT)
Age: 39
End: 2019-09-28

## 2019-10-01 ENCOUNTER — OFFICE VISIT (OUTPATIENT)
Dept: OBGYN | Facility: CLINIC | Age: 39
End: 2019-10-01
Attending: OBSTETRICS & GYNECOLOGY
Payer: COMMERCIAL

## 2019-10-01 VITALS
SYSTOLIC BLOOD PRESSURE: 98 MMHG | HEART RATE: 60 BPM | DIASTOLIC BLOOD PRESSURE: 66 MMHG | BODY MASS INDEX: 30.18 KG/M2 | WEIGHT: 187 LBS

## 2019-10-01 DIAGNOSIS — N93.9 ABNORMAL UTERINE BLEEDING (AUB): Primary | ICD-10-CM

## 2019-10-01 ASSESSMENT — PATIENT HEALTH QUESTIONNAIRE - PHQ9
5. POOR APPETITE OR OVEREATING: NEARLY EVERY DAY
SUM OF ALL RESPONSES TO PHQ QUESTIONS 1-9: 20

## 2019-10-01 ASSESSMENT — ANXIETY QUESTIONNAIRES
6. BECOMING EASILY ANNOYED OR IRRITABLE: SEVERAL DAYS
1. FEELING NERVOUS, ANXIOUS, OR ON EDGE: NEARLY EVERY DAY
GAD7 TOTAL SCORE: 19
5. BEING SO RESTLESS THAT IT IS HARD TO SIT STILL: NEARLY EVERY DAY
2. NOT BEING ABLE TO STOP OR CONTROL WORRYING: NEARLY EVERY DAY
3. WORRYING TOO MUCH ABOUT DIFFERENT THINGS: NEARLY EVERY DAY
7. FEELING AFRAID AS IF SOMETHING AWFUL MIGHT HAPPEN: NEARLY EVERY DAY

## 2019-10-01 NOTE — PROGRESS NOTES
37 yo  presents for consult for robotic assisted hysterectomy for a history of abnormal uterine bleeding for the last year.  Patient has a complex surgical history and was referred to the [facility] for surgery.  She will bleed every 2 weeks, bleeding for 5 days.  At times the bleeding is heavy enough to soak through tampon and a pad.  Can't quantify # of pads. Has a thyroid disorder and recently decreased dose but this hasn't improved bleeding symptoms or frequency.  She is starting to have hot flashes and night sweats in the last 1-1.5 months.      The patient is a tobacco user and not a candidate for combination hormonal contraceptive management.  She declines progesterone IUD.  Previous imaging has noted prominent endometrium and otherwise normal pelvic structures, this was from a CT on 4/8/19.  Her last endometrial biopsy was in May, 2018 and was benign proliferative endometrium without atypia, negative for polyps, hyperplasia or malignancy.  Her last pelvic ultrasound was also in May, 2018, which was normal.     ROS: 10 point ROS neg other than the symptoms noted above in the HPI, chronic low back pain.    Past Medical History:   Diagnosis Date     Breast disorder Not sure     Papanicolaou smear of cervix with low grade squamous intraepithelial lesion (LGSIL)      Thyroid disease      Urinary tract infection, site not specified     Recurrent UTI's     Wounds and injuries 2000     Past Surgical History:   Procedure Laterality Date     ABDOMEN SURGERY  2015    For back surgery     BACK SURGERY  6/2015      BIOPSY  Don t remember     COLONOSCOPY  08/06/07     COLONOSCOPY  08/05/08     HC COLONOSCOPY W BIOPSY  02/06/08     HC TOOTH EXTRACTION W/FORCEP      wisdom teeth removed     INJECT BLOCK MEDIAL BRANCH CERVICAL/THORACIC/LUMBAR N/A 6/21/2019    Procedure: BLOCK, NERVE, SPINAL, MEDIAL BRANCH lumbar 2, 3, 4;  Surgeon: Edgardo Rubio MD;  [facility]     INJECT EPIDURAL CERVICAL N/A 3/8/2018     Procedure: INJECT EPIDURAL CERVICAL;  cervical 6-7 interlaminar epidural steroid injection;  Surgeon: Edgardo Rubio MD;  Location: [facility]     LAPAROSCOPIC TUBAL LIGATION  11/27/2012    Procedure: LAPAROSCOPIC TUBAL LIGATION;  Laparoscopic Bilateral tubal sterilization/ fulgaration;  Surgeon: Sarbjit Whittaker MD;  Location: [facility]     ORTHOPEDIC SURGERY  6/2015     Family History   Problem Relation Age of Onset     Hypertension Maternal Grandmother      Arthritis Maternal Grandmother      Cancer Maternal Grandmother         MGGM     Depression Maternal Grandmother      Lipids Maternal Grandmother      Osteoporosis Maternal Grandmother      Respiratory Maternal Grandmother         emphysema     Genitourinary Problems Maternal Grandmother         Kidney Failure, liver      Coronary Artery Disease Maternal Grandmother      Hyperlipidemia Maternal Grandmother      Anxiety Disorder Maternal Grandmother      Asthma Maternal Grandmother      Anesthesia Reaction Maternal Grandmother      Hypertension Maternal Grandfather      Heart Disease Maternal Grandfather         MI     Cardiovascular Maternal Grandfather      Cancer - colorectal Maternal Grandfather      Cancer Maternal Grandfather         Hodgins Lymphoma     Other Cancer Maternal Grandfather      Coronary Artery Disease Maternal Grandfather      Hypertension Paternal Grandmother      Arthritis Paternal Grandmother         RA     Osteoporosis Paternal Grandmother      Obesity Paternal Grandmother      Substance Abuse Mother         Alcoholic     Depression Mother      Hypertension Father      Hyperlipidemia Father      Mental Illness Father      Substance Abuse Father         Bio and Step dad have alcohol problems     Osteoporosis Father      Coronary Artery Disease Paternal Grandfather      Hypertension Paternal Grandfather      Breast Cancer Other         Maternal great aunt     Coronary Artery Disease Other      Diabetes Other      Thyroid Disease  Maternal Aunt         two aunts     Breast Cancer Other      Colon Cancer Other      Prostate Cancer Other      Other Cancer Other      Other Cancer Other      Thyroid Disease Other      Anxiety Disorder Other      Substance Abuse Other      Depression Other      Anxiety Disorder Maternal Half-Sister      Thyroid Disease Maternal Half-Sister      Thyroid Disease Other      Asthma Other      Thyroid Disease Other      Obesity Other      Diabetes No family hx of      Colon Cancer No family hx of      Mental Illness No family hx of      Physical Exam:  BP 98/66   Pulse 60   Wt 84.8 kg (187 lb)   LMP 09/09/2019 (Exact Date)   BMI 30.18 kg/m    Gen'l: pleasant no acute distress  Neck: no thyromegaly  CV: RRR  Lungs: CTA, bilaterally  Abd: obese, soft, NT, ND, well healed incisional scars  Ext: NT, no edema  Lymph: no inguinal LAD  Vulva: normal, no lesions  Urethra: normal  Vagina: pink, normal rugae, phsyiologic discharge present  Cervix: parous, no lesion, no bleeding  Uterus: mobile, NT, anteverted, normal in size  Adnexa: no palpable masses, NT  Rectum: Anus normal, no rectovaginal exam done    Labs 6/5/19  hemoglobin 12.2  TSH 0.06     Endometrial biopsy 5/10/18: benign proliferative endometrium without atypia  Pap 2/8/16: NIL, negative HPV    Assessment/Plan:  39 yo  with AUB declines trial of medical management.  Desires definitive surgical management.      - recommend repeat hemoglobin and TSH  - recommend imaging of uterus, pelvic ultrasound ordered to be completed at [facility]  - given more than 1 year since last endometrial biopsy recommend repeat prior to surgery to exclude risk of atypia or malignancy.  Patient prefers to have this done at home and will schedule with Dr. Whittaker.  - We reviewed alternatives to hysterectomy including Mirena IUD, endometrial ablation, progesterone only OCPs.  She declines trial of these because she has not done well with hormones in the past and given frequency  of bleeding, even if it was lessened it would still be bothersome.  - Plan for RA TLH/BS, possible cystoscopy, possible open surgery.  Reviewed risks of surgery including bleeding, infection, damage to nearby structures which may be increased given past abdominal approach to back surgery.  We discussed risks specific to robotic surgery including positioning and length of surgery.  Patient is agreeable to risks.  She states she would accept a blood transfusion if necessary.  - Will place orders to schedule surgery.  She will need a pre-op physical with her PCP.    Anabel Barber MD

## 2019-10-01 NOTE — LETTER
10/1/2019       RE: Mattie Banda  [address] [facility] [address]     Dear Colleague,    Thank you for referring your patient, Mattie Banda, to the [facility] at [facility]. Please see a copy of my visit note below.    37 yo  presents for consult for robotic assisted hysterectomy for a history of abnormal uterine bleeding for the last year.  Patient has a complex surgical history and was referred to the [place] for surgery.  She will bleed every 2 weeks, bleeding for 5 days.  At times the bleeding is heavy enough to soak through tampon and a pad.  Can't quantify # of pads. Has a thyroid disorder and recently decreased dose but this hasn't improved bleeding symptoms or frequency.  She is starting to have hot flashes and night sweats in the last 1-1.5 months.      The patient is a tobacco user and not a candidate for combination hormonal contraceptive management.  She declines progesterone IUD.  Previous imaging has noted prominent endometrium and otherwise normal pelvic structures, this was from a CT on 4/8/19.  Her last endometrial biopsy was in May, 2018 and was benign proliferative endometrium without atypia, negative for polyps, hyperplasia or malignancy.  Her last pelvic ultrasound was also in May, 2018, which was normal.     ROS: 10 point ROS neg other than the symptoms noted above in the HPI, chronic low back pain.    Past Medical History:   Diagnosis Date     Breast disorder Not sure     Papanicolaou smear of cervix with low grade squamous intraepithelial lesion (LGSIL)      Thyroid disease      Urinary tract infection, site not specified     Recurrent UTI's     Wounds and injuries 2000     Past Surgical History:   Procedure Laterality Date     ABDOMEN SURGERY  2015    For back surgery     BACK SURGERY  6/2015      BIOPSY  Don t remember     COLONOSCOPY  08/06/07     COLONOSCOPY  08/05/08     HC COLONOSCOPY W BIOPSY  02/06/08     HC  TOOTH EXTRACTION W/FORCEP      wisdom teeth removed     INJECT BLOCK MEDIAL BRANCH CERVICAL/THORACIC/LUMBAR N/A 6/21/2019    Procedure: BLOCK, NERVE, SPINAL, MEDIAL BRANCH lumbar 2, 3, 4;  Surgeon: Edgardo Rubio MD;  [facility]     INJECT EPIDURAL CERVICAL N/A 3/8/2018    Procedure: INJECT EPIDURAL CERVICAL;  cervical 6-7 interlaminar epidural steroid injection;  Surgeon: Edgardo Rubio MD;  [facility]     LAPAROSCOPIC TUBAL LIGATION  11/27/2012    Procedure: LAPAROSCOPIC TUBAL LIGATION;  Laparoscopic Bilateral tubal sterilization/ fulgaration;  Surgeon: Sarbjit Whittaker MD;  Location: [facility]     ORTHOPEDIC SURGERY  6/2015     Family History   Problem Relation Age of Onset     Hypertension Maternal Grandmother      Arthritis Maternal Grandmother      Cancer Maternal Grandmother         MGGM     Depression Maternal Grandmother      Lipids Maternal Grandmother      Osteoporosis Maternal Grandmother      Respiratory Maternal Grandmother         emphysema     Genitourinary Problems Maternal Grandmother         Kidney Failure, liver      Coronary Artery Disease Maternal Grandmother      Hyperlipidemia Maternal Grandmother      Anxiety Disorder Maternal Grandmother      Asthma Maternal Grandmother      Anesthesia Reaction Maternal Grandmother      Hypertension Maternal Grandfather      Heart Disease Maternal Grandfather         MI     Cardiovascular Maternal Grandfather      Cancer - colorectal Maternal Grandfather      Cancer Maternal Grandfather         Hodgins Lymphoma     Other Cancer Maternal Grandfather      Coronary Artery Disease Maternal Grandfather      Hypertension Paternal Grandmother      Arthritis Paternal Grandmother         RA     Osteoporosis Paternal Grandmother      Obesity Paternal Grandmother      Substance Abuse Mother         Alcoholic     Depression Mother      Hypertension Father      Hyperlipidemia Father      Mental Illness Father      Substance Abuse Father         Bio and  Step dad have alcohol problems     Osteoporosis Father      Coronary Artery Disease Paternal Grandfather      Hypertension Paternal Grandfather      Breast Cancer Other         Maternal great aunt     Coronary Artery Disease Other      Diabetes Other      Thyroid Disease Maternal Aunt         two aunts     Breast Cancer Other      Colon Cancer Other      Prostate Cancer Other      Other Cancer Other      Other Cancer Other      Thyroid Disease Other      Anxiety Disorder Other      Substance Abuse Other      Depression Other      Anxiety Disorder Maternal Half-Sister      Thyroid Disease Maternal Half-Sister      Thyroid Disease Other      Asthma Other      Thyroid Disease Other      Obesity Other      Diabetes No family hx of      Colon Cancer No family hx of      Mental Illness No family hx of      Physical Exam:  BP 98/66   Pulse 60   Wt 84.8 kg (187 lb)   LMP 09/09/2019 (Exact Date)   BMI 30.18 kg/m     Gen'l: pleasant no acute distress  Neck: no thyromegaly  CV: RRR  Lungs: CTA, bilaterally  Abd: obese, soft, NT, ND, well healed incisional scars  Ext: NT, no edema  Lymph: no inguinal LAD  Vulva: normal, no lesions  Urethra: normal  Vagina: pink, normal rugae, phsyiologic discharge present  Cervix: parous, no lesion, no bleeding  Uterus: mobile, NT, anteverted, normal in size  Adnexa: no palpable masses, NT  Rectum: Anus normal, no rectovaginal exam done    Labs 6/5/19  hemoglobin 12.2  TSH 0.06     Endometrial biopsy 5/10/18: benign proliferative endometrium without atypia  Pap 2/8/16: NIL, negative HPV    Assessment/Plan:  37 yo  with AUB declines trial of medical management.  Desires definitive surgical management.      - recommend repeat hemoglobin and TSH  - recommend imaging of uterus, pelvic ultrasound [facility]  - given more than 1 year since last endometrial biopsy recommend repeat prior to surgery to exclude risk of atypia or malignancy.  Patient prefers to have  this done at home and will schedule with Dr. Whittaker.  - We reviewed alternatives to hysterectomy including Mirena IUD, endometrial ablation, progesterone only OCPs.  She declines trial of these because she has not done well with hormones in the past and given frequency of bleeding, even if it was lessened it would still be bothersome.  - Plan for RA TLH/BS, possible cystoscopy, possible open surgery.  Reviewed risks of surgery including bleeding, infection, damage to nearby structures which may be increased given past abdominal approach to back surgery.  We discussed risks specific to robotic surgery including positioning and length of surgery.  Patient is agreeable to risks.  She states she would accept a blood transfusion if necessary.  - Will place orders to schedule surgery.  She will need a pre-op physical with her PCP.    Anabel Barber MD

## 2019-10-03 ENCOUNTER — TELEPHONE (OUTPATIENT)
Dept: OBGYN | Facility: CLINIC | Age: 39
End: 2019-10-03

## 2019-10-03 NOTE — TELEPHONE ENCOUNTER
S: Patient is calling to discuss recommendation for biopsy discussed at visit with Dr. Barber.    B: Patient saw Dr. Barber 10/1 for discussion of menorrhagia. Note incomplete.     A: --    R: Discussed with Mattie, no discussion of biopsy in note. Likely was endometrial biopsy that was discussion. However, will route to Dr. Barber for confirmation of recommendation. Patient agreeable.     Yes, I recommended endometrial biopsy.  Patient stated she preferred to do this at her home clinic with Dr. Whittaker, but I am also happy to do this.  It should be done prior to her planned hysterectomy.  Anabel Barber MD

## 2019-10-07 ENCOUNTER — HOSPITAL ENCOUNTER (OUTPATIENT)
Dept: ULTRASOUND IMAGING | Facility: CLINIC | Age: 39
Discharge: HOME | End: 2019-10-07
Attending: OBSTETRICS & GYNECOLOGY | Admitting: OBSTETRICS & GYNECOLOGY
Payer: COMMERCIAL

## 2019-10-07 DIAGNOSIS — N93.9 ABNORMAL UTERINE BLEEDING (AUB): ICD-10-CM

## 2019-10-07 PROCEDURE — 76830 TRANSVAGINAL US NON-OB: CPT

## 2019-10-09 RX ORDER — CEFAZOLIN SODIUM 2 G/100ML
2 INJECTION, SOLUTION INTRAVENOUS
Status: CANCELLED | OUTPATIENT
Start: 2019-10-09

## 2019-10-09 RX ORDER — CEFAZOLIN SODIUM 1 G/3ML
1 INJECTION, POWDER, FOR SOLUTION INTRAMUSCULAR; INTRAVENOUS SEE ADMIN INSTRUCTIONS
Status: CANCELLED | OUTPATIENT
Start: 2019-10-09

## 2019-10-09 RX ORDER — PHENAZOPYRIDINE HYDROCHLORIDE 100 MG/1
200 TABLET, FILM COATED ORAL ONCE
Status: CANCELLED | OUTPATIENT
Start: 2019-10-09 | End: 2019-10-09

## 2019-10-09 RX ORDER — ACETAMINOPHEN 325 MG/1
975 TABLET ORAL ONCE
Status: CANCELLED | OUTPATIENT
Start: 2019-10-09 | End: 2019-10-09

## 2019-10-14 ENCOUNTER — TELEPHONE (OUTPATIENT)
Dept: OBGYN | Facility: CLINIC | Age: 39
End: 2019-10-14

## 2019-10-14 NOTE — TELEPHONE ENCOUNTER
Spoke to Mattie to let her know once Dr Barber enters the surgery order, our  will call her. I let her know Dr Barber maybe waiting until she gets her EMB, which she is choosing to get at her local GYN clinic in [place]. Instructed Mattie to call her gyn provider to get that scheduled(EMB) an appt, her provider will be able to see her US results..Pt indicated understanding and agreed with plan.

## 2019-10-14 NOTE — TELEPHONE ENCOUNTER
----- Message from Viet Gomez sent at 10/14/2019 11:46 AM CDT -----  Regarding: ultrasound results/biopsy order/schedule surgery  Contact: [phone]  PT is wondering if her US results from 10/7/19 are back yet.  PT is also requesting to have an order placed so she can get a uterine biopsy done at [facility].  PT is waiting for a call back to schedule surgery as well.  Please follow up at the number listed.    Thank you,  Viet    Call Center

## 2019-10-14 NOTE — TELEPHONE ENCOUNTER
----- Message from Viet Gomez sent at 10/14/2019 11:46 AM CDT -----  Regarding: ultrasound results/biopsy order/schedule surgery  Contact: [phone]  PT is wondering if her US results from 10/7/19 are back yet.  PT is also requesting to have an order placed so she can get a uterine biopsy done at [facility].  PT is waiting for a call back to schedule surgery as well.  Please follow up at the number listed.    Thank you,  Viet    Call Center

## 2019-10-21 ENCOUNTER — MYC MEDICAL ADVICE (OUTPATIENT)
Dept: FAMILY MEDICINE | Facility: CLINIC | Age: 39
End: 2019-10-21

## 2019-10-21 ENCOUNTER — MYC REFILL (OUTPATIENT)
Dept: FAMILY MEDICINE | Facility: CLINIC | Age: 39
End: 2019-10-21

## 2019-10-21 ENCOUNTER — PREP FOR PROCEDURE (OUTPATIENT)
Dept: OBGYN | Facility: CLINIC | Age: 39
End: 2019-10-21

## 2019-10-21 DIAGNOSIS — M50.30 DDD (DEGENERATIVE DISC DISEASE), CERVICAL: ICD-10-CM

## 2019-10-21 DIAGNOSIS — M51.369 DEGENERATION OF LUMBAR INTERVERTEBRAL DISC: ICD-10-CM

## 2019-10-21 NOTE — TELEPHONE ENCOUNTER
Morphine 15 MG       Last Written Prescription Date:  9/23/19  Last Fill Quantity: 60,   # refills: 0  Last Office Visit: 9-9-19  Future Office visit:    Next 5 appointments (look out 90 days)    Oct 25, 2019  1:15 PM CDT  Office Visit with Yuan Schofield MD  [address] [address]  [phone]           Routing refill request to provider for review/approval because:  Drug not on the [facility], [facility] or [facility] refill protocol or controlled substance  Roxicodone  5 MG      Last Written Prescription Date:  9/23/19  Last Fill Quantity: 240,   # refills: 0  Last Office Visit: 9-9-19  Future Office visit:    Next 5 appointments (look out 90 days)    Oct 25, 2019  1:15 PM CDT  Office Visit with Yuan Schofield MD  [facility] ([address] [address]  [phone]           Routing refill request to provider for review/approval because:  Drug not on the [facility], [facility] or [facility] refill protocol or controlled substance

## 2019-10-22 ENCOUNTER — PREP FOR PROCEDURE (OUTPATIENT)
Dept: OBGYN | Facility: CLINIC | Age: 39
End: 2019-10-22

## 2019-10-22 DIAGNOSIS — N93.9 ABNORMAL UTERINE BLEEDING: Primary | ICD-10-CM

## 2019-10-22 NOTE — TELEPHONE ENCOUNTER
We can cover flu shot, pre op, and follow up appointment. Make sure all are noted at time of registry and rooming.  Yuan Schofield MD

## 2019-10-23 ENCOUNTER — OFFICE VISIT (OUTPATIENT)
Dept: FAMILY MEDICINE | Facility: CLINIC | Age: 39
End: 2019-10-23
Payer: COMMERCIAL

## 2019-10-23 ENCOUNTER — TELEPHONE (OUTPATIENT)
Dept: OBGYN | Facility: CLINIC | Age: 39
End: 2019-10-23

## 2019-10-23 VITALS
WEIGHT: 170.6 LBS | BODY MASS INDEX: 27.54 KG/M2 | OXYGEN SATURATION: 97 % | SYSTOLIC BLOOD PRESSURE: 98 MMHG | TEMPERATURE: 96.8 F | HEART RATE: 98 BPM | RESPIRATION RATE: 14 BRPM | DIASTOLIC BLOOD PRESSURE: 58 MMHG

## 2019-10-23 DIAGNOSIS — Z23 NEED FOR PROPHYLACTIC VACCINATION AND INOCULATION AGAINST INFLUENZA: ICD-10-CM

## 2019-10-23 DIAGNOSIS — N83.202 CYST OF LEFT OVARY: Primary | ICD-10-CM

## 2019-10-23 PROCEDURE — 90472 IMMUNIZATION ADMIN EACH ADD: CPT | Performed by: OBSTETRICS & GYNECOLOGY

## 2019-10-23 PROCEDURE — 90686 IIV4 VACC NO PRSV 0.5 ML IM: CPT | Performed by: OBSTETRICS & GYNECOLOGY

## 2019-10-23 PROCEDURE — 90715 TDAP VACCINE 7 YRS/> IM: CPT | Performed by: OBSTETRICS & GYNECOLOGY

## 2019-10-23 PROCEDURE — 88305 TISSUE EXAM BY PATHOLOGIST: CPT | Performed by: OBSTETRICS & GYNECOLOGY

## 2019-10-23 PROCEDURE — 88305 TISSUE EXAM BY PATHOLOGIST: CPT | Mod: 26 | Performed by: OBSTETRICS & GYNECOLOGY

## 2019-10-23 PROCEDURE — 99213 OFFICE O/P EST LOW 20 MIN: CPT | Mod: 25 | Performed by: OBSTETRICS & GYNECOLOGY

## 2019-10-23 PROCEDURE — 58100 BIOPSY OF UTERUS LINING: CPT | Performed by: OBSTETRICS & GYNECOLOGY

## 2019-10-23 PROCEDURE — 90471 IMMUNIZATION ADMIN: CPT | Performed by: OBSTETRICS & GYNECOLOGY

## 2019-10-23 RX ORDER — NALOXONE HYDROCHLORIDE 4 MG/.1ML
SPRAY NASAL
Refills: 0 | COMMUNITY
Start: 2019-04-12 | End: 2021-07-19

## 2019-10-23 RX ORDER — OXYCODONE HYDROCHLORIDE 5 MG/1
TABLET ORAL
Qty: 240 TABLET | Refills: 0 | Status: SHIPPED | OUTPATIENT
Start: 2019-10-23 | End: 2019-11-14

## 2019-10-23 RX ORDER — MORPHINE SULFATE 15 MG/1
15 TABLET, FILM COATED, EXTENDED RELEASE ORAL EVERY 12 HOURS
Qty: 60 TABLET | Refills: 0 | Status: SHIPPED | OUTPATIENT
Start: 2019-10-23 | End: 2019-11-14

## 2019-10-23 ASSESSMENT — PAIN SCALES - GENERAL: PAINLEVEL: SEVERE PAIN (6)

## 2019-10-23 NOTE — TELEPHONE ENCOUNTER
Confirmed surgery date, time and location with patient 2/7/20 with arrival time at 8:00a.m with nothing to eat eight hours before scheduled surgery time and clear liquids up to two hours before scheduled surgery time, informed patient that she will need to sched a pre op physical within thirty days of surgery and that a map and letter will be mailed out.     to complete the following fields:            CHECKLIST     Google Calendar : Yes     Resident notified: Not Applicable     Clinic schedule blocked:  Not Applicable    Patient notified:Yes      Pre op information sent: Yes     Given to patient over the phone.Yes    Comments:

## 2019-10-23 NOTE — PROGRESS NOTES
Subjective: She is here to discuss ultrasound results.the recent US showed:    FINDINGS:  The uterus is normal in size, shape and echotexture  measuring  7.2 x 3.5 x 5.3 cm. Echogenic focus of the posterior right  uterine endometrium likely represents dystrophic calcification.  Endometrial stripe is otherwise normal in appearance and thickness at  0.4 cm.     The right ovary is normal appearance measuring 2.0 x 1.3 x 1.1 cm. The  left ovary measures 3.1 x 2.4 x 2.5 cm and contains a hypoechoic  structure likely representing complex cyst/hemorrhagic cyst. No  abnormal free fluid collections are seen in the pelvis.                                                                      IMPRESSION:  1. Probable hemorrhagic cyst left ovary.  2. Echogenic focus in the right posterior endometrial stripe likely  represents dystrophic calcifications from prior childbirth or other  injury.  3. Otherwise negative pelvic ultrasound.      SUNIL LAY MD      The past medical history, social history, past surgical history and family history as shown below have been reviewed by me today.    Past Medical History:   Diagnosis Date     Breast disorder Not sure     Papanicolaou smear of cervix with low grade squamous intraepithelial lesion (LGSIL)      Thyroid disease      Urinary tract infection, site not specified     Recurrent UTI's     Wounds and injuries 2000        Allergies   Allergen Reactions     Bupropion Hcl Hives     Wellbutrin     Current Outpatient Medications   Medication Sig Dispense Refill     ALPRAZolam (XANAX) 2 MG tablet 1/2 to 1 tablet three times daily as needed for stress/sleep 90 tablet 1     escitalopram (LEXAPRO) 20 MG tablet TAKE 1 TABLET BY MOUTH EVERY DAY 90 tablet 2     levothyroxine (SYNTHROID/LEVOTHROID) 200 MCG tablet Half tablet daily or 1 tablet every other day. 30 tablet 0     morphine (MS CONTIN) 15 MG CR tablet Take 1 tablet (15 mg) by mouth every 12 hours maximum 2 tablet(s) per day 60 tablet 0      oxyCODONE (ROXICODONE) 5 MG tablet 1-2 tablets 4 times daily for breakthrough pain.  Use sparingly. 240 tablet 0     topiramate (TOPAMAX) 25 MG tablet Take 1 tablet (25 mg) by mouth 2 times daily 60 tablet 1     VENTOLIN  (90 Base) MCG/ACT Inhaler INHALE 2 PUFFS INTO THE LUNGS EVERY 6 HOURS AS NEEDED FOR SHORTNESSOF BREATH / DYSPNEA OR WHEEZING 52 g 3     Glucosamine Sulfate 1000 MG TABS        levothyroxine (SYNTHROID/LEVOTHROID) 100 MCG tablet Take 1 tablet (100 mcg) by mouth daily (Patient not taking: Reported on 10/23/2019) 90 tablet 1     NARCAN 4 MG/0.1ML nasal spray SPRAY 1 SPRAY (4 MG) INTO ONE NOSTRIL ALTERNATING NOSTRILS ONCE AS NEEDED FOR OPIOID REVERSAL EVERY 2-3 MINUTES UNTIL ASSISTANCE ARRIVES  0     Past Surgical History:   Procedure Laterality Date     ABDOMEN SURGERY  2015    For back surgery     BACK SURGERY  6/2015      BIOPSY  Don t remember     COLONOSCOPY  08/06/07     COLONOSCOPY  08/05/08     HC COLONOSCOPY W BIOPSY  02/06/08     HC TOOTH EXTRACTION W/FORCEP      wisdom teeth removed     INJECT BLOCK MEDIAL BRANCH CERVICAL/THORACIC/LUMBAR N/A 6/21/2019    Procedure: BLOCK, NERVE, SPINAL, MEDIAL BRANCH lumbar 2, 3, 4;  Surgeon: Edgardo Rubio MD;  [facility]     INJECT EPIDURAL CERVICAL N/A 3/8/2018    Procedure: INJECT EPIDURAL CERVICAL;  cervical 6-7 interlaminar epidural steroid injection;  Surgeon: Edgardo Rubio MD;  [facility]     LAPAROSCOPIC TUBAL LIGATION  11/27/2012    Procedure: LAPAROSCOPIC TUBAL LIGATION;  Laparoscopic Bilateral tubal sterilization/ fulgaration;  Surgeon: Sarbjit Whittaker MD;  Location[facility]     ORTHOPEDIC SURGERY  6/2015     Social History     Socioeconomic History     Marital status:      Spouse name: Darien     Number of children: 1     Years of education: 12     Highest education level: None   Occupational History     Occupation: Unit Worker     Comment: [employer]   Social Needs     Financial resource strain:  None     Food insecurity:     Worry: None     Inability: None     Transportation needs:     Medical: None     Non-medical: None   Tobacco Use     Smoking status: Current Every Day Smoker     Packs/day: 1.00     Years: 20.00     Pack years: 20.00     Types: Cigarettes     Smokeless tobacco: Never Used   Substance and Sexual Activity     Alcohol use: Yes     Alcohol/week: 0.0 standard drinks     Comment: Rarely     Drug use: No     Sexual activity: Yes     Partners: Male     Birth control/protection: Female Surgical   Lifestyle     Physical activity:     Days per week: None     Minutes per session: None     Stress: None   Relationships     Social connections:     Talks on phone: None     Gets together: None     Attends Restorationist service: None     Active member of club or organization: None     Attends meetings of clubs or organizations: None     Relationship status: None     Intimate partner violence:     Fear of current or ex partner: None     Emotionally abused: None     Physically abused: None     Forced sexual activity: None   Other Topics Concern     Parent/sibling w/ CABG, MI or angioplasty before 65F 55M? Not Asked      Service No     Blood Transfusions No     Caffeine Concern Yes     Comment: coffee/tea: 2c/d     Occupational Exposure No     Hobby Hazards No     Sleep Concern Yes     Comment: On meds     Stress Concern No     Weight Concern Yes     Comment: desire wt loss     Special Diet No     Back Care Yes     Comment: hx surgery     Exercise No     Bike Helmet No     Seat Belt Yes     Self-Exams Yes   Social History Narrative     None     Family History   Problem Relation Age of Onset     Hypertension Maternal Grandmother      Arthritis Maternal Grandmother      Cancer Maternal Grandmother         MGGM     Depression Maternal Grandmother      Lipids Maternal Grandmother      Osteoporosis Maternal Grandmother      Respiratory Maternal Grandmother         emphysema     Genitourinary Problems Maternal  Grandmother         Kidney Failure, liver      Coronary Artery Disease Maternal Grandmother      Hyperlipidemia Maternal Grandmother      Anxiety Disorder Maternal Grandmother      Asthma Maternal Grandmother      Anesthesia Reaction Maternal Grandmother      Hypertension Maternal Grandfather      Heart Disease Maternal Grandfather         MI     Cardiovascular Maternal Grandfather      Cancer - colorectal Maternal Grandfather      Cancer Maternal Grandfather         Hodgins Lymphoma     Other Cancer Maternal Grandfather      Coronary Artery Disease Maternal Grandfather      Hypertension Paternal Grandmother      Arthritis Paternal Grandmother         RA     Osteoporosis Paternal Grandmother      Obesity Paternal Grandmother      Substance Abuse Mother         Alcoholic     Depression Mother      Hypertension Father      Hyperlipidemia Father      Mental Illness Father      Substance Abuse Father         Bio and Step dad have alcohol problems     Osteoporosis Father      Coronary Artery Disease Paternal Grandfather      Hypertension Paternal Grandfather      Breast Cancer Other         Maternal great aunt     Coronary Artery Disease Other      Diabetes Other      Thyroid Disease Maternal Aunt         two aunts     Breast Cancer Other      Colon Cancer Other      Prostate Cancer Other      Other Cancer Other      Other Cancer Other      Thyroid Disease Other      Anxiety Disorder Other      Substance Abuse Other      Depression Other      Anxiety Disorder Maternal Half-Sister      Thyroid Disease Maternal Half-Sister      Thyroid Disease Other      Asthma Other      Thyroid Disease Other      Obesity Other      Diabetes No family hx of      Colon Cancer No family hx of      Mental Illness No family hx of        ROS: A 12 point review of systems was done. Except for what is listed above in the HPI, the systems review is negative .      Objective: Vital signs: Blood pressure 98/58, pulse 98, temperature 96.8  F (36   C), temperature source Temporal, resp. rate 14, weight 77.4 kg (170 lb 9.6 oz), last menstrual period 10/13/2019, SpO2 97 %, not currently breastfeeding.    Pelvic exam:My nurse Catherine  was present to chaperone the exam.  The external genitalia appeared normal.    The vaginal vault was without bleeding  or   discharge   or odor.   The cervix was smooth   and shiny and normal in appearance.      No vaginal support defects were noted,    Bimanual exam revealed a 6 week   sized uterus. It does  descend  somewhat well in the vaginal vault.    No adnexal masses were felt.    There was no  cervical motion tenderness.    Exam was MODERATELY   limited by the patient's body habitus.      With my nurse present, and after receiving informed consent from the patient, I performed the endometrial biopsy in the usual fashion using a standard pipelle. The pipelle sounded to  6 cm.I passed the pipelle 1 time(s)  The tissue retrieved was adequate    I sent the biopsy for pathology. She tolerated the procedure well. She was instructed to call or present to clinic or ER if she has unusual amount of cramping, bleeding, fever or vaginal discharge.          Assessment/Plan:  A total of 20 minutes were spent face-to-face with this patient during today's consultation, with more than 50% of that time devoted to conversation and counseling about the management decisions. 15 minutes spent in discussion and the rest of the time spent in doing the pelvic exam and endometrial biopsy.      1.  She has a small probable hemorrhagic left ovarian cyst.  She has plans to have an upcoming da Stephania assisted hysterectomy.  I circled the area of her ultrasound report about the small ovarian cyst.  I want to call her surgeon's attention to this.  I will also copy Dr. Barber on this note.  The ovary can be examined at the time of surgery and then the proper disposition decided.    2.  Her surgeon requested another endometrial biopsy prior to surgery.  This  was performed today.  She tolerated the procedure well.  We will notify her of results.         The above information was dictating using Dragon voice software and as a result there may be some irregularities that were not detected in my review of this note.    RIKKI Whittaker MD    Ml: Dr Anabel Barber MD

## 2019-10-25 ENCOUNTER — OFFICE VISIT (OUTPATIENT)
Dept: FAMILY MEDICINE | Facility: CLINIC | Age: 39
End: 2019-10-25
Payer: COMMERCIAL

## 2019-10-25 VITALS
RESPIRATION RATE: 16 BRPM | DIASTOLIC BLOOD PRESSURE: 64 MMHG | TEMPERATURE: 96.1 F | OXYGEN SATURATION: 98 % | BODY MASS INDEX: 27.89 KG/M2 | WEIGHT: 172.8 LBS | HEART RATE: 92 BPM | SYSTOLIC BLOOD PRESSURE: 108 MMHG

## 2019-10-25 DIAGNOSIS — N92.0 EXCESSIVE OR FREQUENT MENSTRUATION: ICD-10-CM

## 2019-10-25 DIAGNOSIS — G89.29 CHRONIC BILATERAL LOW BACK PAIN WITH BILATERAL SCIATICA: ICD-10-CM

## 2019-10-25 DIAGNOSIS — M54.41 CHRONIC BILATERAL LOW BACK PAIN WITH BILATERAL SCIATICA: ICD-10-CM

## 2019-10-25 DIAGNOSIS — M54.42 CHRONIC BILATERAL LOW BACK PAIN WITH BILATERAL SCIATICA: ICD-10-CM

## 2019-10-25 DIAGNOSIS — E03.9 HYPOTHYROIDISM, UNSPECIFIED TYPE: Primary | ICD-10-CM

## 2019-10-25 DIAGNOSIS — G89.4 CHRONIC PAIN SYNDROME: ICD-10-CM

## 2019-10-25 DIAGNOSIS — G43.109 MIGRAINE WITH AURA AND WITHOUT STATUS MIGRAINOSUS, NOT INTRACTABLE: ICD-10-CM

## 2019-10-25 PROCEDURE — 99214 OFFICE O/P EST MOD 30 MIN: CPT | Performed by: FAMILY MEDICINE

## 2019-10-25 RX ORDER — TOPIRAMATE 25 MG/1
TABLET, FILM COATED ORAL
Qty: 90 TABLET | Refills: 1 | Status: SHIPPED | OUTPATIENT
Start: 2019-10-25 | End: 2019-12-09

## 2019-10-25 ASSESSMENT — ANXIETY QUESTIONNAIRES
3. WORRYING TOO MUCH ABOUT DIFFERENT THINGS: NEARLY EVERY DAY
GAD7 TOTAL SCORE: 19
6. BECOMING EASILY ANNOYED OR IRRITABLE: MORE THAN HALF THE DAYS
2. NOT BEING ABLE TO STOP OR CONTROL WORRYING: NEARLY EVERY DAY
IF YOU CHECKED OFF ANY PROBLEMS ON THIS QUESTIONNAIRE, HOW DIFFICULT HAVE THESE PROBLEMS MADE IT FOR YOU TO DO YOUR WORK, TAKE CARE OF THINGS AT HOME, OR GET ALONG WITH OTHER PEOPLE: EXTREMELY DIFFICULT
5. BEING SO RESTLESS THAT IT IS HARD TO SIT STILL: NEARLY EVERY DAY
7. FEELING AFRAID AS IF SOMETHING AWFUL MIGHT HAPPEN: MORE THAN HALF THE DAYS
1. FEELING NERVOUS, ANXIOUS, OR ON EDGE: NEARLY EVERY DAY

## 2019-10-25 ASSESSMENT — PAIN SCALES - GENERAL: PAINLEVEL: MODERATE PAIN (5)

## 2019-10-25 ASSESSMENT — PATIENT HEALTH QUESTIONNAIRE - PHQ9
SUM OF ALL RESPONSES TO PHQ QUESTIONS 1-9: 20
5. POOR APPETITE OR OVEREATING: NEARLY EVERY DAY

## 2019-10-25 NOTE — PROGRESS NOTES
Subjective     Mattie Banda is a 39 year old female who presents to clinic today for the following health issues:    HPI   Chronic Pain Follow-Up       Type / Location of Pain: low back  Analgesia/pain control:       Recent changes:  same      Overall control: Comfortably manageable  Activity level/function:      Daily activities:  Able to do all daily activities    Work:  Able to work part time with limitations  Adverse effects:  No  Adherance    Taking medication as directed?  Yes    Participating in other treatments: yes  Risk Factors:    Sleep:  Poor    Mood/anxiety:  controlled    Recent family or social stressors:  conflict between family members    Other aggravating factors: prolonged sitting, prolonged standing and repetitive activities - activity  PHQ-9 SCORE 6/5/2019 9/9/2019 10/1/2019   PHQ-9 Total Score - - -   PHQ-9 Total Score 24 18 20     CRISTIANO-7 SCORE 2/13/2019 9/18/2019 10/1/2019   Total Score - 16 (severe anxiety) -   Total Score 18 16 19     Encounter-Level CSA - 08/20/2018:    Controlled Substance Agreement - Scan on 8/24/2018 10:37 AM: CONTROLLED SUBSTANCE AGREEMENT     Patient-Level CSA:    Controlled Substance Agreement - Opioid - Scan on 6/13/2019  6:05 PM  Controlled Substance Agreement - Opioid - Scan on 6/13/2019  6:03 PM: 06/05/2019           How many servings of fruits and vegetables do you eat daily?  0-1    On average, how many sweetened beverages do you drink each day (soda, juice, sweet tea, etc)?   2    How many days per week do you miss taking your medication? 0        Patient has been involved in physical therapy.  Although pain levels remain relatively high her ability to function and use her body have improved greatly.  She continues to work.  She has no side effects from medication.  She does not ask for early refills.  She is having slightly more depressive thoughts and asks if medication could be adjusted for that.    Regarding menstrual irregularity surgery is now scheduled  for February.    BP Readings from Last 3 Encounters:   10/25/19 108/64   10/23/19 98/58   10/01/19 98/66    Wt Readings from Last 3 Encounters:   10/25/19 78.4 kg (172 lb 12.8 oz)   10/23/19 77.4 kg (170 lb 9.6 oz)   10/01/19 84.8 kg (187 lb)                      Reviewed and updated as needed this visit by Provider         Review of Systems   ROS COMP: Constitutional, HEENT, cardiovascular, pulmonary, gi and gu systems are negative, except as otherwise noted.      Objective    /64   Pulse 92   Temp 96.1  F (35.6  C) (Temporal)   Resp 16   Wt 78.4 kg (172 lb 12.8 oz)   LMP 10/13/2019   SpO2 98%   BMI 27.89 kg/m    Body mass index is 27.89 kg/m .  Physical Exam   GENERAL: healthy, alert and no distress  NECK: no adenopathy, no asymmetry, masses, or scars and thyroid normal to palpation  RESP: lungs clear to auscultation - no rales, rhonchi or wheezes  CV: regular rate and rhythm, normal S1 S2, no S3 or S4, no murmur, click or rub, no peripheral edema and peripheral pulses strong  ABDOMEN: soft, nontender, no hepatosplenomegaly, no masses and bowel sounds normal  MS: Patient still very much protects low back and palpably she is sore.  Straight leg raising does not cause radiating pain down the leg.  Today she is able to cross her legs sit in the chair and bend forward which is something she could not have done when this was at its worst.  SKIN: no suspicious lesions or rashes  NEURO: Normal strength and tone  PSYCH: mentation appears normal, affect normal/bright    Diagnostic Test Results:  Labs reviewed in Epic        Assessment & Plan     1. Chronic bilateral low back pain with bilateral sciatica  Increasing her topiramate to see if this will help not only for pain but also mood.  She already is on 20 mg of Lexapro which she is considered maximum.  Rather than add a new medication I thought I would increase the dose here.  It does seem to be helping her back.  - topiramate (TOPAMAX) 25 MG tablet; 3   "daily in divided doses.  Dispense: 90 tablet; Refill: 1    2. Migraine with aura and without status migrainosus, not intractable  Topiramate has reduced migraines considerably.  Increase should not cause any issue.  - topiramate (TOPAMAX) 25 MG tablet; 3  daily in divided doses.  Dispense: 90 tablet; Refill: 1    3. Chronic pain syndrome  Reviewed  site today.  She uses medications as indicated and has had great improvement with physical therapy.  She is now starting new types of therapy and is very pleased with progress.  She hopes at some point narcotics will be discontinued    4. Hypothyroidism, unspecified type  Will obtain TSH because we recently adjusted her dose.  With dose adjustment some of her other problems may improve as well  - **TSH with free T4 reflex FUTURE anytime; Future    5. Excessive or frequent menstruation  Awaiting surgery.       Tobacco Cessation:   reports that she has been smoking cigarettes. She has a 30.00 pack-year smoking history. She has never used smokeless tobacco.  Tobacco Cessation Action Plan: Information offered: Patient not interested at this time      BMI:   Estimated body mass index is 27.89 kg/m  as calculated from the following:    Height as of 8/7/19: 1.676 m (5' 6\").    Weight as of this encounter: 78.4 kg (172 lb 12.8 oz).   Weight management plan: Discussed healthy diet and exercise guidelines        Patient Instructions   Keep up your success in Physical Therapy   Meds will be continued  Follow up in January unless we need to do more.       Return in about 3 months (around 1/25/2020) for Chronic pain.  And preoperative exam  Time spent was 25 minutes or more with greater than 50% spent in discussion, obtaining history, or making the plan, etc  Patient was here with her daughter today.    Yuan Iain Schofield MD  [facility]      "

## 2019-10-25 NOTE — PATIENT INSTRUCTIONS
Keep up your success in Physical Therapy   Meds will be continued  Follow up in January unless we need to do more.

## 2019-10-26 ASSESSMENT — ANXIETY QUESTIONNAIRES: GAD7 TOTAL SCORE: 19

## 2019-10-27 LAB — COPATH REPORT: NORMAL

## 2019-10-28 NOTE — RESULT ENCOUNTER NOTE
Catherine/Mane Lujan,Please inform Mattie/ or caretaker  that this result(s) is/are normal.  The biopsy of the uterine lining is normal -thanks. RIKKI Whittaker MD

## 2019-10-30 ENCOUNTER — APPOINTMENT (OUTPATIENT)
Dept: CT IMAGING | Facility: CLINIC | Age: 39
End: 2019-10-30
Attending: EMERGENCY MEDICINE
Payer: COMMERCIAL

## 2019-10-30 ENCOUNTER — HOSPITAL ENCOUNTER (EMERGENCY)
Facility: CLINIC | Age: 39
Discharge: HOME | End: 2019-10-30
Attending: EMERGENCY MEDICINE | Admitting: EMERGENCY MEDICINE
Payer: COMMERCIAL

## 2019-10-30 VITALS
HEART RATE: 69 BPM | DIASTOLIC BLOOD PRESSURE: 57 MMHG | BODY MASS INDEX: 27.68 KG/M2 | WEIGHT: 172.2 LBS | SYSTOLIC BLOOD PRESSURE: 108 MMHG | HEIGHT: 66 IN | RESPIRATION RATE: 18 BRPM | TEMPERATURE: 97.6 F | OXYGEN SATURATION: 98 %

## 2019-10-30 DIAGNOSIS — G90.01 CAROTIDYNIA: ICD-10-CM

## 2019-10-30 LAB
ANION GAP SERPL CALCULATED.3IONS-SCNC: 5 MMOL/L (ref 3–14)
BASOPHILS # BLD AUTO: 0 10E9/L (ref 0–0.2)
BASOPHILS NFR BLD AUTO: 0.2 %
BUN SERPL-MCNC: 13 MG/DL (ref 7–30)
CALCIUM SERPL-MCNC: 8.6 MG/DL (ref 8.5–10.1)
CHLORIDE SERPL-SCNC: 108 MMOL/L (ref 94–109)
CO2 SERPL-SCNC: 25 MMOL/L (ref 20–32)
CREAT SERPL-MCNC: 0.83 MG/DL (ref 0.52–1.04)
DEPRECATED S PYO AG THROAT QL EIA: NORMAL
DIFFERENTIAL METHOD BLD: ABNORMAL
EOSINOPHIL # BLD AUTO: 0.1 10E9/L (ref 0–0.7)
EOSINOPHIL NFR BLD AUTO: 1.4 %
ERYTHROCYTE [DISTWIDTH] IN BLOOD BY AUTOMATED COUNT: 15.2 % (ref 10–15)
GFR SERPL CREATININE-BSD FRML MDRD: 89 ML/MIN/{1.73_M2}
GLUCOSE SERPL-MCNC: 87 MG/DL (ref 70–99)
HCT VFR BLD AUTO: 35.8 % (ref 35–47)
HGB BLD-MCNC: 11.8 G/DL (ref 11.7–15.7)
LYMPHOCYTES # BLD AUTO: 2 10E9/L (ref 0.8–5.3)
LYMPHOCYTES NFR BLD AUTO: 22.5 %
MCH RBC QN AUTO: 32.1 PG (ref 26.5–33)
MCHC RBC AUTO-ENTMCNC: 33 G/DL (ref 31.5–36.5)
MCV RBC AUTO: 97 FL (ref 78–100)
MONOCYTES # BLD AUTO: 1 10E9/L (ref 0–1.3)
MONOCYTES NFR BLD AUTO: 10.9 %
NEUTROPHILS # BLD AUTO: 5.7 10E9/L (ref 1.6–8.3)
NEUTROPHILS NFR BLD AUTO: 65 %
PLATELET # BLD AUTO: 254 10E9/L (ref 150–450)
POTASSIUM SERPL-SCNC: 3.8 MMOL/L (ref 3.4–5.3)
RBC # BLD AUTO: 3.68 10E12/L (ref 3.8–5.2)
SODIUM SERPL-SCNC: 138 MMOL/L (ref 133–144)
SPECIMEN SOURCE: NORMAL
WBC # BLD AUTO: 8.7 10E9/L (ref 4–11)

## 2019-10-30 PROCEDURE — 96375 TX/PRO/DX INJ NEW DRUG ADDON: CPT | Performed by: EMERGENCY MEDICINE

## 2019-10-30 PROCEDURE — 25000128 H RX IP 250 OP 636: Performed by: EMERGENCY MEDICINE

## 2019-10-30 PROCEDURE — 80048 BASIC METABOLIC PNL TOTAL CA: CPT | Performed by: EMERGENCY MEDICINE

## 2019-10-30 PROCEDURE — 99285 EMERGENCY DEPT VISIT HI MDM: CPT | Mod: 25 | Performed by: EMERGENCY MEDICINE

## 2019-10-30 PROCEDURE — 25000125 ZZHC RX 250: Performed by: EMERGENCY MEDICINE

## 2019-10-30 PROCEDURE — 96374 THER/PROPH/DIAG INJ IV PUSH: CPT | Mod: 59 | Performed by: EMERGENCY MEDICINE

## 2019-10-30 PROCEDURE — 70491 CT SOFT TISSUE NECK W/DYE: CPT

## 2019-10-30 PROCEDURE — 87081 CULTURE SCREEN ONLY: CPT | Performed by: EMERGENCY MEDICINE

## 2019-10-30 PROCEDURE — 87880 STREP A ASSAY W/OPTIC: CPT | Performed by: EMERGENCY MEDICINE

## 2019-10-30 PROCEDURE — 99284 EMERGENCY DEPT VISIT MOD MDM: CPT | Mod: Z6 | Performed by: EMERGENCY MEDICINE

## 2019-10-30 PROCEDURE — 85025 COMPLETE CBC W/AUTO DIFF WBC: CPT | Performed by: EMERGENCY MEDICINE

## 2019-10-30 RX ORDER — PREDNISONE 20 MG/1
60 TABLET ORAL DAILY
Qty: 15 TABLET | Refills: 0 | Status: SHIPPED | OUTPATIENT
Start: 2019-10-30 | End: 2019-11-15

## 2019-10-30 RX ORDER — KETOROLAC TROMETHAMINE 30 MG/ML
30 INJECTION, SOLUTION INTRAMUSCULAR; INTRAVENOUS ONCE
Status: COMPLETED | OUTPATIENT
Start: 2019-10-30 | End: 2019-10-30

## 2019-10-30 RX ORDER — IOPAMIDOL 755 MG/ML
500 INJECTION, SOLUTION INTRAVASCULAR ONCE
Status: COMPLETED | OUTPATIENT
Start: 2019-10-30 | End: 2019-10-30

## 2019-10-30 RX ADMIN — SODIUM CHLORIDE 76 ML: 9 INJECTION, SOLUTION INTRAVENOUS at 14:25

## 2019-10-30 RX ADMIN — PROCHLORPERAZINE EDISYLATE 10 MG: 5 INJECTION INTRAMUSCULAR; INTRAVENOUS at 14:18

## 2019-10-30 RX ADMIN — KETOROLAC TROMETHAMINE 30 MG: 30 INJECTION, SOLUTION INTRAMUSCULAR at 14:18

## 2019-10-30 RX ADMIN — IOPAMIDOL 100 ML: 755 INJECTION, SOLUTION INTRAVENOUS at 14:26

## 2019-10-30 ASSESSMENT — MIFFLIN-ST. JEOR: SCORE: 1472.84

## 2019-10-30 NOTE — ED TRIAGE NOTES
"Pt. States that she went to an urgent care clinic and they sent her to the ED because she had an infection somewhere. Pt. States she had a fever for the last couple of days. Pt. States that she took \"two oxycodones and four ibuprofens at 1130 today\".  "

## 2019-10-30 NOTE — LETTER
October 30, 2019      To Whom It May Concern:      Mattie Banda was seen in our Emergency Department today, 10/30/19.  I expect her condition to improve over the next 3 days.  She may return to work  when improved.    Sincerely,        Aurelio Fernandez MD

## 2019-10-30 NOTE — ED NOTES
Pt states that she has not felt well x her flu shot 10/22 and has not fell well since c/o malaise , right sided swollen gland that does not give her any trouble eating/swollening.  Low grade temp that is now wnl as pt has taken percocet and motrin today.  She has no appetite, but is taking PO easily.

## 2019-10-30 NOTE — ED AVS SNAPSHOT
[facility] Emergency Department  [address] [place] [address]  Phone:  [phone]  Fax:  [phone]                                    Mattie Banda   MRN: [id number]    Department:  [facility] Emergency Department   Date of Visit:  10/30/2019           After Visit Summary Signature Page    I have received my discharge instructions, and my questions have been answered. I have discussed any challenges I see with this plan with the nurse or doctor.    ..........................................................................................................................................  Patient/Patient Representative Signature      ..........................................................................................................................................  Patient Representative Print Name and Relationship to Patient    ..................................................               ................................................  Date                                   Time    ..........................................................................................................................................  Reviewed by Signature/Title    ...................................................              ..............................................  Date                                               Time          22EPIC Rev 08/18

## 2019-10-30 NOTE — ED PROVIDER NOTES
History     Chief Complaint   Patient presents with     Neck Pain     The history is provided by the patient.     Mattie Banda is a 39 year old female who presents to the emergency department with complaints of a fever and swollen glands in her neck. The [facility] for her symptoms, but was told that she should come to the ER for further work up. Patient reports that she has not been feeling like herself since she got her flu shot 8 days ago. She started to run a low grade fever a couple of days ago. Her temperature is usually around 96.3, but when she checked it today at 130 it was 101.7. Patient denies any sore throat. She does report right ear pain. No cough. No abnormal abdominal pain. Patient takes morphine and oxycodone regularly for back pain, but this has not been helping her neck pain so she took additional Ibuprofen today with minimal relief. She has not yet had anything to eat today, but has drank coffee, water, and soda. Patient notes having a rash on her left shoulder around the area where she was given her flu shot and tetanus shot. She showed this rash to her PCP a couple of days ago and he was not concerned about it.    Allergies:  Allergies   Allergen Reactions     Bupropion Hcl Hives     Wellbutrin       Problem List:    Patient Active Problem List    Diagnosis Date Noted     Abnormal uterine bleeding 10/22/2019     Priority: Medium     Added automatically from request for surgery [id number]       Chronic pain syndrome 07/09/2018     Priority: Medium     Patient is very low risk to abuse  Pain medication.  Patient is followed by Yuan Schofield MD for ongoing prescription of pain medication.  All refills should only be approved by this provider, or covering partner.    Medication(s): ms contin and oxydodone ir.   Maximum quantity per month: #60 ms contin, #180 oxycodone  Clinic visit frequency required: Q 3 months      Controlled substance  agreement:  Encounter-Level CSA - 08/20/2018:    Controlled Substance Agreement - Scan on 6/5/19 CONTROLLED SUBSTANCE AGREEMENT (below)       Patient-Level CSA:    Controlled Substance Agreement - Opioid - Scan on 6/13/2019  6:05 PM (below)    Controlled Substance Agreement - Opioid - Scan on 6/13/2019  6:03 PM: 06/05/2019 (below)         Pain Clinic evaluation in the past: Yes       Date/Location:  [facility] and will go there soon, 6/6/2019.    DIRE Total Score(s):    6/6/2019   Total Score 19       Last [facility] website verification:  10/25/2019 390   [email or web address]             Excessive or frequent menstruation 02/07/2018     Priority: Medium     DDD (degenerative disc disease), cervical 02/07/2018     Priority: Medium     Lymphocytic colitis 06/13/2017     Priority: Medium     Tinea versicolor 06/12/2017     Priority: Medium     Idiopathic peripheral neuropathy 01/10/2017     Priority: Medium     Hypothyroidism, unspecified type 01/10/2017     Priority: Medium     Migraine with aura and without status migrainosus, not intractable 12/27/2016     Priority: Medium     Tobacco use disorder 09/12/2016     Priority: Medium     Low grade squamous intraepithelial lesion on cytologic smear of cervix (LGSIL) 02/08/2016     Priority: Medium     2015Possibly high-grade lesion. Done in [place] in 2015 2/8/16 pap NIL/neg HR HPV. Plan: cotest in 3 years. Tracking.       Labial lesion 02/08/2016     Priority: Medium     Superior folds of the vulva/labia right side       Mixed anxiety depressive disorder 02/08/2016     Priority: Medium     S/P lumbar fusion and discectomy June 2015 06/28/2015     Priority: Medium     Degeneration of lumbar intervertebral disc 04/05/2014     Priority: Medium     newly added to list on 4/4//14  but present for last months       CARDIOVASCULAR SCREENING; LDL GOAL LESS THAN 160 10/31/2010     Priority: Medium     Contraceptive management 07/02/2009     Priority: Medium         Past Medical History:    Past Medical History:   Diagnosis Date     Anxiety      Breast disorder Not sure     Depressive disorder      Papanicolaou smear of cervix with low grade squamous intraepithelial lesion (LGSIL)      Thyroid disease      Urinary tract infection, site not specified      Wounds and injuries 2000       Past Surgical History:    Past Surgical History:   Procedure Laterality Date     ABDOMEN SURGERY  2015    For back surgery     BACK SURGERY  6/2015      BIOPSY  Don t remember     COLONOSCOPY  08/06/07     COLONOSCOPY  08/05/08     HC COLONOSCOPY W BIOPSY  02/06/08     HC TOOTH EXTRACTION W/FORCEP      wisdom teeth removed     INJECT BLOCK MEDIAL BRANCH CERVICAL/THORACIC/LUMBAR N/A 6/21/2019    Procedure: BLOCK, NERVE, SPINAL, MEDIAL BRANCH lumbar 2, 3, 4;  Surgeon: Edgardo Rubio MD;  Location: [facility] OR     INJECT EPIDURAL CERVICAL N/A 3/8/2018    Procedure: INJECT EPIDURAL CERVICAL;  cervical 6-7 interlaminar epidural steroid injection;  Surgeon: Edgardo Rubio MD;  Location: [facility] OR     LAPAROSCOPIC TUBAL LIGATION  11/27/2012    Procedure: LAPAROSCOPIC TUBAL LIGATION;  Laparoscopic Bilateral tubal sterilization/ fulgaration;  Surgeon: Sarbjit Whittaker MD;  Location: [facility] OR     ORTHOPEDIC SURGERY  6/2015       Family History:    Family History   Problem Relation Age of Onset     Hypertension Maternal Grandmother      Arthritis Maternal Grandmother      Cancer Maternal Grandmother         MGGM     Depression Maternal Grandmother      Lipids Maternal Grandmother      Osteoporosis Maternal Grandmother      Respiratory Maternal Grandmother         emphysema     Genitourinary Problems Maternal Grandmother         Kidney Failure, liver      Coronary Artery Disease Maternal Grandmother      Hyperlipidemia Maternal Grandmother      Anxiety Disorder Maternal Grandmother      Asthma Maternal Grandmother      Anesthesia Reaction Maternal Grandmother      Hypertension Maternal Grandfather       Heart Disease Maternal Grandfather         MI     Cardiovascular Maternal Grandfather      Cancer - colorectal Maternal Grandfather      Cancer Maternal Grandfather         [place] Lymphoma     Other Cancer Maternal Grandfather      Coronary Artery Disease Maternal Grandfather      Hypertension Paternal Grandmother      Arthritis Paternal Grandmother         RA     Osteoporosis Paternal Grandmother      Obesity Paternal Grandmother      Substance Abuse Mother         Alcoholic     Depression Mother      Hypertension Father      Hyperlipidemia Father      Mental Illness Father      Substance Abuse Father         Bio and Step dad have alcohol problems     Osteoporosis Father      Coronary Artery Disease Paternal Grandfather      Hypertension Paternal Grandfather      Breast Cancer Other         Maternal great aunt     Coronary Artery Disease Other      Diabetes Other      Thyroid Disease Maternal Aunt         two aunts     Breast Cancer Other      Colon Cancer Other      Prostate Cancer Other      Other Cancer Other      Other Cancer Other      Thyroid Disease Other      Anxiety Disorder Other      Substance Abuse Other      Depression Other      Anxiety Disorder Maternal Half-Sister      Thyroid Disease Maternal Half-Sister      Thyroid Disease Other      Asthma Other      Thyroid Disease Other      Obesity Other      Diabetes No family hx of      Colon Cancer No family hx of      Mental Illness No family hx of        Social History:  Marital Status:   [4]  Social History     Tobacco Use     Smoking status: Current Every Day Smoker     Packs/day: 1.00     Years: 20.00     Pack years: 20.00     Types: Cigarettes     Smokeless tobacco: Never Used   Substance Use Topics     Alcohol use: Yes     Alcohol/week: 0.0 standard drinks     Comment: Rarely     Drug use: No        Medications:    predniSONE (DELTASONE) 20 MG tablet  ALPRAZolam (XANAX) 2 MG tablet  escitalopram (LEXAPRO) 20 MG tablet  levothyroxine  "(SYNTHROID/LEVOTHROID) 200 MCG tablet  morphine (MS CONTIN) 15 MG CR tablet  NARCAN 4 MG/0.1ML nasal spray  oxyCODONE (ROXICODONE) 5 MG tablet  topiramate (TOPAMAX) 25 MG tablet  VENTOLIN  (90 Base) MCG/ACT Inhaler          Review of Systems   All other systems reviewed and are negative.      Physical Exam   BP: 101/49  Pulse: 73  Temp: 97.6  F (36.4  C)  Resp: 18  Height: 167.6 cm (5' 6\")  Weight: 78.1 kg (172 lb 3.2 oz)  SpO2: 94 %      Physical Exam  Vitals signs and nursing note reviewed.   Constitutional:       General: She is not in acute distress.     Appearance: Normal appearance. She is normal weight. She is not diaphoretic.   HENT:      Head: Atraumatic.      Right Ear: Tympanic membrane normal.      Left Ear: Tympanic membrane normal.      Nose: Nose normal.      Mouth/Throat:      Comments: Oropharynx without swelling, erythema, exudate, or obvious abscess.   Eyes:      General: No scleral icterus.     Pupils: Pupils are equal, round, and reactive to light.   Neck:      Musculoskeletal: Normal range of motion.      Comments: Right anterior neck fullness  Cardiovascular:      Rate and Rhythm: Normal rate.      Heart sounds: Normal heart sounds. No murmur. No gallop.    Pulmonary:      Effort: Pulmonary effort is normal. No respiratory distress.      Breath sounds: Normal breath sounds. No stridor. No wheezing, rhonchi or rales.   Chest:      Chest wall: No tenderness.   Abdominal:      General: Bowel sounds are normal. There is no distension.      Palpations: Abdomen is soft. There is no mass.      Tenderness: There is no tenderness. There is no guarding or rebound.      Hernia: No hernia is present.   Musculoskeletal:         General: No tenderness.   Skin:     General: Skin is warm.      Findings: No rash.   Neurological:      General: No focal deficit present.      Mental Status: She is alert.   Psychiatric:         Mood and Affect: Mood normal.         Thought Content: Thought content normal. "         ED Course        Procedures               Critical Care time:  none               Results for orders placed or performed during the hospital encounter of 10/30/19 (from the past 24 hour(s))   CBC with platelets differential   Result Value Ref Range    WBC 8.7 4.0 - 11.0 10e9/L    RBC Count 3.68 (L) 3.8 - 5.2 10e12/L    Hemoglobin 11.8 11.7 - 15.7 g/dL    Hematocrit 35.8 35.0 - 47.0 %    MCV 97 78 - 100 fl    MCH 32.1 26.5 - 33.0 pg    MCHC 33.0 31.5 - 36.5 g/dL    RDW 15.2 (H) 10.0 - 15.0 %    Platelet Count 254 150 - 450 10e9/L    Diff Method Automated Method     % Neutrophils 65.0 %    % Lymphocytes 22.5 %    % Monocytes 10.9 %    % Eosinophils 1.4 %    % Basophils 0.2 %    Absolute Neutrophil 5.7 1.6 - 8.3 10e9/L    Absolute Lymphocytes 2.0 0.8 - 5.3 10e9/L    Absolute Monocytes 1.0 0.0 - 1.3 10e9/L    Absolute Eosinophils 0.1 0.0 - 0.7 10e9/L    Absolute Basophils 0.0 0.0 - 0.2 10e9/L   Basic metabolic panel   Result Value Ref Range    Sodium 138 133 - 144 mmol/L    Potassium 3.8 3.4 - 5.3 mmol/L    Chloride 108 94 - 109 mmol/L    Carbon Dioxide 25 20 - 32 mmol/L    Anion Gap 5 3 - 14 mmol/L    Glucose 87 70 - 99 mg/dL    Urea Nitrogen 13 7 - 30 mg/dL    Creatinine 0.83 0.52 - 1.04 mg/dL    GFR Estimate 89 >60 mL/min/[1.73_m2]    GFR Estimate If Black >90 >60 mL/min/[1.73_m2]    Calcium 8.6 8.5 - 10.1 mg/dL   Rapid strep screen   Result Value Ref Range    Specimen Description Throat     Rapid Strep A Screen       NEGATIVE: No Group A streptococcal antigen detected by immunoassay, await culture report.   Soft tissue neck CT w contrast    Narrative    CT SCAN OF THE NECK WITH CONTRAST  10/30/2019 2:35 PM     HISTORY: Right anterior neck fullness and fever, rule out abscess.    TECHNIQUE: Axial CT images of the neck following administration of  intravenous contrast with reformations. Radiation dose for this scan  was reduced using automated exposure control, adjustment of the mA  and/or kV according to  patient size, or iterative reconstruction  technique. 100mL, Isovue-370 IV.     COMPARISON: 10/4/2018.    FINDINGS: The visualized orbits are normal. The paranasal sinuses are  free of significant disease. Mastoid and middle ear cavities are  clear. Visualized intracranial contents are unremarkable.    Visualized portions of the oral cavity, oropharynx, hypopharynx and  larynx are unremarkable with exception of small bilateral calcified  palatine tonsilliths.    There is subtle, mild asymmetric eccentric soft tissue thickening  noted involving the anterior aspect of the right carotid space,  position anterior to the distal common carotid artery and carotid  bifurcation and continuing along the anterolateral aspect of the right  external more than internal carotid artery (series 2 image 52). There  is no evidence of flow-limiting stenosis of either common carotid  artery or at the carotid bifurcation. The bilateral internal carotid  arteries appear widely patent. Visualized extent of the bilateral  external carotid arteries appear patent. The bilateral vertebral  arteries are patent. No focal fluid collection or dissection flap is  identified.    There is global atrophy of the thyroid gland, as before. There is no  cervical lymphadenopathy. Visualized lung apices are clear. There is  degenerative disc disease at C5-C6 and C6-C7 with some uncovertebral  arthropathy at those levels. The prevertebral soft tissues are normal.    There is an asymmetric soft tissue density lesion measuring  approximately 8 mm x 9 mm in the superior aspect of the right breast  (series 2 image 113) which appears more prominent than the CT neck  exam of 10/4/2018.      Impression    IMPRESSION:  1. Eccentric soft tissue thickening along the wall of the distal right  common carotid artery, carotid bifurcation and proximal cervical  internal carotid artery and external carotid artery. Differential  considerations include carotidynia or a  nonspecific infectious or  inflammatory vasculopathy/vasculitis. No evidence of dissection or  flow-limiting stenosis.    2. Asymmetric soft tissue density in the superior aspect of the right  breast measuring up to 9 mm is indeterminate. Recommend diagnostic  mammogram with breast ultrasound for further characterization.    3. Global thyroid atrophy, as before.    The findings and recommendations were discussed by phone by Dr. Ward  with Dr. Fernandez at approximately 3:28 PM on 10/30/2019.       Medications   sodium chloride (PF) 0.9% PF flush 3 mL (10 mLs Intravenous Given 10/30/19 1425)   Saline Bag 100mL  CT  flush use only (76 mLs Intravenous Given 10/30/19 1425)   iopamidol (ISOVUE-370) solution 500 mL (100 mLs Intravenous Given 10/30/19 1426)   ketorolac (TORADOL) injection 30 mg (30 mg Intravenous Given 10/30/19 1418)   prochlorperazine (COMPAZINE) injection 10 mg (10 mg Intravenous Given 10/30/19 1418)       Assessments & Plan (with Medical Decision Making)    39 year old female presenting for concerns of a fever and swollen glands for the last 2-3 days. Patient has not been feeling like herself since getting her flu shot and tetanus shot 8 days ago. She then developed a low grade fever and swelling in the right side of her neck a few days ago. No cough, sore throat, or abnormal abdominal pain. Vitals were normal upon arrival to the ED. Rapid strep screen was negative. CBC and BMP showed mildly decreased RBC count and increased RDW. Soft tissue neck CT with contrast showed some soft tissue swelling in the right neck in the region of the carotid artery consistent with carotidynia.  No signs of ongoing bacterial infection at this point with a normal white count and afebrile.  I discussed the case with her primary care physician, Dr. Schofield.  We agreed to initiate her on prednisone and he would see her in follow-up in 5 days.  Return anytime sooner if condition worsens.                  I have reviewed the  nursing notes.    I have reviewed the findings, diagnosis, plan and need for follow up with the patient.       Discharge Medication List as of 10/30/2019  4:30 PM      START taking these medications    Details   predniSONE (DELTASONE) 20 MG tablet Take 3 tablets (60 mg) by mouth daily for 5 days, Disp-15 tablet, R-0, E-Prescribe             Final diagnoses:   Carotidynia     This document serves as a record of services personally performed by Aurelio Fernandez MD. It was created on their behalf by Roula Toure, a trained medical scribe. The creation of this record is based on the provider's personal observations and the statements of the patient. This document has been checked and approved by the attending provider.  Note: Chart documentation done in part with Dragon Voice Recognition software. Although reviewed after completion, some word and grammatical errors may remain.    10/30/2019   [facility] EMERGENCY DEPARTMENT     Aurelio Fernandez MD  10/30/19 1951

## 2019-10-31 NOTE — RESULT ENCOUNTER NOTE
Preliminary Beta strep group A r/o culture is PENDING and/or NEGATIVE at this time.   No changes in treatment per [place] Strep protocol.

## 2019-11-01 DIAGNOSIS — G47.9 SLEEP DISORDER: ICD-10-CM

## 2019-11-01 DIAGNOSIS — F41.8 MIXED ANXIETY DEPRESSIVE DISORDER: ICD-10-CM

## 2019-11-01 LAB
BACTERIA SPEC CULT: NORMAL
SPECIMEN SOURCE: NORMAL

## 2019-11-01 RX ORDER — ALPRAZOLAM 2 MG
TABLET ORAL
Qty: 90 TABLET | Refills: 0 | Status: SHIPPED | OUTPATIENT
Start: 2019-11-01 | End: 2019-11-14

## 2019-11-01 NOTE — TELEPHONE ENCOUNTER
Alprazolam 2 MG       Last Written Prescription Date:  9-9-19  Last Fill Quantity: 90,   # refills: 1  Last Office Visit: 10/25/19  Future Office visit:    Next 5 appointments (look out 90 days)    Nov 04, 2019  1:45 PM CST  Office Visit with Yuan Schofield MD  [address] [address]  [phone]   Charanjit 10, 2020  1:15 PM CST  Pre-Op physical with Yuan Schofield MD  [facility] ([address] [address]  [phone]           Routing refill request to provider for review/approval because:  Drug not on the [facility], [facility] or [facility] refill protocol or controlled substance

## 2019-11-04 ENCOUNTER — OFFICE VISIT (OUTPATIENT)
Dept: FAMILY MEDICINE | Facility: CLINIC | Age: 39
End: 2019-11-04
Payer: COMMERCIAL

## 2019-11-04 VITALS
SYSTOLIC BLOOD PRESSURE: 104 MMHG | TEMPERATURE: 96.6 F | HEART RATE: 98 BPM | WEIGHT: 171.1 LBS | BODY MASS INDEX: 27.62 KG/M2 | OXYGEN SATURATION: 96 % | DIASTOLIC BLOOD PRESSURE: 68 MMHG | RESPIRATION RATE: 16 BRPM

## 2019-11-04 DIAGNOSIS — F17.200 TOBACCO USE DISORDER: ICD-10-CM

## 2019-11-04 DIAGNOSIS — I77.6 VASCULITIS (H): Primary | ICD-10-CM

## 2019-11-04 DIAGNOSIS — G89.4 CHRONIC PAIN SYNDROME: ICD-10-CM

## 2019-11-04 DIAGNOSIS — E03.9 HYPOTHYROIDISM, UNSPECIFIED TYPE: ICD-10-CM

## 2019-11-04 DIAGNOSIS — M51.369 DEGENERATION OF LUMBAR INTERVERTEBRAL DISC: ICD-10-CM

## 2019-11-04 LAB
CRP SERPL-MCNC: 5.8 MG/L (ref 0–8)
ERYTHROCYTE [SEDIMENTATION RATE] IN BLOOD BY WESTERGREN METHOD: 21 MM/H (ref 0–20)
T4 FREE SERPL-MCNC: 1.3 NG/DL (ref 0.76–1.46)
TSH SERPL DL<=0.005 MIU/L-ACNC: 0.12 MU/L (ref 0.4–4)

## 2019-11-04 PROCEDURE — 84439 ASSAY OF FREE THYROXINE: CPT | Performed by: FAMILY MEDICINE

## 2019-11-04 PROCEDURE — 99214 OFFICE O/P EST MOD 30 MIN: CPT | Performed by: FAMILY MEDICINE

## 2019-11-04 PROCEDURE — 86140 C-REACTIVE PROTEIN: CPT | Performed by: FAMILY MEDICINE

## 2019-11-04 PROCEDURE — 85652 RBC SED RATE AUTOMATED: CPT | Performed by: FAMILY MEDICINE

## 2019-11-04 PROCEDURE — 84443 ASSAY THYROID STIM HORMONE: CPT | Performed by: FAMILY MEDICINE

## 2019-11-04 PROCEDURE — 86200 CCP ANTIBODY: CPT | Performed by: FAMILY MEDICINE

## 2019-11-04 PROCEDURE — 36415 COLL VENOUS BLD VENIPUNCTURE: CPT | Performed by: FAMILY MEDICINE

## 2019-11-04 PROCEDURE — 86038 ANTINUCLEAR ANTIBODIES: CPT | Performed by: FAMILY MEDICINE

## 2019-11-04 RX ORDER — PREDNISONE 10 MG/1
TABLET ORAL
Qty: 50 TABLET | Refills: 1 | Status: SHIPPED | OUTPATIENT
Start: 2019-11-04 | End: 2020-01-10

## 2019-11-04 ASSESSMENT — PAIN SCALES - GENERAL: PAINLEVEL: SEVERE PAIN (6)

## 2019-11-04 NOTE — LETTER
[address] [address]  Phone: [phone]  Fax: [phone]    November 4, 2019        Mattie Banda  [facility] [address] [facility] [address]          To whom it may concern:    RE: Mattie Banda    Patient was seen and treated today at our clinic and missed work.  She will miss today and tomorrow, possibly longer along with previously missed days since October 30, 2019.    Please contact me for questions or concerns.      Sincerely,        Yuan Iain Schofield MD

## 2019-11-04 NOTE — PATIENT INSTRUCTIONS
Reduce prednisone to 20 mg daily for 3 days, the 10 mg daily    More plans to follow after labs

## 2019-11-04 NOTE — PROGRESS NOTES
Subjective     Mattie Banda is a 39 year old female who presents to clinic today for the following health issues:    HPI   ED/UC Followup:    Facility:  [facility]  Date of visit: 10/30/2019  Reason for visit: neck pain  Current Status: neck pain is improving after taking the prednisone     Smoking Cessation  Pt would like to discuss smoking cessation    Patient developed severe neck and throat pain right side and was seen in ED.  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  Since starting prednisone within 24 to 48 hours she was much better.  Continues to improve but is not gone.  She describes petechial rash varying places including on feet at times, upper extremities, anterior neck and upper chest.  He is known to have lymphocytic colitis.  She was febrile when all of this happened.  She had symptoms consistent with an aura for her migraine in the last days since this all started.  She is anticipating GYN surgery for her marked bleeding.  She would like to quit smoking.  She is missing work and needs a note for same.    One new symptom is pain that developed on her left wrist area, extensor surface starting just medial to the vein where the blood IV was in the ED.  It then extends all the way up the arm to the elbow and stops.  She has not had this before.  There is some swelling in that same region.    BP Readings from Last 3 Encounters:   11/04/19 104/68   10/30/19 108/57   10/25/19 108/64    Wt Readings from Last 3 Encounters:   11/04/19 77.6 kg (171 lb 1.6 oz)   10/30/19 78.1 kg (172 lb 3.2 oz)   10/25/19 78.4 kg (172 lb 12.8 oz)                      Reviewed and updated as needed this visit by Provider         Review of Systems   ROS COMP: Constitutional, HEENT, cardiovascular, pulmonary, gi and gu systems are negative, except as otherwise noted.      Objective    /68   Pulse 98   Temp 96.6  F (35.9  C) (Temporal)   Resp 16   Wt 77.6 kg (171 lb 1.6 oz)   LMP  10/13/2019   SpO2 96%   BMI 27.62 kg/m    Body mass index is 27.62 kg/m .  Physical Exam   GENERAL: healthy, alert and no distress  EYES: Eyes grossly normal to inspection, PERRL and conjunctivae and sclerae normal  HENT: normal cephalic/atraumatic, ear canals and TM's normal, nose and mouth without ulcers or lesions, oropharynx clear and oral mucous membranes moist  NECK: no adenopathy, no asymmetry, masses, or scars, thyroid normal to palpation and being slightly tender in the area of the right carotid but there are no major masses or tumors or bumps noted.  RESP: lungs clear to auscultation - no rales, rhonchi or wheezes  CV: regular rate and rhythm, normal S1 S2, no S3 or S4, no murmur, click or rub, no peripheral edema and peripheral pulses strong  ABDOMEN: Grossly negative  MS: She is tender extensor surface left wrist right at the joint space radius ulna with metacarpals.  One can see a vein just medial to that where the IV was inserted.  This vein is not tender but essentially it follows up the arm to the medial epicondyle and stops.  I do not feel any firm hard lumps.  There is no redness nor swelling.  Above the elbow there is no problem.  Right side is unremarkable.  Other limbs and joints seem unremarkable today.  Favors back with some palpable tenderness both neck and low back.  SKIN: no suspicious lesions or rashes and maybe slight malar rash on a redness of both cheeks but no petechia noted today  PSYCH: mentation appears normal, fatigued, judgement and insight intact and appearance well groomed    Diagnostic Test Results:  Labs reviewed in Epic  Results for orders placed or performed in visit on 11/04/19   **TSH with free T4 reflex FUTURE anytime     Status: Abnormal   Result Value Ref Range    TSH 0.12 (L) 0.40 - 4.00 mU/L   Anti Nuclear Samanta IgG by IFA with Reflex     Status: None   Result Value Ref Range    SAIDA interpretation Negative NEG^Negative   CRP, inflammation     Status: None   Result  "Value Ref Range    CRP Inflammation 5.8 0.0 - 8.0 mg/L   ESR: Erythrocyte sedimentation rate     Status: Abnormal   Result Value Ref Range    Sed Rate 21 (H) 0 - 20 mm/h   T4 free     Status: None   Result Value Ref Range    T4 Free 1.30 0.76 - 1.46 ng/dL           Assessment & Plan     1. Vasculitis (H)  Certainly carotidynia and perhaps some other blood vessel on that left forearm.  With her lymphocytic colitis in previous history there may still be some underlying inflammatory disease that we have not fully identified.  Prednisone is helping.  She has not slept well with the high dose we will reduce and taper the dose so she will be on medication for perhaps 2 to 4 weeks.  All laboratory today was unremarkable.  Thyroid in particular has improved.  - Anti Nuclear Samanta IgG by IFA with Reflex  - Cyclic Citrullinated Peptide Antibody IgG  - CRP, inflammation  - ESR: Erythrocyte sedimentation rate  - predniSONE (DELTASONE) 10 MG tablet; 2 daily for 3 days, then 1 daily  Dispense: 50 tablet; Refill: 1  - T4 free  - T4 free    2. Hypothyroidism, unspecified type  See above but thyroid and will continue same  - **TSH with free T4 reflex FUTURE anytime    3. Degeneration of lumbar intervertebral disc  We will continue same.    4. Chronic pain syndrome  At this point no change in pain medication.    5. Tobacco use disorder  Discussed options to quit smoking.  She will continue to work on things herself.       Tobacco Cessation:   reports that she has been smoking cigarettes. She has a 30.00 pack-year smoking history. She has never used smokeless tobacco.  Tobacco Cessation Action Plan: Self help information given to patient      BMI:   Estimated body mass index is 27.62 kg/m  as calculated from the following:    Height as of 10/30/19: 1.676 m (5' 6\").    Weight as of this encounter: 77.6 kg (171 lb 1.6 oz).   Weight management plan: Discussed healthy diet and exercise guidelines        Patient Instructions       Reduce " prednisone to 20 mg daily for 3 days, the 10 mg daily    More plans to follow after labs        Return in about 4 weeks (around 12/2/2019), or if symptoms worsen or fail to improve, for MSK problem.    Yuan Schofield MD  [facility]

## 2019-11-05 LAB — ANA SER QL IF: NEGATIVE

## 2019-11-06 ENCOUNTER — MYC MEDICAL ADVICE (OUTPATIENT)
Dept: FAMILY MEDICINE | Facility: CLINIC | Age: 39
End: 2019-11-06

## 2019-11-06 NOTE — TELEPHONE ENCOUNTER
Please call to have more info about current symptoms and did things get worse with prednisone reduction.  Yuan Schofield MD

## 2019-11-06 NOTE — TELEPHONE ENCOUNTER
RN TRIAGE CALL:    Patient Contact    Attempt # 1    Was call answered?  No.  Left message on voicemail with information to call clinic RN or My Chart clinic back.  Estefanía Prado, RN

## 2019-11-07 LAB — CCP AB SER IA-ACNC: 1 U/ML

## 2019-11-07 NOTE — TELEPHONE ENCOUNTER
Mattie Banda is a 39 year old female.   Pt responded via My Chart and I contacted her by phone as well.   She states that she is not any better with the prednisone. She is still having flare ups with her arm, low grade fevers off/on, little energy and not sleeping at night. Her biggest frustration is with her arm and why she is not feeling better. She reports improvement in her carotid artery.   She denies worsening of symptoms and is requesting an note from you to try to go back to work on Monday. If possible she would like to pick this up tomorrow.   Informed that provider is out of clinic today and will review tomorrow. She verbalizes understanding.   Steph Champion, RN, BSN

## 2019-11-08 NOTE — TELEPHONE ENCOUNTER
"Mattie states that she still has a big lump by mounika wrist which has gone down a little. The swelling from her wrist to her elbow is not as bad now.  She still is having off and on fevers. Also concerned about the \"hemorrhoid\" and wonders if its related to current issues.  She is using hydrocortisone cream. A follow up appt was made for one week from today. Mattie is NOT returning to work and does not need a note. She was let go from her employment as of today. Will seek urgent care if needed.     Forwarding to PCP as an FYI.   "

## 2019-11-08 NOTE — TELEPHONE ENCOUNTER
Please check with Mattie about her symptoms with arm.  I presume right arm, and pain is wrist to elbow same area as before with no red streaks and minimal swelling.  In other words the same as I saw in the office. Once I know that, I offer a plan and compose letter. Yuan Schofield MD

## 2019-11-15 ENCOUNTER — OFFICE VISIT (OUTPATIENT)
Dept: FAMILY MEDICINE | Facility: CLINIC | Age: 39
End: 2019-11-15
Payer: COMMERCIAL

## 2019-11-15 VITALS
WEIGHT: 172.3 LBS | RESPIRATION RATE: 16 BRPM | SYSTOLIC BLOOD PRESSURE: 108 MMHG | TEMPERATURE: 96.4 F | HEART RATE: 92 BPM | OXYGEN SATURATION: 97 % | BODY MASS INDEX: 27.81 KG/M2 | DIASTOLIC BLOOD PRESSURE: 66 MMHG

## 2019-11-15 DIAGNOSIS — E03.9 HYPOTHYROIDISM, UNSPECIFIED TYPE: ICD-10-CM

## 2019-11-15 DIAGNOSIS — B37.31 YEAST INFECTION OF THE VAGINA: ICD-10-CM

## 2019-11-15 DIAGNOSIS — M62.161: ICD-10-CM

## 2019-11-15 DIAGNOSIS — J03.90 TONSILLITIS: Primary | ICD-10-CM

## 2019-11-15 DIAGNOSIS — S96.811A RUPTURE OF PLANTARIS TENDON, RIGHT, INITIAL ENCOUNTER: ICD-10-CM

## 2019-11-15 DIAGNOSIS — K59.03 DRUG-INDUCED CONSTIPATION: ICD-10-CM

## 2019-11-15 DIAGNOSIS — G90.01 CAROTIDYNIA: ICD-10-CM

## 2019-11-15 PROCEDURE — 99214 OFFICE O/P EST MOD 30 MIN: CPT | Performed by: FAMILY MEDICINE

## 2019-11-15 RX ORDER — CEPHALEXIN 500 MG/1
500 CAPSULE ORAL 2 TIMES DAILY
Qty: 20 CAPSULE | Refills: 1 | Status: SHIPPED | OUTPATIENT
Start: 2019-11-15 | End: 2019-12-11

## 2019-11-15 RX ORDER — LEVOTHYROXINE SODIUM 112 UG/1
112 TABLET ORAL DAILY
Qty: 90 TABLET | Refills: 1 | Status: SHIPPED | OUTPATIENT
Start: 2019-11-15 | End: 2020-04-20

## 2019-11-15 RX ORDER — FLUCONAZOLE 150 MG/1
150 TABLET ORAL ONCE
Qty: 4 TABLET | Refills: 1 | Status: SHIPPED | OUTPATIENT
Start: 2019-11-15 | End: 2019-12-11

## 2019-11-15 ASSESSMENT — PAIN SCALES - GENERAL: PAINLEVEL: SEVERE PAIN (6)

## 2019-11-15 NOTE — PATIENT INSTRUCTIONS
Prednisone 5 mg daily for 3 days. Then every other day for 3 days.  Cephalexin twice daily for infection.  Let us know by Wednesday if symptoms are not improved.

## 2019-11-15 NOTE — PROGRESS NOTES
Subjective     Mattie Banda is a 39 year old female who presents to clinic today for the following health issues:    HPI   Medication Followup of Prednisone    Taking Medication as prescribed: yes    Side Effects:  Unsteady, dizzy, nausea    Medication Helping Symptoms:  yes     Discuss lab results    Tongue feels cracked    Patient's right forearm has finally become less sensitive and the area of swelling is diminished.  There still is some tenderness.  She describes having hard discharge from both tonsils but especially right.  Some of our green summer weight.  Some soft.  Right-sided neck pain has diminished some but she still does not feel well.  Symptoms above she cannot tell if it is from prednisone or whatever else is happening.  She still has some fevers.  She has been having more constipation and did have a very painful hemorrhoid that is broken open and drained.  It is better now.    BP Readings from Last 3 Encounters:   11/15/19 108/66   11/04/19 104/68   10/30/19 108/57    Wt Readings from Last 3 Encounters:   11/15/19 78.2 kg (172 lb 4.8 oz)   11/04/19 77.6 kg (171 lb 1.6 oz)   10/30/19 78.1 kg (172 lb 3.2 oz)                    Currently also having her menstrual cycle with surgery planned in January.  Along with this she was terminated from employment because they do not think she would be well enough for healthy enough to return.  Either from sitting for a long time or getting out of bed she stepped onto her right foot and immediately had a sharp pain in her right calf.  This was 3 or 4 days ago and she has been barely able to walk on his leg since then.  There is been no swelling or bruising.  Reviewed and updated as needed this visit by Provider         Review of Systems   ROS COMP: Constitutional, HEENT, cardiovascular, pulmonary, gi and gu systems are negative, except as otherwise noted.      Objective    /66   Pulse 92   Temp 96.4  F (35.8  C) (Temporal)   Resp 16   Wt 78.2 kg (172  lb 4.8 oz)   SpO2 97%   BMI 27.81 kg/m    Body mass index is 27.81 kg/m .  Physical Exam   GENERAL: healthy, alert and no distress  EYES: Negative  ENT: Pharyngeal injection, some whitish coating to the tongue, some enlargement and crypts redness on the thyroid  NECK: no adenopathy, no asymmetry, masses, or scars and thyroid normal to palpation, and today right carotid is not sore or tender and very minimal mass possibly a note is the only thing noted in the right hand.  It is small less than a centimeter  RESP: lungs clear to auscultation - no rales, rhonchi or wheezes  CV: Regular no murmur  ABDOMEN: soft, nontender, no hepatosplenomegaly, no masses and bowel sounds normal  MS: Today there is no swelling along the radial extensor surface of the right forearm.  There is some mild tenderness from there to the elbow.  She is tender in the right calf but no swelling or ecchymosis.  It is obviously painful for her to move her foot and stand and walk.  There is no swelling in the lower extremities.  Other joints in general are unremarkable today  SKIN: no suspicious lesions or rashes  NEURO: Grossly negative  PSYCH: mentation appears normal, affect flat, fatigued, judgement and insight intact and appearance well groomed    Diagnostic Test Results:  Labs reviewed in Epic  No results found for this or any previous visit (from the past 24 hour(s)).        Assessment & Plan     1. Tonsillitis  I think today given that she still does not feel well has obvious discharge coming from the tonsillar area that tonsillitis may actually have been the main underlying problem to account for inflammation seen on scans.  I am starting antibiotics and reducing prednisone which may be contributing to her other symptoms.  - cephALEXin (KEFLEX) 500 MG capsule; Take 1 capsule (500 mg) by mouth 2 times daily  Dispense: 20 capsule; Refill: 1    2. Carotidynia  This may or may not have been present and prednisone has cleared up inflammation.   Tapering quickly to get her off this medication    3. Other rupture of muscle (nontraumatic), right lower leg  See below.  I suspect she ruptured the plantaris we discussed this with patient    4. Yeast infection of the vagina  She often has yeast when she is on antibiotics.  Therefore these treatment are ordered.  She may have thrush given the whiteness of her tongue and just the prednisone.  This would or should help with that  - fluconazole (DIFLUCAN) 150 MG tablet; Take 1 tablet (150 mg) by mouth once for 1 dose  Dispense: 4 tablet; Refill: 1    5. Hypothyroidism, unspecified type  New dose.  Patient has been on 100 mcg essentially daily and seems like is not enough.  Therefore slightly increased dose will be given  - levothyroxine (SYNTHROID/LEVOTHROID) 112 MCG tablet; Take 1 tablet (112 mcg) by mouth daily  Dispense: 90 tablet; Refill: 1    6. Drug-induced constipation  Patient will use over-the-counter treatments and/or offer MiraLAX if constipation increases.  This may have been more of the reason she has hemorrhoids.    7. Rupture of plantaris tendon, right, initial encounter  Discussed above.    She continues with her chronic pain syndrome using medications to function.  Because of neck and back problems and limitations for work it may be better that she is not working but I know financially this is a significant issue.  She apparently will qualify for unemployment because her employer let her go.  There may be some medical reasons for this but at this point I am not sure that Social Security or anybody else would consider disability unless she had some form of short-term disability/long-term disability from her employer which she does not believe exists.       Tobacco Cessation:   reports that she has been smoking cigarettes. She has a 30.00 pack-year smoking history. She has never used smokeless tobacco.  Tobacco Cessation Action Plan: Self help information given to patient      BMI:   Estimated body  "mass index is 27.81 kg/m  as calculated from the following:    Height as of 10/30/19: 1.676 m (5' 6\").    Weight as of this encounter: 78.2 kg (172 lb 4.8 oz).   Weight management plan: Not really discussed today        Patient Instructions   Prednisone 5 mg daily for 3 days. Then every other day for 3 days.  Cephalexin twice daily for infection.  Let us know by Wednesday if symptoms are not improved.       Return in about 4 weeks (around 12/13/2019).    Yuan Iain Schofield MD  [facility]      "

## 2019-12-02 ENCOUNTER — TELEPHONE (OUTPATIENT)
Dept: FAMILY MEDICINE | Facility: CLINIC | Age: 39
End: 2019-12-02

## 2019-12-02 ENCOUNTER — MYC REFILL (OUTPATIENT)
Dept: FAMILY MEDICINE | Facility: CLINIC | Age: 39
End: 2019-12-02

## 2019-12-02 DIAGNOSIS — G90.01 CAROTIDYNIA: ICD-10-CM

## 2019-12-02 DIAGNOSIS — M50.30 DDD (DEGENERATIVE DISC DISEASE), CERVICAL: ICD-10-CM

## 2019-12-02 DIAGNOSIS — R92.30 BREAST DENSITY: Primary | ICD-10-CM

## 2019-12-02 DIAGNOSIS — M51.369 DEGENERATION OF LUMBAR INTERVERTEBRAL DISC: ICD-10-CM

## 2019-12-02 DIAGNOSIS — R22.1 LOCALIZED SWELLING, MASS AND LUMP, NECK: ICD-10-CM

## 2019-12-02 RX ORDER — MORPHINE SULFATE 15 MG/1
15 TABLET, FILM COATED, EXTENDED RELEASE ORAL EVERY 12 HOURS
Qty: 60 TABLET | Refills: 0 | Status: SHIPPED | OUTPATIENT
Start: 2019-12-02 | End: 2019-12-26

## 2019-12-02 RX ORDER — OXYCODONE HYDROCHLORIDE 5 MG/1
TABLET ORAL
Qty: 240 TABLET | Refills: 0 | Status: SHIPPED | OUTPATIENT
Start: 2019-12-02 | End: 2019-12-26

## 2019-12-02 NOTE — TELEPHONE ENCOUNTER
Reason for call:  Patient reporting a symptom    Symptom or request: Carotid artery swelling again.  Pt wondering if she should start taking prednisone again and what dose to take. Please advise.     Duration (how long have symptoms been present): 2 days    Have you been treated for this before? Yes    Additional comments: Pt also has a question regarding her CT scan on 10.30, pt received a [employer] message that there was a concern on her breast and that she should have a mammogram. Please advise.     Phone Number patient can be reached at:  Home number on file [phone] (home)    Best Time:      Can we leave a detailed message on this number:  YES    Call taken on 12/2/2019 at 9:54 AM by Natalie Briceño

## 2019-12-02 NOTE — TELEPHONE ENCOUNTER
Patient was informed that her results were released due to [employer] automatically does it after a certain amount of time. Patient was informed that she will have the mammogram and ultrasound. She will be set up with an ENT. Advised not to take the prednisone before she sees the ENT. She is going to wait for the  to schedule the ENT appointment. She was advised that they will send a [employer] message with the appointment information.    Boaz Page [facility]

## 2019-12-02 NOTE — TELEPHONE ENCOUNTER
Patient calling back to follow up on message left earlier in the day.Transferred to radiology.   Please call patient to advise on prednisone and seeing an ENT specialist. Has questions regarding recent test results as well.

## 2019-12-02 NOTE — TELEPHONE ENCOUNTER
We should arrange diagnostic mammogram for her. Not sure if best to order only right breast mentioned or if both breasts are diagnositc so will send to staff to arrange for me to sign. Since she did not have screen of left side, would not be good to miss something there.  Regarding neck swelling, she should see ENT since she is not better.  This should be this week. No prednisone for treatment  unless she is quite uncomfortable.  Best if she can see ENT before medicine is started.  Yuan Schofield MD

## 2019-12-02 NOTE — TELEPHONE ENCOUNTER
Tried to reach patient in regards to her questions about lab results, left message for patient to call the clinic back.

## 2019-12-02 NOTE — TELEPHONE ENCOUNTER
Patient calls with concerns about the message she received regarding abnormalities in her breast noted on CT scan done 10/30/19.  Is anxious to get testing set up for this.   Patient asks for someone to call her today regarding this.  Ok to leave detailed message on her phone, [phone].

## 2019-12-02 NOTE — TELEPHONE ENCOUNTER
Roxicodone 5 MG       Last Written Prescription Date:  11/15/19  Last Fill Quantity: 240,   # refills: 0  Last Office Visit: 11/15/19  Future Office visit:    Next 5 appointments (look out 90 days)    Dec 20, 2019  2:45 PM CST  Office Visit with Yuan Schofield MD  [address] [address]  [phone]   Charanjit 10, 2020  1:15 PM CST  Pre-Op physical with Yuan Schofield MD  [facility] ([address] [address]  [phone]           Routing refill request to provider for review/approval because:  Drug not on the [facility], [facility] or [facility] refill protocol or controlled substance  Morphine       Last Written Prescription Date:  11/15/19  Last Fill Quantity: 60,   # refills: 0  Last Office Visit: 11/15/19  Future Office visit:    Next 5 appointments (look out 90 days)    Dec 20, 2019  2:45 PM CST  Office Visit with Yuan Schofield MD  [facility] ([address] [address]  [phone]   Charanjit 10, 2020  1:15 PM CST  Pre-Op physical with Yuan Schofield MD  [facility] ([address] [address]  [phone]           Routing refill request to provider for review/approval because:  Drug not on the [facility], [facility] or [facility] refill protocol or controlled substance

## 2019-12-05 ENCOUNTER — TELEPHONE (OUTPATIENT)
Dept: FAMILY MEDICINE | Facility: CLINIC | Age: 39
End: 2019-12-05

## 2019-12-05 ENCOUNTER — HOSPITAL ENCOUNTER (EMERGENCY)
Facility: CLINIC | Age: 39
Discharge: HOME | End: 2019-12-05
Attending: EMERGENCY MEDICINE | Admitting: EMERGENCY MEDICINE
Payer: COMMERCIAL

## 2019-12-05 VITALS
SYSTOLIC BLOOD PRESSURE: 104 MMHG | HEIGHT: 67 IN | HEART RATE: 63 BPM | RESPIRATION RATE: 12 BRPM | TEMPERATURE: 97.9 F | BODY MASS INDEX: 27.15 KG/M2 | WEIGHT: 173 LBS | OXYGEN SATURATION: 99 % | DIASTOLIC BLOOD PRESSURE: 49 MMHG

## 2019-12-05 DIAGNOSIS — G90.01 CAROTIDYNIA: ICD-10-CM

## 2019-12-05 LAB
ANION GAP SERPL CALCULATED.3IONS-SCNC: 1 MMOL/L (ref 3–14)
BASOPHILS # BLD AUTO: 0.1 10E9/L (ref 0–0.2)
BASOPHILS NFR BLD AUTO: 1 %
BUN SERPL-MCNC: 13 MG/DL (ref 7–30)
CALCIUM SERPL-MCNC: 8.3 MG/DL (ref 8.5–10.1)
CHLORIDE SERPL-SCNC: 111 MMOL/L (ref 94–109)
CO2 SERPL-SCNC: 28 MMOL/L (ref 20–32)
CREAT SERPL-MCNC: 0.82 MG/DL (ref 0.52–1.04)
CRP SERPL-MCNC: <2.9 MG/L (ref 0–8)
DIFFERENTIAL METHOD BLD: ABNORMAL
EOSINOPHIL # BLD AUTO: 0 10E9/L (ref 0–0.7)
EOSINOPHIL NFR BLD AUTO: 0 %
ERYTHROCYTE [DISTWIDTH] IN BLOOD BY AUTOMATED COUNT: 14.5 % (ref 10–15)
ERYTHROCYTE [SEDIMENTATION RATE] IN BLOOD BY WESTERGREN METHOD: 21 MM/H (ref 0–20)
GFR SERPL CREATININE-BSD FRML MDRD: >90 ML/MIN/{1.73_M2}
GLUCOSE SERPL-MCNC: 76 MG/DL (ref 70–99)
HCT VFR BLD AUTO: 36.4 % (ref 35–47)
HGB BLD-MCNC: 11.7 G/DL (ref 11.7–15.7)
INR PPP: 1.02 (ref 0.86–1.14)
LYMPHOCYTES # BLD AUTO: 2.5 10E9/L (ref 0.8–5.3)
LYMPHOCYTES NFR BLD AUTO: 30 %
MCH RBC QN AUTO: 31.8 PG (ref 26.5–33)
MCHC RBC AUTO-ENTMCNC: 32.1 G/DL (ref 31.5–36.5)
MCV RBC AUTO: 99 FL (ref 78–100)
MONOCYTES # BLD AUTO: 0.2 10E9/L (ref 0–1.3)
MONOCYTES NFR BLD AUTO: 3 %
NEUTROPHILS # BLD AUTO: 5.5 10E9/L (ref 1.6–8.3)
NEUTROPHILS NFR BLD AUTO: 66 %
PLATELET # BLD AUTO: 241 10E9/L (ref 150–450)
PLATELET # BLD EST: ABNORMAL 10*3/UL
POTASSIUM SERPL-SCNC: 3.6 MMOL/L (ref 3.4–5.3)
RBC # BLD AUTO: 3.68 10E12/L (ref 3.8–5.2)
RBC MORPH BLD: ABNORMAL
SODIUM SERPL-SCNC: 140 MMOL/L (ref 133–144)
WBC # BLD AUTO: 8.3 10E9/L (ref 4–11)

## 2019-12-05 PROCEDURE — 96361 HYDRATE IV INFUSION ADD-ON: CPT | Performed by: EMERGENCY MEDICINE

## 2019-12-05 PROCEDURE — 25800030 ZZH RX IP 258 OP 636: Performed by: EMERGENCY MEDICINE

## 2019-12-05 PROCEDURE — 85025 COMPLETE CBC W/AUTO DIFF WBC: CPT | Performed by: EMERGENCY MEDICINE

## 2019-12-05 PROCEDURE — 80048 BASIC METABOLIC PNL TOTAL CA: CPT | Performed by: EMERGENCY MEDICINE

## 2019-12-05 PROCEDURE — 96360 HYDRATION IV INFUSION INIT: CPT | Performed by: EMERGENCY MEDICINE

## 2019-12-05 PROCEDURE — 86140 C-REACTIVE PROTEIN: CPT | Performed by: EMERGENCY MEDICINE

## 2019-12-05 PROCEDURE — 99284 EMERGENCY DEPT VISIT MOD MDM: CPT | Mod: 25 | Performed by: EMERGENCY MEDICINE

## 2019-12-05 PROCEDURE — 99284 EMERGENCY DEPT VISIT MOD MDM: CPT | Mod: Z6 | Performed by: EMERGENCY MEDICINE

## 2019-12-05 PROCEDURE — 85610 PROTHROMBIN TIME: CPT | Performed by: EMERGENCY MEDICINE

## 2019-12-05 PROCEDURE — 85652 RBC SED RATE AUTOMATED: CPT | Performed by: EMERGENCY MEDICINE

## 2019-12-05 RX ORDER — METHYLPREDNISOLONE 4 MG
TABLET, DOSE PACK ORAL
Qty: 21 TABLET | Refills: 0 | Status: SHIPPED | OUTPATIENT
Start: 2019-12-05 | End: 2019-12-27 | Stop reason: ALTCHOICE

## 2019-12-05 RX ORDER — SODIUM CHLORIDE 9 MG/ML
1000 INJECTION, SOLUTION INTRAVENOUS CONTINUOUS
Status: DISCONTINUED | OUTPATIENT
Start: 2019-12-05 | End: 2019-12-05 | Stop reason: HOSPADM

## 2019-12-05 RX ADMIN — SODIUM CHLORIDE 1000 ML: 9 INJECTION, SOLUTION INTRAVENOUS at 18:00

## 2019-12-05 ASSESSMENT — MIFFLIN-ST. JEOR: SCORE: 1492.35

## 2019-12-05 NOTE — TELEPHONE ENCOUNTER
"Patient called to make an appointment with the ENT doctor. Because she is saying this is her \"carotid artery\" area, just making sure this shouldn't be with a vascular surgeon? Dr. Bal is a month out for appointments.   "

## 2019-12-05 NOTE — ED NOTES
Pt was dx'd w/vasculitis-swollen carotid artery in October and was on prednisone for some time and sx resolved, but over the last few days she feels that the swelling has returned to the right side of her throat.  No difficulty swallowing.

## 2019-12-05 NOTE — ED AVS SNAPSHOT
[facility] Emergency Department  [address] [place] [address]  Phone:  [phone]  Fax:  [phone]                                    Mattie Banda   MRN: [id number]    Department:  [facility] Emergency Department   Date of Visit:  12/5/2019           After Visit Summary Signature Page    I have received my discharge instructions, and my questions have been answered. I have discussed any challenges I see with this plan with the nurse or doctor.    ..........................................................................................................................................  Patient/Patient Representative Signature      ..........................................................................................................................................  Patient Representative Print Name and Relationship to Patient    ..................................................               ................................................  Date                                   Time    ..........................................................................................................................................  Reviewed by Signature/Title    ...................................................              ..............................................  Date                                               Time          22EPIC Rev 08/18

## 2019-12-05 NOTE — ED TRIAGE NOTES
Rt neck swelling, Recent rt carotid artery swelling that was treated with prednisone. Just finished that two weeks ago.

## 2019-12-05 NOTE — TELEPHONE ENCOUNTER
Discussed case with Dr. Bal and he said that he would not deal with this issue. He asked that we ask Dr. Campos if he would want to see patient or if he would recommend patient to be seen elsewhere. Please have Dr. Campos review case.

## 2019-12-05 NOTE — TELEPHONE ENCOUNTER
"Dr. Schofield, do you want to see her? C/O neck pain and swelling again. She wants to know if you want to start  Her on the prednisone again and what the ENT referral was for?............SHMUEL Higginbotham      : 1980  PHONE #'s: [phone] (home)     PRESENTING PROBLEM:  C/O noticing some swelling on that R side of her neck again. Said she is worried about her carotid artery and wondering if Dr. Schofield wants her to start her Prednisone again? \" I have been off of it for 2 weeks. \"    NURSING ASSESSMENT  Description:  This all started back on 10/30/19. I was seen in the ER for swelling in my neck and they did a CT scan and said I had a swollen carotid artery.  I was given steroids to take  And I just finished them 2 weeks ago.  Onset/duration:  3 days now, she has noticed the same sx coming back.   Precip. factors:  Hx of thickening of the distal right common carotid artery.  Assoc. Sx:  Some swelling noted , too.   Improves/worsens Sx:   same  Pain scale (1-10)   5/10  Sx specific meds:  NONE  LMP/preg/breast feeding:   Na  Last exam/Tx: 11/15/19 with Dr. Schofield.    RECOMMENDED DISPOSITION:  See in 24 hours - RN will forward message to Dr. Schofield. She wants to know if she should start her steroid again and what the ENT referral was for?   They have to openings for a month, so she didn't schedule one.       RN informed to go to the ER if swelling gets worse. Fever, cannot turn head or neck without pain. Difficulty breathing or swallowing, severe headache.     Will comply with recommendation: YES   If further questions/concerns or if Sx do not improve, worsen or new Sx develop, call your PCP or [facility] as soon as possible.    NOTES:  Disposition was determined by the first positive assessment question, therefore all previous assessment questions were negative.  Informed to check provider manual or call insurance company to assure coverage.    Guideline used: Neck pain.  Telephone Triage Protocols for " Nurses, Fifth Edition, Myesha Rocha RN

## 2019-12-05 NOTE — ED PROVIDER NOTES
History     Chief Complaint   Patient presents with     Facial Swelling     The history is provided by the patient.     Mattie Banda is a 39 year old female who presents to the emergency department with concerns of right sided carotid artery swelling. The patient was in the ED on October 30th with this same facial swelling. She had a CT that showed soft tissue thickening along the wall of the distal right common carotid. She was placed on Prednisone and states that helped decreased the swelling. While on the Prednisone, she had swelling of a vein in her arm as well as a hemorrhoid. She does not yet know the cause of the swelling to her veins. Her doctor thinks that it is probably vasculitis, but is still running further tests. He ran several tests and the only thing abnormal that was found was a low TSH but normal T4 and Sed Rate was mildly elevated. The patient is now completely off of the Prednisone and is supposed to make a follow up appointment with an ENT specialist. Patient started to have right sided neck swelling again a couple of days ago. She had some left over prednisone that she started taking, but when she called her doctor he told her not to take the Prednisone and come to the ED instead. She denies any sore throat. No trouble swallowing or breathing. She notes that her feet are swollen tremendously but not today.  Currently denies fever or chills.  No difficulty speech or swallowing.  Denies headache or visual change.      Component      Latest Ref Rng & Units 11/4/2019   TSH      0.40 - 4.00 mU/L 0.12 (L)   SAIDA interpretation      NEG:Negative Negative   Cyclic Citrullinated Peptide Antibody, IgG      <7 U/mL 1   CRP Inflammation      0.0 - 8.0 mg/L 5.8   Sed Rate      0 - 20 mm/h 21 (H)   T4 Free      0.76 - 1.46 ng/dL 1.30       Allergies:  Allergies   Allergen Reactions     Bupropion Hcl Hives     Wellbutrin       Problem List:    Patient Active Problem List    Diagnosis Date Noted      Abnormal uterine bleeding 10/22/2019     Priority: Medium     Added automatically from request for surgery [id number]       Chronic pain syndrome 07/09/2018     Priority: Medium     Patient is very low risk to abuse  Pain medication.  Patient is followed by Yuan Schofield MD for ongoing prescription of pain medication.  All refills should only be approved by this provider, or covering partner.    Medication(s): ms contin and oxydodone ir.   Maximum quantity per month: #60 ms contin, #180 oxycodone  Clinic visit frequency required: Q 3 months      Controlled substance agreement:  Encounter-Level CSA - 08/20/2018:    Controlled Substance Agreement - Scan on 6/5/19 CONTROLLED SUBSTANCE AGREEMENT (below)       Patient-Level CSA:    Controlled Substance Agreement - Opioid - Scan on 6/13/2019  6:05 PM (below)    Controlled Substance Agreement - Opioid - Scan on 6/13/2019  6:03 PM: 06/05/2019 (below)         Pain Clinic evaluation in the past: Yes       Date/Location:  [facility] and will go there soon, 6/6/2019.    DIRE Total Score(s):    6/6/2019   Total Score 19       Last [facility] website verification:  10/25/2019 390   [email or web address]             Excessive or frequent menstruation 02/07/2018     Priority: Medium     DDD (degenerative disc disease), cervical 02/07/2018     Priority: Medium     Lymphocytic colitis 06/13/2017     Priority: Medium     Tinea versicolor 06/12/2017     Priority: Medium     Idiopathic peripheral neuropathy 01/10/2017     Priority: Medium     Hypothyroidism, unspecified type 01/10/2017     Priority: Medium     Migraine with aura and without status migrainosus, not intractable 12/27/2016     Priority: Medium     Tobacco use disorder 09/12/2016     Priority: Medium     Low grade squamous intraepithelial lesion on cytologic smear of cervix (LGSIL) 02/08/2016     Priority: Medium     2015Possibly high-grade lesion. Done in [place] in 2015 2/8/16 pap NIL/neg HR HPV.  Plan: cotest in 3 years. Tracking.       Labial lesion 02/08/2016     Priority: Medium     Superior folds of the vulva/labia right side       Mixed anxiety depressive disorder 02/08/2016     Priority: Medium     S/P lumbar fusion and discectomy June 2015 06/28/2015     Priority: Medium     Degeneration of lumbar intervertebral disc 04/05/2014     Priority: Medium     newly added to list on 4/4//14  but present for last months       CARDIOVASCULAR SCREENING; LDL GOAL LESS THAN 160 10/31/2010     Priority: Medium     Contraceptive management 07/02/2009     Priority: Medium        Past Medical History:    Past Medical History:   Diagnosis Date     Anxiety      Breast disorder Not sure     Depressive disorder      Papanicolaou smear of cervix with low grade squamous intraepithelial lesion (LGSIL)      Thyroid disease      Urinary tract infection, site not specified      Wounds and injuries 2000       Past Surgical History:    Past Surgical History:   Procedure Laterality Date     ABDOMEN SURGERY  2015    For back surgery     BACK SURGERY  6/2015      BIOPSY  Don t remember     COLONOSCOPY  08/06/07     COLONOSCOPY  08/05/08     HC COLONOSCOPY W BIOPSY  02/06/08     HC TOOTH EXTRACTION W/FORCEP      wisdom teeth removed     INJECT BLOCK MEDIAL BRANCH CERVICAL/THORACIC/LUMBAR N/A 6/21/2019    Procedure: BLOCK, NERVE, SPINAL, MEDIAL BRANCH lumbar 2, 3, 4;  Surgeon: Edgardo Rubio MD;  Location: [facility] OR     INJECT EPIDURAL CERVICAL N/A 3/8/2018    Procedure: INJECT EPIDURAL CERVICAL;  cervical 6-7 interlaminar epidural steroid injection;  Surgeon: Edgardo Rubio MD;  Location: [facility] OR     LAPAROSCOPIC TUBAL LIGATION  11/27/2012    Procedure: LAPAROSCOPIC TUBAL LIGATION;  Laparoscopic Bilateral tubal sterilization/ fulgaration;  Surgeon: Sarbjit Whittaker MD;  Location:  OR     ORTHOPEDIC SURGERY  6/2015       Family History:    Family History   Problem Relation Age of Onset     Hypertension Maternal Grandmother       Arthritis Maternal Grandmother      Cancer Maternal Grandmother         MGGM     Depression Maternal Grandmother      Lipids Maternal Grandmother      Osteoporosis Maternal Grandmother      Respiratory Maternal Grandmother         emphysema     Genitourinary Problems Maternal Grandmother         Kidney Failure, liver      Coronary Artery Disease Maternal Grandmother      Hyperlipidemia Maternal Grandmother      Anxiety Disorder Maternal Grandmother      Asthma Maternal Grandmother      Anesthesia Reaction Maternal Grandmother      Hypertension Maternal Grandfather      Heart Disease Maternal Grandfather         MI     Cardiovascular Maternal Grandfather      Cancer - colorectal Maternal Grandfather      Cancer Maternal Grandfather         [place]od[place] Lymphoma     Other Cancer Maternal Grandfather      Coronary Artery Disease Maternal Grandfather      Hypertension Paternal Grandmother      Arthritis Paternal Grandmother         RA     Osteoporosis Paternal Grandmother      Obesity Paternal Grandmother      Substance Abuse Mother         Alcoholic     Depression Mother      Hypertension Father      Hyperlipidemia Father      Mental Illness Father      Substance Abuse Father         Bio and Step dad have alcohol problems     Osteoporosis Father      Coronary Artery Disease Paternal Grandfather      Hypertension Paternal Grandfather      Breast Cancer Other         Maternal great aunt     Coronary Artery Disease Other      Diabetes Other      Thyroid Disease Maternal Aunt         two aunts     Breast Cancer Other      Colon Cancer Other      Prostate Cancer Other      Other Cancer Other      Other Cancer Other      Thyroid Disease Other      Anxiety Disorder Other      Substance Abuse Other      Depression Other      Anxiety Disorder Maternal Half-Sister      Thyroid Disease Maternal Half-Sister      Thyroid Disease Other      Asthma Other      Thyroid Disease Other      Obesity Other      Diabetes No family hx of       "Colon Cancer No family hx of      Mental Illness No family hx of        Social History:  Marital Status:   [4]  Social History     Tobacco Use     Smoking status: Current Every Day Smoker     Packs/day: 1.50     Years: 20.00     Pack years: 30.00     Types: Cigarettes     Smokeless tobacco: Never Used   Substance Use Topics     Alcohol use: Yes     Alcohol/week: 0.0 standard drinks     Comment: Rarely     Drug use: No        Medications:    albuterol (VENTOLIN HFA) 108 (90 Base) MCG/ACT inhaler  ALPRAZolam (XANAX) 2 MG tablet  cephALEXin (KEFLEX) 500 MG capsule  escitalopram (LEXAPRO) 20 MG tablet  levothyroxine (SYNTHROID/LEVOTHROID) 112 MCG tablet  morphine (MS CONTIN) 15 MG CR tablet  NARCAN 4 MG/0.1ML nasal spray  oxyCODONE (ROXICODONE) 5 MG tablet  predniSONE (DELTASONE) 10 MG tablet  topiramate (TOPAMAX) 25 MG tablet          Review of Systems   All other systems reviewed and are negative.      Physical Exam   BP: 104/49  Pulse: 63  Temp: 97.9  F (36.6  C)  Resp: 12  Height: 170.2 cm (5' 7\")  Weight: 78.5 kg (173 lb)  SpO2: 99 %      Physical Exam General alert cooperative female in mild to moderate distress.  HEENT shows no facial swelling or asymmetry.  Pupils show equal reactivity.  Extraocular motions are intact.  Ears were clear bilaterally.  Nasal passages swollen but patent.  Orally no lesion.  Uvula is midline and gag is intact.  Speech was clear.  Neck was supple without bruits or stridor.  Did not appreciate significant swelling on the right side of her neck in the affected area.  Lungs were clear without adventitious sounds.  Neurologically nonfocal exam.    ED Course        Procedures               Critical Care time:  none               Results for orders placed or performed during the hospital encounter of 12/05/19 (from the past 24 hour(s))   CBC with platelets differential   Result Value Ref Range    WBC 8.3 4.0 - 11.0 10e9/L    RBC Count 3.68 (L) 3.8 - 5.2 10e12/L    Hemoglobin 11.7 " 11.7 - 15.7 g/dL    Hematocrit 36.4 35.0 - 47.0 %    MCV 99 78 - 100 fl    MCH 31.8 26.5 - 33.0 pg    MCHC 32.1 31.5 - 36.5 g/dL    RDW 14.5 10.0 - 15.0 %    Platelet Count 241 150 - 450 10e9/L    Diff Method Manual Differential     % Neutrophils 66.0 %    % Lymphocytes 30.0 %    % Monocytes 3.0 %    % Eosinophils 0.0 %    % Basophils 1.0 %    Absolute Neutrophil 5.5 1.6 - 8.3 10e9/L    Absolute Lymphocytes 2.5 0.8 - 5.3 10e9/L    Absolute Monocytes 0.2 0.0 - 1.3 10e9/L    Absolute Eosinophils 0.0 0.0 - 0.7 10e9/L    Absolute Basophils 0.1 0.0 - 0.2 10e9/L    RBC Morphology Consistent with reported results     Platelet Estimate       Automated count confirmed.  Platelet morphology is normal.   Basic metabolic panel   Result Value Ref Range    Sodium 140 133 - 144 mmol/L    Potassium 3.6 3.4 - 5.3 mmol/L    Chloride 111 (H) 94 - 109 mmol/L    Carbon Dioxide 28 20 - 32 mmol/L    Anion Gap 1 (L) 3 - 14 mmol/L    Glucose 76 70 - 99 mg/dL    Urea Nitrogen 13 7 - 30 mg/dL    Creatinine 0.82 0.52 - 1.04 mg/dL    GFR Estimate >90 >60 mL/min/[1.73_m2]    GFR Estimate If Black >90 >60 mL/min/[1.73_m2]    Calcium 8.3 (L) 8.5 - 10.1 mg/dL   CRP inflammation   Result Value Ref Range    CRP Inflammation <2.9 0.0 - 8.0 mg/L   Erythrocyte sedimentation rate auto   Result Value Ref Range    Sed Rate 21 (H) 0 - 20 mm/h   INR   Result Value Ref Range    INR 1.02 0.86 - 1.14       Medications   0.9% sodium chloride BOLUS (1,000 mLs Intravenous New Bag 12/5/19 1800)     Followed by   sodium chloride 0.9% infusion (has no administration in time range)       Assessments & Plan (with Medical Decision Making)   Mattie Banda is a 39 year old female who presents to the emergency department with concerns of right sided carotid artery swelling. The patient was in the ED on October 30th with this same facial swelling. She had a CT that showed soft tissue thickening along the wall of the distal right common carotid. She was placed on  Prednisone and states that helped decreased the swelling. While on the Prednisone, she had swelling of a vein in her arm as well as a hemorrhoid. She does not yet know the cause of the swelling to her veins. Her doctor thinks that it is probably vasculitis, but is still running further tests. He ran several tests and the only thing abnormal that was found was a low TSH but normal T4 and Sed Rate was mildly elevated. The patient is now completely off of the Prednisone and is supposed to make a follow up appointment with an ENT specialist. Patient started to have right sided neck swelling again a couple of days ago. She had some left over prednisone that she started taking, but when she called her doctor he told her not to take the Prednisone and come to the ED instead. She denies any sore throat. No trouble swallowing or breathing. She notes that her feet are swollen tremendously but not today.  Currently denies fever or chills.  No difficulty speech or swallowing.  Denies headache or visual change.  On presentation patient was afebrile and vitally stable.  She is not hypoxic.  No facial asymmetry or swelling.  Cranial nerves II through XII were intact except smell which was not checked.  No oral swelling.  Neck was supple without bruits or stridor.  Did not appreciate significant swelling in the right side of her neck that she feels the sensation.  Neurologically nonfocal exam.  Normal respiratory and cardiac auscultation.  Blood work showed no acute abnormality except her sed rate was 21.  Unsuccessful attempts to contact rheumatology.  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  She was placed on a Medrol pack.  Recommend follow-up with her primary doctor early next week for recheck.  Reasons to return for reassessment discussed.  Due to the early in mild nature of her symptoms this time no imaging was done.  I have reviewed the nursing notes.    I have reviewed the findings, diagnosis, plan and need for  follow up with the patient.       New Prescriptions    No medications on file       Final diagnoses:   Carotidynia     This document serves as a record of services personally performed by Yuan Mahoney MD. It was created on their behalf by Roula Toure, a trained medical scribe. The creation of this record is based on the provider's personal observations and the statements of the patient. This document has been checked and approved by the attending provider.  Note: Chart documentation done in part with Dragon Voice Recognition software. Although reviewed after completion, some word and grammatical errors may remain.    12/5/2019   [facility] EMERGENCY DEPARTMENT     Yuan Mahoney MD  12/05/19 2029       Yuan Mahoney MD  12/05/19 2050

## 2019-12-06 ENCOUNTER — PATIENT OUTREACH (OUTPATIENT)
Dept: FAMILY MEDICINE | Facility: CLINIC | Age: 39
End: 2019-12-06

## 2019-12-06 DIAGNOSIS — Z71.89 OTHER SPECIFIED COUNSELING: ICD-10-CM

## 2019-12-06 NOTE — TELEPHONE ENCOUNTER
I have attempted to contact pt with the message below. I left a message for her to return my call. Elaina Ramos CMA ([facility])

## 2019-12-06 NOTE — PROGRESS NOTES
213pm The clinic Community Health Worker spoke with the patient today due to ED/Hospital Discharge to discuss possible Clinic Care Coordination enrollment.  The service was described to the patient and immediate needs were discussed.  The patient declined enrollement at this time.  The PCP is encouraged to refer in the future if the patient's needs change.    The patient called me back and declined services at this time.     Scheduled Follow Up Call: Attempt 1 Community Health Worker called and left a message for the patient. If the patient is returning my call, please transfer the patient to Fang MORALES at [phone].        Referral: ED discharge report.  Patient was seen for facial swelling

## 2019-12-06 NOTE — DISCHARGE INSTRUCTIONS
Medrol dose pack.  Follow-up with your doctor early next week for recheck.  Recommend you follow-up with rheumatologist.

## 2019-12-06 NOTE — TELEPHONE ENCOUNTER
Message routed to Specialty schedulers to contact and schedule patient with Dr. Campos....................

## 2019-12-06 NOTE — TELEPHONE ENCOUNTER
Pt didn't have any further questions. She will follow-up next week. Elaina Ramos CMA ([facility])

## 2019-12-09 DIAGNOSIS — M54.42 CHRONIC BILATERAL LOW BACK PAIN WITH BILATERAL SCIATICA: ICD-10-CM

## 2019-12-09 DIAGNOSIS — G89.29 CHRONIC BILATERAL LOW BACK PAIN WITH BILATERAL SCIATICA: ICD-10-CM

## 2019-12-09 DIAGNOSIS — M54.41 CHRONIC BILATERAL LOW BACK PAIN WITH BILATERAL SCIATICA: ICD-10-CM

## 2019-12-09 DIAGNOSIS — F41.8 MIXED ANXIETY DEPRESSIVE DISORDER: ICD-10-CM

## 2019-12-09 DIAGNOSIS — G47.9 SLEEP DISORDER: ICD-10-CM

## 2019-12-09 DIAGNOSIS — G43.109 MIGRAINE WITH AURA AND WITHOUT STATUS MIGRAINOSUS, NOT INTRACTABLE: ICD-10-CM

## 2019-12-09 NOTE — TELEPHONE ENCOUNTER
Left message for patient to return call to schedule with Dr. Campos. Please schedule when she calls back.

## 2019-12-10 ENCOUNTER — HOSPITAL ENCOUNTER (OUTPATIENT)
Dept: ULTRASOUND IMAGING | Facility: CLINIC | Age: 39
End: 2019-12-10
Attending: FAMILY MEDICINE
Payer: COMMERCIAL

## 2019-12-10 ENCOUNTER — HOSPITAL ENCOUNTER (OUTPATIENT)
Dept: MAMMOGRAPHY | Facility: CLINIC | Age: 39
Discharge: HOME | End: 2019-12-10
Attending: FAMILY MEDICINE | Admitting: FAMILY MEDICINE
Payer: COMMERCIAL

## 2019-12-10 DIAGNOSIS — R92.30 BREAST DENSITY: ICD-10-CM

## 2019-12-10 PROCEDURE — 77066 DX MAMMO INCL CAD BI: CPT

## 2019-12-10 PROCEDURE — 76642 ULTRASOUND BREAST LIMITED: CPT | Mod: RT

## 2019-12-10 PROCEDURE — G0279 TOMOSYNTHESIS, MAMMO: HCPCS

## 2019-12-10 NOTE — TELEPHONE ENCOUNTER
Topamax  Last Written Prescription Date:  10/25/19  Last Fill Quantity: 90( one 3 x/day),  # refills: 1   Last office visit: 11/15/2019 with prescribing provider:  11/15/19   Future Office Visit:   Next 5 appointments (look out 90 days)    Dec 11, 2019  2:00 PM CST  Office Visit with Yuan Schofield MD  [facility] ([address] [phone]  [phone]   Dec 20, 2019  2:45 PM CST  Office Visit with Yuan Schofield MD  [facility] ([facility]) [address] [place] [id number]  [phone]   Charanjit 10, 2020  1:15 PM CST  Pre-Op physical with Yuan Schofield MD  [facility] ([address] [address]  [phone]       XANAX  Last Written Prescription Date:  11/15/19  Last Fill Quantity: 90,  # refills: 0   Last office visit: 11/15/2019 with prescribing provider:     Future Office Visit:   Next 5 appointments (look out 90 days)    Dec 11, 2019  2:00 PM CST  Office Visit with Yuan Schofield MD  [facility] ([address] [phone]  [phone]   Dec 20, 2019  2:45 PM CST  Office Visit with Yuan Schofield MD  [facility] ([facility]) [address] [place] [phone]  [phone]   Charanjit 10, 2020  1:15 PM CST  Pre-Op physical with Yuan Schofield MD  [facility] ([facility]) [address] [place] [phone]  [phone]      Pt is coming in for millicent tomorrow. Will forward to provider to review and approve or deny. XANAX is not of the refill list for the RNs anyway and Topamax is refilled too soon................SHMUEL Higginbotham

## 2019-12-11 ENCOUNTER — OFFICE VISIT (OUTPATIENT)
Dept: FAMILY MEDICINE | Facility: CLINIC | Age: 39
End: 2019-12-11
Payer: COMMERCIAL

## 2019-12-11 VITALS
RESPIRATION RATE: 16 BRPM | TEMPERATURE: 96.9 F | WEIGHT: 173.9 LBS | HEART RATE: 86 BPM | OXYGEN SATURATION: 96 % | DIASTOLIC BLOOD PRESSURE: 48 MMHG | BODY MASS INDEX: 27.24 KG/M2 | SYSTOLIC BLOOD PRESSURE: 88 MMHG

## 2019-12-11 DIAGNOSIS — E03.9 HYPOTHYROIDISM, UNSPECIFIED TYPE: ICD-10-CM

## 2019-12-11 DIAGNOSIS — G90.01 CAROTIDYNIA: Primary | ICD-10-CM

## 2019-12-11 DIAGNOSIS — M50.30 DDD (DEGENERATIVE DISC DISEASE), CERVICAL: ICD-10-CM

## 2019-12-11 DIAGNOSIS — N92.0 EXCESSIVE OR FREQUENT MENSTRUATION: ICD-10-CM

## 2019-12-11 DIAGNOSIS — G43.109 MIGRAINE WITH AURA AND WITHOUT STATUS MIGRAINOSUS, NOT INTRACTABLE: ICD-10-CM

## 2019-12-11 DIAGNOSIS — M51.369 DEGENERATION OF LUMBAR INTERVERTEBRAL DISC: ICD-10-CM

## 2019-12-11 DIAGNOSIS — F41.8 MIXED ANXIETY DEPRESSIVE DISORDER: ICD-10-CM

## 2019-12-11 DIAGNOSIS — K52.832 LYMPHOCYTIC COLITIS: ICD-10-CM

## 2019-12-11 PROCEDURE — 99214 OFFICE O/P EST MOD 30 MIN: CPT | Performed by: FAMILY MEDICINE

## 2019-12-11 RX ORDER — TOPIRAMATE 25 MG/1
TABLET, FILM COATED ORAL
Qty: 90 TABLET | Refills: 1 | Status: SHIPPED | OUTPATIENT
Start: 2019-12-11 | End: 2020-02-27

## 2019-12-11 RX ORDER — TRAZODONE HYDROCHLORIDE 100 MG/1
100 TABLET ORAL AT BEDTIME
COMMUNITY
End: 2020-10-05

## 2019-12-11 RX ORDER — ACETAMINOPHEN 325 MG/1
325-650 TABLET ORAL EVERY 6 HOURS PRN
COMMUNITY
End: 2021-07-19

## 2019-12-11 RX ORDER — ALPRAZOLAM 2 MG
TABLET ORAL
Qty: 90 TABLET | Refills: 1 | Status: SHIPPED | OUTPATIENT
Start: 2019-12-11 | End: 2020-01-21

## 2019-12-11 RX ORDER — OMEGA-3 FATTY ACIDS/FISH OIL 300-1000MG
200 CAPSULE ORAL EVERY 4 HOURS PRN
COMMUNITY
End: 2020-04-22

## 2019-12-11 ASSESSMENT — PATIENT HEALTH QUESTIONNAIRE - PHQ9: SUM OF ALL RESPONSES TO PHQ QUESTIONS 1-9: 24

## 2019-12-11 ASSESSMENT — PAIN SCALES - GENERAL: PAINLEVEL: SEVERE PAIN (6)

## 2019-12-11 NOTE — PATIENT INSTRUCTIONS
Take prednisone for 10 mg for 2 weeks, then see if you can reduce to 1/2 tabs or 5 mg daily'  In the meantime we will work to arrange Rheumatology consult.  I will see you again as scheduled  I will review for more definitive tests for inflammatory confirmation but need to review them all once more.  Once I have them set up, we will call you  Cancel Dr Campos visit.   I will have Fang MORALES work on disability issues. I do not feel you are capable of work until we sort your real inflammatory issues which cause multiple symptoms including ear aches, neck ache, diarrhea, maybe menstrual periods, even old thyroid issues etc. For now this will be at least to after your surgery planned in February

## 2019-12-11 NOTE — PROGRESS NOTES
Subjective     Mattie Banda is a 39 year old female who presents to clinic today for the following health issues:    HPI   ED/UC Followup:    Facility:  [facility]  Date of visit: 12/05/2019  Reason for visit: neck swelling  Current Status: doing better       Since starting on Medrol Dosepak her neck is no longer inflamed and sore.  Ankles and feet are less sore.  In general she feels better.  She has lots of questions about possible inflammatory diseases and why the neck is doing what it is doing.  She has been hypothyroid secondary to thyroid failure for a number of years.  She is having mental menorrhagia for which surgery is considered in January.  She has some swelling and pain on ring finger of her right hand.  Other hand seems okay at the moment.  She has lymphocytic colitis at best for GI symptoms.  Is been documented by biopsy.  She has had diarrhea for a long period of time.  Currently though having constipation symptoms since starting prednisone or before.  Numerous lab tests recently and only mild inflammatory issues were noted.  However she has had this inflammatory issues with multiple joints for years.  She has colitis.  She is fatigued much of the time and has no energy now.  She does have irregular menstruation.  Prednisone has helped immensely with most symptoms.  She denies fever and chills.  Always a bit short of breath but she acknowledges she does smoke.    BP Readings from Last 3 Encounters:   12/11/19 (!) 88/48   12/05/19 104/49   11/15/19 108/66    Wt Readings from Last 3 Encounters:   12/11/19 78.9 kg (173 lb 14.4 oz)   12/05/19 78.5 kg (173 lb)   11/15/19 78.2 kg (172 lb 4.8 oz)                      Reviewed and updated as needed this visit by Provider         Review of Systems   ROS COMP: Constitutional, HEENT, cardiovascular, pulmonary, gi and gu systems are negative, except as otherwise noted.      Objective    BP (!) 88/48   Pulse 86   Temp 96.9  F (36.1  C) (Temporal)    Resp 16   Wt 78.9 kg (173 lb 14.4 oz)   SpO2 96%   BMI 27.24 kg/m     Body mass index is 27.24 kg/m .  Physical Exam   GENERAL: healthy, alert and no distress  EYES: Eyes grossly normal to inspection, PERRL and conjunctivae and sclerae normal  HENT: ear canals and TM's normal, nose and mouth without ulcers or lesions  NECK: no adenopathy, no asymmetry, masses, or scars and thyroid normal to palpation  RESP: lungs clear to auscultation - no rales, rhonchi or wheezes  CV: regular rate and rhythm, normal S1 S2, no S3 or S4, no murmur, click or rub, no peripheral edema and peripheral pulses strong  ABDOMEN: soft, nontender, no hepatosplenomegaly, no masses and bowel sounds normal  MS: .  Patient moderately tender over the sternal and rib area.  Tender over the symphysis pubis.  Tender across the MP joint into the ring finger of the right hand.  No swelling or redness.  There is no obvious synovial thickening of the hands.  Currently no tenderness or soreness of the feet or shins.  Always has neck and back stiffness and soreness.    Diagnostic Test Results:  Labs reviewed in Epic        Assessment & Plan     1. Carotidynia  Improved carotidynia and will continue prednisone and follow-up in about 10 days.  It is more more likely that she has some underlying inflammatory condition that we have not diagnosed.  Markers are always negative but symptoms including thyroid hypertrophy, colitis, menorrhagia, joint aches and pains, fatigue and this point to something.  She describes that she has been told she has psoriasis although no skin problems now.  There is a strong history of psoriasis within the family.  We will see how quickly we might be able to get her into see rheumatology.    2. Migraine with aura and without status migrainosus, not intractable  Was having some classic symptoms for which she took some ibuprofen during the time I was speaking to her.  Headache was improving.    3. Lymphocytic colitis  Not  symptomatic at the current time but does have periodic symptoms    4. Hypothyroidism, unspecified type  Most recent thyroid is unremarkable.    5. Excessive or frequent menstruation  Still occurring and having little bleeding today but this could change tomorrow.    6. DDD (degenerative disc disease), cervical  He has had treatments for degenerative cervical spine and degenerative lumbar spine.  Currently using medications.  If rheumatology confirms there is a rheumatologic problem, this may be very helpful and treatments could be arranged.  Currently using pain medications but being monitored.    7. Degeneration of lumbar intervertebral disc  See above    8. Mixed anxiety depressive disorder  Certainly with all of her issues this is a problem.  She is reassured by a lot of her discussion today.  We had a very long discussion indicating that we will find treatment options at hopefully work.  We will see what rheumatology is able to offer.  Continue prednisone telemetry stand.  We talked about depression.  She has not been able to work.  At this point until we sort things out I do not think physically or emotionally she is capable of working.  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  She has a counselor but she states she is an older lady and does not seem very helpful.  Therefore we will have 1 of her other people get in contact with her.  She is not suicidal but at times she just thinks it is not worth living.  She will certainly let us know if this changes.  I considered adding antidepressant but with all of her other medication and her life situation I am not sure we would benefit greatly and may just cause more side effects.  We will certainly keep this in mind         Patient Instructions   Take prednisone for 10 mg for 2 weeks, then see if you can reduce to 1/2 tabs or 5 mg daily'  In the meantime we will work to arrange Rheumatology consult.  I will see you again as  scheduled  I will review for more definitive tests for inflammatory confirmation but need to review them all once more.  Once I have them set up, we will call you  Cancel Dr Campos visit.   I will have Fang MORALES work on disability issues. I do not feel you are capable of work until we sort your real inflammatory issues which cause multiple symptoms including ear aches, neck ache, diarrhea, maybe menstrual periods, even old thyroid issues etc. For now this will be at least to after your surgery planned in February       Return in about 1 week (around 12/18/2019).    Yuanjuanita Schofield MD  [facility]

## 2019-12-16 ENCOUNTER — TELEPHONE (OUTPATIENT)
Dept: FAMILY MEDICINE | Facility: CLINIC | Age: 39
End: 2019-12-16

## 2019-12-16 ENCOUNTER — PATIENT OUTREACH (OUTPATIENT)
Dept: FAMILY MEDICINE | Facility: CLINIC | Age: 39
End: 2019-12-16

## 2019-12-16 DIAGNOSIS — M50.30 DDD (DEGENERATIVE DISC DISEASE), CERVICAL: ICD-10-CM

## 2019-12-16 DIAGNOSIS — M51.369 DEGENERATION OF LUMBAR INTERVERTEBRAL DISC: ICD-10-CM

## 2019-12-16 DIAGNOSIS — G90.01 CAROTIDYNIA: Primary | ICD-10-CM

## 2019-12-16 NOTE — TELEPHONE ENCOUNTER
Order placed and pended. Please sign order if appropriate and close encounter. Care Coordination has already contacted the patient. .

## 2019-12-16 NOTE — PROGRESS NOTES
The Clinic Community Health Worker spoke with  the patient today to discuss possible Clinic Care Coordination enrollment. The service was described to the patient and immediate needs were discussed. The patient agreed to enrollment and an assessment was scheduled. The patient was provided with contact information for the clinic CHW.             Assessment date: 12/19 at 1pm in [place] with AUTUMN Jimenez for an assessment.     Notes:   Patient need help with short term disability. Patient has a lot of health issues and she just lost her job because of this. Unemployment is declining her due to her medical issues.     Patient also stated that she doesn't qualify for emergency assistants because her ex  makes too much money. He is an over the road  and stays with her for a few months out of the year.     Patient has no money, children to feed, and doesn't know how to start the disability process.

## 2019-12-19 ENCOUNTER — ALLIED HEALTH/NURSE VISIT (OUTPATIENT)
Dept: FAMILY MEDICINE | Facility: OTHER | Age: 39
End: 2019-12-19
Payer: COMMERCIAL

## 2019-12-19 DIAGNOSIS — Z71.89 COUNSELING AND COORDINATION OF CARE: Primary | ICD-10-CM

## 2019-12-19 PROCEDURE — 99207 ZZC NO BILLABLE SERVICE THIS VISIT: CPT

## 2019-12-19 ASSESSMENT — ACTIVITIES OF DAILY LIVING (ADL): DEPENDENT_IADLS:: INDEPENDENT

## 2019-12-19 NOTE — PROGRESS NOTES
Clinic Care Coordination Contact    Clinic Care Coordination Contact  OUTREACH    Referral Information: Help with Disability- financial resources- pt unemployed  Referral Source: PCP    Primary Diagnosis: Psychosocial    Chief Complaint   Patient presents with     Clinic Care Coordination - Face To Face             [place] Utilization:   Clinic Utilization  Difficulty keeping appointments:: No  Compliance Concerns: No  No-Show Concerns: No  No PCP office visit in Past Year: No  Utilization    Last refreshed: 12/19/2019  2:02 PM:  Hospital Admissions 0           Last refreshed: 12/19/2019  2:02 PM:  ED Visits 4           Last refreshed: 12/19/2019  2:02 PM:  No Show Count (past year) 1              Current as of: 12/19/2019  2:02 PM              Clinical Concerns:  Current Medical Concerns:    Patient Active Problem List   Diagnosis     Contraceptive management     CARDIOVASCULAR SCREENING; LDL GOAL LESS THAN 160     Degeneration of lumbar intervertebral disc     S/P lumbar fusion and discectomy June 2015     Low grade squamous intraepithelial lesion on cytologic smear of cervix (LGSIL)     Labial lesion     Mixed anxiety depressive disorder     Tobacco use disorder     Idiopathic peripheral neuropathy     Hypothyroidism, unspecified type     Migraine with aura and without status migrainosus, not intractable     Tinea versicolor     Lymphocytic colitis     Excessive or frequent menstruation     DDD (degenerative disc disease), cervical     Chronic pain syndrome     Abnormal uterine bleeding     Carotidynia         Current Behavioral Concerns: Pt struggles with depression and anxiety, takes medication. She does see a counselor however it is an hour away and would like to see about doing counseling closer to home. Pt thought PCP was going to place referral for counseling through [facility]. [facility] CC does not see that referral will outreach to provider when in clinic tomorrow to ask. Did mention LETICIA Choi as being able to  bridge and see pt potentially until she gets in with someone.       Education Provided to patient: Pt unable to work due to health issues. Has been having spells where passes out - seen in ED due to carotidynia. Being referred to Rheumatology it sounds like. Pt states she was terminated from her job, initially they had told her they were just going to let her go as she couldn't work currently and they're still saying she has a job to come back too however when working with unemployment they're saying the employer said was terminated r/t poor attitude or something like that. She calls into unemployment each week and but is turned down.  Pt wanting to see about Disability options for sure for short-term but potentially long-term. Explained to pt that only short-term disability is through an employer if you have that plan/insurance option. Applying for Disability through social security is for long-term. Pt would have to be out of work/not able to work for 12 months.   Provided pt with resources for Disability:     [place] ([place]                             [phone]    [facility]   [address] [place], [place]                                                                           [phone]    [facility] - toll free [place]                                                               [phone]    www.socialsecurity.gov                www.ssa.gov/benefits/disability  o To apply online for SS  o Https://www.ssa.gov/disabilityssi/ssi.html  - Schedule an appointment with a local Social Security office to file an application. Call [phone] (TTY [phone]) from 7 a.m. to 7 p.m., Monday through Friday or contact your local Social Security office, or;  - Find out if you are eligible to receive Social Security Disability Benefits. Learn more and start the disability process at our Apply Online for Disability Benefits page.  - Although this is not a SSI application, we can  use most of the information you provide to start the disability process. Once you finish the online process, a Social  will contact you for any additional information needed for the SSI application.    [email or web address] website          www.db101.org    Disability [facility]ists        [phone]      Health Maintenance Reviewed: Due/Overdue   Health Maintenance Due   Topic Date Due     PNEUMOCOCCAL IMMUNIZATION 19-64 MEDIUM RISK (1 of 1 - PPSV23) 10/11/1999     PREVENTIVE CARE VISIT  02/08/2017     PAP  02/08/2019       Clinical Pathway: None    Medication Management:  See medication list.      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 Pt ex  stays with them some but also travels for work. He helps pay for the house since he stays there.   Current living arrangement:: I live in a private home with family    Diet/Exercise/Sleep:  Diet:: Regular  Inadequate nutrition (GOAL):: No  Food Insecurity: Yes  In the last twelve months: We worried whether food would run out before we had money to buy more. : Sometimes True  In the last twelve months: The food we bought just didn't last and we didn't have money to buy more.: Sometimes True  Tube Feeding: No  Exercise:: Currently not exercising  Inadequate activity/exercise (GOAL):: No  Significant changes in sleep pattern (GOAL): No    Transportation:  Transportation concerns (GOAL):: No  Transportation means:: Regular car     Psychosocial:  Advent or spiritual beliefs that impact treatment:: No  Mental health DX:: Yes  Mental health DX how managed:: Outpatient Counseling, Medication  Mental health management concern (GOAL):: Yes  Informal Support system::  Family, Children     Financial/Insurance: Pt has already [facility] programs [facility] and states she doesn't qualify for any cash assistance or food support. Pt is aware of local food shelf as she donates to it. States it is her own pride that she hasn't utilized herself yet. Tried to encourage pt to not feel bad. She is without work right now and definitely would be appropriate to utilize it for her and her family as they need.   Financial/Insurance concerns (GOAL):: No  [employer] MA     Resources and Interventions:  Current Resources:      Community Resources: [facility] Worker(financial worker)  Supplies used at home:: None  Equipment Currently Used at Home: none         Referrals Placed: Disability Specialists, Disability Linkage line     Goals:   Goals        General    Financial Wellbeing (pt-stated)     Notes - Note edited  12/19/2019  3:20 PM by Barbara Duke LSW    Goal Statement: I need to see about if I qualify for disability.   Date Goal set: 12/19/19  Date to Achieve By: 1/15/20  Patient expressed understanding of goal: yes  Action steps to achieve this goal:  1. I will contact [place] with number provided to me to see about next steps and if pt should apply- unclear if pt will be out of work for 12 months or more so may not be eligible  2. I will attend appt with PCP tomorrow and AUTUMN CC will check in                Barriers: Pt may not be eligible for long-term disability- doesn't have any options for short-term disability help. Was not offered through employer and now is terminated from that job    Strengths: With assistance/guidance as questions come up pt is able to make some phone calls  Patient/Caregiver understanding: yes    Outreach Frequency: 2 weeks  Future Appointments              Tomorrow Yuan Schofield MD [facility]    In 3 weeks Yuan Schofield MD [facility]          Plan: AUTUMN CC will f/u  with provider tomorrow regarding some other referrals. Pt has appointment tomorrow with PCP. Will talk to pt again then.   Pt will try to call Disability Hub today.       MARK Salas   Primary Care Clinic- Social Work Care Coordinator  [facility]  12/19/2019 3:23 PM  [phone]

## 2019-12-20 ENCOUNTER — OFFICE VISIT (OUTPATIENT)
Dept: FAMILY MEDICINE | Facility: CLINIC | Age: 39
End: 2019-12-20
Payer: COMMERCIAL

## 2019-12-20 ENCOUNTER — PATIENT OUTREACH (OUTPATIENT)
Dept: CARE COORDINATION | Facility: CLINIC | Age: 39
End: 2019-12-20

## 2019-12-20 VITALS
BODY MASS INDEX: 27.28 KG/M2 | TEMPERATURE: 96 F | OXYGEN SATURATION: 97 % | HEART RATE: 94 BPM | RESPIRATION RATE: 16 BRPM | SYSTOLIC BLOOD PRESSURE: 88 MMHG | WEIGHT: 174.2 LBS | DIASTOLIC BLOOD PRESSURE: 56 MMHG

## 2019-12-20 DIAGNOSIS — M50.30 DDD (DEGENERATIVE DISC DISEASE), CERVICAL: ICD-10-CM

## 2019-12-20 DIAGNOSIS — E03.9 HYPOTHYROIDISM, UNSPECIFIED TYPE: ICD-10-CM

## 2019-12-20 DIAGNOSIS — G90.01 CAROTIDYNIA: Primary | ICD-10-CM

## 2019-12-20 DIAGNOSIS — M51.369 DEGENERATION OF LUMBAR INTERVERTEBRAL DISC: ICD-10-CM

## 2019-12-20 DIAGNOSIS — F41.8 MIXED ANXIETY DEPRESSIVE DISORDER: ICD-10-CM

## 2019-12-20 PROCEDURE — 99215 OFFICE O/P EST HI 40 MIN: CPT | Performed by: FAMILY MEDICINE

## 2019-12-20 RX ORDER — HYDROXYCHLOROQUINE SULFATE 200 MG/1
200 TABLET, FILM COATED ORAL DAILY
Qty: 60 TABLET | Refills: 1 | Status: SHIPPED | OUTPATIENT
Start: 2019-12-20 | End: 2019-12-27

## 2019-12-20 RX ORDER — PREDNISONE 10 MG/1
TABLET ORAL
Qty: 100 TABLET | Refills: 0 | Status: SHIPPED | OUTPATIENT
Start: 2019-12-20 | End: 2020-01-10

## 2019-12-20 ASSESSMENT — PAIN SCALES - GENERAL: PAINLEVEL: EXTREME PAIN (8)

## 2019-12-20 NOTE — PROGRESS NOTES
Subjective     Mattie Banda is a 39 year old female who presents to clinic today for the following health issues:    HPI   Chief Complaint   Patient presents with     RECHECK     carotid; flaring up again       Patient is developing soreness again in her right neck along with more body aches in general.  She is finishing methylprednisolone.  She is wondering what she should do to take care of this.  She is anticipating GYN surgery.  She currently is having her menstrual cycle.  It once again is relatively heavy.  Today she had an incident at the mall in [place] when a strange man followed her teenage daughter to the car.  Patient was following behind but moving slowly because of her back pain.  When the gentleman realized the daughter was accompanied by an adult he turned around and went back to his friends.  Things like this are more more troublesome to her.  Life in general is more troublesome to her because of her aches and pains and illness.  She is moderately overwhelmed with everything.  Work has been a problem and in fact she was let go from work because of the recent health issues.  She has no income as a consequence.  She does not qualify for disability because she has none and this is not anticipated to be a permanent thing.  Life in general is just not going well for her.  Pain medications are still required to help her function.    BP Readings from Last 3 Encounters:   12/20/19 (!) 88/56   12/11/19 (!) 88/48   12/05/19 104/49    Wt Readings from Last 3 Encounters:   12/20/19 79 kg (174 lb 3.2 oz)   12/11/19 78.9 kg (173 lb 14.4 oz)   12/05/19 78.5 kg (173 lb)                      Reviewed and updated as needed this visit by Provider         Review of Systems   ROS COMP: CONSTITUTIONAL: NEGATIVE for fever, chills, change in weight  INTEGUMENTARY/SKIN: NEGATIVE for worrisome rashes, moles or lesions  ENT/MOUTH: NEGATIVE for ear, mouth and throat problems and except sore throat from the right  carotid  RESP: NEGATIVE for significant cough or SOB  CV: NEGATIVE for chest pain, palpitations or peripheral edema      No real GI complaints although perhaps slightly constipated  Aches through her back low back especially.  Other limbs are sometimes uncomfortable      Objective    BP (!) 88/56   Pulse 94   Temp 96  F (35.6  C) (Temporal)   Resp 16   Wt 79 kg (174 lb 3.2 oz)   SpO2 97%   BMI 27.28 kg/m     Body mass index is 27.28 kg/m .  Physical Exam   GENERAL: healthy, alert, pale and fatigued  EYES: Eyes grossly normal to inspection, PERRL and conjunctivae and sclerae normal  HENT: ear canals and TM's normal, nose and mouth without ulcers or lesions  NECK: no adenopathy, no asymmetry, masses, or scars, thyroid normal to palpation and tender at the right carotid area again with no swelling  RESP: lungs clear to auscultation - no rales, rhonchi or wheezes  CV: regular rate and rhythm, normal S1 S2, no S3 or S4, no murmur, click or rub, no peripheral edema and peripheral pulses strong  ABDOMEN: soft, nontender, no hepatosplenomegaly, no masses and bowel sounds normal  MS: Tender lumbar spine and SI joints especially and moves slowly to protect them.  Tender hands but there is no thickening of the synovium.  This includes wrist and fingers.  Full range of motion.  Otherwise generally tender through shoulders as well but full range of motion.  Hips knees unremarkable some tenderness feet and ankles  SKIN: no suspicious lesions or rashes  NEURO: Grossly negative  PSYCH: mentation appears normal, affect flat, anxious, fatigued, judgement and insight intact and appearance well groomed    Diagnostic Test Results:  Labs reviewed in Epic        Assessment & Plan     1. Carotidynia  Carotidynia which I think may be a part of a larger rheumatologic pattern.  I believe we have already asked for rheumatology referral and will make sure this is occurring.  In the meantime starting hydroxychloroquine.  See if we can  quiet down all of the inflammation.  Prednisone will be continued in the meantime as she finishes methylprednisolone.  - hydroxychloroquine (PLAQUENIL) 200 MG tablet; Take 1 tablet (200 mg) by mouth daily  Dispense: 60 tablet; Refill: 1  - predniSONE (DELTASONE) 10 MG tablet; 20 mg daily for one week, then 15 mg daily for one week, 10 daily  Dispense: 100 tablet; Refill: 0  - RHEUMATOLOGY REFERRAL  - CBC with platelets; Future  - Comprehensive metabolic panel; Future  - Erythrocyte sedimentation rate auto; Future    2. DDD (degenerative disc disease), cervical  This remains as an issue.  Pain medications are being used.  Perhaps it is inflammatory in nature    3. Degeneration of lumbar intervertebral disc  Same as #2    4. Hypothyroidism, unspecified type  Thyroid has been okay.    5. Mixed anxiety depressive disorder  Given her problems, continue current medications but will ask for mental health advice.  Hopefully our [facility] can help her.  - MENTAL HEALTH REFERRAL  - Adult; Psychiatry and Medication Management, Outpatient Treatment; Individual/Couples/Family/Group Therapy/Health Psychology; [facility] [phone]; We will contact you to schedule the appointmen...  Anticipating having blood work done but apparently I did not sign this before she left.  We are asking her to come back for this.    Patient Instructions   Take one hydroxychloroquine daily for one week, then take 1 twice daily  Take prednisone at 20 mg one week, 15 mg one week, 10 mg daily  See you early in January as planned      Return in about 2 weeks (around 1/3/2020).  Time spent was 40 minutes or more with greater than 50% spent in discussion, obtaining history, or making the plan, etc.  Patient overwhelmed with physical illnesses and problems including probable inflammatory illness, excessive menstruation, loss of job, anxiety about all of these things that time taken to obtain this information and make a plan.    Yuan  Iain Schofield MD  [facility]

## 2019-12-20 NOTE — LETTER
[address] [address]  Phone: [phone]  Fax: [phone]    December 20, 2019        Mattie Banda  [facility] [address] [facility] [address]          To whom it may concern:    RE: Mattie Banda    Patient was seen and treated today at our clinic.  Light duty employment could be considered. If patient was offered a job considered light duty, I would like to review expectations of this employment before permitting her to work.  I have seen too many jobs described as light duty and the work is not fully so.  It would be easier for me to review job duties and decide how they would fit with her current capabilities.     Please contact me for questions or concerns.      Sincerely,        Yuan Iain Schofield MD

## 2019-12-20 NOTE — PROGRESS NOTES
Clinic Care Coordination Contact  [facility]/Voicemail    SW CC will not be able to meet with pt in clinic today due to schedule change. Left pt vm letting her know and that did send message to PCP about things we had questions on.     Clinical Data: Care Coordinator Outreach  Outreach attempted x 1.  Left message on patient's voicemail with call back information and requested return call.  Plan: Care Coordinator will try to reach patient again in 10 business days.      MARK Salas   Primary Care Clinic- Social Work Care Coordinator  [facility]  12/20/2019 11:21 AM  [phone]

## 2019-12-20 NOTE — PATIENT INSTRUCTIONS
Take one hydroxychloroquine daily for one week, then take 1 twice daily  Take prednisone at 20 mg one week, 15 mg one week, 10 mg daily  See you early in January as planned

## 2019-12-20 NOTE — LETTER
[facility] Home  Complex Care Plan  About Me:    Patient Name:  Mattie Perez    YOB: 1980  Age:         39 year old   [place] MRN:    [id number] Telephone Information:  Home Phone [phone]   Mobile [phone]       Address:  [address] [place] [address] Email address:  [email or web address]      Emergency Contact(s)    Name Relationship Lgl Grd Work Phone Home Phone Mobile Phone   1. VIKAS ARREDONDO* Daughter    [phone]   2. STEPHANIE PEREZ Life Partner   [phone]   3. RAGHAVENDRA TANNER* Mother   [phone]           Primary language:  English     needed? No   [place] [employer] [facility]:  [phone] op. 1  Other communication barriers:    Preferred Method of Communication:  [place]  Current living arrangement:    Mobility Status/ Medical Equipment:      Health Maintenance  Health Maintenance Reviewed:      My Access Plan  Medical Emergency 911   Primary Clinic Line [facility] - [phone]   24 Hour Appointment Line [phone] or  [phone]) (toll-free)   24 Hour Nurse Line [phone] (toll-free)   Preferred Urgent Care     Preferred Hospital     Pref[facility]acy [facility]     Behavioral Health Crisis Line The National Suicide Prevention Lifeline at 1-219.861.4578 or 911             My Care Team Members  Patient Care Team       Relationship Specialty Notifications Start End    Yuan Schofield MD PCP - General Family Practice  9/18/13     Phone: [phone] Fax: [phone]         [address] [place] [address]    Yuan Schofield MD Assigned PCP   1/14/18     Phone: [phone] Fax: [phone]         [address] [place] [address]    Anabel Barber MD MD OB/Gyn  10/14/19     Phone: [phone] Fax: [phone]         [address] [address]    Barbara Duke LSW Lead Care  Coordinator Primary Care - [facility] Admissions 12/19/19     Phone: [phone]                 My Care Plans  Self Management and Treatment Plan  Goals and (Comments)  Goals        General    Financial Wellbeing (pt-stated)     Notes - Note edited  12/19/2019  3:20 PM by Barbara Duke LSW    Goal Statement: I need to see about if I qualify for disability.   Date Goal set: 12/19/19  Date to Achieve By: 1/15/20  Patient expressed understanding of goal: yes  Action steps to achieve this goal:  1. I will contact [employer] [place] with number provided to me to see about next steps and if pt should apply- unclear if pt will be out of work for 12 months or more so may not be eligible  2. I will attend appt with PCP tomorrow and [facility] will check in                 Action Plans on File:            Depression     Migraine    Advance Care Plans/Directives Type:        My Medical and Care Information  Problem List   Patient Active Problem List   Diagnosis     Contraceptive management     CARDIOVASCULAR SCREENING; LDL GOAL LESS THAN 160     Degeneration of lumbar intervertebral disc     S/P lumbar fusion and discectomy June 2015     Low grade squamous intraepithelial lesion on cytologic smear of cervix (LGSIL)     Labial lesion     Mixed anxiety depressive disorder     Tobacco use disorder     Idiopathic peripheral neuropathy     Hypothyroidism, unspecified type     Migraine with aura and without status migrainosus, not intractable     Tinea versicolor     Lymphocytic colitis     Excessive or frequent menstruation     DDD (degenerative disc disease), cervical     Chronic pain syndrome     Abnormal uterine bleeding     Carotidynia      Current Medications and Allergies:  See printed Medication Report.    Care Coordination Start Date: 12/19/2019   Frequency of Care Coordination: 2 weeks   Form Last Updated: 12/20/2019

## 2019-12-26 ENCOUNTER — MYC REFILL (OUTPATIENT)
Dept: FAMILY MEDICINE | Facility: CLINIC | Age: 39
End: 2019-12-26

## 2019-12-26 DIAGNOSIS — M54.41 CHRONIC BILATERAL LOW BACK PAIN WITH BILATERAL SCIATICA: ICD-10-CM

## 2019-12-26 DIAGNOSIS — G90.01 CAROTIDYNIA: ICD-10-CM

## 2019-12-26 DIAGNOSIS — M54.42 CHRONIC BILATERAL LOW BACK PAIN WITH BILATERAL SCIATICA: ICD-10-CM

## 2019-12-26 DIAGNOSIS — M51.369 DEGENERATION OF LUMBAR INTERVERTEBRAL DISC: ICD-10-CM

## 2019-12-26 DIAGNOSIS — M50.30 DDD (DEGENERATIVE DISC DISEASE), CERVICAL: ICD-10-CM

## 2019-12-26 DIAGNOSIS — F41.8 MIXED ANXIETY DEPRESSIVE DISORDER: ICD-10-CM

## 2019-12-26 DIAGNOSIS — G89.29 CHRONIC BILATERAL LOW BACK PAIN WITH BILATERAL SCIATICA: ICD-10-CM

## 2019-12-26 DIAGNOSIS — G47.9 SLEEP DISORDER: ICD-10-CM

## 2019-12-26 DIAGNOSIS — G43.109 MIGRAINE WITH AURA AND WITHOUT STATUS MIGRAINOSUS, NOT INTRACTABLE: ICD-10-CM

## 2019-12-26 DIAGNOSIS — E03.9 HYPOTHYROIDISM, UNSPECIFIED TYPE: ICD-10-CM

## 2019-12-26 RX ORDER — ESCITALOPRAM OXALATE 20 MG/1
20 TABLET ORAL DAILY
Qty: 90 TABLET | Refills: 2 | OUTPATIENT
Start: 2019-12-26

## 2019-12-26 RX ORDER — LEVOTHYROXINE SODIUM 112 UG/1
112 TABLET ORAL DAILY
Qty: 90 TABLET | Refills: 1 | OUTPATIENT
Start: 2019-12-26

## 2019-12-26 RX ORDER — ALPRAZOLAM 2 MG
TABLET ORAL
Qty: 90 TABLET | Refills: 1 | OUTPATIENT
Start: 2019-12-26

## 2019-12-26 RX ORDER — TOPIRAMATE 25 MG/1
TABLET, FILM COATED ORAL
Qty: 90 TABLET | Refills: 1 | OUTPATIENT
Start: 2019-12-26

## 2019-12-26 NOTE — TELEPHONE ENCOUNTER
hydroxychloroquine (PLAQUENIL) 200 MG tablet 60 tablet 1 12/20/2019  --   Sig - Route: Take 1 tablet (200 mg) by mouth daily - Oral   Sent to pharmacy as: hydroxychloroquine (PLAQUENIL) 200 MG tablet   Class: E-Prescribe   Order: [id number]   E-Prescribing Status: Receipt confirmed by pharmacy (12/20/2019  3:41 PM CST)     Please refuse.

## 2019-12-26 NOTE — TELEPHONE ENCOUNTER
"Requested Prescriptions   Pending Prescriptions Disp Refills     escitalopram (LEXAPRO) 20 MG tablet 90 tablet 2     Sig: Take 1 tablet (20 mg) by mouth daily   Last Written Prescription Date:  10/23/19  Last Fill Quantity: 90,  # refills: 2   Last office visit: 12/20/19 Chandu  Future Office Visit:   Next 5 appointments (look out 90 days)    Charanjit 10, 2020  1:15 PM CST  Pre-Op physical with Yuan Schofield MD  [facility] ([address] [phone]             SSRIs Protocol Failed - 12/26/2019  7:07 AM        Failed - PHQ-9 score less than 5 in past 6 months     Please review last PHQ-9 score.   PHQ-9 score:    PHQ-9 SCORE 12/11/2019   PHQ-9 Total Score -   PHQ-9 Total Score 24           Passed - Medication is active on med list        Passed - Patient is age 18 or older        Passed - No active pregnancy on record        Passed - No positive pregnancy test in last 12 months        Passed - Recent (6 mo) or future (30 days) visit within the authorizing provider's specialty     Patient had office visit in the last 6 months or has a visit in the next 30 days with authorizing provider or within the authorizing provider's specialty.  See \"Patient Info\" tab in inbasket, or \"Choose Columns\" in Meds & Orders section of the refill encounter.            levothyroxine (SYNTHROID/LEVOTHROID) 112 MCG tablet 90 tablet 1     Sig: Take 1 tablet (112 mcg) by mouth daily   Last Written Prescription Date:  11/15/19  Last Fill Quantity: 90,  # refills: 1   Last office visit:  12/20/19 Chandu  Future Office Visit:   Next 5 appointments (look out 90 days)    Charanjit 10, 2020  1:15 PM CST  Pre-Op physical with Yuan Schofield MD  [facility] ([facility]) [address] [place] [phone]             Thyroid Protocol Failed - 12/26/2019  7:07 AM        Failed - Normal TSH on file in past 12 months     Recent " "Labs   Lab Test 19  1443   TSH 0.12*            Passed - Patient is 12 years or older        Passed - Recent (12 mo) or future (30 days) visit within the authorizing provider's specialty     Patient has had an office visit with the authorizing provider or a provider within the authorizing providers department within the previous 12 mos or has a future within next 30 days. See \"Patient Info\" tab in inbasket, or \"Choose Columns\" in Meds & Orders section of the refill encounter.              Passed - Medication is active on med list        Passed - No active pregnancy on record     If patient is pregnant or has had a positive pregnancy test, please check TSH.          Passed - No positive pregnancy test in past 12 months     If patient is pregnant or has had a positive pregnancy test, please check TSH.          morphine (MS CONTIN) 15 MG CR tablet 60 tablet 0     Sig: Take 1 tablet (15 mg) by mouth every 12 hours maximum 2 tablet(s) per day   Last Written Prescription Date:  19  Last Fill Quantity: 60,  # refills: 0   Last office visit:19 [facility]   Future Office Visit:   Next 5 appointments (look out 90 days)    Charanjit 10, 2020  1:15 PM CST  Pre-Op physical with Yuan Schofield MD  [address] [phone]             There is no refill protocol information for this order        oxyCODONE (ROXICODONE) 5 MG tablet 240 tablet 0     Si-2 tablets 4 times daily for breakthrough pain.  Use sparingly.   Last Written Prescription Date:  19  Last Fill Quantity: 240,  # refills: 0   Last office visit: 19 [facility]  Future Office Visit:   Next 5 appointments (look out 90 days)    Charanjit 10, 2020  1:15 PM CST  Pre-Op physical with Yuan Schofield MD  [address] [phone]             There is no refill protocol information " "for this order        topiramate (TOPAMAX) 25 MG tablet 90 tablet 1     Sig: TAKE 1 TABLET BY MOUTH 3 TIMES A DAY   Last Written Prescription Date:  12/11/19  Last Fill Quantity: 90,  # refills: 1   Last office visit:  12/20/19 Chandu  Future Office Visit:   Next 5 appointments (look out 90 days)    Charanjit 10, 2020  1:15 PM CST  Pre-Op physical with Yuan Schofield MD  [facility]) [address] [place] [phone]             Anti-Seizure Meds Protocol  Failed - 12/26/2019  7:07 AM        Failed - Review Authorizing provider's last note.      Refer to last progress notes: confirm request is for original authorizing provider (cannot be through other providers).        Failed - Normal CBC on file in past 26 months     Recent Labs   Lab Test 12/05/19 1946   WBC 8.3   RBC 3.68*   HGB 11.7   HCT 36.4                Passed - Recent (12 mo) or future (30 days) visit within the authorizing provider's specialty     Patient has had an office visit with the authorizing provider or a provider within the authorizing providers department within the previous 12 mos or has a future within next 30 days. See \"Patient Info\" tab in inbasket, or \"Choose Columns\" in Meds & Orders section of the refill encounter.              Passed - Normal ALT or AST on file in past 26 months     Recent Labs   Lab Test 06/05/19  1121   ALT 27     Recent Labs   Lab Test 06/05/19  1121   AST 15           Passed - Normal platelet count on file in past 26 months     Recent Labs   Lab Test 12/05/19  1946              Passed - Medication is active on med list        Passed - No active pregnancy on record        Passed - No positive pregnancy test in last 12 months        ALPRAZolam (XANAX) 2 MG tablet 90 tablet 1       There is no refill protocol information for this order        Last Written Prescription Date:  12/11/19  Last Fill Quantity: 90,  # refills: 1   Last office " visit: 12/20/19 Chandu  Future Office Visit:   Next 5 appointments (look out 90 days)    Charanjit 10, 2020  1:15 PM CST  Pre-Op physical with Yuan Schofield MD  [facility] ([facility]) [address] [place] [phone]

## 2019-12-26 NOTE — TELEPHONE ENCOUNTER
Requested Prescriptions   Pending Prescriptions Disp Refills     hydroxychloroquine (PLAQUENIL) 200 MG tablet [Pharmacy Med Name: HYDROXYCHLOROQUINE 200MG TAB] 60 tablet 1     Sig: TAKE 1 TABLET (200 MG) BY MOUTH DAILY       There is no refill protocol information for this order      Last Written Prescription Date:  12/20/19  Last Fill Quantity: 60,  # refills: 1   Last office visit:   Future Office Visit:   Next 5 appointments (look out 90 days)    Charanjit 10, 2020  1:15 PM CST  Pre-Op physical with Yuan Schofield MD  [facility] [address] [place] [phone]         Routing refill request to provider for review/approval because:  Drug not on the [facility] refill protocol   Yumi Aguilera RN on 12/26/2019 at 4:53 PM

## 2019-12-26 NOTE — TELEPHONE ENCOUNTER
Chief Complaint   Patient presents with    COPD     follow up from 7/11/2018; CT 1/4/2019    Lung Nodule    Nicotine Dependence     1. Have you been to the ER, urgent care clinic since your last visit? Hospitalized since your last visit? Yes When: 8/15/2018 & 9/21/2018 [facility]: [facility] Reason for visit: chest pain/SOB & Coronary Artery disease    2. Have you seen or consulted any other health care providers outside of the [address] [place] [address] since your last visit? Include any pap smears or colon screening.  Yes Where: Dr. Shyam Grimm, PCP Routing refill request to provider for review/approval because:  Drug not on the [facility] refill protocol - ms contin, oxycodone    Yumi Aguilera RN on 12/26/2019 at 1:32 PM    4 medication requests denied due to refills available.  Pharmacy notified via E-Prescribe refusal.

## 2019-12-27 ENCOUNTER — TELEPHONE (OUTPATIENT)
Dept: FAMILY MEDICINE | Facility: CLINIC | Age: 39
End: 2019-12-27

## 2019-12-27 DIAGNOSIS — G90.01 CAROTIDYNIA: ICD-10-CM

## 2019-12-27 RX ORDER — MORPHINE SULFATE 15 MG/1
15 TABLET, FILM COATED, EXTENDED RELEASE ORAL EVERY 12 HOURS
Qty: 60 TABLET | Refills: 0 | Status: SHIPPED | OUTPATIENT
Start: 2019-12-27 | End: 2020-01-17

## 2019-12-27 RX ORDER — HYDROXYCHLOROQUINE SULFATE 200 MG/1
200 TABLET, FILM COATED ORAL 2 TIMES DAILY
Qty: 60 TABLET | Refills: 1 | Status: SHIPPED | OUTPATIENT
Start: 2019-12-27 | End: 2020-02-19

## 2019-12-27 RX ORDER — OXYCODONE HYDROCHLORIDE 5 MG/1
TABLET ORAL
Qty: 240 TABLET | Refills: 0 | Status: SHIPPED | OUTPATIENT
Start: 2019-12-27 | End: 2020-01-17

## 2019-12-27 RX ORDER — HYDROXYCHLOROQUINE SULFATE 200 MG/1
200 TABLET, FILM COATED ORAL DAILY
Qty: 60 TABLET | Refills: 1 | Status: SHIPPED | OUTPATIENT
Start: 2019-12-27 | End: 2019-12-27

## 2019-12-27 NOTE — TELEPHONE ENCOUNTER
Dose corrected. Initial order was made with insurance co oversight anticipated.   Yuan Schofield MD

## 2019-12-27 NOTE — TELEPHONE ENCOUNTER
Reason for Call:  Other     Detailed comments: Mattie states pharmacy needs clarification on instructions on hydroxychloroquine (PLAQUENIL) 200 MG tablet.  Also if instructions are different than what Dr Schofield told Mattie she will need new instructions also.    Phone Number Patient can be reached at: Home number on file [phone] (home)    Best Time: any    Can we leave a detailed message on this number? YES    Call taken on 12/27/2019 at 9:46 AM by Mercy Oreilly

## 2019-12-30 ENCOUNTER — TELEPHONE (OUTPATIENT)
Dept: FAMILY MEDICINE | Facility: CLINIC | Age: 39
End: 2019-12-30

## 2019-12-30 NOTE — TELEPHONE ENCOUNTER
Not sure which [facility] outpatient intake this is for.  Will let them send letter or expect to hear from patient.  Yuan Schofield MD

## 2019-12-30 NOTE — TELEPHONE ENCOUNTER
We did not reach Mattie CADET Veronika, we left a message with our contact number [phone].  If we do not hear from them within the next 3 business days we will mail a letter offering our scheduling services.    If they do call to schedule, in the future, we will inform you of the outcome.    Thank you for your referral,  [facility]

## 2020-01-02 DIAGNOSIS — G90.01 CAROTIDYNIA: ICD-10-CM

## 2020-01-02 LAB
ALBUMIN SERPL-MCNC: 3.5 G/DL (ref 3.4–5)
ALP SERPL-CCNC: 65 U/L (ref 40–150)
ALT SERPL W P-5'-P-CCNC: 42 U/L (ref 0–50)
ANION GAP SERPL CALCULATED.3IONS-SCNC: 3 MMOL/L (ref 3–14)
AST SERPL W P-5'-P-CCNC: 14 U/L (ref 0–45)
BILIRUB SERPL-MCNC: 0.2 MG/DL (ref 0.2–1.3)
BUN SERPL-MCNC: 15 MG/DL (ref 7–30)
CALCIUM SERPL-MCNC: 8.9 MG/DL (ref 8.5–10.1)
CHLORIDE SERPL-SCNC: 108 MMOL/L (ref 94–109)
CO2 SERPL-SCNC: 28 MMOL/L (ref 20–32)
CREAT SERPL-MCNC: 0.75 MG/DL (ref 0.52–1.04)
ERYTHROCYTE [DISTWIDTH] IN BLOOD BY AUTOMATED COUNT: 15 % (ref 10–15)
ERYTHROCYTE [SEDIMENTATION RATE] IN BLOOD BY WESTERGREN METHOD: 10 MM/H (ref 0–20)
GFR SERPL CREATININE-BSD FRML MDRD: >90 ML/MIN/{1.73_M2}
GLUCOSE SERPL-MCNC: 108 MG/DL (ref 70–99)
HCT VFR BLD AUTO: 37 % (ref 35–47)
HGB BLD-MCNC: 11.8 G/DL (ref 11.7–15.7)
MCH RBC QN AUTO: 31.9 PG (ref 26.5–33)
MCHC RBC AUTO-ENTMCNC: 31.9 G/DL (ref 31.5–36.5)
MCV RBC AUTO: 100 FL (ref 78–100)
PLATELET # BLD AUTO: 287 10E9/L (ref 150–450)
POTASSIUM SERPL-SCNC: 4.3 MMOL/L (ref 3.4–5.3)
PROT SERPL-MCNC: 6.5 G/DL (ref 6.8–8.8)
RBC # BLD AUTO: 3.7 10E12/L (ref 3.8–5.2)
SODIUM SERPL-SCNC: 139 MMOL/L (ref 133–144)
WBC # BLD AUTO: 17.8 10E9/L (ref 4–11)

## 2020-01-02 PROCEDURE — 85027 COMPLETE CBC AUTOMATED: CPT | Performed by: FAMILY MEDICINE

## 2020-01-02 PROCEDURE — 36415 COLL VENOUS BLD VENIPUNCTURE: CPT | Performed by: FAMILY MEDICINE

## 2020-01-02 PROCEDURE — 80053 COMPREHEN METABOLIC PANEL: CPT | Performed by: FAMILY MEDICINE

## 2020-01-02 PROCEDURE — 85652 RBC SED RATE AUTOMATED: CPT | Performed by: FAMILY MEDICINE

## 2020-01-07 ENCOUNTER — OFFICE VISIT (OUTPATIENT)
Dept: BEHAVIORAL HEALTH | Facility: CLINIC | Age: 40
End: 2020-01-07
Payer: COMMERCIAL

## 2020-01-07 DIAGNOSIS — F32.A DEPRESSION: Primary | ICD-10-CM

## 2020-01-07 PROCEDURE — 90837 PSYTX W PT 60 MINUTES: CPT | Performed by: MARRIAGE & FAMILY THERAPIST

## 2020-01-07 ASSESSMENT — ANXIETY QUESTIONNAIRES
GAD7 TOTAL SCORE: 20
1. FEELING NERVOUS, ANXIOUS, OR ON EDGE: NEARLY EVERY DAY
5. BEING SO RESTLESS THAT IT IS HARD TO SIT STILL: NEARLY EVERY DAY
6. BECOMING EASILY ANNOYED OR IRRITABLE: MORE THAN HALF THE DAYS
IF YOU CHECKED OFF ANY PROBLEMS ON THIS QUESTIONNAIRE, HOW DIFFICULT HAVE THESE PROBLEMS MADE IT FOR YOU TO DO YOUR WORK, TAKE CARE OF THINGS AT HOME, OR GET ALONG WITH OTHER PEOPLE: EXTREMELY DIFFICULT
3. WORRYING TOO MUCH ABOUT DIFFERENT THINGS: NEARLY EVERY DAY
7. FEELING AFRAID AS IF SOMETHING AWFUL MIGHT HAPPEN: NEARLY EVERY DAY
2. NOT BEING ABLE TO STOP OR CONTROL WORRYING: NEARLY EVERY DAY

## 2020-01-07 ASSESSMENT — PATIENT HEALTH QUESTIONNAIRE - PHQ9
5. POOR APPETITE OR OVEREATING: NEARLY EVERY DAY
SUM OF ALL RESPONSES TO PHQ QUESTIONS 1-9: 24
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
## 2020-01-08 ASSESSMENT — ANXIETY QUESTIONNAIRES: GAD7 TOTAL SCORE: 20

## 2020-01-10 ENCOUNTER — OFFICE VISIT (OUTPATIENT)
Dept: FAMILY MEDICINE | Facility: CLINIC | Age: 40
End: 2020-01-10
Payer: COMMERCIAL

## 2020-01-10 VITALS
RESPIRATION RATE: 16 BRPM | SYSTOLIC BLOOD PRESSURE: 104 MMHG | OXYGEN SATURATION: 97 % | HEIGHT: 66 IN | DIASTOLIC BLOOD PRESSURE: 62 MMHG | HEART RATE: 78 BPM | BODY MASS INDEX: 28.59 KG/M2 | WEIGHT: 177.9 LBS | TEMPERATURE: 97.6 F

## 2020-01-10 DIAGNOSIS — Z01.818 PREOP GENERAL PHYSICAL EXAM: Primary | ICD-10-CM

## 2020-01-10 DIAGNOSIS — N92.0 EXCESSIVE OR FREQUENT MENSTRUATION: ICD-10-CM

## 2020-01-10 DIAGNOSIS — F41.8 MIXED ANXIETY DEPRESSIVE DISORDER: ICD-10-CM

## 2020-01-10 DIAGNOSIS — F17.200 TOBACCO USE DISORDER: ICD-10-CM

## 2020-01-10 DIAGNOSIS — E03.9 HYPOTHYROIDISM, UNSPECIFIED TYPE: ICD-10-CM

## 2020-01-10 DIAGNOSIS — G89.4 CHRONIC PAIN SYNDROME: ICD-10-CM

## 2020-01-10 DIAGNOSIS — I77.6 VASCULITIS (H): ICD-10-CM

## 2020-01-10 PROBLEM — B36.0 TINEA VERSICOLOR: Status: RESOLVED | Noted: 2017-06-12 | Resolved: 2020-01-10

## 2020-01-10 PROCEDURE — 99214 OFFICE O/P EST MOD 30 MIN: CPT | Performed by: FAMILY MEDICINE

## 2020-01-10 ASSESSMENT — MIFFLIN-ST. JEOR: SCORE: 1498.7

## 2020-01-10 ASSESSMENT — PAIN SCALES - GENERAL: PAINLEVEL: SEVERE PAIN (6)

## 2020-01-10 NOTE — NURSING NOTE
Patient states that she does not need any refills today.    Health Maintenance Due   Topic Date Due     PNEUMOCOCCAL IMMUNIZATION 19-64 MEDIUM RISK (1 of 1 - PPSV23) 10/11/1999     PREVENTIVE CARE VISIT  02/08/2017     PAP  02/08/2019     Health Maintenance reviewed at today's visit patient asked to schedule/complete:   Patient will talk with Dr. Schofield about getting a pap done before her surgery.    Boaz Page, CMA

## 2020-01-10 NOTE — PROGRESS NOTES
[address] [address]  [phone]  Dept: [phone]    PRE-OP EVALUATION:  Today's date: 1/10/2020    Mattie Banda (: 1980) presents for pre-operative evaluation assessment as requested by Dr. Barber.  She requires evaluation and anesthesia risk assessment prior to undergoing surgery/procedure for treatment of Heavy bleeding .    Fax number for surgical facility: available electronically  Primary Physician: Yuan Schofield  Type of Anesthesia Anticipated: to be determined    Patient has a Health Care Directive or Living Will:  NO    Preop Questions 2020   Who is doing your surgery? Dr. Anabel Barber   What are you having done? [facility] total laparoscopic hysterectomy,bilateral salpingectomy, possible cystoscope, possible open surgery.   Date of Surgery/Procedure: 2020   Facility or Hospital where procedure/surgery will be performed: [facility]   1.  Do you have a history of Heart attack, stroke, stent, coronary bypass surgery, or other heart surgery? No   2.  Do you ever have any pain or discomfort in your chest? YES -periodically and has been evaluated with low risk cardiology   3.  Do you have a history of  Heart Failure? No   4.   Are you troubled by shortness of breath when:  walking on a level surface, or up a slight hill, or at night? YES -noted and considered see below   5.  Do you currently have a cold, bronchitis or other respiratory infection? No   6.  Do you have a cough, shortness of breath, or wheezing? No   7.  Do you sometimes get pains in the calves of your legs when you walk? YES -noted and see below   8. Do you or anyone in your family have previous history of blood clots? YES -do not feel this is an issue   9.  Do you or does anyone in your family have a serious bleeding problem such as prolonged bleeding following surgeries or cuts? YES -do not feel this is an issue   10. Have you ever had problems  with anemia or been told to take iron pills? YES -secondary to heavy periods   11. Have you had any abnormal blood loss such as black, tarry or bloody stools, or abnormal vaginal bleeding? YES -secondary to heavy periods   12. Have you ever had a blood transfusion? No   13. Have you or any of your relatives ever had problems with anesthesia? No   14. Do you have sleep apnea, excessive snoring or daytime drowsiness? No   15. Do you have any prosthetic heart valves? No   16. Do you have prosthetic joints? YES -not sure where this might be.  She has had no major joints replaced based on my history with her   17. Is there any chance that you may be pregnant? No         HPI:     HPI related to upcoming procedure: Persistent heavy vaginal bleeding and irregular periods.  Anticipation of hysterectomy and bilateral removal of tubes to control bleeding.  Anticipating leaving one or both ovaries      See problem list for active medical problems.  Problems all longstanding and stable, except as noted/documented.  See ROS for pertinent symptoms related to these conditions.    Recently extensively worked up for inflammatory conditions and felt to have carotodynia and perhaps even inflammatory arthritis such as lupus or Sjogren's.  She has had mildly positive JASON.  Since starting hydroxychloroquine and prednisone all of the inflammatory symptoms have improved.  There is family history for psoriasis.  Patient has personal history for lymphocytic colitis.  Again as of today, the symptoms have improved greatly with no longer using prednisone.    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  Patient is followed by Yuan Timmons  MD Chandu for ongoing prescription of pain medication.  All refills should only be approved by this provider, or covering partner.    Medication(s): ms contin and oxydodone ir.   Maximum quantity per month: #60 ms contin, #180 oxycodone  Clinic visit frequency required: Q 3 months      Controlled substance agreement:  Encounter-Level CSA - 08/20/2018:    Controlled Substance Agreement - Scan on 6/5/19 CONTROLLED SUBSTANCE AGREEMENT (below)       Patient-Level CSA:    Controlled Substance Agreement - Opioid - Scan on 6/13/2019  6:05 PM (below)    Controlled Substance Agreement - Opioid - Scan on 6/13/2019  6:03 PM: 06/05/2019 (below)         Pain Clinic evaluation in the past: Yes       Date/Location:  [facility] and will go there soon, 6/6/2019.    DIRE Total Score(s):    6/6/2019   Total Score 19       [facility] website verification:  10/25/2019 390   [email or web address]             Excessive or frequent menstruation 02/07/2018     Priority: Medium     DDD (degenerative disc disease), cervical 02/07/2018     Priority: Medium     Lymphocytic colitis 06/13/2017     Priority: Medium     Tinea versicolor 06/12/2017     Priority: Medium     Idiopathic peripheral neuropathy 01/10/2017     Priority: Medium     Hypothyroidism, unspecified type 01/10/2017     Priority: Medium     Migraine with aura and without status migrainosus, not intractable 12/27/2016     Priority: Medium     Tobacco use disorder 09/12/2016     Priority: Medium     Low grade squamous intraepithelial lesion on cytologic smear of cervix (LGSIL) 02/08/2016     Priority: Medium     2015Possibly high-grade lesion. Done in [place] in 2015 2/8/16 pap NIL/neg HR HPV. Plan: cotest in 3 years. Tracking.       Labial lesion 02/08/2016     Priority: Medium     Superior folds of the vulva/labia right side       Mixed anxiety depressive disorder 02/08/2016     Priority: Medium     S/P lumbar fusion and discectomy June 2015 06/28/2015      Priority: Medium     Degeneration of lumbar intervertebral disc 04/05/2014     Priority: Medium     newly added to list on 4/4//14  but present for last months       CARDIOVASCULAR SCREENING; LDL GOAL LESS THAN 160 10/31/2010     Priority: Medium     Contraceptive management 07/02/2009     Priority: Medium      Past Medical History:   Diagnosis Date     Anxiety      Breast disorder Not sure     Depressive disorder      Papanicolaou smear of cervix with low grade squamous intraepithelial lesion (LGSIL)      Thyroid disease      Urinary tract infection, site not specified     Recurrent UTI's     Wounds and injuries 2000     Past Surgical History:   Procedure Laterality Date     ABDOMEN SURGERY  2015    For back surgery     BACK SURGERY  6/2015      BIOPSY  Don t remember     COLONOSCOPY  08/06/07     COLONOSCOPY  08/05/08     HC COLONOSCOPY W BIOPSY  02/06/08     HC TOOTH EXTRACTION W/FORCEP      wisdom teeth removed     INJECT BLOCK MEDIAL BRANCH CERVICAL/THORACIC/LUMBAR N/A 6/21/2019    Procedure: BLOCK, NERVE, SPINAL, MEDIAL BRANCH lumbar 2, 3, 4;  Surgeon: Edgardo Rubio MD;  Location: [facility] OR     INJECT EPIDURAL CERVICAL N/A 3/8/2018    Procedure: INJECT EPIDURAL CERVICAL;  cervical 6-7 interlaminar epidural steroid injection;  Surgeon: Edgardo Rubio MD;  Location: [facility] OR     LAPAROSCOPIC TUBAL LIGATION  11/27/2012    Procedure: LAPAROSCOPIC TUBAL LIGATION;  Laparoscopic Bilateral tubal sterilization/ fulgaration;  Surgeon: Sarbjit Whittaker MD;  Location: [facility] OR     ORTHOPEDIC SURGERY  6/2015     Current Outpatient Medications   Medication Sig Dispense Refill     acetaminophen (TYLENOL) 325 MG tablet Take 325-650 mg by mouth every 6 hours as needed for mild pain       albuterol (VENTOLIN HFA) 108 (90 Base) MCG/ACT inhaler Inhale 2 puffs into the lungs every 6 hours as needed for shortness of breath / dyspnea or wheezing 52 g 0     ALPRAZolam (XANAX) 2 MG tablet TAKE 1/2-1 TABLET (1-2MG) BY MOUTH 3 TIMES  DAILY AS NEEDED FOR STRESS/SLEEP 90 tablet 1     escitalopram (LEXAPRO) 20 MG tablet TAKE 1 TABLET BY MOUTH EVERY DAY 90 tablet 2     hydroxychloroquine (PLAQUENIL) 200 MG tablet Take 1 tablet (200 mg) by mouth 2 times daily 60 tablet 1     ibuprofen (ADVIL/MOTRIN) 200 MG capsule Take 200 mg by mouth every 4 hours as needed for fever       levothyroxine (SYNTHROID/LEVOTHROID) 112 MCG tablet Take 1 tablet (112 mcg) by mouth daily 90 tablet 1     morphine (MS CONTIN) 15 MG CR tablet Take 1 tablet (15 mg) by mouth every 12 hours maximum 2 tablet(s) per day 60 tablet 0     NARCAN 4 MG/0.1ML nasal spray SPRAY 1 SPRAY (4 MG) INTO ONE NOSTRIL ALTERNATING NOSTRILS ONCE AS NEEDED FOR OPIOID REVERSAL EVERY 2-3 MINUTES UNTIL ASSISTANCE ARRIVES  0     oxyCODONE (ROXICODONE) 5 MG tablet 1-2 tablets 4 times daily for breakthrough pain.  Use sparingly. 240 tablet 0     predniSONE (DELTASONE) 10 MG tablet 20 mg daily for one week, then 15 mg daily for one week, 10 daily 100 tablet 0     predniSONE (DELTASONE) 10 MG tablet 2 daily for 3 days, then 1 daily 50 tablet 1     topiramate (TOPAMAX) 25 MG tablet TAKE 1 TABLET BY MOUTH 3 TIMES A DAY 90 tablet 1     traZODone (DESYREL) 100 MG tablet Take 100 mg by mouth At Bedtime Take 1 to 1.5 tablets at bedtime as needed       OTC products: None, except as noted above    Allergies   Allergen Reactions     Bupropion Hcl Hives     Wellbutrin      Latex Allergy: NO    Social History     Tobacco Use     Smoking status: Current Every Day Smoker     Packs/day: 1.50     Years: 20.00     Pack years: 30.00     Types: Cigarettes     Smokeless tobacco: Never Used   Substance Use Topics     Alcohol use: Yes     Alcohol/week: 0.0 standard drinks     Comment: Rarely     History   Drug Use No       REVIEW OF SYSTEMS:   CONSTITUTIONAL: NEGATIVE for fever, chills, change in weight  INTEGUMENTARY/SKIN: NEGATIVE for worrisome rashes, moles or lesions  EYES: NEGATIVE for vision changes or  "irritation  ENT/MOUTH: NEGATIVE for ear, mouth and throat problems  RESP: NEGATIVE for significant cough or SOB  BREAST: NEGATIVE for masses, tenderness or discharge  CV: NEGATIVE for chest pain, palpitations or peripheral edema  GI: NEGATIVE for nausea, abdominal pain, heartburn, or change in bowel habits  : NEGATIVE for frequency, dysuria, or hematuria  MUSCULOSKELETAL: NEGATIVE for significant arthralgias or myalgia  NEURO: NEGATIVE for weakness, dizziness or paresthesias  ENDOCRINE: NEGATIVE for temperature intolerance, skin/hair changes  HEME: NEGATIVE for bleeding problems  PSYCHIATRIC: NEGATIVE for changes in mood or affect  In particular her joints hurt less but still are sore.  She has chronic back pain remains present.  Rashes have improved.  Carotid has improved.  Breathing seems similar.  Continue with regular bleeding spotting vaginally    EXAM:   /62 (BP Location: Left arm, Patient Position: Sitting, Cuff Size: Adult Regular)   Pulse 78   Temp 97.6  F (36.4  C) (Temporal)   Resp 16   Ht 1.676 m (5' 6\")   Wt 80.7 kg (177 lb 14.4 oz)   SpO2 97%   Breastfeeding No   BMI 28.71 kg/m      GENERAL APPEARANCE: healthy, alert and no distress     EYES: EOMI, PERRL     HENT: ear canals and TM's normal and nose and mouth without ulcers or lesions     NECK: no adenopathy, no asymmetry, masses, or scars and thyroid normal to palpation     RESP: lungs clear to auscultation - no rales, rhonchi or wheezes     CV: regular rates and rhythm, normal S1 S2, no S3 or S4 and no murmur, click or rub     ABDOMEN:  soft, nontender, no HSM or masses and bowel sounds normal     MS: Extremities are nearly normal today.  She does favor her low back with change of position.  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  Patient was responsible caring for him     LYMPHATICS: No cervical adenopathy    DIAGNOSTICS:   EKG: Not indicated due to non-vascular surgery and low risk of event (age <65 and without cardiac risk factors)  Multiple recent labs and will recheck labs 1 week prior to surgery.    Recent Labs   Lab Test 01/02/20  1535 12/05/19  1946   HGB 11.8 11.7    241   INR  --  1.02    140   POTASSIUM 4.3 3.6   CR 0.75 0.82        IMPRESSION:   Reason for surgery/procedure: Menorrhagia and irregular menstrual bleeding  Diagnosis/reason for consult:     ICD-10-CM    1. Preop general physical exam Z01.818 **CBC with platelets FUTURE anytime     **Comprehensive metabolic panel FUTURE anytime   2. Excessive or frequent menstruation N92.0 **CBC with platelets FUTURE anytime     **Comprehensive metabolic panel FUTURE anytime   3. Chronic pain syndrome G89.4    4. Hypothyroidism, unspecified type E03.9    5. Tobacco use disorder F17.200    6. Vasculitis (H) I77.6    7. Mixed anxiety depressive disorder F41.8        The proposed surgical procedure is considered INTERMEDIATE risk.    REVISED CARDIAC RISK INDEX  The patient has the following serious cardiovascular risks for perioperative complications such as (MI, PE, VFib and 3  AV Block):  No serious cardiac risks  INTERPRETATION: 0 risks: Class I (very low risk - 0.4% complication rate)    The patient has the following additional risks for perioperative complications:    Possible risk factors her recent use of prednisone but she will not have surgery until 1 month later.  Also underlying inflammatory condition which is still relatively ill-defined      ICD-10-CM    1. Preop general physical exam Z01.818 **CBC with platelets FUTURE anytime     **Comprehensive metabolic panel FUTURE anytime   2. Excessive or frequent  menstruation N92.0 **CBC with platelets FUTURE anytime     **Comprehensive metabolic panel FUTURE anytime   3. Chronic pain syndrome G89.4    4. Hypothyroidism, unspecified type E03.9    5. Tobacco use disorder F17.200    6. Vasculitis (H) I77.6    7. Mixed anxiety depressive disorder F41.8        RECOMMENDATIONS:     --Consult hospital rounder / IM to assist post-op medical management if needed    Patient will hold all medication day of surgery.    APPROVAL GIVEN to proceed with proposed procedure, without further diagnostic evaluation   I will also plan to  recheck her again a few days before surgery since she has a recent complicated history but suspect there will be no change.  H and P will updated at that visit. If no visit occurs because there is not change, patient will keep me informed, than this one stands.     Signed Electronically by: Yuan Schofield MD    Copy of this evaluation report is provided to requesting physician.    [place] Preop Guidelines    Revised Cardiac Risk Index

## 2020-01-10 NOTE — PATIENT INSTRUCTIONS
Before Your Surgery      Call your surgeon if there is any change in your health. This includes signs of a cold or flu (such as a sore throat, runny nose, cough, rash or fever).    Do not smoke, drink alcohol or take over the counter medicine (unless your surgeon or primary care doctor tells you to) for the 24 hours before and after surgery.    If you take prescribed drugs: Follow your doctor s orders about which medicines to take and which to stop until after surgery.    Eating and drinking prior to surgery: follow the instructions from your surgeon    Take a shower or bath the night before surgery. Use the soap your surgeon gave you to gently clean your skin. If you do not have soap from your surgeon, use your regular soap. Do not shave or scrub the surgery site.  Wear clean pajamas and have clean sheets on your bed.     Come for blood work, any  Health clinic ok.  This should be last week of January.

## 2020-01-15 ENCOUNTER — OFFICE VISIT (OUTPATIENT)
Dept: BEHAVIORAL HEALTH | Facility: CLINIC | Age: 40
End: 2020-01-15
Payer: COMMERCIAL

## 2020-01-15 DIAGNOSIS — F32.A DEPRESSION: Primary | ICD-10-CM

## 2020-01-15 PROCEDURE — 90834 PSYTX W PT 45 MINUTES: CPT | Performed by: MARRIAGE & FAMILY THERAPIST
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
## 2020-01-16 ENCOUNTER — TELEPHONE (OUTPATIENT)
Dept: RHEUMATOLOGY | Facility: CLINIC | Age: 40
End: 2020-01-16

## 2020-01-16 NOTE — TELEPHONE ENCOUNTER
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  Diagnosis is not in protocols.  Advised the Care Team would review and reach out to the patient.  Please advise. Thank you.   Action Taken: Message routed to:  Adult Clinics: Rheumatology p [phone]

## 2020-01-17 ENCOUNTER — MYC REFILL (OUTPATIENT)
Dept: FAMILY MEDICINE | Facility: CLINIC | Age: 40
End: 2020-01-17

## 2020-01-17 DIAGNOSIS — M50.30 DDD (DEGENERATIVE DISC DISEASE), CERVICAL: ICD-10-CM

## 2020-01-17 DIAGNOSIS — F41.8 MIXED ANXIETY DEPRESSIVE DISORDER: ICD-10-CM

## 2020-01-17 DIAGNOSIS — G90.01 CAROTIDYNIA: ICD-10-CM

## 2020-01-17 DIAGNOSIS — G47.9 SLEEP DISORDER: ICD-10-CM

## 2020-01-17 DIAGNOSIS — M51.369 DEGENERATION OF LUMBAR INTERVERTEBRAL DISC: ICD-10-CM

## 2020-01-17 RX ORDER — HYDROXYCHLOROQUINE SULFATE 200 MG/1
200 TABLET, FILM COATED ORAL 2 TIMES DAILY
Qty: 60 TABLET | Refills: 1 | Status: CANCELLED | OUTPATIENT
Start: 2020-01-17

## 2020-01-17 RX ORDER — ALPRAZOLAM 2 MG
TABLET ORAL
Qty: 90 TABLET | Refills: 1 | Status: CANCELLED | OUTPATIENT
Start: 2020-01-17

## 2020-01-17 RX ORDER — OXYCODONE HYDROCHLORIDE 5 MG/1
TABLET ORAL
Qty: 240 TABLET | Refills: 0 | Status: SHIPPED | OUTPATIENT
Start: 2020-01-17 | End: 2020-02-17

## 2020-01-17 RX ORDER — ESCITALOPRAM OXALATE 20 MG/1
20 TABLET ORAL DAILY
Qty: 90 TABLET | Refills: 2 | Status: SHIPPED | OUTPATIENT
Start: 2020-01-17 | End: 2020-10-05

## 2020-01-17 RX ORDER — MORPHINE SULFATE 15 MG/1
15 TABLET, FILM COATED, EXTENDED RELEASE ORAL EVERY 12 HOURS
Qty: 60 TABLET | Refills: 0 | Status: SHIPPED | OUTPATIENT
Start: 2020-01-17 | End: 2020-02-17

## 2020-01-17 NOTE — TELEPHONE ENCOUNTER
Oxycodone, morphine and escitalpram      Last Written Prescription Date:  12/27/19-oxy and morphine, 10/23/19-escitalopram  Last Fill Quantity: 240, 60, 90,   # refills: 0,0,2  Last Office Visit: 1/10/20  Future Office visit:    Next 5 appointments (look out 90 days)    Jan 21, 2020  2:00 PM CST  Return Visit with KVNG Balbuena  [address] [address]  [phone]   Jan 28, 2020  1:00 PM CST  Return Visit with KVNG Balbuena  [address] [address]  [phone]   Jan 31, 2020  1:45 PM CST  Pre-Op physical with Yuan Schofield MD  [address] [address]  [phone]           Routing refill request to provider for review/approval because:  Drug not on the [facility], [facility] or [facility] refill protocol or controlled substance

## 2020-01-21 ENCOUNTER — OFFICE VISIT (OUTPATIENT)
Dept: BEHAVIORAL HEALTH | Facility: CLINIC | Age: 40
End: 2020-01-21
Payer: COMMERCIAL

## 2020-01-21 DIAGNOSIS — F41.8 MIXED ANXIETY DEPRESSIVE DISORDER: ICD-10-CM

## 2020-01-21 DIAGNOSIS — F32.2 CURRENT SEVERE EPISODE OF MAJOR DEPRESSIVE DISORDER WITHOUT PSYCHOTIC FEATURES WITHOUT PRIOR EPISODE (H): Primary | ICD-10-CM

## 2020-01-21 DIAGNOSIS — F41.1 GAD (GENERALIZED ANXIETY DISORDER): ICD-10-CM

## 2020-01-21 DIAGNOSIS — G47.9 SLEEP DISORDER: ICD-10-CM

## 2020-01-21 PROCEDURE — 90791 PSYCH DIAGNOSTIC EVALUATION: CPT | Performed by: MARRIAGE & FAMILY THERAPIST

## 2020-01-21 RX ORDER — ALPRAZOLAM 2 MG
TABLET ORAL
Qty: 90 TABLET | Refills: 1 | Status: SHIPPED | OUTPATIENT
Start: 2020-01-21 | End: 2020-02-17

## 2020-01-21 ASSESSMENT — COLUMBIA-SUICIDE SEVERITY RATING SCALE - C-SSRS
TOTAL  NUMBER OF ABORTED OR SELF INTERRUPTED ATTEMPTS PAST 3 MONTHS: NO
6. HAVE YOU EVER DONE ANYTHING, STARTED TO DO ANYTHING, OR PREPARED TO DO ANYTHING TO END YOUR LIFE?: YES
ATTEMPT LIFETIME: YES
TOTAL  NUMBER OF ABORTED OR SELF INTERRUPTED ATTEMPTS PAST LIFETIME: NO
2. HAVE YOU ACTUALLY HAD ANY THOUGHTS OF KILLING YOURSELF LIFETIME?: YES
1. IN THE PAST MONTH, HAVE YOU WISHED YOU WERE DEAD OR WISHED YOU COULD GO TO SLEEP AND NOT WAKE UP?: YES
ATTEMPT PAST THREE MONTHS: NO
5. HAVE YOU STARTED TO WORK OUT OR WORKED OUT THE DETAILS OF HOW TO KILL YOURSELF? DO YOU INTEND TO CARRY OUT THIS PLAN?: YES
LETHALITY/MEDICAL DAMAGE CODE FIRST ACTUAL ATTEMPT: MINOR PHYSICAL DAMAGE
5. HAVE YOU STARTED TO WORK OUT OR WORKED OUT THE DETAILS OF HOW TO KILL YOURSELF? DO YOU INTEND TO CARRY OUT THIS PLAN?: NO
1. IN THE PAST MONTH, HAVE YOU WISHED YOU WERE DEAD OR WISHED YOU COULD GO TO SLEEP AND NOT WAKE UP?: YES
4. HAVE YOU HAD THESE THOUGHTS AND HAD SOME INTENTION OF ACTING ON THEM?: NO
3. HAVE YOU BEEN THINKING ABOUT HOW YOU MIGHT KILL YOURSELF?: YES
TOTAL  NUMBER OF INTERRUPTED ATTEMPTS PAST 3 MONTHS: NO
REASONS FOR IDEATION PAST MONTH: COMPLETELY TO END OR STOP THE PAIN (YOU COULDN'T GO ON LIVING WITH THE PAIN OR HOW YOU WERE FEELING)
4. HAVE YOU HAD THESE THOUGHTS AND HAD SOME INTENTION OF ACTING ON THEM?: NO
REASONS FOR IDEATION LIFETIME: COMPLETELY TO END OR STOP THE PAIN (YOU COULDN'T GO ON LIVING WITH THE PAIN OR HOW YOU WERE FEELING)
2. HAVE YOU ACTUALLY HAD ANY THOUGHTS OF KILLING YOURSELF?: NO
LETHALITY/MEDICAL DAMAGE CODE MOST RECENT ACTUAL ATTEMPT: MINOR PHYSICAL DAMAGE
TOTAL  NUMBER OF INTERRUPTED ATTEMPTS LIFETIME: NO

## 2020-01-21 NOTE — TELEPHONE ENCOUNTER
Alprazolam      Last Written Prescription Date:  12/11/2019  Last Fill Quantity: 90,   # refills: 1  Last Office Visit: 1/10/2020  Future Office visit:    Next 5 appointments (look out 90 days)    Jan 21, 2020  2:00 PM CST  Return Visit with KVNG Balbuena  [address] [address]  [phone]   Jan 28, 2020  1:00 PM CST  Return Visit with KVNG Balbuena  [address] [address]  [phone]   Jan 31, 2020  1:45 PM CST  Pre-Op physical with Yuan Schofield MD  [address] [address]  [phone]           Routing refill request to provider for review/approval because:  Drug not on the [facility], [facility] or [facility] refill protocol or controlled substance
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
## 2020-01-27 ENCOUNTER — ANCILLARY PROCEDURE (OUTPATIENT)
Dept: GENERAL RADIOLOGY | Facility: CLINIC | Age: 40
End: 2020-01-27
Attending: STUDENT IN AN ORGANIZED HEALTH CARE EDUCATION/TRAINING PROGRAM
Payer: COMMERCIAL

## 2020-01-27 ENCOUNTER — PATIENT OUTREACH (OUTPATIENT)
Dept: CARE COORDINATION | Facility: CLINIC | Age: 40
End: 2020-01-27

## 2020-01-27 ENCOUNTER — OFFICE VISIT (OUTPATIENT)
Dept: RHEUMATOLOGY | Facility: CLINIC | Age: 40
End: 2020-01-27
Attending: FAMILY MEDICINE
Payer: COMMERCIAL

## 2020-01-27 VITALS
SYSTOLIC BLOOD PRESSURE: 86 MMHG | OXYGEN SATURATION: 96 % | DIASTOLIC BLOOD PRESSURE: 57 MMHG | WEIGHT: 177 LBS | BODY MASS INDEX: 28.45 KG/M2 | HEART RATE: 67 BPM | HEIGHT: 66 IN

## 2020-01-27 DIAGNOSIS — M25.50 POLYARTHRALGIA: Primary | ICD-10-CM

## 2020-01-27 DIAGNOSIS — R22.1 NECK SWELLING: ICD-10-CM

## 2020-01-27 LAB
CRP SERPL-MCNC: 4.1 MG/L (ref 0–8)
ERYTHROCYTE [SEDIMENTATION RATE] IN BLOOD BY WESTERGREN METHOD: 17 MM/H (ref 0–20)

## 2020-01-27 PROCEDURE — 83516 IMMUNOASSAY NONANTIBODY: CPT | Performed by: STUDENT IN AN ORGANIZED HEALTH CARE EDUCATION/TRAINING PROGRAM

## 2020-01-27 PROCEDURE — 99204 OFFICE O/P NEW MOD 45 MIN: CPT | Performed by: STUDENT IN AN ORGANIZED HEALTH CARE EDUCATION/TRAINING PROGRAM

## 2020-01-27 PROCEDURE — 85652 RBC SED RATE AUTOMATED: CPT | Performed by: STUDENT IN AN ORGANIZED HEALTH CARE EDUCATION/TRAINING PROGRAM

## 2020-01-27 PROCEDURE — 83876 ASSAY MYELOPEROXIDASE: CPT | Performed by: STUDENT IN AN ORGANIZED HEALTH CARE EDUCATION/TRAINING PROGRAM

## 2020-01-27 PROCEDURE — 86140 C-REACTIVE PROTEIN: CPT | Performed by: STUDENT IN AN ORGANIZED HEALTH CARE EDUCATION/TRAINING PROGRAM

## 2020-01-27 PROCEDURE — 73630 X-RAY EXAM OF FOOT: CPT | Mod: RT | Performed by: RADIOLOGY

## 2020-01-27 PROCEDURE — 73130 X-RAY EXAM OF HAND: CPT | Mod: RT | Performed by: RADIOLOGY

## 2020-01-27 PROCEDURE — 86235 NUCLEAR ANTIGEN ANTIBODY: CPT | Performed by: STUDENT IN AN ORGANIZED HEALTH CARE EDUCATION/TRAINING PROGRAM

## 2020-01-27 PROCEDURE — 36415 COLL VENOUS BLD VENIPUNCTURE: CPT | Performed by: STUDENT IN AN ORGANIZED HEALTH CARE EDUCATION/TRAINING PROGRAM

## 2020-01-27 PROCEDURE — 82784 ASSAY IGA/IGD/IGG/IGM EACH: CPT | Performed by: STUDENT IN AN ORGANIZED HEALTH CARE EDUCATION/TRAINING PROGRAM

## 2020-01-27 PROCEDURE — 82787 IGG 1 2 3 OR 4 EACH: CPT | Performed by: STUDENT IN AN ORGANIZED HEALTH CARE EDUCATION/TRAINING PROGRAM

## 2020-01-27 PROCEDURE — 81374 HLA I TYPING 1 ANTIGEN LR: CPT | Performed by: FAMILY MEDICINE

## 2020-01-27 PROCEDURE — 86255 FLUORESCENT ANTIBODY SCREEN: CPT | Performed by: STUDENT IN AN ORGANIZED HEALTH CARE EDUCATION/TRAINING PROGRAM

## 2020-01-27 PROCEDURE — 73110 X-RAY EXAM OF WRIST: CPT | Mod: LT | Performed by: RADIOLOGY

## 2020-01-27 ASSESSMENT — PAIN SCALES - GENERAL: PAINLEVEL: SEVERE PAIN (6)

## 2020-01-27 ASSESSMENT — MIFFLIN-ST. JEOR: SCORE: 1494.62

## 2020-01-27 NOTE — NURSING NOTE
"Mattie Banda's goals for this visit include:   She requests these members of her care team be copied on today's visit information:     PCP: Yuan Schofield    Referring Provider:  Yuan Schofield MD  [address] [place] [address]    BP (!) 86/57   Pulse 67   Ht 1.676 m (5' 6\")   Wt 80.3 kg (177 lb)   SpO2 96%   BMI 28.57 kg/m       "

## 2020-01-27 NOTE — LETTER
1/27/2020      RE: Mattie Banda  [address] [facility] [place] [address]     Dear Colleague,    Thank you for referring your patient, Mattie Banda, to the [facility]. Please see a copy of my visit note below.    ..    Rheumatology Clinic Visit     Mattie Banda MRN# [id number]   YOB: 1980 Age: 39 year old     Date of Visit: Jan 27, 2020   Primary care provider: Yuan Schofield          Assessment and Plan:     Assessment     -- Polyarthralgia  -- Neck swelling - CT soft tissue neck - eccentric soft tissue thickening along the wall of distal right common carotid artery, carotid bifurcation and proximal cervical internal carotid artery and external carotid artery. No dissection or stenosis noted.   -- Lumbar DDD - L5-S1 anterior/posterior fusion  -- Hx of psoriasis    Ms. Banda is 39 year old female seen in clinic for evaluation of neck swelling and joint pains.     Neck swelling : Acute onset of palpable lump over right-side of neck in October 2019.  Presented to the ER and had a CT soft tissue neck which revealed eccentric soft tissue thickening along the wall of the distal right common carotid artery, carotid bifurcation and proximal cervical internal carotid artery and external carotid artery.  No dissection or stenosis noted.  Referred for evaluation of possible vasculitis.    Since October she had more episodes of neck swelling.  Every time treated with a short course of prednisone.  She thinks prednisone helps but not significantly.  At present she is off of prednisone for almost a month.  She was started on Plaquenil 200 mg twice a day dose by primary care a week ago for unknown inflammatory process.     Is the swelling related to fibro inflammatory process like IgG4 Related disease is difficult to say in the absence of tissue biopsy. I will get IgG4 levels and repeat CT soft tissue neck. Large vessel vasculitis like Takayasu's arteritis, GCA can involve carotid  arteries.  For that will need to evaluate the lumen of the vessels with CT or MR angiogram. She tends to run low blood pressure, radial pulses normal. No carotid bruits. Further imaging can evaluate for stenosis in UE blood vessels like subclavian leading to low Blood pressure readings.     Other conditions like relapsing polychondritis or Behcet's can be associated with large vessel involvement.  She denies any history of recurrent oral/nasal mucosal sores, uveitis, folliculitis like  rash.  She denies any recurrent episodes of nasal cartilage inflammation.  She does reports episode of ear inflammation noticed when she sleeps on her side putting pressure on the ear which then resolves in a day.  No swelling or inflammation noticed on exam today.     Polyarthralgia : She reports episodes of joint pains especially hands and feet ongoing for 3 to 4 years, worse in the last 1 year.  Has significant morning stiffness.  Has noticed occasional swelling in 1 or 2 fingers like dactylitis.  Has history of Raynaud's since her teenage years.  Also reports history of psoriasis which is well controlled.  Has noticed some improvement with prednisone.  Also has history of lumbar degenerative disc disease and chronic SI joint pain.  Interestingly MRI of the left hip done on 1/8/2019 showed evidence of possible bilateral sacroiliitis.  It could be seen with underlying degenerative arthritis but will consider getting SI joint xray and MRI pelvis to look for sacroiliitis if suspicion for spondylarthritis increase based on blood tests.      Plan      -- Orders placed this encounter  Orders Placed This Encounter   Procedures     XR Foot Bilateral G/E 3 Views     XR Wrist Bilateral G/E 3 Views     XR Hand Bilateral G/E 3 Views     Erythrocyte sedimentation rate auto     CRP inflammation     ANCA IgG by IFA with Reflex to Titer     ANCA Vasculitis panel     Centromere Antibody IgG     Immunoglobulin G subclasses     HLA-B27 Typing        -- Medications   She is on Plaquenil started by Dr Schofield. She can continue on it but there is no clear indication of use of Plaquenil at this point.     -- RTC in 3 months.                 Active Problem List:     Patient Active Problem List    Diagnosis Date Noted     Vasculitis (H) 01/10/2020     Priority: Medium     Carotidynia 12/11/2019     Priority: Medium     Abnormal uterine bleeding 10/22/2019     Priority: Medium     Added automatically from request for surgery [id number]       Chronic pain syndrome 07/09/2018     Priority: Medium     Patient is very low risk to abuse  Pain medication.  Patient is followed by Yuan Schofield MD for ongoing prescription of pain medication.  All refills should only be approved by this provider, or covering partner.    Medication(s): ms contin and oxydodone ir.   Maximum quantity per month: #60 ms contin, #180 oxycodone  Clinic visit frequency required: Q 3 months      Controlled substance agreement:  Encounter-Level CSA - 08/20/2018:    Controlled Substance Agreement - Scan on 6/5/19 CONTROLLED SUBSTANCE AGREEMENT (below)       Patient-Level CSA:    Controlled Substance Agreement - Opioid - Scan on 6/13/2019  6:05 PM (below)    Controlled Substance Agreement - Opioid - Scan on 6/13/2019  6:03 PM: 06/05/2019 (below)         Pain Clinic evaluation in the past: Yes       Date/Location:  [facility] and will go there soon, 6/6/2019.    DIRE Total Score(s):    6/6/2019   Total Score 19       Last [facility] website verification:  10/25/2019 [email or web address]   [email or web address]             Excessive or frequent menstruation 02/07/2018     Priority: Medium     DDD (degenerative disc disease), cervical 02/07/2018     Priority: Medium     Lymphocytic colitis 06/13/2017     Priority: Medium     Idiopathic peripheral neuropathy 01/10/2017     Priority: Medium     Hypothyroidism, unspecified type 01/10/2017     Priority: Medium     Migraine with aura and without status  migrainosus, not intractable 12/27/2016     Priority: Medium     Tobacco use disorder 09/12/2016     Priority: Medium     Low grade squamous intraepithelial lesion on cytologic smear of cervix (LGSIL) 02/08/2016     Priority: Medium     2015Possibly high-grade lesion. Done in [place] in 2015 2/8/16 pap NIL/neg HR HPV. Plan: cotest in 3 years. Tracking.       Labial lesion 02/08/2016     Priority: Medium     Superior folds of the vulva/labia right side       Mixed anxiety depressive disorder 02/08/2016     Priority: Medium     S/P lumbar fusion and discectomy June 2015 06/28/2015     Priority: Medium     Degeneration of lumbar intervertebral disc 04/05/2014     Priority: Medium     newly added to list on 4/4//14  but present for last months       CARDIOVASCULAR SCREENING; LDL GOAL LESS THAN 160 10/31/2010     Priority: Medium     Contraceptive management 07/02/2009     Priority: Medium            History of Present Illness:   Mattie Banda is a 39 year old female with PMH of lymphocytic colitis seen in the clinic in consultation at request of Dr Schofield for evaluation of neck pain.     She reports having bad headache, ear ache, swelling over the neck, acute in onset. First time she took prednisone 60 mg x 2 weeks. She is not sure if prednisone helped her or not. She had another flare up while on prednisone. She has hx of migraines and gets visual aura. She felt a lump on the right side of her  neck, she had trouble moving her head back or to the side. In 10/2019 she had CT soft tissue neck which showed eccentric soft tissue thickening along the wall of distal right common carotid artery, carotid bifurcation and proximal cervical internal carotid artery and external carotid artery. No dissection or stenosis noted.    She has been treated with prednisone since 10/2019. Since December 2019 she is off of it. She is on Plaquenil now for 3 - 4 weeks, denies any side effects with it.     She has joint pain and swelling for 3  - 4 years. She has pain in her hands, elbows, hips, knees, back, feet. With weather her joints get worse. In the morning her hands are stiff. It takes few hours. She had swelling over left 3,4 MCP joint. PGM and father has arthritis. She reports Raynaud's since early teens.     No history of psoriasis, ulcerative colitis or chron's disease. No h/o iritis, enthesitis, finger or toe swelling like dactylitis, plantar fascitis or heel pain. Denies any raynauds, malar rash, photosensitivity, recurrent mouth/genital ulcers, sicca symptoms, pleuritic chest pains, recurrent sinusitis/rhinitis, swallowing difficulty, hearing or visual changes recently.   No h/o arterial/venous thrombosis in the past. Denies any tick bite, recent GI/ infection.             Review of Systems:     Review Of Systems  Constitutional: denies fever, chills, night sweats and weight loss.  Skin: No skin rash.  Eyes: No dryness or irritation in eyes. No episode of eye inflammation or redness.   Ears/Nose/Throat: no recurrent sinus infections.  Respiratory: No shortness of breath, dyspnea on exertion, cough, or hemoptysis  Cardiovascular: no chest pain or palpitations.  Gastrointestinal: no nausea, vomiting, abdominal pain.  Normal bowel movements.  Genitourinary: no dysuria, frequency  or hematuria.  Musculoskeletal: as in HPI  Neurologic: no numbness, tingling.  Psychiatric: no mood disorders.  Hematologic/Lymphatic/Immunologic: no history of easy bruising, petechia or purpura.  No abnormal bleeding.   Endocrine: no h/o thyroid disease or Diabetes.                  Past Medical History:     Past Medical History:   Diagnosis Date     Anxiety      Breast disorder Not sure     Depressive disorder      Papanicolaou smear of cervix with low grade squamous intraepithelial lesion (LGSIL)      Thyroid disease      Urinary tract infection, site not specified     Recurrent UTI's     Wounds and injuries 2000     Past Surgical History:   Procedure Laterality  Date     ABDOMEN SURGERY  2015    For back surgery     BACK SURGERY  6/2015      BIOPSY  Don t remember     COLONOSCOPY  08/06/07     COLONOSCOPY  08/05/08     HC COLONOSCOPY W BIOPSY  02/06/08     HC TOOTH EXTRACTION W/FORCEP      wisdom teeth removed     INJECT BLOCK MEDIAL BRANCH CERVICAL/THORACIC/LUMBAR N/A 6/21/2019    Procedure: BLOCK, NERVE, SPINAL, MEDIAL BRANCH lumbar 2, 3, 4;  Surgeon: Edgardo Rubio MD;  Location: [facility] OR     INJECT EPIDURAL CERVICAL N/A 3/8/2018    Procedure: INJECT EPIDURAL CERVICAL;  cervical 6-7 interlaminar epidural steroid injection;  Surgeon: Edgardo Rubio MD;  Location: [facility] OR     LAPAROSCOPIC TUBAL LIGATION  11/27/2012    Procedure: LAPAROSCOPIC TUBAL LIGATION;  Laparoscopic Bilateral tubal sterilization/ fulgaration;  Surgeon: Sarbjit Whittaker MD;  Location: [facility] OR     ORTHOPEDIC SURGERY  6/2015            Social History:     Social History     Occupational History     Occupation: Unit Worker     Comment: [employer]   Tobacco Use     Smoking status: Current Every Day Smoker     Packs/day: 1.50     Years: 20.00     Pack years: 30.00     Types: Cigarettes     Smokeless tobacco: Never Used   Substance and Sexual Activity     Alcohol use: Yes     Alcohol/week: 0.0 standard drinks     Comment: Rarely     Drug use: No     Sexual activity: Yes     Partners: Male     Birth control/protection: Female Surgical            Family History:     Family History   Problem Relation Age of Onset     Hypertension Maternal Grandmother      Arthritis Maternal Grandmother      Cancer Maternal Grandmother         MGGM     Depression Maternal Grandmother      Lipids Maternal Grandmother      Osteoporosis Maternal Grandmother      Respiratory Maternal Grandmother         emphysema     Genitourinary Problems Maternal Grandmother         Kidney Failure, liver      Coronary Artery Disease Maternal Grandmother      Hyperlipidemia Maternal Grandmother      Anxiety Disorder Maternal  Grandmother      Asthma Maternal Grandmother      Anesthesia Reaction Maternal Grandmother      Hypertension Maternal Grandfather      Heart Disease Maternal Grandfather         MI     Cardiovascular Maternal Grandfather      Cancer - colorectal Maternal Grandfather      Cancer Maternal Grandfather         [place]od[place] Lymphoma     Other Cancer Maternal Grandfather      Coronary Artery Disease Maternal Grandfather      Hypertension Paternal Grandmother      Arthritis Paternal Grandmother         RA     Osteoporosis Paternal Grandmother      Obesity Paternal Grandmother      Substance Abuse Mother         Alcoholic     Depression Mother      Hypertension Father      Hyperlipidemia Father      Mental Illness Father      Substance Abuse Father         Bio and Step dad have alcohol problems     Osteoporosis Father      Coronary Artery Disease Paternal Grandfather      Hypertension Paternal Grandfather      Breast Cancer Other         Maternal great aunt     Coronary Artery Disease Other      Diabetes Other      Thyroid Disease Maternal Aunt         two aunts     Breast Cancer Other      Colon Cancer Other      Prostate Cancer Other      Other Cancer Other      Other Cancer Other      Thyroid Disease Other      Anxiety Disorder Other      Substance Abuse Other      Depression Other      Anxiety Disorder Maternal Half-Sister      Thyroid Disease Maternal Half-Sister      Thyroid Disease Other      Asthma Other      Thyroid Disease Other      Obesity Other      Diabetes No family hx of      Colon Cancer No family hx of      Mental Illness No family hx of             Allergies:     Allergies   Allergen Reactions     Bupropion Hcl Hives     Wellbutrin            Medications:     Current Outpatient Medications   Medication Sig Dispense Refill     acetaminophen (TYLENOL) 325 MG tablet Take 325-650 mg by mouth every 6 hours as needed for mild pain       albuterol (VENTOLIN HFA) 108 (90 Base) MCG/ACT inhaler Inhale 2 puffs into the  "lungs every 6 hours as needed for shortness of breath / dyspnea or wheezing 52 g 0     ALPRAZolam (XANAX) 2 MG tablet TAKE 1/2-1 TABLET (1-2MG) BY MOUTH 3 TIMES DAILY AS NEEDED FOR STRESS/SLEEP 90 tablet 1     escitalopram (LEXAPRO) 20 MG tablet Take 1 tablet (20 mg) by mouth daily 90 tablet 2     hydroxychloroquine (PLAQUENIL) 200 MG tablet Take 1 tablet (200 mg) by mouth 2 times daily 60 tablet 1     ibuprofen (ADVIL/MOTRIN) 200 MG capsule Take 200 mg by mouth every 4 hours as needed for fever       levothyroxine (SYNTHROID/LEVOTHROID) 112 MCG tablet Take 1 tablet (112 mcg) by mouth daily 90 tablet 1     morphine (MS CONTIN) 15 MG CR tablet Take 1 tablet (15 mg) by mouth every 12 hours maximum 2 tablet(s) per day 60 tablet 0     NARCAN 4 MG/0.1ML nasal spray SPRAY 1 SPRAY (4 MG) INTO ONE NOSTRIL ALTERNATING NOSTRILS ONCE AS NEEDED FOR OPIOID REVERSAL EVERY 2-3 MINUTES UNTIL ASSISTANCE ARRIVES  0     oxyCODONE (ROXICODONE) 5 MG tablet 1-2 tablets 4 times daily for breakthrough pain.  Use sparingly. 240 tablet 0     topiramate (TOPAMAX) 25 MG tablet TAKE 1 TABLET BY MOUTH 3 TIMES A DAY 90 tablet 1     traZODone (DESYREL) 100 MG tablet Take 100 mg by mouth At Bedtime Take 1 to 1.5 tablets at bedtime as needed              Physical Exam:   Blood pressure (!) 86/57, pulse 67, height 1.676 m (5' 6\"), weight 80.3 kg (177 lb), SpO2 96 %, not currently breastfeeding.  Wt Readings from Last 4 Encounters:   01/27/20 80.3 kg (177 lb)   01/10/20 80.7 kg (177 lb 14.4 oz)   12/20/19 79 kg (174 lb 3.2 oz)   12/11/19 78.9 kg (173 lb 14.4 oz)       Constitutional: Obese, appearing stated age; cooperative  Eyes: nl EOM, PERRLA, vision, conjunctiva, sclera  ENT: nl external ears, nose, hearing, lips, teeth, gums, throat  No mucous membrane lesions, normal saliva pool  Neck: no mass or thyroid enlargement  Resp: lungs clear to auscultation, nl to palpation  CV: RRR, no murmurs, rubs or gallops, no edema  GI: no ABD mass or tenderness, " no HSM  : not tested  Lymph: no cervical, supraclavicular, inguinal or epitrochlear nodes    MS: All TMJ, neck, shoulder, elbow, wrist, MCP/PIP/DIP, spine, hip, knee, ankle, and foot MTP/IP joints were examined.   -- No active synovitis or deformity. Reports tenderness over MCP, PIP  Joints.   -- No dactylitis,  tenosynovitis, enthesopathy.    Skin: no nail pitting, alopecia, rash, nodules or lesions  Neuro: nl cranial nerves, strength, sensation, DTRs.   Psych: nl judgement, orientation, memory, affect.         Data:     Results for orders placed or performed in visit on 01/27/20   XR Foot Bilateral G/E 3 Views     Status: None    Narrative    EXAMINATION: XR FOOT BILATERAL G/E 3 VW  1/27/2020 11:11 AM     CLINICAL HISTORY:  to look for inflammatory arthritis and OA;  Polyarthralgia     COMPARISON: None available    FINDINGS: AP, oblique and lateral views of bilateral feet were  obtained. There is no comparison available.    Left foot: AP, oblique and lateral views of the left foot were  obtained. The tarsal bones are normally aligned. The joint spaces are  preserved. No evidence of acute osseous abnormalities.    Right foot: AP, oblique and lateral views of the right foot were  obtained. The tarsal bones are normally aligned. The joint spaces are  preserved. No evidence of acute osseous abnormalities.      Impression    IMPRESSION:   1. No evidence of acute osseous abnormalities.  2. Bilaterally, joint spaces are preserved.    JUTTA ELLERMANN, MD   XR Wrist Bilateral G/E 3 Views     Status: None    Narrative    EXAMINATION: XR HAND BILATERAL G/E 3 VW, XR WRIST BILATERAL G/E 3 VW   1/27/2020 11:10 AM     CLINICAL HISTORY:  to look for inflammatory arthritis and OA;  Polyarthralgia     COMPARISON: None available    FINDINGS: AP, oblique and lateral views of bilateral hands and wrists  were obtained. There is no comparison available.    Left hand and wrist: The carpal bones are normally aligned. Joint  spaces are  preserved. There is no evidence of acute osseous  abnormalities.    Right hand and wrist: The carpal bones are normally aligned. Joint  spaces are preserved. There is no evidence of acute osseous  abnormalities.      Impression    IMPRESSION:   1. Bilaterally, no evidence of osseous abnormalities.  2. Preserved joint spaces, bilaterally.    JUTTA ELLERMANN, MD   XR Hand Bilateral G/E 3 Views     Status: None    Narrative    EXAMINATION: XR HAND BILATERAL G/E 3 VW, XR WRIST BILATERAL G/E 3 VW   1/27/2020 11:10 AM     CLINICAL HISTORY:  to look for inflammatory arthritis and OA;  Polyarthralgia     COMPARISON: None available    FINDINGS: AP, oblique and lateral views of bilateral hands and wrists  were obtained. There is no comparison available.    Left hand and wrist: The carpal bones are normally aligned. Joint  spaces are preserved. There is no evidence of acute osseous  abnormalities.    Right hand and wrist: The carpal bones are normally aligned. Joint  spaces are preserved. There is no evidence of acute osseous  abnormalities.      Impression    IMPRESSION:   1. Bilaterally, no evidence of osseous abnormalities.  2. Preserved joint spaces, bilaterally.    JUTTA ELLERMANN, MD   Erythrocyte sedimentation rate auto     Status: None   Result Value Ref Range    Sed Rate 17 0 - 20 mm/h   CRP inflammation     Status: None   Result Value Ref Range    CRP Inflammation 4.1 0.0 - 8.0 mg/L       Recent Labs   Lab Test 01/02/20  1535 12/05/19  1946 11/04/19  1443 10/30/19  1341 06/05/19  1121 04/08/19  2055  11/04/16  1432   WBC 17.8* 8.3  --  8.7 11.2* 11.5*   < > 8.2   RBC 3.70* 3.68*  --  3.68* 3.84 3.69*   < > 3.70*   HGB 11.8 11.7  --  11.8 12.2 12.0   < > 11.8   HCT 37.0 36.4  --  35.8 38.0 36.0   < > 36.0    99  --  97 99 98   < > 97   RDW 15.0 14.5  --  15.2* 14.5 14.8   < > 13.3    241  --  254 405 347   < > 287   ALBUMIN 3.5  --   --   --  3.8 4.1   < >  --    CRP  --  <2.9 5.8  --   --   --   --   <2.9   BUN 15 13  --  13 12 12   < > 13    < > = values in this interval not displayed.      Recent Labs   Lab Test 11/04/19  1443 06/05/19  1121 08/20/18  1611   TSH 0.12* 0.06* 0.13*   T4 1.30 2.09* 1.58*     Hemoglobin   Date Value Ref Range Status   01/02/2020 11.8 11.7 - 15.7 g/dL Final   12/05/2019 11.7 11.7 - 15.7 g/dL Final   10/30/2019 11.8 11.7 - 15.7 g/dL Final     Urea Nitrogen   Date Value Ref Range Status   01/02/2020 15 7 - 30 mg/dL Final   12/05/2019 13 7 - 30 mg/dL Final   10/30/2019 13 7 - 30 mg/dL Final     Sed Rate   Date Value Ref Range Status   01/27/2020 17 0 - 20 mm/h Final   01/02/2020 10 0 - 20 mm/h Final   12/05/2019 21 (H) 0 - 20 mm/h Final     CRP Inflammation   Date Value Ref Range Status   01/27/2020 4.1 0.0 - 8.0 mg/L Final   12/05/2019 <2.9 0.0 - 8.0 mg/L Final   11/04/2019 5.8 0.0 - 8.0 mg/L Final     AST   Date Value Ref Range Status   01/02/2020 14 0 - 45 U/L Final   06/05/2019 15 0 - 45 U/L Final   04/08/2019 8 0 - 45 U/L Final     Albumin   Date Value Ref Range Status   01/02/2020 3.5 3.4 - 5.0 g/dL Final   06/05/2019 3.8 3.4 - 5.0 g/dL Final   04/08/2019 4.1 3.4 - 5.0 g/dL Final     Alkaline Phosphatase   Date Value Ref Range Status   01/02/2020 65 40 - 150 U/L Final   06/05/2019 90 40 - 150 U/L Final   04/08/2019 61 40 - 150 U/L Final     ALT   Date Value Ref Range Status   01/02/2020 42 0 - 50 U/L Final   06/05/2019 27 0 - 50 U/L Final   04/08/2019 18 0 - 50 U/L Final     Rheumatoid Factor   Date Value Ref Range Status   07/27/2019 <20 <20 IU/mL Final   11/04/2016 <20 <20 IU/mL Final     Recent Labs   Lab Test 01/02/20  1535 12/05/19  1946 11/04/19  1443 10/30/19  1341 06/05/19  1121 04/08/19  2055  08/20/18  1611   WBC 17.8* 8.3  --  8.7 11.2* 11.5*   < >  --    HGB 11.8 11.7  --  11.8 12.2 12.0   < >  --    HCT 37.0 36.4  --  35.8 38.0 36.0   < >  --     99  --  97 99 98   < >  --     241  --  254 405 347   < >  --    BUN 15 13  --  13 12 12   < >  --    TSH   --   --  0.12*  --  0.06*  --   --  0.13*   AST 14  --   --   --  15 8   < >  --    ALT 42  --   --   --  27 18   < >  --    ALKPHOS 65  --   --   --  90 61   < >  --     < > = values in this interval not displayed.       Reviewed Rheumatology lab flowsheet    Beatriz Nunez MD  [facility]  Department of Rheumatology & Autoimmune Disorders  [facility]: [phone]   Pager - [phone]      Again, thank you for allowing me to participate in the care of your patient.      Sincerely,    Beatirz Nunez MD

## 2020-01-27 NOTE — LETTER
February 28, 2020      Mattie Banda  [address] [facility] [address]        Dear ,    We are writing to inform you of your test results.    Your test results for inflammatory markers, vasculitis panel, IgG levels are normal.These tests were done to look for vasculitis.     Resulted Orders   XR Foot Bilateral G/E 3 Views    Narrative    EXAMINATION: XR FOOT BILATERAL G/E 3 VW  1/27/2020 11:11 AM     CLINICAL HISTORY:  to look for inflammatory arthritis and OA;  Polyarthralgia     COMPARISON: None available    FINDINGS: AP, oblique and lateral views of bilateral feet were  obtained. There is no comparison available.    Left foot: AP, oblique and lateral views of the left foot were  obtained. The tarsal bones are normally aligned. The joint spaces are  preserved. No evidence of acute osseous abnormalities.    Right foot: AP, oblique and lateral views of the right foot were  obtained. The tarsal bones are normally aligned. The joint spaces are  preserved. No evidence of acute osseous abnormalities.      Impression    IMPRESSION:   1. No evidence of acute osseous abnormalities.  2. Bilaterally, joint spaces are preserved.    JUTTA ELLERMANN, MD   XR Wrist Bilateral G/E 3 Views    Narrative    EXAMINATION: XR HAND BILATERAL G/E 3 VW, XR WRIST BILATERAL G/E 3 VW   1/27/2020 11:10 AM     CLINICAL HISTORY:  to look for inflammatory arthritis and OA;  Polyarthralgia     COMPARISON: None available    FINDINGS: AP, oblique and lateral views of bilateral hands and wrists  were obtained. There is no comparison available.    Left hand and wrist: The carpal bones are normally aligned. Joint  spaces are preserved. There is no evidence of acute osseous  abnormalities.    Right hand and wrist: The carpal bones are normally aligned. Joint  spaces are preserved. There is no evidence of acute osseous  abnormalities.      Impression    IMPRESSION:   1. Bilaterally, no evidence of osseous abnormalities.  2. Preserved joint  spaces, bilaterally.    JUTTA ELLERMANN, MD   XR Hand Bilateral G/E 3 Views    Narrative    EXAMINATION: XR HAND BILATERAL G/E 3 VW, XR WRIST BILATERAL G/E 3 VW   1/27/2020 11:10 AM     CLINICAL HISTORY:  to look for inflammatory arthritis and OA;  Polyarthralgia     COMPARISON: None available    FINDINGS: AP, oblique and lateral views of bilateral hands and wrists  were obtained. There is no comparison available.    Left hand and wrist: The carpal bones are normally aligned. Joint  spaces are preserved. There is no evidence of acute osseous  abnormalities.    Right hand and wrist: The carpal bones are normally aligned. Joint  spaces are preserved. There is no evidence of acute osseous  abnormalities.      Impression    IMPRESSION:   1. Bilaterally, no evidence of osseous abnormalities.  2. Preserved joint spaces, bilaterally.    JUTTA ELLERMANN, MD   Erythrocyte sedimentation rate auto   Result Value Ref Range    Sed Rate 17 0 - 20 mm/h   CRP inflammation   Result Value Ref Range    CRP Inflammation 4.1 0.0 - 8.0 mg/L   ANCA IgG by IFA with Reflex to Titer   Result Value Ref Range    Neutrophil Cytoplasmic Antibody <1:10 <1:10 [titer]    Neutrophil Cytoplasmic Antibody Pattern       The ANCA IFA is <1:10.  No further testing will be performed.   ANCA Vasculitis panel   Result Value Ref Range    Myeloperoxidase Antibody IgG <0.2 0.0 - 0.9 AI      Comment:      Negative  Antibody index (AI) values reflect qualitative changes in antibody   concentration that cannot be directly associated with clinical condition or   disease state.      Proteinase 3 Antibody IgG <0.2 0.0 - 0.9 AI      Comment:      Negative  Antibody index (AI) values reflect qualitative changes in antibody   concentration that cannot be directly associated with clinical condition or   disease state.     Centromere Antibody IgG   Result Value Ref Range    Centromere Antibody IgG <0.2 0.0 - 0.9 AI      Comment:      Negative  Antibody index (AI) values  reflect qualitative changes in antibody   concentration that cannot be directly associated with clinical condition or   disease state.     Immunoglobulin G subclasses   Result Value Ref Range     610 - 1,616 mg/dL    IgG1 406 382 - 929 mg/dL    IgG2 216 (L) 242 - 700 mg/dL    IgG3 42 22 - 176 mg/dL    IgG4 2 (L) 4 - 86 mg/dL   HLA-B27 Typing   Result Value Ref Range    [id number] Method SSOP     B locus B27 Neg        If you have any questions or concerns, please call the clinic at the number listed above.       Sincerely,        Beatrzi Nunez MD

## 2020-01-27 NOTE — LETTER
[facility]  [facility]  [address] [place] [address]    January 27, 2020    Mattie Banda  [address] [facility] [address]      Dear Mattie,    I have been attempting to reach you since our last contact. I would like to continue to work with you and provide any additional support you may need on achieving your health care related goals. I would appreciate if you would give me a call at [phone] to let me know if you would like to continue working together. I know that there are many things that can affect our ability to communicate and I hope we can continue to work together.    All of us at the [facility] are invested in your health and are here to assist you in meeting your goals.     Sincerely,      MARK Salas   Primary Care Clinic- Social Work Care Coordinator  [facility]   [phone]

## 2020-01-27 NOTE — LETTER
January 27, 2020      Mattie Banda  [address] [facility] [address]        Dear ,    We are writing to inform you of your test results.    The X- rays are acceptable, no evidence of inflammatory arthritis or bone damage seen.  Please contact the clinic if you have any questions or concerns at .     Beatriz Nunez MD     Resulted Orders   XR Foot Bilateral G/E 3 Views    Narrative    EXAMINATION: XR FOOT BILATERAL G/E 3 VW  1/27/2020 11:11 AM     CLINICAL HISTORY:  to look for inflammatory arthritis and OA;  Polyarthralgia     COMPARISON: None available    FINDINGS: AP, oblique and lateral views of bilateral feet were  obtained. There is no comparison available.    Left foot: AP, oblique and lateral views of the left foot were  obtained. The tarsal bones are normally aligned. The joint spaces are  preserved. No evidence of acute osseous abnormalities.    Right foot: AP, oblique and lateral views of the right foot were  obtained. The tarsal bones are normally aligned. The joint spaces are  preserved. No evidence of acute osseous abnormalities.      Impression    IMPRESSION:   1. No evidence of acute osseous abnormalities.  2. Bilaterally, joint spaces are preserved.    JUTTA ELLERMANN, MD

## 2020-01-27 NOTE — LETTER
January 27, 2020      Mattie Banda  [address] [facility] [address]        Dear ,    We are writing to inform you of your test results.    The X- rays are acceptable, no evidence of inflammatory arthritis or bone damage seen. Please contact the clinic if you have any questions or concerns at .     Beatriz Nunez MD     Resulted Orders   XR Foot Bilateral G/E 3 Views    Narrative    EXAMINATION: XR FOOT BILATERAL G/E 3 VW  1/27/2020 11:11 AM     CLINICAL HISTORY:  to look for inflammatory arthritis and OA;  Polyarthralgia     COMPARISON: None available    FINDINGS: AP, oblique and lateral views of bilateral feet were  obtained. There is no comparison available.    Left foot: AP, oblique and lateral views of the left foot were  obtained. The tarsal bones are normally aligned. The joint spaces are  preserved. No evidence of acute osseous abnormalities.    Right foot: AP, oblique and lateral views of the right foot were  obtained. The tarsal bones are normally aligned. The joint spaces are  preserved. No evidence of acute osseous abnormalities.      Impression    IMPRESSION:   1. No evidence of acute osseous abnormalities.  2. Bilaterally, joint spaces are preserved.    JUTTA ELLERMANN, MD   XR Wrist Bilateral G/E 3 Views    Narrative    EXAMINATION: XR HAND BILATERAL G/E 3 VW, XR WRIST BILATERAL G/E 3 VW   1/27/2020 11:10 AM     CLINICAL HISTORY:  to look for inflammatory arthritis and OA;  Polyarthralgia     COMPARISON: None available    FINDINGS: AP, oblique and lateral views of bilateral hands and wrists  were obtained. There is no comparison available.    Left hand and wrist: The carpal bones are normally aligned. Joint  spaces are preserved. There is no evidence of acute osseous  abnormalities.    Right hand and wrist: The carpal bones are normally aligned. Joint  spaces are preserved. There is no evidence of acute osseous  abnormalities.      Impression    IMPRESSION:   1. Bilaterally,  no evidence of osseous abnormalities.  2. Preserved joint spaces, bilaterally.    JUTTA ELLERMANN, MD   XR Hand Bilateral G/E 3 Views    Narrative    EXAMINATION: XR HAND BILATERAL G/E 3 VW, XR WRIST BILATERAL G/E 3 VW   1/27/2020 11:10 AM     CLINICAL HISTORY:  to look for inflammatory arthritis and OA;  Polyarthralgia     COMPARISON: None available    FINDINGS: AP, oblique and lateral views of bilateral hands and wrists  were obtained. There is no comparison available.    Left hand and wrist: The carpal bones are normally aligned. Joint  spaces are preserved. There is no evidence of acute osseous  abnormalities.    Right hand and wrist: The carpal bones are normally aligned. Joint  spaces are preserved. There is no evidence of acute osseous  abnormalities.      Impression    IMPRESSION:   1. Bilaterally, no evidence of osseous abnormalities.  2. Preserved joint spaces, bilaterally.    JUTTA ELLERMANN, MD

## 2020-01-27 NOTE — LETTER
January 27, 2020      Mattie Banda  [address] [facility] [address]        Dear ,    We are writing to inform you of your test results.    The X- rays are acceptable, no evidence of inflammatory arthritis or bone damage seen. Please contact the clinic if you have any questions or concerns at .       Resulted Orders   XR Foot Bilateral G/E 3 Views    Narrative    EXAMINATION: XR FOOT BILATERAL G/E 3 VW  1/27/2020 11:11 AM     CLINICAL HISTORY:  to look for inflammatory arthritis and OA;  Polyarthralgia     COMPARISON: None available    FINDINGS: AP, oblique and lateral views of bilateral feet were  obtained. There is no comparison available.    Left foot: AP, oblique and lateral views of the left foot were  obtained. The tarsal bones are normally aligned. The joint spaces are  preserved. No evidence of acute osseous abnormalities.    Right foot: AP, oblique and lateral views of the right foot were  obtained. The tarsal bones are normally aligned. The joint spaces are  preserved. No evidence of acute osseous abnormalities.      Impression    IMPRESSION:   1. No evidence of acute osseous abnormalities.  2. Bilaterally, joint spaces are preserved.    JUTTA ELLERMANN, MD   XR Hand Bilateral G/E 3 Views    Narrative    EXAMINATION: XR HAND BILATERAL G/E 3 VW, XR WRIST BILATERAL G/E 3 VW   1/27/2020 11:10 AM     CLINICAL HISTORY:  to look for inflammatory arthritis and OA;  Polyarthralgia     COMPARISON: None available    FINDINGS: AP, oblique and lateral views of bilateral hands and wrists  were obtained. There is no comparison available.    Left hand and wrist: The carpal bones are normally aligned. Joint  spaces are preserved. There is no evidence of acute osseous  abnormalities.    Right hand and wrist: The carpal bones are normally aligned. Joint  spaces are preserved. There is no evidence of acute osseous  abnormalities.      Impression    IMPRESSION:   1. Bilaterally, no evidence of osseous  abnormalities.  2. Preserved joint spaces, bilaterally.    JUTTA ELLERMANN, MD       If you have any questions or concerns, please call the clinic at the number listed above.       Sincerely,        Beatriz Nunez MD

## 2020-01-27 NOTE — PROGRESS NOTES
Clinic Care Coordination Contact  [facility]/Voicemail       Clinical Data: Care Coordinator Outreach  Outreach attempted x 2.  Left message on patient's voicemail with call back information and requested return call.  Plan: Care Coordinator will send unable to contact letter with care coordinator contact information via [email or web address]. Care Coordinator will try to reach patient again in 1 month.      MARK Salas   Primary Care Clinic- Social Work Care Coordinator  [facility]  1/27/2020 3:42 PM  [phone]

## 2020-01-28 ENCOUNTER — OFFICE VISIT (OUTPATIENT)
Dept: BEHAVIORAL HEALTH | Facility: CLINIC | Age: 40
End: 2020-01-28
Payer: COMMERCIAL

## 2020-01-28 DIAGNOSIS — F41.1 GAD (GENERALIZED ANXIETY DISORDER): ICD-10-CM

## 2020-01-28 DIAGNOSIS — F32.2 CURRENT SEVERE EPISODE OF MAJOR DEPRESSIVE DISORDER WITHOUT PSYCHOTIC FEATURES WITHOUT PRIOR EPISODE (H): Primary | ICD-10-CM

## 2020-01-28 LAB
ANCA AB PATTERN SER IF-IMP: NORMAL
C-ANCA TITR SER IF: NORMAL {TITER}
CENTROMERE IGG SER-ACNC: <0.2 AI (ref 0–0.9)
IGG SERPL-MCNC: 650 MG/DL (ref 610–1616)
IGG1 SER-MCNC: 406 MG/DL (ref 382–929)
IGG2 SER-MCNC: 216 MG/DL (ref 242–700)
IGG3 SER-MCNC: 42 MG/DL (ref 22–176)
IGG4 SER-MCNC: 2 MG/DL (ref 4–86)
MYELOPEROXIDASE AB SER-ACNC: <0.2 AI (ref 0–0.9)
PROTEINASE3 IGG SER-ACNC: <0.2 AI (ref 0–0.9)

## 2020-01-28 PROCEDURE — 90837 PSYTX W PT 60 MINUTES: CPT | Performed by: MARRIAGE & FAMILY THERAPIST
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
## 2020-01-29 ENCOUNTER — TELEPHONE (OUTPATIENT)
Dept: OBGYN | Facility: CLINIC | Age: 40
End: 2020-01-29

## 2020-01-29 DIAGNOSIS — Z01.818 PREOPERATIVE EXAMINATION: Primary | ICD-10-CM

## 2020-01-29 LAB
B LOCUS: NORMAL
B27TEST METHOD: NORMAL

## 2020-01-29 NOTE — TELEPHONE ENCOUNTER
Pt called to discuss questions re: upcoming surgery. Discussed soap she can purchase OTC. Discussed time of arrival on 2/7. Discussed potential time inpatient if open or lap, and ability to drive after surgery. Primary concern is medications as she is on high dose of opiates for chronic pain and has pain contract with PCP.     Nurse advised patient to discuss at upcoming pre-op with PCP re: medications she may be prescribed for post-op pain management so that Dr. Cade consult with PCP (or read office visit note) to plan accordingly for adequate pain management.     Pt expressed understanding and agrees with this plan. Will route to Dr. Barber to review

## 2020-01-31 ENCOUNTER — OFFICE VISIT (OUTPATIENT)
Dept: FAMILY MEDICINE | Facility: CLINIC | Age: 40
End: 2020-01-31
Payer: COMMERCIAL

## 2020-01-31 VITALS
HEART RATE: 85 BPM | TEMPERATURE: 97 F | WEIGHT: 170.7 LBS | RESPIRATION RATE: 16 BRPM | BODY MASS INDEX: 27.55 KG/M2 | DIASTOLIC BLOOD PRESSURE: 56 MMHG | SYSTOLIC BLOOD PRESSURE: 90 MMHG | OXYGEN SATURATION: 97 %

## 2020-01-31 DIAGNOSIS — K52.832 LYMPHOCYTIC COLITIS: ICD-10-CM

## 2020-01-31 DIAGNOSIS — G89.4 CHRONIC PAIN SYNDROME: ICD-10-CM

## 2020-01-31 DIAGNOSIS — N93.9 ABNORMAL UTERINE BLEEDING (AUB): ICD-10-CM

## 2020-01-31 DIAGNOSIS — N92.0 EXCESSIVE OR FREQUENT MENSTRUATION: ICD-10-CM

## 2020-01-31 DIAGNOSIS — I77.6 VASCULITIS (H): ICD-10-CM

## 2020-01-31 DIAGNOSIS — G90.01 CAROTIDYNIA: ICD-10-CM

## 2020-01-31 DIAGNOSIS — Z01.818 PREOP GENERAL PHYSICAL EXAM: Primary | ICD-10-CM

## 2020-01-31 LAB
ERYTHROCYTE [DISTWIDTH] IN BLOOD BY AUTOMATED COUNT: 14.6 % (ref 10–15)
HCT VFR BLD AUTO: 39.6 % (ref 35–47)
HGB BLD-MCNC: 12.8 G/DL (ref 11.7–15.7)
MCH RBC QN AUTO: 31.6 PG (ref 26.5–33)
MCHC RBC AUTO-ENTMCNC: 32.3 G/DL (ref 31.5–36.5)
MCV RBC AUTO: 98 FL (ref 78–100)
PLATELET # BLD AUTO: 315 10E9/L (ref 150–450)
RBC # BLD AUTO: 4.05 10E12/L (ref 3.8–5.2)
WBC # BLD AUTO: 8.9 10E9/L (ref 4–11)

## 2020-01-31 PROCEDURE — 99214 OFFICE O/P EST MOD 30 MIN: CPT | Performed by: FAMILY MEDICINE

## 2020-01-31 PROCEDURE — 36415 COLL VENOUS BLD VENIPUNCTURE: CPT | Performed by: FAMILY MEDICINE

## 2020-01-31 PROCEDURE — 85027 COMPLETE CBC AUTOMATED: CPT | Performed by: FAMILY MEDICINE

## 2020-01-31 ASSESSMENT — PAIN SCALES - GENERAL: PAINLEVEL: SEVERE PAIN (6)

## 2020-01-31 NOTE — PROGRESS NOTES
[address] [id number] [phone]  Dept: [phone]    PRE-OP EVALUATION:  Today's date: 2020    Mattie Banda (: 1980) presents for pre-operative evaluation assessment as requested by Dr. Barber.  She requires evaluation and anesthesia risk assessment prior to undergoing surgery/procedure for treatment of abnormal uterine bleeding .    Fax number for surgical facility: available electronically  Primary Physician: Yuan Schofield  Type of Anesthesia Anticipated: General    Patient has a Health Care Directive or Living Will:  NO  Review January 10, 2020 history and physical for each of the preop questions noted below.    Preop Questions 2020   Who is doing your surgery? MD.Mary Barber   What are you having done? -   Date of Surgery/Procedure: -   Facility or Hospital where procedure/surgery will be performed: -   1.  Do you have a history of Heart attack, stroke, stent, coronary bypass surgery, or other heart surgery? No   2.  Do you ever have any pain or discomfort in your chest? YES -    3.  Do you have a history of  Heart Failure? No   4.   Are you troubled by shortness of breath when:  walking on a level surface, or up a slight hill, or at night? YES -    5.  Do you currently have a cold, bronchitis or other respiratory infection? No   6.  Do you have a cough, shortness of breath, or wheezing? No   7.  Do you sometimes get pains in the calves of your legs when you walk? YES -    8. Do you or anyone in your family have previous history of blood clots? YES -    9.  Do you or does anyone in your family have a serious bleeding problem such as prolonged bleeding following surgeries or cuts? YES -    10. Have you ever had problems with anemia or been told to take iron pills? YES -    11. Have you had any abnormal blood loss such as black, tarry or bloody stools, or abnormal vaginal bleeding? YES -    12. Have you ever had a blood  transfusion? No   13. Have you or any of your relatives ever had problems with anesthesia? No   14. Do you have sleep apnea, excessive snoring or daytime drowsiness? No   15. Do you have any prosthetic heart valves? No   16. Do you have prosthetic joints? YES -    17. Is there any chance that you may be pregnant? No         HPI:     HPI related to upcoming procedure: Patient has been having dysmenorrhea and menorrhagia for years.  She is to have definitive hysterectomy to resolve this problem.  Also see January 10, 2020 exam      See problem list for active medical problems.  Problems all longstanding and stable, except as noted/documented.  See ROS for pertinent symptoms related to these conditions.      MEDICAL HISTORY:     Patient Active Problem List    Diagnosis Date Noted     Current severe episode of major depressive disorder without psychotic features without prior episode (H) 01/21/2020     Priority: Medium     CRISTIANO (generalized anxiety disorder) 01/21/2020     Priority: Medium     Vasculitis (H) 01/10/2020     Priority: Medium     Carotidynia 12/11/2019     Priority: Medium     Abnormal uterine bleeding 10/22/2019     Priority: Medium     Added automatically from request for surgery [id number]       Chronic pain syndrome 07/09/2018     Priority: Medium     Patient is very low risk to abuse  Pain medication.  Patient is followed by Yuan Schofield MD for ongoing prescription of pain medication.  All refills should only be approved by this provider, or covering partner.    Medication(s): ms contin and oxydodone ir.   Maximum quantity per month: #60 ms contin, #180 oxycodone  Clinic visit frequency required: Q 3 months      Controlled substance agreement:  Encounter-Level CSA - 08/20/2018:    Controlled Substance Agreement - Scan on 6/5/19 CONTROLLED SUBSTANCE AGREEMENT (below)       Patient-Level CSA:    Controlled Substance Agreement - Opioid - Scan on 6/13/2019  6:05 PM (below)    Controlled Substance  Agreement - Opioid - Scan on 6/13/2019  6:03 PM: 06/05/2019 (below)         Pain Clinic evaluation in the past: Yes       Date/Location:  Pain [facility] and will go there soon, 6/6/2019.    DIRE Total Score(s):    6/6/2019   Total Score 19       Last [facility] website verification:  10/25/2019 [email or web address]   [email or web address]             Excessive or frequent menstruation 02/07/2018     Priority: Medium     DDD (degenerative disc disease), cervical 02/07/2018     Priority: Medium     Lymphocytic colitis 06/13/2017     Priority: Medium     Idiopathic peripheral neuropathy 01/10/2017     Priority: Medium     Hypothyroidism, unspecified type 01/10/2017     Priority: Medium     Migraine with aura and without status migrainosus, not intractable 12/27/2016     Priority: Medium     Tobacco use disorder 09/12/2016     Priority: Medium     Low grade squamous intraepithelial lesion on cytologic smear of cervix (LGSIL) 02/08/2016     Priority: Medium     2015Possibly high-grade lesion. Done in [place] in 2015 2/8/16 pap NIL/neg HR HPV. Plan: cotest in 3 years. Tracking.       Labial lesion 02/08/2016     Priority: Medium     Superior folds of the vulva/labia right side       S/P lumbar fusion and discectomy June 2015 06/28/2015     Priority: Medium     Degeneration of lumbar intervertebral disc 04/05/2014     Priority: Medium     newly added to list on 4/4//14  but present for last months       CARDIOVASCULAR SCREENING; LDL GOAL LESS THAN 160 10/31/2010     Priority: Medium     Contraceptive management 07/02/2009     Priority: Medium      Past Medical History:   Diagnosis Date     Anxiety      Breast disorder Not sure     Depressive disorder      Mixed anxiety depressive disorder 2/8/2016     Papanicolaou smear of cervix with low grade squamous intraepithelial lesion (LGSIL)      Thyroid disease      Urinary tract infection, site not specified     Recurrent UTI's     Wounds and injuries 2000     Past Surgical  History:   Procedure Laterality Date     ABDOMEN SURGERY  2015    For back surgery     BACK SURGERY  6/2015      BIOPSY  Don t remember     COLONOSCOPY  08/06/07     COLONOSCOPY  08/05/08     HC COLONOSCOPY W BIOPSY  02/06/08     HC TOOTH EXTRACTION W/FORCEP      wisdom teeth removed     INJECT BLOCK MEDIAL BRANCH CERVICAL/THORACIC/LUMBAR N/A 6/21/2019    Procedure: BLOCK, NERVE, SPINAL, MEDIAL BRANCH lumbar 2, 3, 4;  Surgeon: Edgardo Rubio MD;  Location: [facility] OR     INJECT EPIDURAL CERVICAL N/A 3/8/2018    Procedure: INJECT EPIDURAL CERVICAL;  cervical 6-7 interlaminar epidural steroid injection;  Surgeon: Edgardo Rubio MD;  Location: [facility] OR     LAPAROSCOPIC TUBAL LIGATION  11/27/2012    Procedure: LAPAROSCOPIC TUBAL LIGATION;  Laparoscopic Bilateral tubal sterilization/ fulgaration;  Surgeon: Sarbjit Whittaker MD;  Location: [facility] OR     ORTHOPEDIC SURGERY  6/2015     Current Outpatient Medications   Medication Sig Dispense Refill     albuterol (VENTOLIN HFA) 108 (90 Base) MCG/ACT inhaler Inhale 2 puffs into the lungs every 6 hours as needed for shortness of breath / dyspnea or wheezing 52 g 0     ALPRAZolam (XANAX) 2 MG tablet TAKE 1/2-1 TABLET (1-2MG) BY MOUTH 3 TIMES DAILY AS NEEDED FOR STRESS/SLEEP 90 tablet 1     escitalopram (LEXAPRO) 20 MG tablet Take 1 tablet (20 mg) by mouth daily 90 tablet 2     hydroxychloroquine (PLAQUENIL) 200 MG tablet Take 1 tablet (200 mg) by mouth 2 times daily 60 tablet 1     ibuprofen (ADVIL/MOTRIN) 200 MG capsule Take 200 mg by mouth every 4 hours as needed for fever       levothyroxine (SYNTHROID/LEVOTHROID) 112 MCG tablet Take 1 tablet (112 mcg) by mouth daily 90 tablet 1     morphine (MS CONTIN) 15 MG CR tablet Take 1 tablet (15 mg) by mouth every 12 hours maximum 2 tablet(s) per day 60 tablet 0     oxyCODONE (ROXICODONE) 5 MG tablet 1-2 tablets 4 times daily for breakthrough pain.  Use sparingly. 240 tablet 0     topiramate (TOPAMAX) 25 MG tablet TAKE 1 TABLET BY  MOUTH 3 TIMES A DAY 90 tablet 1     traZODone (DESYREL) 100 MG tablet Take 100 mg by mouth At Bedtime Take 1 to 1.5 tablets at bedtime as needed       acetaminophen (TYLENOL) 325 MG tablet Take 325-650 mg by mouth every 6 hours as needed for mild pain       chlorhexidine (HIBICLENS) 4 % liquid Apply in bath/shower as cleanser one evening prior to surgery and morning of surgery (Patient not taking: Reported on 1/31/2020) 118 mL 0     NARCAN 4 MG/0.1ML nasal spray SPRAY 1 SPRAY (4 MG) INTO ONE NOSTRIL ALTERNATING NOSTRILS ONCE AS NEEDED FOR OPIOID REVERSAL EVERY 2-3 MINUTES UNTIL ASSISTANCE ARRIVES  0     OTC products: NSAIDS    Allergies   Allergen Reactions     Bupropion Hcl Hives     Wellbutrin      Latex Allergy: NO    Social History     Tobacco Use     Smoking status: Current Every Day Smoker     Packs/day: 1.50     Years: 20.00     Pack years: 30.00     Types: Cigarettes     Smokeless tobacco: Never Used   Substance Use Topics     Alcohol use: Yes     Alcohol/week: 0.0 standard drinks     Comment: Rarely     History   Drug Use No       REVIEW OF SYSTEMS:   CONSTITUTIONAL: NEGATIVE for fever, chills, change in weight  INTEGUMENTARY/SKIN: NEGATIVE for worrisome rashes, moles or lesions  EYES: NEGATIVE for vision changes or irritation  ENT/MOUTH: NEGATIVE for ear, mouth and throat problems  RESP: NEGATIVE for significant cough or SOB  CV: NEGATIVE for chest pain, palpitations or peripheral edema  GI: NEGATIVE for nausea, abdominal pain, heartburn, or change in bowel habits  : NEGATIVE for frequency, dysuria, or hematuria  NEURO: NEGATIVE for weakness, dizziness or paresthesias  ENDOCRINE: NEGATIVE for temperature intolerance, skin/hair changes  HEME: NEGATIVE for bleeding problems  PSYCHIATRIC: NEGATIVE for changes in mood or affect  Patient's neck pain consistent with carotidynia is much better.  She is been off prednisone for a couple weeks.  Her hand and wrist pain seems better.  She did see rheumatologist and  right now current treatment will be managed the same.  Some tests are pending.  She also is continuing in psychological counseling and therapy.  She is seeming to have improvement in mood as she works through some difficult issues here.    EXAM:   BP 90/56   Pulse 85   Temp 97  F (36.1  C) (Temporal)   Resp 16   Wt 77.4 kg (170 lb 11.2 oz)   SpO2 97%   BMI 27.55 kg/m      GENERAL APPEARANCE: healthy, alert and no distress     EYES: EOMI, PERRL     HENT: Grossly negative with moist mucous membranes     NECK: no adenopathy, thyroid normal to palpation and carotids are nontender and there is no swelling to speak of right carotid area today     RESP: lungs clear to auscultation - no rales, rhonchi or wheezes     CV: regular rates and rhythm, normal S1 S2, no S3 or S4 and no murmur, click or rub     ABDOMEN: Grossly negative     MS: extremities normal- no gross deformities noted, no evidence of inflammation in joints, FROM in all extremities.  Specifically nontender hands     SKIN: no suspicious lesions or rashes     NEURO: Normal strength and tone, sensory exam grossly normal, mentation intact and speech normal     PSYCH: mentation appears normal and mildly anxious about surgery but good insight     LYMPHATICS: No cervical adenopathy    DIAGNOSTICS:     Labs Resulted Today:   Results for orders placed or performed in visit on 01/31/20   **CBC with platelets FUTURE anytime     Status: None   Result Value Ref Range    WBC 8.9 4.0 - 11.0 10e9/L    RBC Count 4.05 3.8 - 5.2 10e12/L    Hemoglobin 12.8 11.7 - 15.7 g/dL    Hematocrit 39.6 35.0 - 47.0 %    MCV 98 78 - 100 fl    MCH 31.6 26.5 - 33.0 pg    MCHC 32.3 31.5 - 36.5 g/dL    RDW 14.6 10.0 - 15.0 %    Platelet Count 315 150 - 450 10e9/L      See also any lab or EKG from January 10, 2020    Recent Labs   Lab Test 01/02/20  1535 12/05/19  1946   HGB 11.8 11.7    241   INR  --  1.02    140   POTASSIUM 4.3 3.6   CR 0.75 0.82        IMPRESSION:   Reason for  surgery/procedure: Menorrhagia with dysmenorrhea requiring hysterectomy.  Given possibility of adhesions this will be done via da Stephania    The proposed surgical procedure is considered INTERMEDIATE risk.    REVISED CARDIAC RISK INDEX  The patient has the following serious cardiovascular risks for perioperative complications such as (MI, PE, VFib and 3  AV Block):  No serious cardiac risks  INTERPRETATION: 0 risks: Class I (very low risk - 0.4% complication rate)    The patient has the following additional risks for perioperative complications:        ICD-10-CM    1. Preop general physical exam Z01.818 chlorhexidine (HIBICLENS) 4 % liquid     **CBC with platelets FUTURE anytime   2. Excessive or frequent menstruation N92.0 **CBC with platelets FUTURE anytime   3. Abnormal uterine bleeding (AUB) N93.9 CANCELED: CBC with platelets   4. Chronic pain syndrome G89.4    5. Carotidynia G90.01    6. Lymphocytic colitis K52.832    7. Vasculitis (H) I77.6        RECOMMENDATIONS:     Patient may take an early morning dose of her extended release morphine if it is long enough ahead of surgery just to make travel to surgery more comfortable.  Otherwise she will not require any of her medication prior to surgery on the day of surgery        APPROVAL GIVEN to proceed with proposed procedure, without further diagnostic evaluation  See original history and physical from January 10, 202 for some of the history and some of the labs associated with this person.  Today was confirmation that she is a candidate for surgery and I approve surgery as noted above.  Signed Electronically by: Yuan Schofield MD    Copy of this evaluation report is provided to requesting physician.    [place] Preop Guidelines    Revised Cardiac Risk Index

## 2020-01-31 NOTE — PATIENT INSTRUCTIONS
Before Your Surgery      Call your surgeon if there is any change in your health. This includes signs of a cold or flu (such as a sore throat, runny nose, cough, rash or fever).    Do not smoke, drink alcohol or take over the counter medicine (unless your surgeon or primary care doctor tells you to) for the 24 hours before and after surgery.    If you take prescribed drugs: Follow your doctor s orders about which medicines to take and which to stop until after surgery.    Eating and drinking prior to surgery: follow the instructions from your surgeon    Take a shower or bath the night before surgery. Use the soap your surgeon gave you to gently clean your skin. If you do not have soap from your surgeon, use your regular soap. Do not shave or scrub the surgery site.  Wear clean pajamas and have clean sheets on your bed.     No meds needed AM of surgery, not even Morphine long acting.  If you are awake and it is more than 6 hours after you took  MS long acting and is before 6 AM,  You could take you early morning dose. Otherwise do not take any meds in AM. Take with sip of water    And any antibacterial non perfumed soap is ok if Hibiclens is not covered

## 2020-02-04 ENCOUNTER — OFFICE VISIT (OUTPATIENT)
Dept: BEHAVIORAL HEALTH | Facility: CLINIC | Age: 40
End: 2020-02-04
Payer: COMMERCIAL

## 2020-02-04 DIAGNOSIS — F32.2 CURRENT SEVERE EPISODE OF MAJOR DEPRESSIVE DISORDER WITHOUT PSYCHOTIC FEATURES WITHOUT PRIOR EPISODE (H): Primary | ICD-10-CM

## 2020-02-04 DIAGNOSIS — F41.1 GAD (GENERALIZED ANXIETY DISORDER): ICD-10-CM

## 2020-02-04 PROCEDURE — 90832 PSYTX W PT 30 MINUTES: CPT | Performed by: MARRIAGE & FAMILY THERAPIST

## 2020-02-04 NOTE — PROGRESS NOTES
[facility]: Integrated Behavioral Health  February 4, 2020      Behavioral Health Clinician Progress Note    Patient Name: Mattie Banda           Service Type:  Individual      Service Location:   Face to Face in Clinic     Session Start Time: 1:38  Session End Time: 2:12      Session Length: 16 - 37      Attendees: Patient    Visit Activities (Refresh list every visit): [facility] Only    Diagnostic Assessment Date: 1/21/2020  Treatment Plan Review Date: 1/28/2020  See Flowsheets for today's PHQ-9 and CRISTIANO-7 results  Previous PHQ-9:   PHQ-9 SCORE 10/25/2019 12/11/2019 1/7/2020   PHQ-9 Total Score - - -   PHQ-9 Total Score 20 24 24     Previous CRISTIANO-7:   CRISTIANO-7 SCORE 10/1/2019 10/25/2019 1/7/2020   Total Score - - -   Total Score 19 19 20       EMILIA LEVEL:  EMILIA Score (Last Two) 11/14/2012   EMILIA Raw Score 44   Activation Score 70.8   EMILIA Level 4       DATA  Extended Session (60+ minutes): No  Interactive Complexity: No  Crisis: No  [facility] Patient: No    Treatment Objective(s) Addressed in This Session:  Target Behavior(s): disease management/lifestyle changes depression and anxiety    Depressed Mood: Increase interest, engagement, and pleasure in doing things  Decrease frequency and intensity of feeling down, depressed, hopeless  Improve quantity and quality of night time sleep / decrease daytime naps  Feel less tired and more energy during the day   Improve diet, appetite, mindful eating, and / or meal planning  Identify negative self-talk and behaviors: challenge core beliefs, myths, and actions  Improve concentration, focus, and mindfulness in daily activities   Decrease thoughts that you'd be better off dead or of suicide / self-harm  Anxiety: will experience a reduction in anxiety, will develop more effective coping skills to manage anxiety symptoms, will develop healthy cognitive patterns and beliefs and will increase ability to function adaptively    Current Stressors / Issues:  Patient  states that she almost cancelled today's visit. She states that she felt numb all week. She reports that when she got in the car she couldn't find something that she was looking for. She states that she became irritated and she finds that she is more easily irritated in general.  She woke up early this morning and didn't shower and get her make up on, which were her goals.    Reinforced patient coming to the appointment when she had considered not. Encouraged patient to recognize what she has accomplished today, thus far. Discussed grounding and mindfulness techniques and encouraged use to help her stay present. Patient has surgery coming up and working on taking care of herself so she stays healthy for surgery.    Progress on Treatment Objective(s) / Homework:  No improvement - PREPARATION (Decided to change - considering how); Intervened by negotiating a change plan and determining options / strategies for behavior change, identifying triggers, exploring social supports, and working towards setting a date to begin behavior change    Motivational Interviewing    MI Intervention: Expressed Empathy/Understanding, Supported Autonomy, Collaboration, Evocation, Permission to raise concern or advise, Open-ended questions, Reflections: simple and complex, Change talk (evoked) and Reframe     Change Talk Expressed by the Patient: Desire to change Ability to change Reasons to change Need to change Committment to change Activation Taking steps    Provider Response to Change Talk: E - Evoked more info from patient about behavior change, A - Affirmed patient's thoughts, decisions, or attempts at behavior change, R - Reflected patient's change talk and S - Summarized patient's change talk statements    Also provided psychoeducation about behavioral health condition, symptoms, and treatment options      Skills training    Explored skills useful to client in current situation    Skills include assertiveness, communication,  conflict management, problem-solving, relaxation, etc.    Solution-Focused Therapy    Explored patterns in patient's relationships and discussed options for new behaviors    Explored patterns in patient's actions and choices and discussed options for new behaviors    Cognitive-behavioral Therapy    Discussed common cognitive distortions, identified them in patient's life    Explored ways to challenge, replace, and act against these cognitions    Acceptance and Commitment Therapy    Explored and identified important values in patient's life    Discussed ways to commit to behavioral activation around these values    Psychodynamic psychotherapy    Discussed patient's emotional dynamics and issues and how they impact behaviors    Explored patient's history of relationships and how they impact present behaviors    Explored how to work with and make changes in these schemas and patterns    Behavioral Activation    Discussed steps patient can take to become more involved in meaningful activity    Identified barriers to these activities and explored possible solutions    Mindfulness-Based Strategies    Discussed skills based on development and application of mindfulness    Skills drawn from dialectical behavior therapy, mindfulness-based stress reduction, mindfulness-based cognitive therapy, etc.      Care Plan review completed: Yes    Medication Review:  No changes to current psychiatric medication(s)     Medication Compliance:  Yes    Changes in Health Issues:   None reported    Chemical Use Review:   Substance Use: Chemical use reviewed, no active concerns identified       Tobacco Use: No change in amount of tobacco use since last session.  Patient declined discussion at this time    Assessment: Current Emotional / Mental Status (status of significant symptoms):  Risk status (Self / Other harm or suicidal ideation)  Patient has had a history of suicidal ideation: in adolescense and suicide attempts: patient states that  "she tried multiple times around ages [age] by slitting wrists or overdose, and one overdose of alcohol; she denies ever being hospitalized for mental health and denies a history of self-injurious behavior, homicidal ideation, homicidal behavior and and other safety concerns  Patient denies current fears or concerns for personal safety.  Patient reports the following current or recent suicidal ideation or behaviors: patient reports having off and on thoughts of. \"I'm such a burden\" \"I'm not a good wife\" \"I'm not be a good friend\" \"my kids would be better off\". She is able to understand that her kids wouldn't be better off and would not be better off without her. She does not want anyone else to take care of her kids. Patient denies any suicidal plan or intent.  Patient denies current or recent homicidal ideation or behaviors.  Patient denies current or recent self injurious behavior or ideation.  Patient denies other safety concerns.  A safety and risk management plan has been developed including: Patient consented to co-developed safety plan.  A safety and risk management plan was completed.  Patient agreed to use safety plan should any safety concerns arise.  A copy was given to the patient.    Appearance:   Appropriate   Eye Contact:   Fair   Psychomotor Behavior: Normal   Attitude:   Cooperative   Orientation:   All  Speech   Rate / Production: Monotone    Volume:  Normal   Mood:    Anxious  Depressed   Affect:    Flat   Thought Content:  Clear  Perservative  Rumination   Thought Form:  Coherent  Logical   Insight:    Good     Diagnoses:  1. Current severe episode of major depressive disorder without psychotic features without prior episode (H)    2. CRISTIANO (generalized anxiety disorder)        Collateral Reports Completed:  Not Applicable    Plan: (Homework, other):  Patient was given information about behavioral services and encouraged to schedule a follow up appointment with the clinic [facility] in 1 week.  She was " also given information about mental health symptoms and treatment options , Cognitive Behavioral Therapy skills to practice when experiencing anxiety and depression and deep Breathing Strategies to practice when experiencing anxiety and depression.  CD Recommendations: No indications of CD issues. Patient will write down what she accomplishes, daily. She will notice automatic thoughts. Patient will follow safety plan for suicidal thoughts. Patient will schedule next visit after her surgery to see how she is recovering. Steph Bridges LM, [facility]       ______________________________________________________________________    Integrated Primary Care Behavioral Health Treatment Plan    Patient's Name: Mattie Banda  YOB: 1980    Date: January 28, 2020    DSM-V Diagnoses: 296.33 (F33.2) Major Depressive Disorder, Recurrent Episode, Severe _ or 300.02 (F41.1) Generalized Anxiety Disorder  Psychosocial / Contextual Factors: medical issues,  and dating ex-, few friends  WHODAS: 36    Referral / Collaboration:  Referral to another professional/service is not indicated at this time..    Anticipated number of session or this episode of care: 8      MeasurableTreatment Goal(s) related to diagnosis / functional impairment(s)  Goal 1: Patient will demonstrate ability to improve depression and anxiety symptoms as evidenced by improved PHQ9 & GAD7 scores.       Objective #A (Patient Action)    Patient will Decrease frequency and intensity of feeling down, depressed, hopeless  Identify negative self-talk and behaviors: challenge core beliefs, myths, and actions  Decrease thoughts that you'd be better off dead or of suicide / self-harm  Status: New - Date: 1/28/2020     Intervention(s)  [facility] will teach distress tolerance skills. Provide psycho-education on cognitive distortions and automatic thoughts and behaviors and ways to appropriately challenge and restructure thoughts.    Objective #B  Patient  will increase coping strategies by 2  to more effectively manage current stressors  increase understanding of steps in the grief process  Status: New - Date: 1/28/2020     Intervention(s)  [facility] will teach mindfulness and relaxation strategies. Provide psycho-education on grief process.    Patient has reviewed and agreed to the above plan.    Written by  KVNG Watson, [facility]

## 2020-02-06 NOTE — OP NOTE
[facility]  Operative Note     Name: Mattie Banda  MRN: [id number]  : 1980  Date of surgery: 2020     Preoperative diagnosis:  - Abnormal uterine bleeding    Postoperative diagnosis:     Procedure(s):  - Pelvic exam under anesthesia, DaVinci assisted total laparoscopic hysterectomy, bilateral salpingectomy, cystoscopy     Surgeon: Anabel Barber MD  Resident: Maciej Oliveira MD, PGY-4     Anesthesia: GETA  EBL: 50 mL  Urine output: 150 mL pyridium-stained urine  IV fluids: 1000 mL crystalloid     Specimens: Uterus, cervix, bilateral fallopian tubes     Findings:   1. EUA: cervix smooth without any lesions or masses. Small, mobile, anteverted uterus. No adnexal masses appreciated.   2. Laparoscopy: no injuries upon entry into the abdomen. Survey of upper abdomen demonstrated normal liver edge, greater curvature of stomach, hemidiaphragms, and grossly normal bowel and appendix. Normal appearing uterus, bilateral fallopian tubes and ovaries. Left fallopian tube with paratubal cyst. No intraabdominal adhesions.   3. Cystoscopy: no injuries to bladder noted. Bilateral efflux from ureteral orifices seen.     Indications: Mattie Banda is a 39 year-old with history of abnormal uterine bleeding who desired definitive surgical management with hysterectomy. Risks, benefits, and alternatives were discussed and informed consent was signed.     Complications: None apparent     Procedure: The patient was brought to the operating room after informed consent was signed and reviewed.  She was positioned in dorsal lithotomy position with both arms tucked.  She was prepped and draped in the usual sterile fashion.  She received Ancef for preoperative antibiotics and had SCDs placed.  A surgical timeout was performed.  A Mcmillan catheter was placed.  Speculum was placed, cervix was grasped, and the uterus sounded.  A modified KOH manipulator/Advincula Arch was placed and attached to the uterine  positioning system.  Attention was then turned to the abdomen.  A 5 mm incision was made the right upper quadrant and a Veress needle was inserted.  The abdomen was insufflated to pressure of 15 mmHg.  A 5 mm trocar was placed and the abdomen was surveyed with no injuries from entry noted.  Three 8 mm robotic trochars were then placed in the umbilicus, left lower quadrant, and right lower quadrant under direct visualization. The patient was then placed into Trendelenburg.  The robot was then docked. The right fallopian tube was grasped and  with cautery from the right ovary and underlying mesosalpinx until reaching the right cornea.  The right ureter was visualized away from the immediate operative field. The right utero-ovarian ligament was then cauterized and cut. The right fallopian tube was grasped and underlying mesosalpinx cauterized,  the tube from the broad ligament and uterus. The right fallopian tube was placed in an Endocatch bag with plan for removal from the vagina. Next the right round ligament was opened. The right uteroovarian ligament was cauterized and transected and the broad ligament further skeletonized until reaching the right uterine artery.  The vesicouterine peritoneum was opened and the bladder dissected away from the lower uterine segment and cervix.  The right uterine artery was then serially cauterized and cut. Attention was then turned to the left. The left round and utero-ovarian ligaments were serially cauterized and cut and the broad ligaments skeletonized to the level of the left uterine artery. The bladder flap was further developed. The left uterine artery was serially cauterized and cut.  A colpotomy was then made and the uterus and cervix  from the upper vagina.  The uterus, cervix, and bilateral fallopian tubes were then removed vaginally.  The left fallopian tube was similarly  from the underlying left ovary and mesosalpinx.  The left  fallopian tube and right fallopian tube within the Endocatch bag was removed from the vagina. The vaginal cuff was closed using a V lock suture.  Pedicles were then inspected, and noted to be hemostatic. Bilateral ureters were identified and seen to vermiculate. The robot was then undocked. The abdomen was desufflated. The Mcmillan catheter was removed. Cystoscopy was performed with no injuries to the bladder and bilateral ureteral reflux noted.  Abdominal incisions were closed with 4-0 Monocryl and covered with Exofin skin glue. All instruments removed from the vagina. Instrument, sponge, and needle counts were correct x2. Dr. Barber was scrubbed and present for the entire procedure. The patient was extubated in the OR and taken to recovery in stable condition.     Maciej Oliveira MD  OB/GYN Resident, PGY-4  2/7/2020    I was present and scrubbed throughout the procedure,  I agree with the note above  Anabel Barber MD

## 2020-02-07 ENCOUNTER — ANESTHESIA EVENT (OUTPATIENT)
Dept: SURGERY | Facility: CLINIC | Age: 40
End: 2020-02-07
Payer: COMMERCIAL

## 2020-02-07 ENCOUNTER — HOSPITAL ENCOUNTER (OUTPATIENT)
Facility: CLINIC | Age: 40
Discharge: HOME | End: 2020-02-07
Attending: OBSTETRICS & GYNECOLOGY | Admitting: OBSTETRICS & GYNECOLOGY
Payer: COMMERCIAL

## 2020-02-07 ENCOUNTER — ANCILLARY PROCEDURE (OUTPATIENT)
Dept: ULTRASOUND IMAGING | Facility: CLINIC | Age: 40
End: 2020-02-07
Attending: ANESTHESIOLOGY
Payer: COMMERCIAL

## 2020-02-07 ENCOUNTER — ANESTHESIA (OUTPATIENT)
Dept: SURGERY | Facility: CLINIC | Age: 40
End: 2020-02-07
Payer: COMMERCIAL

## 2020-02-07 VITALS
BODY MASS INDEX: 26.51 KG/M2 | TEMPERATURE: 98.1 F | OXYGEN SATURATION: 98 % | WEIGHT: 168.87 LBS | HEIGHT: 67 IN | RESPIRATION RATE: 16 BRPM | HEART RATE: 77 BPM | SYSTOLIC BLOOD PRESSURE: 119 MMHG | DIASTOLIC BLOOD PRESSURE: 76 MMHG

## 2020-02-07 DIAGNOSIS — N93.9 ABNORMAL UTERINE BLEEDING: ICD-10-CM

## 2020-02-07 DIAGNOSIS — Z90.710 HISTORY OF ROBOT-ASSISTED LAPAROSCOPIC HYSTERECTOMY: Primary | ICD-10-CM

## 2020-02-07 LAB
ABO + RH BLD: NORMAL
ABO + RH BLD: NORMAL
BLD GP AB SCN SERPL QL: NORMAL
BLOOD BANK CMNT PATIENT-IMP: NORMAL
GLUCOSE BLD-MCNC: 82 MG/DL (ref 70–99)
HCG UR QL: NEGATIVE
SPECIMEN EXP DATE BLD: NORMAL

## 2020-02-07 PROCEDURE — 27210794 ZZH OR GENERAL SUPPLY STERILE: Performed by: OBSTETRICS & GYNECOLOGY

## 2020-02-07 PROCEDURE — 88307 TISSUE EXAM BY PATHOLOGIST: CPT | Mod: 26 | Performed by: OBSTETRICS & GYNECOLOGY

## 2020-02-07 PROCEDURE — 25000125 ZZHC RX 250: Performed by: NURSE ANESTHETIST, CERTIFIED REGISTERED

## 2020-02-07 PROCEDURE — 25800030 ZZH RX IP 258 OP 636: Performed by: NURSE ANESTHETIST, CERTIFIED REGISTERED

## 2020-02-07 PROCEDURE — 25000132 ZZH RX MED GY IP 250 OP 250 PS 637: Performed by: OBSTETRICS & GYNECOLOGY

## 2020-02-07 PROCEDURE — 88304 TISSUE EXAM BY PATHOLOGIST: CPT | Mod: 26 | Performed by: OBSTETRICS & GYNECOLOGY

## 2020-02-07 PROCEDURE — 36415 COLL VENOUS BLD VENIPUNCTURE: CPT | Performed by: OBSTETRICS & GYNECOLOGY

## 2020-02-07 PROCEDURE — 25000566 ZZH SEVOFLURANE, EA 15 MIN: Performed by: OBSTETRICS & GYNECOLOGY

## 2020-02-07 PROCEDURE — 36000088 ZZH SURGERY LEVEL 8 EA 15 ADDTL MIN - UMMC: Performed by: OBSTETRICS & GYNECOLOGY

## 2020-02-07 PROCEDURE — 86850 RBC ANTIBODY SCREEN: CPT | Performed by: OBSTETRICS & GYNECOLOGY

## 2020-02-07 PROCEDURE — 71000014 ZZH RECOVERY PHASE 1 LEVEL 2 FIRST HR: Performed by: OBSTETRICS & GYNECOLOGY

## 2020-02-07 PROCEDURE — 40000170 ZZH STATISTIC PRE-PROCEDURE ASSESSMENT II: Performed by: OBSTETRICS & GYNECOLOGY

## 2020-02-07 PROCEDURE — 37000009 ZZH ANESTHESIA TECHNICAL FEE, EACH ADDTL 15 MIN: Performed by: OBSTETRICS & GYNECOLOGY

## 2020-02-07 PROCEDURE — 71000015 ZZH RECOVERY PHASE 1 LEVEL 2 EA ADDTL HR: Performed by: OBSTETRICS & GYNECOLOGY

## 2020-02-07 PROCEDURE — 25000128 H RX IP 250 OP 636: Performed by: NURSE ANESTHETIST, CERTIFIED REGISTERED

## 2020-02-07 PROCEDURE — 25800030 ZZH RX IP 258 OP 636: Performed by: ANESTHESIOLOGY

## 2020-02-07 PROCEDURE — 88304 TISSUE EXAM BY PATHOLOGIST: CPT | Performed by: OBSTETRICS & GYNECOLOGY

## 2020-02-07 PROCEDURE — 25000128 H RX IP 250 OP 636: Performed by: ANESTHESIOLOGY

## 2020-02-07 PROCEDURE — 86901 BLOOD TYPING SEROLOGIC RH(D): CPT | Performed by: OBSTETRICS & GYNECOLOGY

## 2020-02-07 PROCEDURE — 71000027 ZZH RECOVERY PHASE 2 EACH 15 MINS: Performed by: OBSTETRICS & GYNECOLOGY

## 2020-02-07 PROCEDURE — 81025 URINE PREGNANCY TEST: CPT | Performed by: OBSTETRICS & GYNECOLOGY

## 2020-02-07 PROCEDURE — 86900 BLOOD TYPING SEROLOGIC ABO: CPT | Performed by: OBSTETRICS & GYNECOLOGY

## 2020-02-07 PROCEDURE — 88307 TISSUE EXAM BY PATHOLOGIST: CPT | Performed by: OBSTETRICS & GYNECOLOGY

## 2020-02-07 PROCEDURE — 82947 ASSAY GLUCOSE BLOOD QUANT: CPT | Performed by: OBSTETRICS & GYNECOLOGY

## 2020-02-07 PROCEDURE — 36000086 ZZH SURGERY LEVEL 8 1ST 30 MIN UMMC: Performed by: OBSTETRICS & GYNECOLOGY

## 2020-02-07 PROCEDURE — 25000125 ZZHC RX 250: Performed by: ANESTHESIOLOGY

## 2020-02-07 PROCEDURE — 25000128 H RX IP 250 OP 636: Performed by: OBSTETRICS & GYNECOLOGY

## 2020-02-07 PROCEDURE — 37000008 ZZH ANESTHESIA TECHNICAL FEE, 1ST 30 MIN: Performed by: OBSTETRICS & GYNECOLOGY

## 2020-02-07 RX ORDER — NALOXONE HYDROCHLORIDE 0.4 MG/ML
.1-.4 INJECTION, SOLUTION INTRAMUSCULAR; INTRAVENOUS; SUBCUTANEOUS
Status: DISCONTINUED | OUTPATIENT
Start: 2020-02-07 | End: 2020-02-07 | Stop reason: HOSPADM

## 2020-02-07 RX ORDER — ACETAMINOPHEN 325 MG/1
975 TABLET ORAL ONCE
Status: COMPLETED | OUTPATIENT
Start: 2020-02-07 | End: 2020-02-07

## 2020-02-07 RX ORDER — PHENAZOPYRIDINE HYDROCHLORIDE 200 MG/1
200 TABLET, FILM COATED ORAL ONCE
Status: COMPLETED | OUTPATIENT
Start: 2020-02-07 | End: 2020-02-07

## 2020-02-07 RX ORDER — DEXAMETHASONE SODIUM PHOSPHATE 4 MG/ML
INJECTION, SOLUTION INTRA-ARTICULAR; INTRALESIONAL; INTRAMUSCULAR; INTRAVENOUS; SOFT TISSUE PRN
Status: DISCONTINUED | OUTPATIENT
Start: 2020-02-07 | End: 2020-02-07

## 2020-02-07 RX ORDER — MEPERIDINE HYDROCHLORIDE 25 MG/ML
12.5 INJECTION INTRAMUSCULAR; INTRAVENOUS; SUBCUTANEOUS
Status: DISCONTINUED | OUTPATIENT
Start: 2020-02-07 | End: 2020-02-07 | Stop reason: HOSPADM

## 2020-02-07 RX ORDER — KETAMINE HYDROCHLORIDE 10 MG/ML
INJECTION, SOLUTION INTRAMUSCULAR; INTRAVENOUS PRN
Status: DISCONTINUED | OUTPATIENT
Start: 2020-02-07 | End: 2020-02-07

## 2020-02-07 RX ORDER — OXYCODONE HYDROCHLORIDE 5 MG/1
5-10 TABLET ORAL
Status: DISCONTINUED | OUTPATIENT
Start: 2020-02-07 | End: 2020-02-07 | Stop reason: HOSPADM

## 2020-02-07 RX ORDER — BUPIVACAINE HYDROCHLORIDE 2.5 MG/ML
INJECTION, SOLUTION EPIDURAL; INFILTRATION; INTRACAUDAL PRN
Status: DISCONTINUED | OUTPATIENT
Start: 2020-02-07 | End: 2020-02-07

## 2020-02-07 RX ORDER — HYDROMORPHONE HYDROCHLORIDE 1 MG/ML
.3-.5 INJECTION, SOLUTION INTRAMUSCULAR; INTRAVENOUS; SUBCUTANEOUS EVERY 10 MIN PRN
Status: DISCONTINUED | OUTPATIENT
Start: 2020-02-07 | End: 2020-02-07 | Stop reason: HOSPADM

## 2020-02-07 RX ORDER — ONDANSETRON 4 MG/1
4 TABLET, ORALLY DISINTEGRATING ORAL
Status: DISCONTINUED | OUTPATIENT
Start: 2020-02-07 | End: 2020-02-07 | Stop reason: HOSPADM

## 2020-02-07 RX ORDER — ONDANSETRON 2 MG/ML
INJECTION INTRAMUSCULAR; INTRAVENOUS PRN
Status: DISCONTINUED | OUTPATIENT
Start: 2020-02-07 | End: 2020-02-07

## 2020-02-07 RX ORDER — PROPOFOL 10 MG/ML
INJECTION, EMULSION INTRAVENOUS PRN
Status: DISCONTINUED | OUTPATIENT
Start: 2020-02-07 | End: 2020-02-07

## 2020-02-07 RX ORDER — OXYCODONE HYDROCHLORIDE 5 MG/1
5 TABLET ORAL EVERY 6 HOURS PRN
Qty: 10 TABLET | Refills: 0 | Status: SHIPPED | OUTPATIENT
Start: 2020-02-07 | End: 2020-03-04

## 2020-02-07 RX ORDER — CEFAZOLIN SODIUM 1 G/3ML
1 INJECTION, POWDER, FOR SOLUTION INTRAMUSCULAR; INTRAVENOUS SEE ADMIN INSTRUCTIONS
Status: DISCONTINUED | OUTPATIENT
Start: 2020-02-07 | End: 2020-02-07 | Stop reason: HOSPADM

## 2020-02-07 RX ORDER — KETOROLAC TROMETHAMINE 30 MG/ML
INJECTION, SOLUTION INTRAMUSCULAR; INTRAVENOUS PRN
Status: DISCONTINUED | OUTPATIENT
Start: 2020-02-07 | End: 2020-02-07

## 2020-02-07 RX ORDER — DEXAMETHASONE SODIUM PHOSPHATE 10 MG/ML
INJECTION, SOLUTION INTRAMUSCULAR; INTRAVENOUS PRN
Status: DISCONTINUED | OUTPATIENT
Start: 2020-02-07 | End: 2020-02-07

## 2020-02-07 RX ORDER — SODIUM CHLORIDE, SODIUM LACTATE, POTASSIUM CHLORIDE, CALCIUM CHLORIDE 600; 310; 30; 20 MG/100ML; MG/100ML; MG/100ML; MG/100ML
INJECTION, SOLUTION INTRAVENOUS CONTINUOUS
Status: DISCONTINUED | OUTPATIENT
Start: 2020-02-07 | End: 2020-02-07 | Stop reason: HOSPADM

## 2020-02-07 RX ORDER — CEFAZOLIN SODIUM 2 G/100ML
2 INJECTION, SOLUTION INTRAVENOUS
Status: COMPLETED | OUTPATIENT
Start: 2020-02-07 | End: 2020-02-07

## 2020-02-07 RX ORDER — ONDANSETRON 2 MG/ML
4 INJECTION INTRAMUSCULAR; INTRAVENOUS EVERY 30 MIN PRN
Status: DISCONTINUED | OUTPATIENT
Start: 2020-02-07 | End: 2020-02-07 | Stop reason: HOSPADM

## 2020-02-07 RX ORDER — FENTANYL CITRATE 50 UG/ML
25-50 INJECTION, SOLUTION INTRAMUSCULAR; INTRAVENOUS
Status: DISCONTINUED | OUTPATIENT
Start: 2020-02-07 | End: 2020-02-07 | Stop reason: HOSPADM

## 2020-02-07 RX ORDER — FENTANYL CITRATE 50 UG/ML
INJECTION, SOLUTION INTRAMUSCULAR; INTRAVENOUS PRN
Status: DISCONTINUED | OUTPATIENT
Start: 2020-02-07 | End: 2020-02-07

## 2020-02-07 RX ORDER — ACETAMINOPHEN 325 MG/1
650 TABLET ORAL
Status: DISCONTINUED | OUTPATIENT
Start: 2020-02-07 | End: 2020-02-07 | Stop reason: HOSPADM

## 2020-02-07 RX ORDER — LIDOCAINE HYDROCHLORIDE 20 MG/ML
INJECTION, SOLUTION INFILTRATION; PERINEURAL PRN
Status: DISCONTINUED | OUTPATIENT
Start: 2020-02-07 | End: 2020-02-07

## 2020-02-07 RX ORDER — ONDANSETRON 4 MG/1
4 TABLET, ORALLY DISINTEGRATING ORAL EVERY 30 MIN PRN
Status: DISCONTINUED | OUTPATIENT
Start: 2020-02-07 | End: 2020-02-07 | Stop reason: HOSPADM

## 2020-02-07 RX ORDER — EPHEDRINE SULFATE 50 MG/ML
INJECTION, SOLUTION INTRAMUSCULAR; INTRAVENOUS; SUBCUTANEOUS PRN
Status: DISCONTINUED | OUTPATIENT
Start: 2020-02-07 | End: 2020-02-07

## 2020-02-07 RX ORDER — SODIUM CHLORIDE, SODIUM LACTATE, POTASSIUM CHLORIDE, CALCIUM CHLORIDE 600; 310; 30; 20 MG/100ML; MG/100ML; MG/100ML; MG/100ML
INJECTION, SOLUTION INTRAVENOUS CONTINUOUS PRN
Status: DISCONTINUED | OUTPATIENT
Start: 2020-02-07 | End: 2020-02-07

## 2020-02-07 RX ADMIN — CEFAZOLIN SODIUM 2 G: 2 INJECTION, SOLUTION INTRAVENOUS at 09:56

## 2020-02-07 RX ADMIN — FENTANYL CITRATE 50 MCG: 50 INJECTION, SOLUTION INTRAMUSCULAR; INTRAVENOUS at 11:24

## 2020-02-07 RX ADMIN — KETOROLAC TROMETHAMINE 30 MG: 30 INJECTION, SOLUTION INTRAMUSCULAR at 12:06

## 2020-02-07 RX ADMIN — HYDROMORPHONE HYDROCHLORIDE 0.5 MG: 1 INJECTION, SOLUTION INTRAMUSCULAR; INTRAVENOUS; SUBCUTANEOUS at 12:52

## 2020-02-07 RX ADMIN — DEXAMETHASONE SODIUM PHOSPHATE 8 MG: 4 INJECTION, SOLUTION INTRAMUSCULAR; INTRAVENOUS at 10:18

## 2020-02-07 RX ADMIN — Medication 10 MG: at 09:53

## 2020-02-07 RX ADMIN — ACETAMINOPHEN 975 MG: 325 TABLET, FILM COATED ORAL at 08:14

## 2020-02-07 RX ADMIN — ROCURONIUM BROMIDE 50 MG: 10 INJECTION INTRAVENOUS at 11:14

## 2020-02-07 RX ADMIN — DEXMEDETOMIDINE HYDROCHLORIDE 40 MCG: 100 INJECTION, SOLUTION INTRAVENOUS at 10:41

## 2020-02-07 RX ADMIN — Medication 80 MG: at 09:48

## 2020-02-07 RX ADMIN — KETAMINE HYDROCHLORIDE 10 MG: 10 INJECTION, SOLUTION INTRAMUSCULAR; INTRAVENOUS at 10:43

## 2020-02-07 RX ADMIN — BUPIVACAINE HYDROCHLORIDE 40 ML: 2.5 INJECTION, SOLUTION EPIDURAL; INFILTRATION; INTRACAUDAL at 10:41

## 2020-02-07 RX ADMIN — Medication 10 MG: at 10:19

## 2020-02-07 RX ADMIN — SODIUM CHLORIDE, POTASSIUM CHLORIDE, SODIUM LACTATE AND CALCIUM CHLORIDE: 600; 310; 30; 20 INJECTION, SOLUTION INTRAVENOUS at 09:39

## 2020-02-07 RX ADMIN — PHENAZOPYRIDINE HYDROCHLORIDE 200 MG: 200 TABLET, FILM COATED ORAL at 08:17

## 2020-02-07 RX ADMIN — FENTANYL CITRATE 50 MCG: 50 INJECTION, SOLUTION INTRAMUSCULAR; INTRAVENOUS at 09:48

## 2020-02-07 RX ADMIN — FENTANYL CITRATE 50 MCG: 50 INJECTION, SOLUTION INTRAMUSCULAR; INTRAVENOUS at 10:34

## 2020-02-07 RX ADMIN — SODIUM CHLORIDE, POTASSIUM CHLORIDE, SODIUM LACTATE AND CALCIUM CHLORIDE: 600; 310; 30; 20 INJECTION, SOLUTION INTRAVENOUS at 11:14

## 2020-02-07 RX ADMIN — PROPOFOL 140 MG: 10 INJECTION, EMULSION INTRAVENOUS at 09:48

## 2020-02-07 RX ADMIN — KETAMINE HYDROCHLORIDE 30 MG: 10 INJECTION, SOLUTION INTRAMUSCULAR; INTRAVENOUS at 09:48

## 2020-02-07 RX ADMIN — LIDOCAINE HYDROCHLORIDE 80 MG: 20 INJECTION, SOLUTION INFILTRATION; PERINEURAL at 09:48

## 2020-02-07 RX ADMIN — ONDANSETRON 4 MG: 2 INJECTION INTRAMUSCULAR; INTRAVENOUS at 12:02

## 2020-02-07 RX ADMIN — FENTANYL CITRATE 50 MCG: 50 INJECTION, SOLUTION INTRAMUSCULAR; INTRAVENOUS at 10:53

## 2020-02-07 RX ADMIN — SUGAMMADEX 300 MG: 100 INJECTION, SOLUTION INTRAVENOUS at 12:19

## 2020-02-07 RX ADMIN — CEFAZOLIN 1 G: 1 INJECTION, POWDER, FOR SOLUTION INTRAMUSCULAR; INTRAVENOUS at 11:59

## 2020-02-07 RX ADMIN — DEXAMETHASONE SODIUM PHOSPHATE 4 MG: 10 INJECTION, SOLUTION INTRAMUSCULAR; INTRAVENOUS at 10:41

## 2020-02-07 RX ADMIN — ROCURONIUM BROMIDE 50 MG: 10 INJECTION INTRAVENOUS at 10:04

## 2020-02-07 RX ADMIN — MIDAZOLAM 2 MG: 1 INJECTION INTRAMUSCULAR; INTRAVENOUS at 09:42

## 2020-02-07 ASSESSMENT — LIFESTYLE VARIABLES: TOBACCO_USE: 1

## 2020-02-07 ASSESSMENT — MIFFLIN-ST. JEOR: SCORE: 1473.63

## 2020-02-07 ASSESSMENT — PAIN DESCRIPTION - DESCRIPTORS: DESCRIPTORS: SHARP

## 2020-02-07 NOTE — ANESTHESIA POSTPROCEDURE EVALUATION
Anesthesia POST Procedure Evaluation    Patient: Mattie Banda   MRN:     [id number] Gender:   female   Age:    39 year old :      1980        Preoperative Diagnosis: Abnormal uterine bleeding [N93.9]   Procedure(s):  Davinci Total Laparoscopic Hysterectomy, Bilateral Salpingectomy, Exam Under Anesthesia  Cystoscopy   Postop Comments: No value filed.       Anesthesia Type:  Not documented  General    Reportable Event: NO     PAIN: Uncomplicated   Sign Out status: Comfortable, Well controlled pain     PONV: No PONV   Sign Out status:  No Nausea or Vomiting     Neuro/Psych: Uneventful perioperative course   Sign Out Status: Preoperative baseline; Age appropriate mentation     Airway/Resp.: Uneventful perioperative course   Sign Out Status: Non labored breathing, age appropriate RR; Resp. Status within EXPECTED Parameters     CV: Uneventful perioperative course   Sign Out status: Appropriate BP and perfusion indices; Appropriate HR/Rhythm     Disposition:   Sign Out in:  PACU  Disposition:  Phase II; Home  Recovery Course: Uneventful  Follow-Up: Not required           Last Anesthesia Record Vitals:  CRNA VITALS  2020 1156 - 2020 1256      2020             Pulse:  83    SpO2:  100 %    Resp Rate (observed):  (!) 2          Last PACU Vitals:  Vitals Value Taken Time   /73 2020  1:53 PM   Temp 36.4  C (97.5  F) 2020  1:53 PM   Pulse 74 2020  1:30 PM   Resp 16 2020  1:53 PM   SpO2 98 % 2020  1:53 PM   Temp src     NIBP     Pulse     SpO2     Resp     Temp     Ht Rate     Temp 2     Vitals shown include unvalidated device data.      Electronically Signed By: Kera Barlow MD, 2020, 2:57 PM

## 2020-02-07 NOTE — ANESTHESIA PROCEDURE NOTES
Peripheral Nerve Block Procedure Note    Staff:     Anesthesiologist:  Patrick Snow MD  Location: OR Before induction and OR AFTER induction  Procedure Start/Stop TImes:      2/7/2020 9:50 AM    patient identified, IV checked, site marked, risks and benefits discussed, informed consent, monitors and equipment checked, pre-op evaluation, at physician/surgeon's request and post-op pain management      Correct Patient: Yes      Correct Position: Yes      Correct Site: Yes      Correct Procedure: Yes      Correct Laterality:  Yes    Site Marked:  Yes  Procedure details:     Procedure:  Quadratus lumborum    ASA:  2    Diagnosis:  Hysterectomy    Laterality:  Bilateral    Position:  Supine    Needle:  Short bevel    Needle gauge:  21    Needle length (inches):  4    Ultrasound: Yes      Ultrasound used to identify targeted nerve, plexus, or vascular structure and placed a needle adjacent to it      Permanent Image entered into patiient's record      Bolus via:  Needle    Infusion Method:  Single Shot    Complications:  None

## 2020-02-07 NOTE — DISCHARGE INSTRUCTIONS
Same-Day Surgery   Adult Discharge Orders & Instructions     For 24 hours after surgery:  1. Get plenty of rest.  A responsible adult must stay with you for at least 24 hours after you leave the hospital.   2. Pain medication can slow your reflexes. Do not drive or use heavy equipment.  If you have weakness or tingling, don't drive or use heavy equipment until this feeling goes away.  3. Mixing alcohol and pain medication can cause dizziness and slow your breathing. It can even be fatal. Do not drink alcohol while taking pain medication.  4. Avoid strenuous or risky activities.  Ask for help when climbing stairs.   5. You may feel lightheaded.  If so, sit for a few minutes before standing.  Have someone help you get up.   6. If you have nausea (feel sick to your stomach), drink only clear liquids such as apple juice, ginger ale, broth or 7-Up.  Rest may also help.  Be sure to drink enough fluids.  Move to a regular diet as you feel able. Take pain medications with a small amount of solid food, such as toast or crackers, to avoid nausea.   7. A slight fever is normal. Call the doctor if your fever is over 100 F (37.7 C) (taken under the tongue) or lasts longer than 24 hours.  8. You may have a dry mouth, muscle aches, trouble sleeping or a sore throat.  These symptoms should go away after 24 hours.  9. Do not make important or legal decisions.   Pain Management:      1. Take pain medication (if prescribed) for pain as directed by your physician.        2. WARNING: If the pain medication you have been prescribed contains Tylenol  (acetaminophen), DO NOT take additional doses of Tylenol (acetaminophen).     Call your doctor for any of the followin.  Signs of infection (fever, growing tenderness at the surgery site, severe pain, a large amount of drainage or bleeding, foul-smelling drainage, redness, swelling).    2.  It has been over 8 to 10 hours since surgery and you are still not able to urinate (pee).    3.   Headache for over 24 hours.    4.  Numbness, tingling or weakness the day after surgery (if you had spinal anesthesia).  To contact a doctor, call _____________Dr. Barber________________________ or:      [phone] and ask for the Resident On Call for:          ______________GENARO____________________________ (answered 24 hours a day)      Emergency Department:  [place] [facility]: [phone]  [place] [facility]: [phone]               Rev. 10/2014

## 2020-02-07 NOTE — ANESTHESIA PREPROCEDURE EVALUATION
Anesthesia Pre-Procedure Evaluation    Patient: Mattie Banda   MRN:     [id number] Gender:   female   Age:    39 year old :      1980        Preoperative Diagnosis: Abnormal uterine bleeding [N93.9]   Procedure(s):  Davinci Total Laparoscopic Hysterectomy, Bilateral Salpingectomy, Exam Under Anesthesia, Possible Open Surgery  Possible Cystoscopy  Possible Open Surgery     Past Medical History:   Diagnosis Date     Anxiety      Breast disorder Not sure     Depressive disorder      Mixed anxiety depressive disorder 2016     Papanicolaou smear of cervix with low grade squamous intraepithelial lesion (LGSIL)      Thyroid disease      Urinary tract infection, site not specified     Recurrent UTI's     Wounds and injuries       Past Surgical History:   Procedure Laterality Date     ABDOMEN SURGERY      For back surgery     BACK SURGERY  2015      BIOPSY  Don t remember     COLONOSCOPY  07     COLONOSCOPY  08     HC COLONOSCOPY W BIOPSY  08     HC TOOTH EXTRACTION W/FORCEP      wisdom teeth removed     INJECT BLOCK MEDIAL BRANCH CERVICAL/THORACIC/LUMBAR N/A 2019    Procedure: BLOCK, NERVE, SPINAL, MEDIAL BRANCH lumbar 2, 3, 4;  Surgeon: Edgardo Rubio MD;  Location: [facility]     INJECT EPIDURAL CERVICAL N/A 3/8/2018    Procedure: INJECT EPIDURAL CERVICAL;  cervical 6-7 interlaminar epidural steroid injection;  Surgeon: Edgardo Rubio MD;  Location: [facility]     LAPAROSCOPIC TUBAL LIGATION  2012    Procedure: LAPAROSCOPIC TUBAL LIGATION;  Laparoscopic Bilateral tubal sterilization/ fulgaration;  Surgeon: Sarbjit Whittaker MD;  Location:  OR     ORTHOPEDIC SURGERY  2015          Anesthesia Evaluation     . Pt has had prior anesthetic.     No history of anesthetic complications          ROS/MED HX    ENT/Pulmonary:     (+)tobacco use, Current use Intermittent asthma (inhaler only when sx of bronchitis, none currently) , . .    Neurologic:     (+)neuropathy -  idiopathic peripheral, migraines,     Cardiovascular:  - neg cardiovascular ROS       METS/Exercise Tolerance:  3 - Able to walk 1-2 blocks without stopping   Hematologic:  - neg hematologic  ROS       Musculoskeletal:   (+) arthritis,  -       GI/Hepatic:  - neg GI/hepatic ROS       Renal/Genitourinary:  - ROS Renal section negative       Endo:     (+) thyroid problem hypothyroidism, .      Psychiatric:     (+) psychiatric history anxiety and depression (severe)      Infectious Disease:         Malignancy:         Other:    (+) H/O Chronic Pain,H/O chronic opiod use ,                        PHYSICAL EXAM:   Mental Status/Neuro:    Airway: Facies: Feasible  Mouth/Opening: Full  TM distance: > 6 cm  Neck ROM: Full   Respiratory: Auscultation: CTAB     Resp. Rate: Normal     Resp. Effort: Normal      CV: Rhythm: Regular  Rate: Age appropriate  Heart: Normal Sounds  Edema: None   Comments:      Dental: Dentures  Dentures: Upper                LABS:  CBC:   Lab Results   Component Value Date    WBC 8.9 01/31/2020    WBC 17.8 (H) 01/02/2020    HGB 12.8 01/31/2020    HGB 11.8 01/02/2020    HCT 39.6 01/31/2020    HCT 37.0 01/02/2020     01/31/2020     01/02/2020     BMP:   Lab Results   Component Value Date     01/02/2020     12/05/2019    POTASSIUM 4.3 01/02/2020    POTASSIUM 3.6 12/05/2019    CHLORIDE 108 01/02/2020    CHLORIDE 111 (H) 12/05/2019    CO2 28 01/02/2020    CO2 28 12/05/2019    BUN 15 01/02/2020    BUN 13 12/05/2019    CR 0.75 01/02/2020    CR 0.82 12/05/2019    GLC 82 02/07/2020     (H) 01/02/2020     COAGS:   Lab Results   Component Value Date    PTT 27 07/20/2008    INR 1.02 12/05/2019     POC:   Lab Results   Component Value Date    HCG Negative 02/07/2020     OTHER:   Lab Results   Component Value Date    PH 7.0 02/14/2003    LACT 0.9 06/28/2015    WAQAS 8.9 01/02/2020    ALBUMIN 3.5 01/02/2020    PROTTOTAL 6.5 (L) 01/02/2020    ALT 42 01/02/2020    AST 14 01/02/2020     "ALKPHOS 65 01/02/2020    BILITOTAL 0.2 01/02/2020    LIPASE 185 04/08/2019    AMYLASE 44 09/20/2018    TSH 0.12 (L) 11/04/2019    T4 1.30 11/04/2019    CRP 4.1 01/27/2020    SED 17 01/27/2020        Preop Vitals    BP Readings from Last 3 Encounters:   02/07/20 93/61   01/31/20 90/56   01/27/20 (!) 86/57    Pulse Readings from Last 3 Encounters:   02/07/20 64   01/31/20 85   01/27/20 67      Resp Readings from Last 3 Encounters:   02/07/20 12   01/31/20 16   01/10/20 16    SpO2 Readings from Last 3 Encounters:   02/07/20 98%   01/31/20 97%   01/27/20 96%      Temp Readings from Last 1 Encounters:   02/07/20 36.3  C (97.3  F) (Oral)    Ht Readings from Last 1 Encounters:   02/07/20 1.702 m (5' 7\")      Wt Readings from Last 1 Encounters:   02/07/20 76.6 kg (168 lb 14 oz)    Estimated body mass index is 26.45 kg/m  as calculated from the following:    Height as of this encounter: 1.702 m (5' 7\").    Weight as of this encounter: 76.6 kg (168 lb 14 oz).     LDA:  Peripheral IV 12/05/19 Left Hand (Active)   Number of days: 64       Peripheral IV 12/05/19 Left Hand (Active)   Number of days: 64        Assessment:   ASA SCORE: 3    H&P: History and physical reviewed and following examination; no interval change.     Smoking Status:  Active Smoker       - patient smoked on day of surgery       - instructed to abstain from smoking on day of procedure       - Patient was counseled regarding increased Infection Risk with same day smoking.   NPO Status: NPO Appropriate     Plan:   Anes. Type:  General   Pre-Medication: Midazolam (ketamine)   Induction:  IV (Standard)   Airway: ETT; Oral   Access/Monitoring: PIV   Maintenance: Balanced     Postop Plan:   Postop Pain: Opioids  Postop Sedation/Airway: Not planned  Disposition: Outpatient     PONV Management:   Adult Risk Factors: Female, Postop Opioids   Prevention: Ondansetron, Dexamethasone     CONSENT: Direct conversation   Plan and risks discussed with: Patient                 "      Kera Barlow MD

## 2020-02-07 NOTE — ANESTHESIA CARE TRANSFER NOTE
Patient: Mattie Banda    Procedure(s):  Davinci Total Laparoscopic Hysterectomy, Bilateral Salpingectomy, Exam Under Anesthesia  Cystoscopy    Diagnosis: Abnormal uterine bleeding [N93.9]  Diagnosis Additional Information: No value filed.    Anesthesia Type:   General     Note:  Airway :Face Mask  Patient transferred to:PACU  Handoff Report: Identifed the Patient, Identified the Reponsible Provider, Reviewed the pertinent medical history, Discussed the surgical course, Reviewed Intra-OP anesthesia mangement and issues during anesthesia, Set expectations for post-procedure period and Allowed opportunity for questions and acknowledgement of understanding      Vitals: (Last set prior to Anesthesia Care Transfer)    CRNA VITALS  2/7/2020 1156 - 2/7/2020 1238      2/7/2020             Pulse:  83    SpO2:  100 %    Resp Rate (observed):  (!) 2                Electronically Signed By: SAUD Temple CRNA  February 7, 2020  12:38 PM

## 2020-02-08 ENCOUNTER — TELEPHONE (OUTPATIENT)
Dept: OBGYN | Facility: CLINIC | Age: 40
End: 2020-02-08

## 2020-02-08 NOTE — TELEPHONE ENCOUNTER
Received call from patient.  She is wondering if pain level is normal as she is feeling a lot of gas pain.  She is also bothered by her chronic back pain which is worse than normal.  She had questions if she had received a block which she had, as she has noted numbness going around to her back.  We reviewed her surgery and typical pain on post-op day #1.  She will call with any concerns.  Anabel Barber MD

## 2020-02-10 ENCOUNTER — TELEPHONE (OUTPATIENT)
Dept: OBGYN | Facility: CLINIC | Age: 40
End: 2020-02-10

## 2020-02-10 DIAGNOSIS — Z98.1 S/P LUMBAR FUSION: ICD-10-CM

## 2020-02-10 DIAGNOSIS — M51.369 DEGENERATION OF LUMBAR INTERVERTEBRAL DISC: ICD-10-CM

## 2020-02-10 DIAGNOSIS — M50.30 DDD (DEGENERATIVE DISC DISEASE), CERVICAL: Primary | ICD-10-CM

## 2020-02-10 RX ORDER — METHOCARBAMOL 750 MG/1
750 TABLET, FILM COATED ORAL 3 TIMES DAILY PRN
Qty: 90 TABLET | Refills: 0 | Status: SHIPPED | OUTPATIENT
Start: 2020-02-10 | End: 2020-03-02

## 2020-02-10 NOTE — TELEPHONE ENCOUNTER
I will use methocarbamol 750 mg up to 3 times daily.  As noted in my previous message her back pain could be purely from hysterectomy or additionally with her chronic longstanding back problems.  Also inactivity secondary to surgery recovery does not help.  Yuan Schofield MD

## 2020-02-10 NOTE — TELEPHONE ENCOUNTER
Pt notified of the message below. She is willing to try the muscle relaxant. She would prefer something other than Flexeril. She uses [facility] in [place]. She states she is taking ibuprofen 800 mg every 4 hours. She is also taking one morphine in the morning and one in the evening. She said it isn't touching the pain. She said her back is numb and very painful. Elaina Ramos CMA ([facility])

## 2020-02-10 NOTE — TELEPHONE ENCOUNTER
Patient calling stating she was advised to reach out to her PCP as well regarding symptoms she is experiencing after surgery. Please advise on if she can be prescribed pain medication/ muscle relaxer since surgeon is out today.

## 2020-02-10 NOTE — TELEPHONE ENCOUNTER
Actually back pain is often expected post complicated hysterectomy like hers but do agree she has known back problems.  She should take ibuprofen 600 mg four times daily with food along with pain meds.  Muscle relaxant is ok, blisters probably from heating pad. I cannot add more narcotic.

## 2020-02-10 NOTE — TELEPHONE ENCOUNTER
----- Message from Holly Moseley sent at 2/10/2020 10:35 AM CST -----  Regarding: Follow up surgery appt/Call back from nurse  1. Pt called needing a surgery follow up appt with Dr Barber around march 6 (per summary notes) but earliest given April 6.     2. Pt states she spoke with Dr Barber on Saturday regarding numbness and pain in the back area. She is requesting a pain med or a muscle relaxer. Pt states she also has blisters on her back and does not think it is from the heating pad because she has used the pad for years. Pt is also asking how long does it take for the glue to dissolve.     Please call pt back. Thank holly adams [place]

## 2020-02-10 NOTE — TELEPHONE ENCOUNTER
Mattie is calling back, she is asking if Dr Schofield can address this message and medication today.

## 2020-02-10 NOTE — TELEPHONE ENCOUNTER
Spoke with Mattie and helped to schedule post-op appt. Also discussed pain with patient. She states she has been taking her regularly scheduled nacrotics, MS Contin every 12 hours and 5mg of oxycodone every 4-6 hours. She states that since surgery her back pain has been greatly increased. She has tried a heating pad which usually works but now is not helping. She also has noticed some blisters on her back. She is wondering if she can have muscle relaxer, to see if that would help with her pain. Nurse stated that message would be sent to on-call provider to ask for muscle relaxer but also suggested patient reach out to PCP since they prescribe her regular medications and her pain does not seem to be related to surgery as much as her back.

## 2020-02-12 LAB — COPATH REPORT: NORMAL

## 2020-02-13 ENCOUNTER — TELEPHONE (OUTPATIENT)
Dept: OBGYN | Facility: CLINIC | Age: 40
End: 2020-02-13

## 2020-02-13 NOTE — TELEPHONE ENCOUNTER
I attempted to reach patient to review benign pathology report.  There was no answer.  Voicemail left that all was normal.  Anabel Barber MD

## 2020-02-17 ENCOUNTER — MYC REFILL (OUTPATIENT)
Dept: FAMILY MEDICINE | Facility: CLINIC | Age: 40
End: 2020-02-17

## 2020-02-17 DIAGNOSIS — G47.9 SLEEP DISORDER: ICD-10-CM

## 2020-02-17 DIAGNOSIS — F41.8 MIXED ANXIETY DEPRESSIVE DISORDER: ICD-10-CM

## 2020-02-17 DIAGNOSIS — M50.30 DDD (DEGENERATIVE DISC DISEASE), CERVICAL: ICD-10-CM

## 2020-02-17 DIAGNOSIS — M51.369 DEGENERATION OF LUMBAR INTERVERTEBRAL DISC: ICD-10-CM

## 2020-02-17 RX ORDER — ALPRAZOLAM 2 MG
TABLET ORAL
Qty: 90 TABLET | Refills: 1 | Status: CANCELLED | OUTPATIENT
Start: 2020-02-17

## 2020-02-17 RX ORDER — MORPHINE SULFATE 15 MG/1
15 TABLET, FILM COATED, EXTENDED RELEASE ORAL EVERY 12 HOURS
Qty: 60 TABLET | Refills: 0 | Status: SHIPPED | OUTPATIENT
Start: 2020-02-17 | End: 2020-03-16

## 2020-02-17 RX ORDER — OXYCODONE HYDROCHLORIDE 5 MG/1
TABLET ORAL
Qty: 240 TABLET | Refills: 0 | Status: SHIPPED | OUTPATIENT
Start: 2020-02-17 | End: 2020-03-02

## 2020-02-17 NOTE — TELEPHONE ENCOUNTER
xanax    Should have refills remaining at the pharmacy           roxicodone      Last Written Prescription Date:  1/17/20  Last Fill Quantity: 240,   # refills: 0  Last Office Visit: 1/31/20  Future Office visit:    Next 5 appointments (look out 90 days)    Mar 02, 2020  3:15 PM CST  [facility] with Yuan [facility]ield MD  [facility] [address] [place] [phone]  [phone]   Apr 24, 2020  8:30 AM CDT  Return Visit with Beatriz Nunez MD  [facility] ([facility]) [id number] [place] [phone]  [phone]           Routing refill request to provider for review/approval because:  Drug not on the [facility], [facility] or [facility] refill protocol or controlled substance    MS contin      Last Written Prescription Date:  1/17/20  Last Fill Quantity: 60,   # refills: 0  Last Office Visit: 1/31/20  Future Office visit:    Next 5 appointments (look out 90 days)    Mar 02, 2020  3:15 PM CST  SHORT with Yuan Schofield MD  [facility] [address] [place] [phone]   Apr 24, 2020  8:30 AM CDT  Return Visit with Beatriz Nunez MD  [facility] ([facility]) [id number] [place] [phone]  [phone]           Routing refill request to provider for review/approval because:  Drug not on the [facility], [facility] or [facility] refill protocol or controlled substance

## 2020-02-18 DIAGNOSIS — G90.01 CAROTIDYNIA: ICD-10-CM

## 2020-02-18 NOTE — TELEPHONE ENCOUNTER
Alprazolam 2 MG       Last Written Prescription Date:  1/21/2020  Last Fill Quantity: 90,   # refills: 1  Last Office Visit: 1/31/2020  Future Office visit:    Next 5 appointments (look out 90 days)    Mar 02, 2020  3:15 PM CST  SHORT with Yuan Schofield MD  [facility]) [address] [place] [phone]  [phone]   Apr 24, 2020  8:30 AM CDT  Return Visit with Beatriz Nunez MD  [facility] ([facility]) [address] [place] [phone]           Routing refill request to provider for review/approval because:  Drug not on the [facility], [facility] or [facility] refill protocol or controlled substance

## 2020-02-18 NOTE — TELEPHONE ENCOUNTER
Hydroxychloroquine 200 MG      Last Written Prescription Date:  12/27/19  Last Fill Quantity: 60,   # refills: 1  Last Office Visit: 1/31/2020  Future Office visit:    Next 5 appointments (look out 90 days)    Mar 02, 2020  3:15 PM CST  SHORT with Yuan Schofield MD  [facility]) [address] [place] [phone]  [phone]   Apr 24, 2020  8:30 AM CDT  Return Visit with Beatriz Nunez MD  [facility]) [address] [place] [phone]           Routing refill request to provider for review/approval because:  Drug not on the [facility], [facility] or [facility] refill protocol or controlled substance

## 2020-02-19 RX ORDER — ALPRAZOLAM 2 MG
TABLET ORAL
Qty: 90 TABLET | Refills: 1 | Status: SHIPPED | OUTPATIENT
Start: 2020-02-19 | End: 2020-05-01

## 2020-02-19 RX ORDER — HYDROXYCHLOROQUINE SULFATE 200 MG/1
200 TABLET, FILM COATED ORAL 2 TIMES DAILY
Qty: 60 TABLET | Refills: 1 | Status: SHIPPED | OUTPATIENT
Start: 2020-02-19 | End: 2020-04-13

## 2020-02-25 ENCOUNTER — OFFICE VISIT (OUTPATIENT)
Dept: BEHAVIORAL HEALTH | Facility: CLINIC | Age: 40
End: 2020-02-25
Payer: COMMERCIAL

## 2020-02-25 DIAGNOSIS — F41.1 GAD (GENERALIZED ANXIETY DISORDER): ICD-10-CM

## 2020-02-25 DIAGNOSIS — F32.2 CURRENT SEVERE EPISODE OF MAJOR DEPRESSIVE DISORDER WITHOUT PSYCHOTIC FEATURES WITHOUT PRIOR EPISODE (H): Primary | ICD-10-CM

## 2020-02-25 PROCEDURE — 90832 PSYTX W PT 30 MINUTES: CPT | Performed by: MARRIAGE & FAMILY THERAPIST

## 2020-02-25 NOTE — PROGRESS NOTES
[facility] Primary Care: Integrated Behavioral Health  February 25, 2020      Behavioral Health Clinician Progress Note    Patient Name: Mattie Banda           Service Type:  Individual      Service Location:   Face to Face in Clinic     Session Start Time: 4:05  Session End Time: 4:34      Session Length: 16 - 37      Attendees: Patient    Visit Activities (Refresh list every visit): [facility] Only    Diagnostic Assessment Date: 1/21/2020  Treatment Plan Review Date: 1/28/2020  See Flowsheets for today's PHQ-9 and CRISTIANO-7 results  Previous PHQ-9:   PHQ-9 SCORE 10/25/2019 12/11/2019 1/7/2020   PHQ-9 Total Score - - -   PHQ-9 Total Score 20 24 24     Previous CRISTIANO-7:   CRISTIANO-7 SCORE 10/1/2019 10/25/2019 1/7/2020   Total Score - - -   Total Score 19 19 20       EMILIA LEVEL:  EMILIA Score (Last Two) 11/14/2012   EMILIA Raw Score 44   Activation Score 70.8   EMILIA Level 4       DATA  Extended Session (60+ minutes): No  Interactive Complexity: No  Crisis: No  [facility] Patient: No    Treatment Objective(s) Addressed in This Session:  Target Behavior(s): disease management/lifestyle changes depression and anxiety    Depressed Mood: Increase interest, engagement, and pleasure in doing things  Decrease frequency and intensity of feeling down, depressed, hopeless  Improve quantity and quality of night time sleep / decrease daytime naps  Feel less tired and more energy during the day   Improve diet, appetite, mindful eating, and / or meal planning  Identify negative self-talk and behaviors: challenge core beliefs, myths, and actions  Improve concentration, focus, and mindfulness in daily activities   Decrease thoughts that you'd be better off dead or of suicide / self-harm  Anxiety: will experience a reduction in anxiety, will develop more effective coping skills to manage anxiety symptoms, will develop healthy cognitive patterns and beliefs and will increase ability to function adaptively    Current Stressors / Issues:  Patient  "reports that part of the surgery went well and other parts not so well. She states that she wasn't sent home with care instructions. Patient reports having increased pain in her back though the outside was numb. She states that her belly still is bloated from her procedure and she states that the pressure has been uncomfortable. Her SO was there for the surgery and left the following day for a job. He should be back late tonight or tomorrow.   Patient states that she has also been having issues with her taxes and the person helping her with them. She identified feeling frustrated with this.  Patient has been having difficulty with sleep. She states that her mood has been \"up and down\".   Patient states that she had asked her mom to help her, as she can't do things such as vacuum. She states that this created some additional personal conflict with her mom.   Patient states that she called about scheduling a psychiatry appointment. She was informed that she should contact her insurance to see what's in network. Patient states that she doesn't know where to start.    Processed patient's experience of her surgery and how recovery at home has been going. Discussed dynamics between her and her SO and she states that she doesn't feel much support. Patient has been more withdrawn and states that she spent time away from others more this past week. Explored what this did for her and how to increase social connection. Encouraged patient to be around her daughter during  hours. Explored patient's experience of pain and giving herself time to heal. Reinforced patient calling to schedule psychiatry. Encouraged her to call her insurance and she could bring in their suggestions into her next visit with this writer to determine where she would be willing to try scheduling.    Progress on Treatment Objective(s) / Homework:  No improvement - PREPARATION (Decided to change - considering how); Intervened by negotiating a change " plan and determining options / strategies for behavior change, identifying triggers, exploring social supports, and working towards setting a date to begin behavior change    Motivational Interviewing    MI Intervention: Expressed Empathy/Understanding, Supported Autonomy, Collaboration, Evocation, Permission to raise concern or advise, Open-ended questions, Reflections: simple and complex, Change talk (evoked) and Reframe     Change Talk Expressed by the Patient: Desire to change Ability to change Reasons to change Need to change Committment to change Activation Taking steps    Provider Response to Change Talk: E - Evoked more info from patient about behavior change, A - Affirmed patient's thoughts, decisions, or attempts at behavior change, R - Reflected patient's change talk and S - Summarized patient's change talk statements    Also provided psychoeducation about behavioral health condition, symptoms, and treatment options      Skills training    Explored skills useful to client in current situation    Skills include assertiveness, communication, conflict management, problem-solving, relaxation, etc.    Solution-Focused Therapy    Explored patterns in patient's relationships and discussed options for new behaviors    Explored patterns in patient's actions and choices and discussed options for new behaviors    Cognitive-behavioral Therapy    Discussed common cognitive distortions, identified them in patient's life    Explored ways to challenge, replace, and act against these cognitions    Acceptance and Commitment Therapy    Explored and identified important values in patient's life    Discussed ways to commit to behavioral activation around these values    Psychodynamic psychotherapy    Discussed patient's emotional dynamics and issues and how they impact behaviors    Explored patient's history of relationships and how they impact present behaviors    Explored how to work with and make changes in these schemas  "and patterns    Behavioral Activation    Discussed steps patient can take to become more involved in meaningful activity    Identified barriers to these activities and explored possible solutions    Mindfulness-Based Strategies    Discussed skills based on development and application of mindfulness    Skills drawn from dialectical behavior therapy, mindfulness-based stress reduction, mindfulness-based cognitive therapy, etc.      Care Plan review completed: Yes    Medication Review:  No changes to current psychiatric medication(s)     Medication Compliance:  Yes    Changes in Health Issues:   None reported    Chemical Use Review:   Substance Use: Chemical use reviewed, no active concerns identified       Tobacco Use: No change in amount of tobacco use since last session.  Patient declined discussion at this time    Assessment: Current Emotional / Mental Status (status of significant symptoms):  Risk status (Self / Other harm or suicidal ideation)  Patient has had a history of suicidal ideation: in adolescense and suicide attempts: patient states that she tried multiple times around ages 15-16 by slitting wrists or overdose, and one overdose of alcohol; she denies ever being hospitalized for mental health and denies a history of self-injurious behavior, homicidal ideation, homicidal behavior and and other safety concerns  Patient denies current fears or concerns for personal safety.  Patient reports the following current or recent suicidal ideation or behaviors: patient reports having off and on thoughts of. \"I'm such a burden\" \"I'm not a good wife\" \"I'm not be a good friend\" \"my kids would be better off\". She is able to understand that her kids wouldn't be better off and would not be better off without her. She does not want anyone else to take care of her kids. Patient denies any suicidal plan or intent.  Patient denies current or recent homicidal ideation or behaviors.  Patient denies current or recent self " injurious behavior or ideation.  Patient denies other safety concerns.  A safety and risk management plan has been developed including: Patient consented to co-developed safety plan.  A safety and risk management plan was completed.  Patient agreed to use safety plan should any safety concerns arise.  A copy was given to the patient.    Appearance:   Appropriate   Eye Contact:   Fair   Psychomotor Behavior: Normal   Attitude:   Cooperative   Orientation:   All  Speech   Rate / Production: Monotone    Volume:  Normal   Mood:    Anxious  Depressed   Affect:    Flat   Thought Content:  Clear  Perservative  Rumination   Thought Form:  Coherent  Logical   Insight:    Good     Diagnoses:  1. Current severe episode of major depressive disorder without psychotic features without prior episode (H)    2. CRISTIANO (generalized anxiety disorder)        Collateral Reports Completed:  Not Applicable    Plan: (Homework, other):  Patient was given information about behavioral services and encouraged to schedule a follow up appointment with the clinic [facility] [place] in 1 week.  She was also given information about mental health symptoms and treatment options , Cognitive Behavioral Therapy skills to practice when experiencing anxiety and depression and deep Breathing Strategies to practice when experiencing anxiety and depression.  CD Recommendations: No indications of CD issues.  Patient will follow safety plan for suicidal thoughts.  KVNG Watson, [facility] [place]       ______________________________________________________________________    Integrated Primary Care Behavioral Health Treatment Plan    Patient's Name: Mattie Banda  YOB: 1980    Date: January 28, 2020    DSM-V Diagnoses: 296.33 (F33.2) Major Depressive Disorder, Recurrent Episode, Severe _ or 300.02 (F41.1) Generalized Anxiety Disorder  Psychosocial / Contextual Factors: medical issues,  and dating ex-, few friends  WHODAS: 36    Referral /  Collaboration:  Referral to another professional/service is not indicated at this time..    Anticipated number of session or this episode of care: 8      MeasurableTreatment Goal(s) related to diagnosis / functional impairment(s)  Goal 1: Patient will demonstrate ability to improve depression and anxiety symptoms as evidenced by improved PHQ9 & GAD7 scores.       Objective #A (Patient Action)    Patient will Decrease frequency and intensity of feeling down, depressed, hopeless  Identify negative self-talk and behaviors: challenge core beliefs, myths, and actions  Decrease thoughts that you'd be better off dead or of suicide / self-harm  Status: New - Date: 1/28/2020     Intervention(s)  [facility] will teach distress tolerance skills. Provide psycho-education on cognitive distortions and automatic thoughts and behaviors and ways to appropriately challenge and restructure thoughts.    Objective #B  Patient will increase coping strategies by 2  to more effectively manage current stressors  increase understanding of steps in the grief process  Status: New - Date: 1/28/2020     Intervention(s)  [facility] will teach mindfulness and relaxation strategies. Provide psycho-education on grief process.    Patient has reviewed and agreed to the above plan.    Written by  KVNG Watson, [facility] Primary Care: Integrated Behavioral Health  February 4, 2020      Behavioral Health Clinician Progress Note    Patient Name: Mattie Banda           Service Type:  Individual      Service Location:   Face to Face in Clinic     Session Start Time: 1:38  Session End Time: 2:12      Session Length: 16 - 37      Attendees: Patient    Visit Activities (Refresh list every visit): [facility] Only    Diagnostic Assessment Date: 1/21/2020  Treatment Plan Review Date: 1/28/2020  See Flowsheets for today's PHQ-9 and CRISTIANO-7 results  Previous PHQ-9:   PHQ-9 SCORE 10/25/2019 12/11/2019 1/7/2020   PHQ-9 Total Score - - -   PHQ-9  Total Score 20 24 24     Previous CRISTIANO-7:   CRISTIANO-7 SCORE 10/1/2019 10/25/2019 1/7/2020   Total Score - - -   Total Score 19 19 20       EMILIA LEVEL:  EMILIA Score (Last Two) 11/14/2012   EMILIA Raw Score 44   Activation Score 70.8   EMILIA Level 4       DATA  Extended Session (60+ minutes): No  Interactive Complexity: No  Crisis: No  [facility] Patient: No    Treatment Objective(s) Addressed in This Session:  Target Behavior(s): disease management/lifestyle changes depression and anxiety    Depressed Mood: Increase interest, engagement, and pleasure in doing things  Decrease frequency and intensity of feeling down, depressed, hopeless  Improve quantity and quality of night time sleep / decrease daytime naps  Feel less tired and more energy during the day   Improve diet, appetite, mindful eating, and / or meal planning  Identify negative self-talk and behaviors: challenge core beliefs, myths, and actions  Improve concentration, focus, and mindfulness in daily activities   Decrease thoughts that you'd be better off dead or of suicide / self-harm  Anxiety: will experience a reduction in anxiety, will develop more effective coping skills to manage anxiety symptoms, will develop healthy cognitive patterns and beliefs and will increase ability to function adaptively    Current Stressors / Issues:  Patient states that she almost cancelled today's visit. She states that she felt numb all week. She reports that when she got in the car she couldn't find something that she was looking for. She states that she became irritated and she finds that she is more easily irritated in general.  She woke up early this morning and didn't shower and get her make up on, which were her goals.    Reinforced patient coming to the appointment when she had considered not. Encouraged patient to recognize what she has accomplished today, thus far. Discussed grounding and mindfulness techniques and encouraged use to help her stay present. Patient has surgery coming  up and working on taking care of herself so she stays healthy for surgery.    Progress on Treatment Objective(s) / Homework:  No improvement - PREPARATION (Decided to change - considering how); Intervened by negotiating a change plan and determining options / strategies for behavior change, identifying triggers, exploring social supports, and working towards setting a date to begin behavior change    Motivational Interviewing    MI Intervention: Expressed Empathy/Understanding, Supported Autonomy, Collaboration, Evocation, Permission to raise concern or advise, Open-ended questions, Reflections: simple and complex, Change talk (evoked) and Reframe     Change Talk Expressed by the Patient: Desire to change Ability to change Reasons to change Need to change Committment to change Activation Taking steps    Provider Response to Change Talk: E - Evoked more info from patient about behavior change, A - Affirmed patient's thoughts, decisions, or attempts at behavior change, R - Reflected patient's change talk and S - Summarized patient's change talk statements    Also provided psychoeducation about behavioral health condition, symptoms, and treatment options      Skills training    Explored skills useful to client in current situation    Skills include assertiveness, communication, conflict management, problem-solving, relaxation, etc.    Solution-Focused Therapy    Explored patterns in patient's relationships and discussed options for new behaviors    Explored patterns in patient's actions and choices and discussed options for new behaviors    Cognitive-behavioral Therapy    Discussed common cognitive distortions, identified them in patient's life    Explored ways to challenge, replace, and act against these cognitions    Acceptance and Commitment Therapy    Explored and identified important values in patient's life    Discussed ways to commit to behavioral activation around these values    Psychodynamic  "psychotherapy    Discussed patient's emotional dynamics and issues and how they impact behaviors    Explored patient's history of relationships and how they impact present behaviors    Explored how to work with and make changes in these schemas and patterns    Behavioral Activation    Discussed steps patient can take to become more involved in meaningful activity    Identified barriers to these activities and explored possible solutions    Mindfulness-Based Strategies    Discussed skills based on development and application of mindfulness    Skills drawn from dialectical behavior therapy, mindfulness-based stress reduction, mindfulness-based cognitive therapy, etc.      Care Plan review completed: Yes    Medication Review:  No changes to current psychiatric medication(s)     Medication Compliance:  Yes    Changes in Health Issues:   None reported    Chemical Use Review:   Substance Use: Chemical use reviewed, no active concerns identified       Tobacco Use: No change in amount of tobacco use since last session.  Patient declined discussion at this time    Assessment: Current Emotional / Mental Status (status of significant symptoms):  Risk status (Self / Other harm or suicidal ideation)  Patient has had a history of suicidal ideation: in adolescense and suicide attempts: patient states that she tried multiple times around ages [age] by slitting wrists or overdose, and one overdose of alcohol; she denies ever being hospitalized for mental health and denies a history of self-injurious behavior, homicidal ideation, homicidal behavior and and other safety concerns  Patient denies current fears or concerns for personal safety.  Patient reports the following current or recent suicidal ideation or behaviors: patient reports having off and on thoughts of. \"I'm such a burden\" \"I'm not a good wife\" \"I'm not be a good friend\" \"my kids would be better off\". She is able to understand that her kids wouldn't be better off and " would not be better off without her. She does not want anyone else to take care of her kids. Patient denies any suicidal plan or intent.  Patient denies current or recent homicidal ideation or behaviors.  Patient denies current or recent self injurious behavior or ideation.  Patient denies other safety concerns.  A safety and risk management plan has been developed including: Patient consented to co-developed safety plan.  A safety and risk management plan was completed.  Patient agreed to use safety plan should any safety concerns arise.  A copy was given to the patient.    Appearance:   Appropriate   Eye Contact:   Fair   Psychomotor Behavior: Normal   Attitude:   Cooperative   Orientation:   All  Speech   Rate / Production: Monotone    Volume:  Normal   Mood:    Anxious  Depressed   Affect:    Flat   Thought Content:  Clear  Perservative  Rumination   Thought Form:  Coherent  Logical   Insight:    Good     Diagnoses:  No diagnosis found.    Collateral Reports Completed:  Not Applicable    Plan: (Homework, other):  Patient was given information about behavioral services and encouraged to schedule a follow up appointment with the clinic [facility] in 1 week.  She was also given information about mental health symptoms and treatment options , Cognitive Behavioral Therapy skills to practice when experiencing anxiety and depression and deep Breathing Strategies to practice when experiencing anxiety and depression.  CD Recommendations: No indications of CD issues. Patient will write down what she accomplishes, daily. She will notice automatic thoughts. Patient will follow safety plan for suicidal thoughts.  KVNG Watson, [facility]       ______________________________________________________________________    Integrated Primary Care Behavioral Health Treatment Plan    Patient's Name: Mattie Banda  YOB: 1980    Date: January 28, 2020    DSM-V Diagnoses: 296.33 (F33.2) Major Depressive Disorder, Recurrent  Episode, Severe _ or 300.02 (F41.1) Generalized Anxiety Disorder  Psychosocial / Contextual Factors: medical issues,  and dating ex-, few friends  WHODAS: 36    Referral / Collaboration:  Referral to another professional/service is not indicated at this time..    Anticipated number of session or this episode of care: 8      MeasurableTreatment Goal(s) related to diagnosis / functional impairment(s)  Goal 1: Patient will demonstrate ability to improve depression and anxiety symptoms as evidenced by improved PHQ9 & GAD7 scores.       Objective #A (Patient Action)    Patient will Decrease frequency and intensity of feeling down, depressed, hopeless  Identify negative self-talk and behaviors: challenge core beliefs, myths, and actions  Decrease thoughts that you'd be better off dead or of suicide / self-harm  Status: New - Date: 1/28/2020     Intervention(s)  [facility] will teach distress tolerance skills. Provide psycho-education on cognitive distortions and automatic thoughts and behaviors and ways to appropriately challenge and restructure thoughts.    Objective #B  Patient will increase coping strategies by 2  to more effectively manage current stressors  increase understanding of steps in the grief process  Status: New - Date: 1/28/2020     Intervention(s)  [facility] will teach mindfulness and relaxation strategies. Provide psycho-education on grief process.    Patient has reviewed and agreed to the above plan.    Written by  KVNG Watson, [facility]

## 2020-02-26 DIAGNOSIS — G89.29 CHRONIC BILATERAL LOW BACK PAIN WITH BILATERAL SCIATICA: ICD-10-CM

## 2020-02-26 DIAGNOSIS — G43.109 MIGRAINE WITH AURA AND WITHOUT STATUS MIGRAINOSUS, NOT INTRACTABLE: ICD-10-CM

## 2020-02-26 DIAGNOSIS — M54.42 CHRONIC BILATERAL LOW BACK PAIN WITH BILATERAL SCIATICA: ICD-10-CM

## 2020-02-26 DIAGNOSIS — M54.41 CHRONIC BILATERAL LOW BACK PAIN WITH BILATERAL SCIATICA: ICD-10-CM

## 2020-02-27 RX ORDER — TOPIRAMATE 25 MG/1
TABLET, FILM COATED ORAL
Qty: 90 TABLET | Refills: 0 | Status: SHIPPED | OUTPATIENT
Start: 2020-02-27 | End: 2020-03-24

## 2020-02-27 NOTE — TELEPHONE ENCOUNTER
"topamax  Last Written Prescription Date:  12/11/2019  Last Fill Quantity: 90,  # refills: 1   Last office visit: 1/31/2020 with prescribing provider:  Chandu   Future Office Visit:   Next 5 appointments (look out 90 days)    Mar 02, 2020  3:15 PM CST  SHORT with Yuan Schofield MD  [facility] ([address] [id number]  [phone]   Mar 10, 2020  3:00 PM CDT  Return Visit with KVNG Balbuena  [facility] ([facility]) [address] [place] [id number]  [phone]   Apr 24, 2020  8:30 AM CDT  Return Visit with Beatriz Nunez MD  [facility] ([facility]) [address] [place] [id number] [phone]         Requested Prescriptions   Pending Prescriptions Disp Refills     topiramate (TOPAMAX) 25 MG tablet [Pharmacy Med Name: TOPIRAMATE 25MG TABLET] 90 tablet 1     Sig: TAKE 1 TABLET BY MOUTH 3 TIMES A DAY       Anti-Seizure Meds Protocol  Failed - 2/26/2020  1:15 AM        Failed - Review Authorizing provider's last note.      Refer to last progress notes: confirm request is for original authorizing provider (cannot be through other providers).          Passed - Recent (12 mo) or future (30 days) visit within the authorizing provider's specialty     Patient has had an office visit with the authorizing provider or a provider within the authorizing providers department within the previous 12 mos or has a future within next 30 days. See \"Patient Info\" tab in inbasket, or \"Choose Columns\" in Meds & Orders section of the refill encounter.              Passed - Normal CBC on file in past 26 months     Recent Labs   Lab Test 01/31/20  1452   WBC 8.9   RBC 4.05   HGB 12.8   HCT 39.6                    Passed - Normal ALT or AST on file in past 26 months     Recent Labs   Lab Test 01/02/20  1535   ALT 42     Recent Labs   Lab Test 01/02/20  1535   AST 14 "             Passed - Normal platelet count on file in past 26 months     Recent Labs   Lab Test 01/31/20  1452                  Passed - Medication is active on med list        Passed - No active pregnancy on record        Passed - No positive pregnancy test in last 12 months        Medication is being filled for 1 time refill only due to:  patient has an upcoming appointment   Ángela Stein RN BSN

## 2020-02-28 ENCOUNTER — PATIENT OUTREACH (OUTPATIENT)
Dept: CARE COORDINATION | Facility: CLINIC | Age: 40
End: 2020-02-28

## 2020-02-28 ENCOUNTER — TELEPHONE (OUTPATIENT)
Dept: RHEUMATOLOGY | Facility: CLINIC | Age: 40
End: 2020-02-28

## 2020-02-28 NOTE — PROGRESS NOTES
Rheumatology Clinic Visit     Mattie Banda MRN# [id number]   YOB: 1980 Age: 39 year old     Date of Visit: Jan 27, 2020   Primary care provider: Yuan Schofield          Assessment and Plan:     Assessment     -- Polyarthralgia  -- Neck swelling - CT soft tissue neck - eccentric soft tissue thickening along the wall of distal right common carotid artery, carotid bifurcation and proximal cervical internal carotid artery and external carotid artery. No dissection or stenosis noted.   -- Lumbar DDD - L5-S1 anterior/posterior fusion  -- Hx of psoriasis    Ms. Banda is 39 year old female seen in clinic for evaluation of neck swelling and joint pains.     Neck swelling : Acute onset of palpable lump over right-side of neck in October 2019.  Presented to the ER and had a CT soft tissue neck which revealed eccentric soft tissue thickening along the wall of the distal right common carotid artery, carotid bifurcation and proximal cervical internal carotid artery and external carotid artery.  No dissection or stenosis noted.  Referred for evaluation of possible vasculitis.    Since October she had more episodes of neck swelling.  Every time treated with a short course of prednisone.  She thinks prednisone helps but not significantly.  At present she is off of prednisone for almost a month.  She was started on Plaquenil 200 mg twice a day dose by primary care a week ago for unknown inflammatory process.     Is the swelling related to fibro inflammatory process like IgG4 Related disease is difficult to say in the absence of tissue biopsy. I will get IgG4 levels and repeat CT soft tissue neck. Large vessel vasculitis like Takayasu's arteritis, GCA can involve carotid arteries.  For that will need to evaluate the lumen of the vessels with CT or MR angiogram. She tends to run low blood pressure, radial pulses normal. No carotid bruits. Further imaging can evaluate for stenosis in UE blood vessels like  subclavian leading to low Blood pressure readings.     Other conditions like relapsing polychondritis or Behcet's can be associated with large vessel involvement.  She denies any history of recurrent oral/nasal mucosal sores, uveitis, folliculitis like  rash.  She denies any recurrent episodes of nasal cartilage inflammation.  She does reports episode of ear inflammation noticed when she sleeps on her side putting pressure on the ear which then resolves in a day.  No swelling or inflammation noticed on exam today.     Polyarthralgia : She reports episodes of joint pains especially hands and feet ongoing for 3 to 4 years, worse in the last 1 year.  Has significant morning stiffness.  Has noticed occasional swelling in 1 or 2 fingers like dactylitis.  Has history of Raynaud's since her teenage years.  Also reports history of psoriasis which is well controlled.  Has noticed some improvement with prednisone.  Also has history of lumbar degenerative disc disease and chronic SI joint pain.  Interestingly MRI of the left hip done on 1/8/2019 showed evidence of possible bilateral sacroiliitis.  It could be seen with underlying degenerative arthritis but will consider getting SI joint xray and MRI pelvis to look for sacroiliitis if suspicion for spondylarthritis increase based on blood tests.      Plan      -- Orders placed this encounter  Orders Placed This Encounter   Procedures     XR Foot Bilateral G/E 3 Views     XR Wrist Bilateral G/E 3 Views     XR Hand Bilateral G/E 3 Views     Erythrocyte sedimentation rate auto     CRP inflammation     ANCA IgG by IFA with Reflex to Titer     ANCA Vasculitis panel     Centromere Antibody IgG     Immunoglobulin G subclasses     HLA-B27 Typing       -- Medications   She is on Plaquenil started by Dr Schofield. She can continue on it but there is no clear indication of use of Plaquenil at this point.     -- RTC in 3 months.                 Active Problem List:     Patient Active Problem  List    Diagnosis Date Noted     Vasculitis (H) 01/10/2020     Priority: Medium     Carotidynia 12/11/2019     Priority: Medium     Abnormal uterine bleeding 10/22/2019     Priority: Medium     Added automatically from request for surgery [id number]       Chronic pain syndrome 07/09/2018     Priority: Medium     Patient is very low risk to abuse  Pain medication.  Patient is followed by Yuan Schofield MD for ongoing prescription of pain medication.  All refills should only be approved by this provider, or covering partner.    Medication(s): ms contin and oxydodone ir.   Maximum quantity per month: #60 ms contin, #180 oxycodone  Clinic visit frequency required: Q 3 months      Controlled substance agreement:  Encounter-Level CSA - 08/20/2018:    Controlled Substance Agreement - Scan on 6/5/19 CONTROLLED SUBSTANCE AGREEMENT (below)       Patient-Level CSA:    Controlled Substance Agreement - Opioid - Scan on 6/13/2019  6:05 PM (below)    Controlled Substance Agreement - Opioid - Scan on 6/13/2019  6:03 PM: 06/05/2019 (below)         Pain Clinic evaluation in the past: Yes       Date/Location:  [facility] and will go there soon, 6/6/2019.    DIRE Total Score(s):    6/6/2019   Total Score 19       Last [facility] website verification:  10/25/2019 390   [email or web address]             Excessive or frequent menstruation 02/07/2018     Priority: Medium     DDD (degenerative disc disease), cervical 02/07/2018     Priority: Medium     Lymphocytic colitis 06/13/2017     Priority: Medium     Idiopathic peripheral neuropathy 01/10/2017     Priority: Medium     Hypothyroidism, unspecified type 01/10/2017     Priority: Medium     Migraine with aura and without status migrainosus, not intractable 12/27/2016     Priority: Medium     Tobacco use disorder 09/12/2016     Priority: Medium     Low grade squamous intraepithelial lesion on cytologic smear of cervix (LGSIL) 02/08/2016     Priority: Medium      2015Possibly high-grade lesion. Done in [place] in 2015 2/8/16 pap NIL/neg HR HPV. Plan: cotest in 3 years. Tracking.       Labial lesion 02/08/2016     Priority: Medium     Superior folds of the vulva/labia right side       Mixed anxiety depressive disorder 02/08/2016     Priority: Medium     S/P lumbar fusion and discectomy June 2015 06/28/2015     Priority: Medium     Degeneration of lumbar intervertebral disc 04/05/2014     Priority: Medium     newly added to list on 4/4//14  but present for last months       CARDIOVASCULAR SCREENING; LDL GOAL LESS THAN 160 10/31/2010     Priority: Medium     Contraceptive management 07/02/2009     Priority: Medium            History of Present Illness:   Mattie Banda is a 39 year old female with PMH of lymphocytic colitis seen in the clinic in consultation at request of Dr Schofield for evaluation of neck pain.     She reports having bad headache, ear ache, swelling over the neck, acute in onset. First time she took prednisone 60 mg x 2 weeks. She is not sure if prednisone helped her or not. She had another flare up while on prednisone. She has hx of migraines and gets visual aura. She felt a lump on the right side of her  neck, she had trouble moving her head back or to the side. In 10/2019 she had CT soft tissue neck which showed eccentric soft tissue thickening along the wall of distal right common carotid artery, carotid bifurcation and proximal cervical internal carotid artery and external carotid artery. No dissection or stenosis noted.    She has been treated with prednisone since 10/2019. Since December 2019 she is off of it. She is on Plaquenil now for 3 - 4 weeks, denies any side effects with it.     She has joint pain and swelling for 3 - 4 years. She has pain in her hands, elbows, hips, knees, back, feet. With weather her joints get worse. In the morning her hands are stiff. It takes few hours. She had swelling over left 3,4 MCP joint. PGM and father has arthritis.  She reports Raynaud's since early teens.     No history of psoriasis, ulcerative colitis or chron's disease. No h/o iritis, enthesitis, finger or toe swelling like dactylitis, plantar fascitis or heel pain. Denies any raynauds, malar rash, photosensitivity, recurrent mouth/genital ulcers, sicca symptoms, pleuritic chest pains, recurrent sinusitis/rhinitis, swallowing difficulty, hearing or visual changes recently.   No h/o arterial/venous thrombosis in the past. Denies any tick bite, recent GI/ infection.             Review of Systems:     Review Of Systems  Constitutional: denies fever, chills, night sweats and weight loss.  Skin: No skin rash.  Eyes: No dryness or irritation in eyes. No episode of eye inflammation or redness.   Ears/Nose/Throat: no recurrent sinus infections.  Respiratory: No shortness of breath, dyspnea on exertion, cough, or hemoptysis  Cardiovascular: no chest pain or palpitations.  Gastrointestinal: no nausea, vomiting, abdominal pain.  Normal bowel movements.  Genitourinary: no dysuria, frequency  or hematuria.  Musculoskeletal: as in HPI  Neurologic: no numbness, tingling.  Psychiatric: no mood disorders.  Hematologic/Lymphatic/Immunologic: no history of easy bruising, petechia or purpura.  No abnormal bleeding.   Endocrine: no h/o thyroid disease or Diabetes.                  Past Medical History:     Past Medical History:   Diagnosis Date     Anxiety      Breast disorder Not sure     Depressive disorder      Papanicolaou smear of cervix with low grade squamous intraepithelial lesion (LGSIL)      Thyroid disease      Urinary tract infection, site not specified     Recurrent UTI's     Wounds and injuries 2000     Past Surgical History:   Procedure Laterality Date     ABDOMEN SURGERY  2015    For back surgery     BACK SURGERY  6/2015      BIOPSY  Don t remember     COLONOSCOPY  08/06/07     COLONOSCOPY  08/05/08     HC COLONOSCOPY W BIOPSY  02/06/08     HC TOOTH EXTRACTION W/FORCEP       wisdom teeth removed     INJECT BLOCK MEDIAL BRANCH CERVICAL/THORACIC/LUMBAR N/A 6/21/2019    Procedure: BLOCK, NERVE, SPINAL, MEDIAL BRANCH lumbar 2, 3, 4;  Surgeon: Edgardo Rubio MD;  Location: [facility] OR     INJECT EPIDURAL CERVICAL N/A 3/8/2018    Procedure: INJECT EPIDURAL CERVICAL;  cervical 6-7 interlaminar epidural steroid injection;  Surgeon: Edgardo Rubio MD;  Location: [facility] OR     LAPAROSCOPIC TUBAL LIGATION  11/27/2012    Procedure: LAPAROSCOPIC TUBAL LIGATION;  Laparoscopic Bilateral tubal sterilization/ fulgaration;  Surgeon: Sarbjit Whittaker MD;  Location: [facility] OR     ORTHOPEDIC SURGERY  6/2015            Social History:     Social History     Occupational History     Occupation: Unit Worker     Comment: [employer]   Tobacco Use     Smoking status: Current Every Day Smoker     Packs/day: 1.50     Years: 20.00     Pack years: 30.00     Types: Cigarettes     Smokeless tobacco: Never Used   Substance and Sexual Activity     Alcohol use: Yes     Alcohol/week: 0.0 standard drinks     Comment: Rarely     Drug use: No     Sexual activity: Yes     Partners: Male     Birth control/protection: Female Surgical            Family History:     Family History   Problem Relation Age of Onset     Hypertension Maternal Grandmother      Arthritis Maternal Grandmother      Cancer Maternal Grandmother         MGGM     Depression Maternal Grandmother      Lipids Maternal Grandmother      Osteoporosis Maternal Grandmother      Respiratory Maternal Grandmother         emphysema     Genitourinary Problems Maternal Grandmother         Kidney Failure, liver      Coronary Artery Disease Maternal Grandmother      Hyperlipidemia Maternal Grandmother      Anxiety Disorder Maternal Grandmother      Asthma Maternal Grandmother      Anesthesia Reaction Maternal Grandmother      Hypertension Maternal Grandfather      Heart Disease Maternal Grandfather         MI     Cardiovascular Maternal Grandfather      Cancer -  colorectal Maternal Grandfather      Cancer Maternal Grandfather         [place]od[place] Lymphoma     Other Cancer Maternal Grandfather      Coronary Artery Disease Maternal Grandfather      Hypertension Paternal Grandmother      Arthritis Paternal Grandmother         RA     Osteoporosis Paternal Grandmother      Obesity Paternal Grandmother      Substance Abuse Mother         Alcoholic     Depression Mother      Hypertension Father      Hyperlipidemia Father      Mental Illness Father      Substance Abuse Father         Bio and Step dad have alcohol problems     Osteoporosis Father      Coronary Artery Disease Paternal Grandfather      Hypertension Paternal Grandfather      Breast Cancer Other         Maternal great aunt     Coronary Artery Disease Other      Diabetes Other      Thyroid Disease Maternal Aunt         two aunts     Breast Cancer Other      Colon Cancer Other      Prostate Cancer Other      Other Cancer Other      Other Cancer Other      Thyroid Disease Other      Anxiety Disorder Other      Substance Abuse Other      Depression Other      Anxiety Disorder Maternal Half-Sister      Thyroid Disease Maternal Half-Sister      Thyroid Disease Other      Asthma Other      Thyroid Disease Other      Obesity Other      Diabetes No family hx of      Colon Cancer No family hx of      Mental Illness No family hx of             Allergies:     Allergies   Allergen Reactions     Bupropion Hcl Hives     Wellbutrin            Medications:     Current Outpatient Medications   Medication Sig Dispense Refill     acetaminophen (TYLENOL) 325 MG tablet Take 325-650 mg by mouth every 6 hours as needed for mild pain       albuterol (VENTOLIN HFA) 108 (90 Base) MCG/ACT inhaler Inhale 2 puffs into the lungs every 6 hours as needed for shortness of breath / dyspnea or wheezing 52 g 0     ALPRAZolam (XANAX) 2 MG tablet TAKE 1/2-1 TABLET (1-2MG) BY MOUTH 3 TIMES DAILY AS NEEDED FOR STRESS/SLEEP 90 tablet 1     escitalopram (LEXAPRO) 20  "MG tablet Take 1 tablet (20 mg) by mouth daily 90 tablet 2     hydroxychloroquine (PLAQUENIL) 200 MG tablet Take 1 tablet (200 mg) by mouth 2 times daily 60 tablet 1     ibuprofen (ADVIL/MOTRIN) 200 MG capsule Take 200 mg by mouth every 4 hours as needed for fever       levothyroxine (SYNTHROID/LEVOTHROID) 112 MCG tablet Take 1 tablet (112 mcg) by mouth daily 90 tablet 1     morphine (MS CONTIN) 15 MG CR tablet Take 1 tablet (15 mg) by mouth every 12 hours maximum 2 tablet(s) per day 60 tablet 0     NARCAN 4 MG/0.1ML nasal spray SPRAY 1 SPRAY (4 MG) INTO ONE NOSTRIL ALTERNATING NOSTRILS ONCE AS NEEDED FOR OPIOID REVERSAL EVERY 2-3 MINUTES UNTIL ASSISTANCE ARRIVES  0     oxyCODONE (ROXICODONE) 5 MG tablet 1-2 tablets 4 times daily for breakthrough pain.  Use sparingly. 240 tablet 0     topiramate (TOPAMAX) 25 MG tablet TAKE 1 TABLET BY MOUTH 3 TIMES A DAY 90 tablet 1     traZODone (DESYREL) 100 MG tablet Take 100 mg by mouth At Bedtime Take 1 to 1.5 tablets at bedtime as needed              Physical Exam:   Blood pressure (!) 86/57, pulse 67, height 1.676 m (5' 6\"), weight 80.3 kg (177 lb), SpO2 96 %, not currently breastfeeding.  Wt Readings from Last 4 Encounters:   01/27/20 80.3 kg (177 lb)   01/10/20 80.7 kg (177 lb 14.4 oz)   12/20/19 79 kg (174 lb 3.2 oz)   12/11/19 78.9 kg (173 lb 14.4 oz)       Constitutional: Obese, appearing stated age; cooperative  Eyes: nl EOM, PERRLA, vision, conjunctiva, sclera  ENT: nl external ears, nose, hearing, lips, teeth, gums, throat  No mucous membrane lesions, normal saliva pool  Neck: no mass or thyroid enlargement  Resp: lungs clear to auscultation, nl to palpation  CV: RRR, no murmurs, rubs or gallops, no edema  GI: no ABD mass or tenderness, no HSM  : not tested  Lymph: no cervical, supraclavicular, inguinal or epitrochlear nodes    MS: All TMJ, neck, shoulder, elbow, wrist, MCP/PIP/DIP, spine, hip, knee, ankle, and foot MTP/IP joints were examined.   -- No active " synovitis or deformity. Reports tenderness over MCP, PIP  Joints.   -- No dactylitis,  tenosynovitis, enthesopathy.    Skin: no nail pitting, alopecia, rash, nodules or lesions  Neuro: nl cranial nerves, strength, sensation, DTRs.   Psych: nl judgement, orientation, memory, affect.         Data:     Results for orders placed or performed in visit on 01/27/20   XR Foot Bilateral G/E 3 Views     Status: None    Narrative    EXAMINATION: XR FOOT BILATERAL G/E 3 VW  1/27/2020 11:11 AM     CLINICAL HISTORY:  to look for inflammatory arthritis and OA;  Polyarthralgia     COMPARISON: None available    FINDINGS: AP, oblique and lateral views of bilateral feet were  obtained. There is no comparison available.    Left foot: AP, oblique and lateral views of the left foot were  obtained. The tarsal bones are normally aligned. The joint spaces are  preserved. No evidence of acute osseous abnormalities.    Right foot: AP, oblique and lateral views of the right foot were  obtained. The tarsal bones are normally aligned. The joint spaces are  preserved. No evidence of acute osseous abnormalities.      Impression    IMPRESSION:   1. No evidence of acute osseous abnormalities.  2. Bilaterally, joint spaces are preserved.    JUTTA ELLERMANN, MD   XR Wrist Bilateral G/E 3 Views     Status: None    Narrative    EXAMINATION: XR HAND BILATERAL G/E 3 VW, XR WRIST BILATERAL G/E 3 VW   1/27/2020 11:10 AM     CLINICAL HISTORY:  to look for inflammatory arthritis and OA;  Polyarthralgia     COMPARISON: None available    FINDINGS: AP, oblique and lateral views of bilateral hands and wrists  were obtained. There is no comparison available.    Left hand and wrist: The carpal bones are normally aligned. Joint  spaces are preserved. There is no evidence of acute osseous  abnormalities.    Right hand and wrist: The carpal bones are normally aligned. Joint  spaces are preserved. There is no evidence of acute osseous  abnormalities.      Impression     IMPRESSION:   1. Bilaterally, no evidence of osseous abnormalities.  2. Preserved joint spaces, bilaterally.    JUTTA ELLERMANN, MD   XR Hand Bilateral G/E 3 Views     Status: None    Narrative    EXAMINATION: XR HAND BILATERAL G/E 3 VW, XR WRIST BILATERAL G/E 3 VW   1/27/2020 11:10 AM     CLINICAL HISTORY:  to look for inflammatory arthritis and OA;  Polyarthralgia     COMPARISON: None available    FINDINGS: AP, oblique and lateral views of bilateral hands and wrists  were obtained. There is no comparison available.    Left hand and wrist: The carpal bones are normally aligned. Joint  spaces are preserved. There is no evidence of acute osseous  abnormalities.    Right hand and wrist: The carpal bones are normally aligned. Joint  spaces are preserved. There is no evidence of acute osseous  abnormalities.      Impression    IMPRESSION:   1. Bilaterally, no evidence of osseous abnormalities.  2. Preserved joint spaces, bilaterally.    JUTTA ELLERMANN, MD   Erythrocyte sedimentation rate auto     Status: None   Result Value Ref Range    Sed Rate 17 0 - 20 mm/h   CRP inflammation     Status: None   Result Value Ref Range    CRP Inflammation 4.1 0.0 - 8.0 mg/L       Recent Labs   Lab Test 01/02/20  1535 12/05/19  1946 11/04/19  1443 10/30/19  1341 06/05/19  1121 04/08/19  2055  11/04/16  1432   WBC 17.8* 8.3  --  8.7 11.2* 11.5*   < > 8.2   RBC 3.70* 3.68*  --  3.68* 3.84 3.69*   < > 3.70*   HGB 11.8 11.7  --  11.8 12.2 12.0   < > 11.8   HCT 37.0 36.4  --  35.8 38.0 36.0   < > 36.0    99  --  97 99 98   < > 97   RDW 15.0 14.5  --  15.2* 14.5 14.8   < > 13.3    241  --  254 405 347   < > 287   ALBUMIN 3.5  --   --   --  3.8 4.1   < >  --    CRP  --  <2.9 5.8  --   --   --   --  <2.9   BUN 15 13  --  13 12 12   < > 13    < > = values in this interval not displayed.      Recent Labs   Lab Test 11/04/19  1443 06/05/19  1121 08/20/18  1611   TSH 0.12* 0.06* 0.13*   T4 1.30 2.09* 1.58*     Hemoglobin   Date Value  Ref Range Status   01/02/2020 11.8 11.7 - 15.7 g/dL Final   12/05/2019 11.7 11.7 - 15.7 g/dL Final   10/30/2019 11.8 11.7 - 15.7 g/dL Final     Urea Nitrogen   Date Value Ref Range Status   01/02/2020 15 7 - 30 mg/dL Final   12/05/2019 13 7 - 30 mg/dL Final   10/30/2019 13 7 - 30 mg/dL Final     Sed Rate   Date Value Ref Range Status   01/27/2020 17 0 - 20 mm/h Final   01/02/2020 10 0 - 20 mm/h Final   12/05/2019 21 (H) 0 - 20 mm/h Final     CRP Inflammation   Date Value Ref Range Status   01/27/2020 4.1 0.0 - 8.0 mg/L Final   12/05/2019 <2.9 0.0 - 8.0 mg/L Final   11/04/2019 5.8 0.0 - 8.0 mg/L Final     AST   Date Value Ref Range Status   01/02/2020 14 0 - 45 U/L Final   06/05/2019 15 0 - 45 U/L Final   04/08/2019 8 0 - 45 U/L Final     Albumin   Date Value Ref Range Status   01/02/2020 3.5 3.4 - 5.0 g/dL Final   06/05/2019 3.8 3.4 - 5.0 g/dL Final   04/08/2019 4.1 3.4 - 5.0 g/dL Final     Alkaline Phosphatase   Date Value Ref Range Status   01/02/2020 65 40 - 150 U/L Final   06/05/2019 90 40 - 150 U/L Final   04/08/2019 61 40 - 150 U/L Final     ALT   Date Value Ref Range Status   01/02/2020 42 0 - 50 U/L Final   06/05/2019 27 0 - 50 U/L Final   04/08/2019 18 0 - 50 U/L Final     Rheumatoid Factor   Date Value Ref Range Status   07/27/2019 <20 <20 IU/mL Final   11/04/2016 <20 <20 IU/mL Final     Recent Labs   Lab Test 01/02/20  1535 12/05/19  1946 11/04/19  1443 10/30/19  1341 06/05/19  1121 04/08/19 2055  08/20/18  1611   WBC 17.8* 8.3  --  8.7 11.2* 11.5*   < >  --    HGB 11.8 11.7  --  11.8 12.2 12.0   < >  --    HCT 37.0 36.4  --  35.8 38.0 36.0   < >  --     99  --  97 99 98   < >  --     241  --  254 405 347   < >  --    BUN 15 13  --  13 12 12   < >  --    TSH  --   --  0.12*  --  0.06*  --   --  0.13*   AST 14  --   --   --  15 8   < >  --    ALT 42  --   --   --  27 18   < >  --    ALKPHOS 65  --   --   --  90 61   < >  --     < > = values in this interval not displayed.       Reviewed  Rheumatology lab flowsheet    Beatriz Nunez MD  [facility]  Department of Rheumatology & Autoimmune Disorders  [facility]: [phone]   Pager - [phone]

## 2020-02-28 NOTE — TELEPHONE ENCOUNTER
Called patient and left vm stating results were normal. Clinic number provided and asked patient to call with questions.     EFRAIN LawrenceN, RN   Rheumatology Care Coordinator   [facility]        ----- Message from Beatriz Nunze MD sent at 2/27/2020 11:38 PM CST -----  Your test results for inflammatory markers, vasculitis panel, IgG levels are normal. These tests were done to look for vasculitis.

## 2020-02-28 NOTE — PROGRESS NOTES
Clinic Care Coordination Contact  [facility]/Voicemail       Clinical Data: Care Coordinator Outreach  Outreach attempted x 3.  Left message on patient's voicemail with call back information and requested return call.  Plan: Care Coordinator will send disenrollment letter with care coordinator contact information via [employer]. Care Coordinator will do no further outreaches at this time.      MARK Salas   Primary Care Clinic- Social Work Care Coordinator  [facility]  2/28/2020 3:02 PM  [phone]

## 2020-02-28 NOTE — LETTER
[place] CARE COORDINATION  [facility]  [address] [place] [address]    February 28, 2020    Mattie Banda  [address] [facility] [address]      Dear Mattie,    I have been unsuccessful in reaching you since our last contact. At this time the Care Coordination team will make no further attempts to reach you, however this does not change your ability to continue receiving care from your providers at your primary care clinic. If you need additional support from a care coordinator in the future please contact me at [phone].    All of us at [facility] are invested in your health and are here to assist you in meeting your goals.     Sincerely,    MARK Salas   Primary Care Clinic- Social Work Care Coordinator  [facility]   [phone]

## 2020-03-02 ENCOUNTER — OFFICE VISIT (OUTPATIENT)
Dept: FAMILY MEDICINE | Facility: CLINIC | Age: 40
End: 2020-03-02
Payer: COMMERCIAL

## 2020-03-02 ENCOUNTER — TELEPHONE (OUTPATIENT)
Dept: RHEUMATOLOGY | Facility: CLINIC | Age: 40
End: 2020-03-02

## 2020-03-02 VITALS
DIASTOLIC BLOOD PRESSURE: 56 MMHG | OXYGEN SATURATION: 98 % | RESPIRATION RATE: 15 BRPM | WEIGHT: 164.3 LBS | HEART RATE: 76 BPM | TEMPERATURE: 97 F | SYSTOLIC BLOOD PRESSURE: 122 MMHG | BODY MASS INDEX: 25.73 KG/M2

## 2020-03-02 DIAGNOSIS — I77.6 VASCULITIS (H): ICD-10-CM

## 2020-03-02 DIAGNOSIS — Z98.1 S/P LUMBAR FUSION: ICD-10-CM

## 2020-03-02 DIAGNOSIS — F41.1 GAD (GENERALIZED ANXIETY DISORDER): ICD-10-CM

## 2020-03-02 DIAGNOSIS — K52.832 LYMPHOCYTIC COLITIS: ICD-10-CM

## 2020-03-02 DIAGNOSIS — F43.10 PTSD (POST-TRAUMATIC STRESS DISORDER): Primary | ICD-10-CM

## 2020-03-02 DIAGNOSIS — N76.0 BACTERIAL VAGINITIS: ICD-10-CM

## 2020-03-02 DIAGNOSIS — M51.369 DEGENERATION OF LUMBAR INTERVERTEBRAL DISC: ICD-10-CM

## 2020-03-02 DIAGNOSIS — B96.89 BACTERIAL VAGINITIS: ICD-10-CM

## 2020-03-02 PROCEDURE — 99215 OFFICE O/P EST HI 40 MIN: CPT | Performed by: FAMILY MEDICINE

## 2020-03-02 RX ORDER — METHOCARBAMOL 750 MG/1
750 TABLET, FILM COATED ORAL 3 TIMES DAILY PRN
Qty: 90 TABLET | Refills: 0 | Status: SHIPPED | OUTPATIENT
Start: 2020-03-02 | End: 2020-03-30

## 2020-03-02 RX ORDER — METRONIDAZOLE 500 MG/1
500 TABLET ORAL 2 TIMES DAILY
Qty: 14 TABLET | Refills: 0 | Status: SHIPPED | OUTPATIENT
Start: 2020-03-02 | End: 2020-03-30

## 2020-03-02 ASSESSMENT — PAIN SCALES - GENERAL: PAINLEVEL: SEVERE PAIN (7)

## 2020-03-02 NOTE — PROGRESS NOTES
"Clinic Care Coordination Contact  Care Team Conversations    Pt left message back for AUTUMN MALDONADO. States she would still like to talk with AUTUMN MALDONADO. States her mental health has \"gotten in the way\" and she had a surgery.     Plan: Pt states she will call AUTUMN MALDONADO before her appointment with PCP on 3/2/20.     AUTUMN MALDONADO notes she is seeing PCP today at 3:30 in [place]. AUTUMN MALDONADO will attempt to meet with pt while in clinic.     MARK Salas   Primary Care Clinic- Social Work Care Coordinator  [facility]  3/2/2020 8:16 AM  [phone]    "

## 2020-03-02 NOTE — PATIENT INSTRUCTIONS
Stay with meds you have.    Your back requires at least 6 weeks post surgery to improve  Use Vaseline and the sore on right flank should clear, clear seal are good  Stay with Steph Bridges until we can find another solution for you  You are in the process of improving,

## 2020-03-02 NOTE — TELEPHONE ENCOUNTER
----- Message from Beatriz Nunez MD sent at 2/28/2020  1:05 PM CST -----  Can you call the patient and let her know that her blood tests are normal.     She had MRI of left hip done last year which showed possible Sacroiliitis. It can be seen in patients with Psoriasis. To look into detail for possibility of Psoriatic arthritis I will like to get X ray of SI joints, If it shows any finding then we confirm with the MRI pelvis.     How is her neck swelling ? If it is bothering her then she will need neck to be imaged again as well.     She just had surgery done recently. She can get the imaging that I recommend when she is able to do it.     Beatriz Nunez MD
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
## 2020-03-03 ENCOUNTER — MYC MEDICAL ADVICE (OUTPATIENT)
Dept: FAMILY MEDICINE | Facility: CLINIC | Age: 40
End: 2020-03-03

## 2020-03-03 DIAGNOSIS — Z90.710 HISTORY OF ROBOT-ASSISTED LAPAROSCOPIC HYSTERECTOMY: ICD-10-CM

## 2020-03-03 NOTE — PROGRESS NOTES
Clinic Care Coordination Contact    Follow Up Progress Note      Assessment: AUTUMN MALDONADO received call from pt. Unfortunately AUTUMN MALDONADO missed pt in clinic yesterday. Pt apologized for not returning SW CC calls sooner, stating she just hasn't been feeling well after her surgery and she just couldn't get herself to call back until now.    Pt states that PCP feels she will now likely be out of work for 12 months or longer given her mental health struggles. Pt would like to start process of applying for disability.      Goals addressed this encounter:   Goals Addressed                 This Visit's Progress       Patient Stated      Financial Wellbeing (pt-stated)   30%     Goal Statement: I would like to start the process of applying for Disability now that MD feels my issues will cause me to be unable to work for 12 months or more    Date Goal set: 12/19/19  Barriers: struggling with mental health; recent surgery; pain  Strengths: Pt took steps to call AUTUMN MALDONADO back after getting messages; reaching out for assistance; been seeing [facility]   Date to Achieve By: 4/30/20  Patient expressed understanding of goal: yes  Action steps to achieve this goal:  1. I will contact  to see about initiating the process of applying for disability.   2. I will continue to see [facility] for counseling for now and consider longer term counseling options for future         Intervention/Education provided during outreach: Let pt know should consider working with  to start process of applying for disability ([phone]).     Outreach Frequency: monthly    Plan:   Pt will call  to start process of applying for disability.    Care Coordinator will follow up in 1 month.       MARK Salas   Primary Care Clinic- Social Work Care Coordinator  [facility]  3/3/2020 10:28 AM  [phone]

## 2020-03-04 RX ORDER — OXYCODONE HYDROCHLORIDE 5 MG/1
5-10 TABLET ORAL EVERY 6 HOURS PRN
Qty: 240 TABLET | Refills: 0 | Status: SHIPPED | OUTPATIENT
Start: 2020-03-04 | End: 2020-03-16

## 2020-03-04 ASSESSMENT — PATIENT HEALTH QUESTIONNAIRE - PHQ9
5. POOR APPETITE OR OVEREATING: NEARLY EVERY DAY
SUM OF ALL RESPONSES TO PHQ QUESTIONS 1-9: 25

## 2020-03-04 ASSESSMENT — ANXIETY QUESTIONNAIRES
7. FEELING AFRAID AS IF SOMETHING AWFUL MIGHT HAPPEN: NEARLY EVERY DAY
IF YOU CHECKED OFF ANY PROBLEMS ON THIS QUESTIONNAIRE, HOW DIFFICULT HAVE THESE PROBLEMS MADE IT FOR YOU TO DO YOUR WORK, TAKE CARE OF THINGS AT HOME, OR GET ALONG WITH OTHER PEOPLE: EXTREMELY DIFFICULT
5. BEING SO RESTLESS THAT IT IS HARD TO SIT STILL: NEARLY EVERY DAY
2. NOT BEING ABLE TO STOP OR CONTROL WORRYING: NEARLY EVERY DAY
GAD7 TOTAL SCORE: 20
6. BECOMING EASILY ANNOYED OR IRRITABLE: MORE THAN HALF THE DAYS
3. WORRYING TOO MUCH ABOUT DIFFERENT THINGS: NEARLY EVERY DAY
1. FEELING NERVOUS, ANXIOUS, OR ON EDGE: NEARLY EVERY DAY

## 2020-03-04 NOTE — TELEPHONE ENCOUNTER
The patients previous prescription for the oxycodone was take 1-2 tablets 4 times daily. Qty was 240 tablets.   Pt's surgeon prescribed oxycodone. Take 1 tablet by mouth every 6 hours as needed. Qty of 10 tablets.   Elaina Ramos CMA ([facility])

## 2020-03-04 NOTE — TELEPHONE ENCOUNTER
See patient MyChart message and get details so we return to level of pain meds she was taking for her back prior to surgery. I will not approve more than before.  Enter refill requests as needed.  Yuan Schofield MD

## 2020-03-04 NOTE — TELEPHONE ENCOUNTER
I will see patient at the end of March and we once again discussed use of chronic pain medication at higher doses than those used postoperatively with surgery.  Until she fully recovers from surgery, any other pain management direction may not be successful.  I would expect at the end of March with surgery early in February, we will be working with chronic pain issues and not acute pain issues.  Yuan Schofield MD

## 2020-03-05 ASSESSMENT — ANXIETY QUESTIONNAIRES: GAD7 TOTAL SCORE: 20

## 2020-03-06 NOTE — TELEPHONE ENCOUNTER
Called patient to discuss results and follow up on symptoms. Patient not available, left v/m. Instructed patient to call back.

## 2020-03-12 ENCOUNTER — OFFICE VISIT (OUTPATIENT)
Dept: BEHAVIORAL HEALTH | Facility: CLINIC | Age: 40
End: 2020-03-12
Payer: COMMERCIAL

## 2020-03-12 DIAGNOSIS — F32.2 CURRENT SEVERE EPISODE OF MAJOR DEPRESSIVE DISORDER WITHOUT PSYCHOTIC FEATURES WITHOUT PRIOR EPISODE (H): Primary | ICD-10-CM

## 2020-03-12 DIAGNOSIS — F41.1 GAD (GENERALIZED ANXIETY DISORDER): ICD-10-CM

## 2020-03-12 PROCEDURE — 90832 PSYTX W PT 30 MINUTES: CPT | Performed by: MARRIAGE & FAMILY THERAPIST

## 2020-03-12 NOTE — PROGRESS NOTES
[facility] Primary Care: Integrated Behavioral Health  March 12, 2020      Behavioral Health Clinician Progress Note    Patient Name: Mattie Banda           Service Type:  Individual      Service Location:   Face to Face in Clinic     Session Start Time: 2:38  Session End Time: 3:04      Session Length: 16 - 37      Attendees: Patient    Visit Activities (Refresh list every visit): [facility] Only    Diagnostic Assessment Date: 1/21/2020  Treatment Plan Review Date: 1/28/2020  See Flowsheets for today's PHQ-9 and CRISTIANO-7 results  Previous PHQ-9:   PHQ-9 SCORE 12/11/2019 1/7/2020 3/4/2020   PHQ-9 Total Score - - -   PHQ-9 Total Score 24 24 25     Previous CRISTIANO-7:   CRISTIANO-7 SCORE 10/25/2019 1/7/2020 3/4/2020   Total Score - - -   Total Score 19 20 20       EMILIA LEVEL:  EMILIA Score (Last Two) 11/14/2012   EMILIA Raw Score 44   Activation Score 70.8   EMILIA Level 4       DATA  Extended Session (60+ minutes): No  Interactive Complexity: No  Crisis: No  [facility] Patient: No    Treatment Objective(s) Addressed in This Session:  Target Behavior(s): disease management/lifestyle changes depression and anxiety    Depressed Mood: Increase interest, engagement, and pleasure in doing things  Decrease frequency and intensity of feeling down, depressed, hopeless  Improve quantity and quality of night time sleep / decrease daytime naps  Feel less tired and more energy during the day   Improve diet, appetite, mindful eating, and / or meal planning  Identify negative self-talk and behaviors: challenge core beliefs, myths, and actions  Improve concentration, focus, and mindfulness in daily activities   Decrease thoughts that you'd be better off dead or of suicide / self-harm  Anxiety: will experience a reduction in anxiety, will develop more effective coping skills to manage anxiety symptoms, will develop healthy cognitive patterns and beliefs and will increase ability to function adaptively    Current Stressors / Issues:  Patient reports  "feeling more \"bleh\" for the past couple days. She states that on 3/9 it has been 11 months since the passing of her grandma. She reports that grandma's birthday is approaching and the one year anniversary is approaching. Patient states that she feels that she is isolating herself more lately. They have a heated garage with a tv and she spends most of her time out there because she can smoke while there. Patient reports that her smoking has increased. Her SO will be gone for a couple months for work.   She is looking for psychiatry and we reviewed options she was referred to by her insurance.   Patient reports that she did a phone intake for disability.     Processed recent events. Provided psycho-education on grief and loss. Discussed how response can look different in everyone. Reflected judgmental thought processes and impact on mood and behavioral. Provide handout on challenging cognitive distortions and coping statements. Encouraged patient to utilize. She has started to color some, though SO had made unsupportive comments. Reinforced patient doing the coloring as she finds it helpful and discussed ways to communicate support needs to SO.    Progress on Treatment Objective(s) / Homework:  No improvement - PREPARATION (Decided to change - considering how); Intervened by negotiating a change plan and determining options / strategies for behavior change, identifying triggers, exploring social supports, and working towards setting a date to begin behavior change    Motivational Interviewing    MI Intervention: Expressed Empathy/Understanding, Supported Autonomy, Collaboration, Evocation, Permission to raise concern or advise, Open-ended questions, Reflections: simple and complex, Change talk (evoked) and Reframe     Change Talk Expressed by the Patient: Desire to change Ability to change Reasons to change Need to change Committment to change Activation Taking steps    Provider Response to Change Talk: E - Evoked " more info from patient about behavior change, A - Affirmed patient's thoughts, decisions, or attempts at behavior change, R - Reflected patient's change talk and S - Summarized patient's change talk statements    Also provided psychoeducation about behavioral health condition, symptoms, and treatment options      Skills training    Explored skills useful to client in current situation    Skills include assertiveness, communication, conflict management, problem-solving, relaxation, etc.    Solution-Focused Therapy    Explored patterns in patient's relationships and discussed options for new behaviors    Explored patterns in patient's actions and choices and discussed options for new behaviors    Cognitive-behavioral Therapy    Discussed common cognitive distortions, identified them in patient's life    Explored ways to challenge, replace, and act against these cognitions    Acceptance and Commitment Therapy    Explored and identified important values in patient's life    Discussed ways to commit to behavioral activation around these values    Psychodynamic psychotherapy    Discussed patient's emotional dynamics and issues and how they impact behaviors    Explored patient's history of relationships and how they impact present behaviors    Explored how to work with and make changes in these schemas and patterns    Behavioral Activation    Discussed steps patient can take to become more involved in meaningful activity    Identified barriers to these activities and explored possible solutions    Mindfulness-Based Strategies    Discussed skills based on development and application of mindfulness    Skills drawn from dialectical behavior therapy, mindfulness-based stress reduction, mindfulness-based cognitive therapy, etc.      Care Plan review completed: Yes    Medication Review:  No changes to current psychiatric medication(s)     Medication Compliance:  Yes    Changes in Health Issues:   None reported    Chemical Use  "Review:   Substance Use: Chemical use reviewed, no active concerns identified       Tobacco Use: No change in amount of tobacco use since last session.  Patient declined discussion at this time    Assessment: Current Emotional / Mental Status (status of significant symptoms):  Risk status (Self / Other harm or suicidal ideation)  Patient has had a history of suicidal ideation: in adolescense and suicide attempts: patient states that she tried multiple times around ages [age] by slitting wrists or overdose, and one overdose of alcohol; she denies ever being hospitalized for mental health and denies a history of self-injurious behavior, homicidal ideation, homicidal behavior and and other safety concerns  Patient denies current fears or concerns for personal safety.  Patient reports the following current or recent suicidal ideation or behaviors: patient reports having off and on thoughts of. \"I'm such a burden\" \"I'm not a good wife\" \"I'm not be a good friend\" \"my kids would be better off\". She is able to understand that her kids wouldn't be better off and would not be better off without her. She does not want anyone else to take care of her kids. Patient denies any suicidal plan or intent.  Patient denies current or recent homicidal ideation or behaviors.  Patient denies current or recent self injurious behavior or ideation.  Patient denies other safety concerns.  A safety and risk management plan has been developed including: Patient consented to co-developed safety plan.  A safety and risk management plan was completed.  Patient agreed to use safety plan should any safety concerns arise.  A copy was given to the patient.    Appearance:   Appropriate   Eye Contact:   Fair   Psychomotor Behavior: Normal   Attitude:   Cooperative   Orientation:   All  Speech   Rate / Production: Monotone    Volume:  Normal   Mood:    Anxious  Depressed   Affect:    Flat   Thought Content:  Clear  Perservative  Rumination   Thought " Form:  Coherent  Logical   Insight:    Good     Diagnoses:  1. Current severe episode of major depressive disorder without psychotic features without prior episode (H)    2. CRISTIANO (generalized anxiety disorder)        Collateral Reports Completed:  Not Applicable    Plan: (Homework, other):  Patient was given information about behavioral services and encouraged to schedule a follow up appointment with the clinic [facility] in 1 week.  She was also given information about mental health symptoms and treatment options , Cognitive Behavioral Therapy skills to practice when experiencing anxiety and depression and deep Breathing Strategies to practice when experiencing anxiety and depression.  CD Recommendations: No indications of CD issues.  Patient will follow safety plan for suicidal thoughts. Patient was referred to [facility] for specialty counseling.  [facility] Watson, [facility]       ______________________________________________________________________    Integrated Primary Care Behavioral Health Treatment Plan    Patient's Name: Mattie Banda  YOB: 1980    Date: January 28, 2020    DSM-V Diagnoses: 296.33 (F33.2) Major Depressive Disorder, Recurrent Episode, Severe _ or 300.02 (F41.1) Generalized Anxiety Disorder  Psychosocial / Contextual Factors: medical issues,  and dating ex-, few friends  WHODAS: 36    Referral / Collaboration:  Referral to another professional/service is not indicated at this time..    Anticipated number of session or this episode of care: 8      MeasurableTreatment Goal(s) related to diagnosis / functional impairment(s)  Goal 1: Patient will demonstrate ability to improve depression and anxiety symptoms as evidenced by improved PHQ9 & GAD7 scores.       Objective #A (Patient Action)    Patient will Decrease frequency and intensity of feeling down, depressed, hopeless  Identify negative self-talk and behaviors: challenge core beliefs, myths, and actions  Decrease thoughts that  you'd be better off dead or of suicide / self-harm  Status: New - Date: 1/28/2020     Intervention(s)  [facility] will teach distress tolerance skills. Provide psycho-education on cognitive distortions and automatic thoughts and behaviors and ways to appropriately challenge and restructure thoughts.    Objective #B  Patient will increase coping strategies by 2  to more effectively manage current stressors  increase understanding of steps in the grief process  Status: New - Date: 1/28/2020     Intervention(s)  [facility] will teach mindfulness and relaxation strategies. Provide psycho-education on grief process.    Patient has reviewed and agreed to the above plan.    Written by  KVNG Watson, [facility]

## 2020-03-15 ENCOUNTER — HEALTH MAINTENANCE LETTER (OUTPATIENT)
Age: 40
End: 2020-03-15

## 2020-03-16 ENCOUNTER — MYC REFILL (OUTPATIENT)
Dept: FAMILY MEDICINE | Facility: CLINIC | Age: 40
End: 2020-03-16

## 2020-03-16 DIAGNOSIS — F41.8 MIXED ANXIETY DEPRESSIVE DISORDER: ICD-10-CM

## 2020-03-16 DIAGNOSIS — Z90.710 HISTORY OF ROBOT-ASSISTED LAPAROSCOPIC HYSTERECTOMY: ICD-10-CM

## 2020-03-16 DIAGNOSIS — M50.30 DDD (DEGENERATIVE DISC DISEASE), CERVICAL: ICD-10-CM

## 2020-03-16 DIAGNOSIS — G47.9 SLEEP DISORDER: ICD-10-CM

## 2020-03-16 DIAGNOSIS — M51.369 DEGENERATION OF LUMBAR INTERVERTEBRAL DISC: ICD-10-CM

## 2020-03-16 RX ORDER — MORPHINE SULFATE 15 MG/1
15 TABLET, FILM COATED, EXTENDED RELEASE ORAL EVERY 12 HOURS
Qty: 60 TABLET | Refills: 0 | Status: SHIPPED | OUTPATIENT
Start: 2020-03-16 | End: 2020-04-08

## 2020-03-16 RX ORDER — OXYCODONE HYDROCHLORIDE 5 MG/1
5-10 TABLET ORAL EVERY 6 HOURS PRN
Qty: 240 TABLET | Refills: 0 | Status: SHIPPED | OUTPATIENT
Start: 2020-03-16 | End: 2020-04-08

## 2020-03-16 RX ORDER — ALPRAZOLAM 2 MG
TABLET ORAL
Qty: 90 TABLET | Refills: 1 | Status: CANCELLED | OUTPATIENT
Start: 2020-03-16

## 2020-03-16 NOTE — TELEPHONE ENCOUNTER
oxycodone      Last Written Prescription Date:  3/4/19  Last Fill Quantity: 240,   # refills: 0  Last Office Visit: 3/2/20  Future Office visit:    Next 5 appointments (look out 90 days)    Mar 17, 2020 11:30 AM CDT  Telephone Visit with KVNG Balbuena  [facility] ([address] [address]  [phone]   Mar 30, 2020  3:15 PM CDT  Office Visit with Yuan Schofield MD  [facility] ([address] [address]  [phone]   Apr 20, 2020  8:30 AM CDT  Return Visit with Neeta Donovan LPC  [facility] ([facility]) [address] [facility] [address] [phone]   Apr 24, 2020  8:30 AM CDT  Return Visit with Beatriz Nunez MD  [facility] ([facility] [address] [place] [id number] [phone]           Routing refill request to provider for review/approval because:  Drug not on the G, [facility] or [facility] refill protocol or controlled substance    Morphine      Last Written Prescription Date:  2/17/20  Last Fill Quantity: 60,   # refills: 0  Last Office Visit: 3/2/20  Future Office visit:    Next 5 appointments (look out 90 days)    Mar 17, 2020 11:30 AM CDT  Telephone Visit with KVNG Balbuena  [facility] ([facility]) [address] [place] [id number]  [phone]   Mar 30, 2020  3:15 PM CDT  Office Visit with Yuan Schofield MD  [facility] ([address] [address]  [phone]   Apr 20, 2020  8:30 AM CDT  Return Visit with Neeta Donovan LPC  [facility] ([facility]) [address] [facility] [address] [phone]   Apr 24, 2020  8:30 AM CDT  Return Visit with MD GUY Shaikh [facility] (  [facility] [address] [address] [place] [phone]           Routing refill request to provider for review/approval because:  Drug not on the [facility], [facility] or  Health refill protocol or controlled substance

## 2020-03-17 ENCOUNTER — VIRTUAL VISIT (OUTPATIENT)
Dept: BEHAVIORAL HEALTH | Facility: CLINIC | Age: 40
End: 2020-03-17
Payer: COMMERCIAL

## 2020-03-17 DIAGNOSIS — F41.1 GAD (GENERALIZED ANXIETY DISORDER): ICD-10-CM

## 2020-03-17 DIAGNOSIS — F32.2 CURRENT SEVERE EPISODE OF MAJOR DEPRESSIVE DISORDER WITHOUT PSYCHOTIC FEATURES WITHOUT PRIOR EPISODE (H): Primary | ICD-10-CM

## 2020-03-17 PROCEDURE — 99207 ZZC NON-BILLABLE SERV PER CHARTING: CPT | Performed by: MARRIAGE & FAMILY THERAPIST

## 2020-03-17 NOTE — TELEPHONE ENCOUNTER
Spoke with Mattie about recommendations. Her neck is feeling fine. Advised she will need to wait to have x-rays done. She expressed understanding and had no further questions.     Shameka Cazares BSN, RN   Rheumatology Care Coordinator   [facility]

## 2020-03-17 NOTE — PROGRESS NOTES
"Mattie Banda is a 39 year old female who is being evaluated via a billable telephone visit.      The patient has been notified of following:     \"This telephone visit will be conducted via a call between you and your physician/provider. We have found that certain health care needs can be provided without the need for a physical exam.  This service lets us provide the care you need with a short phone conversation.  If a prescription is necessary we can send it directly to your pharmacy.  If lab work is needed we can place an order for that and you can then stop by our lab to have the test done at a later time.    If during the course of the call the physician/provider feels a telephone visit is not appropriate, you will not be charged for this service.\"       Mattie Banda complains of  No chief complaint on file.      I have reviewed and updated the patient's Past Medical History, Social History, Family History and Medication List.    Assessment/Plan:  Current severe episode of major depressive disorder without psychotic features without prior episode (H)  (primary encounter diagnosis)  CRISTIANO (generalized anxiety disorder)    I have reviewed the note as documented above.  This accurately captures the substance of my conversation with the patient.  Please seen note below for more information    Phone call contact time  Call Started at 11:39  Call Ended at 12:07    Steph Bridges [facility] Primary Care: Integrated Behavioral Health  March 17, 2020      Behavioral Health Clinician Progress Note    Patient Name: Mattie Banda           Service Type:  Individual      Service Location:   Phone call (patient / identified key support person reached)     Session Start Time: 11:39  Session End Time: 12:07      Session Length: 16 - 37      Attendees: Patient    Visit Activities (Refresh list every visit): [facility] Only    Diagnostic Assessment Date: 1/21/2020  Treatment Plan Review Date: " 1/28/2020  See Flowsheets for today's PHQ-9 and CIRSTIANO-7 results  Previous PHQ-9:   PHQ-9 SCORE 12/11/2019 1/7/2020 3/4/2020   PHQ-9 Total Score - - -   PHQ-9 Total Score 24 24 25     Previous CRISTIANO-7:   CRISTIANO-7 SCORE 10/25/2019 1/7/2020 3/4/2020   Total Score - - -   Total Score 19 20 20       EMILIA LEVEL:  EMILIA Score (Last Two) 11/14/2012   EMILIA Raw Score 44   Activation Score 70.8   EMILIA Level 4       DATA  Extended Session (60+ minutes): No  Interactive Complexity: No  Crisis: No  [facility] Patient: No    Treatment Objective(s) Addressed in This Session:  Target Behavior(s): disease management/lifestyle changes depression and anxiety    Depressed Mood: Increase interest, engagement, and pleasure in doing things  Decrease frequency and intensity of feeling down, depressed, hopeless  Improve quantity and quality of night time sleep / decrease daytime naps  Feel less tired and more energy during the day   Improve diet, appetite, mindful eating, and / or meal planning  Identify negative self-talk and behaviors: challenge core beliefs, myths, and actions  Improve concentration, focus, and mindfulness in daily activities   Decrease thoughts that you'd be better off dead or of suicide / self-harm  Anxiety: will experience a reduction in anxiety, will develop more effective coping skills to manage anxiety symptoms, will develop healthy cognitive patterns and beliefs and will increase ability to function adaptively    Current Stressors / Issues:  Patient reports feeling more down. She states that her grandma's birthday is this Friday. She states that with COVID-19 she is more worried about finances. She states that she has a disability phone interview scheduled.     Discussed grief and loss. Patient states that she plans to celebrate her grandma's birthday, how they always did with coconut cream pie. Reinforced patient honoring her memory with tradition. Patient states that she had some people recommend a medium and she might consider  this. Patient states that she had a dream about her grandma screaming and hollering at her. She identified that this bothered her as this was something that never happened.     Addressed changes with COVID-19 and how patient plans to spend her time. Encouraged structure and getting fresh air. Explored social interaction and she states that she has her kids she can interact with.     Patient is scheduled with [facility] specialty therapy on 4/20/2020. Reinforced patient getting this set up. Discussed how patient can continue to meet with this writer for bridging visits.     Progress on Treatment Objective(s) / Homework:  No improvement - PREPARATION (Decided to change - considering how); Intervened by negotiating a change plan and determining options / strategies for behavior change, identifying triggers, exploring social supports, and working towards setting a date to begin behavior change    Motivational Interviewing    MI Intervention: Expressed Empathy/Understanding, Supported Autonomy, Collaboration, Evocation, Permission to raise concern or advise, Open-ended questions, Reflections: simple and complex, Change talk (evoked) and Reframe     Change Talk Expressed by the Patient: Desire to change Ability to change Reasons to change Need to change Committment to change Activation Taking steps    Provider Response to Change Talk: E - Evoked more info from patient about behavior change, A - Affirmed patient's thoughts, decisions, or attempts at behavior change, R - Reflected patient's change talk and S - Summarized patient's change talk statements    Also provided psychoeducation about behavioral health condition, symptoms, and treatment options      Skills training    Explored skills useful to client in current situation    Skills include assertiveness, communication, conflict management, problem-solving, relaxation, etc.    Solution-Focused Therapy    Explored patterns in patient's relationships and discussed options for  new behaviors    Explored patterns in patient's actions and choices and discussed options for new behaviors    Cognitive-behavioral Therapy    Discussed common cognitive distortions, identified them in patient's life    Explored ways to challenge, replace, and act against these cognitions    Acceptance and Commitment Therapy    Explored and identified important values in patient's life    Discussed ways to commit to behavioral activation around these values    Psychodynamic psychotherapy    Discussed patient's emotional dynamics and issues and how they impact behaviors    Explored patient's history of relationships and how they impact present behaviors    Explored how to work with and make changes in these schemas and patterns    Behavioral Activation    Discussed steps patient can take to become more involved in meaningful activity    Identified barriers to these activities and explored possible solutions    Mindfulness-Based Strategies    Discussed skills based on development and application of mindfulness    Skills drawn from dialectical behavior therapy, mindfulness-based stress reduction, mindfulness-based cognitive therapy, etc.      Care Plan review completed: Yes    Medication Review:  No changes to current psychiatric medication(s)     Medication Compliance:  Yes    Changes in Health Issues:   None reported    Chemical Use Review:   Substance Use: Chemical use reviewed, no active concerns identified       Tobacco Use: No change in amount of tobacco use since last session.  Patient declined discussion at this time    Assessment: Current Emotional / Mental Status (status of significant symptoms):  Risk status (Self / Other harm or suicidal ideation)  Patient has had a history of suicidal ideation: in adolescense and suicide attempts: patient states that she tried multiple times around ages [age] by slitting wrists or overdose, and one overdose of alcohol; she denies ever being hospitalized for mental health  "and denies a history of self-injurious behavior, homicidal ideation, homicidal behavior and and other safety concerns  Patient denies current fears or concerns for personal safety.  Patient reports the following current or recent suicidal ideation or behaviors: patient reports having off and on thoughts of. \"I'm such a burden\" \"I'm not a good wife\" \"I'm not be a good friend\" \"my kids would be better off\". She is able to understand that her kids wouldn't be better off and would not be better off without her. She does not want anyone else to take care of her kids. Patient denies any suicidal plan or intent.  Patient denies current or recent homicidal ideation or behaviors.  Patient denies current or recent self injurious behavior or ideation.  Patient denies other safety concerns.  A safety and risk management plan has been developed including: Patient consented to co-developed safety plan.  A safety and risk management plan was completed.  Patient agreed to use safety plan should any safety concerns arise.  A copy was given to the patient.      Appearance:   unable to assess due to phone visit   Eye Contact:   unable to assess due to phone visit   Psychomotor Behavior: unable to assess due to phone visit   Attitude:   Cooperative   Orientation:   All  Speech   Rate / Production: Monotone    Volume:  Normal   Mood:    Anxious  Depressed   Affect:    unable to assess due to phone visit   Thought Content:  Clear  Perservative  Rumination   Thought Form:  Coherent  Logical   Insight:    Good     Diagnoses:  1. Current severe episode of major depressive disorder without psychotic features without prior episode (H)    2. CRISTIANO (generalized anxiety disorder)        Collateral Reports Completed:  Not Applicable    Plan: (Homework, other):  Patient was given information about behavioral services and encouraged to schedule a follow up appointment with the clinic [facility] in 1 week.  She was also given information about mental health " symptoms and treatment options , Cognitive Behavioral Therapy skills to practice when experiencing anxiety and depression and deep Breathing Strategies to practice when experiencing anxiety and depression.  CD Recommendations: No indications of CD issues.  Patient will follow safety plan for suicidal thoughts.   KVNG Watson, [facility]       ______________________________________________________________________    Integrated Primary Care Behavioral Health Treatment Plan    Patient's Name: Mattie Banda  YOB: 1980    Date: January 28, 2020    DSM-V Diagnoses: 296.33 (F33.2) Major Depressive Disorder, Recurrent Episode, Severe _ or 300.02 (F41.1) Generalized Anxiety Disorder  Psychosocial / Contextual Factors: medical issues,  and dating ex-, few friends  WHODAS: 36    Referral / Collaboration:  Referral to another professional/service is not indicated at this time..    Anticipated number of session or this episode of care: 8      MeasurableTreatment Goal(s) related to diagnosis / functional impairment(s)  Goal 1: Patient will demonstrate ability to improve depression and anxiety symptoms as evidenced by improved PHQ9 & GAD7 scores.       Objective #A (Patient Action)    Patient will Decrease frequency and intensity of feeling down, depressed, hopeless  Identify negative self-talk and behaviors: challenge core beliefs, myths, and actions  Decrease thoughts that you'd be better off dead or of suicide / self-harm  Status: New - Date: 1/28/2020     Intervention(s)  [facility] will teach distress tolerance skills. Provide psycho-education on cognitive distortions and automatic thoughts and behaviors and ways to appropriately challenge and restructure thoughts.    Objective #B  Patient will increase coping strategies by 2  to more effectively manage current stressors  increase understanding of steps in the grief process  Status: New - Date: 1/28/2020     Intervention(s)  [facility] will teach  mindfulness and relaxation strategies. Provide psycho-education on grief process.    Patient has reviewed and agreed to the above plan.    Written by  KVNG Watson, [facility]

## 2020-03-19 ENCOUNTER — OFFICE VISIT (OUTPATIENT)
Dept: OBGYN | Facility: CLINIC | Age: 40
End: 2020-03-19
Attending: OBSTETRICS & GYNECOLOGY
Payer: COMMERCIAL

## 2020-03-19 DIAGNOSIS — Z90.710 S/P LAPAROSCOPIC HYSTERECTOMY: Primary | ICD-10-CM

## 2020-03-19 NOTE — TELEPHONE ENCOUNTER
I will hold on all the imaging orders at this time and would like to avoid going for the appointments if symptoms are stable and not life threatening.

## 2020-03-19 NOTE — LETTER
"3/19/2020       RE: Mattie Banda  [address] [facility] [address]     Dear Colleague,    Thank you for referring your patient, Mattie Banda, to the [facility] at [facility]. Please see a copy of my visit note below.    SUBJECTIVE     Mattie Banda is a 39 year old female who is being evaluated via a billable telephone visit.    Patient opted to conduct today's return visit via telephone secondary to the COVID-19 pandemic vs. an in person visit to the clinic.    I spoke with: Mattie Banda    The patient has been notified of following:   \"This telephone visit will be conducted via a call between you and your physician/provider. We have found that certain health care needs can be provided without the need for a physical exam.  This service lets us provide the care you need with a short phone conversation.  If a prescription is necessary we can send it directly to your pharmacy.  If lab work is needed we can place an order for that and you can then stop by our lab to have the test done at a later time.  If during the course of the call the physician/provider feels a telephone visit is not appropriate, you will not be charged for this service.\"     The reason for the telephone visit: post op appointment    39 year old s/p linda assisted TLH/BS for AUB. Overall doing well since surgery. Has ongoing back pain which is changed from her chronic pain. Had numb area which is improving as well. Working with home clinic for these issues. Had 1 episode of vaginal bleeding, has resolved. Also had BV which was treated by her PCP and has improved. Is taking pyridium for pelvic discomfort.     Past Medical History  Past Medical History:   Diagnosis Date     Anxiety      Breast disorder Not sure     Depressive disorder      Mixed anxiety depressive disorder 2/8/2016     Papanicolaou smear of cervix with low grade squamous intraepithelial lesion (LGSIL)      " Thyroid disease      Urinary tract infection, site not specified     Recurrent UTI's     Wounds and injuries        Allergies  Bupropion hcl; No clinical screening - see comments; and Adhesive tape    I have reviewed and updated the patient's, Social History, Family History and Medication List.    ASSESSMENT   Mattie Banda is a 39 year old , telephone visit for post op visit    PLAN   Reviewed pathology from procedure- benign  Reviewed pain, numbness after surgery. Has appropriate follow up scheduled.   Reviewed pelvic rest following surgery  Can follow up with PCP for future gyn cares  --    Phone call start:8:46  Phone call end:8:58  Phone call duration:  12 minutes    Mela Saleh MD

## 2020-03-23 ENCOUNTER — TELEPHONE (OUTPATIENT)
Dept: RHEUMATOLOGY | Facility: CLINIC | Age: 40
End: 2020-03-23

## 2020-03-23 DIAGNOSIS — M54.42 CHRONIC BILATERAL LOW BACK PAIN WITH BILATERAL SCIATICA: ICD-10-CM

## 2020-03-23 DIAGNOSIS — G89.29 CHRONIC BILATERAL LOW BACK PAIN WITH BILATERAL SCIATICA: ICD-10-CM

## 2020-03-23 DIAGNOSIS — J98.01 ACUTE BRONCHOSPASM: ICD-10-CM

## 2020-03-23 DIAGNOSIS — R05.9 COUGH: ICD-10-CM

## 2020-03-23 DIAGNOSIS — M54.41 CHRONIC BILATERAL LOW BACK PAIN WITH BILATERAL SCIATICA: ICD-10-CM

## 2020-03-23 DIAGNOSIS — G43.109 MIGRAINE WITH AURA AND WITHOUT STATUS MIGRAINOSUS, NOT INTRACTABLE: ICD-10-CM

## 2020-03-23 NOTE — TELEPHONE ENCOUNTER
[facility] Call Center    Phone Message    May a detailed message be left on voicemail: yes     Reason for Call: Patient called wanting to go over her most recent results because she cannot remember what was recommended to her. Please advise. Thank you.    Action Taken: Message routed to:  Adult Clinics: Rheumatology p [id number]    Travel Screening: Not Applicable

## 2020-03-24 ENCOUNTER — VIRTUAL VISIT (OUTPATIENT)
Dept: BEHAVIORAL HEALTH | Facility: CLINIC | Age: 40
End: 2020-03-24
Payer: COMMERCIAL

## 2020-03-24 DIAGNOSIS — F41.1 GAD (GENERALIZED ANXIETY DISORDER): Primary | ICD-10-CM

## 2020-03-24 DIAGNOSIS — F32.2 CURRENT SEVERE EPISODE OF MAJOR DEPRESSIVE DISORDER WITHOUT PSYCHOTIC FEATURES WITHOUT PRIOR EPISODE (H): ICD-10-CM

## 2020-03-24 PROCEDURE — 98968 PH1 ASSMT&MGMT NQHP 21-30: CPT | Performed by: MARRIAGE & FAMILY THERAPIST

## 2020-03-24 RX ORDER — TOPIRAMATE 25 MG/1
TABLET, FILM COATED ORAL
Qty: 90 TABLET | Refills: 0 | Status: SHIPPED | OUTPATIENT
Start: 2020-03-24 | End: 2020-04-23

## 2020-03-24 RX ORDER — ALBUTEROL SULFATE 90 UG/1
AEROSOL, METERED RESPIRATORY (INHALATION)
Qty: 18 G | Refills: 3 | Status: SHIPPED | OUTPATIENT
Start: 2020-03-24

## 2020-03-24 NOTE — TELEPHONE ENCOUNTER
Called and left vm letting patient know that her labs were normal and we recommend having SI joint xray,  But that it will have to wait due to COVID-19. Asked her to call back with any questions.    EFRAIN LawrenceN, RN   Rheumatology Care Coordinator   [facility]

## 2020-03-24 NOTE — TELEPHONE ENCOUNTER
Mattie called back. Discussed results/recommendations as we previously had. She had no further questions.     EFRAIN LawrenceN, RN   Rheumatology Care Coordinator   [facility]

## 2020-03-24 NOTE — PROGRESS NOTES
"[facility] Primary Care: Integrated Behavioral Health  March 24, 2020    Mattie Banda is a 39 year old female who is being evaluated via a telephone visit.      The patient has been notified of the following:     \"We have found that certain health care needs can be provided without the need for a face to face visit.  This service lets us provide the care you need with a short phone conversation.      I will have full access to your [facility] medical record during this entire phone call.   I will be taking notes for your medical record.     Since this is like an office visit, we will bill your insurance company for this service.  Please check with your medical insurance if this type of telephone visit/virtual care is covered.  You may be responsible for the cost of this service if insurance coverage is denied.      There are potential benefits and risks of telephone visits (e.g. limits to patient confidentiality) that differ from in-person visits.?  Confidentiality still applies for telephone services, and nobody will record the visit.  It is important to be in a quiet, private space that is free of distractions (including cell phone or other devices) during the visit.??     If during the course of the call I believe a telephone visit is not appropriate, you will not be charged for this service\"    Consent has been obtained for this service by care team member: yes.    Behavioral Health Clinician Progress Note    Patient Name: Mattie Banda           Service Type:  Individual      Service Location:   Phone call (patient / identified key support person reached)     Session Start Time: 1:00  Session End Time: 1:28      Session Length: 16 - 37      Attendees: Patient    Visit Activities (Refresh list every visit): [facility] Only    Diagnostic Assessment Date: 1/21/2020  Treatment Plan Review Date: 1/28/2020  See Flowsheets for today's PHQ-9 and CRISTIANO-7 results  Previous PHQ-9:   PHQ-9 SCORE 12/11/2019 " "1/7/2020 3/4/2020   PHQ-9 Total Score - - -   PHQ-9 Total Score 24 24 25     Previous CRISTIANO-7:   CRISTIANO-7 SCORE 10/25/2019 1/7/2020 3/4/2020   Total Score - - -   Total Score 19 20 20       EMILIA LEVEL:  EMILIA Score (Last Two) 11/14/2012   EMILIA Raw Score 44   Activation Score 70.8   EMILIA Level 4       DATA  Extended Session (60+ minutes): No  Interactive Complexity: No  Crisis: No  H Patient: No    Treatment Objective(s) Addressed in This Session:  Target Behavior(s): disease management/lifestyle changes depression and anxiety    Depressed Mood: Increase interest, engagement, and pleasure in doing things  Decrease frequency and intensity of feeling down, depressed, hopeless  Improve quantity and quality of night time sleep / decrease daytime naps  Feel less tired and more energy during the day   Improve diet, appetite, mindful eating, and / or meal planning  Identify negative self-talk and behaviors: challenge core beliefs, myths, and actions  Improve concentration, focus, and mindfulness in daily activities   Decrease thoughts that you'd be better off dead or of suicide / self-harm  Anxiety: will experience a reduction in anxiety, will develop more effective coping skills to manage anxiety symptoms, will develop healthy cognitive patterns and beliefs and will increase ability to function adaptively    Current Stressors / Issues:  Patient reports that she is doing \"alright\". Patient reports that it was a \"hard week\" with her grandma's birthday at the end of last week. She ended up going to visit her aunt. She states that the visit was good with the exception of her uncle.   She states that her mom has been trying to reach out a little more and be more understanding of patient. Mom has been calling more sober. Patient provided examples of boundary setting, as she will only answer mom's calls at certain times. Patient states that she heard her mom say \"I didn't parent you so that you could grow up strong\". Patient states that " "she doesn't understand this. Patient identified that she has made it through a lot of things. She heard her mom say \"I love you\". Patient states that she is confused and doesn't understand how a parent doesn't put their child first.  Patient is working on not isolating herself and spending part of her day around family at home.     Reinforced patient working on maintaining social connections. Processed dynamics with mom and encouraged patient to continue to recognize boundaries she would like to set and maintain with her mom. Encouraged her to identify emotions and automatic thoughts that occur as the relationship continues to evolve.       Progress on Treatment Objective(s) / Homework:  Minimal progress - ACTION (Actively working towards change); Intervened by reinforcing change plan / affirming steps taken    Motivational Interviewing    MI Intervention: Expressed Empathy/Understanding, Supported Autonomy, Collaboration, Evocation, Permission to raise concern or advise, Open-ended questions, Reflections: simple and complex, Change talk (evoked) and Reframe     Change Talk Expressed by the Patient: Desire to change Ability to change Reasons to change Need to change Committment to change Activation Taking steps    Provider Response to Change Talk: E - Evoked more info from patient about behavior change, A - Affirmed patient's thoughts, decisions, or attempts at behavior change, R - Reflected patient's change talk and S - Summarized patient's change talk statements    Also provided psychoeducation about behavioral health condition, symptoms, and treatment options      Skills training    Explored skills useful to client in current situation    Skills include assertiveness, communication, conflict management, problem-solving, relaxation, etc.    Solution-Focused Therapy    Explored patterns in patient's relationships and discussed options for new behaviors    Explored patterns in patient's actions and choices and " discussed options for new behaviors    Cognitive-behavioral Therapy    Discussed common cognitive distortions, identified them in patient's life    Explored ways to challenge, replace, and act against these cognitions    Acceptance and Commitment Therapy    Explored and identified important values in patient's life    Discussed ways to commit to behavioral activation around these values    Psychodynamic psychotherapy    Discussed patient's emotional dynamics and issues and how they impact behaviors    Explored patient's history of relationships and how they impact present behaviors    Explored how to work with and make changes in these schemas and patterns    Behavioral Activation    Discussed steps patient can take to become more involved in meaningful activity    Identified barriers to these activities and explored possible solutions    Mindfulness-Based Strategies    Discussed skills based on development and application of mindfulness    Skills drawn from dialectical behavior therapy, mindfulness-based stress reduction, mindfulness-based cognitive therapy, etc.      Care Plan review completed: Yes    Medication Review:  No changes to current psychiatric medication(s) Patient reports that she has an appointment for psychiatry tomorrow at [facility]    Medication Compliance:  Yes    Changes in Health Issues:   None reported    Chemical Use Review:   Substance Use: Chemical use reviewed, no active concerns identified       Tobacco Use: No change in amount of tobacco use since last session.  Patient declined discussion at this time    Assessment: Current Emotional / Mental Status (status of significant symptoms):  Risk status (Self / Other harm or suicidal ideation)  Patient has had a history of suicidal ideation: in adolescense and suicide attempts: patient states that she tried multiple times around ages [age] by slitting wrists or overdose, and one overdose of alcohol; she denies  "ever being hospitalized for mental health and denies a history of self-injurious behavior, homicidal ideation, homicidal behavior and and other safety concerns  Patient denies current fears or concerns for personal safety.  Patient reports the following current or recent suicidal ideation or behaviors: patient reports having off and on thoughts of. \"I'm such a burden\" \"I'm not a good wife\" \"I'm not be a good friend\" \"my kids would be better off\". She is able to understand that her kids wouldn't be better off and would not be better off without her. She does not want anyone else to take care of her kids. Patient denies any suicidal plan or intent.  Patient denies current or recent homicidal ideation or behaviors.  Patient denies current or recent self injurious behavior or ideation.  Patient denies other safety concerns.  A safety and risk management plan has been developed including: Patient consented to co-developed safety plan.  A safety and risk management plan was completed.  Patient agreed to use safety plan should any safety concerns arise.  A copy was given to the patient.      Appearance:   unable to assess due to phone visit   Eye Contact:   unable to assess due to phone visit   Psychomotor Behavior: unable to assess due to phone visit   Attitude:   Cooperative   Orientation:   All  Speech   Rate / Production: Monotone    Volume:  Normal   Mood:    Anxious  Depressed   Affect:    unable to assess due to phone visit   Thought Content:  Clear  Perservative  Rumination   Thought Form:  Coherent  Logical   Insight:    Good     Diagnoses:  1. CRISTIANO (generalized anxiety disorder)    2. Current severe episode of major depressive disorder without psychotic features without prior episode (H)        Collateral Reports Completed:  Not Applicable    Plan: (Homework, other):  Patient was given information about behavioral services and encouraged to schedule a follow up appointment with the clinic [facility] in 1 week.  She was " also given information about mental health symptoms and treatment options , Cognitive Behavioral Therapy skills to practice when experiencing anxiety and depression and deep Breathing Strategies to practice when experiencing anxiety and depression.  CD Recommendations: No indications of CD issues.  Patient will follow safety plan for suicidal thoughts. Patient will attend her psychiatry intake on 3/25 at [facility] in [place]. Patient will work on identifying boundaries to set with her mom. Patient will work on reducing social isolation. Patient will meet with [facility] for bridging sessions until she sees specialty counseling on 4/20.  KVNG Watson, [facility]       ______________________________________________________________________    Integrated Primary Care Behavioral Health Treatment Plan    Patient's Name: Mattie Banda  YOB: 1980    Date: January 28, 2020    DSM-V Diagnoses: 296.33 (F33.2) Major Depressive Disorder, Recurrent Episode, Severe _ or 300.02 (F41.1) Generalized Anxiety Disorder  Psychosocial / Contextual Factors: medical issues,  and dating ex-, few friends  WHODAS: 36    Referral / Collaboration:  Referral to another professional/service is not indicated at this time..    Anticipated number of session or this episode of care: 8      MeasurableTreatment Goal(s) related to diagnosis / functional impairment(s)  Goal 1: Patient will demonstrate ability to improve depression and anxiety symptoms as evidenced by improved PHQ9 & GAD7 scores.       Objective #A (Patient Action)    Patient will Decrease frequency and intensity of feeling down, depressed, hopeless  Identify negative self-talk and behaviors: challenge core beliefs, myths, and actions  Decrease thoughts that you'd be better off dead or of suicide / self-harm  Status: New - Date: 1/28/2020     Intervention(s)  [facility] will teach distress tolerance skills. Provide psycho-education on cognitive  distortions and automatic thoughts and behaviors and ways to appropriately challenge and restructure thoughts.    Objective #B  Patient will increase coping strategies by 2  to more effectively manage current stressors  increase understanding of steps in the grief process  Status: New - Date: 1/28/2020     Intervention(s)  [facility] will teach mindfulness and relaxation strategies. Provide psycho-education on grief process.    Patient has reviewed and agreed to the above plan.    Written by  KVNG Watson, [facility]

## 2020-03-24 NOTE — TELEPHONE ENCOUNTER
, please review and approve or deny refill request. Thank you.............SHMUEL Higginbotham      ALBUTERAL INHALER  Last Written Prescription Date:  11/14/19  Last Fill Quantity: 52g,  # refills: 0   Last office visit: 3/2/2020 with prescribing provider:     Future Office Visit:   Next 5 appointments (look out 90 days)    Mar 24, 2020  1:00 PM CDT  Telephone Visit with KVNG Balbuena  [facility] [address] [place] [id number] [phone]   Mar 30, 2020  3:15 PM CDT  Office Visit with Yuan Schofield MD  [address] [address] [phone]   Apr 20, 2020  8:30 AM CDT  Return Visit with Neeta Donovan LPC  [facility] ([address] [id number] [phone]   Apr 24, 2020  8:30 AM CDT  Return Visit with Beatriz Nunez MD  [facility]) [address] [place] [address] [phone]         DX: Cough/Bronchospasm    TOPAMAX  Last Written Prescription Date:  2/27/20  Last Fill Quantity: 90, Take on 3 times a day)# refills: 0   Last office visit: 3/2/2020 with prescribing provider:  Dr. Schofield   Future Office Visit:   Next 5 appointments (look out 90 days)    Mar 24, 2020  1:00 PM CDT  Telephone Visit with KVNG Balbuena  [address] [address] [phone]   Mar 30, 2020  3:15 PM CDT  Office Visit with Yuan Schofield MD  [facility] ([address] [address] [phone]   Apr 20, 2020  8:30 AM CDT  [facility] [place] [facility] [place]) [address]  [place] [address]  [phone]   Apr 24, 2020  8:30 AM  "CDT  Return Visit with Beatriz Nunez MD  [facility]) [address] [address] [place] [address] [phone]         Requested Prescriptions   Pending Prescriptions Disp Refills     topiramate (TOPAMAX) 25 MG tablet [Pharmacy Med Name: TOPIRAMATE 25MG TABLET] 90 tablet 0     Sig: TAKE 1 TABLET BY MOUTH 3 TIMES A DAY       Anti-Seizure Meds Protocol  Failed - 3/23/2020  7:10 PM        Failed - Review Authorizing provider's last note.      Refer to last progress notes: confirm request is for original authorizing provider (cannot be through other providers).        Passed - Recent (12 mo) or future (30 days) visit within the authorizing provider's specialty     Patient has had an office visit with the authorizing provider or a provider within the authorizing providers department within the previous 12 mos or has a future within next 30 days. See \"Patient Info\" tab in inbasket, or \"Choose Columns\" in Meds & Orders section of the refill encounter.            Passed - Normal CBC on file in past 26 months     Recent Labs   Lab Test 01/31/20  1452   WBC 8.9   RBC 4.05   HGB 12.8   HCT 39.6              Passed - Normal ALT or AST on file in past 26 months     Recent Labs   Lab Test 01/02/20  1535   ALT 42     Recent Labs   Lab Test 01/02/20  1535   AST 14           Passed - Normal platelet count on file in past 26 months     Recent Labs   Lab Test 01/31/20  1452              Passed - Medication is active on med list        Passed - No active pregnancy on record        Passed - No positive pregnancy test in last 12 months           albuterol (PROAIR HFA/PROVENTIL HFA/VENTOLIN HFA) 108 (90 Base) MCG/ACT inhaler [Pharmacy Med Name: ALBUTEROL HFA 90MCG/ACT INH] 18 g      Sig: INHALE 2 PUFFS INTO THE LUNGS EVERY 6 HOURS AS NEEDED FOR SHORTNESSOF BREATH / DYSPNEA OR WHEEZING       Asthma Maintenance Inhalers - Anticholinergics Passed - 3/23/2020  7:10 PM        " "Passed - Patient is age 12 years or older        Passed - Recent (12 mo) or future (30 days) visit within the authorizing provider's specialty     Patient has had an office visit with the authorizing provider or a provider within the authorizing providers department within the previous 12 mos or has a future within next 30 days. See \"Patient Info\" tab in inbasket, or \"Choose Columns\" in Meds & Orders section of the refill encounter.            Passed - Medication is active on med list       Short-Acting Beta Agonist Inhalers Protocol  Passed - 3/23/2020  7:10 PM        Passed - Patient is age 12 or older        Passed - Recent (12 mo) or future (30 days) visit within the authorizing provider's specialty     Patient has had an office visit with the authorizing provider or a provider within the authorizing providers department within the previous 12 mos or has a future within next 30 days. See \"Patient Info\" tab in inbasket, or \"Choose Columns\" in Meds & Orders section of the refill encounter.            Passed - Medication is active on med list         Routing refill request to provider for review/approval because:  Dx for Albuteral was cough/ Bronchospasm in NOV. And Topamax states failed protocol due to provider note.  SHMUEL Higginbotham            "

## 2020-03-25 ENCOUNTER — TELEPHONE (OUTPATIENT)
Dept: BEHAVIORAL HEALTH | Facility: CLINIC | Age: 40
End: 2020-03-25

## 2020-03-25 DIAGNOSIS — F32.2 CURRENT SEVERE EPISODE OF MAJOR DEPRESSIVE DISORDER WITHOUT PSYCHOTIC FEATURES WITHOUT PRIOR EPISODE (H): Primary | ICD-10-CM

## 2020-03-25 NOTE — TELEPHONE ENCOUNTER
Phone Encounter   [facility] [place] spoke with patient stating that she is feeling confused about her appointments. She is currently at [facility] and thought she was being seen for psychiatry. She learned that it was a counseling appointment and that if she does psychiatry she would have to go to their [place] location. Explored options and patient would like to do what is closest to home and within the [facility] system if possible. Patient decided that she will keep her appointment for specialty [place] [facility]. She decided will call the intake line to set up with our collaborative care psychiatry services at [place]. The number was provided. This will be routed to her PCP to put in the order if it needs to be updated.    KVNG Watson, Behavioral Health Clinician

## 2020-03-26 NOTE — PROGRESS NOTES
"SUBJECTIVE     Mattie Banda is a 39 year old female who is being evaluated via a billable telephone visit.    Patient opted to conduct today's return visit via telephone secondary to the COVID-19 pandemic vs. an in person visit to the clinic.    I spoke with: Mattie Banda    The patient has been notified of following:   \"This telephone visit will be conducted via a call between you and your physician/provider. We have found that certain health care needs can be provided without the need for a physical exam.  This service lets us provide the care you need with a short phone conversation.  If a prescription is necessary we can send it directly to your pharmacy.  If lab work is needed we can place an order for that and you can then stop by our lab to have the test done at a later time.  If during the course of the call the physician/provider feels a telephone visit is not appropriate, you will not be charged for this service.\"     The reason for the telephone visit: post op appointment    39 year old s/p linda assisted TLH/BS for AUB. Overall doing well since surgery. Has ongoing back pain which is changed from her chronic pain. Had numb area which is improving as well. Working with home clinic for these issues. Had 1 episode of vaginal bleeding, has resolved. Also had BV which was treated by her PCP and has improved. Is taking pyridium for pelvic discomfort.     Past Medical History  Past Medical History:   Diagnosis Date     Anxiety      Breast disorder Not sure     Depressive disorder      Mixed anxiety depressive disorder 2/8/2016     Papanicolaou smear of cervix with low grade squamous intraepithelial lesion (LGSIL)      Thyroid disease      Urinary tract infection, site not specified     Recurrent UTI's     Wounds and injuries 2000       Allergies  Bupropion hcl; No clinical screening - see comments; and Adhesive tape    I have reviewed and updated the patient's, Social History, Family History and " Medication List.    ASSESSMENT   Mattie Banda is a 39 year old , telephone visit for post op visit    PLAN   Reviewed pathology from procedure- benign  Reviewed pain, numbness after surgery. Has appropriate follow up scheduled.   Reviewed pelvic rest following surgery  Can follow up with PCP for future gyn cares  --    Phone call start:8:46  Phone call end:8:58  Phone call duration:  12 minutes    Mela Saleh MD

## 2020-03-30 ENCOUNTER — VIRTUAL VISIT (OUTPATIENT)
Dept: FAMILY MEDICINE | Facility: CLINIC | Age: 40
End: 2020-03-30
Payer: COMMERCIAL

## 2020-03-30 DIAGNOSIS — M51.369 DEGENERATION OF LUMBAR INTERVERTEBRAL DISC: ICD-10-CM

## 2020-03-30 DIAGNOSIS — K51.918 ULCERATIVE COLITIS WITH OTHER COMPLICATION, UNSPECIFIED LOCATION (H): ICD-10-CM

## 2020-03-30 DIAGNOSIS — K52.832 LYMPHOCYTIC COLITIS: ICD-10-CM

## 2020-03-30 DIAGNOSIS — F32.2 CURRENT SEVERE EPISODE OF MAJOR DEPRESSIVE DISORDER WITHOUT PSYCHOTIC FEATURES WITHOUT PRIOR EPISODE (H): Primary | ICD-10-CM

## 2020-03-30 PROBLEM — F41.1 GAD (GENERALIZED ANXIETY DISORDER): Status: RESOLVED | Noted: 2020-01-21 | Resolved: 2020-03-30

## 2020-03-30 PROBLEM — N92.0 EXCESSIVE OR FREQUENT MENSTRUATION: Status: RESOLVED | Noted: 2018-02-07 | Resolved: 2020-03-30

## 2020-03-30 PROCEDURE — 99443 ZZC PHYSICIAN TELEPHONE EVALUATION 21-30 MIN: CPT | Performed by: FAMILY MEDICINE

## 2020-03-30 RX ORDER — METHOCARBAMOL 750 MG/1
750 TABLET, FILM COATED ORAL 3 TIMES DAILY PRN
Qty: 90 TABLET | Refills: 0 | Status: CANCELLED | OUTPATIENT
Start: 2020-03-30

## 2020-03-30 RX ORDER — MESALAMINE 800 MG/1
800 TABLET, DELAYED RELEASE ORAL 3 TIMES DAILY
Qty: 100 TABLET | Refills: 1 | Status: SHIPPED | OUTPATIENT
Start: 2020-03-30 | End: 2020-04-01

## 2020-03-30 RX ORDER — METHOCARBAMOL 750 MG/1
750 TABLET, FILM COATED ORAL 3 TIMES DAILY PRN
Qty: 90 TABLET | Refills: 1 | Status: SHIPPED | OUTPATIENT
Start: 2020-03-30 | End: 2020-04-21

## 2020-03-30 RX ORDER — BUSPIRONE HYDROCHLORIDE 5 MG/1
5 TABLET ORAL 2 TIMES DAILY
Qty: 60 TABLET | Refills: 1 | Status: SHIPPED | OUTPATIENT
Start: 2020-03-30 | End: 2020-04-29

## 2020-03-30 NOTE — PROGRESS NOTES
"Subjective     Mattie Banda is a 39 year old female who is being evaluated via a billable telephone visit.      The patient has been notified of following:     \"This telephone visit will be conducted via a call between you and your physician/provider. We have found that certain health care needs can be provided without the need for a physical exam.  This service lets us provide the care you need with a short phone conversation.  If a prescription is necessary we can send it directly to your pharmacy.  If lab work is needed we can place an order for that and you can then stop by our lab to have the test done at a later time.    If during the course of the call the physician/provider feels a telephone visit is not appropriate, you will not be charged for this service.\"     Physician has received verbal consent for a Telephone Visit from the patient? Yes    Mattie Banda complains of   Chief Complaint   Patient presents with     Pain       ALLERGIES  Bupropion hcl; No clinical screening - see comments; and Adhesive tape    Chronic Pain Follow-Up    Where in your body do you have pain? \"everything\"  How has your pain affected your ability to work? Unable to work  Which of these pain treatments have you tried since your last clinic visit? Cold and Heat  How well are you sleeping? Poor  How has your mood been since your last visit? Slightly worse  Have you had a significant life event? Health Concerns  Other aggravating factors: prolonged sitting, prolonged standing and sleeping on sides  Taking medication as directed? Yes    PHQ-9 SCORE 12/11/2019 1/7/2020 3/4/2020   PHQ-9 Total Score - - -   PHQ-9 Total Score 24 24 25     CRISTIANO-7 SCORE 10/25/2019 1/7/2020 3/4/2020   Total Score - - -   Total Score 19 20 20     No flowsheet data found.  Encounter-Level CSA - 08/20/2018:    Controlled Substance Agreement - Scan on 8/24/2018 10:37 AM: CONTROLLED SUBSTANCE AGREEMENT     Patient-Level CSA:    Controlled Substance " Agreement - Opioid - Scan on 6/13/2019  6:05 PM  Controlled Substance Agreement - Opioid - Scan on 6/13/2019  6:03 PM: 06/05/2019           Patient continues to struggle with multiple fears and concerns.  She has consulted a  and disability about obtaining permanent disability.  Patient could not have a visit with rheumatology with the pandemic change in scheduling.  Some other examinations were considered but she does not want to do these expose herself in a clinic situation.  She is fearful of coronavirus.  She is having a lot of hip pain bilaterally.  Pain zinging down the back through the hips.  Her hands are swollen and red.  Knees hurt.  She does not have the carotid.  Neck pain these days.  Breathing is a little tight but not in bed.  No heart issues.  She is having uncontrolled loose diarrhea that may have some blood in it.  Consistent with previous colitis.  Regarding depression, this would be time of her grandmother's birthday.  She misses her greatly.  Was the one adult with stable in her life.        Reviewed and updated as needed this visit by Provider         Physically she has been cleared after her surgery.  However she was [facility]  Patient continues to have back pain that increased at surgical time.  It is felt that perhaps positioning from surgery contributed to this back pain which is bernadette    lola   ROS COMP: Constitutional, HEENT, cardiovascular, pulmonary, gi and gu systems are negative, except as otherwise noted.       Objective   Reported vitals:  There were no vitals taken for this visit.   1 can hear the patient's fatigue in her voice.  Judgment is intact.    Diagnostic Test Results:  Labs reviewed in Epic        Assessment/Plan:  1. Current severe episode of major depressive disorder without psychotic features without prior episode (H)  Discussed options of medication changes.  Multiple antidepressants have been tried in the past.  Some  of significant side effects including highs from bupropion.  She does remember being on BuSpar at one time and that helped some with anxiety and depression.  We will start this at low-dose 5 mg twice daily.  She will have mental health counseling and it sounds like at least an advanced practice psychiatric nurse or psychiatrist, other counselor to help her with her PTSD,.  Until financial issues are moderately resolved, current pandemic and fears of this dissipate, she works through some of her PTSD issues, she will continue to have depression.  Atypical antipsychotic might be considered..  Low-dose venlafaxine may be considered as well.  - busPIRone (BUSPAR) 5 MG tablet; Take 1 tablet (5 mg) by mouth 2 times daily  Dispense: 60 tablet; Refill: 1    2. Ulcerative colitis with other complication, unspecified location (H)  Patient has had nonspecific colitis in the past.  She is having symptoms again.  Is a call will be used at 1 3 times daily.  Perhaps if we can control the colitis then her body has more time to heal other things.  This may account for some of the back pain that developed after surgery.  Possibly she had antibiotics and we would need to consider C. difficile if things do not improve.  - mesalamine (ASACOL HD) 800 MG EC tablet; Take 1 tablet (800 mg) by mouth 3 times daily  Dispense: 100 tablet; Refill: 1    3. Degeneration of lumbar intervertebral disc  Since we think some of the back pain is musculoskeletal muscle relaxant is used.  - methocarbamol (ROBAXIN) 750 MG tablet; Take 1 tablet (750 mg) by mouth 3 times daily as needed for muscle spasms  Dispense: 90 tablet; Refill: 1  No follow-ups on file.    All other medications will be continued.  Remains highly suspicious to me that she has colitis, joint swelling with redness, and by description psoriasis-like problems, carotidynia that there is truly an autoimmune.  She will remain on current medications until something can be arranged with  rheumatologist for an actual visit   for further testing.    Phone call duration:  26 minutes multiple symptoms and problems for which she has many questions and concerns raised.    Yuan Iain Schofield MD

## 2020-03-31 ENCOUNTER — TELEPHONE (OUTPATIENT)
Dept: FAMILY MEDICINE | Facility: CLINIC | Age: 40
End: 2020-03-31

## 2020-03-31 ENCOUNTER — VIRTUAL VISIT (OUTPATIENT)
Dept: BEHAVIORAL HEALTH | Facility: CLINIC | Age: 40
End: 2020-03-31
Payer: COMMERCIAL

## 2020-03-31 DIAGNOSIS — F32.2 CURRENT SEVERE EPISODE OF MAJOR DEPRESSIVE DISORDER WITHOUT PSYCHOTIC FEATURES WITHOUT PRIOR EPISODE (H): Primary | ICD-10-CM

## 2020-03-31 DIAGNOSIS — F41.1 GAD (GENERALIZED ANXIETY DISORDER): ICD-10-CM

## 2020-03-31 DIAGNOSIS — K52.832 LYMPHOCYTIC COLITIS: Primary | ICD-10-CM

## 2020-03-31 PROCEDURE — 98968 PH1 ASSMT&MGMT NQHP 21-30: CPT | Performed by: MARRIAGE & FAMILY THERAPIST

## 2020-03-31 NOTE — TELEPHONE ENCOUNTER
Prior Authorization Retail Medication Request    Medication/Dose: Mesalamine 800MG dr tablets    Key: [id number]    ICD code (if different than what is on RX):    Previously Tried and Failed:    Rationale:      Insurance Name: [facility]      Insurance ID:  [id number]      Pharmacy Information (if different than what is on RX)  Name:    Phone:

## 2020-03-31 NOTE — TELEPHONE ENCOUNTER
Central Prior Authorization Team   Phone: [phone]      PA Initiation    Medication: Mesalamine 800MG dr tablets  Insurance Company: [employer] [place] - Phone [phone] Fax [phone]  Pharmacy Filling the Rx: [id number] - [place], [place] - [address] [place] [address]  Filling Pharmacy Phone: [phone]  Filling Pharmacy Fax:    Start Date: 3/31/2020

## 2020-03-31 NOTE — PROGRESS NOTES
"[facility]: Integrated Behavioral Health  March 31, 2020    Mattie Banda is a 39 year old female who is being evaluated via a telephone visit.      The patient has been notified of the following:     \"We have found that certain health care needs can be provided without the need for a face to face visit.  This service lets us provide the care you need with a short phone conversation.      I will have full access to your [facility] medical record during this entire phone call.   I will be taking notes for your medical record.     Since this is like an office visit, we will bill your insurance company for this service.  Please check with your medical insurance if this type of telephone visit/virtual care is covered.  You may be responsible for the cost of this service if insurance coverage is denied.      There are potential benefits and risks of telephone visits (e.g. limits to patient confidentiality) that differ from in-person visits.?  Confidentiality still applies for telephone services, and nobody will record the visit.  It is important to be in a quiet, private space that is free of distractions (including cell phone or other devices) during the visit.??     If during the course of the call I believe a telephone visit is not appropriate, you will not be charged for this service\"    Consent has been obtained for this service by care team member: yes.    Behavioral Health Clinician Progress Note    Patient Name: Mattie Banda           Service Type:  Individual      Service Location:   Phone call (patient / identified key support person reached)     Session Start Time: 1:05  Session End Time: 1:35      Session Length: 16 - 37      Attendees: Patient    Visit Activities (Refresh list every visit): [facility] Only    Diagnostic Assessment Date: 1/21/2020  Treatment Plan Review Date: 1/28/2020  See Flowsheets for today's PHQ-9 and CRISTIANO-7 results  Previous PHQ-9:   PHQ-9 SCORE 12/11/2019 " "1/7/2020 3/4/2020   PHQ-9 Total Score - - -   PHQ-9 Total Score 24 24 25     Previous CRISTIANO-7:   CRISTIANO-7 SCORE 10/25/2019 1/7/2020 3/4/2020   Total Score - - -   Total Score 19 20 20       EMILIA LEVEL:  EMILIA Score (Last Two) 11/14/2012   EMILIA Raw Score 44   Activation Score 70.8   EMILIA Level 4       DATA  Extended Session (60+ minutes): No  Interactive Complexity: No  Crisis: No  H Patient: No    Treatment Objective(s) Addressed in This Session:  Target Behavior(s): disease management/lifestyle changes depression and anxiety    Depressed Mood: Increase interest, engagement, and pleasure in doing things  Decrease frequency and intensity of feeling down, depressed, hopeless  Improve quantity and quality of night time sleep / decrease daytime naps  Feel less tired and more energy during the day   Improve diet, appetite, mindful eating, and / or meal planning  Identify negative self-talk and behaviors: challenge core beliefs, myths, and actions  Improve concentration, focus, and mindfulness in daily activities   Decrease thoughts that you'd be better off dead or of suicide / self-harm  Anxiety: will experience a reduction in anxiety, will develop more effective coping skills to manage anxiety symptoms, will develop healthy cognitive patterns and beliefs and will increase ability to function adaptively    Current Stressors / Issues:  Patient reports that she had a \"good meltdown\" yesterday. She states that with the first of the month approaching, she is thinking about bills. She identified feeling guilty about not being able to contribute financially.   She states that she has felt \"numb\" and that she is sleeping poorly. She met with her PCP, yesterday for a phone visit, and he adjusted her medication until she is able to get in with psychiatry. Patient is scheduled with psychiatry within our system on 5/7/2020.  Patient states that she had been working on small goals of spending time around others for an hour a day. She tends " "to spend most of her time in her heated garage which is reportedly like an extra living room. She states that she did watch a movie with her son. Her son started e-learning yesterday. Reinforced and encouraged patient to continue to make an effort to socially connect with others.  She reports that her boss stopped over and they discussed her job. She states that they were able to fill out her work form. She was told that her job is available for her when she is ready to return. She expressed concern about another leader in her company maybe not allowing this.     Processed emotions and reflected what patient understands to be true. Discussed patient taking time to notice what she is accomplishing and encouraged her to write it down. [facility] sent a [place] message to provide additional questions to reflect on a daily basis:  What am I grateful for today?  Who I am checking in with or connecting with today?  What expectations of \"normal \" am I letting go of today?  Am I getting outside today?  How am I moving my body today?  What Beauty am I either creating or cultivating, or inviting in today?    Encouraged patient to also get up and get dressed every morning and maintain some daily structure.      Progress on Treatment Objective(s) / Homework:  Minimal progress - ACTION (Actively working towards change); Intervened by reinforcing change plan / affirming steps taken    Motivational Interviewing    MI Intervention: Expressed Empathy/Understanding, Supported Autonomy, Collaboration, Evocation, Permission to raise concern or advise, Open-ended questions, Reflections: simple and complex, Change talk (evoked) and Reframe     Change Talk Expressed by the Patient: Desire to change Ability to change Reasons to change Need to change Committment to change Activation Taking steps    Provider Response to Change Talk: E - Evoked more info from patient about behavior change, A - Affirmed patient's thoughts, decisions, or attempts at " behavior change, R - Reflected patient's change talk and S - Summarized patient's change talk statements    Also provided psychoeducation about behavioral health condition, symptoms, and treatment options      Skills training    Explored skills useful to client in current situation    Skills include assertiveness, communication, conflict management, problem-solving, relaxation, etc.    Solution-Focused Therapy    Explored patterns in patient's relationships and discussed options for new behaviors    Explored patterns in patient's actions and choices and discussed options for new behaviors    Cognitive-behavioral Therapy    Discussed common cognitive distortions, identified them in patient's life    Explored ways to challenge, replace, and act against these cognitions    Acceptance and Commitment Therapy    Explored and identified important values in patient's life    Discussed ways to commit to behavioral activation around these values    Psychodynamic psychotherapy    Discussed patient's emotional dynamics and issues and how they impact behaviors    Explored patient's history of relationships and how they impact present behaviors    Explored how to work with and make changes in these schemas and patterns    Behavioral Activation    Discussed steps patient can take to become more involved in meaningful activity    Identified barriers to these activities and explored possible solutions    Mindfulness-Based Strategies    Discussed skills based on development and application of mindfulness    Skills drawn from dialectical behavior therapy, mindfulness-based stress reduction, mindfulness-based cognitive therapy, etc.      Care Plan review completed: Yes    Medication Review:  No changes to current psychiatric medication(s)     Medication Compliance:  Yes    Changes in Health Issues:   None reported    Chemical Use Review:   Substance Use: Chemical use reviewed, no active concerns identified       Tobacco Use: No change  in amount of tobacco use since last session.  Patient declined discussion at this time    Assessment: Current Emotional / Mental Status (status of significant symptoms):  Risk status (Self / Other harm or suicidal ideation)  Patient has had a history of suicidal ideation: in adolescense and suicide attempts: patient states that she tried multiple times around ages [age] by slitting wrists or overdose, and one overdose of alcohol; she denies ever being hospitalized for mental health and denies a history of self-injurious behavior, homicidal ideation, homicidal behavior and and other safety concerns  Patient denies current fears or concerns for personal safety.  Patient reports the following current or recent suicidal ideation or behaviors: patient reports some thoughts of not wanting to be around. She denies any specific thoughts about death and denies any suicial plan or intent.   Patient denies current or recent homicidal ideation or behaviors.  Patient denies current or recent self injurious behavior or ideation.  Patient denies other safety concerns.  A safety and risk management plan has been developed including: Patient consented to co-developed safety plan.  A safety and risk management plan was completed.  Patient agreed to use safety plan should any safety concerns arise.  A copy was given to the patient.      Appearance:   unable to assess due to phone visit   Eye Contact:   unable to assess due to phone visit   Psychomotor Behavior: unable to assess due to phone visit   Attitude:   Cooperative   Orientation:   All  Speech   Rate / Production: Monotone    Volume:  Normal   Mood:    Anxious  Depressed   Affect:    unable to assess due to phone visit   Thought Content:  Clear  Perservative  Rumination   Thought Form:  Coherent  Logical   Insight:    Good     Diagnoses:  1. Current severe episode of major depressive disorder without psychotic features without prior episode (H)    2. CRISTIANO (generalized anxiety  disorder)        Collateral Reports Completed:  Not Applicable    Plan: (Homework, other):  Patient was given information about behavioral services and encouraged to schedule a follow up appointment with the clinic [facility] in 1 week.  She was also given information about mental health symptoms and treatment options , Cognitive Behavioral Therapy skills to practice when experiencing anxiety and depression and deep Breathing Strategies to practice when experiencing anxiety and depression.  CD Recommendations: No indications of CD issues.  Patient will continue to work on connecting with others. She will spend time reflecting, daily, on what she accomplished. KVNG Watson, [facility]       ______________________________________________________________________    Integrated Primary Care Behavioral Health Treatment Plan    Patient's Name: Mattie Badna  YOB: 1980    Date: January 28, 2020    DSM-V Diagnoses: 296.33 (F33.2) Major Depressive Disorder, Recurrent Episode, Severe _ or 300.02 (F41.1) Generalized Anxiety Disorder  Psychosocial / Contextual Factors: medical issues,  and dating ex-, few friends  WHODAS: 36    Referral / Collaboration:  Referral to another professional/service is not indicated at this time..    Anticipated number of session or this episode of care: 8      MeasurableTreatment Goal(s) related to diagnosis / functional impairment(s)  Goal 1: Patient will demonstrate ability to improve depression and anxiety symptoms as evidenced by improved PHQ9 & GAD7 scores.       Objective #A (Patient Action)    Patient will Decrease frequency and intensity of feeling down, depressed, hopeless  Identify negative self-talk and behaviors: challenge core beliefs, myths, and actions  Decrease thoughts that you'd be better off dead or of suicide / self-harm  Status: New - Date: 1/28/2020     Intervention(s)  [facility] will teach distress tolerance skills. Provide psycho-education on cognitive  distortions and automatic thoughts and behaviors and ways to appropriately challenge and restructure thoughts.    Objective #B  Patient will increase coping strategies by 2  to more effectively manage current stressors  increase understanding of steps in the grief process  Status: New - Date: 1/28/2020     Intervention(s)  [facility] will teach mindfulness and relaxation strategies. Provide psycho-education on grief process.    Patient has reviewed and agreed to the above plan.    Written by  KVNG Watson, [facility]

## 2020-04-01 ENCOUNTER — TELEPHONE (OUTPATIENT)
Dept: FAMILY MEDICINE | Facility: CLINIC | Age: 40
End: 2020-04-01

## 2020-04-01 DIAGNOSIS — K52.832 LYMPHOCYTIC COLITIS: Primary | ICD-10-CM

## 2020-04-01 RX ORDER — SULFASALAZINE 500 MG/1
500 TABLET ORAL 4 TIMES DAILY
Qty: 120 TABLET | Refills: 2 | Status: SHIPPED | OUTPATIENT
Start: 2020-04-01

## 2020-04-01 RX ORDER — SULFASALAZINE 500 MG/1
500 TABLET, DELAYED RELEASE ORAL 4 TIMES DAILY
Qty: 120 TABLET | Refills: 1 | Status: SHIPPED | OUTPATIENT
Start: 2020-04-01 | End: 2020-04-01 | Stop reason: ALTCHOICE

## 2020-04-01 NOTE — TELEPHONE ENCOUNTER
Pt notified of the med change. She has also set up a follow-up telephone visit. Elaina Ramos CMA ([facility])

## 2020-04-01 NOTE — TELEPHONE ENCOUNTER
When it is clear there are alternatives, the PA process may not be necessary as first line. I will order sulfasalazine today and have patient use that.  We should notify patient of new plan. Medication is ordered four times daily but have her start with 2 and increase to 4 over the next 2-3 weeks if needed. Let her now if this medication is not tolerated we should be able to mesalamine/Asacol which we know worked in the past.  For multiple reasons, we should set up a phone visit in about one month with  Me.  Yuan Schofield MD

## 2020-04-01 NOTE — TELEPHONE ENCOUNTER
PRIOR AUTHORIZATION DENIED    Medication: Mesalamine 800MG dr tablets    Denial Date: 3/31/2020    Denial Rational:        Appeal Information:    If you would like to appeal, please supply P/A team with a letter of medical necessity with clinical reason.

## 2020-04-03 ENCOUNTER — PATIENT OUTREACH (OUTPATIENT)
Dept: CARE COORDINATION | Facility: CLINIC | Age: 40
End: 2020-04-03

## 2020-04-03 NOTE — LETTER
[facility]  Complex Care Plan  About Me:    Patient Name:  Mattie Perez    YOB: 1980  Age:         39 year old   Amrita MRN:    [id number] Telephone Information:  Home Phone [phone]   Mobile [phone]       Address:  [address] [place] [address] Email address:  [email or web address]      Emergency Contact(s)    Name Relationship Lgl Grd Work Phone Home Phone Mobile Phone   1. VIKAS ARREDONDO* Daughter    [phone]   2. STEPHANIE PEREZ Life Partner   [phone]   3. RAGHAVENDRA TANNER* Mother   [phone]           Primary language:  English     needed? No   [facility] [employer] [facility]:  [phone] op. 1  Other communication barriers: None  Preferred Method of Communication:  [place]  Current living arrangement:    Mobility Status/ Medical Equipment:      Health Maintenance  Health Maintenance Reviewed: Due/Overdue   Health Maintenance Due   Topic Date Due     PNEUMOCOCCAL IMMUNIZATION 19-64 MEDIUM RISK (1 of 1 - PPSV23) 10/11/1999     PREVENTIVE CARE VISIT  02/08/2017     HPV TEST  02/08/2019     PAP  02/08/2019         My Access Plan  Medical Emergency 911   Primary Clinic Line [facility] - [phone]   24 Hour Appointment Line [phone] or  [phone]) (toll-free)   24 Hour Nurse Line [phone] (toll-free)   Preferred Urgent Care     Preferred Hospital     Preferred Pharmacy [facility] [address]     Behavioral Health Crisis Line The National Suicide Prevention Lifeline at 1-832.666.7330 or 911             My Care Team Members  Patient Care Team       Relationship Specialty Notifications Start End    Yuan Schofield MD PCP - General Family Practice  9/18/13     Phone: [phone] Fax: [phone]         [address] [place] [address]    Yuan Schofield MD Assigned PCP   1/14/18     Phone: [phone] Fax:  [phone]         [address] [place] [address]    Anabel Barber MD MD OB/Gyn  10/14/19     Phone: [phone] Fax: [phone]         [address] [facility] [address]    Barbara Duke LSW Lead Care Coordinator Primary Care - CC Admissions 12/19/19     Phone: [phone]                 My Care Plans  Self Management and Treatment Plan  Goals and (Comments)  Goals        General    Financial Wellbeing (pt-stated)     Notes - Note edited  4/3/2020  4:06 PM by Barbara Duke LSW    Goal Statement: I would like to start the process of applying for Disability now that MD feels my issues will cause me to be unable to work for 12 months or more    Date Goal set: 12/19/19  Barriers: struggling with mental health; recent surgery; pain  Strengths: Pt took steps to call [facility] back after getting messages; reaching out for assistance; been seeing [facility]   Date to Achieve By: 6/30/20  Patient expressed understanding of goal: yes  Action steps to achieve this goal:  1. I will work on the Disability packet of paperwork/application that I received in the mail.   2. I will read my MyChart message from [facility] and follow suggestions to help my anxiety  3. I will attend my appointments for counseling and psychiatry.   4. I will reach out to [facility] with resource needs or just to talk for support.                  Action Plans on File:            Depression     Migraine    Advance Care Plans/Directives Type:        My Medical and Care Information  Problem List   Patient Active Problem List   Diagnosis     Degeneration of lumbar intervertebral disc     S/P lumbar fusion and discectomy June 2015     Labial lesion     Tobacco use disorder     Idiopathic peripheral neuropathy     Hypothyroidism, unspecified type     Migraine with aura and without status migrainosus, not intractable     Lymphocytic colitis     DDD (degenerative disc disease), cervical     Chronic pain syndrome     Abnormal uterine bleeding      Carotidynia     Vasculitis (H)     Current severe episode of major depressive disorder without psychotic features without prior episode (H)     CRISTIANO (generalized anxiety disorder)      Current Medications and Allergies:  See printed Medication Report.    Care Coordination Start Date: 12/19/2019   Frequency of Care Coordination: monthly   Form Last Updated: 04/03/2020

## 2020-04-03 NOTE — PROGRESS NOTES
Clinic Care Coordination Contact    Follow Up Progress Note      Assessment: [facility] spoke with pt. Pt states anxiety has been high lately. Pt continues to work with [facility]. Pt will be seeing psychiatry and [facility] counselor in future as well. Appointments are made for this.     Pt said she just got the packet/application and information in the mail from [employer] recently. She plans to start working on that.       Goals addressed this encounter:   Goals Addressed                 This Visit's Progress       Patient Stated      Financial Wellbeing (pt-stated)   40%     Goal Statement: I would like to start the process of applying for Disability now that MD feels my issues will cause me to be unable to work for 12 months or more    Date Goal set: 12/19/19  Barriers: struggling with mental health; recent surgery; pain  Strengths: Pt took steps to call [facility] back after getting messages; reaching out for assistance; been seeing [facility]   Date to Achieve By: 6/30/20  Patient expressed understanding of goal: yes  Action steps to achieve this goal:  1. I will work on the Disability packet of paperwork/application that I received in the mail.   2. I will read my [employer] message from [facility] and follow suggestions to help my anxiety  3. I will attend my appointments for counseling and psychiatry.   4. I will reach out to [facility] with resource needs or just to talk for support.                Intervention/Education provided during outreach: Asked pt how she is coping due to COVID. Pt states it isn't much different for her as she tends to isolate herself already even before this.     Let pt know [facility] sent a GAM[employer]IDE message with some tips/information for her that might help her anxiety and coping and give her some perspective or a way to look at things differently. Pt will look into this she said.      Unfortunately pt was driving and connection wasn't great so couldn't talk very long. Pt has [facility] number if needs/questions arise. Pt  appreciative of the assistance, support.      Outreach Frequency: monthly    Plan:   AUTUMN [facility] will send Complex Care Plan via [employer].   Care Coordinator will follow up in 1 month.       MARK Salas   Primary Care Clinic- Social Work Care Coordinator  [facility]  4/3/2020 4:06 PM  [phone]

## 2020-04-07 ENCOUNTER — VIRTUAL VISIT (OUTPATIENT)
Dept: BEHAVIORAL HEALTH | Facility: CLINIC | Age: 40
End: 2020-04-07
Payer: COMMERCIAL

## 2020-04-07 DIAGNOSIS — F41.1 GAD (GENERALIZED ANXIETY DISORDER): ICD-10-CM

## 2020-04-07 DIAGNOSIS — F32.2 CURRENT SEVERE EPISODE OF MAJOR DEPRESSIVE DISORDER WITHOUT PSYCHOTIC FEATURES WITHOUT PRIOR EPISODE (H): Primary | ICD-10-CM

## 2020-04-07 PROCEDURE — 98968 PH1 ASSMT&MGMT NQHP 21-30: CPT | Performed by: MARRIAGE & FAMILY THERAPIST

## 2020-04-07 NOTE — PROGRESS NOTES
"[facility] Primary Care: Integrated Behavioral Health  April 7, 2020    Mattie Banda is a 39 year old female who is being evaluated via a telephone visit.      The patient has been notified of the following:     \"We have found that certain health care needs can be provided without the need for a face to face visit.  This service lets us provide the care you need with a short phone conversation.      I will have full access to your [facility] medical record during this entire phone call.   I will be taking notes for your medical record.     Since this is like an office visit, we will bill your insurance company for this service.  Please check with your medical insurance if this type of telephone visit/virtual care is covered.  You may be responsible for the cost of this service if insurance coverage is denied.      There are potential benefits and risks of telephone visits (e.g. limits to patient confidentiality) that differ from in-person visits.?  Confidentiality still applies for telephone services, and nobody will record the visit.  It is important to be in a quiet, private space that is free of distractions (including cell phone or other devices) during the visit.??     If during the course of the call I believe a telephone visit is not appropriate, you will not be charged for this service\"    Consent has been obtained for this service by care team member: yes.    Behavioral Health Clinician Progress Note    Patient Name: Mattie Banda           Service Type:  Individual      Service Location:   Phone call (patient / identified key support person reached)     Session Start Time: 1:05  Session End Time: 1:45      Session Length: 38 - 52      Attendees: Patient    Visit Activities (Refresh list every visit): [facility] Only    Diagnostic Assessment Date: 1/21/2020  Treatment Plan Review Date: 1/28/2020  See Flowsheets for today's PHQ-9 and CRISTIANO-7 results  Previous PHQ-9:   PHQ-9 SCORE 12/11/2019 " "1/7/2020 3/4/2020   PHQ-9 Total Score - - -   PHQ-9 Total Score 24 24 25     Previous CRISTIANO-7:   CRISTIANO-7 SCORE 10/25/2019 1/7/2020 3/4/2020   Total Score - - -   Total Score 19 20 20       EMILIA LEVEL:  EMILIA Score (Last Two) 11/14/2012   EMILIA Raw Score 44   Activation Score 70.8   EMILIA Level 4       DATA  Extended Session (60+ minutes): No  Interactive Complexity: No  Crisis: No  State mental health facility Patient: No    Treatment Objective(s) Addressed in This Session:  Target Behavior(s): disease management/lifestyle changes depression and anxiety    Depressed Mood: Increase interest, engagement, and pleasure in doing things  Decrease frequency and intensity of feeling down, depressed, hopeless  Improve quantity and quality of night time sleep / decrease daytime naps  Feel less tired and more energy during the day   Improve diet, appetite, mindful eating, and / or meal planning  Identify negative self-talk and behaviors: challenge core beliefs, myths, and actions  Improve concentration, focus, and mindfulness in daily activities   Decrease thoughts that you'd be better off dead or of suicide / self-harm  Anxiety: will experience a reduction in anxiety, will develop more effective coping skills to manage anxiety symptoms, will develop healthy cognitive patterns and beliefs and will increase ability to function adaptively    Current Stressors / Issues:  Patient reports that this past week has been \"rough\". This week will be the one year anniversary of her grandma passing. She finds she has been crying more. She did go visit with her grandpa. She states that she is grateful her grandma is not in pain and also not experiencing what is happening with Covid19.  She received paperwork following her phone interview with social security disability. She states that there is a lot to go through.  She expressed frustration with continued experience of pain and not knowing what is going on. Her appointments have been cancelled with rheumatology due to " COVID19.  She states that she doesn't qualify for unemployment and continues to worry about finances.   She continues to work on reducing her social isolation. Provided an example of going out with her daughter when they picked up their new puppy. She also went for a walk a couple days.     Validated patient's feelings. Processed grief and reviewed stages of grief. Patient talked about plans on honoring her memory. Encouraged patient to continue to engage with others socially in small increments. Discussed financial strain and reflected patient working to connect with appropriate resources. Encouraged daily relaxation to help ground her.    Introduced square breathing; breath in for count of four, hold four, breath out for four and  hold four. and repeat. Suggested patient practice daily.    Progress on Treatment Objective(s) / Homework:  Minimal progress - ACTION (Actively working towards change); Intervened by reinforcing change plan / affirming steps taken    Motivational Interviewing    MI Intervention: Expressed Empathy/Understanding, Supported Autonomy, Collaboration, Evocation, Permission to raise concern or advise, Open-ended questions, Reflections: simple and complex, Change talk (evoked) and Reframe     Change Talk Expressed by the Patient: Desire to change Ability to change Reasons to change Need to change Committment to change Activation Taking steps    Provider Response to Change Talk: E - Evoked more info from patient about behavior change, A - Affirmed patient's thoughts, decisions, or attempts at behavior change, R - Reflected patient's change talk and S - Summarized patient's change talk statements    Also provided psychoeducation about behavioral health condition, symptoms, and treatment options      Skills training    Explored skills useful to client in current situation    Skills include assertiveness, communication, conflict management, problem-solving, relaxation, etc.    Solution-Focused  Therapy    Explored patterns in patient's relationships and discussed options for new behaviors    Explored patterns in patient's actions and choices and discussed options for new behaviors    Cognitive-behavioral Therapy    Discussed common cognitive distortions, identified them in patient's life    Explored ways to challenge, replace, and act against these cognitions    Acceptance and Commitment Therapy    Explored and identified important values in patient's life    Discussed ways to commit to behavioral activation around these values    Psychodynamic psychotherapy    Discussed patient's emotional dynamics and issues and how they impact behaviors    Explored patient's history of relationships and how they impact present behaviors    Explored how to work with and make changes in these schemas and patterns    Behavioral Activation    Discussed steps patient can take to become more involved in meaningful activity    Identified barriers to these activities and explored possible solutions    Mindfulness-Based Strategies    Discussed skills based on development and application of mindfulness    Skills drawn from dialectical behavior therapy, mindfulness-based stress reduction, mindfulness-based cognitive therapy, etc.      Care Plan review completed: Yes    Medication Review:  No changes to current psychiatric medication(s)     Medication Compliance:  Yes    Changes in Health Issues:   None reported    Chemical Use Review:   Substance Use: Chemical use reviewed, no active concerns identified       Tobacco Use: No change in amount of tobacco use since last session.  Patient declined discussion at this time    Assessment: Current Emotional / Mental Status (status of significant symptoms):  Risk status (Self / Other harm or suicidal ideation)  Patient has had a history of suicidal ideation: in adolescense and suicide attempts: patient states that she tried multiple times around ages 15-16 by slitting wrists or overdose,  and one overdose of alcohol; she denies ever being hospitalized for mental health and denies a history of self-injurious behavior, homicidal ideation, homicidal behavior and and other safety concerns  Patient denies current fears or concerns for personal safety.  Patient reports the following current or recent suicidal ideation or behaviors: patient reports some thoughts of not wanting to be around. She denies any specific thoughts about death and denies any suicial plan or intent.   Patient denies current or recent homicidal ideation or behaviors.  Patient denies current or recent self injurious behavior or ideation.  Patient denies other safety concerns.  A safety and risk management plan has been developed including: Patient consented to co-developed safety plan.  A safety and risk management plan was completed.  Patient agreed to use safety plan should any safety concerns arise.  A copy was given to the patient.      Appearance:   unable to assess due to phone visit   Eye Contact:   unable to assess due to phone visit   Psychomotor Behavior: unable to assess due to phone visit   Attitude:   Cooperative   Orientation:   All  Speech   Rate / Production: Monotone    Volume:  Normal   Mood:    Anxious  Depressed   Affect:    unable to assess due to phone visit   Thought Content:  Clear  Perservative  Rumination   Thought Form:  Coherent  Logical   Insight:    Good     Diagnoses:  1. Current severe episode of major depressive disorder without psychotic features without prior episode (H)    2. CRISTIANO (generalized anxiety disorder)        Collateral Reports Completed:  Not Applicable    Plan: (Homework, other):  Patient was given information about behavioral services and encouraged to schedule a follow up appointment with the clinic [facility] in 1 week.  She was also given information about mental health symptoms and treatment options , Cognitive Behavioral Therapy skills to practice when experiencing anxiety and depression and  deep Breathing Strategies to practice when experiencing anxiety and depression.  CD Recommendations: No indications of CD issues.  Patient will continue to work on connecting with others. She will spend time reflecting, daily, on what she accomplished. She will work on practicing square breathing on a daily basis. KVNG Watson, [facility]       ______________________________________________________________________    Integrated Primary Care Behavioral Health Treatment Plan    Patient's Name: Mattie Banda  YOB: 1980    Date: January 28, 2020    DSM-V Diagnoses: 296.33 (F33.2) Major Depressive Disorder, Recurrent Episode, Severe _ or 300.02 (F41.1) Generalized Anxiety Disorder  Psychosocial / Contextual Factors: medical issues,  and dating ex-, few friends  WHODAS: 36    Referral / Collaboration:  Referral to another professional/service is not indicated at this time..    Anticipated number of session or this episode of care: 8      MeasurableTreatment Goal(s) related to diagnosis / functional impairment(s)  Goal 1: Patient will demonstrate ability to improve depression and anxiety symptoms as evidenced by improved PHQ9 & GAD7 scores.       Objective #A (Patient Action)    Patient will Decrease frequency and intensity of feeling down, depressed, hopeless  Identify negative self-talk and behaviors: challenge core beliefs, myths, and actions  Decrease thoughts that you'd be better off dead or of suicide / self-harm  Status: New - Date: 1/28/2020     Intervention(s)  [facility] will teach distress tolerance skills. Provide psycho-education on cognitive distortions and automatic thoughts and behaviors and ways to appropriately challenge and restructure thoughts.    Objective #B  Patient will increase coping strategies by 2  to more effectively manage current stressors  increase understanding of steps in the grief process  Status: New - Date: 1/28/2020     Intervention(s)  [facility] will teach  mindfulness and relaxation strategies. Provide psycho-education on grief process.    Patient has reviewed and agreed to the above plan.    Written by  KVNG Watson, [facility]

## 2020-04-08 ENCOUNTER — MYC REFILL (OUTPATIENT)
Dept: FAMILY MEDICINE | Facility: CLINIC | Age: 40
End: 2020-04-08

## 2020-04-08 DIAGNOSIS — Z90.710 HISTORY OF ROBOT-ASSISTED LAPAROSCOPIC HYSTERECTOMY: ICD-10-CM

## 2020-04-08 DIAGNOSIS — M50.30 DDD (DEGENERATIVE DISC DISEASE), CERVICAL: ICD-10-CM

## 2020-04-08 DIAGNOSIS — M51.369 DEGENERATION OF LUMBAR INTERVERTEBRAL DISC: ICD-10-CM

## 2020-04-08 RX ORDER — MORPHINE SULFATE 15 MG/1
15 TABLET, FILM COATED, EXTENDED RELEASE ORAL EVERY 12 HOURS
Qty: 60 TABLET | Refills: 0 | Status: SHIPPED | OUTPATIENT
Start: 2020-04-08 | End: 2020-05-07

## 2020-04-08 RX ORDER — OXYCODONE HYDROCHLORIDE 5 MG/1
5-10 TABLET ORAL EVERY 6 HOURS PRN
Qty: 240 TABLET | Refills: 0 | Status: SHIPPED | OUTPATIENT
Start: 2020-04-08 | End: 2020-05-07

## 2020-04-08 NOTE — TELEPHONE ENCOUNTER
morphine       Last Written Prescription Date:  3/16/20  Last Fill Quantity: 60,   # refills: 0  Last Office Visit: 3/30/20  Future Office visit:    Next 5 appointments (look out 90 days)    Apr 21, 2020  1:00 PM CDT  Telephone Visit with KVNG Balbuena  [facility] ([facility]) [address] [place] [address]  [phone]   Apr 24, 2020  8:30 AM CDT  Return Visit with Beatriz Nunez MD  [facility] [address] [place] [address]  [phone]   May 01, 2020  1:15 PM CDT  Telephone Visit with Yuan Schofield MD  [address] [address]  [phone]   May 07, 2020  2:15 PM CDT  Telephone Visit with Tiffany Fallon DO  [facility] ([facility]) [address] [place] [address] [address]  [facility] [address]  [phone]           Routing refill request to provider for review/approval because:  Drug not on the [facility] or [facility] refill protocol or controlled substance    oxycodone      Last Written Prescription Date:  3/16/20  Last Fill Quantity: 240,   # refills: 0  Last Office Visit: 3/30/20  Future Office visit:    Next 5 appointments (look out 90 days)    Apr 21, 2020  1:00 PM CDT  Telephone Visit with KVNG Balbuena  [facility] ([facility]) [address] [place] [address]  [phone]   Apr 24, 2020  8:30 AM CDT  Return Visit with Beatriz Nunez MD  [facility]) [address] [place] [address]  [phone]   May 01, 2020  1:15 PM CDT  Telephone Visit with Yuan Schofield MD  [facility] ([facility]) [address] [place] [address]  [phone]   May 07, 2020  2:15 PM CDT  Telephone Visit with Tiffany Fallon DO  [facility]) [address] [facility] [address]  [facility] [address]  [phone]           Routing refill request to provider for review/approval because:  Drug not on the [facility], [facility] or [facility] refill protocol or controlled substance

## 2020-04-13 DIAGNOSIS — G90.01 CAROTIDYNIA: ICD-10-CM

## 2020-04-14 RX ORDER — HYDROXYCHLOROQUINE SULFATE 200 MG/1
200 TABLET, FILM COATED ORAL 2 TIMES DAILY
Qty: 60 TABLET | Refills: 0 | Status: SHIPPED | OUTPATIENT
Start: 2020-04-14 | End: 2020-05-19

## 2020-04-14 NOTE — TELEPHONE ENCOUNTER
Hydroxychloroquine 200 mg      Last Written Prescription Date:  2/19/2020  Last Fill Quantity: 60,   # refills: 1  Last Office Visit: 3/30/2020  Future Office visit:    Next 5 appointments (look out 90 days)    Apr 21, 2020  1:00 PM CDT  [facility] KVNG [facility] [address] [place] [address]  [phone]   Apr 24, 2020  8:30 AM CDT  Return Visit with Beatriz Nunez MD  [facility] [address] [place] [address]  [phone]   May 01, 2020  1:30 PM CDT  Telephone Visit with [facility]ield [facility]  [facility] [address] [place] [address]  [phone]   May 07, 2020  2:15 PM CDT  Telephone Visit with Tiffany Fallon DO  [facility] ([facility]) [address] [facility] [address]  [facility] [address] [phone]           Routing refill request to provider for review/approval because:  Drug not on the [facility], [facility] or [facility] refill protocol or controlled substance

## 2020-04-15 ENCOUNTER — TELEPHONE (OUTPATIENT)
Dept: RHEUMATOLOGY | Facility: CLINIC | Age: 40
End: 2020-04-15

## 2020-04-15 NOTE — TELEPHONE ENCOUNTER
Writer attempted to reach the patient and change in clinic visit to video visit with Dr. Nunez on 4/24/2020.    CALL CENTER OK TO SCHEDULE    Lvm.    Kristin [facility]  Adult Med Spec/Surg Spec   [phone]

## 2020-04-18 DIAGNOSIS — E03.9 HYPOTHYROIDISM, UNSPECIFIED TYPE: ICD-10-CM

## 2020-04-20 RX ORDER — LEVOTHYROXINE SODIUM 112 UG/1
TABLET ORAL
Qty: 90 TABLET | Refills: 1 | Status: SHIPPED | OUTPATIENT
Start: 2020-04-20 | End: 2020-10-14

## 2020-04-20 NOTE — TELEPHONE ENCOUNTER
Routing refill request to provider for review/approval because:  Labs not current:  [facility]  Heaven Duke RN

## 2020-04-21 ENCOUNTER — MYC REFILL (OUTPATIENT)
Dept: FAMILY MEDICINE | Facility: CLINIC | Age: 40
End: 2020-04-21

## 2020-04-21 ENCOUNTER — VIRTUAL VISIT (OUTPATIENT)
Dept: BEHAVIORAL HEALTH | Facility: CLINIC | Age: 40
End: 2020-04-21
Payer: COMMERCIAL

## 2020-04-21 DIAGNOSIS — F41.1 GAD (GENERALIZED ANXIETY DISORDER): ICD-10-CM

## 2020-04-21 DIAGNOSIS — M51.369 DEGENERATION OF LUMBAR INTERVERTEBRAL DISC: ICD-10-CM

## 2020-04-21 DIAGNOSIS — F32.2 CURRENT SEVERE EPISODE OF MAJOR DEPRESSIVE DISORDER WITHOUT PSYCHOTIC FEATURES WITHOUT PRIOR EPISODE (H): Primary | ICD-10-CM

## 2020-04-21 PROCEDURE — 98968 PH1 ASSMT&MGMT NQHP 21-30: CPT | Performed by: MARRIAGE & FAMILY THERAPIST

## 2020-04-21 RX ORDER — METHOCARBAMOL 750 MG/1
750 TABLET, FILM COATED ORAL 3 TIMES DAILY PRN
Qty: 90 TABLET | Refills: 1 | Status: SHIPPED | OUTPATIENT
Start: 2020-04-21 | End: 2020-05-07

## 2020-04-21 NOTE — TELEPHONE ENCOUNTER
Methocarbamol 750 MG       Last Written Prescription Date:  3/30/2020  Last Fill Quantity: 90,   # refills: 1  Last Office Visit: 3/30/2020  Future Office visit:    Next 5 appointments (look out 90 days)    May 01, 2020  1:30 PM CDT  Telephone Visit with Yuan Schofield MD  [facility] ([facility]) [address] [facility] [address]  [phone]   May 07, 2020  2:15 PM CDT  Telephone Visit with Tiffany Fallon DO  [facility] ([facility]) [address] [facility] [address]  [facility] [address] [phone]           Routing refill request to provider for review/approval because:  Drug not on the [facility], [facility] or [facility] refill protocol or controlled substance

## 2020-04-21 NOTE — PROGRESS NOTES
"[facility] Primary [facility]: Integrated Behavioral Health  April 21, 2020    Mattie Banda is a 39 year old female who is being evaluated via a telephone visit.      The patient has been notified of the following:     \"We have found that certain health care needs can be provided without the need for a face to face visit.  This service lets us provide the care you need with a short phone conversation.      I will have full access to your [place] medical record during this entire phone call.   I will be taking notes for your medical record.     Since this is like an office visit, we will bill your insurance company for this service.  Please check with your medical insurance if this type of telephone visit/virtual care is covered.  You may be responsible for the cost of this service if insurance coverage is denied.      There are potential benefits and risks of telephone visits (e.g. limits to patient confidentiality) that differ from in-person visits.?  Confidentiality still applies for telephone services, and nobody will record the visit.  It is important to be in a quiet, private space that is free of distractions (including cell phone or other devices) during the visit.??     If during the course of the call I believe a telephone visit is not appropriate, you will not be charged for this service\"    Consent has been obtained for this service by care team member: yes.    Behavioral Health Clinician Progress Note    Patient Name: Mattie Banda           Service Type:  Individual      Service Location:   Phone call (patient / identified key support person reached)     Session Start Time: 1:00  Session End Time: 1:50      Session Length: 38 - 52      Attendees: Patient    Visit Activities (Refresh list every visit): [facility] [place] Only    Diagnostic Assessment Date: 1/21/2020  Treatment Plan Review Date: 1/28/2020  See Flowsheets for today's PHQ-9 and CRISTIANO-7 results  Previous PHQ-9:   PHQ-9 SCORE 12/11/2019 " 2020 3/4/2020   PHQ-9 Total Score - - -   PHQ-9 Total Score 24 24 25     Previous CRISTIANO-7:   CRISTIANO-7 SCORE 10/25/2019 2020 3/4/2020   Total Score - - -   Total Score 19 20 20       EMILIA LEVEL:  EMILIA Score (Last Two) 2012   EMILIA Raw Score 44   Activation Score 70.8   EMILIA Level 4       DATA  Extended Session (60+ minutes): No  Interactive Complexity: No  Crisis: No  [facility] Patient: No    Treatment Objective(s) Addressed in This Session:  Target Behavior(s): disease management/lifestyle changes depression and anxiety    Depressed Mood: Increase interest, engagement, and pleasure in doing things  Decrease frequency and intensity of feeling down, depressed, hopeless  Improve quantity and quality of night time sleep / decrease daytime naps  Feel less tired and more energy during the day   Improve diet, appetite, mindful eating, and / or meal planning  Identify negative self-talk and behaviors: challenge core beliefs, myths, and actions  Improve concentration, focus, and mindfulness in daily activities   Decrease thoughts that you'd be better off dead or of suicide / self-harm  Anxiety: will experience a reduction in anxiety, will develop more effective coping skills to manage anxiety symptoms, will develop healthy cognitive patterns and beliefs and will increase ability to function adaptively    Current Stressors / Issues:  Patient reports increased depressed mood and sadness as she went through the one year anniversary of her grandmother's death.  Patient identified feeling upset with her mom for not visiting grandma's grave. Still felt the need to see mom. Was assertive in verbalizing her disappointment in her mom to her. Reinforced patient verbalizing her feelings. Patient states that she played a card game with her son and parents and found herself laughing.  She states that she has also been spending time with her son by going for walks.  She learned that someone in the community she knows recently  from  "COVID19.  Her spouse has been more \"crabby\" due to increased stress related to his job.    Discussed interpersonal effectiveness skills to help with communication with her spouse.    Patient states that she tried to click on the link for relaxation resources, sent via [employer], however it didn't work. Recommended that patient google. \"free calm resources\" and try to use one relaxation exercise a day.    Patient states that she had stopped listening to music and she is just starting to get back into it.       Progress on Treatment Objective(s) / Homework:  Minimal progress - ACTION (Actively working towards change); Intervened by reinforcing change plan / affirming steps taken    Motivational Interviewing    MI Intervention: Expressed Empathy/Understanding, Supported Autonomy, Collaboration, Evocation, Permission to raise concern or advise, Open-ended questions, Reflections: simple and complex, Change talk (evoked) and Reframe     Change Talk Expressed by the Patient: Desire to change Ability to change Reasons to change Need to change Committment to change Activation Taking steps    Provider Response to Change Talk: E - Evoked more info from patient about behavior change, A - Affirmed patient's thoughts, decisions, or attempts at behavior change, R - Reflected patient's change talk and S - Summarized patient's change talk statements    Also provided psychoeducation about behavioral health condition, symptoms, and treatment options      Skills training    Explored skills useful to client in current situation    Skills include assertiveness, communication, conflict management, problem-solving, relaxation, etc.    Solution-Focused Therapy    Explored patterns in patient's relationships and discussed options for new behaviors    Explored patterns in patient's actions and choices and discussed options for new behaviors    Cognitive-behavioral Therapy    Discussed common cognitive distortions, identified them in patient's " life    Explored ways to challenge, replace, and act against these cognitions    Acceptance and Commitment Therapy    Explored and identified important values in patient's life    Discussed ways to commit to behavioral activation around these values    Psychodynamic psychotherapy    Discussed patient's emotional dynamics and issues and how they impact behaviors    Explored patient's history of relationships and how they impact present behaviors    Explored how to work with and make changes in these schemas and patterns    Behavioral Activation    Discussed steps patient can take to become more involved in meaningful activity    Identified barriers to these activities and explored possible solutions    Mindfulness-Based Strategies    Discussed skills based on development and application of mindfulness    Skills drawn from dialectical behavior therapy, mindfulness-based stress reduction, mindfulness-based cognitive therapy, etc.      Care Plan review completed: Yes    Medication Review:  No changes to current psychiatric medication(s)     Medication Compliance:  Yes    Changes in Health Issues:   None reported    Chemical Use Review:   Substance Use: Chemical use reviewed, no active concerns identified       Tobacco Use: No change in amount of tobacco use since last session.  Patient declined discussion at this time    Assessment: Current Emotional / Mental Status (status of significant symptoms):  Risk status (Self / Other harm or suicidal ideation)  Patient has had a history of suicidal ideation: in adolescense and suicide attempts: patient states that she tried multiple times around ages [age] by slitting wrists or overdose, and one overdose of alcohol; she denies ever being hospitalized for mental health and denies a history of self-injurious behavior, homicidal ideation, homicidal behavior and and other safety concerns  Patient denies current fears or concerns for personal safety.  Patient reports the following  current or recent suicidal ideation or behaviors: patient reports some thoughts of not wanting to be around. She denies any specific thoughts about death and denies any suicial plan or intent.   Patient denies current or recent homicidal ideation or behaviors.  Patient denies current or recent self injurious behavior or ideation.  Patient denies other safety concerns.  A safety and risk management plan has been developed including: Patient consented to co-developed safety plan.  A safety and risk management plan was completed.  Patient agreed to use safety plan should any safety concerns arise.  A copy was given to the patient.      Appearance:   unable to assess due to phone visit   Eye Contact:   unable to assess due to phone visit   Psychomotor Behavior: unable to assess due to phone visit   Attitude:   Cooperative   Orientation:   All  Speech   Rate / Production: Monotone    Volume:  Normal   Mood:    Anxious  Depressed   Affect:    unable to assess due to phone visit   Thought Content:  Clear  Perservative  Rumination   Thought Form:  Coherent  Logical   Insight:    Good     Diagnoses:  1. Current severe episode of major depressive disorder without psychotic features without prior episode (H)    2. CRISTIANO (generalized anxiety disorder)        Collateral Reports Completed:  Not Applicable    Plan: (Homework, other):  Patient was given information about behavioral services and encouraged to schedule a follow up appointment with the clinic [facility] in 1 week.  She was also given information about mental health symptoms and treatment options , Cognitive Behavioral Therapy skills to practice when experiencing anxiety and depression and deep Breathing Strategies to practice when experiencing anxiety and depression.  CD Recommendations: No indications of CD issues.  Patient will continue to work on connecting with others. She will spend time reflecting, daily, on what she accomplished. She will work on practicing square  breathing on a daily basis. KVNG Watson, [facility]       ______________________________________________________________________    Integrated Primary Care Behavioral Health Treatment Plan    Patient's Name: Mattie Banda  YOB: 1980    Date: January 28, 2020    DSM-V Diagnoses: 296.33 (F33.2) Major Depressive Disorder, Recurrent Episode, Severe _ or 300.02 (F41.1) Generalized Anxiety Disorder  Psychosocial / Contextual Factors: medical issues,  and dating ex-, few friends  WHODAS: 36    Referral / Collaboration:  Referral to another professional/service is not indicated at this time..    Anticipated number of session or this episode of care: 8      MeasurableTreatment Goal(s) related to diagnosis / functional impairment(s)  Goal 1: Patient will demonstrate ability to improve depression and anxiety symptoms as evidenced by improved PHQ9 & GAD7 scores.       Objective #A (Patient Action)    Patient will Decrease frequency and intensity of feeling down, depressed, hopeless  Identify negative self-talk and behaviors: challenge core beliefs, myths, and actions  Decrease thoughts that you'd be better off dead or of suicide / self-harm  Status: New - Date: 1/28/2020     Intervention(s)  [facility] will teach distress tolerance skills. Provide psycho-education on cognitive distortions and automatic thoughts and behaviors and ways to appropriately challenge and restructure thoughts.    Objective #B  Patient will increase coping strategies by 2  to more effectively manage current stressors  increase understanding of steps in the grief process  Status: New - Date: 1/28/2020     Intervention(s)  [facility] will teach mindfulness and relaxation strategies. Provide psycho-education on grief process.    Patient has reviewed and agreed to the above plan.    Written by  KVNG Watson, [facility]

## 2020-04-22 ENCOUNTER — VIRTUAL VISIT (OUTPATIENT)
Dept: FAMILY MEDICINE | Facility: CLINIC | Age: 40
End: 2020-04-22
Payer: COMMERCIAL

## 2020-04-22 DIAGNOSIS — M79.644 PAIN OF FINGER OF RIGHT HAND: Primary | ICD-10-CM

## 2020-04-22 DIAGNOSIS — G89.29 CHRONIC BILATERAL LOW BACK PAIN WITH BILATERAL SCIATICA: ICD-10-CM

## 2020-04-22 DIAGNOSIS — N30.00 ACUTE CYSTITIS WITHOUT HEMATURIA: ICD-10-CM

## 2020-04-22 DIAGNOSIS — M51.369 DEGENERATION OF LUMBAR INTERVERTEBRAL DISC: ICD-10-CM

## 2020-04-22 DIAGNOSIS — G43.109 MIGRAINE WITH AURA AND WITHOUT STATUS MIGRAINOSUS, NOT INTRACTABLE: ICD-10-CM

## 2020-04-22 DIAGNOSIS — I77.6 VASCULITIS (H): ICD-10-CM

## 2020-04-22 DIAGNOSIS — M54.42 CHRONIC BILATERAL LOW BACK PAIN WITH BILATERAL SCIATICA: ICD-10-CM

## 2020-04-22 DIAGNOSIS — M54.41 CHRONIC BILATERAL LOW BACK PAIN WITH BILATERAL SCIATICA: ICD-10-CM

## 2020-04-22 DIAGNOSIS — K52.832 LYMPHOCYTIC COLITIS: ICD-10-CM

## 2020-04-22 PROCEDURE — 99443 ZZC PHYSICIAN TELEPHONE EVALUATION 21-30 MIN: CPT | Performed by: FAMILY MEDICINE

## 2020-04-22 RX ORDER — CIPROFLOXACIN 250 MG/1
250 TABLET, FILM COATED ORAL 2 TIMES DAILY
Qty: 6 TABLET | Refills: 0 | Status: SHIPPED | OUTPATIENT
Start: 2020-04-22 | End: 2020-05-01

## 2020-04-22 RX ORDER — MELOXICAM 15 MG/1
15 TABLET ORAL DAILY
Qty: 30 TABLET | Refills: 0 | Status: SHIPPED | OUTPATIENT
Start: 2020-04-22 | End: 2020-05-19

## 2020-04-22 RX ORDER — PHENAZOPYRIDINE HYDROCHLORIDE 95 MG/1
190 TABLET ORAL 3 TIMES DAILY
COMMUNITY
End: 2021-07-19

## 2020-04-22 NOTE — PROGRESS NOTES
"Mattie Banda is a 39 year old female who is being evaluated via a billable telephone visit.      The patient has been notified of following:     \"This telephone visit will be conducted via a call between you and your physician/provider. We have found that certain health care needs can be provided without the need for a physical exam.  This service lets us provide the care you need with a short phone conversation.  If a prescription is necessary we can send it directly to your pharmacy.  If lab work is needed we can place an order for that and you can then stop by our lab to have the test done at a later time.    Telephone visits are billed at different rates depending on your insurance coverage. During this emergency period, for some insurers they may be billed the same as an in-person visit.  Please reach out to your insurance provider with any questions.    If during the course of the call the physician/provider feels a telephone visit is not appropriate, you will not be charged for this service.\"    Patient has given verbal consent for Telephone visit?  Yes    How would you like to obtain your AVS? [place]     Mattie Banda is a 39 year old female who presents to clinic today for the following health issues:    HPI  Gout/ single inflamed joint   Onset: x 2 weeks    Description:   Location: fingers - right  Joint Swelling: YES  Redness: YES  Pain: YES and warm to the touch    Intensity: severe    Progression of Symptoms:  waxing and waning    Accompanying Signs & Symptoms:  Fevers: YES- low grade; intermittent    History:   Trauma to the area: no   Previous history of gout: YES   Recent illness:  YES- possible kidney infection    Precipitating factors:   Diet-rich in purine: no  Alcohol use: no   Diuretic use: no     Alleviating factors:  none    Therapies Tried and outcome: heat, cold, anti-inflammatories and narcotic pain meds    Discuss possible kidney infection patient has been having pain " with urination and discomfort in her flank on both sides.  This is often associated with urinary tract infection.  She may have noticed some blood in the urine.  However this is very similar to any other previous bladder infection which antibiotics covers.    The original reason for the call today was red swollen fingers on both hands but especially right ring finger at the base.  It is red and swollen and tender to touch.  It is like she had in July of last year and was diagnosed as gout.  She was wondering if gout might be the problem.  However during that time she has had carotidynia, she saw rheumatology and has a follow-up in 2 days with rheumatology, she has nonspecific bloody colitis.  Ibuprofen has been minimally helping the pain.  She has narcotics for her lumbar spine issues.  She says cold water and ice numb the fingers but then makes her Raynauds is worse.  Heat does help some but then the swelling is worse.  She has no symptoms of carotidynia at this time i.e. no neck area pain with redness and swelling.  She has no skin issues at this time.              Patient Active Problem List   Diagnosis     Degeneration of lumbar intervertebral disc     S/P lumbar fusion and discectomy June 2015     Labial lesion     Tobacco use disorder     Idiopathic peripheral neuropathy     Hypothyroidism, unspecified type     Migraine with aura and without status migrainosus, not intractable     Lymphocytic colitis     DDD (degenerative disc disease), cervical     Chronic pain syndrome     Abnormal uterine bleeding     Carotidynia     Vasculitis (H)     Current severe episode of major depressive disorder without psychotic features without prior episode (H)     CRISTIANO (generalized anxiety disorder)     Past Surgical History:   Procedure Laterality Date     ABDOMEN SURGERY  2015    For back surgery     BACK SURGERY  6/2015      BIOPSY  Don t remember     COLONOSCOPY  08/06/07     COLONOSCOPY  08/05/08     CYSTOSCOPY N/A 2/7/2020     Procedure: Cystoscopy;  Surgeon: Anabel Barber MD;  Location: [facility]     DAVINCI HYSTERECTOMY TOTAL, SALPINGECTOMY BILATERAL Bilateral 2/7/2020    Procedure: Davinci Total Laparoscopic Hysterectomy, Bilateral Salpingectomy, Exam Under Anesthesia;  Surgeon: Anabel Barber MD;  Location: [facility]     HC COLONOSCOPY W BIOPSY  02/06/08     HC TOOTH EXTRACTION W/FORCEP      wisdom teeth removed     HYSTERECTOMY, PAP NO LONGER INDICATED       INJECT BLOCK MEDIAL BRANCH CERVICAL/THORACIC/LUMBAR N/A 6/21/2019    Procedure: BLOCK, NERVE, SPINAL, MEDIAL BRANCH lumbar 2, 3, 4;  Surgeon: Edgardo Rubio MD;  Location: [facility]     INJECT EPIDURAL CERVICAL N/A 3/8/2018    Procedure: INJECT EPIDURAL CERVICAL;  cervical 6-7 interlaminar epidural steroid injection;  Surgeon: Edgardo Rubio MD;  Location: [facility]     LAPAROSCOPIC TUBAL LIGATION  11/27/2012    Procedure: LAPAROSCOPIC TUBAL LIGATION;  Laparoscopic Bilateral tubal sterilization/ fulgaration;  Surgeon: Sarbjit Whittaker MD;  Location: [facility]     ORTHOPEDIC SURGERY  6/2015       Social History     Tobacco Use     Smoking status: Current Every Day Smoker     Packs/day: 1.00     Years: 20.00     Pack years: 20.00     Types: Cigarettes     Smokeless tobacco: Never Used   Substance Use Topics     Alcohol use: Yes     Alcohol/week: 0.0 standard drinks     Comment: Rarely     Family History   Problem Relation Age of Onset     Hypertension Maternal Grandmother      Arthritis Maternal Grandmother      Cancer Maternal Grandmother         MGGM     Depression Maternal Grandmother      Lipids Maternal Grandmother      Osteoporosis Maternal Grandmother      Respiratory Maternal Grandmother         emphysema     Genitourinary Problems Maternal Grandmother         Kidney Failure, liver      Coronary Artery Disease Maternal Grandmother      Hyperlipidemia Maternal Grandmother      Anxiety Disorder Maternal Grandmother      Asthma Maternal Grandmother      Anesthesia Reaction  Maternal Grandmother      Hypertension Maternal Grandfather      Heart Disease Maternal Grandfather         MI     Cardiovascular Maternal Grandfather      Cancer - colorectal Maternal Grandfather      Cancer Maternal Grandfather         [place] Lymphoma     Other Cancer Maternal Grandfather      Coronary Artery Disease Maternal Grandfather      Hypertension Paternal Grandmother      Arthritis Paternal Grandmother         RA     Osteoporosis Paternal Grandmother      Obesity Paternal Grandmother      Substance Abuse Mother         Alcoholic     Depression Mother      Hypertension Father      Hyperlipidemia Father      Mental Illness Father      Substance Abuse Father         Bio and Step dad have alcohol problems     Osteoporosis Father      Coronary Artery Disease Paternal Grandfather      Hypertension Paternal Grandfather      Breast Cancer Other         Maternal great aunt     Coronary Artery Disease Other      Diabetes Other      Thyroid Disease Maternal Aunt         two aunts     Breast Cancer Other      Colon Cancer Other      Prostate Cancer Other      Other Cancer Other      Other Cancer Other      Thyroid Disease Other      Anxiety Disorder Other      Substance Abuse Other      Depression Other      Mental Illness Other      Anxiety Disorder Maternal Half-Sister      Thyroid Disease Maternal Half-Sister      Thyroid Disease Other      Asthma Other      Thyroid Disease Other      Obesity Other      Coronary Artery Disease Other      Hypertension Other          Current Outpatient Medications   Medication Sig Dispense Refill     acetaminophen (TYLENOL) 325 MG tablet Take 325-650 mg by mouth every 6 hours as needed for mild pain       albuterol (PROAIR HFA/PROVENTIL HFA/VENTOLIN HFA) 108 (90 Base) MCG/ACT inhaler INHALE 2 PUFFS INTO THE LUNGS EVERY 6 HOURS AS NEEDED FOR SHORTNESSOF BREATH / DYSPNEA OR WHEEZING 18 g 3     ALPRAZolam 2 MG PO tablet TAKE 1/2-1 TABLET (1-2MG) BY MOUTH 3 TIMES DAILY AS NEEDED FOR  STRESS/SLEEP 90 tablet 1     busPIRone (BUSPAR) 5 MG tablet Take 1 tablet (5 mg) by mouth 2 times daily 60 tablet 1     ciprofloxacin (CIPRO) 250 MG tablet Take 1 tablet (250 mg) by mouth 2 times daily 6 tablet 0     escitalopram (LEXAPRO) 20 MG tablet Take 1 tablet (20 mg) by mouth daily 90 tablet 2     hydroxychloroquine (PLAQUENIL) 200 MG tablet Take 1 tablet (200 mg) by mouth 2 times daily 60 tablet 0     levothyroxine (SYNTHROID/LEVOTHROID) 112 MCG tablet TAKE 1 TABLET BY MOUTH EVERY DAY 90 tablet 1     meloxicam (MOBIC) 15 MG tablet Take 1 tablet (15 mg) by mouth daily 30 tablet 0     methocarbamol (ROBAXIN) 750 MG tablet Take 1 tablet (750 mg) by mouth 3 times daily as needed for muscle spasms 90 tablet 1     morphine (MS CONTIN) 15 MG CR tablet Take 1 tablet (15 mg) by mouth every 12 hours maximum 2 tablet(s) per day 60 tablet 0     oxyCODONE (ROXICODONE) 5 MG tablet Take 1-2 tablets (5-10 mg) by mouth every 6 hours as needed for severe pain (uses fewest possible tabs daily.) 240 tablet 0     phenazopyridine (AZO URINARY PAIN RELIEF) 95 MG tablet Take 190 mg by mouth 3 times daily       topiramate (TOPAMAX) 25 MG tablet TAKE 1 TABLET BY MOUTH 3 TIMES A DAY 90 tablet 0     traZODone (DESYREL) 100 MG tablet Take 100 mg by mouth At Bedtime Take 1 to 1.5 tablets at bedtime as needed       NARCAN 4 MG/0.1ML nasal spray SPRAY 1 SPRAY (4 MG) INTO ONE NOSTRIL ALTERNATING NOSTRILS ONCE AS NEEDED FOR OPIOID REVERSAL EVERY 2-3 MINUTES UNTIL ASSISTANCE ARRIVES  0     sulfaSALAzine (AZULFIDINE) 500 MG tablet Take 1 tablet (500 mg) by mouth 4 times daily May start at 2 daily.  Increase to 4 daily over 2-4 weeks if colitis symptoms not controlled. (Patient not taking: Reported on 4/22/2020) 120 tablet 2     Allergies   Allergen Reactions     Bupropion Hcl Hives     Wellbutrin     No Clinical Screening - See Comments      Some metals, nickel  rash     Adhesive Tape Rash and Blisters     BP Readings from Last 3 Encounters:    03/02/20 122/56   02/07/20 119/76   01/31/20 90/56    Wt Readings from Last 3 Encounters:   03/02/20 74.5 kg (164 lb 4.8 oz)   02/07/20 76.6 kg (168 lb 14 oz)   01/31/20 77.4 kg (170 lb 11.2 oz)                    Reviewed and updated as needed this visit by Provider         Review of Systems   ROS COMP: Constitutional, HEENT, cardiovascular, pulmonary, gi and gu systems are negative, except as otherwise noted.  Patient did not have her in person follow-up gynecologic surgery evaluation.  There is no vaginal bleeding at this time.  There is no abdominal pain other than either associated with colitis or urinary tract symptoms now.       Objective   Reported vitals:  There were no vitals taken for this visit.   healthy, alert, mild distress and cooperative  PSYCH: Alert and oriented times 3; coherent speech, normal   rate and volume, able to articulate logical thoughts, able   to abstract reason, no tangential thoughts, no hallucinations   or delusions  Her affect is normal and pleasant  RESP: No cough, no audible wheezing, able to talk in full sentences  Remainder of exam unable to be completed due to telephone visits    Diagnostic Test Results:  Labs reviewed in Epic        Assessment/Plan:  1. Pain of finger of right hand  This is actually hand swelling and redness and with all of her other possible immune system problems needs to be evaluated by rheumatology.  In the past prednisone would take care of redness and inflammation for her but she is having a tele-visit with rheumatologist in 2 days.  It would be better if her hand is actually swollen and red at the time of the exam so the rheumatologist may see this.  She will use topical treatments including Lidoderm patches.  I am trying meloxicam once daily as a different NSAID than ibuprofen.  She will remain on Plaquenil.  This was started as a possible medication for her immune systems disease.  It was initiated when she was uncertain of when her next  rheumatology appointment would be.  Hopefully this does not skew what rheumatologist would have in mind for her.  - **Comprehensive metabolic panel FUTURE anytime; Future  - **CBC with platelets FUTURE anytime; Future  - **ESR FUTURE anytime; Future  - **Uric acid FUTURE 2mo; Future    2. Acute cystitis without hematuria  I have ordered a urinalysis but given previous history of infection that started antibiotic rather than have her expose herself to more possibility of obtaining COVID.  If she is to have lab after rheumatology visit, she will leave a urine sample.  Some of her symptoms may also indicate autoimmune problems with kidneys.  I would anticipate urinalysis could help us with that as well.  Blood work is also ordered.  - ciprofloxacin (CIPRO) 250 MG tablet; Take 1 tablet (250 mg) by mouth 2 times daily  Dispense: 6 tablet; Refill: 0  - **UA reflex to Microscopic FUTURE anytime; Future    3. Degeneration of lumbar intervertebral disc  Ordered both for her acute hand pain and lumbar pain.  Perhaps this NSAID will work much better than ibuprofen  - meloxicam (MOBIC) 15 MG tablet; Take 1 tablet (15 mg) by mouth daily  Dispense: 30 tablet; Refill: 0    4. Lymphocytic colitis  Currently in control in improved when Plaquenil was started I believe.  - **Comprehensive metabolic panel FUTURE anytime; Future  - **CBC with platelets FUTURE anytime; Future    5. Vasculitis (H)  No symptoms today.      Return in about 3 months (around 7/22/2020) for Chronic pain, MSK problem, mental health.      Phone call duration:  23minutes    Yuanjuanita Schofield MD

## 2020-04-22 NOTE — Clinical Note
Beatriz, She sees you Friday this week via telecommunication.  I did not order blood work today but future. Her hands are red and swollen. I told her you would like to see them as they are now. She requested prednisone and understood why I did not order it today. Meloxicam was ordered to replace ibuprofen which she was using.  I hope any treatment if have initiated since you saw her does not interfere with your plans or exams. Yuan

## 2020-04-23 RX ORDER — TOPIRAMATE 25 MG/1
TABLET, FILM COATED ORAL
Qty: 90 TABLET | Refills: 1 | Status: SHIPPED | OUTPATIENT
Start: 2020-04-23 | End: 2020-06-17

## 2020-04-23 NOTE — TELEPHONE ENCOUNTER
[place] [facility] [place].    Yumi Aguilera RN on 4/23/2020 at 4:35 PM

## 2020-04-24 ENCOUNTER — VIRTUAL VISIT (OUTPATIENT)
Dept: RHEUMATOLOGY | Facility: CLINIC | Age: 40
End: 2020-04-24
Payer: COMMERCIAL

## 2020-04-24 DIAGNOSIS — M25.50 POLYARTHRALGIA: Primary | ICD-10-CM

## 2020-04-24 DIAGNOSIS — M79.644 PAIN OF FINGER OF RIGHT HAND: ICD-10-CM

## 2020-04-24 DIAGNOSIS — N30.00 ACUTE CYSTITIS WITHOUT HEMATURIA: ICD-10-CM

## 2020-04-24 DIAGNOSIS — M25.50 POLYARTHRALGIA: ICD-10-CM

## 2020-04-24 DIAGNOSIS — K52.832 LYMPHOCYTIC COLITIS: ICD-10-CM

## 2020-04-24 LAB
ALBUMIN SERPL-MCNC: 3.8 G/DL (ref 3.4–5)
ALBUMIN UR-MCNC: 30 MG/DL
ALP SERPL-CCNC: 79 U/L (ref 40–150)
ALT SERPL W P-5'-P-CCNC: 11 U/L (ref 0–50)
ANION GAP SERPL CALCULATED.3IONS-SCNC: 4 MMOL/L (ref 3–14)
APPEARANCE UR: ABNORMAL
AST SERPL W P-5'-P-CCNC: 12 U/L (ref 0–45)
BACTERIA #/AREA URNS HPF: ABNORMAL /HPF
BILIRUB SERPL-MCNC: 0.3 MG/DL (ref 0.2–1.3)
BILIRUB UR QL STRIP: ABNORMAL
BUN SERPL-MCNC: 13 MG/DL (ref 7–30)
CALCIUM SERPL-MCNC: 8.2 MG/DL (ref 8.5–10.1)
CHLORIDE SERPL-SCNC: 109 MMOL/L (ref 94–109)
CO2 SERPL-SCNC: 25 MMOL/L (ref 20–32)
COLOR UR AUTO: ABNORMAL
CREAT SERPL-MCNC: 0.91 MG/DL (ref 0.52–1.04)
CRP SERPL-MCNC: <2.9 MG/L (ref 0–8)
ERYTHROCYTE [DISTWIDTH] IN BLOOD BY AUTOMATED COUNT: 14.7 % (ref 10–15)
ERYTHROCYTE [SEDIMENTATION RATE] IN BLOOD BY WESTERGREN METHOD: 11 MM/H (ref 0–20)
GFR SERPL CREATININE-BSD FRML MDRD: 79 ML/MIN/{1.73_M2}
GLUCOSE SERPL-MCNC: 88 MG/DL (ref 70–99)
GLUCOSE UR STRIP-MCNC: NEGATIVE MG/DL
HCT VFR BLD AUTO: 40.6 % (ref 35–47)
HGB BLD-MCNC: 12.8 G/DL (ref 11.7–15.7)
HGB UR QL STRIP: NEGATIVE
HYALINE CASTS #/AREA URNS LPF: 3 /LPF (ref 0–2)
KETONES UR STRIP-MCNC: 5 MG/DL
LEUKOCYTE ESTERASE UR QL STRIP: NEGATIVE
MCH RBC QN AUTO: 29.8 PG (ref 26.5–33)
MCHC RBC AUTO-ENTMCNC: 31.5 G/DL (ref 31.5–36.5)
MCV RBC AUTO: 94 FL (ref 78–100)
MUCOUS THREADS #/AREA URNS LPF: PRESENT /LPF
NITRATE UR QL: NEGATIVE
PH UR STRIP: 5 PH (ref 5–7)
PLATELET # BLD AUTO: 283 10E9/L (ref 150–450)
POTASSIUM SERPL-SCNC: 4.2 MMOL/L (ref 3.4–5.3)
PROT SERPL-MCNC: 7.2 G/DL (ref 6.8–8.8)
RBC # BLD AUTO: 4.3 10E12/L (ref 3.8–5.2)
RBC #/AREA URNS AUTO: 1 /HPF (ref 0–2)
SODIUM SERPL-SCNC: 138 MMOL/L (ref 133–144)
SOURCE: ABNORMAL
SP GR UR STRIP: 1.03 (ref 1–1.03)
SQUAMOUS #/AREA URNS AUTO: 18 /HPF (ref 0–1)
URATE SERPL-MCNC: 3.7 MG/DL (ref 2.6–6)
UROBILINOGEN UR STRIP-MCNC: 4 MG/DL (ref 0–2)
WBC # BLD AUTO: 10.5 10E9/L (ref 4–11)
WBC #/AREA URNS AUTO: 1 /HPF (ref 0–5)

## 2020-04-24 PROCEDURE — 36415 COLL VENOUS BLD VENIPUNCTURE: CPT | Performed by: FAMILY MEDICINE

## 2020-04-24 PROCEDURE — G0499 HEPB SCREEN HIGH RISK INDIV: HCPCS | Performed by: FAMILY MEDICINE

## 2020-04-24 PROCEDURE — 80053 COMPREHEN METABOLIC PANEL: CPT | Performed by: FAMILY MEDICINE

## 2020-04-24 PROCEDURE — 85652 RBC SED RATE AUTOMATED: CPT | Performed by: FAMILY MEDICINE

## 2020-04-24 PROCEDURE — 86704 HEP B CORE ANTIBODY TOTAL: CPT | Performed by: FAMILY MEDICINE

## 2020-04-24 PROCEDURE — 99000 SPECIMEN HANDLING OFFICE-LAB: CPT | Performed by: FAMILY MEDICINE

## 2020-04-24 PROCEDURE — 99214 OFFICE O/P EST MOD 30 MIN: CPT | Mod: GT | Performed by: STUDENT IN AN ORGANIZED HEALTH CARE EDUCATION/TRAINING PROGRAM

## 2020-04-24 PROCEDURE — 85027 COMPLETE CBC AUTOMATED: CPT | Performed by: FAMILY MEDICINE

## 2020-04-24 PROCEDURE — 86140 C-REACTIVE PROTEIN: CPT | Performed by: FAMILY MEDICINE

## 2020-04-24 PROCEDURE — 86803 HEPATITIS C AB TEST: CPT | Performed by: FAMILY MEDICINE

## 2020-04-24 PROCEDURE — 84550 ASSAY OF BLOOD/URIC ACID: CPT | Performed by: FAMILY MEDICINE

## 2020-04-24 PROCEDURE — 86481 TB AG RESPONSE T-CELL SUSP: CPT | Performed by: FAMILY MEDICINE

## 2020-04-24 PROCEDURE — 81001 URINALYSIS AUTO W/SCOPE: CPT | Performed by: FAMILY MEDICINE

## 2020-04-24 PROCEDURE — 82955 ASSAY OF G6PD ENZYME: CPT | Mod: 90 | Performed by: FAMILY MEDICINE

## 2020-04-24 NOTE — PROGRESS NOTES
"Mattie Banda is a 39 year old female who is being evaluated via a billable video visit.      The patient has been notified of following:     \"This video visit will be conducted via a call between you and your physician/provider. We have found that certain health care needs can be provided without the need for an in-person physical exam.  This service lets us provide the care you need with a video conversation.  If a prescription is necessary we can send it directly to your pharmacy.  If lab work is needed we can place an order for that and you can then stop by our lab to have the test done at a later time.    Video visits are billed at different rates depending on your insurance coverage.  Please reach out to your insurance provider with any questions.    If during the course of the call the physician/provider feels a video visit is not appropriate, you will not be charged for this service.\"    Patient has given verbal consent for Video visit? Yes    How would you like to obtain your AVS? [place]    Patient would like the video invitation sent by: Text to cell phone: [phone]    Will anyone else be joining your video visit? No      Frida Babin LPN           Active Problem List:     Patient Active Problem List    Diagnosis Date Noted     Current severe episode of major depressive disorder without psychotic features without prior episode (H) 01/21/2020     Priority: Medium     CRISTIANO (generalized anxiety disorder) 01/21/2020     Priority: Medium     Vasculitis (H) 01/10/2020     Priority: Medium     Carotidynia 12/11/2019     Priority: Medium     Abnormal uterine bleeding 10/22/2019     Priority: Medium     Added automatically from request for surgery [id number]       Chronic pain syndrome 07/09/2018     Priority: Medium     Patient is very low risk to abuse  Pain medication.  Patient is followed by Yuan Schofield MD for ongoing prescription of pain medication.  All refills should only be approved by this " provider, or covering partner.    Medication(s): ms contin and oxydodone ir.   Maximum quantity per month: #60 ms contin, #180 oxycodone  Clinic visit frequency required: Q 3 months      Controlled substance agreement:  Encounter-Level CSA - 08/20/2018:    Controlled Substance Agreement - Scan on 6/5/19 CONTROLLED SUBSTANCE AGREEMENT (below)       Patient-Level CSA:    Controlled Substance Agreement - Opioid - Scan on 6/13/2019  6:05 PM (below)    Controlled Substance Agreement - Opioid - Scan on 6/13/2019  6:03 PM: 06/05/2019 (below)         Pain Clinic evaluation in the past: Yes       Date/Location:  [facility] Center [facility] and will go there soon, 6/6/2019.    DIRE Total Score(s):    6/6/2019   Total Score 19       Last [facility] website verification:  10/25/2019 390   [email or web address]             DDD (degenerative disc disease), cervical 02/07/2018     Priority: Medium     Lymphocytic colitis 06/13/2017     Priority: Medium     Idiopathic peripheral neuropathy 01/10/2017     Priority: Medium     Hypothyroidism, unspecified type 01/10/2017     Priority: Medium     Migraine with aura and without status migrainosus, not intractable 12/27/2016     Priority: Medium     Tobacco use disorder 09/12/2016     Priority: Medium     Labial lesion 02/08/2016     Priority: Medium     Superior folds of the vulva/labia right side       S/P lumbar fusion and discectomy June 2015 06/28/2015     Priority: Medium     Degeneration of lumbar intervertebral disc 04/05/2014     Priority: Medium     newly added to list on 4/4//14  but present for last months              History of Present Illness:   Mattie Banda is a 39 year old female with PMH of lymphocytic colitis evaluated via a billable virtual visit for follow up of joint pains.     4/24/20 : 2 weeks ago joints in her hands swell up, they are red and inflammed. Could not bend it. Right index finger PIP joint is swollen and red. She can feel the lump in the PIP  joint and is very painful to touch on the side. Both hands are swollen in the morning. Hips hurt bad.     She is having pain in the flanks as if she has a kidney infection. Lasts for few hours. Dr Schofield put her on antibiotic Ciprofloxacin.     Also started Meloxicam 15 mg daily on 4/20/20 but it has not helped yet.   She does report pain in her knees, ankles, hips, shoulders. Chronic neck and back pain.   Gone to the ER before with same exact symptoms.     She has history of psoriasis for past many years and has small spots over her hands, elbows. She has lymphocytic colitis, FH of Crohn's disease on Paternal side. Cousins have Psoriatic arthritis.       Prednisone helps with her symptoms. By the second day her symptoms are better by 40%.             Review of Systems:     Review Of Systems  Constitutional: denies fever, chills, night sweats and weight loss.  Skin: No skin rash.  Eyes: No dryness or irritation in eyes. No episode of eye inflammation or redness.   Ears/Nose/Throat: no recurrent sinus infections.  Respiratory: No shortness of breath, dyspnea on exertion, cough, or hemoptysis  Cardiovascular: no chest pain or palpitations.  Gastrointestinal: no nausea, vomiting, abdominal pain.  Normal bowel movements.  Genitourinary: no dysuria, frequency  or hematuria.  Musculoskeletal: as in HPI  Neurologic: no numbness, tingling.  Psychiatric: no mood disorders.  Hematologic/Lymphatic/Immunologic: no history of easy bruising, petechia or purpura.  No abnormal bleeding.   Endocrine: no h/o thyroid disease or Diabetes.                  Past Medical History:     Past Medical History:   Diagnosis Date     Anxiety      Arthritis 2019     Breast disorder Not sure     COPD (chronic obstructive pulmonary disease) (H)      Depressive disorder      Mixed anxiety depressive disorder 2/8/2016     Papanicolaou smear of cervix with low grade squamous intraepithelial lesion (LGSIL)      Thyroid disease      Urinary tract  infection, site not specified     Recurrent UTI's     Wounds and injuries 2000     Past Surgical History:   Procedure Laterality Date     ABDOMEN SURGERY  2015    For back surgery     BACK SURGERY  6/2015      BIOPSY  Don t remember     COLONOSCOPY  08/06/07     COLONOSCOPY  08/05/08     CYSTOSCOPY N/A 2/7/2020    Procedure: Cystoscopy;  Surgeon: Anabel Barber MD;  Location: [facility]     DAVINCI HYSTERECTOMY TOTAL, SALPINGECTOMY BILATERAL Bilateral 2/7/2020    Procedure: Davinci Total Laparoscopic Hysterectomy, Bilateral Salpingectomy, Exam Under Anesthesia;  Surgeon: Anabel Barber MD;  Location: [facility] OR     HC COLONOSCOPY W BIOPSY  02/06/08     HC TOOTH EXTRACTION W/FORCEP      wisdom teeth removed     HYSTERECTOMY, PAP NO LONGER INDICATED       INJECT BLOCK MEDIAL BRANCH CERVICAL/THORACIC/LUMBAR N/A 6/21/2019    Procedure: BLOCK, NERVE, SPINAL, MEDIAL BRANCH lumbar 2, 3, 4;  Surgeon: Edgardo Rubio MD;  Location: [facility] OR     INJECT EPIDURAL CERVICAL N/A 3/8/2018    Procedure: INJECT EPIDURAL CERVICAL;  cervical 6-7 interlaminar epidural steroid injection;  Surgeon: Edgardo Rubio MD;  Location: [facility]     LAPAROSCOPIC TUBAL LIGATION  11/27/2012    Procedure: LAPAROSCOPIC TUBAL LIGATION;  Laparoscopic Bilateral tubal sterilization/ fulgaration;  Surgeon: Sarbjit Whittaker MD;  Location: [facility] OR     ORTHOPEDIC SURGERY  6/2015            Social History:     Social History     Occupational History     Occupation: Unit Worker     Comment: [employer]   Tobacco Use     Smoking status: Current Every Day Smoker     Packs/day: 1.00     Years: 20.00     Pack years: 20.00     Types: Cigarettes     Smokeless tobacco: Never Used   Substance and Sexual Activity     Alcohol use: Yes     Alcohol/week: 0.0 standard drinks     Comment: Rarely     Drug use: No     Sexual activity: Not Currently     Partners: Male     Birth control/protection: Female Surgical, Other     Comment: Hysterectomy            Family  History:     Family History   Problem Relation Age of Onset     Hypertension Maternal Grandmother      Arthritis Maternal Grandmother      Cancer Maternal Grandmother         MGGM     Depression Maternal Grandmother      Lipids Maternal Grandmother      Osteoporosis Maternal Grandmother      Respiratory Maternal Grandmother         emphysema     Genitourinary Problems Maternal Grandmother         Kidney Failure, liver      Coronary Artery Disease Maternal Grandmother      Hyperlipidemia Maternal Grandmother      Anxiety Disorder Maternal Grandmother      Asthma Maternal Grandmother      Anesthesia Reaction Maternal Grandmother      Hypertension Maternal Grandfather      Heart Disease Maternal Grandfather         MI     Cardiovascular Maternal Grandfather      Cancer - colorectal Maternal Grandfather      Cancer Maternal Grandfather         [place] Lymphoma     Other Cancer Maternal Grandfather      Coronary Artery Disease Maternal Grandfather      Hypertension Paternal Grandmother      Arthritis Paternal Grandmother         RA     Osteoporosis Paternal Grandmother      Obesity Paternal Grandmother      Substance Abuse Mother         Alcoholic     Depression Mother      Hypertension Father      Hyperlipidemia Father      Mental Illness Father      Substance Abuse Father         Bio and Step dad have alcohol problems     Osteoporosis Father      Coronary Artery Disease Paternal Grandfather      Hypertension Paternal Grandfather      Breast Cancer Other         Maternal great aunt     Coronary Artery Disease Other      Diabetes Other      Thyroid Disease Maternal Aunt         two aunts     Breast Cancer Other      Colon Cancer Other      Prostate Cancer Other      Other Cancer Other      Other Cancer Other      Thyroid Disease Other      Anxiety Disorder Other      Substance Abuse Other      Depression Other      Mental Illness Other      Anxiety Disorder Maternal Half-Sister      Thyroid Disease Maternal Half-Sister       Thyroid Disease Other      Asthma Other      Thyroid Disease Other      Obesity Other      Coronary Artery Disease Other      Hypertension Other             Allergies:     Allergies   Allergen Reactions     Bupropion Hcl Hives     Wellbutrin     No Clinical Screening - See Comments      Some metals, nickel  rash     Adhesive Tape Rash and Blisters            Medications:     Current Outpatient Medications   Medication Sig Dispense Refill     albuterol (PROAIR HFA/PROVENTIL HFA/VENTOLIN HFA) 108 (90 Base) MCG/ACT inhaler INHALE 2 PUFFS INTO THE LUNGS EVERY 6 HOURS AS NEEDED FOR SHORTNESSOF BREATH / DYSPNEA OR WHEEZING 18 g 3     ALPRAZolam 2 MG PO tablet TAKE 1/2-1 TABLET (1-2MG) BY MOUTH 3 TIMES DAILY AS NEEDED FOR STRESS/SLEEP 90 tablet 1     busPIRone (BUSPAR) 5 MG tablet Take 1 tablet (5 mg) by mouth 2 times daily 60 tablet 1     ciprofloxacin (CIPRO) 250 MG tablet Take 1 tablet (250 mg) by mouth 2 times daily 6 tablet 0     escitalopram (LEXAPRO) 20 MG tablet Take 1 tablet (20 mg) by mouth daily 90 tablet 2     hydroxychloroquine (PLAQUENIL) 200 MG tablet Take 1 tablet (200 mg) by mouth 2 times daily 60 tablet 0     levothyroxine (SYNTHROID/LEVOTHROID) 112 MCG tablet TAKE 1 TABLET BY MOUTH EVERY DAY 90 tablet 1     meloxicam (MOBIC) 15 MG tablet Take 1 tablet (15 mg) by mouth daily 30 tablet 0     methocarbamol (ROBAXIN) 750 MG tablet Take 1 tablet (750 mg) by mouth 3 times daily as needed for muscle spasms 90 tablet 1     morphine (MS CONTIN) 15 MG CR tablet Take 1 tablet (15 mg) by mouth every 12 hours maximum 2 tablet(s) per day 60 tablet 0     oxyCODONE (ROXICODONE) 5 MG tablet Take 1-2 tablets (5-10 mg) by mouth every 6 hours as needed for severe pain (uses fewest possible tabs daily.) 240 tablet 0     phenazopyridine (AZO URINARY PAIN RELIEF) 95 MG tablet Take 190 mg by mouth 3 times daily       topiramate (TOPAMAX) 25 MG tablet TAKE 1 TABLET BY MOUTH 3 TIMES A DAY 90 tablet 1     traZODone (DESYREL)  100 MG tablet Take 100 mg by mouth At Bedtime Take 1 to 1.5 tablets at bedtime as needed       acetaminophen (TYLENOL) 325 MG tablet Take 325-650 mg by mouth every 6 hours as needed for mild pain       NARCAN 4 MG/0.1ML nasal spray SPRAY 1 SPRAY (4 MG) INTO ONE NOSTRIL ALTERNATING NOSTRILS ONCE AS NEEDED FOR OPIOID REVERSAL EVERY 2-3 MINUTES UNTIL ASSISTANCE ARRIVES  0     sulfaSALAzine (AZULFIDINE) 500 MG tablet Take 1 tablet (500 mg) by mouth 4 times daily May start at 2 daily.  Increase to 4 daily over 2-4 weeks if colitis symptoms not controlled. (Patient not taking: Reported on 4/22/2020) 120 tablet 2            Physical Exam:   Vitals not taken.   Wt Readings from Last 4 Encounters:   03/02/20 74.5 kg (164 lb 4.8 oz)   02/07/20 76.6 kg (168 lb 14 oz)   01/31/20 77.4 kg (170 lb 11.2 oz)   01/27/20 80.3 kg (177 lb)       Constitutional: Obese, appearing stated age; cooperative  MS: Right index finger is swollen especially at the base.  No visible deformities noted.  No tenderness.  No swelling of the wrists.  Normal range of motion of the elbows and shoulders.  No knee effusions.  Psych: nl judgement, orientation, memory, affect.         Data:     No results found for any visits on 04/24/20.    Recent Labs   Lab Test 01/31/20  1452 01/27/20  1107 01/02/20  1535 12/05/19  1946 11/04/19  1443 10/30/19  1341 06/05/19  1121 04/08/19  2055   WBC 8.9  --  17.8* 8.3  --  8.7 11.2* 11.5*   RBC 4.05  --  3.70* 3.68*  --  3.68* 3.84 3.69*   HGB 12.8  --  11.8 11.7  --  11.8 12.2 12.0   HCT 39.6  --  37.0 36.4  --  35.8 38.0 36.0   MCV 98  --  100 99  --  97 99 98   RDW 14.6  --  15.0 14.5  --  15.2* 14.5 14.8     --  287 241  --  254 405 347   ALBUMIN  --   --  3.5  --   --   --  3.8 4.1   CRP  --  4.1  --  <2.9 5.8  --   --   --    BUN  --   --  15 13  --  13 12 12      Recent Labs   Lab Test 11/04/19  1443 06/05/19  1121 08/20/18  1611   TSH 0.12* 0.06* 0.13*   T4 1.30 2.09* 1.58*     Hemoglobin   Date Value Ref  Range Status   01/31/2020 12.8 11.7 - 15.7 g/dL Final   01/02/2020 11.8 11.7 - 15.7 g/dL Final   12/05/2019 11.7 11.7 - 15.7 g/dL Final     Urea Nitrogen   Date Value Ref Range Status   01/02/2020 15 7 - 30 mg/dL Final   12/05/2019 13 7 - 30 mg/dL Final   10/30/2019 13 7 - 30 mg/dL Final     Sed Rate   Date Value Ref Range Status   01/27/2020 17 0 - 20 mm/h Final   01/02/2020 10 0 - 20 mm/h Final   12/05/2019 21 (H) 0 - 20 mm/h Final     CRP Inflammation   Date Value Ref Range Status   01/27/2020 4.1 0.0 - 8.0 mg/L Final   12/05/2019 <2.9 0.0 - 8.0 mg/L Final   11/04/2019 5.8 0.0 - 8.0 mg/L Final     AST   Date Value Ref Range Status   01/02/2020 14 0 - 45 U/L Final   06/05/2019 15 0 - 45 U/L Final   04/08/2019 8 0 - 45 U/L Final     Albumin   Date Value Ref Range Status   01/02/2020 3.5 3.4 - 5.0 g/dL Final   06/05/2019 3.8 3.4 - 5.0 g/dL Final   04/08/2019 4.1 3.4 - 5.0 g/dL Final     Alkaline Phosphatase   Date Value Ref Range Status   01/02/2020 65 40 - 150 U/L Final   06/05/2019 90 40 - 150 U/L Final   04/08/2019 61 40 - 150 U/L Final     ALT   Date Value Ref Range Status   01/02/2020 42 0 - 50 U/L Final   06/05/2019 27 0 - 50 U/L Final   04/08/2019 18 0 - 50 U/L Final     Rheumatoid Factor   Date Value Ref Range Status   07/27/2019 <20 <20 IU/mL Final   11/04/2016 <20 <20 IU/mL Final     Recent Labs   Lab Test 01/31/20  1452 01/02/20  1535 12/05/19  1946 11/04/19  1443 10/30/19  1341 06/05/19  1121 04/08/19 2055 08/20/18  1611   WBC 8.9 17.8* 8.3  --  8.7 11.2* 11.5*   < >  --    HGB 12.8 11.8 11.7  --  11.8 12.2 12.0   < >  --    HCT 39.6 37.0 36.4  --  35.8 38.0 36.0   < >  --    MCV 98 100 99  --  97 99 98   < >  --     287 241  --  254 405 347   < >  --    BUN  --  15 13  --  13 12 12   < >  --    TSH  --   --   --  0.12*  --  0.06*  --   --  0.13*   AST  --  14  --   --   --  15 8   < >  --    ALT  --  42  --   --   --  27 18   < >  --    ALKPHOS  --  65  --   --   --  90 61   < >  --     < > =  values in this interval not displayed.         Assessment     Polyarthralgia-hands, knees, hips, ankles  Right index finger swelling-?  Dactylitis  History of psoriasis  MRI left hip without contrast on 1/8/2019-showed possible bilateral sacroiliitis  History of lymphocytic colitis  [id number] negative  Family history of Crohn's disease, psoriatic arthritis on paternal side      Hand swelling: She has noticed swelling over her hands for the past couple months.  Right index finger is swollen.  Similar episode has happened last year when she was diagnosed with gout.  Her presentation does raise concern about dactylitis which can be seen as part of psoriatic arthritis.  She does have history of psoriasis which has been well controlled.  She has only small spots over her hands and elbows.    She is HLA-B27 negative but that does not rule out the possibility of spondyloarthropathy or psoriatic arthritis.    Blood test including hepatitis and TB tests ordered in anticipation of starting her DMARDs like sulfasalazine.  G6PD level was done before putting her on sulfasalazine.  It is pending.    She was on Plaquenil for the past few months started by her primary care provider.  Plaquenil does not help in patients with spondyloarthropathy.  She has not noticed improvement with it.  I will discontinue it at this point.    She can continue NSAIDs like ibuprofen or Aleve on as-needed basis.     She has possible bilateral sacroiliitis evident on MRI of the left hip done in January 2018.  I will get MRI pelvis to look for sacroiliitis in few months. There is no urgency in getting the MRI at this point.    Plan     Blood tests ordered.  She can get it done at [facility].    Her symptoms could be related to spondyloarthropathy.  She has history of psoriasis.  Possible bilateral sacroiliitis was noted on MRI of the hip joint.  Once lock down is over will consider getting MRI pelvis to look further into  sacroiliitis.    Medication like sulfasalazine and methotrexate can be tried for spondyloarthropathy.    Plaquenil does not help in spondyloarthropathy.  She can discontinue it    Continue meloxicam 15 mg daily.  Not take ibuprofen or Aleve when on meloxicam.  Do not take it empty stomach.    Send her information about sulfasalazine    Follow-up in couple months    Reviewed Rheumatology lab flowsheet          Video-Visit Details    Type of service:  Video Visit    Video Start Time: 9:00 AM  Video End Time: 9:45  Originating Location (pt. Location): Home    Distant Location (provider location):  [facility]     Mode of Communication:  Video Conference via SnapLogic      Beatriz Nunez MD

## 2020-04-24 NOTE — PATIENT INSTRUCTIONS
Blood tests ordered.  She can get it done at [facility].    Her symptoms could be related to spondyloarthropathy.  She has history of psoriasis.  Possible bilateral sacroiliitis was noted on MRI of the hip joint.  Once lock down is over will consider getting MRI pelvis to look further into sacroiliitis.    Medication like sulfasalazine and methotrexate can be tried for spondyloarthropathy.    Plaquenil does not help in spondyloarthropathy.  She can discontinue it    Continue meloxicam 15 mg daily.  Not take ibuprofen or Aleve when on meloxicam.  Do not take it empty stomach.    Send her information about sulfasalazine    Follow-up in couple months

## 2020-04-26 LAB
HBV CORE AB SERPL QL IA: NONREACTIVE
HBV SURFACE AG SERPL QL IA: NONREACTIVE
HCV AB SERPL QL IA: NONREACTIVE

## 2020-04-27 LAB
GAMMA INTERFERON BACKGROUND BLD IA-ACNC: 0.02 IU/ML
M TB IFN-G BLD-IMP: NEGATIVE
M TB IFN-G CD4+ BCKGRND COR BLD-ACNC: 8.43 IU/ML
MITOGEN IGNF BCKGRD COR BLD-ACNC: 0 IU/ML
MITOGEN IGNF BCKGRD COR BLD-ACNC: 0 IU/ML

## 2020-04-28 ENCOUNTER — VIRTUAL VISIT (OUTPATIENT)
Dept: BEHAVIORAL HEALTH | Facility: CLINIC | Age: 40
End: 2020-04-28
Payer: COMMERCIAL

## 2020-04-28 DIAGNOSIS — F32.2 CURRENT SEVERE EPISODE OF MAJOR DEPRESSIVE DISORDER WITHOUT PSYCHOTIC FEATURES WITHOUT PRIOR EPISODE (H): ICD-10-CM

## 2020-04-28 DIAGNOSIS — F41.1 GAD (GENERALIZED ANXIETY DISORDER): ICD-10-CM

## 2020-04-28 DIAGNOSIS — F32.2 CURRENT SEVERE EPISODE OF MAJOR DEPRESSIVE DISORDER WITHOUT PSYCHOTIC FEATURES WITHOUT PRIOR EPISODE (H): Primary | ICD-10-CM

## 2020-04-28 LAB — G6PD RBC-CCNC: 12.6 U/G HB (ref 9.9–16.6)

## 2020-04-28 PROCEDURE — 90834 PSYTX W PT 45 MINUTES: CPT | Mod: 95 | Performed by: MARRIAGE & FAMILY THERAPIST

## 2020-04-28 NOTE — PROGRESS NOTES
"[facility] Primary Care: Integrated Behavioral Health  April 28, 2020    Mattie Banda is a 39 year old female who is being evaluated via a telephone visit.      The patient has been notified of the following:     \"We have found that certain health care needs can be provided without the need for a face to face visit.  This service lets us provide the care you need with a short phone conversation.      I will have full access to your [facility] medical record during this entire phone call.   I will be taking notes for your medical record.     Since this is like an office visit, we will bill your insurance company for this service.  Please check with your medical insurance if this type of telephone visit/virtual care is covered.  You may be responsible for the cost of this service if insurance coverage is denied.      There are potential benefits and risks of telephone visits (e.g. limits to patient confidentiality) that differ from in-person visits.?  Confidentiality still applies for telephone services, and nobody will record the visit.  It is important to be in a quiet, private space that is free of distractions (including cell phone or other devices) during the visit.??     If during the course of the call I believe a telephone visit is not appropriate, you will not be charged for this service\"    Consent has been obtained for this service by care team member: yes.    Behavioral Health Clinician Progress Note    Patient Name: Mattie Banda           Service Type:  Individual     Session Start Time: 1:00  Session End Time: 1:52      Session Length: 38 - 52      Attendees: Patient    Visit Activities (Refresh list every visit): [facility] Only    Diagnostic Assessment Date: 1/21/2020  Treatment Plan Review Date: 1/28/2020  See Flowsheets for today's PHQ-9 and CRISTIANO-7 results  Previous PHQ-9:   PHQ-9 SCORE 12/11/2019 1/7/2020 3/4/2020   PHQ-9 Total Score - - -   PHQ-9 Total Score 24 24 25     Previous " "CRISTIANO-7:   CRISTIANO-7 SCORE 10/25/2019 1/7/2020 3/4/2020   Total Score - - -   Total Score 19 20 20       EMILIA LEVEL:  EMILIA Score (Last Two) 11/14/2012   EMILIA Raw Score 44   Activation Score 70.8   EMILIA Level 4       DATA  Extended Session (60+ minutes): No  Interactive Complexity: No  Crisis: No  [facility] Patient: No    Treatment Objective(s) Addressed in This Session:  Target Behavior(s): disease management/lifestyle changes depression and anxiety    Depressed Mood: Increase interest, engagement, and pleasure in doing things  Decrease frequency and intensity of feeling down, depressed, hopeless  Improve quantity and quality of night time sleep / decrease daytime naps  Feel less tired and more energy during the day   Improve diet, appetite, mindful eating, and / or meal planning  Identify negative self-talk and behaviors: challenge core beliefs, myths, and actions  Improve concentration, focus, and mindfulness in daily activities   Decrease thoughts that you'd be better off dead or of suicide / self-harm  Anxiety: will experience a reduction in anxiety, will develop more effective coping skills to manage anxiety symptoms, will develop healthy cognitive patterns and beliefs and will increase ability to function adaptively    Current Stressors / Issues:  Patient reports that she is \"hanging in there\". She states that she has noticed some changes in her son since starting distance learning. She states that he has felt comfortable texting her that he is feeling more depressed. Reinforced patient talking with her son about this. Patient states that she feels responsible for him feeling this way as she believes she has passed this on to him. Validated patients feelings and reframed that she is able to better help him and offer resources to help him better manage symptoms. Suggested that she get him connected to a doctor and therapist.     Patient reports increase in nightmares of her Granny. This has been since seeing her grandpa and " her uncle. This results in 2 or 3am waking up and not being able to return to sleep. She has not been able to go to sleep unless she is taking a Xanax.     She states that she has been having days where it is hard for her to do anything. Discussed setting small attainable goals.       Progress on Treatment Objective(s) / Homework:  Minimal progress - ACTION (Actively working towards change); Intervened by reinforcing change plan / affirming steps taken    Motivational Interviewing    MI Intervention: Expressed Empathy/Understanding, Supported Autonomy, Collaboration, Evocation, Permission to raise concern or advise, Open-ended questions, Reflections: simple and complex, Change talk (evoked) and Reframe     Change Talk Expressed by the Patient: Desire to change Ability to change Reasons to change Need to change Committment to change Activation Taking steps    Provider Response to Change Talk: E - Evoked more info from patient about behavior change, A - Affirmed patient's thoughts, decisions, or attempts at behavior change, R - Reflected patient's change talk and S - Summarized patient's change talk statements    Also provided psychoeducation about behavioral health condition, symptoms, and treatment options      Skills training    Explored skills useful to client in current situation    Skills include assertiveness, communication, conflict management, problem-solving, relaxation, etc.    Solution-Focused Therapy    Explored patterns in patient's relationships and discussed options for new behaviors    Explored patterns in patient's actions and choices and discussed options for new behaviors    Cognitive-behavioral Therapy    Discussed common cognitive distortions, identified them in patient's life    Explored ways to challenge, replace, and act against these cognitions    Acceptance and Commitment Therapy    Explored and identified important values in patient's life    Discussed ways to commit to behavioral  activation around these values    Psychodynamic psychotherapy    Discussed patient's emotional dynamics and issues and how they impact behaviors    Explored patient's history of relationships and how they impact present behaviors    Explored how to work with and make changes in these schemas and patterns    Behavioral Activation    Discussed steps patient can take to become more involved in meaningful activity    Identified barriers to these activities and explored possible solutions    Mindfulness-Based Strategies    Discussed skills based on development and application of mindfulness    Skills drawn from dialectical behavior therapy, mindfulness-based stress reduction, mindfulness-based cognitive therapy, etc.      Care Plan review completed: Yes    Medication Review:  No changes to current psychiatric medication(s)     Medication Compliance:  Yes    Changes in Health Issues:   None reported    Chemical Use Review:   Substance Use: Chemical use reviewed, no active concerns identified       Tobacco Use: No change in amount of tobacco use since last session.  Patient declined discussion at this time    Assessment: Current Emotional / Mental Status (status of significant symptoms):  Risk status (Self / Other harm or suicidal ideation)  Patient has had a history of suicidal ideation: in adolescense and suicide attempts: patient states that she tried multiple times around ages 15-16 by slitting wrists or overdose, and one overdose of alcohol; she denies ever being hospitalized for mental health and denies a history of self-injurious behavior, homicidal ideation, homicidal behavior and and other safety concerns  Patient denies current fears or concerns for personal safety.  Patient reports the following current or recent suicidal ideation or behaviors: patient reports some thoughts of not wanting to be around. She denies any specific thoughts about death and denies any suicial plan or intent.   Patient denies current or  recent homicidal ideation or behaviors.  Patient denies current or recent self injurious behavior or ideation.  Patient denies other safety concerns.  A safety and risk management plan has been developed including: Patient consented to co-developed safety plan.  A safety and risk management plan was completed.  Patient agreed to use safety plan should any safety concerns arise.  A copy was given to the patient.      Appearance:   unable to assess due to phone visit   Eye Contact:   unable to assess due to phone visit   Psychomotor Behavior: unable to assess due to phone visit   Attitude:   Cooperative   Orientation:   All  Speech   Rate / Production: Monotone    Volume:  Normal   Mood:    Anxious  Depressed   Affect:    unable to assess due to phone visit   Thought Content:  Clear  Perservative  Rumination   Thought Form:  Coherent  Logical   Insight:    Good     Diagnoses:  1. Current severe episode of major depressive disorder without psychotic features without prior episode (H)    2. CRISTIANO (generalized anxiety disorder)        Collateral Reports Completed:  Not Applicable    Plan: (Homework, other):  Patient was given information about behavioral services and encouraged to schedule a follow up appointment with the clinic [facility] in 1 week.  She was also given information about mental health symptoms and treatment options , Cognitive Behavioral Therapy skills to practice when experiencing anxiety and depression and deep Breathing Strategies to practice when experiencing anxiety and depression.  CD Recommendations: No indications of CD issues.  Patient will continue to work on connecting with others. She will spend time reflecting, daily, on what she accomplished. She will work on practicing square breathing on a daily basis. Patient will consider small daily goals to set for herself.  KVNG Watson, [facility]       ______________________________________________________________________    Integrated Primary Care  Behavioral Health Treatment Plan    Patient's Name: Mattie Banda  YOB: 1980    Date: January 28, 2020    DSM-V Diagnoses: 296.33 (F33.2) Major Depressive Disorder, Recurrent Episode, Severe _ or 300.02 (F41.1) Generalized Anxiety Disorder  Psychosocial / Contextual Factors: medical issues,  and dating ex-, few friends  WHODAS: 36    Referral / Collaboration:  Referral to another professional/service is not indicated at this time..    Anticipated number of session or this episode of care: 8      MeasurableTreatment Goal(s) related to diagnosis / functional impairment(s)  Goal 1: Patient will demonstrate ability to improve depression and anxiety symptoms as evidenced by improved PHQ9 & GAD7 scores.       Objective #A (Patient Action)    Patient will Decrease frequency and intensity of feeling down, depressed, hopeless  Identify negative self-talk and behaviors: challenge core beliefs, myths, and actions  Decrease thoughts that you'd be better off dead or of suicide / self-harm  Status: New - Date: 1/28/2020     Intervention(s)  [facility] will teach distress tolerance skills. Provide psycho-education on cognitive distortions and automatic thoughts and behaviors and ways to appropriately challenge and restructure thoughts.    Objective #B  Patient will increase coping strategies by 2  to more effectively manage current stressors  increase understanding of steps in the grief process  Status: New - Date: 1/28/2020     Intervention(s)  [facility] will teach mindfulness and relaxation strategies. Provide psycho-education on grief process.    Patient has reviewed and agreed to the above plan.    Written by  KVNG Watson, [facility]

## 2020-04-29 RX ORDER — BUSPIRONE HYDROCHLORIDE 5 MG/1
TABLET ORAL
Qty: 60 TABLET | Refills: 1 | Status: SHIPPED | OUTPATIENT
Start: 2020-04-29 | End: 2020-05-10 | Stop reason: DRUGHIGH

## 2020-04-29 NOTE — TELEPHONE ENCOUNTER
Routing refill request to provider for review/approval because:  PHQ9 score = 25 on 3/4/20  CRISTIANO score= 20  SHMUEL Higginbotham

## 2020-05-01 ENCOUNTER — VIRTUAL VISIT (OUTPATIENT)
Dept: FAMILY MEDICINE | Facility: CLINIC | Age: 40
End: 2020-05-01
Payer: COMMERCIAL

## 2020-05-01 DIAGNOSIS — G47.9 SLEEP DISORDER: ICD-10-CM

## 2020-05-01 DIAGNOSIS — F32.2 CURRENT SEVERE EPISODE OF MAJOR DEPRESSIVE DISORDER WITHOUT PSYCHOTIC FEATURES WITHOUT PRIOR EPISODE (H): ICD-10-CM

## 2020-05-01 DIAGNOSIS — K52.832 LYMPHOCYTIC COLITIS: ICD-10-CM

## 2020-05-01 DIAGNOSIS — M47.819 SPONDYLARTHRITIS: Primary | ICD-10-CM

## 2020-05-01 DIAGNOSIS — I77.6 VASCULITIS (H): ICD-10-CM

## 2020-05-01 DIAGNOSIS — F41.8 MIXED ANXIETY DEPRESSIVE DISORDER: ICD-10-CM

## 2020-05-01 PROCEDURE — 99214 OFFICE O/P EST MOD 30 MIN: CPT | Mod: 95 | Performed by: FAMILY MEDICINE

## 2020-05-01 RX ORDER — ALPRAZOLAM 2 MG
TABLET ORAL
Qty: 90 TABLET | Refills: 1 | Status: SHIPPED | OUTPATIENT
Start: 2020-05-01 | End: 2020-06-10

## 2020-05-01 NOTE — PATIENT INSTRUCTIONS
Keep taking sulfasalazine and use prednisone for now to get more rapid anti-inflammatory effect  Continue to follow-up with me and/or rheumatology at least once a month with some contact and certainly visit with him 3.

## 2020-05-01 NOTE — PROGRESS NOTES
"Mattie Banda is a 39 year old female who is being evaluated via a billable telephone visit.      The patient has been notified of following:     \"This telephone visit will be conducted via a call between you and your physician/provider. We have found that certain health care needs can be provided without the need for a physical exam.  This service lets us provide the care you need with a short phone conversation.  If a prescription is necessary we can send it directly to your pharmacy.  If lab work is needed we can place an order for that and you can then stop by our lab to have the test done at a later time.    Telephone visits are billed at different rates depending on your insurance coverage. During this emergency period, for some insurers they may be billed the same as an in-person visit.  Please reach out to your insurance provider with any questions.    If during the course of the call the physician/provider feels a telephone visit is not appropriate, you will not be charged for this service.\"    Patient has given verbal consent for Telephone visit?  Yes    How would you like to obtain your AVS? [place] [facility]     Mattie Banda is a 39 year old female who presents to clinic today for the following health issues:    HPI  Discuss lab results. Stopped meloxicam after speaking with rheumatologist. Finished antibiotic still has kidney pain and trouble urinating.       Patient continues to have multiple areas of joint pain in particular finger which was red and swollen last time I spoke to her, low back pain with lightninglike bolts going down both legs at times, some other discomfort to other joints including feet and remainder of her hands.  She saw a rheumatology who indicates Plaquenil does not work.  Sulfasalazine should work for anticipated spondyloarthropathy, and meloxicam if she wants to or not.  Does not do anything for inflammatory for this problem.  She did have injections in the past which " helped probably the SI joint.  Rheumatologist think she has sacroiliitis and will do a MRI when COVID restrictions for exams are discontinued.  She was told prednisone could be continued to control inflammation until sulfasalazine helps.  Yesterday she was doing something and pulled a muscle in her upper back that is still causing some discomfort today.  She has methocarbamol available to her.  She continues to have issues with Worker's Compensation.  They state that the letter I composed December 20 never reached them that she could have light duty if a very specific light duty job was offered and I had a chance to review the requirements of the job.  She asked that we fax this to unemployment currently.    Patient continues to have some urgency and frequency like urinary tract.  We did obtain a urinalysis on April 24.    Patient Active Problem List   Diagnosis     Degeneration of lumbar intervertebral disc     S/P lumbar fusion and discectomy June 2015     Labial lesion     Tobacco use disorder     Idiopathic peripheral neuropathy     Hypothyroidism, unspecified type     Migraine with aura and without status migrainosus, not intractable     Lymphocytic colitis     DDD (degenerative disc disease), cervical     Chronic pain syndrome     Abnormal uterine bleeding     Carotidynia     Vasculitis (H)     Current severe episode of major depressive disorder without psychotic features without prior episode (H)     CRISTIANO (generalized anxiety disorder)     Past Surgical History:   Procedure Laterality Date     ABDOMEN SURGERY  2015    For back surgery     BACK SURGERY  6/2015      BIOPSY  Don t remember     COLONOSCOPY  08/06/07     COLONOSCOPY  08/05/08     CYSTOSCOPY N/A 2/7/2020    Procedure: Cystoscopy;  Surgeon: Anabel Barber MD;  Location: [facility]     DAVINCI HYSTERECTOMY TOTAL, SALPINGECTOMY BILATERAL Bilateral 2/7/2020    Procedure: Davinci Total Laparoscopic Hysterectomy, Bilateral Salpingectomy, Exam Under  Anesthesia;  Surgeon: Anabel Barber MD;  Location: [facility]     HC COLONOSCOPY W BIOPSY  02/06/08     HC TOOTH EXTRACTION W/FORCEP      wisdom teeth removed     HYSTERECTOMY, PAP NO LONGER INDICATED       INJECT BLOCK MEDIAL BRANCH CERVICAL/THORACIC/LUMBAR N/A 6/21/2019    Procedure: BLOCK, NERVE, SPINAL, MEDIAL BRANCH lumbar 2, 3, 4;  Surgeon: Edgardo Rubio MD;  Location: [facility]     INJECT EPIDURAL CERVICAL N/A 3/8/2018    Procedure: INJECT EPIDURAL CERVICAL;  cervical 6-7 interlaminar epidural steroid injection;  Surgeon: Edgardo Rubio MD;  Location: [facility]     LAPAROSCOPIC TUBAL LIGATION  11/27/2012    Procedure: LAPAROSCOPIC TUBAL LIGATION;  Laparoscopic Bilateral tubal sterilization/ fulgaration;  Surgeon: Sarbjit Whittaker MD;  Location: [facility]     ORTHOPEDIC SURGERY  6/2015       Social History     Tobacco Use     Smoking status: Current Every Day Smoker     Packs/day: 1.00     Years: 20.00     Pack years: 20.00     Types: Cigarettes     Smokeless tobacco: Never Used   Substance Use Topics     Alcohol use: Yes     Alcohol/week: 0.0 standard drinks     Comment: Rarely     Family History   Problem Relation Age of Onset     Hypertension Maternal Grandmother      Arthritis Maternal Grandmother      Cancer Maternal Grandmother         MGGM     Depression Maternal Grandmother      Lipids Maternal Grandmother      Osteoporosis Maternal Grandmother      Respiratory Maternal Grandmother         emphysema     Genitourinary Problems Maternal Grandmother         Kidney Failure, liver      Coronary Artery Disease Maternal Grandmother      Hyperlipidemia Maternal Grandmother      Anxiety Disorder Maternal Grandmother      Asthma Maternal Grandmother      Anesthesia Reaction Maternal Grandmother      Hypertension Maternal Grandfather      Heart Disease Maternal Grandfather         MI     Cardiovascular Maternal Grandfather      Cancer - colorectal Maternal Grandfather      Cancer Maternal Grandfather          [place] Lymphoma     Other Cancer Maternal Grandfather      Coronary Artery Disease Maternal Grandfather      Hypertension Paternal Grandmother      Arthritis Paternal Grandmother         RA     Osteoporosis Paternal Grandmother      Obesity Paternal Grandmother      Substance Abuse Mother         Alcoholic     Depression Mother      Hypertension Father      Hyperlipidemia Father      Mental Illness Father      Substance Abuse Father         Bio and Step dad have alcohol problems     Osteoporosis Father      Coronary Artery Disease Paternal Grandfather      Hypertension Paternal Grandfather      Breast Cancer Other         Maternal great aunt     Coronary Artery Disease Other      Diabetes Other      Thyroid Disease Maternal Aunt         two aunts     Breast Cancer Other      Colon Cancer Other      Prostate Cancer Other      Other Cancer Other      Other Cancer Other      Thyroid Disease Other      Anxiety Disorder Other      Substance Abuse Other      Depression Other      Mental Illness Other      Anxiety Disorder Maternal Half-Sister      Thyroid Disease Maternal Half-Sister      Thyroid Disease Other      Asthma Other      Thyroid Disease Other      Obesity Other      Coronary Artery Disease Other      Hypertension Other          Current Outpatient Medications   Medication Sig Dispense Refill     acetaminophen (TYLENOL) 325 MG tablet Take 325-650 mg by mouth every 6 hours as needed for mild pain       albuterol (PROAIR HFA/PROVENTIL HFA/VENTOLIN HFA) 108 (90 Base) MCG/ACT inhaler INHALE 2 PUFFS INTO THE LUNGS EVERY 6 HOURS AS NEEDED FOR SHORTNESSOF BREATH / DYSPNEA OR WHEEZING 18 g 3     ALPRAZolam (XANAX) 2 MG tablet TAKE 1/2-1 TABLET (1-2MG) BY MOUTH 3 TIMES DAILY AS NEEDED FOR STRESS/SLEEP 90 tablet 1     busPIRone (BUSPAR) 5 MG tablet TAKE 1 TABLET BY MOUTH TWICE A DAY 60 tablet 1     escitalopram (LEXAPRO) 20 MG tablet Take 1 tablet (20 mg) by mouth daily 90 tablet 2     levothyroxine  (SYNTHROID/LEVOTHROID) 112 MCG tablet TAKE 1 TABLET BY MOUTH EVERY DAY 90 tablet 1     methocarbamol (ROBAXIN) 750 MG tablet Take 1 tablet (750 mg) by mouth 3 times daily as needed for muscle spasms 90 tablet 1     morphine (MS CONTIN) 15 MG CR tablet Take 1 tablet (15 mg) by mouth every 12 hours maximum 2 tablet(s) per day 60 tablet 0     NARCAN 4 MG/0.1ML nasal spray SPRAY 1 SPRAY (4 MG) INTO ONE NOSTRIL ALTERNATING NOSTRILS ONCE AS NEEDED FOR OPIOID REVERSAL EVERY 2-3 MINUTES UNTIL ASSISTANCE ARRIVES  0     oxyCODONE (ROXICODONE) 5 MG tablet Take 1-2 tablets (5-10 mg) by mouth every 6 hours as needed for severe pain (uses fewest possible tabs daily.) 240 tablet 0     phenazopyridine (AZO URINARY PAIN RELIEF) 95 MG tablet Take 190 mg by mouth 3 times daily       sulfaSALAzine (AZULFIDINE) 500 MG tablet Take 1 tablet (500 mg) by mouth 4 times daily May start at 2 daily.  Increase to 4 daily over 2-4 weeks if colitis symptoms not controlled. 120 tablet 2     topiramate (TOPAMAX) 25 MG tablet TAKE 1 TABLET BY MOUTH 3 TIMES A DAY 90 tablet 1     traZODone (DESYREL) 100 MG tablet Take 100 mg by mouth At Bedtime Take 1 to 1.5 tablets at bedtime as needed       hydroxychloroquine (PLAQUENIL) 200 MG tablet Take 1 tablet (200 mg) by mouth 2 times daily (Patient not taking: Reported on 5/1/2020) 60 tablet 0     meloxicam (MOBIC) 15 MG tablet Take 1 tablet (15 mg) by mouth daily (Patient not taking: Reported on 5/1/2020) 30 tablet 0     Allergies   Allergen Reactions     Bupropion Hcl Hives     Wellbutrin     No Clinical Screening - See Comments      Some metals, nickel  rash     Adhesive Tape Rash and Blisters     BP Readings from Last 3 Encounters:   03/02/20 122/56   02/07/20 119/76   01/31/20 90/56    Wt Readings from Last 3 Encounters:   03/02/20 74.5 kg (164 lb 4.8 oz)   02/07/20 76.6 kg (168 lb 14 oz)   01/31/20 77.4 kg (170 lb 11.2 oz)                    Reviewed and updated as needed this visit by Provider          Review of Systems   ROS COMP: Constitutional, HEENT, cardiovascular, pulmonary, gi and gu systems are negative, except as otherwise noted.       Objective   Reported vitals:  There were no vitals taken for this visit.   healthy, alert, mild distress and cooperative  PSYCH: Alert and oriented times 3; coherent speech, normal   rate and volume, able to articulate logical thoughts, able   to abstract reason, no tangential thoughts, no hallucinations   or delusions  Her affect is normal  RESP: No cough, no audible wheezing, able to talk in full sentences  Remainder of exam unable to be completed due to telephone visits    Diagnostic Test Results:  Labs reviewed in Epic        Assessment/Plan:  1. Spondylarthritis  Based on rheumatologic diagnosis and review of records and discussion with patient confirms my thinking this is been immunologic in nature.  Patient is now on sulfasalazine.  She will take prednisone 10 mg daily somewhat indefinitely while sulfasalazine hopefully kicks in.  She will keep me or rheumatology informed.  This would explain why she was so much worse with such pain after surgery if she has sacroiliitis as anticipated.  I will ask staff to fax the letter generated November 20, 2019 to the unemployment office indicating that she had not been working but was not felt to be appropriate candidate to have heart intensive labor given arthropathies which were felt to be immunologic.  If the light duty with appropriate functions could be found, work might be considered.  Currently unless it is very light duty, I continue to anticipate she is not able to function effectively for gainful employment 40 hours weekly.  She may be working on true disability and I would agree that this may be appropriate at this time until we have an acceptable immune response from treatment.    2. Lymphocytic colitis  This may be ulcerative colitis which would fall in line with #1 above.  Prednisone and sulfasalazine should  help    3. Sleep disorder  Still have trouble sleeping but a lot of that is related to pain.  - ALPRAZolam (XANAX) 2 MG tablet; TAKE 1/2-1 TABLET (1-2MG) BY MOUTH 3 TIMES DAILY AS NEEDED FOR STRESS/SLEEP  Dispense: 90 tablet; Refill: 1    4. Mixed anxiety depressive disorder  Still present as she worries about finances and past life events.  - ALPRAZolam (XANAX) 2 MG tablet; TAKE 1/2-1 TABLET (1-2MG) BY MOUTH 3 TIMES DAILY AS NEEDED FOR STRESS/SLEEP  Dispense: 90 tablet; Refill: 1    5. Vasculitis (H)  No obvious symptoms today but would also be consistent with #1 above    6. Current severe episode of major depressive disorder without psychotic features without prior episode (H)  C #4 above.      Return in about 3 months (around 8/1/2020) for MSK problem, mental health.      Phone call duration:  22 minutes    Yuan Schofield MD

## 2020-05-05 ENCOUNTER — PATIENT OUTREACH (OUTPATIENT)
Dept: CARE COORDINATION | Facility: CLINIC | Age: 40
End: 2020-05-05
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
## 2020-05-05 NOTE — LETTER
[place] CARE COORDINATION  [facility]  [address] [place] [address]    May 13, 2020    Mattie Banda  [address] [facility] [address]      Dear Mattie,    I am a clinic care coordinator who works with Yuan Schofield MD at [facility]. I recently tried to call and was unable to reach you. Below is a description of clinic care coordination and how I can further assist you.      The clinic care coordination team is made up of a registered nurse,  and community health worker who understand the health care system. The goal of clinic care coordination is to help you manage your health and improve access to the health care system in the most efficient manner. The team can assist you in meeting your health care goals by providing education, coordinating services, strengthening the communication among your providers and supporting you with any resource needs.    Please feel free to contact me at [phone] with any questions or concerns. We are focused on providing you with the highest-quality healthcare experience possible and that all starts with you.     Sincerely,       MARK Salas   Primary Care Clinic- Social Work Care Coordinator  [facility]   [phone]

## 2020-05-05 NOTE — PROGRESS NOTES
Clinic Care Coordination Contact  [facility]/Voicemail       Clinical Data: Care Coordinator Outreach  Outreach attempted x 1.  Left message on patient's voicemail with call back information and requested return call.  Plan: Care Coordinator will try to reach patient again in 3-5 business days.      MARK Salas   Primary Care Clinic- Social Work Care Coordinator  [facility] [place]  5/5/2020 3:07 PM  [phone]

## 2020-05-06 ENCOUNTER — VIRTUAL VISIT (OUTPATIENT)
Dept: BEHAVIORAL HEALTH | Facility: CLINIC | Age: 40
End: 2020-05-06
Payer: COMMERCIAL

## 2020-05-06 DIAGNOSIS — F41.1 GAD (GENERALIZED ANXIETY DISORDER): ICD-10-CM

## 2020-05-06 DIAGNOSIS — F32.2 CURRENT SEVERE EPISODE OF MAJOR DEPRESSIVE DISORDER WITHOUT PSYCHOTIC FEATURES WITHOUT PRIOR EPISODE (H): Primary | ICD-10-CM

## 2020-05-06 PROCEDURE — 90832 PSYTX W PT 30 MINUTES: CPT | Mod: 95 | Performed by: MARRIAGE & FAMILY THERAPIST

## 2020-05-06 NOTE — PROGRESS NOTES
"[facility] Primary Care: Integrated Behavioral Health  May 6, 2020    Mattie Banda is a 39 year old female who is being evaluated via a telephone visit.      The patient has been notified of the following:     \"We have found that certain health care needs can be provided without the need for a face to face visit.  This service lets us provide the care you need with a short phone conversation.      I will have full access to your [place] medical record during this entire phone call.   I will be taking notes for your medical record.     Since this is like an office visit, we will bill your insurance company for this service.  Please check with your medical insurance if this type of telephone visit/virtual care is covered.  You may be responsible for the cost of this service if insurance coverage is denied.      There are potential benefits and risks of telephone visits (e.g. limits to patient confidentiality) that differ from in-person visits.?  Confidentiality still applies for telephone services, and nobody will record the visit.  It is important to be in a quiet, private space that is free of distractions (including cell phone or other devices) during the visit.??     If during the course of the call I believe a telephone visit is not appropriate, you will not be charged for this service\"    Consent has been obtained for this service by care team member: yes.    Behavioral Health Clinician Progress Note    Patient Name: Mattie Banda           Service Type:  Individual     Session Start Time: 10:11  Session End Time: 10:35      Session Length: 16 - 37      Attendees: Patient    Visit Activities (Refresh list every visit): [facility] [place] Only    Diagnostic Assessment Date: 1/21/2020  Treatment Plan Review Date: 5/6/2020  See Flowsheets for today's PHQ-9 and CRISTIANO-7 results  Previous PHQ-9:   PHQ-9 SCORE 12/11/2019 1/7/2020 3/4/2020   PHQ-9 Total Score - - -   PHQ-9 Total Score 24 24 25     Previous CRISTIANO-7: "   CRISTIANO-7 SCORE 10/25/2019 1/7/2020 3/4/2020   Total Score - - -   Total Score 19 20 20       EMILIA LEVEL:  EMILIA Score (Last Two) 11/14/2012   EMILIA Raw Score 44   Activation Score 70.8   EMILIA Level 4     Clinical Global Impressions  First:  Considering your total clinical experience with this particular patient population, how severe are the patient's symptoms at this time?: 5 (5/6/2020  2:23 PM)      Most recent:  Compared to the patient's condition at the START of treatment, this patient's condition is: 3 (5/6/2020  2:23 PM)          DATA  Extended Session (60+ minutes): No  Interactive Complexity: No  Crisis: No  [facility] Patient: No    Treatment Objective(s) Addressed in This Session:  Target Behavior(s): disease management/lifestyle changes depression and anxiety    Depressed Mood: Increase interest, engagement, and pleasure in doing things  Decrease frequency and intensity of feeling down, depressed, hopeless  Improve quantity and quality of night time sleep / decrease daytime naps  Feel less tired and more energy during the day   Improve diet, appetite, mindful eating, and / or meal planning  Identify negative self-talk and behaviors: challenge core beliefs, myths, and actions  Improve concentration, focus, and mindfulness in daily activities   Decrease thoughts that you'd be better off dead or of suicide / self-harm  Anxiety: will experience a reduction in anxiety, will develop more effective coping skills to manage anxiety symptoms, will develop healthy cognitive patterns and beliefs and will increase ability to function adaptively    Current Stressors / Issues:  Patient continues to feel down. She finds that a lot of her focus is on her son and she feels guilty that he is depressed. Patient states that her spouse is not comfortable with meds, but is better understanding of it now as he talked with their son. Patient states that she has support from her aunt on parenting, and has reassured patient that she is not  over-reacting.  Patient is going for a walk, daily, with her son. Reinforced patient getting outside and doing something that benefits both her and her son.    Reviewed treatment plan and goals. Patient notes progress from when she started as she is not crying, daily. She also notes small improvement in her mood overall.    Progress on Treatment Objective(s) / Homework:  Minimal progress - ACTION (Actively working towards change); Intervened by reinforcing change plan / affirming steps taken    Motivational Interviewing    MI Intervention: Expressed Empathy/Understanding, Supported Autonomy, Collaboration, Evocation, Permission to raise concern or advise, Open-ended questions, Reflections: simple and complex, Change talk (evoked) and Reframe     Change Talk Expressed by the Patient: Desire to change Ability to change Reasons to change Need to change Committment to change Activation Taking steps    Provider Response to Change Talk: E - Evoked more info from patient about behavior change, A - Affirmed patient's thoughts, decisions, or attempts at behavior change, R - Reflected patient's change talk and S - Summarized patient's change talk statements    Also provided psychoeducation about behavioral health condition, symptoms, and treatment options      Skills training    Explored skills useful to client in current situation    Skills include assertiveness, communication, conflict management, problem-solving, relaxation, etc.    Solution-Focused Therapy    Explored patterns in patient's relationships and discussed options for new behaviors    Explored patterns in patient's actions and choices and discussed options for new behaviors    Cognitive-behavioral Therapy    Discussed common cognitive distortions, identified them in patient's life    Explored ways to challenge, replace, and act against these cognitions    Acceptance and Commitment Therapy    Explored and identified important values in patient's  life    Discussed ways to commit to behavioral activation around these values    Psychodynamic psychotherapy    Discussed patient's emotional dynamics and issues and how they impact behaviors    Explored patient's history of relationships and how they impact present behaviors    Explored how to work with and make changes in these schemas and patterns    Behavioral Activation    Discussed steps patient can take to become more involved in meaningful activity    Identified barriers to these activities and explored possible solutions    Mindfulness-Based Strategies    Discussed skills based on development and application of mindfulness    Skills drawn from dialectical behavior therapy, mindfulness-based stress reduction, mindfulness-based cognitive therapy, etc.      Care Plan review completed: Yes    Medication Review:  No changes to current psychiatric medication(s)     Medication Compliance:  Yes    Changes in Health Issues:   None reported    Chemical Use Review:   Substance Use: Chemical use reviewed, no active concerns identified       Tobacco Use: No change in amount of tobacco use since last session.  Patient declined discussion at this time    Assessment: Current Emotional / Mental Status (status of significant symptoms):  Risk status (Self / Other harm or suicidal ideation)  Patient has had a history of suicidal ideation: in adolescense and suicide attempts: patient states that she tried multiple times around ages [age] by slitting wrists or overdose, and one overdose of alcohol; she denies ever being hospitalized for mental health and denies a history of self-injurious behavior, homicidal ideation, homicidal behavior and and other safety concerns  Patient denies current fears or concerns for personal safety.  Patient reports the following current or recent suicidal ideation or behaviors: patient reports some thoughts of not wanting to be around. She denies any specific thoughts about death and denies any  suicial plan or intent.   Patient denies current or recent homicidal ideation or behaviors.  Patient denies current or recent self injurious behavior or ideation.  Patient denies other safety concerns.  A safety and risk management plan has been developed including: Patient consented to co-developed safety plan.  A safety and risk management plan was completed.  Patient agreed to use safety plan should any safety concerns arise.  A copy was given to the patient.      Appearance:   unable to assess due to phone visit   Eye Contact:   unable to assess due to phone visit   Psychomotor Behavior: unable to assess due to phone visit   Attitude:   Cooperative   Orientation:   All  Speech   Rate / Production: Monotone    Volume:  Normal   Mood:    Anxious  Depressed   Affect:    unable to assess due to phone visit   Thought Content:  Clear  Perservative  Rumination   Thought Form:  Coherent  Logical   Insight:    Good     Diagnoses:  1. Current severe episode of major depressive disorder without psychotic features without prior episode (H)    2. CRISTIANO (generalized anxiety disorder)        Collateral Reports Completed:  Not Applicable    Plan: (Homework, other):  Patient was given information about behavioral services and encouraged to schedule a follow up appointment with the clinic [facility] in 1 week.  She was also given information about mental health symptoms and treatment options , Cognitive Behavioral Therapy skills to practice when experiencing anxiety and depression and deep Breathing Strategies to practice when experiencing anxiety and depression.  CD Recommendations: No indications of CD issues.  Patient will continue to work on connecting with others. She will spend time reflecting, daily, on what she accomplished. She will work on practicing square breathing on a daily basis. Patient will continue with daily walks.     KVNG Watson, [facility]        ______________________________________________________________________    Integrated Primary Care Behavioral Health Treatment Plan    Patient's Name: Mattie Banda  YOB: 1980    Date: May 6, 2020    DSM-V Diagnoses: 296.33 (F33.2) Major Depressive Disorder, Recurrent Episode, Severe _ or 300.02 (F41.1) Generalized Anxiety Disorder  Psychosocial / Contextual Factors: medical issues,  and dating ex-, few friends  WHODAS: 36    Referral / Collaboration:  Referral to another professional/service is not indicated at this time..    Anticipated number of session or this episode of care: 8      MeasurableTreatment Goal(s) related to diagnosis / functional impairment(s)  Goal 1: Patient will demonstrate ability to improve depression and anxiety symptoms as evidenced by improved PHQ9 & GAD7 scores.       Objective #A (Patient Action)    Patient will Decrease frequency and intensity of feeling down, depressed, hopeless  Identify negative self-talk and behaviors: challenge core beliefs, myths, and actions  Decrease thoughts that you'd be better off dead or of suicide / self-harm  Status: New - Date: 1/28/2020 ; Improved and continued: 5/6/2020     Intervention(s)  [facility] [place] will teach distress tolerance skills. Provide psycho-education on cognitive distortions and automatic thoughts and behaviors and ways to appropriately challenge and restructure thoughts.    Objective #B  Patient will increase coping strategies by 2  to more effectively manage current stressors  increase understanding of steps in the grief process  Status: New - Date: 1/28/2020; Improved and Continued: 5/6/2020    Intervention(s)  [facility] [place] will teach mindfulness and relaxation strategies. Provide psycho-education on grief process.    Patient has reviewed and agreed to the above plan.    Written by  KVNG Watson, [facility] [place]

## 2020-05-07 ENCOUNTER — MYC REFILL (OUTPATIENT)
Dept: FAMILY MEDICINE | Facility: CLINIC | Age: 40
End: 2020-05-07

## 2020-05-07 ENCOUNTER — VIRTUAL VISIT (OUTPATIENT)
Dept: PSYCHIATRY | Facility: OTHER | Age: 40
End: 2020-05-07
Payer: COMMERCIAL

## 2020-05-07 DIAGNOSIS — M51.369 DEGENERATION OF LUMBAR INTERVERTEBRAL DISC: ICD-10-CM

## 2020-05-07 DIAGNOSIS — F11.90 CHRONIC, CONTINUOUS USE OF OPIOIDS: ICD-10-CM

## 2020-05-07 DIAGNOSIS — F13.20 BENZODIAZEPINE DEPENDENCE (H): ICD-10-CM

## 2020-05-07 DIAGNOSIS — M50.30 DDD (DEGENERATIVE DISC DISEASE), CERVICAL: ICD-10-CM

## 2020-05-07 DIAGNOSIS — G47.00 INSOMNIA, UNSPECIFIED TYPE: ICD-10-CM

## 2020-05-07 DIAGNOSIS — Z90.710 HISTORY OF ROBOT-ASSISTED LAPAROSCOPIC HYSTERECTOMY: ICD-10-CM

## 2020-05-07 DIAGNOSIS — F41.1 GAD (GENERALIZED ANXIETY DISORDER): ICD-10-CM

## 2020-05-07 DIAGNOSIS — F33.2 SEVERE EPISODE OF RECURRENT MAJOR DEPRESSIVE DISORDER, WITHOUT PSYCHOTIC FEATURES (H): Primary | ICD-10-CM

## 2020-05-07 DIAGNOSIS — Z87.828 HISTORY OF TRAUMA: ICD-10-CM

## 2020-05-07 PROCEDURE — 90792 PSYCH DIAG EVAL W/MED SRVCS: CPT | Mod: 95 | Performed by: PSYCHIATRY & NEUROLOGY

## 2020-05-07 RX ORDER — ARIPIPRAZOLE 2 MG/1
2 TABLET ORAL DAILY
Qty: 30 TABLET | Refills: 1 | Status: SHIPPED | OUTPATIENT
Start: 2020-05-07 | End: 2020-06-18

## 2020-05-07 RX ORDER — BUSPIRONE HYDROCHLORIDE 10 MG/1
10 TABLET ORAL 2 TIMES DAILY
Qty: 60 TABLET | Refills: 1 | Status: SHIPPED | OUTPATIENT
Start: 2020-05-07 | End: 2020-06-18

## 2020-05-07 ASSESSMENT — ANXIETY QUESTIONNAIRES
6. BECOMING EASILY ANNOYED OR IRRITABLE: MORE THAN HALF THE DAYS
GAD7 TOTAL SCORE: 20
3. WORRYING TOO MUCH ABOUT DIFFERENT THINGS: NEARLY EVERY DAY
GAD7 TOTAL SCORE: 20
5. BEING SO RESTLESS THAT IT IS HARD TO SIT STILL: NEARLY EVERY DAY
1. FEELING NERVOUS, ANXIOUS, OR ON EDGE: NEARLY EVERY DAY
7. FEELING AFRAID AS IF SOMETHING AWFUL MIGHT HAPPEN: NEARLY EVERY DAY
GAD7 TOTAL SCORE: 20
4. TROUBLE RELAXING: NEARLY EVERY DAY
2. NOT BEING ABLE TO STOP OR CONTROL WORRYING: NEARLY EVERY DAY
7. FEELING AFRAID AS IF SOMETHING AWFUL MIGHT HAPPEN: NEARLY EVERY DAY

## 2020-05-07 ASSESSMENT — PATIENT HEALTH QUESTIONNAIRE - PHQ9
SUM OF ALL RESPONSES TO PHQ QUESTIONS 1-9: 22
SUM OF ALL RESPONSES TO PHQ QUESTIONS 1-9: 22
10. IF YOU CHECKED OFF ANY PROBLEMS, HOW DIFFICULT HAVE THESE PROBLEMS MADE IT FOR YOU TO DO YOUR WORK, TAKE CARE OF THINGS AT HOME, OR GET ALONG WITH OTHER PEOPLE: EXTREMELY DIFFICULT

## 2020-05-07 NOTE — Clinical Note
Would love to know your thoughts about her case and how you might think I can best help!     Thanks!     -Tiffany  [facility]

## 2020-05-07 NOTE — TELEPHONE ENCOUNTER
Morphine      Last Written Prescription Date:  4/8/20  Last Fill Quantity: 60,   # refills: 0  Last Office Visit: 5/1/20  Future Office visit:    Next 5 appointments (look out 90 days)    May 07, 2020  2:15 PM CDT  [facility] [address] [address]  [phone]           Routing refill request to provider for review/approval because:  Drug not on the [facility], [facility] or [facility] refill protocol or controlled substance    oxycodone      Last Written Prescription Date:  4/8/20  Last Fill Quantity: 240,   # refills: 0  Last Office Visit: 5/1/20  Future Office visit:    Next 5 appointments (look out 90 days)    May 07, 2020  2:15 PM CDT  Telephone Visit with Tiffany Fallon DO  [facility] [address] [place] [id number]  [phone]           Routing refill request to provider for review/approval because:  Drug not on the [facility], [facility] or [facility] refill protocol or controlled substance

## 2020-05-08 RX ORDER — OXYCODONE HYDROCHLORIDE 5 MG/1
5-10 TABLET ORAL EVERY 6 HOURS PRN
Qty: 240 TABLET | Refills: 0 | Status: SHIPPED | OUTPATIENT
Start: 2020-05-08 | End: 2020-05-19

## 2020-05-08 RX ORDER — METHOCARBAMOL 750 MG/1
750 TABLET, FILM COATED ORAL 3 TIMES DAILY PRN
Qty: 90 TABLET | Refills: 1 | Status: SHIPPED | OUTPATIENT
Start: 2020-05-08 | End: 2020-06-10

## 2020-05-08 RX ORDER — MORPHINE SULFATE 15 MG/1
15 TABLET, FILM COATED, EXTENDED RELEASE ORAL EVERY 12 HOURS
Qty: 60 TABLET | Refills: 0 | Status: SHIPPED | OUTPATIENT
Start: 2020-05-08 | End: 2020-05-29

## 2020-05-08 ASSESSMENT — ANXIETY QUESTIONNAIRES: GAD7 TOTAL SCORE: 20

## 2020-05-08 ASSESSMENT — PATIENT HEALTH QUESTIONNAIRE - PHQ9: SUM OF ALL RESPONSES TO PHQ QUESTIONS 1-9: 22

## 2020-05-11 NOTE — PATIENT INSTRUCTIONS
Treatment Plan:    Continue Xanax per primary care provider. I am not comfortable assuming prescribing of benzodiazepines while also on chronic narcotic pain medication. Primary care provider can see any suggestions/recommendations in my visit note.     Increase BusPar to 10 mg twice daily for anxiety    Start Abilify 2 mg daily to augment Lexapro    Continue Lexapro 20 mg daily for mood and anxiety    Continue trazodone 100 mg daily at bedtime for sleep    Consider sleep study after Covid-19 restrictions to rule out central sleep apnea due to narcotic pain med use and concurrent benzodiazepine use.     Continue therapy as planned.    Continue all other medications as reviewed per electronic medical record today.     Safety plan reviewed. To the Emergency Department as needed or call after hours crisis line at [phone] or [phone]. [place] Crisis Text Line: Text [place] to 413050  or  Suicide LifeLine Chat: suicidepreventionlifeline.org/chat    Schedule an appointment with me in 4 weeks or sooner as needed.  Call [facility] at [phone] to schedule.    Follow up with primary care provider as planned or sooner if needed for acute medical concerns.    Call the psychiatric nurse line with medication questions or concerns at [phone].    [employer] may be used to communicate with your provider, but this is not intended to be used for emergencies.    Patient Education:  Could consider [facility] Addiction Medicine referral to discuss potential for Suboxone therapy and benzodiazepine adjustments.     Discussed risks of benzodiazepine (Ativan, Xanax, Klonopin, Valium, etc) use including, but not limited to, sedation, tolerance, risk for addiction/dependence. Pt told not to drink alcohol while taking benzodiazepines due to risk of trouble breathing and potential death. Discussed not driving or operating heavy machinery until it is known how the drug affects the patient. Also instructed to  discuss with physician or pharmacist before ever taking a benzodiazepine with a narcotic/opioid pain medication.     Community Resources:    National Suicide Prevention Lifeline: 188.516.5806 (TTY: 124.359.6757). Call anytime for help.  (www.suicidepreventionlifeline.org)  National [place] on Mental Illness (www.tommy.org): 763.609.1520 or 832-318-2537.   Mental Health Association (www.mentalhealth.org): 452.244.2108 or 187-374-7864.  [place] Crisis Text Line: Text [place] to 586555  Suicide LifeLine Chat: suicidepreventionlifeline.org/chat

## 2020-05-13 NOTE — PROGRESS NOTES
Clinic Care Coordination Contact  [facility]/Voicemail       Clinical Data: Care Coordinator Outreach  Outreach attempted x 2.  Left message on patient's voicemail with call back information and requested return call.  Plan: Care Coordinator will send another letter letting pt know been attempting to reach her. Will send via [employer]. Care Coordinator will try to reach patient again in 1 month.      MARK Salas   Primary Care Clinic- Social Work Care Coordinator  [facility]  5/13/2020 11:43 AM  [phone]

## 2020-05-14 ENCOUNTER — TELEPHONE (OUTPATIENT)
Dept: OBGYN | Facility: CLINIC | Age: 40
End: 2020-05-14

## 2020-05-14 NOTE — TELEPHONE ENCOUNTER
Spoke with Mattie who is about 3 weeks out from [facility]. She states that she has been doing well but yesterday she went to the bathroom and a long piece of suture came out of her vagina. States it was blue and had 3 knots in it. Some tissue did come out with the suture. She also noted some spotting and pain but it resolved. Then today another smaller piece of suture came out, this one was more dissolved. She is wondering if this is a concern. Discussed with Dr. Barber. She states it is likely from the cuff and if patient is not having pain or bleeding is likely ok however, patient should have office visit to check on the cuff and make sure it is in tact. Patient agreeable to this plan and was scheduled in clinic.

## 2020-05-18 NOTE — PROGRESS NOTES
Clinic Care Coordination Contact  [facility]/Voicemail    Pt had tried calling  CC back. So attempting to reach her again.      Clinical Data: Care Coordinator Outreach  Outreach attempted x 3.  Left message on patient's voicemail with call back information and requested return call. Asked that she leave  CC a message with a good date and time to reach her.   Plan: Care Coordinator sent care coordination introduction letter on 5/13/20 via [employer]. Care Coordinator will try to reach patient again in 1 month.      MARK Salas   Primary Care Clinic- Social Work Care Coordinator  [facility]  5/18/2020 3:34 PM  [phone]

## 2020-05-19 ENCOUNTER — VIRTUAL VISIT (OUTPATIENT)
Dept: BEHAVIORAL HEALTH | Facility: CLINIC | Age: 40
End: 2020-05-19
Payer: COMMERCIAL

## 2020-05-19 ENCOUNTER — OFFICE VISIT (OUTPATIENT)
Dept: OBGYN | Facility: CLINIC | Age: 40
End: 2020-05-19
Attending: OBSTETRICS & GYNECOLOGY
Payer: COMMERCIAL

## 2020-05-19 VITALS
DIASTOLIC BLOOD PRESSURE: 55 MMHG | SYSTOLIC BLOOD PRESSURE: 89 MMHG | HEART RATE: 74 BPM | BODY MASS INDEX: 25.62 KG/M2 | HEIGHT: 67 IN | WEIGHT: 163.2 LBS

## 2020-05-19 DIAGNOSIS — F32.2 CURRENT SEVERE EPISODE OF MAJOR DEPRESSIVE DISORDER WITHOUT PSYCHOTIC FEATURES WITHOUT PRIOR EPISODE (H): ICD-10-CM

## 2020-05-19 DIAGNOSIS — F41.1 GAD (GENERALIZED ANXIETY DISORDER): Primary | ICD-10-CM

## 2020-05-19 DIAGNOSIS — Z09 POSTOPERATIVE EXAMINATION: Primary | ICD-10-CM

## 2020-05-19 PROCEDURE — 90834 PSYTX W PT 45 MINUTES: CPT | Mod: 95 | Performed by: MARRIAGE & FAMILY THERAPIST

## 2020-05-19 ASSESSMENT — MIFFLIN-ST. JEOR: SCORE: 1447.9

## 2020-05-19 NOTE — LETTER
"2020       RE: Mattie Banda  [address] [facility] [address]     Dear Colleague,    Thank you for referring your patient, Mattie Banda, to the [facility] at [facility]. Please see a copy of my visit note below.    SUBJECTIVE   Mattie Banda is a 39 year old , No LMP recorded. (Menstrual status: Irregular Periods)., here for postop follow up.    S//p EFRAIN CARLTON, cystoscopy for AUB on 2020  Few days ago, saw suture with knot on it when wiping after urinating and then some other \"tissue\", was worried was complication from sugrery.  Also mentioned that for last few weeks, has times when she sits to urinate and has difficulty getting stream going.  Seen for possible UA, treated and BLAIRE neg.    Past Medical History  Past Medical History:   Diagnosis Date     Anxiety      Arthritis      Breast disorder Not sure     COPD (chronic obstructive pulmonary disease) (H)      Depressive disorder      Mixed anxiety depressive disorder 2016     Papanicolaou smear of cervix with low grade squamous intraepithelial lesion (LGSIL)      Thyroid disease      Urinary tract infection, site not specified     Recurrent UTI's     Wounds and injuries      Medications  Current Outpatient Medications   Medication     acetaminophen (TYLENOL) 325 MG tablet     albuterol (PROAIR HFA/PROVENTIL HFA/VENTOLIN HFA) 108 (90 Base) MCG/ACT inhaler     ALPRAZolam (XANAX) 2 MG tablet     ARIPiprazole (ABILIFY) 2 MG tablet     busPIRone (BUSPAR) 10 MG tablet     escitalopram (LEXAPRO) 20 MG tablet     levothyroxine (SYNTHROID/LEVOTHROID) 112 MCG tablet     methocarbamol (ROBAXIN) 750 MG tablet     morphine (MS CONTIN) 15 MG CR tablet     NARCAN 4 MG/0.1ML nasal spray     oxyCODONE (ROXICODONE) 5 MG tablet     phenazopyridine (AZO URINARY PAIN RELIEF) 95 MG tablet     sulfaSALAzine (AZULFIDINE) 500 MG tablet     topiramate (TOPAMAX) 25 MG tablet     traZODone " "(DESYREL) 100 MG tablet     No current facility-administered medications for this visit.        Allergies  Allergies   Allergen Reactions     Bupropion Hcl Hives     Wellbutrin     No Clinical Screening - See Comments      Some metals, nickel  rash     Adhesive Tape Rash and Blisters       Review of Systems   ROS: 10 point ROS neg other than the symptoms noted above in the HPI.    OBJECTIVE   BP (!) 89/55   Pulse 74   Ht 1.702 m (5' 7\")   Wt 74 kg (163 lb 3.2 oz)   BMI 25.56 kg/m       General:  Alert, no distress   Head:  Normocephalic, without obvious abnormality   Lungs:  Clear to auscultation bilaterally   Heart:  Regular rate and rhythm, no murmur   Abdomen:  Soft, non-tender, non-distended, bowel sounds normal  L/s incisions well healed   Pelvic: -nefg  -vagina normal without discharge  -cervix absent  - cuff visually intact, no suture visible  - BME with cuff intact   Extremities:  normal     ASSESSMENT   Mattie Banda is a 39 year old , postop follow up.    PLAN   1. Reviewed pathology  2.  Reassurance about exam  3.  Supportive cares for urine stream, if unsuccessful, urogyn consult    Total visit time was 15 minutes with >50% time spent in counseling and coordination of care for postop cares.  Lupe Thomson MD MPH        Again, thank you for allowing me to participate in the care of your patient.      Sincerely,    Ema Thomson MD      "

## 2020-05-19 NOTE — PROGRESS NOTES
"SUBJECTIVE   Mattie Banda is a 39 year old , No LMP recorded. (Menstrual status: Irregular Periods)., here for postop follow up.    S//p EFRAIN CARLTON, cystoscopy for AUB on 2020  Few days ago, saw suture with knot on it when wiping after urinating and then some other \"tissue\", was worried was complication from sugrery.  Also mentioned that for last few weeks, has times when she sits to urinate and has difficulty getting stream going.  Seen for possible UA, treated and BLAIRE neg.    Past Medical History  Past Medical History:   Diagnosis Date     Anxiety      Arthritis      Breast disorder Not sure     COPD (chronic obstructive pulmonary disease) (H)      Depressive disorder      Mixed anxiety depressive disorder 2016     Papanicolaou smear of cervix with low grade squamous intraepithelial lesion (LGSIL)      Thyroid disease      Urinary tract infection, site not specified     Recurrent UTI's     Wounds and injuries      Medications  Current Outpatient Medications   Medication     acetaminophen (TYLENOL) 325 MG tablet     albuterol (PROAIR HFA/PROVENTIL HFA/VENTOLIN HFA) 108 (90 Base) MCG/ACT inhaler     ALPRAZolam (XANAX) 2 MG tablet     ARIPiprazole (ABILIFY) 2 MG tablet     busPIRone (BUSPAR) 10 MG tablet     escitalopram (LEXAPRO) 20 MG tablet     levothyroxine (SYNTHROID/LEVOTHROID) 112 MCG tablet     methocarbamol (ROBAXIN) 750 MG tablet     morphine (MS CONTIN) 15 MG CR tablet     NARCAN 4 MG/0.1ML nasal spray     oxyCODONE (ROXICODONE) 5 MG tablet     phenazopyridine (AZO URINARY PAIN RELIEF) 95 MG tablet     sulfaSALAzine (AZULFIDINE) 500 MG tablet     topiramate (TOPAMAX) 25 MG tablet     traZODone (DESYREL) 100 MG tablet     No current facility-administered medications for this visit.        Allergies  Allergies   Allergen Reactions     Bupropion Hcl Hives     Wellbutrin     No Clinical Screening - See Comments      Some metals, nickel  rash     Adhesive Tape Rash and Blisters " "      Review of Systems   ROS: 10 point ROS neg other than the symptoms noted above in the HPI.    OBJECTIVE   BP (!) 89/55   Pulse 74   Ht 1.702 m (5' 7\")   Wt 74 kg (163 lb 3.2 oz)   BMI 25.56 kg/m       General:  Alert, no distress   Head:  Normocephalic, without obvious abnormality   Lungs:  Clear to auscultation bilaterally   Heart:  Regular rate and rhythm, no murmur   Abdomen:  Soft, non-tender, non-distended, bowel sounds normal  L/s incisions well healed   Pelvic: -nefg  -vagina normal without discharge  -cervix absent  - cuff visually intact, no suture visible  - BME with cuff intact   Extremities:  normal     ASSESSMENT   Mattie Banda is a 39 year old , postop follow up.    PLAN   1. Reviewed pathology  2.  Reassurance about exam  3.  Supportive cares for urine stream, if unsuccessful, urogyn consult    Total visit time was 15 minutes with >50% time spent in counseling and coordination of care for postop cares.  Lupe Thomson MD MPH      "

## 2020-05-19 NOTE — PROGRESS NOTES
"[facility]: Integrated Behavioral Health  May 19, 2020    Mattie Banda is a 39 year old female who is being evaluated via a telephone visit.      The patient has been notified of the following:     \"We have found that certain health care needs can be provided without the need for a face to face visit.  This service lets us provide the care you need with a short phone conversation.      I will have full access to your [facility] medical record during this entire phone call.   I will be taking notes for your medical record.     Since this is like an office visit, we will bill your insurance company for this service.  Please check with your medical insurance if this type of telephone visit/virtual care is covered.  You may be responsible for the cost of this service if insurance coverage is denied.      There are potential benefits and risks of telephone visits (e.g. limits to patient confidentiality) that differ from in-person visits.?  Confidentiality still applies for telephone services, and nobody will record the visit.  It is important to be in a quiet, private space that is free of distractions (including cell phone or other devices) during the visit.??     If during the course of the call I believe a telephone visit is not appropriate, you will not be charged for this service\"    Consent has been obtained for this service by care team member: yes.    Behavioral Health Clinician Progress Note    Patient Name: Mattie Banda           Service Type:  Individual     Session Start Time: 1:02  Session End Time: 1:42      Session Length: 38 - 52      Attendees: Patient, patient's daughter was also in the car while patient was talking, patient stated she was okay with still proceeding with the visit.    Visit Activities (Refresh list every visit): [facility] Only    Diagnostic Assessment Date: 1/21/2020  Treatment Plan Review Date: 5/6/2020  See Flowsheets for today's PHQ-9 and CRISTIANO-7 " results  Previous PHQ-9:   PHQ-9 SCORE 1/7/2020 3/4/2020 5/7/2020   PHQ-9 Total Score - - -   PHQ-9 Total Score MyChart - - 22 (Severe depression)   PHQ-9 Total Score 24 25 22     Previous CRISTIANO-7:   CRISTIANO-7 SCORE 1/7/2020 3/4/2020 5/7/2020   Total Score - - 20 (severe anxiety)   Total Score 20 20 20       EMILIA LEVEL:  EMILIA Score (Last Two) 11/14/2012   EMILIA Raw Score 44   Activation Score 70.8   EMILIA Level 4     Clinical Global Impressions  First:  Considering your total clinical experience with this particular patient population, how severe are the patient's symptoms at this time?: 5 (5/6/2020  2:23 PM)      Most recent:  Compared to the patient's condition at the START of treatment, this patient's condition is: 3 (5/6/2020  2:23 PM)          DATA  Extended Session (60+ minutes): No  Interactive Complexity: No  Crisis: No  [facility] Patient: No    Treatment Objective(s) Addressed in This Session:  Target Behavior(s): disease management/lifestyle changes depression and anxiety    Depressed Mood: Increase interest, engagement, and pleasure in doing things  Decrease frequency and intensity of feeling down, depressed, hopeless  Improve quantity and quality of night time sleep / decrease daytime naps  Feel less tired and more energy during the day   Improve diet, appetite, mindful eating, and / or meal planning  Identify negative self-talk and behaviors: challenge core beliefs, myths, and actions  Improve concentration, focus, and mindfulness in daily activities   Decrease thoughts that you'd be better off dead or of suicide / self-harm  Anxiety: will experience a reduction in anxiety, will develop more effective coping skills to manage anxiety symptoms, will develop healthy cognitive patterns and beliefs and will increase ability to function adaptively    Current Stressors / Issues:  Patient reports ongoing issues related to her recent surgery. She has an appointment this afternoon regarding those concerns.  Her uncle fell down the  "steps yesterday and she went to help and an ambulance was called to help. He is now in surgery.  Patient reports difficulty with sleep the past two nights. Reflected that there has been a lot going on that this can be a result of that.  Processed visit with psychiatry. Patient expressed concern about her provider possibly mis-understanding her and how she uses her Xanax. Patient disclosed that she finds the xanax more helpful than the trazedone, however she also doesn't use her Xanax daily. She states that she does pick it up every month as she picks up all of her prescriptions and that is just what she does, \"out of habit\". Discussed use of xanax and how it is habit-forming. Patient stated that she was unaware of this and will only pick it up when she is out of it.  Explored changes psychiatry made and patient states that she maybe is feeling a tiny change, as she is not feeling an impending sense of doom in the morning.  She reports that she is pushing herself to get out of the garage more. She states that she is going for walks everyday. Patient states that her daughter has noticed a positive change and that patient is not sleeping as much.   Patient's spouse is on his way home, however there was an issue with his truck and this has to get fixed for him to get home.   Patient was driving on her way to the appointment at the [facility]. She stated her daughter was driving as patient states she typically feels anxious and has panic attacks. Discussed relaxation and ways to calm down. Discussed ways to ground herself in the vehicle by moving in her seat to find a comfortable position and noticing her surroundings in the car. Encouraged conversation and listening to music as long as it is not too distracting to the . She also uses a navigation system to help her feel better about finding her destination.    Progress on Treatment Objective(s) / Homework:  Minimal progress - ACTION (Actively working towards change); " Intervened by reinforcing change plan / affirming steps taken    Motivational Interviewing    MI Intervention: Expressed Empathy/Understanding, Supported Autonomy, Collaboration, Evocation, Permission to raise concern or advise, Open-ended questions, Reflections: simple and complex, Change talk (evoked) and Reframe     Change Talk Expressed by the Patient: Desire to change Ability to change Reasons to change Need to change Committment to change Activation Taking steps    Provider Response to Change Talk: E - Evoked more info from patient about behavior change, A - Affirmed patient's thoughts, decisions, or attempts at behavior change, R - Reflected patient's change talk and S - Summarized patient's change talk statements    Also provided psychoeducation about behavioral health condition, symptoms, and treatment options      Skills training    Explored skills useful to client in current situation    Skills include assertiveness, communication, conflict management, problem-solving, relaxation, etc.    Solution-Focused Therapy    Explored patterns in patient's relationships and discussed options for new behaviors    Explored patterns in patient's actions and choices and discussed options for new behaviors    Cognitive-behavioral Therapy    Discussed common cognitive distortions, identified them in patient's life    Explored ways to challenge, replace, and act against these cognitions    Acceptance and Commitment Therapy    Explored and identified important values in patient's life    Discussed ways to commit to behavioral activation around these values    Psychodynamic psychotherapy    Discussed patient's emotional dynamics and issues and how they impact behaviors    Explored patient's history of relationships and how they impact present behaviors    Explored how to work with and make changes in these schemas and patterns    Behavioral Activation    Discussed steps patient can take to become more involved in meaningful  activity    Identified barriers to these activities and explored possible solutions    Mindfulness-Based Strategies    Discussed skills based on development and application of mindfulness    Skills drawn from dialectical behavior therapy, mindfulness-based stress reduction, mindfulness-based cognitive therapy, etc.      Care Plan review completed: Yes    Medication Review:  No changes to current psychiatric medication(s)     Medication Compliance:  Yes    Changes in Health Issues:   None reported    Chemical Use Review:   Substance Use: Chemical use reviewed, no active concerns identified       Tobacco Use: No change in amount of tobacco use since last session.  Patient declined discussion at this time    Assessment: Current Emotional / Mental Status (status of significant symptoms):  Risk status (Self / Other harm or suicidal ideation)  Patient has had a history of suicidal ideation: in adolescense and suicide attempts: patient states that she tried multiple times around ages 15-16 by slitting wrists or overdose, and one overdose of alcohol; she denies ever being hospitalized for mental health and denies a history of self-injurious behavior, homicidal ideation, homicidal behavior and and other safety concerns  Patient denies current fears or concerns for personal safety.  Patient denies current or recent suicidal ideation or behaviors.   Patient denies current or recent homicidal ideation or behaviors.  Patient denies current or recent self injurious behavior or ideation.  Patient denies other safety concerns.  A safety and risk management plan has been developed including: Patient consented to co-developed safety plan.  A safety and risk management plan was completed.  Patient agreed to use safety plan should any safety concerns arise.  A copy was given to the patient.      Appearance:   unable to assess due to phone visit   Eye Contact:   unable to assess due to phone visit   Psychomotor Behavior: unable to  assess due to phone visit   Attitude:   Cooperative   Orientation:   All  Speech   Rate / Production: Monotone    Volume:  Normal   Mood:    Anxious  Depressed   Affect:    unable to assess due to phone visit   Thought Content:  Clear  Perservative  Rumination   Thought Form:  Coherent  Logical   Insight:    Good     Diagnoses:  1. CRISTIANO (generalized anxiety disorder)    2. Current severe episode of major depressive disorder without psychotic features without prior episode (H)        Collateral Reports Completed:  Not Applicable    Plan: (Homework, other):  Patient was given information about behavioral services and encouraged to schedule a follow up appointment with the clinic [facility] in 1 week.  She was also given information about mental health symptoms and treatment options , Cognitive Behavioral Therapy skills to practice when experiencing anxiety and depression and deep Breathing Strategies to practice when experiencing anxiety and depression.  CD Recommendations: No indications of CD issues.  Patient will continue to work on connecting with others. She will spend time reflecting, daily, on what she accomplished. She will work on practicing square breathing on a daily basis. Patient will continue with daily walks.     KVNG Watson, [facility]       ______________________________________________________________________    Integrated Primary Care Behavioral Health Treatment Plan    Patient's Name: Mattie Banda  YOB: 1980    Date: May 6, 2020    DSM-V Diagnoses: 296.33 (F33.2) Major Depressive Disorder, Recurrent Episode, Severe _ or 300.02 (F41.1) Generalized Anxiety Disorder  Psychosocial / Contextual Factors: medical issues,  and dating ex-, few friends  WHODAS: 36    Referral / Collaboration:  Referral to another professional/service is not indicated at this time..    Anticipated number of session or this episode of care: 8      MeasurableTreatment Goal(s) related to diagnosis /  functional impairment(s)  Goal 1: Patient will demonstrate ability to improve depression and anxiety symptoms as evidenced by improved PHQ9 & GAD7 scores.       Objective #A (Patient Action)    Patient will Decrease frequency and intensity of feeling down, depressed, hopeless  Identify negative self-talk and behaviors: challenge core beliefs, myths, and actions  Decrease thoughts that you'd be better off dead or of suicide / self-harm  Status: New - Date: 1/28/2020 ; Improved and continued: 5/6/2020     Intervention(s)  [facility] will teach distress tolerance skills. Provide psycho-education on cognitive distortions and automatic thoughts and behaviors and ways to appropriately challenge and restructure thoughts.    Objective #B  Patient will increase coping strategies by 2  to more effectively manage current stressors  increase understanding of steps in the grief process  Status: New - Date: 1/28/2020; Improved and Continued: 5/6/2020    Intervention(s)  [facility] will teach mindfulness and relaxation strategies. Provide psycho-education on grief process.    Patient has reviewed and agreed to the above plan.    Written by  KVNG Watson, [facility]

## 2020-05-23 ENCOUNTER — MYC MEDICAL ADVICE (OUTPATIENT)
Dept: FAMILY MEDICINE | Facility: CLINIC | Age: 40
End: 2020-05-23

## 2020-05-23 DIAGNOSIS — Z90.710 HISTORY OF ROBOT-ASSISTED LAPAROSCOPIC HYSTERECTOMY: ICD-10-CM

## 2020-05-23 NOTE — LETTER
[address] [address]  Phone: [phone]  Fax: [phone]    May 28, 2020        Mattie Banda  [facility] [address] [facility] [address]          To whom it may concern:    RE: Mattie Banda    Patient was seen and treated on December 20, 2019 and multiple time since that day.  She had gynecological surgery in February and with COVID 19 issues, finally has complete clearance from her surgeon.  She continues with marked degenerative spine issues and severe PTSD which limits interactions.  She has a treatment plan,  but options are limited due to COVID 19 clinic/medical facility restrictions. Regardless, she was unable to work on or before December 20, 2019 at her previous occupation. I wrote the following for her at that time  .  Light duty employment could be considered. If patient was offered a job considered light duty, I would like to review expectations of this employment before permitting her to work.  I have seen too many jobs described as light duty and the work is not fully so.  It would be easier for me to review job duties and decide how they would fit with her current capabilities. Her limitations remain physical and mental. Type of work, length of work day, interaction with people, etc all need to be considered. It will be difficult but perhaps not impossible to locally find a job I would expect she could do. However I know she is tough, wants to work, and would consider some possible employment.    Please contact me for questions or concerns.      Sincerely,    Yuan Iain Schofield MD

## 2020-05-26 ENCOUNTER — VIRTUAL VISIT (OUTPATIENT)
Dept: BEHAVIORAL HEALTH | Facility: CLINIC | Age: 40
End: 2020-05-26
Payer: COMMERCIAL

## 2020-05-26 DIAGNOSIS — F32.2 CURRENT SEVERE EPISODE OF MAJOR DEPRESSIVE DISORDER WITHOUT PSYCHOTIC FEATURES WITHOUT PRIOR EPISODE (H): Primary | ICD-10-CM

## 2020-05-26 DIAGNOSIS — F41.1 GAD (GENERALIZED ANXIETY DISORDER): ICD-10-CM

## 2020-05-26 PROCEDURE — 90832 PSYTX W PT 30 MINUTES: CPT | Mod: 95 | Performed by: MARRIAGE & FAMILY THERAPIST

## 2020-05-26 NOTE — TELEPHONE ENCOUNTER
It appears the CMA from virtual visit 5/19 stopped. we need to determine if we just need to return it to her list or if she needs a refill. Regarding her note for work, I will await her MyChart to confirm where it needs to be sent.   Yuan Schofield MD
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
## 2020-05-28 ENCOUNTER — TELEPHONE (OUTPATIENT)
Dept: PALLIATIVE MEDICINE | Facility: CLINIC | Age: 40
End: 2020-05-28

## 2020-05-28 DIAGNOSIS — M70.60 TROCHANTERIC BURSITIS, UNSPECIFIED LATERALITY: ICD-10-CM

## 2020-05-28 DIAGNOSIS — M53.3 SI (SACROILIAC) JOINT DYSFUNCTION: Primary | ICD-10-CM

## 2020-05-28 RX ORDER — BUSPIRONE HYDROCHLORIDE 5 MG/1
TABLET ORAL
COMMUNITY
Start: 2020-04-25 | End: 2020-06-18

## 2020-05-28 RX ORDER — OXYCODONE HYDROCHLORIDE 5 MG/1
5-10 TABLET ORAL EVERY 6 HOURS PRN
Qty: 240 TABLET | Refills: 0 | COMMUNITY
Start: 2020-05-08 | End: 2020-05-29

## 2020-05-28 NOTE — TELEPHONE ENCOUNTER
Team [place] I completed a letter today which she needs for unemployment.  I am not sure if she gave us new paperwork or it was related to old paperwork end of last year.  Please her get this letter to the appropriate place.  All other questions in her MyChart addressed.  Yuan Schofield MD

## 2020-05-28 NOTE — TELEPHONE ENCOUNTER
Reviewed chart.     Pt has not been seen in an office visit since 8/7/19 with recs to return in 6-8 weeks.     8/9 trochanteric bursa injection completed    8/19 SI joint injections completed by Leonarda Wilson MD     Patient has been connecting with PCP so it may make sense to have injection orders come from him rather than pain specialist.     Will have Danica review and then will call patient with recommendations.     EFRAIN McallisterN, RN-BC  Patient Care Supervisor  [facility] [place]

## 2020-05-28 NOTE — TELEPHONE ENCOUNTER
Reason for Call:  Other appointment and call back    Detailed comments: Patient asking if she can come in for a hip and back injection.  Please call to advise.  Mattie said you can leave a detailed message if she doesn't answer.    Phone Number Patient can be reached at: Home number on file [phone] (home)    Best Time: any    Can we leave a detailed message on this number? YES    Call taken on 5/28/2020 at 8:00 AM by Jennifer Ramirez

## 2020-05-29 NOTE — TELEPHONE ENCOUNTER
Spoke to Mattie and she is happy to speak with Dr Schofield about procedure orders. I did let her know we would be happy to help with the injections once those orders are signed. Will route to Dr Schofield as an FYI.    EFRAIN BiggsN, RN  Care Coordinator  [facility] [place]

## 2020-05-29 NOTE — TELEPHONE ENCOUNTER
If patient just needs order for injection, this can come from her primary care provider. If she needs further recommendations for treatment, then she should schedule a follow up visit.    Danica Last PA-C on 5/29/2020 at 8:28 AM

## 2020-05-29 NOTE — TELEPHONE ENCOUNTER
Mattie calls to let Dr Schofield know that she is requesting another [facility] through [facility].

## 2020-06-01 NOTE — TELEPHONE ENCOUNTER
It appears patient had bilateral SI joint injections with by Leonarda Wilson MD ordered by Danica NUNEZ August 19, 2019.  On August 9, 2019 she had trochanteric bursa injection from Danica.  Please help arrange these injections with appropriate people.  I will be happy to sign orders.  Yuan Schofield MD

## 2020-06-02 ENCOUNTER — TELEPHONE (OUTPATIENT)
Dept: SURGERY | Facility: CLINIC | Age: 40
End: 2020-06-02

## 2020-06-02 ENCOUNTER — TELEPHONE (OUTPATIENT)
Dept: FAMILY MEDICINE | Facility: CLINIC | Age: 40
End: 2020-06-02

## 2020-06-02 DIAGNOSIS — Z11.59 ENCOUNTER FOR SCREENING FOR OTHER VIRAL DISEASES: Primary | ICD-10-CM

## 2020-06-02 NOTE — TELEPHONE ENCOUNTER
Pt scheduled for [facility]  Date: 6/12/20  Time: 1115   Dr. Rubio    Instructed pt to have H&P and  for procedure.  Patient informed of COVID testing process.

## 2020-06-02 NOTE — TELEPHONE ENCOUNTER
I would agree between my phone visit and gynecological follow up, I would  her for surgery with Dr Rubio.  However we need to make sure all information is completed as needed and charted appropriately for the surgical team. I note she has an appointment with Dr Duke Friday and that may be the best way for all to be done. Yuan Schofield MD

## 2020-06-02 NOTE — TELEPHONE ENCOUNTER
Spoke to Mattie to get a little more information, she is asking if her virtual visit with Dr. Schofield on 5/1/2020 combined with her office visit at the [facility] on 5/19/2020 is good enough to clear her for surgery. I explained that pre-ops need to be within 30 days of the procedure, and she said Dr. Rubio told her they were okay with it being greater than 30 days. We did schedule her an appointment for a pre-op on 6/5/2020, just in case, and will cancel if it is not needed.     Please review and advise

## 2020-06-02 NOTE — TELEPHONE ENCOUNTER
Reason for Call:  Other Pre Op    Detailed comments: pt was told by Dr Rubio staff to ask DM if her LOV would be suffient enough for a pre op. If so please send the appropriate paperwork to [facility]. Please inform pt ASAP so she knows if she needs to alma a pre op with DM.       Phone Number Patient can be reached at: Home number on file [phone] (home)    Best Time:     Can we leave a detailed message on this number? YES    Call taken on 6/2/2020 at 10:20 AM by Sushila Andrade

## 2020-06-05 ENCOUNTER — OFFICE VISIT (OUTPATIENT)
Dept: FAMILY MEDICINE | Facility: CLINIC | Age: 40
End: 2020-06-05
Payer: COMMERCIAL

## 2020-06-05 VITALS
TEMPERATURE: 97.6 F | HEIGHT: 67 IN | SYSTOLIC BLOOD PRESSURE: 94 MMHG | WEIGHT: 156.9 LBS | OXYGEN SATURATION: 94 % | BODY MASS INDEX: 24.63 KG/M2 | DIASTOLIC BLOOD PRESSURE: 60 MMHG | HEART RATE: 105 BPM

## 2020-06-05 DIAGNOSIS — M51.369 DEGENERATION OF LUMBAR INTERVERTEBRAL DISC: ICD-10-CM

## 2020-06-05 DIAGNOSIS — Z01.818 PREOP GENERAL PHYSICAL EXAM: Primary | ICD-10-CM

## 2020-06-05 PROCEDURE — 99214 OFFICE O/P EST MOD 30 MIN: CPT | Performed by: FAMILY MEDICINE

## 2020-06-05 RX ORDER — LORATADINE 10 MG/1
10 TABLET ORAL DAILY
COMMUNITY

## 2020-06-05 ASSESSMENT — MIFFLIN-ST. JEOR: SCORE: 1419.32

## 2020-06-05 NOTE — PROGRESS NOTES
[address] [id number] [phone]  Dept: [phone]    PRE-OP EVALUATION:  Today's date: 2020    Mattie Banda (: 1980) presents for pre-operative evaluation assessment as requested by Edgardo Quiles MD.  She requires evaluation and anesthesia risk assessment prior to undergoing surgery/procedure for treatment of SI (sacroiliac) joint dysfunction,Trochanteric bursitis, unspecified laterality  .    Fax number for surgical facility:NA  Primary Physician: Yuan Schofield  Type of Anesthesia Anticipated: MAC    Patient has a Health Care Directive or Living Will:  NO    Preop Questions 2020   Who is doing your surgery? Dr bell   What are you having done? injections   Date of Surgery/Procedure: 2020   Facility or Hospital where procedure/surgery will be performed: [facility]   1.  Do you have a history of Heart attack, stroke, stent, coronary bypass surgery, or other heart surgery? No   2.  Do you ever have any pain or discomfort in your chest? No   3.  Do you have a history of  Heart Failure? No   4.   Are you troubled by shortness of breath when:  walking on a level surface, or up a slight hill, or at night? No   5.  Do you currently have a cold, bronchitis or other respiratory infection? No   6.  Do you have a cough, shortness of breath, or wheezing? No   7.  Do you sometimes get pains in the calves of your legs when you walk? YES - hip pain    8. Do you or anyone in your family have previous history of blood clots? YES - grandmother   9.  Do you or does anyone in your family have a serious bleeding problem such as prolonged bleeding following surgeries or cuts? YES - self and grandmother, aunt and uncles.    10. Have you ever had problems with anemia or been told to take iron pills? YES - Self, anemia    11. Have you had any abnormal blood loss such as black, tarry or bloody stools, or abnormal vaginal bleeding? YES - anemia    12.  Have you ever had a blood transfusion? No   13. Have you or any of your relatives ever had problems with anesthesia? No   14. Do you have sleep apnea, excessive snoring or daytime drowsiness? No   15. Do you have any prosthetic heart valves? No   16. Do you have prosthetic joints? No   17. Is there any chance that you may be pregnant? No         HPI:     HPI related to upcoming procedure: Worsening pain in her lumbar back hips and sacroiliac area.          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  Patient is followed by Yuan Schofield MD for ongoing prescription of pain medication.  All refills should only be approved by this provider, or covering partner.    Medication(s): ms contin and oxydodone ir.   Maximum quantity per month: #60 ms contin, #180 oxycodone  Clinic visit frequency required: Q 3 months      Controlled substance agreement:  Encounter-Level CSA - 08/20/2018:    Controlled Substance Agreement - Scan on 6/5/19 CONTROLLED SUBSTANCE AGREEMENT (below)       Patient-Level CSA:    Controlled Substance Agreement - Opioid - Scan on 6/13/2019  6:05 PM (below)    Controlled Substance Agreement - Opioid - Scan on 6/13/2019  6:03 PM: 06/05/2019 (below)         Pain Clinic evaluation in the past: Yes       Date/Location:  [facility] and will go there soon, 6/6/2019.    DIRE Total  Score(s):    6/6/2019   Total Score 19       Last [facility] website verification:  10/25/2019 [id number]   [email or web address]             DDD (degenerative disc disease), cervical 02/07/2018     Priority: Medium     Lymphocytic colitis 06/13/2017     Priority: Medium     Idiopathic peripheral neuropathy 01/10/2017     Priority: Medium     Hypothyroidism, unspecified type 01/10/2017     Priority: Medium     Migraine with aura and without status migrainosus, not intractable 12/27/2016     Priority: Medium     Tobacco use disorder 09/12/2016     Priority: Medium     Labial lesion 02/08/2016     Priority: Medium     Superior folds of the vulva/labia right side       S/P lumbar fusion and discectomy June 2015 06/28/2015     Priority: Medium     Degeneration of lumbar intervertebral disc 04/05/2014     Priority: Medium     newly added to list on 4/4//14  but present for last months        Past Medical History:   Diagnosis Date     Anxiety      Arthritis 2019     Breast disorder Not sure     COPD (chronic obstructive pulmonary disease) (H)      Depressive disorder      Mixed anxiety depressive disorder 2/8/2016     Papanicolaou smear of cervix with low grade squamous intraepithelial lesion (LGSIL)      Thyroid disease      Urinary tract infection, site not specified     Recurrent UTI's     Wounds and injuries 2000     Past Surgical History:   Procedure Laterality Date     ABDOMEN SURGERY  2015    For back surgery     BACK SURGERY  6/2015      BIOPSY  Don t remember     COLONOSCOPY  08/06/07     COLONOSCOPY  08/05/08     CYSTOSCOPY N/A 2/7/2020    Procedure: Cystoscopy;  Surgeon: Anabel Barber MD;  Location: [facility]     DAVINCI HYSTERECTOMY TOTAL, SALPINGECTOMY BILATERAL Bilateral 2/7/2020    Procedure: Davinci Total Laparoscopic Hysterectomy, Bilateral Salpingectomy, Exam Under Anesthesia;  Surgeon: Anabel Barber MD;  Location: [facility] OR     HC COLONOSCOPY W BIOPSY  02/06/08     HC TOOTH EXTRACTION W/FORCEP       wisdom teeth removed     HYSTERECTOMY, PAP NO LONGER INDICATED       INJECT BLOCK MEDIAL BRANCH CERVICAL/THORACIC/LUMBAR N/A 6/21/2019    Procedure: BLOCK, NERVE, SPINAL, MEDIAL BRANCH lumbar 2, 3, 4;  Surgeon: Edgardo Rubio MD;  Location: [facility]     INJECT EPIDURAL CERVICAL N/A 3/8/2018    Procedure: INJECT EPIDURAL CERVICAL;  cervical 6-7 interlaminar epidural steroid injection;  Surgeon: Edgardo Rubio MD;  Location: [facility]     LAPAROSCOPIC TUBAL LIGATION  11/27/2012    Procedure: LAPAROSCOPIC TUBAL LIGATION;  Laparoscopic Bilateral tubal sterilization/ fulgaration;  Surgeon: Sarbjit Whittaker MD;  Location: [facility] OR     ORTHOPEDIC SURGERY  6/2015     Current Outpatient Medications   Medication Sig Dispense Refill     acetaminophen (TYLENOL) 325 MG tablet Take 325-650 mg by mouth every 6 hours as needed for mild pain       albuterol (PROAIR HFA/PROVENTIL HFA/VENTOLIN HFA) 108 (90 Base) MCG/ACT inhaler INHALE 2 PUFFS INTO THE LUNGS EVERY 6 HOURS AS NEEDED FOR SHORTNESSOF BREATH / DYSPNEA OR WHEEZING 18 g 3     ALPRAZolam (XANAX) 2 MG tablet TAKE 1/2-1 TABLET (1-2MG) BY MOUTH 3 TIMES DAILY AS NEEDED FOR STRESS/SLEEP 90 tablet 1     ARIPiprazole (ABILIFY) 2 MG tablet Take 1 tablet (2 mg) by mouth daily 30 tablet 1     busPIRone (BUSPAR) 10 MG tablet Take 1 tablet (10 mg) by mouth 2 times daily 60 tablet 1     escitalopram (LEXAPRO) 20 MG tablet Take 1 tablet (20 mg) by mouth daily 90 tablet 2     Ibuprofen (ADVIL PO)        levothyroxine (SYNTHROID/LEVOTHROID) 112 MCG tablet TAKE 1 TABLET BY MOUTH EVERY DAY 90 tablet 1     loratadine (CLARITIN) 10 MG tablet Take 10 mg by mouth daily       methocarbamol (ROBAXIN) 750 MG tablet Take 1 tablet (750 mg) by mouth 3 times daily as needed for muscle spasms 90 tablet 1     morphine (MS CONTIN) 15 MG CR tablet Take 1 tablet (15 mg) by mouth every 12 hours maximum 2 tablet(s) per day 60 tablet 0     oxyCODONE (ROXICODONE) 5 MG tablet Take 1-2 tablets (5-10 mg) by mouth  "every 6 hours as needed for severe pain (uses fewest possible tabs daily.) 240 tablet 0     phenazopyridine (AZO URINARY PAIN RELIEF) 95 MG tablet Take 190 mg by mouth 3 times daily       sulfaSALAzine (AZULFIDINE) 500 MG tablet Take 1 tablet (500 mg) by mouth 4 times daily May start at 2 daily.  Increase to 4 daily over 2-4 weeks if colitis symptoms not controlled. 120 tablet 2     topiramate (TOPAMAX) 25 MG tablet TAKE 1 TABLET BY MOUTH 3 TIMES A DAY 90 tablet 1     traZODone (DESYREL) 100 MG tablet Take 100 mg by mouth At Bedtime Take 1 to 1.5 tablets at bedtime as needed       busPIRone (BUSPAR) 5 MG tablet        NARCAN 4 MG/0.1ML nasal spray SPRAY 1 SPRAY (4 MG) INTO ONE NOSTRIL ALTERNATING NOSTRILS ONCE AS NEEDED FOR OPIOID REVERSAL EVERY 2-3 MINUTES UNTIL ASSISTANCE ARRIVES  0     OTC products: None, except as noted above    Allergies   Allergen Reactions     Bupropion Hcl Hives     Wellbutrin     No Clinical Screening - See Comments      Some metals, nickel  rash     Adhesive Tape Rash and Blisters      Latex Allergy: NO    Social History     Tobacco Use     Smoking status: Current Every Day Smoker     Packs/day: 1.00     Years: 20.00     Pack years: 20.00     Types: Cigarettes     Smokeless tobacco: Never Used   Substance Use Topics     Alcohol use: Yes     Alcohol/week: 0.0 standard drinks     Comment: Rarely     History   Drug Use No       REVIEW OF SYSTEMS:   Constitutional, neuro, ENT, endocrine, pulmonary, cardiac, gastrointestinal, genitourinary, musculoskeletal, integument and psychiatric systems are negative, except as otherwise noted.    EXAM:   BP 94/60 (BP Location: Right arm, Patient Position: Sitting, Cuff Size: Adult Large)   Pulse 105   Temp 97.6  F (36.4  C) (Temporal)   Ht 1.702 m (5' 7\")   Wt 71.2 kg (156 lb 14.4 oz)   SpO2 94%   BMI 24.57 kg/m      GENERAL APPEARANCE: healthy, alert and no distress     EYES: EOMI, PERRL     HENT: ear canals and TM's normal and nose and mouth " without ulcers or lesions     NECK: no adenopathy, no asymmetry, masses, or scars and thyroid normal to palpation     RESP: lungs clear to auscultation - no rales, rhonchi or wheezes     CV: regular rates and rhythm, normal S1 S2, no S3 or S4 and no murmur, click or rub     ABDOMEN:  soft, nontender, no HSM or masses and bowel sounds normal     MS: extremities normal- no gross deformities noted, no evidence of inflammation in joints, FROM in all extremities.     SKIN: no suspicious lesions or rashes     NEURO: Normal strength and tone, sensory exam grossly normal, mentation intact and speech normal     PSYCH: mentation appears normal. and affect normal/bright     LYMPHATICS: No cervical adenopathy    DIAGNOSTICS:   No labs or EKG required for low risk surgery (cataract, skin procedure, breast biopsy, etc)    Recent Labs   Lab Test 04/24/20  1246 01/31/20  1452 01/02/20  1535 12/05/19  1946   HGB 12.8 12.8 11.8 11.7    315 287 241   INR  --   --   --  1.02     --  139 140   POTASSIUM 4.2  --  4.3 3.6   CR 0.91  --  0.75 0.82        IMPRESSION:   Reason for surgery/procedure: Degenerative lumbar intervertebral disc disease    The proposed surgical procedure is considered LOW risk.    REVISED CARDIAC RISK INDEX  The patient has the following serious cardiovascular risks for perioperative complications such as (MI, PE, VFib and 3  AV Block):  No serious cardiac risks  INTERPRETATION: 0 risks: Class I (very low risk - 0.4% complication rate)    The patient has the following additional risks for perioperative complications:  No identified additional risks      ICD-10-CM    1. Preop general physical exam  Z01.818        RECOMMENDATIONS:         --Patient is to take all scheduled medications on the day of surgery EXCEPT for modifications listed below.    APPROVAL GIVEN to proceed with proposed procedure, without further diagnostic evaluation       Signed Electronically by: Dave Duke MD    Copy of this  evaluation report is provided to requesting physician.    [facility] Preop Guidelines    Revised Cardiac Risk Index

## 2020-06-08 ENCOUNTER — TELEPHONE (OUTPATIENT)
Dept: OBGYN | Facility: CLINIC | Age: 40
End: 2020-06-08

## 2020-06-08 NOTE — TELEPHONE ENCOUNTER
Called patient to review the tubal papers I had received from her insurance company.  She states she has completed them several times and she specifically remembers that we discussed that she would not be able to bear children after hysterectomy when I saw her in October.  She also states that she had had a prior tubal ligation years before her hysterectomy.   Anabel Barber MD

## 2020-06-09 ENCOUNTER — VIRTUAL VISIT (OUTPATIENT)
Dept: BEHAVIORAL HEALTH | Facility: CLINIC | Age: 40
End: 2020-06-09
Payer: COMMERCIAL

## 2020-06-09 DIAGNOSIS — F41.1 GAD (GENERALIZED ANXIETY DISORDER): ICD-10-CM

## 2020-06-09 DIAGNOSIS — F32.2 CURRENT SEVERE EPISODE OF MAJOR DEPRESSIVE DISORDER WITHOUT PSYCHOTIC FEATURES WITHOUT PRIOR EPISODE (H): Primary | ICD-10-CM

## 2020-06-09 PROCEDURE — 90832 PSYTX W PT 30 MINUTES: CPT | Mod: TEL | Performed by: MARRIAGE & FAMILY THERAPIST

## 2020-06-09 NOTE — PROGRESS NOTES
"[facility] Primary Care: Integrated Behavioral Health  June 9, 2020    Mattie Banda is a 39 year old female who is being evaluated via a telephone visit.      The patient has been notified of the following:     \"We have found that certain health care needs can be provided without the need for a face to face visit.  This service lets us provide the care you need with a short phone conversation.      I will have full access to your [place] medical record during this entire phone call.   I will be taking notes for your medical record.     Since this is like an office visit, we will bill your insurance company for this service.  Please check with your medical insurance if this type of telephone visit/virtual care is covered.  You may be responsible for the cost of this service if insurance coverage is denied.      There are potential benefits and risks of telephone visits (e.g. limits to patient confidentiality) that differ from in-person visits.?  Confidentiality still applies for telephone services, and nobody will record the visit.  It is important to be in a quiet, private space that is free of distractions (including cell phone or other devices) during the visit.??     If during the course of the call I believe a telephone visit is not appropriate, you will not be charged for this service\"    Consent has been obtained for this service by care team member: yes.    Behavioral Health Clinician Progress Note    Patient Name: Mattie Banda           Service Type:  Individual     Session Start Time: 1:00  Session End Time: 1:30      Session Length: 16 - 37      Attendees: Patient    Visit Activities (Refresh list every visit): [facility] Only    Diagnostic Assessment Date: 1/21/2020  Treatment Plan Review Date: 5/6/2020  See Flowsheets for today's PHQ-9 and CRISTIANO-7 results  Previous PHQ-9:   PHQ-9 SCORE 1/7/2020 3/4/2020 5/7/2020   PHQ-9 Total Score - - -   PHQ-9 Total Score MyChart - - 22 (Severe " depression)   PHQ-9 Total Score 24 25 22     Previous CRISTIANO-7:   CRISTIANO-7 SCORE 1/7/2020 3/4/2020 5/7/2020   Total Score - - 20 (severe anxiety)   Total Score 20 20 20       EMILIA LEVEL:  EMILIA Score (Last Two) 11/14/2012   EMILIA Raw Score 44   Activation Score 70.8   EMILIA Level 4     Clinical Global Impressions  First:  Considering your total clinical experience with this particular patient population, how severe are the patient's symptoms at this time?: 5 (5/6/2020  2:23 PM)      Most recent:  Compared to the patient's condition at the START of treatment, this patient's condition is: 3 (5/6/2020  2:23 PM)          DATA  Extended Session (60+ minutes): No  Interactive Complexity: No  Crisis: No  [facility] Patient: No    Treatment Objective(s) Addressed in This Session:  Target Behavior(s): disease management/lifestyle changes depression and anxiety    Depressed Mood: Increase interest, engagement, and pleasure in doing things  Decrease frequency and intensity of feeling down, depressed, hopeless  Improve quantity and quality of night time sleep / decrease daytime naps  Feel less tired and more energy during the day   Improve diet, appetite, mindful eating, and / or meal planning  Identify negative self-talk and behaviors: challenge core beliefs, myths, and actions  Improve concentration, focus, and mindfulness in daily activities   Decrease thoughts that you'd be better off dead or of suicide / self-harm  Anxiety: will experience a reduction in anxiety, will develop more effective coping skills to manage anxiety symptoms, will develop healthy cognitive patterns and beliefs and will increase ability to function adaptively    Current Stressors / Issues:  Patient states that she is at her Uncle's house helping her mom care for him. Patient provided examples of setting boundaries with her mom on saying what she is able to do to help out. She talked about some conflict with her mom and not following medical recommendations given.  "  Patient identified feeling she is \"always the one who has to apologize\".  Patient states that her  left for another month and a half of work. She reflected that it was one of the nicer times she's had with him. Patient talked about a lack of physical intimacy for a year with him.     Reinforced patient setting boundaries. Reviewed interpersonal effectiveness skills.   Explored dynamics with her SO and patient talked about their intimate relationship. Patient states that they did talk a little about this, just before he left. She states that they both are wanting to get back into it. Encouraged continued communication about this. Patient states that she has opened up more to him about her past trauma and she heard him say that she is safe with him. She reflected that this was the first time she heard him say this.       Progress on Treatment Objective(s) / Homework:  Minimal progress - ACTION (Actively working towards change); Intervened by reinforcing change plan / affirming steps taken    Motivational Interviewing    MI Intervention: Expressed Empathy/Understanding, Supported Autonomy, Collaboration, Evocation, Permission to raise concern or advise, Open-ended questions, Reflections: simple and complex, Change talk (evoked) and Reframe     Change Talk Expressed by the Patient: Desire to change Ability to change Reasons to change Need to change Committment to change Activation Taking steps    Provider Response to Change Talk: E - Evoked more info from patient about behavior change, A - Affirmed patient's thoughts, decisions, or attempts at behavior change, R - Reflected patient's change talk and S - Summarized patient's change talk statements    Also provided psychoeducation about behavioral health condition, symptoms, and treatment options      Skills training    Explored skills useful to client in current situation    Skills include assertiveness, communication, conflict management, problem-solving, " relaxation, etc.    Solution-Focused Therapy    Explored patterns in patient's relationships and discussed options for new behaviors    Explored patterns in patient's actions and choices and discussed options for new behaviors    Cognitive-behavioral Therapy    Discussed common cognitive distortions, identified them in patient's life    Explored ways to challenge, replace, and act against these cognitions    Acceptance and Commitment Therapy    Explored and identified important values in patient's life    Discussed ways to commit to behavioral activation around these values    Psychodynamic psychotherapy    Discussed patient's emotional dynamics and issues and how they impact behaviors    Explored patient's history of relationships and how they impact present behaviors    Explored how to work with and make changes in these schemas and patterns    Behavioral Activation    Discussed steps patient can take to become more involved in meaningful activity    Identified barriers to these activities and explored possible solutions    Mindfulness-Based Strategies    Discussed skills based on development and application of mindfulness    Skills drawn from dialectical behavior therapy, mindfulness-based stress reduction, mindfulness-based cognitive therapy, etc.      Care Plan review completed: Yes    Medication Review:  No changes to current psychiatric medication(s)     Medication Compliance:  Yes    Changes in Health Issues:   Yes: hair loss, Associated Psychological Distress, encouraged patient to monitor and call the clinic if it continues or worsens.    Chemical Use Review:   Substance Use: Chemical use reviewed, no active concerns identified       Tobacco Use: No change in amount of tobacco use since last session.  Patient declined discussion at this time    Assessment: Current Emotional / Mental Status (status of significant symptoms):  Risk status (Self / Other harm or suicidal ideation)  Patient has had a history of  suicidal ideation: in adolescense and suicide attempts: patient states that she tried multiple times around ages 15-16 by slitting wrists or overdose, and one overdose of alcohol; she denies ever being hospitalized for mental health and denies a history of self-injurious behavior, homicidal ideation, homicidal behavior and and other safety concerns  Patient denies current fears or concerns for personal safety.  Patient denies current or recent suicidal ideation or behaviors.   Patient denies current or recent homicidal ideation or behaviors.  Patient denies current or recent self injurious behavior or ideation.  Patient denies other safety concerns.  A safety and risk management plan has been developed including: Patient consented to co-developed safety plan.  A safety and risk management plan was completed.  Patient agreed to use safety plan should any safety concerns arise.  A copy was given to the patient.      Appearance:   unable to assess due to phone visit   Eye Contact:   unable to assess due to phone visit   Psychomotor Behavior: unable to assess due to phone visit   Attitude:   Cooperative   Orientation:   All  Speech   Rate / Production: Monotone    Volume:  Normal   Mood:    Anxious  Depressed   Affect:    unable to assess due to phone visit   Thought Content:  Perservative  Rumination   Thought Form:  Circumstantial  Insight:    Good     Diagnoses:  1. Current severe episode of major depressive disorder without psychotic features without prior episode (H)    2. CRISTIANO (generalized anxiety disorder)        Collateral Reports Completed:  Not Applicable    Plan: (Homework, other):  Patient was given information about behavioral services and encouraged to schedule a follow up appointment with the clinic [facility] in 1 week.  She was also given information about mental health symptoms and treatment options , Cognitive Behavioral Therapy skills to practice when experiencing anxiety and depression and deep Breathing  Strategies to practice when experiencing anxiety and depression.  CD Recommendations: No indications of CD issues.  Patient will continue to work on connecting with others. She will spend time reflecting, daily, on what she accomplished. She will continue to work on practicing square breathing on a daily basis. Patient will continue with daily walks.     KVNG Watson, [facility]       ______________________________________________________________________    Integrated Primary Care Behavioral Health Treatment Plan    Patient's Name: Mattie Banda  YOB: 1980    Date: May 6, 2020    DSM-V Diagnoses: 296.33 (F33.2) Major Depressive Disorder, Recurrent Episode, Severe _ or 300.02 (F41.1) Generalized Anxiety Disorder  Psychosocial / Contextual Factors: medical issues,  and dating ex-, few friends  WHODAS: 36    Referral / Collaboration:  Referral to another professional/service is not indicated at this time..    Anticipated number of session or this episode of care: 8      MeasurableTreatment Goal(s) related to diagnosis / functional impairment(s)  Goal 1: Patient will demonstrate ability to improve depression and anxiety symptoms as evidenced by improved PHQ9 & GAD7 scores.       Objective #A (Patient Action)    Patient will Decrease frequency and intensity of feeling down, depressed, hopeless  Identify negative self-talk and behaviors: challenge core beliefs, myths, and actions  Decrease thoughts that you'd be better off dead or of suicide / self-harm  Status: New - Date: 1/28/2020 ; Improved and continued: 5/6/2020     Intervention(s)  [facility] will teach distress tolerance skills. Provide psycho-education on cognitive distortions and automatic thoughts and behaviors and ways to appropriately challenge and restructure thoughts.    Objective #B  Patient will increase coping strategies by 2  to more effectively manage current stressors  increase understanding of steps in the grief  process  Status: New - Date: 1/28/2020; Improved and Continued: 5/6/2020    Intervention(s)  [facility] will teach mindfulness and relaxation strategies. Provide psycho-education on grief process.    Patient has reviewed and agreed to the above plan.    Written by  KVNG Watson, [facility]

## 2020-06-10 DIAGNOSIS — G47.9 SLEEP DISORDER: ICD-10-CM

## 2020-06-10 DIAGNOSIS — F41.8 MIXED ANXIETY DEPRESSIVE DISORDER: ICD-10-CM

## 2020-06-10 DIAGNOSIS — M51.369 DEGENERATION OF LUMBAR INTERVERTEBRAL DISC: ICD-10-CM

## 2020-06-10 DIAGNOSIS — Z11.59 ENCOUNTER FOR SCREENING FOR OTHER VIRAL DISEASES: ICD-10-CM

## 2020-06-10 PROCEDURE — U0003 INFECTIOUS AGENT DETECTION BY NUCLEIC ACID (DNA OR RNA); SEVERE ACUTE RESPIRATORY SYNDROME CORONAVIRUS 2 (SARS-COV-2) (CORONAVIRUS DISEASE [COVID-19]), AMPLIFIED PROBE TECHNIQUE, MAKING USE OF HIGH THROUGHPUT TECHNOLOGIES AS DESCRIBED BY CMS-2020-01-R: HCPCS | Performed by: ANESTHESIOLOGY

## 2020-06-10 PROCEDURE — 99207 ZZC NO CHARGE LOS: CPT

## 2020-06-10 PROCEDURE — 99000 SPECIMEN HANDLING OFFICE-LAB: CPT | Performed by: ANESTHESIOLOGY

## 2020-06-10 RX ORDER — ALPRAZOLAM 2 MG
TABLET ORAL
Qty: 90 TABLET | Refills: 0 | Status: SHIPPED | OUTPATIENT
Start: 2020-06-10 | End: 2020-07-04

## 2020-06-10 RX ORDER — METHOCARBAMOL 750 MG/1
750 TABLET, FILM COATED ORAL 3 TIMES DAILY PRN
Qty: 90 TABLET | Refills: 1 | Status: SHIPPED | OUTPATIENT
Start: 2020-06-10 | End: 2020-08-07

## 2020-06-10 NOTE — TELEPHONE ENCOUNTER
Alprazolam      Last Written Prescription Date:  5/01/2020  Last Fill Quantity: 90,   # refills: 1  Last Office Visit: 6/05/2020  Future Office visit:    Next 5 appointments (look out 90 days)    Jun 18, 2020  1:15 PM CDT  Telephone Visit with Tiffany Fallon DO  [facility] [address] [address]  [phone]           Routing refill request to provider for review/approval because:  Drug not on the [facility], [facility] or [facility] refill protocol or controlled substance    Robaxin      Last Written Prescription Date:  5/08/2020  Last Fill Quantity: 90,   # refills: 1  Last Office Visit: 6/05/2020  Future Office visit:    Next 5 appointments (look out 90 days)    Jun 18, 2020  1:15 PM CDT  Telephone Visit with Tiffany Fallon DO  [facility] [address] [address]  [phone]           Routing refill request to provider for review/approval because:  Drug not on the [facility], [facility] or [facility] refill protocol or controlled substance

## 2020-06-10 NOTE — PROGRESS NOTES
"Mattie Banda is a 39 year old female who is being evaluated via a billable telephone visit.      The patient has been notified of following:     \"This telephone visit will be conducted via a call between you and your physician/provider. We have found that certain health care needs can be provided without the need for a physical exam.  This service lets us provide the care you need with a short phone conversation.  If a prescription is necessary we can send it directly to your pharmacy.  If lab work is needed we can place an order for that and you can then stop by our lab to have the test done at a later time.    Telephone visits are billed at different rates depending on your insurance coverage. During this emergency period, for some insurers they may be billed the same as an in-person visit.  Please reach out to your insurance provider with any questions.    If during the course of the call the physician/provider feels a telephone visit is not appropriate, you will not be charged for this service.\"    Patient has given verbal consent for Telephone visit?  {YES-NO  Default Yes:4444::\"Yes\"}    What phone number would you like to be contacted at? ***    How would you like to obtain your AVS? {AVS Preference:456545}        Outpatient Psychiatric Progress Note    Name: Mattie Banda   : 1980                    Primary Care Provider: Yuan Schofield MD   Therapist: ***     PHQ-9 scores:  PHQ-9 SCORE 2020 3/4/2020 2020   PHQ-9 Total Score - - -   PHQ-9 Total Score MyChart - - 22 (Severe depression)   PHQ-9 Total Score 24 25 22       CRISTIANO-7 scores:  CRISTIANO-7 SCORE 2020 3/4/2020 2020   Total Score - - 20 (severe anxiety)   Total Score 20 20 20       Patient Identification:  Patient is a 39 year old, {[facility] RELATIONSHIP STATUS:749416}  White American female  who presents for return visit with me.  Patient is currently {[facility] EMPLOYMENT:837569}. Patient attended the phone/video session {[facility] " "ATTENDANCE:134745}. Patient prefers to be called: \"***\".    Interim History:  I last saw Mattie Banda for outpatient psychiatry Consultation on 5/7/20. During that appointment, we:    Continue Xanax per primary care provider. I am not comfortable assuming prescribing of benzodiazepines while also on chronic narcotic pain medication. Primary care provider can see any suggestions/recommendations in my visit note.     Increase BusPar to 10 mg twice daily for anxiety    Start Abilify 2 mg daily to augment Lexapro    Continue Lexapro 20 mg daily for mood and anxiety    Continue trazodone 100 mg daily at bedtime for sleep    Consider sleep study after Covid-19 restrictions to rule out central sleep apnea due to narcotic pain med use and concurrent benzodiazepine use.     Continue therapy as planned.    Today, ***    Psychiatric ROS:  Mattie Banda reports mood has been: ***  Anxiety has been: ***  Sleep has been: ***  Marisol sxs: ***  Psychosis sxs: ***  ADHD/ADD sxs: ***  PTSD sxs: ***  PHQ9 and GAD7 scores were reviewed today.   Medication side effects: {[facility]:070782}  Current stressors include: {[facility] STRESS:516727}  Coping mechanisms and supports include: {[facility] COPE:038224}    Current medications include:   Current Outpatient Medications   Medication Sig     acetaminophen (TYLENOL) 325 MG tablet Take 325-650 mg by mouth every 6 hours as needed for mild pain     albuterol (PROAIR HFA/PROVENTIL HFA/VENTOLIN HFA) 108 (90 Base) MCG/ACT inhaler INHALE 2 PUFFS INTO THE LUNGS EVERY 6 HOURS AS NEEDED FOR SHORTNESSOF BREATH / DYSPNEA OR WHEEZING     ALPRAZolam (XANAX) 2 MG tablet TAKE 1/2-1 TABLET (1-2MG) BY MOUTH 3 TIMES DAILY AS NEEDED FOR STRESS/SLEEP     ARIPiprazole (ABILIFY) 2 MG tablet Take 1 tablet (2 mg) by mouth daily     busPIRone (BUSPAR) 10 MG tablet Take 1 tablet (10 mg) by mouth 2 times daily     busPIRone (BUSPAR) 5 MG tablet      escitalopram (LEXAPRO) 20 MG tablet Take 1 tablet (20 mg) by mouth daily     " Ibuprofen (ADVIL PO)      levothyroxine (SYNTHROID/LEVOTHROID) 112 MCG tablet TAKE 1 TABLET BY MOUTH EVERY DAY     loratadine (CLARITIN) 10 MG tablet Take 10 mg by mouth daily     methocarbamol (ROBAXIN) 750 MG tablet TAKE 1 TABLET (750 MG) BY MOUTH 3 TIMES DAILY AS NEEDED FOR MUSCLE SPASMS     morphine (MS CONTIN) 15 MG CR tablet Take 1 tablet (15 mg) by mouth every 12 hours maximum 2 tablet(s) per day     NARCAN 4 MG/0.1ML nasal spray SPRAY 1 SPRAY (4 MG) INTO ONE NOSTRIL ALTERNATING NOSTRILS ONCE AS NEEDED FOR OPIOID REVERSAL EVERY 2-3 MINUTES UNTIL ASSISTANCE ARRIVES     oxyCODONE (ROXICODONE) 5 MG tablet Take 1-2 tablets (5-10 mg) by mouth every 6 hours as needed for severe pain (uses fewest possible tabs daily.)     phenazopyridine (AZO URINARY PAIN RELIEF) 95 MG tablet Take 190 mg by mouth 3 times daily     sulfaSALAzine (AZULFIDINE) 500 MG tablet Take 1 tablet (500 mg) by mouth 4 times daily May start at 2 daily.  Increase to 4 daily over 2-4 weeks if colitis symptoms not controlled.     sulfaSALAzine ER (AZULFIDINE EN) 500 MG EC tablet TAKE 1 TABLET (500 MG) BY MOUTH 4 TIMES DAILY     topiramate (TOPAMAX) 25 MG tablet TAKE 1 TABLET BY MOUTH 3 TIMES A DAY     traZODone (DESYREL) 100 MG tablet Take 100 mg by mouth At Bedtime Take 1 to 1.5 tablets at bedtime as needed     No current facility-administered medications for this visit.        The [place] Prescription Monitoring Program has been reviewed and there are no concerns about diversionary activity for controlled substances at this time. ***    Past Medical/Surgical History:  Past Medical History:   Diagnosis Date     Anxiety      Arthritis 2019     Breast disorder Not sure     COPD (chronic obstructive pulmonary disease) (H)      Depressive disorder      Mixed anxiety depressive disorder 2/8/2016     Papanicolaou smear of cervix with low grade squamous intraepithelial lesion (LGSIL)      Thyroid disease      Urinary tract infection, site not specified      Recurrent UTI's     Wounds and injuries 2000      has a past medical history of Anxiety, Arthritis (2019), Breast disorder (Not sure), COPD (chronic obstructive pulmonary disease) (H), Depressive disorder, Mixed anxiety depressive disorder (2/8/2016), Papanicolaou smear of cervix with low grade squamous intraepithelial lesion (LGSIL), Thyroid disease, Urinary tract infection, site not specified, and Wounds and injuries (2000). She also has no past medical history of Cancer (H), Cerebral infarction (H), Congestive heart failure (H), Diabetes (H), Heart disease, History of blood transfusion, Hypertension, or Uncomplicated asthma.    Social History:  Reviewed. No changes to social history. ***    Vital Signs:   None. This is phone/video visit.     Labs:  Most recent laboratory results reviewed and the pertinent results include: ***  Most recent laboratory results reviewed and no new labs. ***    Review of Systems:  10 systems (general, cardiovascular, respiratory, eyes, ENT, endocrine, GI, , M/S, neurological) were reviewed. Most pertinent finding(s) is/are: *** . The remaining systems are all unremarkable.    Mental Status Examination (limited as this is by phone/video):  ***  Attitude:  cooperative   Oriented to:  person, place, time, and situation  Attention Span and Concentration:  normal  Speech:  clear, coherent, regular rate, rhythm, and volume  Language: intact  Mood:  { :158893}  Affect:  { :664145}  Associations:  no loose associations  Thought Process:  logical, linear and goal oriented  Thought Content:  no evidence of suicidal ideation or homicidal ideation, no evidence of psychotic thought, no auditory hallucinations present and no visual hallucinations present  Recent and Remote Memory:  Intact to interview. Not formally assessed. No amnesia.  Fund of Knowledge: appropriate  Insight:  { :073543}  Judgment:  { :111516}, adequate for safety  Impulse Control:  { :593954}    Suicide Risk  Assessment:  Today Mattie Banda reports ***. In addition, there are notable risk factors for self-harm, including {[facility] SUICIDE RISKS:356575}. However, risk is mitigated by {[facility] SUICIDE PROTECT:686825}. Therefore, based on all available evidence including the factors cited above, Mattie Banda does not appear to be at imminent risk for self-harm, does not meet criteria for a 72-hr hold, and therefore remains appropriate for ongoing outpatient level of care.  A thorough assessment of risk factors related to suicide and self-harm have been reviewed and are noted above. The patient convincingly denies suicidality on several occasions. Local community safety resources printed and reviewed for patient to use if needed. There was no deceit detected, and the patient presented in a manner that was believable.     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  Patient is followed by Yuan Schofield MD for ongoing prescription of pain medication.  All refills should only be approved by this provider, or covering partner.    Medication(s): ms contin and oxydodone ir.   Maximum quantity per month: #60 ms contin, #180 oxycodone  Clinic visit frequency required: Q 3 months      Controlled substance agreement:  Encounter-Level CSA - 08/20/2018:     Controlled Substance Agreement - Scan on 6/5/19 CONTROLLED SUBSTANCE AGREEMENT (below)       Patient-Level CSA:    Controlled Substance Agreement - Opioid - Scan on 6/13/2019  6:05 PM (below)    Controlled Substance Agreement - Opioid - Scan on 6/13/2019  6:03 PM: 06/05/2019 (below)         Pain Clinic evaluation in the past: Yes       Date/Location:  [facility] and will go there soon, 6/6/2019.    DIRE Total Score(s):    6/6/2019   Total Score 19       Last [facility] website verification:  10/25/2019 390   [email or web address]             DDD (degenerative disc disease), cervical 02/07/2018     Priority: Medium     Lymphocytic colitis 06/13/2017     Priority: Medium     Idiopathic peripheral neuropathy 01/10/2017     Priority: Medium     Hypothyroidism, unspecified type 01/10/2017     Priority: Medium     Migraine with aura and without status migrainosus, not intractable 12/27/2016     Priority: Medium     Tobacco use disorder 09/12/2016     Priority: Medium     Labial lesion 02/08/2016     Priority: Medium     Superior folds of the vulva/labia right side       S/P lumbar fusion and discectomy June 2015 06/28/2015     Priority: Medium     Degeneration of lumbar intervertebral disc 04/05/2014     Priority: Medium     newly added to list on 4/4//14  but present for last months         Psychosocial & Contextual Factors:  {[facility] PSYCHOSOCIAL FACTORS:491861}    Assessment:  Mattie Banda reports ***.     Medication side effects and alternatives were reviewed. Health promotion activities recommended and reviewed today. All questions addressed. Education and counseling completed regarding risks and benefits of medications and psychotherapy options. Recommend therapy for additional support.     Treatment Plan:    Continue ***    Discontinue ***    Start ***    Continue all other medications as reviewed per electronic medical record today.     Safety plan reviewed. To the Emergency Department as  needed or call after hours crisis line at [phone] or [phone]. [place] Crisis Text Line. Text [place] to 237181 or Suicide LifeLine Chat: suicidepreventionAltheRx Pharmaceuticalsline.org/chat    Continue therapy as planned. ***    Schedule an appointment with me in *** weeks or sooner as needed. Call [facility] at [phone] to schedule.    Follow up with primary care provider as planned or for acute medical concerns.    Call the psychiatric nurse line with medication questions or concerns at [phone].    [employer] may be used to communicate with your provider, but this is not intended to be used for emergencies.    Administrative Billing:   Phone Call/Video Duration: *** Minutes  Start: ***  Stop: ***    Time spent with patient was *** minutes and greater than 50% of time or *** minutes was spent in counseling and coordination of care regarding above diagnoses and treatment plan.    [facility]:  [facility]    Signed:   Tiffany Fallon DO  [facility]

## 2020-06-11 LAB
SARS-COV-2 RNA SPEC QL NAA+PROBE: NOT DETECTED
SPECIMEN SOURCE: NORMAL

## 2020-06-12 ENCOUNTER — NURSE TRIAGE (OUTPATIENT)
Dept: NURSING | Facility: CLINIC | Age: 40
End: 2020-06-12

## 2020-06-12 ENCOUNTER — HOSPITAL ENCOUNTER (OUTPATIENT)
Dept: GENERAL RADIOLOGY | Facility: CLINIC | Age: 40
End: 2020-06-12
Attending: ANESTHESIOLOGY | Admitting: ANESTHESIOLOGY
Payer: COMMERCIAL

## 2020-06-12 ENCOUNTER — ANESTHESIA EVENT (OUTPATIENT)
Dept: SURGERY | Facility: CLINIC | Age: 40
End: 2020-06-12
Payer: COMMERCIAL

## 2020-06-12 ENCOUNTER — HOSPITAL ENCOUNTER (OUTPATIENT)
Facility: CLINIC | Age: 40
Discharge: HOME | End: 2020-06-12
Attending: ANESTHESIOLOGY | Admitting: ANESTHESIOLOGY
Payer: COMMERCIAL

## 2020-06-12 ENCOUNTER — ANESTHESIA (OUTPATIENT)
Dept: SURGERY | Facility: CLINIC | Age: 40
End: 2020-06-12
Payer: COMMERCIAL

## 2020-06-12 VITALS
DIASTOLIC BLOOD PRESSURE: 53 MMHG | WEIGHT: 156.9 LBS | RESPIRATION RATE: 16 BRPM | HEIGHT: 67 IN | BODY MASS INDEX: 24.63 KG/M2 | SYSTOLIC BLOOD PRESSURE: 91 MMHG | OXYGEN SATURATION: 99 % | HEART RATE: 65 BPM

## 2020-06-12 DIAGNOSIS — M53.3 SI (SACROILIAC) JOINT DYSFUNCTION: ICD-10-CM

## 2020-06-12 PROCEDURE — 20550 NJX 1 TENDON SHEATH/LIGAMENT: CPT | Mod: 50 | Performed by: ANESTHESIOLOGY

## 2020-06-12 PROCEDURE — 40000277 XR SURGERY CARM FLUORO LESS THAN 5 MIN W STILLS: Mod: TC

## 2020-06-12 PROCEDURE — 27096 INJECT SACROILIAC JOINT: CPT | Mod: 50 | Performed by: ANESTHESIOLOGY

## 2020-06-12 PROCEDURE — 37000008 ZZH ANESTHESIA TECHNICAL FEE, 1ST 30 MIN: Performed by: ANESTHESIOLOGY

## 2020-06-12 PROCEDURE — 25000125 ZZHC RX 250: Performed by: NURSE ANESTHETIST, CERTIFIED REGISTERED

## 2020-06-12 PROCEDURE — 25000128 H RX IP 250 OP 636: Performed by: NURSE ANESTHETIST, CERTIFIED REGISTERED

## 2020-06-12 PROCEDURE — 25000128 H RX IP 250 OP 636: Performed by: ANESTHESIOLOGY

## 2020-06-12 PROCEDURE — 25000125 ZZHC RX 250: Performed by: ANESTHESIOLOGY

## 2020-06-12 RX ORDER — BUPIVACAINE HYDROCHLORIDE 5 MG/ML
INJECTION, SOLUTION PERINEURAL PRN
Status: DISCONTINUED | OUTPATIENT
Start: 2020-06-12 | End: 2020-06-12 | Stop reason: HOSPADM

## 2020-06-12 RX ORDER — PROPOFOL 10 MG/ML
INJECTION, EMULSION INTRAVENOUS PRN
Status: DISCONTINUED | OUTPATIENT
Start: 2020-06-12 | End: 2020-06-12

## 2020-06-12 RX ORDER — IOPAMIDOL 612 MG/ML
INJECTION, SOLUTION INTRATHECAL PRN
Status: DISCONTINUED | OUTPATIENT
Start: 2020-06-12 | End: 2020-06-12 | Stop reason: HOSPADM

## 2020-06-12 RX ORDER — TRIAMCINOLONE ACETONIDE 40 MG/ML
INJECTION, SUSPENSION INTRA-ARTICULAR; INTRAMUSCULAR PRN
Status: DISCONTINUED | OUTPATIENT
Start: 2020-06-12 | End: 2020-06-12 | Stop reason: HOSPADM

## 2020-06-12 RX ORDER — LIDOCAINE HYDROCHLORIDE 20 MG/ML
INJECTION, SOLUTION INFILTRATION; PERINEURAL PRN
Status: DISCONTINUED | OUTPATIENT
Start: 2020-06-12 | End: 2020-06-12

## 2020-06-12 RX ADMIN — PROPOFOL 30 MG: 10 INJECTION, EMULSION INTRAVENOUS at 11:34

## 2020-06-12 RX ADMIN — LIDOCAINE HYDROCHLORIDE 1 ML: 10 INJECTION, SOLUTION EPIDURAL; INFILTRATION; INTRACAUDAL; PERINEURAL at 10:57

## 2020-06-12 RX ADMIN — PROPOFOL 50 MG: 10 INJECTION, EMULSION INTRAVENOUS at 11:31

## 2020-06-12 RX ADMIN — PROPOFOL 20 MG: 10 INJECTION, EMULSION INTRAVENOUS at 11:36

## 2020-06-12 RX ADMIN — LIDOCAINE HYDROCHLORIDE 50 MG: 20 INJECTION, SOLUTION INFILTRATION; PERINEURAL at 11:31

## 2020-06-12 SDOH — HEALTH STABILITY: MENTAL HEALTH: CURRENT SMOKER: 1

## 2020-06-12 ASSESSMENT — COPD QUESTIONNAIRES
COPD: 1
CAT_SEVERITY: MILD

## 2020-06-12 ASSESSMENT — MIFFLIN-ST. JEOR: SCORE: 1419.32

## 2020-06-12 ASSESSMENT — LIFESTYLE VARIABLES: TOBACCO_USE: 1

## 2020-06-12 NOTE — OP NOTE
CHIEF COMPLAINT:    1.SI joint dysfunction (Sacroilitis) and pain (724.6)   2 Sacral enthesopathy (720.1)    PROCEDURE:   Fluoroscopically-guided injection of the Bilateral  sacroiliac joints with Bilateral tereso Sacroiliac joint ligaments infiltration over the sacrum.    PROCEDURE DETAILS: After written informed consent was obtained from the patient, the patient was escorted to the procedure room.  The patient was placed in the prone position.   A time out was conducted to verify patient identity, procedure to be performed, side, site, allergies and any special requirements.  The skin over the lumbosacral region was prepped and draped in normal sterile fashion. Fluoroscopy was used to identify the posteroinferior region of the sacroiliac joint.  The skin was anesthetized with 2 mL of 1% lidocaine with bicarbonate buffer.  Using fluoroscopic guidance, a 22 gauge, 3.5  Quincke spinal needle was advanced into the joint from an inferior approach.  After negative aspiration, 0.5 ml of Omnipaque contrast was injected showing intra-articular spread of contrast without evidence of intravascular spread.  2.0 mL of solution consisting of 40 mg of Depo-Medrol and 1.5 cc of 0.5% marcaine was injected slowly into the joint.   A second injection at the sacroiliac joint ligaments was then performed.  The middle third of the SI Joint was identified with fluoroscopy. After advancing a 22 gauge, 3.5  Quincke spinal needle to these ligaments with intermittent fluoroscopic guidance,  3 mL of solution consisting of  20 mg of DepoMedrol and 2.75 mL of 0.5 Marcaine was injected periligamentous and intramuscular over the sacroiliac joint ligaments.    This was performed bilaterally.  The patient was monitored with blood pressure and pulse oximetry machines with the assistance of an RN throughout the procedure.  The patient was alert and responsive to questions throughout the procedure.   The patient tolerated the procedure well and was  observed in the post-procedural area.  The patient was dismissed without apparent complications.     DIAGNOSIS:  1.  Bilateral sacroilitis  2.  Bilateral sacral enthesopathy  PLAN:  1. Performed Bilateral   Sacroiliac joint injections.  2. Bilateral SI ligament injections over the sacrum   3. The patient was instructed to fill out a pain form reflecting the local anesthetic phase of the injection and follow-up per Dr. Rubio's instructions.        Edgardo Rubio MD  Diplomate of the American Board of Anesthesiology, Pain Medicine

## 2020-06-12 NOTE — ANESTHESIA PREPROCEDURE EVALUATION
Anesthesia Pre-Procedure Evaluation    Patient: Mattie Banda   MRN: [id number] : 1980          Preoperative Diagnosis: SI (sacroiliac) joint dysfunction [M53.3]  Trochanteric bursitis, unspecified laterality [M70.60]    Procedure(s):  INJECT JOINT SACROILIAC bilateral    Past Medical History:   Diagnosis Date     Anxiety      Arthritis      Breast disorder Not sure     COPD (chronic obstructive pulmonary disease) (H)      Depressive disorder      Mixed anxiety depressive disorder 2016     Papanicolaou smear of cervix with low grade squamous intraepithelial lesion (LGSIL)      Thyroid disease      Urinary tract infection, site not specified     Recurrent UTI's     Wounds and injuries      Past Surgical History:   Procedure Laterality Date     ABDOMEN SURGERY      For back surgery     BACK SURGERY  2015      BIOPSY  Don t remember     COLONOSCOPY  07     COLONOSCOPY  08     CYSTOSCOPY N/A 2020    Procedure: Cystoscopy;  Surgeon: Anabel Barber MD;  Location: [facility] OR     DAVINCI HYSTERECTOMY TOTAL, SALPINGECTOMY BILATERAL Bilateral 2020    Procedure: Davinci Total Laparoscopic Hysterectomy, Bilateral Salpingectomy, Exam Under Anesthesia;  Surgeon: Anabel Barber MD;  Location: [facility] OR     HC COLONOSCOPY W BIOPSY  08     HC TOOTH EXTRACTION W/FORCEP      wisdom teeth removed     HYSTERECTOMY, PAP NO LONGER INDICATED       INJECT BLOCK MEDIAL BRANCH CERVICAL/THORACIC/LUMBAR N/A 2019    Procedure: BLOCK, NERVE, SPINAL, MEDIAL BRANCH lumbar 2, 3, 4;  Surgeon: Edgardo Rubio MD;  Location: [facility] OR     INJECT EPIDURAL CERVICAL N/A 3/8/2018    Procedure: INJECT EPIDURAL CERVICAL;  cervical 6-7 interlaminar epidural steroid injection;  Surgeon: Edgardo Rubio MD;  Location: [facility] OR     LAPAROSCOPIC TUBAL LIGATION  2012    Procedure: LAPAROSCOPIC TUBAL LIGATION;  Laparoscopic Bilateral tubal sterilization/ fulgaration;  Surgeon: Sarbjit Whittaker MD;   Location:  OR     ORTHOPEDIC SURGERY  6/2015       Anesthesia Evaluation     . Pt has had prior anesthetic.     No history of anesthetic complications          ROS/MED HX    ENT/Pulmonary:     (+)tobacco use, Current use 1ppd packs/day  Intermittent asthma (inhaler only when sx of bronchitis, none currently) mild COPD, , . .    Neurologic:     (+)neuropathy - idiopathic peripheral, migraines,     Cardiovascular:  - neg cardiovascular ROS   (+) ----. : . . . :. . Previous cardiac testing date:results:date: results:ECG reviewed date:2-2-10 results:NSR date: results:          METS/Exercise Tolerance:  3 - Able to walk 1-2 blocks without stopping   Hematologic:  - neg hematologic  ROS       Musculoskeletal:   (+) arthritis,  -       GI/Hepatic:  - neg GI/hepatic ROS       Renal/Genitourinary:  - ROS Renal section negative       Endo:     (+) thyroid problem hypothyroidism, .      Psychiatric:     (+) psychiatric history anxiety and depression (severe)      Infectious Disease:  - neg infectious disease ROS       Malignancy:      - no malignancy   Other:    (+) H/O Chronic Pain,H/O chronic opiod use ,                         Physical Exam  Normal systems: cardiovascular, pulmonary and dental    Airway   Mallampati: II  TM distance: >3 FB  Neck ROM: full    Dental     Cardiovascular   Rhythm and rate: regular and normal      Pulmonary    breath sounds clear to auscultation            Lab Results   Component Value Date    WBC 10.5 04/24/2020    HGB 12.8 04/24/2020    HCT 40.6 04/24/2020     04/24/2020    CRP <2.9 04/24/2020    SED 11 04/24/2020     04/24/2020    POTASSIUM 4.2 04/24/2020    CHLORIDE 109 04/24/2020    CO2 25 04/24/2020    BUN 13 04/24/2020    CR 0.91 04/24/2020    GLC 88 04/24/2020    WAQAS 8.2 (L) 04/24/2020    ALBUMIN 3.8 04/24/2020    PROTTOTAL 7.2 04/24/2020    ALT 11 04/24/2020    AST 12 04/24/2020    ALKPHOS 79 04/24/2020    BILITOTAL 0.3 04/24/2020    LIPASE 185 04/08/2019    AMYLASE 44  "09/20/2018    PTT 27 07/20/2008    INR 1.02 12/05/2019    TSH 0.12 (L) 11/04/2019    T4 1.30 11/04/2019    HCG Negative 02/07/2020       Preop Vitals  BP Readings from Last 3 Encounters:   06/05/20 94/60   05/19/20 (!) 89/55   03/02/20 122/56    Pulse Readings from Last 3 Encounters:   06/05/20 105   05/19/20 74   03/02/20 76      Resp Readings from Last 3 Encounters:   03/02/20 15   02/07/20 16   01/31/20 16    SpO2 Readings from Last 3 Encounters:   06/05/20 94%   03/02/20 98%   02/07/20 98%      Temp Readings from Last 1 Encounters:   06/05/20 97.6  F (36.4  C) (Temporal)    Ht Readings from Last 1 Encounters:   06/05/20 1.702 m (5' 7\")      Wt Readings from Last 1 Encounters:   06/05/20 71.2 kg (156 lb 14.4 oz)    Estimated body mass index is 24.57 kg/m  as calculated from the following:    Height as of 6/5/20: 1.702 m (5' 7\").    Weight as of 6/5/20: 71.2 kg (156 lb 14.4 oz).       Anesthesia Plan      History & Physical Review  History and physical reviewed and following examination; no interval change.    ASA Status:  2 .    NPO Status:  > 6 hours    Plan for MAC with Intravenous and Propofol induction. Maintenance will be TIVA.  Reason for MAC:  Deep or markedly invasive procedure (G8)  PONV prophylaxis:  Other (See comment)    The patient is a current Smoker, Patient was instructed to abstain from smoking on day of procedure and patient smoked on day of surgery     Postoperative Care      Consents  Anesthetic plan, risks, benefits and alternatives discussed with:  Patient.  Use of blood products discussed: No .   .                 SAUD Cooper CRNA  "

## 2020-06-12 NOTE — TELEPHONE ENCOUNTER
Triage call:  At 10:15 she is going in to have hip injections- she is wondering if she can put a leave in conditioner in her hair and then blow dry her hair and then come to her procedure- advised that as long as there were no strong fragrances from the product that she could do that. She was also wondering if she could put on light makeup as well- writer advised just bringing the make up with her and applying after her procedure. Patient verbalizes understanding and declines further questions.     Mari Kapoor RN BSN 6/12/2020 7:39 AM    Additional Information    Negative: Nursing judgment    Negative: Nursing judgment    Negative: Nursing judgment    Negative: Nursing judgment    Information only question and nurse able to answer    Protocols used: NO PROTOCOL AVAILABLE - INFORMATION ONLY-A-[place]

## 2020-06-12 NOTE — ANESTHESIA CARE TRANSFER NOTE
Patient: Mattie Banda    Procedure(s):  INJECT JOINT SACROILIAC bilateral    Diagnosis: SI (sacroiliac) joint dysfunction [M53.3]  Trochanteric bursitis, unspecified laterality [M70.60]  Diagnosis Additional Information: No value filed.    Anesthesia Type:   MAC     Note:  Airway :Nasal Cannula  Patient transferred to:Phase II  Handoff Report: Identifed the Patient, Identified the Reponsible Provider, Reviewed the pertinent medical history, Discussed the surgical course, Reviewed Intra-OP anesthesia mangement and issues during anesthesia, Set expectations for post-procedure period and Allowed opportunity for questions and acknowledgement of understanding      Vitals: (Last set prior to Anesthesia Care Transfer)    CRNA VITALS  6/12/2020 1111 - 6/12/2020 1146      6/12/2020             Resp Rate (observed):  23                Electronically Signed By: SAUD Yoder CRNA  June 12, 2020  11:46 AM

## 2020-06-12 NOTE — DISCHARGE INSTRUCTIONS
Home Care Instructions                Procedure: Epidural injection or joint injection    Activity:    Rest today    Do not work today    Resume normal activity tomorrow    Pain:    You may experience soreness at the injection site for 1 to 3 days.    You may use an ice pack for 20 minutes every 2 hours for the first 24 hours    You may use a heating pad after the first 24 hours    You may use Tylenol  (acetaminophen) every 4 hours or other pain medicines as directed by your physician    Safety  Sedation medicine, if given may remain active for many hours.    It is important for the next 24 hours that you do not:    Drive a car    Operate machines or power tools    Consume alcohol, including beer    Sign any important papers or legal documents    You may experience numbness radiating into your legs or arms, (depending on the procedure location)  This numbness may last several hours.  Until the numb sensation returns to normal please use caution in walking, climbing stairs, stepping out of your vehicle, etc.    Common side effects of steroids:  Not everyone will experience corticosteroid side effects. If side effects are experienced they will gradually subside in the 7-10 day period following an injection.    Most common side effects include:    Flushed face and/or chest    Feeling of warmth, particularly in face but could be overall feeling of warmth    Increased blood sugar in diabetic patients    Menstrual irregularities may occur.  If taking hormone based birth control an alternate method of birth control is recommended    Sleep disturbances and/or mood swings are possible    Leg cramps    Please contact us if you have:  Severe pain   Fever more than 101.5 degrees Fahrenheit  Signs of infection (redness, swelling or drainage)      If you have questions during normal business hours (8am-5pm Monday-Friday) contact the [facility] at [phone]. If you need help after hours, we recommend that you go to a  hospital emergency room or dial 911.

## 2020-06-12 NOTE — ANESTHESIA POSTPROCEDURE EVALUATION
Patient: Mattie Banda    Procedure(s):  INJECT JOINT SACROILIAC bilateral    Diagnosis:SI (sacroiliac) joint dysfunction [M53.3]  Trochanteric bursitis, unspecified laterality [M70.60]  Diagnosis Additional Information: No value filed.    Anesthesia Type:  MAC    Note:  Anesthesia Post Evaluation    Patient location during evaluation: Phase 2  Patient participation: Able to fully participate in evaluation  Level of consciousness: awake and alert  Pain management: adequate  Airway patency: patent  Cardiovascular status: acceptable and stable  Respiratory status: acceptable and room air  Hydration status: acceptable  PONV: none     Anesthetic complications: None    Comments:  Patient was happy with the anesthesia care received and no anesthesia related complications were noted.  I will follow up with the patient again if it is needed.        Last vitals:  Vitals:    06/12/20 1150 06/12/20 1200 06/12/20 1210   BP: (!) 81/49 (!) 89/46 91/53   Pulse: 61 61 65   Resp:   16   SpO2: 99% 100% 99%         Electronically Signed By: SAUD Yoder CRNA  June 12, 2020  12:19 PM

## 2020-06-15 ENCOUNTER — TELEPHONE (OUTPATIENT)
Dept: PSYCHOLOGY | Facility: CLINIC | Age: 40
End: 2020-06-15

## 2020-06-16 ENCOUNTER — VIRTUAL VISIT (OUTPATIENT)
Dept: BEHAVIORAL HEALTH | Facility: CLINIC | Age: 40
End: 2020-06-16
Payer: COMMERCIAL

## 2020-06-16 DIAGNOSIS — F41.1 GAD (GENERALIZED ANXIETY DISORDER): Primary | ICD-10-CM

## 2020-06-16 DIAGNOSIS — F32.2 CURRENT SEVERE EPISODE OF MAJOR DEPRESSIVE DISORDER WITHOUT PSYCHOTIC FEATURES WITHOUT PRIOR EPISODE (H): ICD-10-CM

## 2020-06-16 PROCEDURE — 90834 PSYTX W PT 45 MINUTES: CPT | Mod: TEL | Performed by: MARRIAGE & FAMILY THERAPIST

## 2020-06-16 NOTE — PROGRESS NOTES
"[facility]: Integrated Behavioral Health  June 16, 2020    aMttie Banda is a 39 year old female who is being evaluated via a telephone visit.      The patient has been notified of the following:     \"We have found that certain health care needs can be provided without the need for a face to face visit.  This service lets us provide the care you need with a short phone conversation.      I will have full access to your [facility] medical record during this entire phone call.   I will be taking notes for your medical record.     Since this is like an office visit, we will bill your insurance company for this service.  Please check with your medical insurance if this type of telephone visit/virtual care is covered.  You may be responsible for the cost of this service if insurance coverage is denied.      There are potential benefits and risks of telephone visits (e.g. limits to patient confidentiality) that differ from in-person visits.?  Confidentiality still applies for telephone services, and nobody will record the visit.  It is important to be in a quiet, private space that is free of distractions (including cell phone or other devices) during the visit.??     If during the course of the call I believe a telephone visit is not appropriate, you will not be charged for this service\"    Consent has been obtained for this service by care team member: yes.    Behavioral Health Clinician Progress Note    Patient Name: Mattie Banda           Service Type:  Individual     Session Start Time: 1:00  Session End Time: 1:48      Session Length: 38 - 52      Attendees: Patient    Visit Activities (Refresh list every visit): [facility] Only    Diagnostic Assessment Date: 1/21/2020  Treatment Plan Review Date: 5/6/2020  See Flowsheets for today's PHQ-9 and CRISTIANO-7 results  Previous PHQ-9:   PHQ-9 SCORE 1/7/2020 3/4/2020 5/7/2020   PHQ-9 Total Score - - -   PHQ-9 Total Score MyChart - - 22 (Severe " depression)   PHQ-9 Total Score 24 25 22     Previous CRISTIANO-7:   CRISTIANO-7 SCORE 1/7/2020 3/4/2020 5/7/2020   Total Score - - 20 (severe anxiety)   Total Score 20 20 20       EMILIA LEVEL:  EMILIA Score (Last Two) 11/14/2012   EMILIA Raw Score 44   Activation Score 70.8   EMILIA Level 4     Clinical Global Impressions  First:  Considering your total clinical experience with this particular patient population, how severe are the patient's symptoms at this time?: 5 (5/6/2020  2:23 PM)      Most recent:  Compared to the patient's condition at the START of treatment, this patient's condition is: 3 (5/6/2020  2:23 PM)          DATA  Extended Session (60+ minutes): No  Interactive Complexity: No  Crisis: No  [facility] Patient: No    Treatment Objective(s) Addressed in This Session:  Target Behavior(s): disease management/lifestyle changes depression and anxiety    Depressed Mood: Increase interest, engagement, and pleasure in doing things  Decrease frequency and intensity of feeling down, depressed, hopeless  Improve quantity and quality of night time sleep / decrease daytime naps  Feel less tired and more energy during the day   Improve diet, appetite, mindful eating, and / or meal planning  Identify negative self-talk and behaviors: challenge core beliefs, myths, and actions  Improve concentration, focus, and mindfulness in daily activities   Decrease thoughts that you'd be better off dead or of suicide / self-harm  Anxiety: will experience a reduction in anxiety, will develop more effective coping skills to manage anxiety symptoms, will develop healthy cognitive patterns and beliefs and will increase ability to function adaptively    Current Stressors / Issues:  Patient reports continued frustration with her mom. She states that her mom is supposed to be caring for her uncle, however this is not taking place. Patient states that her mom was dishonest with her and she doesn't feel she is able to trust her mom and is having to now help out more  than she feels she is able to do.  Patient expressed curiosity about whether she puts others first as a way to avoid facing her own challenges. Reflected patient insight.     Processed recent events. Reviewed where patient has control versus what is outside of her control. Discussed boundary setting. Encouraged patient to consider what she is able to do with regards to helping out her uncle and talk it through with her spouse or someone she trusts and then communicate it appropriately.      Progress on Treatment Objective(s) / Homework:  Minimal progress - ACTION (Actively working towards change); Intervened by reinforcing change plan / affirming steps taken    Motivational Interviewing    MI Intervention: Expressed Empathy/Understanding, Supported Autonomy, Collaboration, Evocation, Permission to raise concern or advise, Open-ended questions, Reflections: simple and complex, Change talk (evoked) and Reframe     Change Talk Expressed by the Patient: Desire to change Ability to change Reasons to change Need to change Committment to change Activation Taking steps    Provider Response to Change Talk: E - Evoked more info from patient about behavior change, A - Affirmed patient's thoughts, decisions, or attempts at behavior change, R - Reflected patient's change talk and S - Summarized patient's change talk statements    Also provided psychoeducation about behavioral health condition, symptoms, and treatment options      Skills training    Explored skills useful to client in current situation    Skills include assertiveness, communication, conflict management, problem-solving, relaxation, etc.    Solution-Focused Therapy    Explored patterns in patient's relationships and discussed options for new behaviors    Explored patterns in patient's actions and choices and discussed options for new behaviors    Cognitive-behavioral Therapy    Discussed common cognitive distortions, identified them in patient's life    Explored  ways to challenge, replace, and act against these cognitions    Acceptance and Commitment Therapy    Explored and identified important values in patient's life    Discussed ways to commit to behavioral activation around these values    Psychodynamic psychotherapy    Discussed patient's emotional dynamics and issues and how they impact behaviors    Explored patient's history of relationships and how they impact present behaviors    Explored how to work with and make changes in these schemas and patterns    Behavioral Activation    Discussed steps patient can take to become more involved in meaningful activity    Identified barriers to these activities and explored possible solutions    Mindfulness-Based Strategies    Discussed skills based on development and application of mindfulness    Skills drawn from dialectical behavior therapy, mindfulness-based stress reduction, mindfulness-based cognitive therapy, etc.      Care Plan review completed: Yes    Medication Review:  No changes to current psychiatric medication(s)     Medication Compliance:  Yes    Changes in Health Issues:   Yes: hair loss, Associated Psychological Distress, encouraged patient to monitor and call the clinic if it continues or worsens.    Chemical Use Review:   Substance Use: Chemical use reviewed, no active concerns identified       Tobacco Use: No change in amount of tobacco use since last session.  Patient declined discussion at this time    Assessment: Current Emotional / Mental Status (status of significant symptoms):  Risk status (Self / Other harm or suicidal ideation)  Patient has had a history of suicidal ideation: in adolescense and suicide attempts: patient states that she tried multiple times around ages [age] by slitting wrists or overdose, and one overdose of alcohol; she denies ever being hospitalized for mental health and denies a history of self-injurious behavior, homicidal ideation, homicidal behavior and and other safety  concerns  Patient denies current fears or concerns for personal safety.  Patient denies current or recent suicidal ideation or behaviors.   Patient denies current or recent homicidal ideation or behaviors.  Patient denies current or recent self injurious behavior or ideation.  Patient denies other safety concerns.  A safety and risk management plan has been developed including: Patient consented to co-developed safety plan.  A safety and risk management plan was completed.  Patient agreed to use safety plan should any safety concerns arise.  A copy was given to the patient.      Appearance:   unable to assess due to phone visit   Eye Contact:   unable to assess due to phone visit   Psychomotor Behavior: unable to assess due to phone visit   Attitude:   Cooperative   Orientation:   All  Speech   Rate / Production: Monotone    Volume:  Normal   Mood:    Irritable   Affect:    unable to assess due to phone visit   Thought Content:  Perservative  Rumination   Thought Form:  Circumstantial  Insight:    Good     Diagnoses:  1. CRISTIANO (generalized anxiety disorder)    2. Current severe episode of major depressive disorder without psychotic features without prior episode (H)        Collateral Reports Completed:  Not Applicable    Plan: (Homework, other):  Patient was given information about behavioral services and encouraged to schedule a follow up appointment with the clinic [facility] in 1 week.  She was also given information about mental health symptoms and treatment options , Cognitive Behavioral Therapy skills to practice when experiencing anxiety and depression and deep Breathing Strategies to practice when experiencing anxiety and depression.  CD Recommendations: No indications of CD issues.  Patient will continue to work on connecting with others. She will spend time reflecting, daily, on what she accomplished. She will continue to work on practicing square breathing on a daily basis. Patient will continue with daily walks.  Patient will consider boundaries that she wants to set relating to helping out with her Uncle.     [facility] Watson, [facility]       ______________________________________________________________________    Integrated Primary Care Behavioral Health Treatment Plan    Patient's Name: Mattie Banda  YOB: 1980    Date: May 6, 2020    DSM-V Diagnoses: 296.33 (F33.2) Major Depressive Disorder, Recurrent Episode, Severe _ or 300.02 (F41.1) Generalized Anxiety Disorder  Psychosocial / Contextual Factors: medical issues,  and dating ex-, few friends  WHODAS: 36    Referral / Collaboration:  Referral to another professional/service is not indicated at this time..    Anticipated number of session or this episode of care: 8      MeasurableTreatment Goal(s) related to diagnosis / functional impairment(s)  Goal 1: Patient will demonstrate ability to improve depression and anxiety symptoms as evidenced by improved PHQ9 & GAD7 scores.       Objective #A (Patient Action)    Patient will Decrease frequency and intensity of feeling down, depressed, hopeless  Identify negative self-talk and behaviors: challenge core beliefs, myths, and actions  Decrease thoughts that you'd be better off dead or of suicide / self-harm  Status: New - Date: 1/28/2020 ; Improved and continued: 5/6/2020     Intervention(s)  [facility] will teach distress tolerance skills. Provide psycho-education on cognitive distortions and automatic thoughts and behaviors and ways to appropriately challenge and restructure thoughts.    Objective #B  Patient will increase coping strategies by 2  to more effectively manage current stressors  increase understanding of steps in the grief process  Status: New - Date: 1/28/2020; Improved and Continued: 5/6/2020    Intervention(s)  [facility] will teach mindfulness and relaxation strategies. Provide psycho-education on grief process.    Patient has reviewed and agreed to the above plan.    Written by  Steph  KVNG Bridges, [facility], [place]

## 2020-06-17 DIAGNOSIS — M54.42 CHRONIC BILATERAL LOW BACK PAIN WITH BILATERAL SCIATICA: ICD-10-CM

## 2020-06-17 DIAGNOSIS — M54.41 CHRONIC BILATERAL LOW BACK PAIN WITH BILATERAL SCIATICA: ICD-10-CM

## 2020-06-17 DIAGNOSIS — G43.109 MIGRAINE WITH AURA AND WITHOUT STATUS MIGRAINOSUS, NOT INTRACTABLE: ICD-10-CM

## 2020-06-17 DIAGNOSIS — K52.832 LYMPHOCYTIC COLITIS: ICD-10-CM

## 2020-06-17 DIAGNOSIS — G89.29 CHRONIC BILATERAL LOW BACK PAIN WITH BILATERAL SCIATICA: ICD-10-CM

## 2020-06-17 RX ORDER — SULFASALAZINE 500 MG/1
500 TABLET, DELAYED RELEASE ORAL 4 TIMES DAILY
Qty: 120 TABLET | Refills: 1 | Status: SHIPPED | OUTPATIENT
Start: 2020-06-17 | End: 2020-10-19 | Stop reason: ALTCHOICE

## 2020-06-17 RX ORDER — TOPIRAMATE 25 MG/1
TABLET, FILM COATED ORAL
Qty: 90 TABLET | Refills: 1 | Status: SHIPPED | OUTPATIENT
Start: 2020-06-17 | End: 2020-08-17

## 2020-06-17 NOTE — TELEPHONE ENCOUNTER
Patient has been seen in the past 30 days, 6/5/20 with Dr. Duke  Routing  RX to PCP to advise.  SHMUEL Higginbotham

## 2020-06-18 ENCOUNTER — VIRTUAL VISIT (OUTPATIENT)
Dept: PSYCHIATRY | Facility: OTHER | Age: 40
End: 2020-06-18
Payer: COMMERCIAL

## 2020-06-18 ENCOUNTER — VIRTUAL VISIT (OUTPATIENT)
Dept: PSYCHOLOGY | Facility: CLINIC | Age: 40
End: 2020-06-18
Payer: COMMERCIAL

## 2020-06-18 DIAGNOSIS — F41.1 GAD (GENERALIZED ANXIETY DISORDER): Primary | ICD-10-CM

## 2020-06-18 DIAGNOSIS — F13.20 BENZODIAZEPINE DEPENDENCE (H): ICD-10-CM

## 2020-06-18 DIAGNOSIS — F43.10 PTSD (POST-TRAUMATIC STRESS DISORDER): ICD-10-CM

## 2020-06-18 DIAGNOSIS — F11.90 CHRONIC, CONTINUOUS USE OF OPIOIDS: ICD-10-CM

## 2020-06-18 DIAGNOSIS — F33.2 SEVERE EPISODE OF RECURRENT MAJOR DEPRESSIVE DISORDER, WITHOUT PSYCHOTIC FEATURES (H): ICD-10-CM

## 2020-06-18 PROCEDURE — 99214 OFFICE O/P EST MOD 30 MIN: CPT | Mod: 95 | Performed by: PSYCHIATRY & NEUROLOGY

## 2020-06-18 PROCEDURE — 90832 PSYTX W PT 30 MINUTES: CPT | Mod: TEL | Performed by: PSYCHOLOGIST

## 2020-06-18 RX ORDER — BUSPIRONE HYDROCHLORIDE 10 MG/1
20 TABLET ORAL 2 TIMES DAILY
Qty: 120 TABLET | Refills: 1 | Status: SHIPPED | OUTPATIENT
Start: 2020-06-18 | End: 2020-08-31

## 2020-06-18 RX ORDER — ARIPIPRAZOLE 2 MG/1
2 TABLET ORAL DAILY
Qty: 30 TABLET | Refills: 1 | Status: SHIPPED | OUTPATIENT
Start: 2020-06-18 | End: 2020-09-09
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
## 2020-06-18 NOTE — Clinical Note
Just YESY that I discussed proper/safe disposal of medication as she reports having excess Xanax stored in her dresser at home. This is a safety concern due to her chronic, passive SI. Just want to make sure everyone is on same page/aware. I gave her resources for disposal near her in [place].     Let me know if there are any questions or concerns.     -Tiffany
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
## 2020-06-19 ENCOUNTER — PATIENT OUTREACH (OUTPATIENT)
Dept: CARE COORDINATION | Facility: CLINIC | Age: 40
End: 2020-06-19

## 2020-06-19 NOTE — PROGRESS NOTES
Clinic Care Coordination Contact    AUTUMN CC will try sending pt message via [employer] to let her know been trying to reach her.       MARK Salas   Primary Care Clinic- Social Work Care Coordinator  [facility]  6/19/2020 4:36 PM  [phone]

## 2020-06-23 ENCOUNTER — VIRTUAL VISIT (OUTPATIENT)
Dept: BEHAVIORAL HEALTH | Facility: CLINIC | Age: 40
End: 2020-06-23
Payer: COMMERCIAL

## 2020-06-23 DIAGNOSIS — F32.2 CURRENT SEVERE EPISODE OF MAJOR DEPRESSIVE DISORDER WITHOUT PSYCHOTIC FEATURES WITHOUT PRIOR EPISODE (H): Primary | ICD-10-CM

## 2020-06-23 DIAGNOSIS — F41.1 GAD (GENERALIZED ANXIETY DISORDER): ICD-10-CM

## 2020-06-23 PROCEDURE — 90832 PSYTX W PT 30 MINUTES: CPT | Mod: 95 | Performed by: MARRIAGE & FAMILY THERAPIST

## 2020-06-23 NOTE — PATIENT INSTRUCTIONS
Treatment Plan:    Continue alprazolam per primary care provider.  It will continue to be the responsibility of your primary care provider to prescribe/refill alprazolam.    Continue Abilify 2 mg daily to augment Lexapro    Increase BuSpar to 20 mg twice daily for anxiety.  Take 15 mg twice daily for about 1 week, and then increase to 20 mg twice daily.    Continue Lexapro 20 mg daily for mood and anxiety    Continue trazodone 100 mg at bedtime as needed for sleep    Continue Topamax as prescribed by her primary care provider    Consider sleep study after Covid-19 restrictions to rule out central sleep apnea due to narcotic pain med use and concurrent benzodiazepine use.     Make sure to properly/safely dispose of any extra Xanax that you have at home.  It can be very dangerous to keep excess medications at home, particularly medications that can be very lethal if taken in excess. See below for more information.     Continue all other medications as reviewed per electronic medical record today.     Safety plan reviewed. To the Emergency Department as needed or call after hours crisis line at [phone] or [phone]. [place] Crisis Text Line. Text [place] to 534860 or Suicide LifeLine Chat: suicidepreventionlifeline.org/chat    Continue therapy as planned.     Schedule an appointment with me in 6 weeks or sooner as needed. Call [facility] at [phone] to schedule.    Follow up with primary care provider as planned or for acute medical concerns.    Call the psychiatric nurse line with medication questions or concerns at [phone].    [employer] may be used to communicate with your provider, but this is not intended to be used for emergencies.    The following website is specific to the area you live for safe medication disposal.  Visit the website to find the nearest drop-off site to where you live:  [email or web address]

## 2020-06-23 NOTE — PROGRESS NOTES
"[facility] Primary Care: Integrated Behavioral Health  June 23, 2020    Mattie Banda is a 39 year old female who is being evaluated via a telephone visit.      The patient has been notified of the following:     \"We have found that certain health care needs can be provided without the need for a face to face visit.  This service lets us provide the care you need with a short phone conversation.      I will have full access to your [place] medical record during this entire phone call.   I will be taking notes for your medical record.     Since this is like an office visit, we will bill your insurance company for this service.  Please check with your medical insurance if this type of telephone visit/virtual care is covered.  You may be responsible for the cost of this service if insurance coverage is denied.      There are potential benefits and risks of telephone visits (e.g. limits to patient confidentiality) that differ from in-person visits.?  Confidentiality still applies for telephone services, and nobody will record the visit.  It is important to be in a quiet, private space that is free of distractions (including cell phone or other devices) during the visit.??     If during the course of the call I believe a telephone visit is not appropriate, you will not be charged for this service\"    Consent has been obtained for this service by care team member: yes.    Behavioral Health Clinician Progress Note    Patient Name: Mattie Banda           Service Type:  Individual     Session Start Time: 1:00  Session End Time: 1:30      Session Length: 16 - 37      Attendees: Patient    Visit Activities (Refresh list every visit): [facility] Only    Diagnostic Assessment Date: 1/21/2020  Treatment Plan Review Date: 5/6/2020  See Flowsheets for today's PHQ-9 and CRISTIANO-7 results  Previous PHQ-9:   PHQ-9 SCORE 1/7/2020 3/4/2020 5/7/2020   PHQ-9 Total Score - - -   PHQ-9 Total Score MyChart - - 22 (Severe " depression)   PHQ-9 Total Score 24 25 22     Previous CRISTIANO-7:   CRISTIANO-7 SCORE 1/7/2020 3/4/2020 5/7/2020   Total Score - - 20 (severe anxiety)   Total Score 20 20 20       EMILIA LEVEL:  EMILIA Score (Last Two) 11/14/2012   EMILIA Raw Score 44   Activation Score 70.8   EMILIA Level 4     Clinical Global Impressions  First:  Considering your total clinical experience with this particular patient population, how severe are the patient's symptoms at this time?: 5 (5/6/2020  2:23 PM)      Most recent:  Compared to the patient's condition at the START of treatment, this patient's condition is: 3 (5/6/2020  2:23 PM)          DATA  Extended Session (60+ minutes): No  Interactive Complexity: No  Crisis: No  [facility] Patient: No    Treatment Objective(s) Addressed in This Session:  Target Behavior(s): disease management/lifestyle changes depression and anxiety    Depressed Mood: Increase interest, engagement, and pleasure in doing things  Decrease frequency and intensity of feeling down, depressed, hopeless  Improve quantity and quality of night time sleep / decrease daytime naps  Feel less tired and more energy during the day   Improve diet, appetite, mindful eating, and / or meal planning  Identify negative self-talk and behaviors: challenge core beliefs, myths, and actions  Improve concentration, focus, and mindfulness in daily activities   Decrease thoughts that you'd be better off dead or of suicide / self-harm  Anxiety: will experience a reduction in anxiety, will develop more effective coping skills to manage anxiety symptoms, will develop healthy cognitive patterns and beliefs and will increase ability to function adaptively    Current Stressors / Issues:  Patient reports that she is doing okay. She reports that she had two days in the past week where she has felt a little more down. She continues to work on getting dressed, daily and doing things throughout the day. She states that today she mowed the lawn and is in the middle of doing  "laundry.  She recently met with psychiatry. She states that they addressed how she felt after her first visit and feels this visit went better.  Patient states that she is starting to feel more happiness than she had felt before.   Patient reports that she continues to work on setting boundaries with her mom and uncle. She provided an example of letting them know she couldn't bring her uncle to an appointment and this resulted in him bringing himself.     Patient states that she has been feeling more energetic for the past two days. She states that she is worried that she will \"crash\" and go into a depression episode.     Patient reports that she is looking forward to camping and watching the fireworks over [facility].    Validated patient's feelings. Discussed her mood change and explored what she is doing differently. Reviewed self care and what she has control over. Reinforced patient looking forward to her upcoming camping trip.    Progress on Treatment Objective(s) / Homework:  Minimal progress - ACTION (Actively working towards change); Intervened by reinforcing change plan / affirming steps taken    Motivational Interviewing    MI Intervention: Expressed Empathy/Understanding, Supported Autonomy, Collaboration, Evocation, Permission to raise concern or advise, Open-ended questions, Reflections: simple and complex, Change talk (evoked) and Reframe     Change Talk Expressed by the Patient: Desire to change Ability to change Reasons to change Need to change Committment to change Activation Taking steps    Provider Response to Change Talk: E - Evoked more info from patient about behavior change, A - Affirmed patient's thoughts, decisions, or attempts at behavior change, R - Reflected patient's change talk and S - Summarized patient's change talk statements    Also provided psychoeducation about behavioral health condition, symptoms, and treatment options      Skills training    Explored skills useful to client " in current situation    Skills include assertiveness, communication, conflict management, problem-solving, relaxation, etc.    Solution-Focused Therapy    Explored patterns in patient's relationships and discussed options for new behaviors    Explored patterns in patient's actions and choices and discussed options for new behaviors    Cognitive-behavioral Therapy    Discussed common cognitive distortions, identified them in patient's life    Explored ways to challenge, replace, and act against these cognitions    Acceptance and Commitment Therapy    Explored and identified important values in patient's life    Discussed ways to commit to behavioral activation around these values    Psychodynamic psychotherapy    Discussed patient's emotional dynamics and issues and how they impact behaviors    Explored patient's history of relationships and how they impact present behaviors    Explored how to work with and make changes in these schemas and patterns    Behavioral Activation    Discussed steps patient can take to become more involved in meaningful activity    Identified barriers to these activities and explored possible solutions    Mindfulness-Based Strategies    Discussed skills based on development and application of mindfulness    Skills drawn from dialectical behavior therapy, mindfulness-based stress reduction, mindfulness-based cognitive therapy, etc.      Care Plan review completed: Yes    Medication Review:  Changes to psychiatric medications, see updated Medication List in EPIC. Patient met with psychiatry on 6/18 and her Buspar was increased.     Medication Compliance:  Yes    Changes in Health Issues:   None reported    Chemical Use Review:   Substance Use: Chemical use reviewed, no active concerns identified       Tobacco Use: No change in amount of tobacco use since last session.  Patient declined discussion at this time    Assessment: Current Emotional / Mental Status (status of significant  symptoms):  Risk status (Self / Other harm or suicidal ideation)  Patient has had a history of suicidal ideation: in adolescense and suicide attempts: patient states that she tried multiple times around ages [age] by slitting wrists or overdose, and one overdose of alcohol; she denies ever being hospitalized for mental health and denies a history of self-injurious behavior, homicidal ideation, homicidal behavior and and other safety concerns  Patient denies current fears or concerns for personal safety.  Patient denies current or recent suicidal ideation or behaviors.   Patient denies current or recent homicidal ideation or behaviors.  Patient denies current or recent self injurious behavior or ideation.  Patient denies other safety concerns.  A safety and risk management plan has been developed including: Patient consented to co-developed safety plan.  A safety and risk management plan was completed.  Patient agreed to use safety plan should any safety concerns arise.  A copy was given to the patient.      Appearance:   unable to assess due to phone visit   Eye Contact:   unable to assess due to phone visit   Psychomotor Behavior: unable to assess due to phone visit   Attitude:   Cooperative   Orientation:   All  Speech   Rate / Production: Normal    Volume:  Normal   Mood:    Normal  Affect:    unable to assess due to phone visit   Thought Content:  Rumination   Thought Form:  Circumstantial  Insight:    Good     Diagnoses:  1. Current severe episode of major depressive disorder without psychotic features without prior episode (H)    2. CRISTIANO (generalized anxiety disorder)        Collateral Reports Completed:  Not Applicable    Plan: (Homework, other):  Patient was given information about behavioral services and encouraged to schedule a follow up appointment with the clinic [facility] in 1 week.  She was also given information about mental health symptoms and treatment options , Cognitive Behavioral Therapy skills to  practice when experiencing anxiety and depression and deep Breathing Strategies to practice when experiencing anxiety and depression.  CD Recommendations: No indications of CD issues.  Patient will continue to work on boundary setting in her personal relationship. Patient will continue to work on self care.     KVNG Watson, [facility]       ______________________________________________________________________    Integrated Primary Care Behavioral Health Treatment Plan    Patient's Name: Mattie Banda  YOB: 1980    Date: May 6, 2020    DSM-V Diagnoses: 296.33 (F33.2) Major Depressive Disorder, Recurrent Episode, Severe _ or 300.02 (F41.1) Generalized Anxiety Disorder  Psychosocial / Contextual Factors: medical issues,  and dating ex-, few friends  WHODAS: 36    Referral / Collaboration:  Referral to another professional/service is not indicated at this time..    Anticipated number of session or this episode of care: 8      MeasurableTreatment Goal(s) related to diagnosis / functional impairment(s)  Goal 1: Patient will demonstrate ability to improve depression and anxiety symptoms as evidenced by improved PHQ9 & GAD7 scores.       Objective #A (Patient Action)    Patient will Decrease frequency and intensity of feeling down, depressed, hopeless  Identify negative self-talk and behaviors: challenge core beliefs, myths, and actions  Decrease thoughts that you'd be better off dead or of suicide / self-harm  Status: New - Date: 1/28/2020 ; Improved and continued: 5/6/2020     Intervention(s)  [facility] will teach distress tolerance skills. Provide psycho-education on cognitive distortions and automatic thoughts and behaviors and ways to appropriately challenge and restructure thoughts.    Objective #B  Patient will increase coping strategies by 2  to more effectively manage current stressors  increase understanding of steps in the grief process  Status: New - Date: 1/28/2020; Improved and  Continued: 5/6/2020    Intervention(s)  [facility] will teach mindfulness and relaxation strategies. Provide psycho-education on grief process.    Patient has reviewed and agreed to the above plan.    Written by  KVNG Watson, [facility]

## 2020-06-24 ENCOUNTER — MYC REFILL (OUTPATIENT)
Dept: FAMILY MEDICINE | Facility: CLINIC | Age: 40
End: 2020-06-24

## 2020-06-24 DIAGNOSIS — M50.30 DDD (DEGENERATIVE DISC DISEASE), CERVICAL: ICD-10-CM

## 2020-06-24 DIAGNOSIS — M51.369 DEGENERATION OF LUMBAR INTERVERTEBRAL DISC: ICD-10-CM

## 2020-06-24 DIAGNOSIS — Z90.710 HISTORY OF ROBOT-ASSISTED LAPAROSCOPIC HYSTERECTOMY: ICD-10-CM

## 2020-06-24 RX ORDER — OXYCODONE HYDROCHLORIDE 5 MG/1
5-10 TABLET ORAL EVERY 6 HOURS PRN
Qty: 240 TABLET | Refills: 0 | Status: SHIPPED | OUTPATIENT
Start: 2020-06-24 | End: 2020-07-21

## 2020-06-24 RX ORDER — MORPHINE SULFATE 15 MG/1
15 TABLET, FILM COATED, EXTENDED RELEASE ORAL EVERY 12 HOURS
Qty: 60 TABLET | Refills: 0 | Status: SHIPPED | OUTPATIENT
Start: 2020-06-24 | End: 2020-07-21

## 2020-06-24 NOTE — TELEPHONE ENCOUNTER
morphine (MS CONTIN) 15 MG CR tablet         Last Written Prescription Date:  5/30/20  Last Fill Quantity: 60,   # refills: 0  Last Office Visit: 6/5/20  Future Office visit:       Routing refill request to provider for review/approval because:  Drug not on the [facility], [facility] or [facility] refill protocol or controlled substance       oxyCODONE (ROXICODONE) 5 MG tablet          Last Written Prescription Date:  5/30/20  Last Fill Quantity: 240,   # refills: 0  Last Office Visit: 6/5/20  Future Office visit:       Routing refill request to provider for review/approval because:  Drug not on the [facility], [facility] or [facility] refill protocol or controlled substance

## 2020-06-26 NOTE — PROGRESS NOTES
Clinic Care Coordination Contact  [facility]/Voicemail    Did talk with pt via [employer]. Pt said to try calling today anytime after 10am.      Clinical Data: Care Coordinator Outreach  Outreach attempted x 1.  Left message on patient's voicemail with call back information and requested return call.  Plan: Care Coordinator sent care coordination introduction letter on 5/13/20 via [employer]. Care Coordinator will try to reach patient again in 5-7 business days.      MARK Salas   Primary Care Clinic- Social Work Care Coordinator  [facility]  6/26/2020 2:51 PM  [phone]

## 2020-07-04 ENCOUNTER — MYC REFILL (OUTPATIENT)
Dept: FAMILY MEDICINE | Facility: CLINIC | Age: 40
End: 2020-07-04

## 2020-07-04 DIAGNOSIS — G47.9 SLEEP DISORDER: ICD-10-CM

## 2020-07-04 DIAGNOSIS — F41.8 MIXED ANXIETY DEPRESSIVE DISORDER: ICD-10-CM

## 2020-07-04 DIAGNOSIS — M51.369 DEGENERATION OF LUMBAR INTERVERTEBRAL DISC: ICD-10-CM

## 2020-07-04 RX ORDER — METHOCARBAMOL 750 MG/1
750 TABLET, FILM COATED ORAL 3 TIMES DAILY PRN
Qty: 90 TABLET | Refills: 1 | Status: CANCELLED | OUTPATIENT
Start: 2020-07-04

## 2020-07-06 RX ORDER — ALPRAZOLAM 2 MG
TABLET ORAL
Qty: 90 TABLET | Refills: 0 | Status: SHIPPED | OUTPATIENT
Start: 2020-07-06 | End: 2020-07-29

## 2020-07-06 NOTE — TELEPHONE ENCOUNTER
Xanax      Last Written Prescription Date:  6-  Last Fill Quantity: 90,   # refills: 0  Last Office Visit: 6-5-2020 with Dr. Duke for a pre-op  Future Office visit:    Next 5 appointments (look out 90 days)    Jul 30, 2020  2:45 PM CDT  Telephone Visit with Marco Walls PsyD  [facility] [address] [place] [id number] [phone]           Routing refill request to provider for review/approval because:  Drug not on the [facility], [facility] or [facility] refill protocol or controlled substance

## 2020-07-07 ENCOUNTER — VIRTUAL VISIT (OUTPATIENT)
Dept: BEHAVIORAL HEALTH | Facility: CLINIC | Age: 40
End: 2020-07-07
Payer: COMMERCIAL

## 2020-07-07 DIAGNOSIS — F32.2 CURRENT SEVERE EPISODE OF MAJOR DEPRESSIVE DISORDER WITHOUT PSYCHOTIC FEATURES WITHOUT PRIOR EPISODE (H): ICD-10-CM

## 2020-07-07 DIAGNOSIS — F41.1 GAD (GENERALIZED ANXIETY DISORDER): Primary | ICD-10-CM

## 2020-07-07 PROCEDURE — 90832 PSYTX W PT 30 MINUTES: CPT | Mod: 95 | Performed by: MARRIAGE & FAMILY THERAPIST

## 2020-07-07 NOTE — PROGRESS NOTES
"[facility] Primary Care: Integrated Behavioral Health  July 7, 2020    Mattie Banda is a 39 year old female who is being evaluated via a telephone visit.      The patient has been notified of the following:     \"We have found that certain health care needs can be provided without the need for a face to face visit.  This service lets us provide the care you need with a short phone conversation.      I will have full access to your [place] medical record during this entire phone call.   I will be taking notes for your medical record.     Since this is like an office visit, we will bill your insurance company for this service.  Please check with your medical insurance if this type of telephone visit/virtual care is covered.  You may be responsible for the cost of this service if insurance coverage is denied.      There are potential benefits and risks of telephone visits (e.g. limits to patient confidentiality) that differ from in-person visits.?  Confidentiality still applies for telephone services, and nobody will record the visit.  It is important to be in a quiet, private space that is free of distractions (including cell phone or other devices) during the visit.??     If during the course of the call I believe a telephone visit is not appropriate, you will not be charged for this service\"    Consent has been obtained for this service by care team member: yes.    Behavioral Health Clinician Progress Note    Patient Name: Mattie Banda           Service Type:  Individual     Session Start Time: 1:07  Session End Time: 1:38      Session Length: 16 - 37      Attendees: Patient    Visit Activities (Refresh list every visit): [facility] Only    Diagnostic Assessment Date: 1/21/2020  Treatment Plan Review Date: 5/6/2020  See Flowsheets for today's PHQ-9 and CRISTIANO-7 results  Previous PHQ-9:   PHQ-9 SCORE 1/7/2020 3/4/2020 5/7/2020   PHQ-9 Total Score - - -   PHQ-9 Total Score MyChart - - 22 (Severe " depression)   PHQ-9 Total Score 24 25 22     Previous CRISTIANO-7:   CRISTIANO-7 SCORE 1/7/2020 3/4/2020 5/7/2020   Total Score - - 20 (severe anxiety)   Total Score 20 20 20       EMILIA LEVEL:  EMILIA Score (Last Two) 11/14/2012   EMILIA Raw Score 44   Activation Score 70.8   EMILIA Level 4     Clinical Global Impressions  First:  Considering your total clinical experience with this particular patient population, how severe are the patient's symptoms at this time?: 5 (5/6/2020  2:23 PM)      Most recent:  Compared to the patient's condition at the START of treatment, this patient's condition is: 3 (5/6/2020  2:23 PM)          DATA  Extended Session (60+ minutes): No  Interactive Complexity: No  Crisis: No  [facility] Patient: No    Treatment Objective(s) Addressed in This Session:  Target Behavior(s): disease management/lifestyle changes depression and anxiety    Depressed Mood: Increase interest, engagement, and pleasure in doing things  Decrease frequency and intensity of feeling down, depressed, hopeless  Improve quantity and quality of night time sleep / decrease daytime naps  Feel less tired and more energy during the day   Improve diet, appetite, mindful eating, and / or meal planning  Identify negative self-talk and behaviors: challenge core beliefs, myths, and actions  Improve concentration, focus, and mindfulness in daily activities   Decrease thoughts that you'd be better off dead or of suicide / self-harm  Anxiety: will experience a reduction in anxiety, will develop more effective coping skills to manage anxiety symptoms, will develop healthy cognitive patterns and beliefs and will increase ability to function adaptively    Current Stressors / Issues:  Patient reports that she is doing pretty good. They had taken a long weekend, up in [place], [place] for a camping trip. There were some things that had come up over the weekend with extended family.   Patient reports having a disagreement with her spouse prior to their trip. She had  heard her spouse say that he didn't believe patient would make any changes to her health. She states that he had been drinking when this discussion started. She reflected that she had informed him that she is making changes for herself whether he is there or not. She identified that she feels proud for saying what she did.   Patient reports that she is barely in the garage, where she realizes that she had spent the majority of her time in the garage before.    Patient stated that she was uncertain whether or not to bring up this disagreement when her spouse is sober. Weighed pros and cons of bringing this up and not bringing it up. Role played dialogue when wanting to bring up the conversation.      Progress on Treatment Objective(s) / Homework:  Minimal progress - ACTION (Actively working towards change); Intervened by reinforcing change plan / affirming steps taken    Motivational Interviewing    MI Intervention: Expressed Empathy/Understanding, Supported Autonomy, Collaboration, Evocation, Permission to raise concern or advise, Open-ended questions, Reflections: simple and complex, Change talk (evoked) and Reframe     Change Talk Expressed by the Patient: Desire to change Ability to change Reasons to change Need to change Committment to change Activation Taking steps    Provider Response to Change Talk: E - Evoked more info from patient about behavior change, A - Affirmed patient's thoughts, decisions, or attempts at behavior change, R - Reflected patient's change talk and S - Summarized patient's change talk statements    Also provided psychoeducation about behavioral health condition, symptoms, and treatment options      Skills training    Explored skills useful to client in current situation    Skills include assertiveness, communication, conflict management, problem-solving, relaxation, etc.    Solution-Focused Therapy    Explored patterns in patient's relationships and discussed options for new  behaviors    Explored patterns in patient's actions and choices and discussed options for new behaviors    Cognitive-behavioral Therapy    Discussed common cognitive distortions, identified them in patient's life    Explored ways to challenge, replace, and act against these cognitions    Acceptance and Commitment Therapy    Explored and identified important values in patient's life    Discussed ways to commit to behavioral activation around these values    Psychodynamic psychotherapy    Discussed patient's emotional dynamics and issues and how they impact behaviors    Explored patient's history of relationships and how they impact present behaviors    Explored how to work with and make changes in these schemas and patterns    Behavioral Activation    Discussed steps patient can take to become more involved in meaningful activity    Identified barriers to these activities and explored possible solutions    Mindfulness-Based Strategies    Discussed skills based on development and application of mindfulness    Skills drawn from dialectical behavior therapy, mindfulness-based stress reduction, mindfulness-based cognitive therapy, etc.      Care Plan review completed: Yes    Medication Review:  No changes to current psychiatric medication(s)    Medication Compliance:  Yes    Changes in Health Issues:   None reported    Chemical Use Review:   Substance Use: Chemical use reviewed, no active concerns identified       Tobacco Use: No change in amount of tobacco use since last session.  Patient declined discussion at this time    Assessment: Current Emotional / Mental Status (status of significant symptoms):  Risk status (Self / Other harm or suicidal ideation)  Patient has had a history of suicidal ideation: in adolescense and suicide attempts: patient states that she tried multiple times around ages [age] by slitting wrists or overdose, and one overdose of alcohol; she denies ever being hospitalized for mental health and  denies a history of self-injurious behavior, homicidal ideation, homicidal behavior and and other safety concerns  Patient denies current fears or concerns for personal safety.  Patient denies current or recent suicidal ideation or behaviors.   Patient denies current or recent homicidal ideation or behaviors.  Patient denies current or recent self injurious behavior or ideation.  Patient denies other safety concerns.  A safety and risk management plan has been developed including: Patient consented to co-developed safety plan.  A safety and risk management plan was completed.  Patient agreed to use safety plan should any safety concerns arise.  A copy was given to the patient.      Appearance:   unable to assess due to phone visit   Eye Contact:   unable to assess due to phone visit   Psychomotor Behavior: unable to assess due to phone visit   Attitude:   Cooperative   Orientation:   All  Speech   Rate / Production: Normal    Volume:  Normal   Mood:    Anxious   Affect:    unable to assess due to phone visit   Thought Content:  Rumination   Thought Form:  Circumstantial  Insight:    Good     Diagnoses:  1. CRISTIANO (generalized anxiety disorder)    2. Current severe episode of major depressive disorder without psychotic features without prior episode (H)        Collateral Reports Completed:  Not Applicable    Plan: (Homework, other):  Patient was given information about behavioral services and encouraged to schedule a follow up appointment with the clinic [facility] in 1 week.  She was also given information about mental health symptoms and treatment options , Cognitive Behavioral Therapy skills to practice when experiencing anxiety and depression and deep Breathing Strategies to practice when experiencing anxiety and depression.  CD Recommendations: No indications of CD issues.  Patient will continue to work on boundary setting in her personal relationship. Patient will continue to work on self care.     KVNG Watson,  [facility]       ______________________________________________________________________    Integrated Primary Care Behavioral Health Treatment Plan    Patient's Name: Mattie Banda  YOB: 1980    Date: May 6, 2020    DSM-V Diagnoses: 296.33 (F33.2) Major Depressive Disorder, Recurrent Episode, Severe _ or 300.02 (F41.1) Generalized Anxiety Disorder  Psychosocial / Contextual Factors: medical issues,  and dating ex-, few friends  WHODAS: 36    Referral / Collaboration:  Referral to another professional/service is not indicated at this time..    Anticipated number of session or this episode of care: 8      MeasurableTreatment Goal(s) related to diagnosis / functional impairment(s)  Goal 1: Patient will demonstrate ability to improve depression and anxiety symptoms as evidenced by improved PHQ9 & GAD7 scores.       Objective #A (Patient Action)    Patient will Decrease frequency and intensity of feeling down, depressed, hopeless  Identify negative self-talk and behaviors: challenge core beliefs, myths, and actions  Decrease thoughts that you'd be better off dead or of suicide / self-harm  Status: New - Date: 1/28/2020 ; Improved and continued: 5/6/2020     Intervention(s)  [facility] will teach distress tolerance skills. Provide psycho-education on cognitive distortions and automatic thoughts and behaviors and ways to appropriately challenge and restructure thoughts.    Objective #B  Patient will increase coping strategies by 2  to more effectively manage current stressors  increase understanding of steps in the grief process  Status: New - Date: 1/28/2020; Improved and Continued: 5/6/2020    Intervention(s)  [facility] will teach mindfulness and relaxation strategies. Provide psycho-education on grief process.    Patient has reviewed and agreed to the above plan.    Written by  KVNG Watson, [facility]

## 2020-07-07 NOTE — PROGRESS NOTES
Clinic Care Coordination Contact    Sent pt [employer] message letting her know will try calling again in 2 weeks.     MARK Salas   Primary Care Clinic- Social Work Care Coordinator  [facility]  7/7/2020 12:17 PM  [phone]

## 2020-07-14 ENCOUNTER — VIRTUAL VISIT (OUTPATIENT)
Dept: BEHAVIORAL HEALTH | Facility: CLINIC | Age: 40
End: 2020-07-14
Payer: COMMERCIAL

## 2020-07-14 DIAGNOSIS — F32.2 CURRENT SEVERE EPISODE OF MAJOR DEPRESSIVE DISORDER WITHOUT PSYCHOTIC FEATURES WITHOUT PRIOR EPISODE (H): Primary | ICD-10-CM

## 2020-07-14 DIAGNOSIS — F41.1 GAD (GENERALIZED ANXIETY DISORDER): ICD-10-CM

## 2020-07-14 PROCEDURE — 90832 PSYTX W PT 30 MINUTES: CPT | Mod: 95 | Performed by: MARRIAGE & FAMILY THERAPIST

## 2020-07-14 NOTE — PROGRESS NOTES
"[facility] Primary [facility]: Integrated Behavioral Health  July 14, 2020    1:00-[facility]  Made attempt to reach patient for scheduled phone visit. LM informing patient that this writer will try back in a few minutes.    Mattie Banda is a 39 year old female who is being evaluated via a telephone visit.      The patient has been notified of the following:     \"We have found that certain health care needs can be provided without the need for a face to face visit.  This service lets us provide the care you need with a short phone conversation.      I will have full access to your [place] medical record during this entire phone call.   I will be taking notes for your medical record.     Since this is like an office visit, we will bill your insurance company for this service.  Please check with your medical insurance if this type of telephone visit/virtual care is covered.  You may be responsible for the cost of this service if insurance coverage is denied.      There are potential benefits and risks of telephone visits (e.g. limits to patient confidentiality) that differ from in-person visits.?  Confidentiality still applies for telephone services, and nobody will record the visit.  It is important to be in a quiet, private space that is free of distractions (including cell phone or other devices) during the visit.??     If during the course of the call I believe a telephone visit is not appropriate, you will not be charged for this service\"    Consent has been obtained for this service by care team member: yes.    Behavioral Health Clinician Progress Note    Patient Name: Mattie Banda           Service Type:  Individual     Session Start Time: 1:27  Session End Time: 2:00      Session Length: 16 - 37      Attendees: Patient    Visit Activities (Refresh list every visit): [facility] Only    Diagnostic Assessment Date: 1/21/2020  Treatment Plan Review Date: 5/6/2020  See Flowsheets for today's PHQ-9 and CRISTIANO-7 " results  Previous PHQ-9:   PHQ-9 SCORE 1/7/2020 3/4/2020 5/7/2020   PHQ-9 Total Score - - -   PHQ-9 Total Score MyChart - - 22 (Severe depression)   PHQ-9 Total Score 24 25 22     Previous CRISTIANO-7:   CRISTIANO-7 SCORE 1/7/2020 3/4/2020 5/7/2020   Total Score - - 20 (severe anxiety)   Total Score 20 20 20       EMILIA LEVEL:  EMILIA Score (Last Two) 11/14/2012   EMILIA Raw Score 44   Activation Score 70.8   EMILIA Level 4     Clinical Global Impressions  First:  Considering your total clinical experience with this particular patient population, how severe are the patient's symptoms at this time?: 5 (5/6/2020  2:23 PM)      Most recent:  Compared to the patient's condition at the START of treatment, this patient's condition is: 3 (5/6/2020  2:23 PM)          DATA  Extended Session (60+ minutes): No  Interactive Complexity: No  Crisis: No  [facility] Patient: No    Treatment Objective(s) Addressed in This Session:  Target Behavior(s): disease management/lifestyle changes depression and anxiety    Depressed Mood: Increase interest, engagement, and pleasure in doing things  Decrease frequency and intensity of feeling down, depressed, hopeless  Improve quantity and quality of night time sleep / decrease daytime naps  Feel less tired and more energy during the day   Improve diet, appetite, mindful eating, and / or meal planning  Identify negative self-talk and behaviors: challenge core beliefs, myths, and actions  Improve concentration, focus, and mindfulness in daily activities   Decrease thoughts that you'd be better off dead or of suicide / self-harm  Anxiety: will experience a reduction in anxiety, will develop more effective coping skills to manage anxiety symptoms, will develop healthy cognitive patterns and beliefs and will increase ability to function adaptively    Current Stressors / Issues:  Patient reports that she was not able to sleep last night. She states that she was getting something from up high and used a stool. She ended up  mis-stepping on her way down and hurt herself. She states that she was having a hard time finding a comfortable position last night and it interfered with sleep.     She reports that she had another conversation with her spouse to address what he brought up when he was intoxicated. She states that she felt it was helpful to talk about it again, though reflected that it wasn't an easy conversation. Patient processed what she heard and felt. Reinforced patient initiating the conversation. Encouraged patient to continue use of assertive communication to address the conversation as they continue to move forward.     Patient talked about her son's depression and how she sees improvement. She expressed concern about a girl he has been spending time with. Discussed boundaries and she states that she has informed him of her concerns and she understands that he has to make decisions about his relationships.      Progress on Treatment Objective(s) / Homework:  Minimal progress - ACTION (Actively working towards change); Intervened by reinforcing change plan / affirming steps taken    Motivational Interviewing    MI Intervention: Expressed Empathy/Understanding, Supported Autonomy, Collaboration, Evocation, Permission to raise concern or advise, Open-ended questions, Reflections: simple and complex, Change talk (evoked) and Reframe     Change Talk Expressed by the Patient: Desire to change Ability to change Reasons to change Need to change Committment to change Activation Taking steps    Provider Response to Change Talk: E - Evoked more info from patient about behavior change, A - Affirmed patient's thoughts, decisions, or attempts at behavior change, R - Reflected patient's change talk and S - Summarized patient's change talk statements    Also provided psychoeducation about behavioral health condition, symptoms, and treatment options      Skills training    Explored skills useful to client in current situation    Skills  include assertiveness, communication, conflict management, problem-solving, relaxation, etc.    Solution-Focused Therapy    Explored patterns in patient's relationships and discussed options for new behaviors    Explored patterns in patient's actions and choices and discussed options for new behaviors    Cognitive-behavioral Therapy    Discussed common cognitive distortions, identified them in patient's life    Explored ways to challenge, replace, and act against these cognitions    Acceptance and Commitment Therapy    Explored and identified important values in patient's life    Discussed ways to commit to behavioral activation around these values    Psychodynamic psychotherapy    Discussed patient's emotional dynamics and issues and how they impact behaviors    Explored patient's history of relationships and how they impact present behaviors    Explored how to work with and make changes in these schemas and patterns    Behavioral Activation    Discussed steps patient can take to become more involved in meaningful activity    Identified barriers to these activities and explored possible solutions    Mindfulness-Based Strategies    Discussed skills based on development and application of mindfulness    Skills drawn from dialectical behavior therapy, mindfulness-based stress reduction, mindfulness-based cognitive therapy, etc.      Care Plan review completed: Yes    Medication Review:  No changes to current psychiatric medication(s)    Medication Compliance:  Yes    Changes in Health Issues:   None reported    Chemical Use Review:   Substance Use: Chemical use reviewed, no active concerns identified       Tobacco Use: No change in amount of tobacco use since last session.  Patient declined discussion at this time    Assessment: Current Emotional / Mental Status (status of significant symptoms):  Risk status (Self / Other harm or suicidal ideation)  Patient has had a history of suicidal ideation: in adolescense and  suicide attempts: patient states that she tried multiple times around ages [age] by slitting wrists or overdose, and one overdose of alcohol; she denies ever being hospitalized for mental health and denies a history of self-injurious behavior, homicidal ideation, homicidal behavior and and other safety concerns  Patient denies current fears or concerns for personal safety.  Patient denies current or recent suicidal ideation or behaviors.   Patient denies current or recent homicidal ideation or behaviors.  Patient denies current or recent self injurious behavior or ideation.  Patient denies other safety concerns.  A safety and risk management plan has been developed including: Patient consented to co-developed safety plan.  A safety and risk management plan was completed.  Patient agreed to use safety plan should any safety concerns arise.  A copy was given to the patient.      Appearance:   unable to assess due to phone visit   Eye Contact:   unable to assess due to phone visit   Psychomotor Behavior: unable to assess due to phone visit   Attitude:   Cooperative   Orientation:   All  Speech   Rate / Production: Normal    Volume:  Normal   Mood:    Anxious   Affect:    unable to assess due to phone visit   Thought Content:  Rumination   Thought Form:  Circumstantial  Insight:    Good     Diagnoses:  1. Current severe episode of major depressive disorder without psychotic features without prior episode (H)    2. CRISTIANO (generalized anxiety disorder)        Collateral Reports Completed:  Not Applicable    Plan: (Homework, other):  Patient was given information about behavioral services and encouraged to schedule a follow up appointment with the clinic [facility] in 1 week.  She was also given information about mental health symptoms and treatment options , Cognitive Behavioral Therapy skills to practice when experiencing anxiety and depression and deep Breathing Strategies to practice when experiencing anxiety and depression.   CD Recommendations: No indications of CD issues.  Patient will continue to work on boundary setting in her personal relationship. Patient will continue to work on self care.     KVNG Watson, [facility]       ______________________________________________________________________    Integrated Primary Care Behavioral Health Treatment Plan    Patient's Name: Mattie Banda  YOB: 1980    Date: May 6, 2020    DSM-V Diagnoses: 296.33 (F33.2) Major Depressive Disorder, Recurrent Episode, Severe _ or 300.02 (F41.1) Generalized Anxiety Disorder  Psychosocial / Contextual Factors: medical issues,  and dating ex-, few friends  WHODAS: 36    Referral / Collaboration:  Referral to another professional/service is not indicated at this time..    Anticipated number of session or this episode of care: 8      MeasurableTreatment Goal(s) related to diagnosis / functional impairment(s)  Goal 1: Patient will demonstrate ability to improve depression and anxiety symptoms as evidenced by improved PHQ9 & GAD7 scores.       Objective #A (Patient Action)    Patient will Decrease frequency and intensity of feeling down, depressed, hopeless  Identify negative self-talk and behaviors: challenge core beliefs, myths, and actions  Decrease thoughts that you'd be better off dead or of suicide / self-harm  Status: New - Date: 1/28/2020 ; Improved and continued: 5/6/2020     Intervention(s)  [facility] will teach distress tolerance skills. Provide psycho-education on cognitive distortions and automatic thoughts and behaviors and ways to appropriately challenge and restructure thoughts.    Objective #B  Patient will increase coping strategies by 2  to more effectively manage current stressors  increase understanding of steps in the grief process  Status: New - Date: 1/28/2020; Improved and Continued: 5/6/2020    Intervention(s)  [facility] will teach mindfulness and relaxation strategies. Provide psycho-education on grief  process.    Patient has reviewed and agreed to the above plan.    Written by  KVNG Watson, [facility]

## 2020-07-15 ENCOUNTER — HOSPITAL ENCOUNTER (OUTPATIENT)
Dept: GENERAL RADIOLOGY | Facility: CLINIC | Age: 40
End: 2020-07-15
Attending: FAMILY MEDICINE
Payer: COMMERCIAL

## 2020-07-15 ENCOUNTER — OFFICE VISIT (OUTPATIENT)
Dept: FAMILY MEDICINE | Facility: OTHER | Age: 40
End: 2020-07-15
Payer: COMMERCIAL

## 2020-07-15 VITALS
WEIGHT: 157 LBS | BODY MASS INDEX: 24.59 KG/M2 | OXYGEN SATURATION: 98 % | DIASTOLIC BLOOD PRESSURE: 56 MMHG | HEART RATE: 68 BPM | SYSTOLIC BLOOD PRESSURE: 94 MMHG | RESPIRATION RATE: 16 BRPM | TEMPERATURE: 98.3 F
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
## 2020-07-15 DIAGNOSIS — T14.8XXA CONTUSION OF BONE: ICD-10-CM

## 2020-07-15 PROCEDURE — 72100 X-RAY EXAM L-S SPINE 2/3 VWS: CPT | Mod: TC

## 2020-07-15 PROCEDURE — 71101 X-RAY EXAM UNILAT RIBS/CHEST: CPT | Mod: TC

## 2020-07-15 PROCEDURE — 99213 OFFICE O/P EST LOW 20 MIN: CPT | Performed by: FAMILY MEDICINE

## 2020-07-15 PROCEDURE — 72220 X-RAY EXAM SACRUM TAILBONE: CPT | Mod: TC

## 2020-07-15 RX ORDER — LIDOCAINE 40 MG/G
CREAM TOPICAL
Qty: 1 TUBE | Refills: 0 | Status: SHIPPED | OUTPATIENT
Start: 2020-07-15 | End: 2020-12-07

## 2020-07-15 NOTE — PROGRESS NOTES
Subjective     Mattie Banda is a 39 year old female who presents to clinic today for the following health issues:    HPI   Concern - pain in tailbone  Onset: 2 days    Description:   Fell off of stool at home    Intensity: 10/10    Progression of Symptoms:  worsening    Accompanying Signs & Symptoms:  Is having a hard time sleeping and moving    Previous history of similar problem:   Has had surgery on lower back 5 years ago    Precipitating factors:   Worsened by: everything    Alleviating factors:  Improved by: nothing    Therapies Tried and outcome: icing, heating no relief    Patient reports standing on a stool 2 to 3 feet high to read something on the top shelf of the closet.  As she stepped down she slipped hitting her ribs on the left side on the edge of the stool.  She also landed with her butt on the floor landing on her tailbone.    She did have significant pain with trying to deal without this.  Additionally she does have history of lower back surgery.    She denies any shortness of breath, chest pain, ecchymosis.  It is tender to sit or take a deep breath since the injury.  She is here today to rule out any fractures or damage to her back hardware.    Patient Active Problem List   Diagnosis     Degeneration of lumbar intervertebral disc     S/P lumbar fusion and discectomy June 2015     Labial lesion     Tobacco use disorder     Idiopathic peripheral neuropathy     Hypothyroidism, unspecified type     Migraine with aura and without status migrainosus, not intractable     Lymphocytic colitis     DDD (degenerative disc disease), cervical     Chronic pain syndrome     Abnormal uterine bleeding     Carotidynia     Vasculitis (H)     Current severe episode of major depressive disorder without psychotic features without prior episode (H)     CRISTIANO (generalized anxiety disorder)     Spondylarthritis     PTSD (post-traumatic stress disorder)     Past Surgical History:   Procedure Laterality Date     ABDOMEN  SURGERY  2015    For back surgery     BACK SURGERY  6/2015      BIOPSY  Don t remember     COLONOSCOPY  08/06/07     COLONOSCOPY  08/05/08     CYSTOSCOPY N/A 2/7/2020    Procedure: Cystoscopy;  Surgeon: Anabel Barber MD;  Location: [facility]     DAVINCI HYSTERECTOMY TOTAL, SALPINGECTOMY BILATERAL Bilateral 2/7/2020    Procedure: Davinci Total Laparoscopic Hysterectomy, Bilateral Salpingectomy, Exam Under Anesthesia;  Surgeon: Anabel Barber MD;  Location: [facility]     HC COLONOSCOPY W BIOPSY  02/06/08     HC TOOTH EXTRACTION W/FORCEP      wisdom teeth removed     HYSTERECTOMY, PAP NO LONGER INDICATED       INJECT BLOCK MEDIAL BRANCH CERVICAL/THORACIC/LUMBAR N/A 6/21/2019    Procedure: BLOCK, NERVE, SPINAL, MEDIAL BRANCH lumbar 2, 3, 4;  Surgeon: Edgardo Rubio MD;  Location: [facility]     INJECT EPIDURAL CERVICAL N/A 3/8/2018    Procedure: INJECT EPIDURAL CERVICAL;  cervical 6-7 interlaminar epidural steroid injection;  Surgeon: Edgardo Rubio MD;  Location[facility]     INJECT JOINT SACROILIAC Bilateral 6/12/2020    Procedure: INJECT JOINT SACROILIAC bilateral;  Surgeon: Edgardo Rubio MD;  Location[facility]     LAPAROSCOPIC TUBAL LIGATION  11/27/2012    Procedure: LAPAROSCOPIC TUBAL LIGATION;  Laparoscopic Bilateral tubal sterilization/ fulgaration;  Surgeon: Sarbjit Whittaker MD;  Location: [facility]     ORTHOPEDIC SURGERY  6/2015       Social History     Tobacco Use     Smoking status: Current Every Day Smoker     Packs/day: 1.00     Years: 20.00     Pack years: 20.00     Types: Cigarettes     Smokeless tobacco: Never Used   Substance Use Topics     Alcohol use: Yes     Alcohol/week: 0.0 standard drinks     Comment: Rarely     Family History   Problem Relation Age of Onset     Hypertension Maternal Grandmother      Arthritis Maternal Grandmother      Cancer Maternal Grandmother         MGGM     Depression Maternal Grandmother      Lipids Maternal Grandmother      Osteoporosis Maternal Grandmother      Respiratory  Maternal Grandmother         emphysema     Genitourinary Problems Maternal Grandmother         Kidney Failure, liver      Coronary Artery Disease Maternal Grandmother      Hyperlipidemia Maternal Grandmother      Anxiety Disorder Maternal Grandmother      Asthma Maternal Grandmother      Anesthesia Reaction Maternal Grandmother      Hypertension Maternal Grandfather      Heart Disease Maternal Grandfather         MI     Cardiovascular Maternal Grandfather      Cancer - colorectal Maternal Grandfather      Cancer Maternal Grandfather         [place] Lymphoma     Other Cancer Maternal Grandfather      Coronary Artery Disease Maternal Grandfather      Hypertension Paternal Grandmother      Arthritis Paternal Grandmother         RA     Osteoporosis Paternal Grandmother      Obesity Paternal Grandmother      Substance Abuse Mother         Alcoholic     Depression Mother      Hypertension Father      Hyperlipidemia Father      Mental Illness Father      Substance Abuse Father         Bio and Step dad have alcohol problems     Osteoporosis Father      Coronary Artery Disease Paternal Grandfather      Hypertension Paternal Grandfather      Breast Cancer Other         Maternal great aunt     Coronary Artery Disease Other      Diabetes Other      Thyroid Disease Maternal Aunt         two aunts     Breast Cancer Other      Colon Cancer Other      Prostate Cancer Other      Other Cancer Other      Other Cancer Other      Thyroid Disease Other      Anxiety Disorder Other      Substance Abuse Other      Depression Other      Mental Illness Other      Anxiety Disorder Maternal Half-Sister      Thyroid Disease Maternal Half-Sister      Thyroid Disease Other      Asthma Other      Thyroid Disease Other      Obesity Other      Coronary Artery Disease Other      Hypertension Other          Current Outpatient Medications   Medication Sig Dispense Refill     acetaminophen (TYLENOL) 325 MG tablet Take 325-650 mg by mouth every 6 hours as  needed for mild pain       albuterol (PROAIR HFA/PROVENTIL HFA/VENTOLIN HFA) 108 (90 Base) MCG/ACT inhaler INHALE 2 PUFFS INTO THE LUNGS EVERY 6 HOURS AS NEEDED FOR SHORTNESSOF BREATH / DYSPNEA OR WHEEZING 18 g 3     ALPRAZolam (XANAX) 2 MG tablet TAKE 1/2-1 TABLET (1-2MG) BY MOUTH 3 TIMES DAILY AS NEEDED FOR STRESS/SLEEP 90 tablet 0     ARIPiprazole (ABILIFY) 2 MG tablet Take 1 tablet (2 mg) by mouth daily 30 tablet 1     busPIRone (BUSPAR) 10 MG tablet Take 2 tablets (20 mg) by mouth 2 times daily Take 15 mg twice daily for one week then 20 mg twice daily. 120 tablet 1     escitalopram (LEXAPRO) 20 MG tablet Take 1 tablet (20 mg) by mouth daily 90 tablet 2     Ibuprofen (ADVIL PO)        levothyroxine (SYNTHROID/LEVOTHROID) 112 MCG tablet TAKE 1 TABLET BY MOUTH EVERY DAY 90 tablet 1     lidocaine (LMX4) 4 % external cream Apply topically once as needed for mild pain 1 Tube 0     loratadine (CLARITIN) 10 MG tablet Take 10 mg by mouth daily       methocarbamol (ROBAXIN) 750 MG tablet TAKE 1 TABLET (750 MG) BY MOUTH 3 TIMES DAILY AS NEEDED FOR MUSCLE SPASMS 90 tablet 1     morphine (MS CONTIN) 15 MG CR tablet Take 1 tablet (15 mg) by mouth every 12 hours maximum 2 tablet(s) per day 60 tablet 0     NARCAN 4 MG/0.1ML nasal spray SPRAY 1 SPRAY (4 MG) INTO ONE NOSTRIL ALTERNATING NOSTRILS ONCE AS NEEDED FOR OPIOID REVERSAL EVERY 2-3 MINUTES UNTIL ASSISTANCE ARRIVES  0     oxyCODONE (ROXICODONE) 5 MG tablet Take 1-2 tablets (5-10 mg) by mouth every 6 hours as needed for severe pain (uses fewest possible tabs daily.) 240 tablet 0     sulfaSALAzine (AZULFIDINE) 500 MG tablet Take 1 tablet (500 mg) by mouth 4 times daily May start at 2 daily.  Increase to 4 daily over 2-4 weeks if colitis symptoms not controlled. 120 tablet 2     topiramate (TOPAMAX) 25 MG tablet TAKE 1 TABLET BY MOUTH 3 TIMES A DAY 90 tablet 1     phenazopyridine (AZO URINARY PAIN RELIEF) 95 MG tablet Take 190 mg by mouth 3 times daily       sulfaSALAzine  ER (AZULFIDINE EN) 500 MG EC tablet TAKE 1 TABLET (500 MG) BY MOUTH 4 TIMES DAILY (Patient not taking: Reported on 7/15/2020) 120 tablet 1     traZODone (DESYREL) 100 MG tablet Take 100 mg by mouth At Bedtime Take 1 to 1.5 tablets at bedtime as needed       Allergies   Allergen Reactions     Bupropion Hcl Hives     Wellbutrin     No Clinical Screening - See Comments      Some metals, nickel  rash     Adhesive Tape Rash and Blisters     Reviewed and updated as needed this visit by Provider  Tobacco  Allergies  Meds  Problems  Med Hx  Surg Hx  Fam Hx         Review of Systems   Constitutional, HEENT, cardiovascular, pulmonary, gi and gu systems are negative, except as otherwise noted.      Objective    BP 94/56   Pulse 68   Temp 98.3  F (36.8  C) (Temporal)   Resp 16   Wt 71.2 kg (157 lb)   LMP 02/01/2020   SpO2 98%   BMI 24.59 kg/m    Body mass index is 24.59 kg/m .  Physical Exam   General: Appears well and in no acute distress.  Cardiovascular: Regular rate and rhythm, normal S1 and S2 without murmur. No extra heartsounds or friction rub. Radial pulses present and equal bilaterally.  Respiratory: Lungs clear to auscultation bilaterally. No wheezing or crackles. No prolonged expiration. Symmetrical chest rise.  Musculoskeletal: Pain to palpation over left chest wall, sacrum, and lumbar spine. No overlying ecchymosis. No gross extremity deformities. No peripheral edema. Normal muscle bulk. Antalgic gate.    Diagnostic Test Results:  Xray -     RIBS AND CHEST LEFT THREE VIEWS July 15, 2020 2:27 PM      HISTORY: Fall, initial encounter.     COMPARISON: Chest x-ray from 6/19/2015.                                                                      IMPRESSION: Heart size is normal. Lungs are clear. No pneumothorax or pleural fluid. No definite rib fracture. No displaced rib fractures.          LUMBAR SPINE TWO TO THREE VIEWS July 15, 2020 2:26 PM      HISTORY: Fall, initial encounter.     COMPARISON: June  6, 2016.                                                                      IMPRESSION: Anterior surgical fusion and intervertebral disc spacer at L5-S1. Also posterior transpedicular screw fixation. Overall alignment is intact and unchanged. Limbus vertebrae at the superior and anterior aspect of L3 vertebrae, unchanged.      SACRUM AND COCCYX TWO VIEWS  7/15/2020 2:26 PM     HISTORY: Fall, initial encounter     COMPARISON: None.                                                                      IMPRESSION: No definite sacral fracture. No definite coccygeal  fracture or dislocation. Anterior and posterior fusion is noted at  L5-S1.          Assessment & Plan   1. Fall, initial encounter: No evidence of fracture.  Treat conservatively.  Continue Tylenol, ibuprofen.  Encourage deep breath exercises to prevent pneumonia.  Topical lidocaine can also be used over-the-counter.  Follow-up if not improving.  Patient agreeable plan.  - XR Lumbar Spine 2/3 Views; Future  - XR Sacrum and Coccyx 2 Views; Future  - XR Ribs & Chest Left G/E 3 Views; Future  - lidocaine (LMX4) 4 % external cream; Apply topically once as needed for mild pain  Dispense: 1 Tube; Refill: 0    2. Contusion of bone:  - lidocaine (LMX4) 4 % external cream; Apply topically once as needed for mild pain  Dispense: 1 Tube; Refill: 0      Return in about 2 weeks (around 7/29/2020), or if symptoms worsen or fail to improve.    Jordon Swift MD  [facility]    This chart is completed utilizing dictation software; typos and/or incorrect word substitutions may unintentionally occur.

## 2020-07-21 ENCOUNTER — VIRTUAL VISIT (OUTPATIENT)
Dept: BEHAVIORAL HEALTH | Facility: CLINIC | Age: 40
End: 2020-07-21
Payer: COMMERCIAL

## 2020-07-21 DIAGNOSIS — F32.2 CURRENT SEVERE EPISODE OF MAJOR DEPRESSIVE DISORDER WITHOUT PSYCHOTIC FEATURES WITHOUT PRIOR EPISODE (H): Primary | ICD-10-CM

## 2020-07-21 DIAGNOSIS — F41.1 GAD (GENERALIZED ANXIETY DISORDER): ICD-10-CM

## 2020-07-21 PROCEDURE — 90832 PSYTX W PT 30 MINUTES: CPT | Mod: 95 | Performed by: MARRIAGE & FAMILY THERAPIST

## 2020-07-21 NOTE — PROGRESS NOTES
"[facility]: Integrated Behavioral Health  July 21, 2020      Mattie Banda is a 39 year old female who is being evaluated via a telephone visit.      The patient has been notified of the following:     \"We have found that certain health care needs can be provided without the need for a face to face visit.  This service lets us provide the care you need with a short phone conversation.      I will have full access to your [facility] medical record during this entire phone call.   I will be taking notes for your medical record.     Since this is like an office visit, we will bill your insurance company for this service.  Please check with your medical insurance if this type of telephone visit/virtual care is covered.  You may be responsible for the cost of this service if insurance coverage is denied.      There are potential benefits and risks of telephone visits (e.g. limits to patient confidentiality) that differ from in-person visits.?  Confidentiality still applies for telephone services, and nobody will record the visit.  It is important to be in a quiet, private space that is free of distractions (including cell phone or other devices) during the visit.??     If during the course of the call I believe a telephone visit is not appropriate, you will not be charged for this service\"    Consent has been obtained for this service by care team member: yes.    Behavioral Health Clinician Progress Note    Patient Name: Mattie Banda           Service Type:  Individual     Session Start Time: 1:00  Session End Time: 1:33       Session Length: 16 - 37      Attendees: Patient    Visit Activities (Refresh list every visit): [facility] Only    Diagnostic Assessment Date: 1/21/2020  Treatment Plan Review Date: 5/6/2020  See Flowsheets for today's PHQ-9 and CRISTIANO-7 results  Previous PHQ-9:   PHQ-9 SCORE 1/7/2020 3/4/2020 5/7/2020   PHQ-9 Total Score - - -   PHQ-9 Total Score MyChart - - 22 (Severe " "depression)   PHQ-9 Total Score 24 25 22     Previous CRISTIANO-7:   CRISTIANO-7 SCORE 1/7/2020 3/4/2020 5/7/2020   Total Score - - 20 (severe anxiety)   Total Score 20 20 20       EMILIA LEVEL:  EMILIA Score (Last Two) 11/14/2012   EMILIA Raw Score 44   Activation Score 70.8   EMILIA Level 4     Clinical Global Impressions  First:  Considering your total clinical experience with this particular patient population, how severe are the patient's symptoms at this time?: 5 (5/6/2020  2:23 PM)      Most recent:  Compared to the patient's condition at the START of treatment, this patient's condition is: 3 (5/6/2020  2:23 PM)          DATA  Extended Session (60+ minutes): No  Interactive Complexity: No  Crisis: No  [facility] Patient: No    Treatment Objective(s) Addressed in This Session:  Target Behavior(s): disease management/lifestyle changes depression and anxiety    Depressed Mood: Increase interest, engagement, and pleasure in doing things  Decrease frequency and intensity of feeling down, depressed, hopeless  Improve quantity and quality of night time sleep / decrease daytime naps  Feel less tired and more energy during the day   Improve diet, appetite, mindful eating, and / or meal planning  Identify negative self-talk and behaviors: challenge core beliefs, myths, and actions  Improve concentration, focus, and mindfulness in daily activities   Decrease thoughts that you'd be better off dead or of suicide / self-harm  Anxiety: will experience a reduction in anxiety, will develop more effective coping skills to manage anxiety symptoms, will develop healthy cognitive patterns and beliefs and will increase ability to function adaptively    Current Stressors / Issues:  Patient reports that she feels like \"crap\". She states that she has had only 4 hours of sleep in the past two days. She states that this is partly due to pain from her injury from last week. She states that she is also sick and she believes that she has bronchitis. She states that she " "typically gets it twice a year, and one of the times is typically in the summer months. She states that she is not wanting to be seen as she knows she will be tested for COVID which she has done before and it was reportedly painful.   Patient reports that she had recent interactions with her spouse, uncle and mom that have also added to her stress. She states that this resulted in a panic attack and she reports that she was scared and thought about calling 911 because she states it felt like she was having a heart attack.  Patient states that she doesn't know why she continues to let people \"take advantage of me and hurt me\". She states that her mom continues to drink and this bothers patient.    Patient processed recent interactions with her family. Reviewed boundary setting and reflected that her anger may be in response to her boundaries needing to be looked at. Discussed addiction and suggested patient look into Adult Children of Alcoholic groups. She states that she did attend [employer] as a kid, but this was reportedly for her own drinking she was doing at that time.     Recommended reading to patient: Co-Dependant No More and The Language of Letting Go, both by Ale Owens.       Progress on Treatment Objective(s) / Homework:  Worsening - ACTION (Actively working towards change); Intervened by reinforcing change plan / affirming steps taken    Motivational Interviewing    MI Intervention: Expressed Empathy/Understanding, Supported Autonomy, Collaboration, Evocation, Permission to raise concern or advise, Open-ended questions, Reflections: simple and complex, Change talk (evoked) and Reframe     Change Talk Expressed by the Patient: Desire to change Ability to change Reasons to change Need to change Committment to change Activation Taking steps    Provider Response to Change Talk: E - Evoked more info from patient about behavior change, A - Affirmed patient's thoughts, decisions, or attempts at behavior " change, R - Reflected patient's change talk and S - Summarized patient's change talk statements    Also provided psychoeducation about behavioral health condition, symptoms, and treatment options      Skills training    Explored skills useful to client in current situation    Skills include assertiveness, communication, conflict management, problem-solving, relaxation, etc.    Solution-Focused Therapy    Explored patterns in patient's relationships and discussed options for new behaviors    Explored patterns in patient's actions and choices and discussed options for new behaviors    Cognitive-behavioral Therapy    Discussed common cognitive distortions, identified them in patient's life    Explored ways to challenge, replace, and act against these cognitions    Acceptance and Commitment Therapy    Explored and identified important values in patient's life    Discussed ways to commit to behavioral activation around these values    Psychodynamic psychotherapy    Discussed patient's emotional dynamics and issues and how they impact behaviors    Explored patient's history of relationships and how they impact present behaviors    Explored how to work with and make changes in these schemas and patterns    Behavioral Activation    Discussed steps patient can take to become more involved in meaningful activity    Identified barriers to these activities and explored possible solutions    Mindfulness-Based Strategies    Discussed skills based on development and application of mindfulness    Skills drawn from dialectical behavior therapy, mindfulness-based stress reduction, mindfulness-based cognitive therapy, etc.      Care Plan review completed: Yes    Medication Review:  No changes to current psychiatric medication(s)    Medication Compliance:  Yes    Changes in Health Issues:   None reported    Chemical Use Review:   Substance Use: Chemical use reviewed, no active concerns identified       Tobacco Use: No change in amount  of tobacco use since last session.  Patient declined discussion at this time    Assessment: Current Emotional / Mental Status (status of significant symptoms):  Risk status (Self / Other harm or suicidal ideation)  Patient has had a history of suicidal ideation: in adolescense and suicide attempts: patient states that she tried multiple times around ages [age] by slitting wrists or overdose, and one overdose of alcohol; she denies ever being hospitalized for mental health and denies a history of self-injurious behavior, homicidal ideation, homicidal behavior and and other safety concerns  Patient denies current fears or concerns for personal safety.  Patient denies current or recent suicidal ideation or behaviors.   Patient denies current or recent homicidal ideation or behaviors.  Patient denies current or recent self injurious behavior or ideation.  Patient denies other safety concerns.  A safety and risk management plan has been developed including: Patient consented to co-developed safety plan.  A safety and risk management plan was completed.  Patient agreed to use safety plan should any safety concerns arise.  A copy was given to the patient.      Appearance:   unable to assess due to phone visit   Eye Contact:   unable to assess due to phone visit   Psychomotor Behavior: unable to assess due to phone visit   Attitude:   Cooperative  Pleasant  Orientation:   All  Speech   Rate / Production: Normal    Volume:  Normal   Mood:    Anxious  Depressed   Affect:    unable to assess due to phone visit   Thought Content:  Perservative  Rumination   Thought Form:  Circumstantial  Insight:    Good     Diagnoses:  1. Current severe episode of major depressive disorder without psychotic features without prior episode (H)    2. CRISTIANO (generalized anxiety disorder)        Collateral Reports Completed:  Not Applicable    Plan: (Homework, other):  Patient was given information about behavioral services and encouraged to schedule  a follow up appointment with the clinic [facility] in 1 week.  She was also given information about mental health symptoms and treatment options , Cognitive Behavioral Therapy skills to practice when experiencing anxiety and depression and deep Breathing Strategies to practice when experiencing anxiety and depression.  CD Recommendations: No indications of CD issues.  Patient will continue to work on boundary setting in her personal relationship. Patient will continue to work on self care. Patient will look into recommended books: Co-Dependant No More and The Language of Letting Go.     KVNG Watson, [facility]       ______________________________________________________________________    Integrated Primary Care Behavioral Health Treatment Plan    Patient's Name: Mattie Banda  YOB: 1980    Date: May 6, 2020    DSM-V Diagnoses: 296.33 (F33.2) Major Depressive Disorder, Recurrent Episode, Severe _ or 300.02 (F41.1) Generalized Anxiety Disorder  Psychosocial / Contextual Factors: medical issues,  and dating ex-, few friends  WHODAS: 36    Referral / Collaboration:  Referral to another professional/service is not indicated at this time..    Anticipated number of session or this episode of care: 8      MeasurableTreatment Goal(s) related to diagnosis / functional impairment(s)  Goal 1: Patient will demonstrate ability to improve depression and anxiety symptoms as evidenced by improved PHQ9 & GAD7 scores.       Objective #A (Patient Action)    Patient will Decrease frequency and intensity of feeling down, depressed, hopeless  Identify negative self-talk and behaviors: challenge core beliefs, myths, and actions  Decrease thoughts that you'd be better off dead or of suicide / self-harm  Status: New - Date: 1/28/2020 ; Improved and continued: 5/6/2020     Intervention(s)  [facility] will teach distress tolerance skills. Provide psycho-education on cognitive distortions and automatic thoughts and  behaviors and ways to appropriately challenge and restructure thoughts.    Objective #B  Patient will increase coping strategies by 2  to more effectively manage current stressors  increase understanding of steps in the grief process  Status: New - Date: 1/28/2020; Improved and Continued: 5/6/2020    Intervention(s)  [facility] will teach mindfulness and relaxation strategies. Provide psycho-education on grief process.    Patient has reviewed and agreed to the above plan.    Written by  KVNG Watson, [facility]

## 2020-07-25 DIAGNOSIS — R05.9 COUGH: ICD-10-CM

## 2020-07-25 DIAGNOSIS — J20.9 ACUTE BRONCHITIS WITH COEXISTING CONDITION REQUIRING PROPHYLACTIC TREATMENT: ICD-10-CM

## 2020-07-27 ENCOUNTER — PATIENT OUTREACH (OUTPATIENT)
Dept: CARE COORDINATION | Facility: CLINIC | Age: 40
End: 2020-07-27

## 2020-07-27 RX ORDER — ALBUTEROL SULFATE 0.83 MG/ML
SOLUTION RESPIRATORY (INHALATION) EVERY 6 HOURS PRN
Qty: 90 ML | Refills: 3 | Status: SHIPPED | OUTPATIENT
Start: 2020-07-27 | End: 2020-10-19

## 2020-07-27 NOTE — TELEPHONE ENCOUNTER
Routing refill request to provider for review/approval because:  Drug not active on patient's medication list    Was discontinued  Last office visit: 6/5/2020 with prescribing provider:     Future Office Visit:      ZARA Jackson, RN

## 2020-07-27 NOTE — PROGRESS NOTES
Clinic Care Coordination Contact  [facility]/Voicemail       Clinical Data: Care Coordinator Outreach  Outreach attempted x 2.  Left message on patient's voicemail with call back information and requested return call.  Plan: Care Coordinator sent care coordination introduction letter on 5/13/20 via [facility]. Care Coordinator will try to reach patient again in 10 business days. [employer] has sent [facility] messages as well.      MARK Salas   Primary Care Clinic- Social Work Care Coordinator  [facility]  7/27/2020 10:23 AM  [phone]

## 2020-08-04 ENCOUNTER — VIRTUAL VISIT (OUTPATIENT)
Dept: BEHAVIORAL HEALTH | Facility: CLINIC | Age: 40
End: 2020-08-04
Payer: COMMERCIAL

## 2020-08-04 DIAGNOSIS — F41.1 GAD (GENERALIZED ANXIETY DISORDER): ICD-10-CM

## 2020-08-04 DIAGNOSIS — F32.2 CURRENT SEVERE EPISODE OF MAJOR DEPRESSIVE DISORDER WITHOUT PSYCHOTIC FEATURES WITHOUT PRIOR EPISODE (H): Primary | ICD-10-CM

## 2020-08-04 PROCEDURE — 90832 PSYTX W PT 30 MINUTES: CPT | Mod: 95 | Performed by: MARRIAGE & FAMILY THERAPIST

## 2020-08-04 NOTE — PROGRESS NOTES
"[facility] Primary Care: Integrated Behavioral Health  August 4, 2020      Mattie Banda is a 39 year old female who is being evaluated via a telephone visit.      The patient has been notified of the following:     \"We have found that certain health care needs can be provided without the need for a face to face visit.  This service lets us provide the care you need with a short phone conversation.      I will have full access to your [facility] medical record during this entire phone call.   I will be taking notes for your medical record.     Since this is like an office visit, we will bill your insurance company for this service.  Please check with your medical insurance if this type of telephone visit/virtual care is covered.  You may be responsible for the cost of this service if insurance coverage is denied.      There are potential benefits and risks of telephone visits (e.g. limits to patient confidentiality) that differ from in-person visits.?  Confidentiality still applies for telephone services, and nobody will record the visit.  It is important to be in a quiet, private space that is free of distractions (including cell phone or other devices) during the visit.??     If during the course of the call I believe a telephone visit is not appropriate, you will not be charged for this service\"    Consent has been obtained for this service by care team member: yes.    Behavioral Health Clinician Progress Note    Patient Name: Mattie Banda           Service Type:  Individual     Session Start Time: 1:02  Session End Time: 1:30       Session Length: 16 - 37      Attendees: Patient    Visit Activities (Refresh list every visit): [facility] Only    Diagnostic Assessment Date: 1/21/2020  Treatment Plan Review Date: 5/6/2020, due next visit  See Flowsheets for today's PHQ-9 and CRISTIANO-7 results  Previous PHQ-9:   PHQ-9 SCORE 1/7/2020 3/4/2020 5/7/2020   PHQ-9 Total Score - - -   PHQ-9 Total Score MyChart - " - 22 (Severe depression)   PHQ-9 Total Score 24 25 22     Previous CRISTIANO-7:   CRISTIANO-7 SCORE 1/7/2020 3/4/2020 5/7/2020   Total Score - - 20 (severe anxiety)   Total Score 20 20 20       EMILIA LEVEL:  EMILIA Score (Last Two) 11/14/2012   EMILIA Raw Score 44   Activation Score 70.8   EMILIA Level 4     Clinical Global Impressions  First:  Considering your total clinical experience with this particular patient population, how severe are the patient's symptoms at this time?: 5 (5/6/2020  2:23 PM)      Most recent:  Compared to the patient's condition at the START of treatment, this patient's condition is: 3 (5/6/2020  2:23 PM)          DATA  Extended Session (60+ minutes): No  Interactive Complexity: No  Crisis: No  [facility] Patient: No    Treatment Objective(s) Addressed in This Session:  Target Behavior(s): disease management/lifestyle changes depression and anxiety    Depressed Mood: Increase interest, engagement, and pleasure in doing things  Decrease frequency and intensity of feeling down, depressed, hopeless  Improve quantity and quality of night time sleep / decrease daytime naps  Feel less tired and more energy during the day   Improve diet, appetite, mindful eating, and / or meal planning  Identify negative self-talk and behaviors: challenge core beliefs, myths, and actions  Improve concentration, focus, and mindfulness in daily activities   Decrease thoughts that you'd be better off dead or of suicide / self-harm  Anxiety: will experience a reduction in anxiety, will develop more effective coping skills to manage anxiety symptoms, will develop healthy cognitive patterns and beliefs and will increase ability to function adaptively    Current Stressors / Issues:  Patient reports that she states that she is still sick and feeling drained. She states that she missed last weeks appointment due to not having a voice and continual coughing. She reports that she missed her psychiatry appointment due to sleeping through it. She believes  "that she has pneumonia. She states that she spoke with a nurse advisor, however this is not viewable in patient's EMR.   Patient reports being more irritable and being more emotional. She worries that she is backtracking. She states that her sleep is worsening. She states that she will take her Xanax and will watch tv. She states that it's not working. She has also tried a coloring millicent and will start to feel tired and as soon as she sets her phone down she wakes up. She states that her mind will start to race.   She wonders if she still has monthly hormonal changes similar to when she would menstruate. She states that her breasts are tender/sore and she wonders if it's possible to still have symptoms even though she had a hysterectomy. She also reports cramping, similar to menstrual cramping.   Patient reports that her sister is coming to visit and she is looking forward to this.     Reviewed sleep hygiene tips. Addressed patient's use of Xanax to help with sleep. She states that if she doesn't take it she will have more difficulty with sleep onset. Patient reflected that she sleeps well during the day, without meds, and then has more difficulty with sleep at night. She identified that she has had \"bad things happen at nights. She states that having her spouse home also changes her sleep as she is adjusting to having him in bed with her. She states that she sleeps better when he is there and she acknowledges that she feels \"safer\" when he is there.     Sent patient information on sleep via [employer].    Progress on Treatment Objective(s) / Homework:  No improvement - ACTION (Actively working towards change); Intervened by reinforcing change plan / affirming steps taken    Motivational Interviewing    MI Intervention: Expressed Empathy/Understanding, Supported Autonomy, Collaboration, Evocation, Permission to raise concern or advise, Open-ended questions, Reflections: simple and complex, Change talk (evoked) and " Reframe     Change Talk Expressed by the Patient: Desire to change Ability to change Reasons to change Need to change Committment to change Activation Taking steps    Provider Response to Change Talk: E - Evoked more info from patient about behavior change, A - Affirmed patient's thoughts, decisions, or attempts at behavior change, R - Reflected patient's change talk and S - Summarized patient's change talk statements    Also provided psychoeducation about behavioral health condition, symptoms, and treatment options      Skills training    Explored skills useful to client in current situation    Skills include assertiveness, communication, conflict management, problem-solving, relaxation, etc.    Solution-Focused Therapy    Explored patterns in patient's relationships and discussed options for new behaviors    Explored patterns in patient's actions and choices and discussed options for new behaviors    Cognitive-behavioral Therapy    Discussed common cognitive distortions, identified them in patient's life    Explored ways to challenge, replace, and act against these cognitions    Acceptance and Commitment Therapy    Explored and identified important values in patient's life    Discussed ways to commit to behavioral activation around these values    Psychodynamic psychotherapy    Discussed patient's emotional dynamics and issues and how they impact behaviors    Explored patient's history of relationships and how they impact present behaviors    Explored how to work with and make changes in these schemas and patterns    Behavioral Activation    Discussed steps patient can take to become more involved in meaningful activity    Identified barriers to these activities and explored possible solutions    Mindfulness-Based Strategies    Discussed skills based on development and application of mindfulness    Skills drawn from dialectical behavior therapy, mindfulness-based stress reduction, mindfulness-based cognitive  therapy, etc.      Care Plan review completed: Yes    Medication Review:  No changes to current psychiatric medication(s)    Medication Compliance:  Yes    Changes in Health Issues:   None reported    Chemical Use Review:   Substance Use: Chemical use reviewed, no active concerns identified       Tobacco Use: No change in amount of tobacco use since last session.  Patient declined discussion at this time    Assessment: Current Emotional / Mental Status (status of significant symptoms):  Risk status (Self / Other harm or suicidal ideation)  Patient has had a history of suicidal ideation: in adolescense and suicide attempts: patient states that she tried multiple times around ages 15-16 by slitting wrists or overdose, and one overdose of alcohol; she denies ever being hospitalized for mental health and denies a history of self-injurious behavior, homicidal ideation, homicidal behavior and and other safety concerns  Patient denies current fears or concerns for personal safety.  Patient denies current or recent suicidal ideation or behaviors.   Patient denies current or recent homicidal ideation or behaviors.  Patient denies current or recent self injurious behavior or ideation.  Patient denies other safety concerns.  A safety and risk management plan has been developed including: Patient consented to co-developed safety plan.  A safety and risk management plan was completed.  Patient agreed to use safety plan should any safety concerns arise.  A copy was given to the patient.      Appearance:   unable to assess due to phone visit   Eye Contact:   unable to assess due to phone visit   Psychomotor Behavior: unable to assess due to phone visit   Attitude:   Cooperative  Pleasant  Orientation:   All  Speech   Rate / Production: Normal    Volume:  Normal   Mood:    Anxious  Depressed   Affect:    unable to assess due to phone visit   Thought Content:  Perservative  Rumination   Thought  Form:  Circumstantial  Insight:    Good     Diagnoses:  1. Current severe episode of major depressive disorder without psychotic features without prior episode (H)    2. CRISTIANO (generalized anxiety disorder)        Collateral Reports Completed:  Not Applicable    Plan: (Homework, other):  Patient was given information about behavioral services and encouraged to schedule a follow up appointment with the clinic [facility] in 1 week.  She was also given information about mental health symptoms and treatment options , Cognitive Behavioral Therapy skills to practice when experiencing anxiety and depression and deep Breathing Strategies to practice when experiencing anxiety and depression.  CD Recommendations: No indications of CD issues.  [facility] sent patient information on sleep, via [employer] for her to review. Patient will connect with her PCP about questions regarding menstrual cramping. Patient will reschedule appointment for psychiatrist.     KVNG Watson, [facility]       ______________________________________________________________________    Integrated Primary Care Behavioral Health Treatment Plan    Patient's Name: Mattie Banda  YOB: 1980    Date: May 6, 2020    DSM-V Diagnoses: 296.33 (F33.2) Major Depressive Disorder, Recurrent Episode, Severe _ or 300.02 (F41.1) Generalized Anxiety Disorder  Psychosocial / Contextual Factors: medical issues,  and dating ex-, few friends  WHODAS: 36    Referral / Collaboration:  Referral to another professional/service is not indicated at this time..    Anticipated number of session or this episode of care: 8      MeasurableTreatment Goal(s) related to diagnosis / functional impairment(s)  Goal 1: Patient will demonstrate ability to improve depression and anxiety symptoms as evidenced by improved PHQ9 & GAD7 scores.       Objective #A (Patient Action)    Patient will Decrease frequency and intensity of feeling down, depressed, hopeless  Identify negative  self-talk and behaviors: challenge core beliefs, myths, and actions  Decrease thoughts that you'd be better off dead or of suicide / self-harm  Status: New - Date: 1/28/2020 ; Improved and continued: 5/6/2020     Intervention(s)  [facility] [place] will teach distress tolerance skills. Provide psycho-education on cognitive distortions and automatic thoughts and behaviors and ways to appropriately challenge and restructure thoughts.    Objective #B  Patient will increase coping strategies by 2  to more effectively manage current stressors  increase understanding of steps in the grief process  Status: New - Date: 1/28/2020; Improved and Continued: 5/6/2020    Intervention(s)  [facility] [place] will teach mindfulness and relaxation strategies. Provide psycho-education on grief process.    Patient has reviewed and agreed to the above plan.    Written by  KVNG Watson, [facility] [place]

## 2020-08-07 ENCOUNTER — PATIENT OUTREACH (OUTPATIENT)
Dept: FAMILY MEDICINE | Facility: CLINIC | Age: 40
End: 2020-08-07
Payer: COMMERCIAL

## 2020-08-07 NOTE — PROGRESS NOTES
Clinic Care Coordination Contact    Follow Up Progress Note      Assessment:  CC spoke with pt. Pt has not been feeling real well. She has been sick with pneumonia. Finally starting to feel a little better.     Pt continues to see psychiatry and [facility] for counseling. Pt finding this extremely helpful. Pt seeing [facility] weekly.     Goals addressed this encounter:   Goals Addressed                 This Visit's Progress       Patient Stated      Financial Wellbeing (pt-stated)   50%     Goal Statement: I would like to start the process of applying for Disability now that MD feels my issues will cause me to be unable to work for 12 months or more    Date Goal set: 12/19/19  Barriers: struggling with mental health; recent surgery; pain  Strengths: Pt took steps to call  CC back after getting messages; reaching out for assistance; been seeing [facility]   Date to Achieve By: 11/30/20  Patient expressed understanding of goal: yes  Action steps to achieve this goal:  1. I will work on the Disability packet of paperwork/application that I received in the mail.   2. I will read my [employer] message from [facility] and follow suggestions to help my anxiety  3. I will attend my appointments for counseling and psychiatry.   4. I will reach out to [facility] with resource needs or just to talk for support.                  Intervention/Education provided during outreach: Pt has applied for disability. She is working with . Pt got application in and had her interview, now said she just has to wait. Pt also waiting on some unemployment stuff too. Pt is aware it takes quite a while for disability.      Outreach Frequency: monthly    Plan:   No concerns or resource needs at this time. Pt appreciative of [facility] checking in.     Care Coordinator will follow up in 1 month.       MARK Salas   Primary Care Clinic- Social Work Care Coordinator  [facility]  8/7/2020 1:04 PM  [phone]

## 2020-08-11 ENCOUNTER — VIRTUAL VISIT (OUTPATIENT)
Dept: BEHAVIORAL HEALTH | Facility: CLINIC | Age: 40
End: 2020-08-11
Payer: COMMERCIAL

## 2020-08-11 DIAGNOSIS — F32.2 CURRENT SEVERE EPISODE OF MAJOR DEPRESSIVE DISORDER WITHOUT PSYCHOTIC FEATURES WITHOUT PRIOR EPISODE (H): Primary | ICD-10-CM

## 2020-08-11 DIAGNOSIS — F41.1 GAD (GENERALIZED ANXIETY DISORDER): ICD-10-CM

## 2020-08-11 PROCEDURE — 90832 PSYTX W PT 30 MINUTES: CPT | Mod: 95 | Performed by: MARRIAGE & FAMILY THERAPIST

## 2020-08-11 NOTE — PROGRESS NOTES
"[facility] Primary Care: Integrated Behavioral Health  August 11, 2020      Mattie Banda is a 39 year old female who is being evaluated via a telephone visit.      The patient has been notified of the following:     \"We have found that certain health care needs can be provided without the need for a face to face visit.  This service lets us provide the care you need with a short phone conversation.      I will have full access to your [place] medical record during this entire phone call.   I will be taking notes for your medical record.     Since this is like an office visit, we will bill your insurance company for this service.  Please check with your medical insurance if this type of telephone visit/virtual care is covered.  You may be responsible for the cost of this service if insurance coverage is denied.      There are potential benefits and risks of telephone visits (e.g. limits to patient confidentiality) that differ from in-person visits.?  Confidentiality still applies for telephone services, and nobody will record the visit.  It is important to be in a quiet, private space that is free of distractions (including cell phone or other devices) during the visit.??     If during the course of the call I believe a telephone visit is not appropriate, you will not be charged for this service\"    Consent has been obtained for this service by care team member: yes.    Behavioral Health Clinician Progress Note    Patient Name: Mattie Banda           Service Type:  Individual     Session Start Time: 1:03  Session End Time: 1:37       Session Length: 16 - 37      Attendees: Patient    Visit Activities (Refresh list every visit): [facility] Only    Diagnostic Assessment Date: 1/21/2020  Treatment Plan Review Date: 8/11/2020  See Flowsheets for today's PHQ-9 and CRISTIANO-7 results  Previous PHQ-9:   PHQ-9 SCORE 1/7/2020 3/4/2020 5/7/2020   PHQ-9 Total Score - - -   PHQ-9 Total Score MyChart - - 22 (Severe " depression)   PHQ-9 Total Score 24 25 22     Previous CRISTIANO-7:   CRISTIANO-7 SCORE 1/7/2020 3/4/2020 5/7/2020   Total Score - - 20 (severe anxiety)   Total Score 20 20 20       EMILIA LEVEL:  EMILIA Score (Last Two) 11/14/2012   EMILIA Raw Score 44   Activation Score 70.8   EMILIA Level 4     Clinical Global Impressions  First:  Considering your total clinical experience with this particular patient population, how severe are the patient's symptoms at this time?: 5 (5/6/2020  2:23 PM)      Most recent:  Compared to the patient's condition at the START of treatment, this patient's condition is: 3 (5/6/2020  2:23 PM)          DATA  Extended Session (60+ minutes): No  Interactive Complexity: No  Crisis: No  [facility] Patient: No    Treatment Objective(s) Addressed in This Session:  Target Behavior(s): disease management/lifestyle changes depression and anxiety    Depressed Mood: Increase interest, engagement, and pleasure in doing things  Decrease frequency and intensity of feeling down, depressed, hopeless  Improve quantity and quality of night time sleep / decrease daytime naps  Feel less tired and more energy during the day   Improve diet, appetite, mindful eating, and / or meal planning  Identify negative self-talk and behaviors: challenge core beliefs, myths, and actions  Improve concentration, focus, and mindfulness in daily activities   Decrease thoughts that you'd be better off dead or of suicide / self-harm  Anxiety: will experience a reduction in anxiety, will develop more effective coping skills to manage anxiety symptoms, will develop healthy cognitive patterns and beliefs and will increase ability to function adaptively    Current Stressors / Issues:  Patient reports that she continues to recover from her upper respiratory illness.    Reviewed treatment plan and goals. Patient reports improvement in setting boundaries and decreasing suicidal thoughts. She also notes some improvement with her grief. Patient states that she would  "like to be able to talk about her Cely more. Patient states that she would also like to work more on her relationship with her SO as she feels that impacts her mood.    Patient talked about her relationship with her Cely and how she struggles with feeling she doesn't have that \"one person\" anymore that loves her. Patient states that she had early in her marriage she felt that with her spouse, however he has expressed concern that her mood will not improve.    Patient reports that she has asked her spouse to attend a counseling visit and he has been resistant. Patient reports that she recently initiated sex with her spouse. She states that they hadn't been intimate for 8 months. She talked about how sex will trigger past trauma and she has not shared this with her spouse. Her spouse is currently home for the next two weeks. Discussed ways to start to open up to her spouse about her past.    Progress on Treatment Objective(s) / Homework:  No improvement - ACTION (Actively working towards change); Intervened by reinforcing change plan / affirming steps taken    Motivational Interviewing    MI Intervention: Expressed Empathy/Understanding, Supported Autonomy, Collaboration, Evocation, Permission to raise concern or advise, Open-ended questions, Reflections: simple and complex, Change talk (evoked) and Reframe     Change Talk Expressed by the Patient: Desire to change Ability to change Reasons to change Need to change Committment to change Activation Taking steps    Provider Response to Change Talk: E - Evoked more info from patient about behavior change, A - Affirmed patient's thoughts, decisions, or attempts at behavior change, R - Reflected patient's change talk and S - Summarized patient's change talk statements    Also provided psychoeducation about behavioral health condition, symptoms, and treatment options      Skills training    Explored skills useful to client in current situation    Skills include " assertiveness, communication, conflict management, problem-solving, relaxation, etc.    Solution-Focused Therapy    Explored patterns in patient's relationships and discussed options for new behaviors    Explored patterns in patient's actions and choices and discussed options for new behaviors    Cognitive-behavioral Therapy    Discussed common cognitive distortions, identified them in patient's life    Explored ways to challenge, replace, and act against these cognitions    Acceptance and Commitment Therapy    Explored and identified important values in patient's life    Discussed ways to commit to behavioral activation around these values    Psychodynamic psychotherapy    Discussed patient's emotional dynamics and issues and how they impact behaviors    Explored patient's history of relationships and how they impact present behaviors    Explored how to work with and make changes in these schemas and patterns    Behavioral Activation    Discussed steps patient can take to become more involved in meaningful activity    Identified barriers to these activities and explored possible solutions    Mindfulness-Based Strategies    Discussed skills based on development and application of mindfulness    Skills drawn from dialectical behavior therapy, mindfulness-based stress reduction, mindfulness-based cognitive therapy, etc.      Care Plan review completed: Yes    Medication Review:  No changes to current psychiatric medication(s)    Medication Compliance:  Yes    Changes in Health Issues:   None reported    Chemical Use Review:   Substance Use: Chemical use reviewed, no active concerns identified       Tobacco Use: No change in amount of tobacco use since last session.  Patient declined discussion at this time    Assessment: Current Emotional / Mental Status (status of significant symptoms):  Risk status (Self / Other harm or suicidal ideation)  Patient has had a history of suicidal ideation: in adolescense and suicide  attempts: patient states that she tried multiple times around ages [age] by slitting wrists or overdose, and one overdose of alcohol; she denies ever being hospitalized for mental health and denies a history of self-injurious behavior, homicidal ideation, homicidal behavior and and other safety concerns  Patient denies current fears or concerns for personal safety.  Patient denies current or recent suicidal ideation or behaviors.   Patient denies current or recent homicidal ideation or behaviors.  Patient denies current or recent self injurious behavior or ideation.  Patient denies other safety concerns.  A safety and risk management plan has been developed including: Patient consented to co-developed safety plan.  A safety and risk management plan was completed.  Patient agreed to use safety plan should any safety concerns arise.  A copy was given to the patient.      Appearance:   unable to assess due to phone visit   Eye Contact:   unable to assess due to phone visit   Psychomotor Behavior: unable to assess due to phone visit   Attitude:   Cooperative  Pleasant  Orientation:   All  Speech   Rate / Production: Normal    Volume:  Normal   Mood:    Anxious  Depressed   Affect:    unable to assess due to phone visit   Thought Content:  Perservative  Rumination   Thought Form:  Circumstantial  Insight:    Good     Diagnoses:  1. Current severe episode of major depressive disorder without psychotic features without prior episode (H)    2. CRISTIANO (generalized anxiety disorder)        Collateral Reports Completed:  Not Applicable    Plan: (Homework, other):  Patient was given information about behavioral services and encouraged to schedule a follow up appointment with the clinic [facility] in 1 week.  She was also given information about mental health symptoms and treatment options , Cognitive Behavioral Therapy skills to practice when experiencing anxiety and depression and deep Breathing Strategies to practice when experiencing  anxiety and depression.  CD Recommendations: No indications of CD issues.       KVNG Watosn, [facility] [place]       ______________________________________________________________________    Integrated Primary Care Behavioral Health Treatment Plan    Patient's Name: Mattie Banda  YOB: 1980    Date: May 6, 2020    DSM-V Diagnoses: 296.33 (F33.2) Major Depressive Disorder, Recurrent Episode, Severe _ or 300.02 (F41.1) Generalized Anxiety Disorder  Psychosocial / Contextual Factors: medical issues,  and dating ex-, few friends  WHODAS: 36    Referral / Collaboration:  Referral to another professional/service is not indicated at this time..    Anticipated number of session or this episode of care: 8      MeasurableTreatment Goal(s) related to diagnosis / functional impairment(s)  Goal 1: Patient will demonstrate ability to improve depression and anxiety symptoms as evidenced by improved PHQ9 & GAD7 scores.       Objective #A (Patient Action)    Patient will Decrease frequency and intensity of feeling down, depressed, hopeless  Identify negative self-talk and behaviors: challenge core beliefs, myths, and actions  Decrease thoughts that you'd be better off dead or of suicide / self-harm  Status: New - Date: 1/28/2020 ; Improved and continued: 5/6/2020 ; Continued:8/11/2020    Intervention(s)  [facility] [place] will teach distress tolerance skills. Provide psycho-education on cognitive distortions and automatic thoughts and behaviors and ways to appropriately challenge and restructure thoughts.    Objective #B  Patient will increase coping strategies by 2  to more effectively manage current stressors  increase understanding of steps in the grief process  Status: New - Date: 1/28/2020; Improved and Continued: 5/6/2020; Continued:8/11/2020    Intervention(s)  [facility] [place] will teach mindfulness and relaxation strategies. Provide psycho-education on grief process.    Patient has reviewed and agreed to the above  plan.    Written by  KVNG Watson, [facility]

## 2020-08-16 DIAGNOSIS — M54.42 CHRONIC BILATERAL LOW BACK PAIN WITH BILATERAL SCIATICA: ICD-10-CM

## 2020-08-16 DIAGNOSIS — G43.109 MIGRAINE WITH AURA AND WITHOUT STATUS MIGRAINOSUS, NOT INTRACTABLE: ICD-10-CM

## 2020-08-16 DIAGNOSIS — M54.41 CHRONIC BILATERAL LOW BACK PAIN WITH BILATERAL SCIATICA: ICD-10-CM

## 2020-08-16 DIAGNOSIS — G89.29 CHRONIC BILATERAL LOW BACK PAIN WITH BILATERAL SCIATICA: ICD-10-CM

## 2020-08-17 DIAGNOSIS — F41.1 GAD (GENERALIZED ANXIETY DISORDER): ICD-10-CM

## 2020-08-17 DIAGNOSIS — F33.2 SEVERE EPISODE OF RECURRENT MAJOR DEPRESSIVE DISORDER, WITHOUT PSYCHOTIC FEATURES (H): ICD-10-CM

## 2020-08-17 RX ORDER — BUSPIRONE HYDROCHLORIDE 10 MG/1
20 TABLET ORAL 2 TIMES DAILY
Qty: 120 TABLET | Refills: 1 | Status: CANCELLED | OUTPATIENT
Start: 2020-08-17

## 2020-08-17 RX ORDER — TOPIRAMATE 25 MG/1
TABLET, FILM COATED ORAL
Qty: 90 TABLET | Refills: 1 | Status: SHIPPED | OUTPATIENT
Start: 2020-08-17 | End: 2020-10-14

## 2020-08-17 RX ORDER — ARIPIPRAZOLE 2 MG/1
2 TABLET ORAL DAILY
Qty: 30 TABLET | Refills: 1 | Status: CANCELLED | OUTPATIENT
Start: 2020-08-17

## 2020-08-17 NOTE — TELEPHONE ENCOUNTER
"  Requested Prescriptions   Pending Prescriptions Disp Refills     topiramate (TOPAMAX) 25 MG tablet [Pharmacy Med Name: TOPIRAMATE 25MG TABLET] 90 tablet 1     Sig: TAKE 1 TABLET BY MOUTH 3 TIMES A DAY   Last Written Prescription Date:  6/17/2020  Last Fill Quantity: 90,  # refills: 1   Last office visit: 6/5/2020    Future Office Visit:        Anti-Seizure Meds Protocol  Failed - 8/16/2020  5:13 AM        Failed - Review Authorizing provider's last note.      Refer to last progress notes: confirm request is for original authorizing provider (cannot be through other providers).        Passed - Recent (12 mo) or future (30 days) visit within the authorizing provider's specialty     Patient has had an office visit with the authorizing provider or a provider within the authorizing providers department within the previous 12 mos or has a future within next 30 days. See \"Patient Info\" tab in inbasket, or \"Choose Columns\" in Meds & Orders section of the refill encounter.            Passed - Normal CBC on file in past 26 months     Recent Labs   Lab Test 04/24/20  1246   WBC 10.5   RBC 4.30   HGB 12.8   HCT 40.6              Passed - Normal ALT or AST on file in past 26 months     Recent Labs   Lab Test 04/24/20  1246   ALT 11     Recent Labs   Lab Test 04/24/20  1246   AST 12           Passed - Normal platelet count on file in past 26 months     Recent Labs   Lab Test 04/24/20  1246              Passed - Medication is active on med list        Passed - No active pregnancy on record        Passed - No positive pregnancy test in last 12 months         Prescription approved per [facility] Refill Protocol.  Estefanía Prado RN      "

## 2020-08-18 ENCOUNTER — TELEPHONE (OUTPATIENT)
Dept: FAMILY MEDICINE | Facility: CLINIC | Age: 40
End: 2020-08-18

## 2020-08-18 NOTE — TELEPHONE ENCOUNTER
Note is completed in Epic indicating restrictions that I think are appropriate.  Patient should be notified and she has access to this in my chart.  Please see how she would like this delivered.  Yuan Schofield MD

## 2020-08-18 NOTE — TELEPHONE ENCOUNTER
Please check with patient.  It is my recommendation that 50 pounds or less would be considered.  However this is in a straight lift.  If pushing and pulling are considered I might be willing to increase this to 100 pounds and perhaps more.  More information about the job also may be helpful.  Patient's thoughts on what she might be able to lift or carry and how frequently should also be determined.  Yuan Schofield MD

## 2020-08-18 NOTE — TELEPHONE ENCOUNTER
LM please give message below from Dr. Schofield. Please get more info about job per note and route back to Dr. Schofield. Thank you    Noy Castaneda MA 8/18/2020  3:06 PM

## 2020-08-18 NOTE — TELEPHONE ENCOUNTER
Called and LM for patient to call back. Please see if she would like letter sent in [employer] for patient to print. Or if she would like a copy mailed to her home. Or if she needs us to fax letter to her employer. If so will need name of company and fax number.     Bhavani Ochoa MA

## 2020-08-18 NOTE — TELEPHONE ENCOUNTER
Routing to [facility] to reach out to patient to assist in scheduling follow up with -- if patient does not wish to schedule a follow up with provider, please inform to follow up with PCP and note account. Please notify nursing of outcome.     Please attempt to connect patient with nursing once scheduled to discuss medications.     Silvia Hassan RN    Nurse Liaison  [facility]

## 2020-08-18 NOTE — TELEPHONE ENCOUNTER
Reason for Call:  Other:     Detailed comments: Mattie has a job interview today and she was informed to call and get her weight restrictions before the interview. States that the weight is needed because she has had a past back injury.     Phone Number Patient can be reached at: Home number on file [phone] (home)    Best Time: any     Can we leave a detailed message on this number? YES    Call taken on 8/18/2020 at 12:13 PM by Yessica Duke

## 2020-08-18 NOTE — TELEPHONE ENCOUNTER
Pending Prescriptions:                       Disp   Refills    ARIPiprazole (ABILIFY) 2 MG tablet         30 tab*1        Sig: Take 1 tablet (2 mg) by mouth daily    busPIRone (BUSPAR) 10 MG tablet            120 ta*1        Sig: Take 2 tablets (20 mg) by mouth 2 times daily Take 15           mg twice daily for one week then 20 mg twice           daily.    Routing refill request to provider for review/approval because:  Labs not current:  LDL, PHQ-9    Antonina Oreilly, EFRAINN, RN, PHN

## 2020-08-18 NOTE — TELEPHONE ENCOUNTER
Tiffany Fallon, DO  Psych Rn - [facility] 22 minutes ago (10:51 AM)       Can someone follow-up with her please? She was a no-show at last visit and I have no idea if she is tolerating the med changes well. Also needs follow-up visit scheduled. Then can provide bridge Rx until next scheduled appt.     Message text

## 2020-08-19 NOTE — TELEPHONE ENCOUNTER
A voicemail was left for the patient to connect with central scheduling to make a return appointment with the provider.

## 2020-08-25 ENCOUNTER — VIRTUAL VISIT (OUTPATIENT)
Dept: BEHAVIORAL HEALTH | Facility: CLINIC | Age: 40
End: 2020-08-25
Payer: COMMERCIAL

## 2020-08-25 DIAGNOSIS — F41.1 GAD (GENERALIZED ANXIETY DISORDER): ICD-10-CM

## 2020-08-25 DIAGNOSIS — F32.2 CURRENT SEVERE EPISODE OF MAJOR DEPRESSIVE DISORDER WITHOUT PSYCHOTIC FEATURES WITHOUT PRIOR EPISODE (H): Primary | ICD-10-CM

## 2020-08-25 PROCEDURE — 90832 PSYTX W PT 30 MINUTES: CPT | Mod: 95 | Performed by: MARRIAGE & FAMILY THERAPIST

## 2020-08-25 NOTE — TELEPHONE ENCOUNTER
Patient is on for follow up with Dr Fallon and Dr Walls 10.5.2020, states she is doing good on her new medication and will call if she needs anything prior to the appt

## 2020-08-25 NOTE — PROGRESS NOTES
"[facility] Primary Care: Integrated Behavioral Health  August 25, 2020      Mattie Banda is a 39 year old female who is being evaluated via a telephone visit.      The patient has been notified of the following:     \"We have found that certain health care needs can be provided without the need for a face to face visit.  This service lets us provide the care you need with a short phone conversation.      I will have full access to your [facility] medical record during this entire phone call.   I will be taking notes for your medical record.     Since this is like an office visit, we will bill your insurance company for this service.  Please check with your medical insurance if this type of telephone visit/virtual care is covered.  You may be responsible for the cost of this service if insurance coverage is denied.      There are potential benefits and risks of telephone visits (e.g. limits to patient confidentiality) that differ from in-person visits.?  Confidentiality still applies for telephone services, and nobody will record the visit.  It is important to be in a quiet, private space that is free of distractions (including cell phone or other devices) during the visit.??     If during the course of the call I believe a telephone visit is not appropriate, you will not be charged for this service\"    Consent has been obtained for this service by care team member: yes.    Behavioral Health Clinician Progress Note    Patient Name: Mattie Banda           Service Type:  Individual     Session Start Time: 1:06  Session End Time: 1:35        Session Length: 16 - 37      Attendees: Patient    Visit Activities (Refresh list every visit): [facility] Only    Diagnostic Assessment Date: 1/21/2020  Treatment Plan Review Date: 8/11/2020  See Flowsheets for today's PHQ-9 and CRISTIANO-7 results  Previous PHQ-9:   PHQ-9 SCORE 1/7/2020 3/4/2020 5/7/2020   PHQ-9 Total Score - - -   PHQ-9 Total Score MyChart - - 22 (Severe " depression)   PHQ-9 Total Score 24 25 22     Previous CRISTIANO-7:   CRISTIANO-7 SCORE 1/7/2020 3/4/2020 5/7/2020   Total Score - - 20 (severe anxiety)   Total Score 20 20 20       EMILIA LEVEL:  EMILIA Score (Last Two) 11/14/2012   EMILIA Raw Score 44   Activation Score 70.8   EMILIA Level 4     Clinical Global Impressions  First:  Considering your total clinical experience with this particular patient population, how severe are the patient's symptoms at this time?: 5 (5/6/2020  2:23 PM)      Most recent:  Compared to the patient's condition at the START of treatment, this patient's condition is: 3 (5/6/2020  2:23 PM)          DATA  Extended Session (60+ minutes): No  Interactive Complexity: No  Crisis: No  [facility] Patient: No    Treatment Objective(s) Addressed in This Session:  Target Behavior(s): disease management/lifestyle changes depression and anxiety    Depressed Mood: Increase interest, engagement, and pleasure in doing things  Decrease frequency and intensity of feeling down, depressed, hopeless  Improve quantity and quality of night time sleep / decrease daytime naps  Feel less tired and more energy during the day   Improve diet, appetite, mindful eating, and / or meal planning  Identify negative self-talk and behaviors: challenge core beliefs, myths, and actions  Improve concentration, focus, and mindfulness in daily activities   Decrease thoughts that you'd be better off dead or of suicide / self-harm  Anxiety: will experience a reduction in anxiety, will develop more effective coping skills to manage anxiety symptoms, will develop healthy cognitive patterns and beliefs and will increase ability to function adaptively    Current Stressors / Issues:  Patient reports that she missed last week's appointment due to a job interview. She states that she was unable to take that job due to needing to lift over 100lbs. 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  Patient identified feeling nervous about returning to work. She states that is nervous about meeting new people.  Patient reflected that she had reached out to her former employers and never got a response back.   Patient reports feeling \"up and down\" in the past couple of weeks. She is working to remind herself of progress she has made. Patient talked about her sister coming, from out of state, for a visit. She states that her mom interfered and this created conflict.     Patient talked about her son's depression improving.     Patient continues to work on setting boundaries with her Uncle. She provided the example that she has not been helping him get his mail. She states that he won't talk with her as a result.     Reinforced patient getting back into working. Patient acknowledged the benefits. She identified feeling apprehension about it as well. Validated and normalized patient's feelings.     Progress on Treatment Objective(s) / Homework:  No improvement - ACTION (Actively working towards change); Intervened by reinforcing change plan / affirming steps taken    Motivational Interviewing    MI Intervention: Expressed Empathy/Understanding, Supported Autonomy, Collaboration, Evocation, Permission to raise concern or advise, Open-ended questions, Reflections: simple and complex, Change talk (evoked) and Reframe     Change Talk Expressed by the Patient: Desire to change Ability to change Reasons to change Need to change Committment to change Activation Taking steps    Provider Response to Change Talk: E - Evoked more info from patient about behavior change, A - Affirmed patient's thoughts, decisions, or attempts at behavior change, R - Reflected patient's change talk and S - Summarized patient's change talk statements    Also provided psychoeducation about behavioral health condition, symptoms, and treatment options      Skills training    Explored skills useful to " client in current situation    Skills include assertiveness, communication, conflict management, problem-solving, relaxation, etc.    Solution-Focused Therapy    Explored patterns in patient's relationships and discussed options for new behaviors    Explored patterns in patient's actions and choices and discussed options for new behaviors    Cognitive-behavioral Therapy    Discussed common cognitive distortions, identified them in patient's life    Explored ways to challenge, replace, and act against these cognitions    Acceptance and Commitment Therapy    Explored and identified important values in patient's life    Discussed ways to commit to behavioral activation around these values    Psychodynamic psychotherapy    Discussed patient's emotional dynamics and issues and how they impact behaviors    Explored patient's history of relationships and how they impact present behaviors    Explored how to work with and make changes in these schemas and patterns    Behavioral Activation    Discussed steps patient can take to become more involved in meaningful activity    Identified barriers to these activities and explored possible solutions    Mindfulness-Based Strategies    Discussed skills based on development and application of mindfulness    Skills drawn from dialectical behavior therapy, mindfulness-based stress reduction, mindfulness-based cognitive therapy, etc.      Care Plan review completed: Yes    Medication Review:  No changes to current psychiatric medication(s)    Medication Compliance:  Yes    Changes in Health Issues:   None reported    Chemical Use Review:   Substance Use: Chemical use reviewed, no active concerns identified       Tobacco Use: No change in amount of tobacco use since last session.  Patient declined discussion at this time    Assessment: Current Emotional / Mental Status (status of significant symptoms):  Risk status (Self / Other harm or suicidal ideation)  Patient has had a history of  suicidal ideation: in adolescense and suicide attempts: patient states that she tried multiple times around ages [age] by slitting wrists or overdose, and one overdose of alcohol; she denies ever being hospitalized for mental health and denies a history of self-injurious behavior, homicidal ideation, homicidal behavior and and other safety concerns  Patient denies current fears or concerns for personal safety.  Patient denies current or recent suicidal ideation or behaviors.   Patient denies current or recent homicidal ideation or behaviors.  Patient denies current or recent self injurious behavior or ideation.  Patient denies other safety concerns.  A safety and risk management plan has been developed including: Patient consented to co-developed safety plan.  A safety and risk management plan was completed.  Patient agreed to use safety plan should any safety concerns arise.  A copy was given to the patient.      Appearance:   unable to assess due to phone visit   Eye Contact:   unable to assess due to phone visit   Psychomotor Behavior: unable to assess due to phone visit   Attitude:   Cooperative  Pleasant  Orientation:   All  Speech   Rate / Production: Normal    Volume:  Normal   Mood:    Anxious  Depressed   Affect:    unable to assess due to phone visit   Thought Content:  Perservative  Rumination   Thought Form:  Circumstantial  Insight:    Good     Diagnoses:  1. Current severe episode of major depressive disorder without psychotic features without prior episode (H)    2. CRISTIANO (generalized anxiety disorder)        Collateral Reports Completed:  Not Applicable    Plan: (Homework, other):  Patient was given information about behavioral services and encouraged to schedule a follow up appointment with the clinic [facility] in 1-2 weeks.  She was also given information about mental health symptoms and treatment options , Cognitive Behavioral Therapy skills to practice when experiencing anxiety and depression and deep  Breathing Strategies to practice when experiencing anxiety and depression.  CD Recommendations: No indications of CD issues.       KVNG Watson, [facility]       ______________________________________________________________________    Integrated Primary Care Behavioral Health Treatment Plan    Patient's Name: Mattie Banda  YOB: 1980    Date: May 6, 2020    DSM-V Diagnoses: 296.33 (F33.2) Major Depressive Disorder, Recurrent Episode, Severe _ or 300.02 (F41.1) Generalized Anxiety Disorder  Psychosocial / Contextual Factors: medical issues,  and dating ex-, few friends  WHODAS: 36    Referral / Collaboration:  Referral to another professional/service is not indicated at this time..    Anticipated number of session or this episode of care: 8      MeasurableTreatment Goal(s) related to diagnosis / functional impairment(s)  Goal 1: Patient will demonstrate ability to improve depression and anxiety symptoms as evidenced by improved PHQ9 & GAD7 scores.       Objective #A (Patient Action)    Patient will Decrease frequency and intensity of feeling down, depressed, hopeless  Identify negative self-talk and behaviors: challenge core beliefs, myths, and actions  Decrease thoughts that you'd be better off dead or of suicide / self-harm  Status: New - Date: 1/28/2020 ; Improved and continued: 5/6/2020 ; Continued:8/11/2020    Intervention(s)  [facility] will teach distress tolerance skills. Provide psycho-education on cognitive distortions and automatic thoughts and behaviors and ways to appropriately challenge and restructure thoughts.    Objective #B  Patient will increase coping strategies by 2  to more effectively manage current stressors  increase understanding of steps in the grief process  Status: New - Date: 1/28/2020; Improved and Continued: 5/6/2020; Continued:8/11/2020    Intervention(s)  [facility] will teach mindfulness and relaxation strategies. Provide psycho-education on grief  process.    Patient has reviewed and agreed to the above plan.    Written by  KVNG Watson, [facility] [place]

## 2020-08-25 NOTE — TELEPHONE ENCOUNTER
Noted.   Cancelling refill requests.    Silvia Hassan, RN    Nurse Liaison  [facility]

## 2020-08-31 ENCOUNTER — TELEPHONE (OUTPATIENT)
Dept: PSYCHIATRY | Facility: CLINIC | Age: 40
End: 2020-08-31

## 2020-08-31 DIAGNOSIS — F33.2 SEVERE EPISODE OF RECURRENT MAJOR DEPRESSIVE DISORDER, WITHOUT PSYCHOTIC FEATURES (H): ICD-10-CM

## 2020-08-31 DIAGNOSIS — F41.1 GAD (GENERALIZED ANXIETY DISORDER): ICD-10-CM

## 2020-08-31 RX ORDER — BUSPIRONE HYDROCHLORIDE 10 MG/1
20 TABLET ORAL 2 TIMES DAILY
Qty: 120 TABLET | Refills: 0 | Status: SHIPPED | OUTPATIENT
Start: 2020-08-31 | End: 2020-09-23

## 2020-08-31 NOTE — TELEPHONE ENCOUNTER
Reason for call:  Medication   If this is a refill request, has the caller requested the refill from the pharmacy already? Yes  Will the patient be using a [facility]? No  Name of the pharmacy and phone number for the current request:   [id number] - [place], [place] - [address] [place] [address] [phone] (Phone)  [phone] (Fax)       Name of the medication requested: Buspar     Other request: Pt reports her pharmacy has sent multiple refill requests but they have not received a response.  Pt is almost out of meds at this time.  She would like a callback to discuss if there is a reason it can't be filled.    Phone number to reach patient:  Cell number on file:    Telephone Information:   Mobile [phone]       Best Time:  Anytime    Can we leave a detailed message on this number?  YES    Travel screening: Not Applicable

## 2020-08-31 NOTE — TELEPHONE ENCOUNTER
"Buspar refill. Last filled 6/18/20, 2 month rx, from Dr Fallon.     Last appt: 6/18/20  Next appt: 10/5/20    Per Dr Fallon's 8/18:  Can someone follow-up with her please? She was a no-show at last visit and I have no idea if she is tolerating the med changes well. Also needs follow-up visit scheduled. Then can provide bridge Rx until next scheduled appt.      Phone call to Mattie. She reports that Buspar 20mg twice daily is \"really good.\" Denied side effects.     Buspar refill sent to pharmacy per Dr Fallon's note. Routing to Dr Fallon as FYI.    Aliya Valentine RN on 8/31/2020 at 12:13 PM    "

## 2020-09-03 NOTE — TELEPHONE ENCOUNTER
Attempted to contact patient , LVM updating her that a script was sent to [facility] on 8/31/20, and to call back if she needs anything else.

## 2020-09-03 NOTE — TELEPHONE ENCOUNTER
Pharmacy called again, patient is still out of meds and has appt for 10.5 with Dr Fallon. Can she get a bridge until 10.5 appt?

## 2020-09-09 ENCOUNTER — PATIENT OUTREACH (OUTPATIENT)
Dept: CARE COORDINATION | Facility: CLINIC | Age: 40
End: 2020-09-09

## 2020-09-09 RX ORDER — ARIPIPRAZOLE 2 MG/1
2 TABLET ORAL DAILY
Qty: 30 TABLET | Refills: 0 | Status: SHIPPED | OUTPATIENT
Start: 2020-09-09 | End: 2020-10-05

## 2020-09-09 NOTE — TELEPHONE ENCOUNTER
Called and spoke with pharmacy staff, confirmed that they did receive the Buspar script, but they have been trying to get the Abilify filled sine the 15 th of August through fax and phone calls. They state they spoke to someone on 9/3/20 who told them they would get the fill taken care. Do not see any open encounters for 9/3/20.     Refill for: Abilify 2 mg 30 day supply pended    Last Appointment: 6/18/20    Next Appointment: 10/5/20    No Shows/Cancellations since last appointment: no show: 7/30/20    Last Refill in Epic (date and amount/how many days):     Last office visit note reviewed and summarized below:  Treatment Plan:    Continue alprazolam per primary care provider.  It will continue to be the responsibility of your primary care provider to prescribe/refill alprazolam.    Continue Abilify 2 mg daily to augment Lexapro    Increase BuSpar to 20 mg twice daily for anxiety.  Take 15 mg twice daily for about 1 week, and then increase to 20 mg twice daily.    Continue Lexapro 20 mg daily for mood and anxiety    Continue trazodone 100 mg at bedtime as needed for sleep    Continue Topamax as prescribed by her primary care provider        Routing to provider.       Prosper Rankin, SHMUEL   Nurse Liaison  [facility]

## 2020-09-09 NOTE — PROGRESS NOTES
Clinic Care Coordination Contact  [facility]/Voicemail       Clinical Data: Care Coordinator Outreach  Outreach attempted x 1.  Left message on patient's voicemail with call back information and requested return call.  Plan: Care Coordinator sent care coordination introduction letter on 6/19/20 via [employer] App. Care Coordinator will try to reach patient again in 5-7 days.      MARK Salas   Primary Care Clinic- Social Work Care Coordinator  [facility]  9/9/2020 10:38 AM  [phone]

## 2020-09-14 ENCOUNTER — MYC REFILL (OUTPATIENT)
Dept: FAMILY MEDICINE | Facility: CLINIC | Age: 40
End: 2020-09-14

## 2020-09-14 DIAGNOSIS — G47.9 SLEEP DISORDER: ICD-10-CM

## 2020-09-14 DIAGNOSIS — M51.369 DEGENERATION OF LUMBAR INTERVERTEBRAL DISC: ICD-10-CM

## 2020-09-14 DIAGNOSIS — Z90.710 HISTORY OF ROBOT-ASSISTED LAPAROSCOPIC HYSTERECTOMY: ICD-10-CM

## 2020-09-14 DIAGNOSIS — F41.8 MIXED ANXIETY DEPRESSIVE DISORDER: ICD-10-CM

## 2020-09-14 DIAGNOSIS — M50.30 DDD (DEGENERATIVE DISC DISEASE), CERVICAL: ICD-10-CM

## 2020-09-14 NOTE — TELEPHONE ENCOUNTER
ALPRAZolam (XANAX) 2 MG tablet      Last Written Prescription Date:  08/24/2020  Last Fill Quantity: 90,   # refills: 0  Last Office Visit: 03/02/2020  Future Office visit:       Routing refill request to provider for review/approval because:  Drug not on the [facility], [facility] or [facility] refill protocol or controlled substance     oxyCODONE (ROXICODONE) 5 MG tablet    Last Written Prescription Date:  08/19/2020  Last Fill Quantity: 240,   # refills: 0  Last Office Visit: 03/02/2020  Future Office visit:       Routing refill request to provider for review/approval because:  Drug not on the [facility], [facility] or [facility] refill protocol or controlled substance    morphine (MS CONTIN) 15 MG CR tablet     Last Written Prescription Date:  08/19/2020  Last Fill Quantity: 60,   # refills: 0  Last Office Visit: 03/02/2020  Future Office visit:       Routing refill request to provider for review/approval because:  Drug not on the [facility], [facility] or [facility] refill protocol or controlled substance

## 2020-09-15 ENCOUNTER — VIRTUAL VISIT (OUTPATIENT)
Dept: BEHAVIORAL HEALTH | Facility: CLINIC | Age: 40
End: 2020-09-15
Payer: COMMERCIAL

## 2020-09-15 ENCOUNTER — MYC REFILL (OUTPATIENT)
Dept: FAMILY MEDICINE | Facility: CLINIC | Age: 40
End: 2020-09-15

## 2020-09-15 DIAGNOSIS — M51.369 DEGENERATION OF LUMBAR INTERVERTEBRAL DISC: ICD-10-CM

## 2020-09-15 DIAGNOSIS — G47.9 SLEEP DISORDER: ICD-10-CM

## 2020-09-15 DIAGNOSIS — M50.30 DDD (DEGENERATIVE DISC DISEASE), CERVICAL: ICD-10-CM

## 2020-09-15 DIAGNOSIS — F41.8 MIXED ANXIETY DEPRESSIVE DISORDER: ICD-10-CM

## 2020-09-15 DIAGNOSIS — F41.1 GAD (GENERALIZED ANXIETY DISORDER): ICD-10-CM

## 2020-09-15 DIAGNOSIS — Z90.710 HISTORY OF ROBOT-ASSISTED LAPAROSCOPIC HYSTERECTOMY: ICD-10-CM

## 2020-09-15 DIAGNOSIS — F32.2 CURRENT SEVERE EPISODE OF MAJOR DEPRESSIVE DISORDER WITHOUT PSYCHOTIC FEATURES WITHOUT PRIOR EPISODE (H): Primary | ICD-10-CM

## 2020-09-15 PROCEDURE — 90832 PSYTX W PT 30 MINUTES: CPT | Mod: 95 | Performed by: MARRIAGE & FAMILY THERAPIST

## 2020-09-15 RX ORDER — OXYCODONE HYDROCHLORIDE 5 MG/1
5-10 TABLET ORAL EVERY 6 HOURS PRN
Qty: 60 TABLET | Refills: 0 | Status: SHIPPED | OUTPATIENT
Start: 2020-09-15 | End: 2020-09-24

## 2020-09-15 RX ORDER — ALPRAZOLAM 2 MG
TABLET ORAL
Qty: 30 TABLET | Refills: 0 | Status: SHIPPED | OUTPATIENT
Start: 2020-09-15 | End: 2020-09-24

## 2020-09-15 RX ORDER — MORPHINE SULFATE 15 MG/1
15 TABLET, FILM COATED, EXTENDED RELEASE ORAL EVERY 12 HOURS
Qty: 15 TABLET | Refills: 0 | Status: SHIPPED | OUTPATIENT
Start: 2020-09-15 | End: 2020-09-24

## 2020-09-15 NOTE — PROGRESS NOTES
"[facility] Primary Care: Integrated Behavioral Health  September 15, 2020      Mattie Banda is a 39 year old female who is being evaluated via a telephone visit.      The patient has been notified of the following:     \"We have found that certain health care needs can be provided without the need for a face to face visit.  This service lets us provide the care you need with a short phone conversation.      I will have full access to your [place] medical record during this entire phone call.   I will be taking notes for your medical record.     Since this is like an office visit, we will bill your insurance company for this service.  Please check with your medical insurance if this type of telephone visit/virtual care is covered.  You may be responsible for the cost of this service if insurance coverage is denied.      There are potential benefits and risks of telephone visits (e.g. limits to patient confidentiality) that differ from in-person visits.?  Confidentiality still applies for telephone services, and nobody will record the visit.  It is important to be in a quiet, private space that is free of distractions (including cell phone or other devices) during the visit.??     If during the course of the call I believe a telephone visit is not appropriate, you will not be charged for this service\"    Consent has been obtained for this service by care team member: yes.    Behavioral Health Clinician Progress Note    Patient Name: Mattie Banda           Service Type:  Individual     Session Start Time: 1:06  Session End Time: 1:35        Session Length: 16 - 37      Attendees: Patient    Visit Activities (Refresh list every visit): [facility] Only    Diagnostic Assessment Date: 1/21/2020  Treatment Plan Review Date: 8/11/2020  See Flowsheets for today's PHQ-9 and CRISTIANO-7 results  Previous PHQ-9:   PHQ-9 SCORE 1/7/2020 3/4/2020 5/7/2020   PHQ-9 Total Score - - -   PHQ-9 Total Score MyChart - - 22 " "(Severe depression)   PHQ-9 Total Score 24 25 22     Previous CRISTIANO-7:   CRISTIANO-7 SCORE 1/7/2020 3/4/2020 5/7/2020   Total Score - - 20 (severe anxiety)   Total Score 20 20 20       EMILIA LEVEL:  EMILIA Score (Last Two) 11/14/2012   EMILIA Raw Score 44   Activation Score 70.8   EMILIA Level 4     Clinical Global Impressions  First:  Considering your total clinical experience with this particular patient population, how severe are the patient's symptoms at this time?: 5 (5/6/2020  2:23 PM)      Most recent:  Compared to the patient's condition at the START of treatment, this patient's condition is: 3 (5/6/2020  2:23 PM)          DATA  Extended Session (60+ minutes): No  Interactive Complexity: No  Crisis: No  [facility] Patient: No    Treatment Objective(s) Addressed in This Session:  Target Behavior(s): disease management/lifestyle changes depression and anxiety    Depressed Mood: Increase interest, engagement, and pleasure in doing things  Decrease frequency and intensity of feeling down, depressed, hopeless  Improve quantity and quality of night time sleep / decrease daytime naps  Feel less tired and more energy during the day   Improve diet, appetite, mindful eating, and / or meal planning  Identify negative self-talk and behaviors: challenge core beliefs, myths, and actions  Improve concentration, focus, and mindfulness in daily activities   Decrease thoughts that you'd be better off dead or of suicide / self-harm  Anxiety: will experience a reduction in anxiety, will develop more effective coping skills to manage anxiety symptoms, will develop healthy cognitive patterns and beliefs and will increase ability to function adaptively    Current Stressors / Issues:  Patient reports that she has \"been in a funk\". She reports increased stress with her son restarting school and her spouse having broken ribs. She states that she has had to take on more responsibility at home. She states that this has brought her more pain, which she thinks is " "causing her to feel more down. Patient reports that she is having interrupted sleep. She states that she is eating well.   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 She reports that she connected with social security and they said this fit within parameters.   Patient talked about about her new job. She states that this woman wanted her to call her granny. Patient identified that she felt as though she was betraying her own Granny. She states that this job has felt positive to her.  Patient states that she is at her mom's and going through some of her granny's things. She states that things have been better since she and her mom had a talk.   Patient reports that she has had some \"breakdown days\" where she feels more down. She notices that her spouse is seemingly more understanding of this. She states that she had talked with him about what she was feeling.     Patient identified that she feels there has been a lot going on and this has made her feel \"blah\".       Patient processed recent events. Discussed stress and encouraged use of self care. Reinforced patient obtaining a job and finding it enjoyable so far.     Progress on Treatment Objective(s) / Homework:  No improvement - ACTION (Actively working towards change); Intervened by reinforcing change plan / affirming steps taken    Motivational Interviewing    MI Intervention: Expressed Empathy/Understanding, Supported Autonomy, Collaboration, Evocation, Permission to raise concern or advise, Open-ended questions, Reflections: simple and complex, Change talk (evoked) and Reframe     Change Talk Expressed by the Patient: Desire to change Ability to change Reasons to change Need to change Committment to change Activation Taking steps    Provider Response to Change Talk: E - Evoked more info from patient about behavior change, A - Affirmed patient's thoughts, decisions, or attempts at behavior change, R - Reflected patient's " change talk and S - Summarized patient's change talk statements    Also provided psychoeducation about behavioral health condition, symptoms, and treatment options      Skills training    Explored skills useful to client in current situation    Skills include assertiveness, communication, conflict management, problem-solving, relaxation, etc.    Solution-Focused Therapy    Explored patterns in patient's relationships and discussed options for new behaviors    Explored patterns in patient's actions and choices and discussed options for new behaviors    Cognitive-behavioral Therapy    Discussed common cognitive distortions, identified them in patient's life    Explored ways to challenge, replace, and act against these cognitions    Acceptance and Commitment Therapy    Explored and identified important values in patient's life    Discussed ways to commit to behavioral activation around these values    Psychodynamic psychotherapy    Discussed patient's emotional dynamics and issues and how they impact behaviors    Explored patient's history of relationships and how they impact present behaviors    Explored how to work with and make changes in these schemas and patterns    Behavioral Activation    Discussed steps patient can take to become more involved in meaningful activity    Identified barriers to these activities and explored possible solutions    Mindfulness-Based Strategies    Discussed skills based on development and application of mindfulness    Skills drawn from dialectical behavior therapy, mindfulness-based stress reduction, mindfulness-based cognitive therapy, etc.      Care Plan review completed: Yes    Medication Review:  No changes to current psychiatric medication(s)    Medication Compliance:  Yes    Changes in Health Issues:   None reported    Chemical Use Review:   Substance Use: Chemical use reviewed, no active concerns identified       Tobacco Use: No change in amount of tobacco use since last  session.  Patient declined discussion at this time    Assessment: Current Emotional / Mental Status (status of significant symptoms):  Risk status (Self / Other harm or suicidal ideation)  Patient has had a history of suicidal ideation: in adolescense and suicide attempts: patient states that she tried multiple times around ages [age] by slitting wrists or overdose, and one overdose of alcohol; she denies ever being hospitalized for mental health and denies a history of self-injurious behavior, homicidal ideation, homicidal behavior and and other safety concerns  Patient denies current fears or concerns for personal safety.  Patient denies current or recent suicidal ideation or behaviors.   Patient denies current or recent homicidal ideation or behaviors.  Patient denies current or recent self injurious behavior or ideation.  Patient denies other safety concerns.  A safety and risk management plan has been developed including: Patient consented to co-developed safety plan.  A safety and risk management plan was completed.  Patient agreed to use safety plan should any safety concerns arise.  A copy was given to the patient.      Appearance:   unable to assess due to phone visit   Eye Contact:   unable to assess due to phone visit   Psychomotor Behavior: unable to assess due to phone visit   Attitude:   Cooperative  Pleasant  Orientation:   All  Speech   Rate / Production: Normal    Volume:  Normal   Mood:    Anxious  Depressed   Affect:    unable to assess due to phone visit   Thought Content:  Perservative  Rumination   Thought Form:  Circumstantial  Insight:    Good     Diagnoses:  1. Current severe episode of major depressive disorder without psychotic features without prior episode (H)    2. CRISTIANO (generalized anxiety disorder)        Collateral Reports Completed:  Not Applicable    Plan: (Homework, other):  Patient was given information about behavioral services and encouraged to schedule a follow up appointment  with the clinic [facility] in 1-2 weeks.  She was also given information about mental health symptoms and treatment options , Cognitive Behavioral Therapy skills to practice when experiencing anxiety and depression and deep Breathing Strategies to practice when experiencing anxiety and depression.  CD Recommendations: No indications of CD issues.       KVNG Watson, [facility]       ______________________________________________________________________    Integrated Primary Care Behavioral Health Treatment Plan    Patient's Name: Mattie Banda  YOB: 1980    Date: May 6, 2020    DSM-V Diagnoses: 296.33 (F33.2) Major Depressive Disorder, Recurrent Episode, Severe _ or 300.02 (F41.1) Generalized Anxiety Disorder  Psychosocial / Contextual Factors: medical issues,  and dating ex-, few friends  WHODAS: 36    Referral / Collaboration:  Referral to another professional/service is not indicated at this time..    Anticipated number of session or this episode of care: 8      MeasurableTreatment Goal(s) related to diagnosis / functional impairment(s)  Goal 1: Patient will demonstrate ability to improve depression and anxiety symptoms as evidenced by improved PHQ9 & GAD7 scores.       Objective #A (Patient Action)    Patient will Decrease frequency and intensity of feeling down, depressed, hopeless  Identify negative self-talk and behaviors: challenge core beliefs, myths, and actions  Decrease thoughts that you'd be better off dead or of suicide / self-harm  Status: New - Date: 1/28/2020 ; Improved and continued: 5/6/2020 ; Continued:8/11/2020    Intervention(s)  [facility] will teach distress tolerance skills. Provide psycho-education on cognitive distortions and automatic thoughts and behaviors and ways to appropriately challenge and restructure thoughts.    Objective #B  Patient will increase coping strategies by 2  to more effectively manage current stressors  increase understanding of steps in the grief  process  Status: New - Date: 1/28/2020; Improved and Continued: 5/6/2020; Continued:8/11/2020    Intervention(s)  [facility] will teach mindfulness and relaxation strategies. Provide psycho-education on grief process.    Patient has reviewed and agreed to the above plan.    Written by  KVNG Watson, [facility]

## 2020-09-15 NOTE — TELEPHONE ENCOUNTER
Ms contin      Last Written Prescription Date:  8/19/20  Last Fill Quantity: 60,   # refills: 0  Last Office Visit: 6/05/20  Future Office visit:       Routing refill request to provider for review/approval because:  Drug not on the [facility], [facility] or [facility] refill protocol or controlled substance    roxicodone      Last Written Prescription Date:  08/19/20  Last Fill Quantity: 240,   # refills: 0  Last Office Visit: 6/05/20  Future Office visit:       Routing refill request to provider for review/approval because:  Drug not on the [facility], [facility] or [facility] refill protocol or controlled substance    xanax      Last Written Prescription Date:  8/24/20  Last Fill Quantity: 90,   # refills: 0  Last Office Visit: 6/05/20  Future Office visit:       Routing refill request to provider for review/approval because:  Drug not on the [facility], [facility] or [facility] refill protocol or controlled substance

## 2020-09-16 DIAGNOSIS — M51.369 DEGENERATION OF LUMBAR INTERVERTEBRAL DISC: ICD-10-CM

## 2020-09-16 RX ORDER — METHOCARBAMOL 750 MG/1
750 TABLET, FILM COATED ORAL 3 TIMES DAILY PRN
Qty: 90 TABLET | Refills: 1 | Status: SHIPPED | OUTPATIENT
Start: 2020-09-16 | End: 2020-11-09

## 2020-09-18 DIAGNOSIS — F41.1 GAD (GENERALIZED ANXIETY DISORDER): ICD-10-CM

## 2020-09-18 DIAGNOSIS — F33.2 SEVERE EPISODE OF RECURRENT MAJOR DEPRESSIVE DISORDER, WITHOUT PSYCHOTIC FEATURES (H): ICD-10-CM

## 2020-09-18 RX ORDER — ARIPIPRAZOLE 2 MG/1
2 TABLET ORAL DAILY
Qty: 30 TABLET | Refills: 0 | Status: CANCELLED | OUTPATIENT
Start: 2020-09-18

## 2020-09-18 RX ORDER — MORPHINE SULFATE 15 MG/1
15 TABLET, FILM COATED, EXTENDED RELEASE ORAL EVERY 12 HOURS
Qty: 60 TABLET | Refills: 0 | OUTPATIENT
Start: 2020-09-18

## 2020-09-18 RX ORDER — OXYCODONE HYDROCHLORIDE 5 MG/1
5-10 TABLET ORAL EVERY 6 HOURS PRN
Qty: 240 TABLET | Refills: 0 | OUTPATIENT
Start: 2020-09-18

## 2020-09-18 RX ORDER — ALPRAZOLAM 2 MG
TABLET ORAL
Qty: 90 TABLET | Refills: 0 | OUTPATIENT
Start: 2020-09-18

## 2020-09-20 NOTE — TELEPHONE ENCOUNTER
Pending Prescriptions:                       Disp   Refills    ARIPiprazole (ABILIFY) 2 MG tablet         30 tab*0        Sig: Take 1 tablet (2 mg) by mouth daily    busPIRone (BUSPAR) 10 MG tablet            120 ta*0        Sig: Take 2 tablets (20 mg) by mouth 2 times daily        Support staff:   Please call schedule a visit. (F2F, phone, or e-visit)Please schedule fasting lipid test and complete PHQ9. Than route to provider for review.     Thank You    Shad Germain RN  September 20, 2020

## 2020-09-22 ENCOUNTER — VIRTUAL VISIT (OUTPATIENT)
Dept: BEHAVIORAL HEALTH | Facility: CLINIC | Age: 40
End: 2020-09-22
Payer: COMMERCIAL

## 2020-09-22 ENCOUNTER — PATIENT OUTREACH (OUTPATIENT)
Dept: FAMILY MEDICINE | Facility: CLINIC | Age: 40
End: 2020-09-22
Payer: COMMERCIAL

## 2020-09-22 DIAGNOSIS — F32.2 CURRENT SEVERE EPISODE OF MAJOR DEPRESSIVE DISORDER WITHOUT PSYCHOTIC FEATURES WITHOUT PRIOR EPISODE (H): Primary | ICD-10-CM

## 2020-09-22 DIAGNOSIS — F41.1 GAD (GENERALIZED ANXIETY DISORDER): ICD-10-CM

## 2020-09-22 PROCEDURE — 90832 PSYTX W PT 30 MINUTES: CPT | Mod: 95 | Performed by: MARRIAGE & FAMILY THERAPIST

## 2020-09-22 NOTE — PROGRESS NOTES
"[facility] Primary Care: Integrated Behavioral Health  September 22, 2020      Mattie Banda is a 39 year old female who is being evaluated via a telephone visit.      The patient has been notified of the following:     \"We have found that certain health care needs can be provided without the need for a face to face visit.  This service lets us provide the care you need with a short phone conversation.      I will have full access to your [place] medical record during this entire phone call.   I will be taking notes for your medical record.     Since this is like an office visit, we will bill your insurance company for this service.  Please check with your medical insurance if this type of telephone visit/virtual care is covered.  You may be responsible for the cost of this service if insurance coverage is denied.      There are potential benefits and risks of telephone visits (e.g. limits to patient confidentiality) that differ from in-person visits.?  Confidentiality still applies for telephone services, and nobody will record the visit.  It is important to be in a quiet, private space that is free of distractions (including cell phone or other devices) during the visit.??     If during the course of the call I believe a telephone visit is not appropriate, you will not be charged for this service\"    Consent has been obtained for this service by care team member: yes.    Behavioral Health Clinician Progress Note    Patient Name: Mattie Banda           Service Type:  Individual     Session Start Time: 1:04  Session End Time: 1:30        Session Length: 16 - 37      Attendees: Patient    Visit Activities (Refresh list every visit): [facility] Only    Diagnostic Assessment Date: 1/21/2020  Treatment Plan Review Date: 8/11/2020  See Flowsheets for today's PHQ-9 and CRISTIANO-7 results  Previous PHQ-9:   PHQ-9 SCORE 1/7/2020 3/4/2020 5/7/2020   PHQ-9 Total Score - - -   PHQ-9 Total Score MyChart - - 22 " Deny   "(Severe depression)   PHQ-9 Total Score 24 25 22     Previous CRISTIANO-7:   CRISTIANO-7 SCORE 1/7/2020 3/4/2020 5/7/2020   Total Score - - 20 (severe anxiety)   Total Score 20 20 20       EMILIA LEVEL:  EMILIA Score (Last Two) 11/14/2012   EMILIA Raw Score 44   Activation Score 70.8   EMILIA Level 4     Clinical Global Impressions  First:  Considering your total clinical experience with this particular patient population, how severe are the patient's symptoms at this time?: 5 (5/6/2020  2:23 PM)      Most recent:  Compared to the patient's condition at the START of treatment, this patient's condition is: 3 (5/6/2020  2:23 PM)          DATA  Extended Session (60+ minutes): No  Interactive Complexity: No  Crisis: No  [facility] Patient: No    Treatment Objective(s) Addressed in This Session:  Target Behavior(s): disease management/lifestyle changes depression and anxiety    Depressed Mood: Increase interest, engagement, and pleasure in doing things  Decrease frequency and intensity of feeling down, depressed, hopeless  Improve quantity and quality of night time sleep / decrease daytime naps  Feel less tired and more energy during the day   Improve diet, appetite, mindful eating, and / or meal planning  Identify negative self-talk and behaviors: challenge core beliefs, myths, and actions  Improve concentration, focus, and mindfulness in daily activities   Decrease thoughts that you'd be better off dead or of suicide / self-harm  Anxiety: will experience a reduction in anxiety, will develop more effective coping skills to manage anxiety symptoms, will develop healthy cognitive patterns and beliefs and will increase ability to function adaptively    Current Stressors / Issues:  Patient reports that she has been \"pretty good\" this past week. She states that she is not sure the reason for the progress. She reflected that she celebrated a cousin's 50th birthday. She identified that she was anxious leading up to it, however she states that she felt " better after being there. She was able to connect with family she hadn't seen for awhile. Patient shared positive feedback she heard from family.   Patient talked about a couple interactions with a couple cousins where she felt proud about being assertive and not taking things personally.   Patient states that she and her SO are doing okay. She notices that he seems to be more understanding for the things she has been doing and her difficulty in doing others.     Patient shared about reconnecting with a friend.     She states that it's been sad to see her grandpa having more difficulty.       Validated patient's feelings. Patient processed recent events. She stated that she felt good about hearing and accepting compliments from others. Reinforced patient's use of assertiveness and recognizing a lack of boundaries from others. Reflected that connecting with others seemed to be an added piece to her improved mood this week. Encouraged continued self care.    Progress on Treatment Objective(s) / Homework:  Minimal progress - ACTION (Actively working towards change); Intervened by reinforcing change plan / affirming steps taken    Motivational Interviewing    MI Intervention: Expressed Empathy/Understanding, Supported Autonomy, Collaboration, Evocation, Permission to raise concern or advise, Open-ended questions, Reflections: simple and complex, Change talk (evoked) and Reframe     Change Talk Expressed by the Patient: Desire to change Ability to change Reasons to change Need to change Committment to change Activation Taking steps    Provider Response to Change Talk: E - Evoked more info from patient about behavior change, A - Affirmed patient's thoughts, decisions, or attempts at behavior change, R - Reflected patient's change talk and S - Summarized patient's change talk statements    Also provided psychoeducation about behavioral health condition, symptoms, and treatment options      Skills training    Explored  skills useful to client in current situation    Skills include assertiveness, communication, conflict management, problem-solving, relaxation, etc.    Solution-Focused Therapy    Explored patterns in patient's relationships and discussed options for new behaviors    Explored patterns in patient's actions and choices and discussed options for new behaviors    Cognitive-behavioral Therapy    Discussed common cognitive distortions, identified them in patient's life    Explored ways to challenge, replace, and act against these cognitions    Acceptance and Commitment Therapy    Explored and identified important values in patient's life    Discussed ways to commit to behavioral activation around these values    Psychodynamic psychotherapy    Discussed patient's emotional dynamics and issues and how they impact behaviors    Explored patient's history of relationships and how they impact present behaviors    Explored how to work with and make changes in these schemas and patterns    Behavioral Activation    Discussed steps patient can take to become more involved in meaningful activity    Identified barriers to these activities and explored possible solutions    Mindfulness-Based Strategies    Discussed skills based on development and application of mindfulness    Skills drawn from dialectical behavior therapy, mindfulness-based stress reduction, mindfulness-based cognitive therapy, etc.      Care Plan review completed: Yes    Medication Review:  No changes to current psychiatric medication(s)    Medication Compliance:  Yes    Changes in Health Issues:   None reported    Chemical Use Review:   Substance Use: Chemical use reviewed, no active concerns identified       Tobacco Use: No change in amount of tobacco use since last session.  Patient declined discussion at this time    Assessment: Current Emotional / Mental Status (status of significant symptoms):  Risk status (Self / Other harm or suicidal ideation)  Patient has  had a history of suicidal ideation: in adolescense and suicide attempts: patient states that she tried multiple times around ages [age] by slitting wrists or overdose, and one overdose of alcohol; she denies ever being hospitalized for mental health and denies a history of self-injurious behavior, homicidal ideation, homicidal behavior and and other safety concerns  Patient denies current fears or concerns for personal safety.  Patient denies current or recent suicidal ideation or behaviors.   Patient denies current or recent homicidal ideation or behaviors.  Patient denies current or recent self injurious behavior or ideation.  Patient denies other safety concerns.  A safety and risk management plan has been developed including: Patient consented to co-developed safety plan.  A safety and risk management plan was completed.  Patient agreed to use safety plan should any safety concerns arise.  A copy was given to the patient.      Appearance:   unable to assess due to phone visit   Eye Contact:   unable to assess due to phone visit   Psychomotor Behavior: unable to assess due to phone visit   Attitude:   Cooperative  Pleasant  Orientation:   All  Speech   Rate / Production: Normal    Volume:  Normal   Mood:    Normal  Affect:    unable to assess due to phone visit   Thought Content:  Rumination   Thought Form:  Coherent  Logical   Insight:    Good     Diagnoses:  1. Current severe episode of major depressive disorder without psychotic features without prior episode (H)    2. CRISTIANO (generalized anxiety disorder)        Collateral Reports Completed:  Not Applicable    Plan: (Homework, other):  Patient was given information about behavioral services and encouraged to schedule a follow up appointment with the clinic [facility] in 1-2 weeks.  She was also given information about mental health symptoms and treatment options , Cognitive Behavioral Therapy skills to practice when experiencing anxiety and depression and deep  Breathing Strategies to practice when experiencing anxiety and depression.  CD Recommendations: No indications of CD issues.       KVNG Watson, [facility] [place]       ______________________________________________________________________    Integrated Primary Care Behavioral Health Treatment Plan    Patient's Name: Mattie Banda  YOB: 1980    Date: May 6, 2020    DSM-V Diagnoses: 296.33 (F33.2) Major Depressive Disorder, Recurrent Episode, Severe _ or 300.02 (F41.1) Generalized Anxiety Disorder  Psychosocial / Contextual Factors: medical issues,  and dating ex-, few friends  WHODAS: 36    Referral / Collaboration:  Referral to another professional/service is not indicated at this time..    Anticipated number of session or this episode of care: 8      MeasurableTreatment Goal(s) related to diagnosis / functional impairment(s)  Goal 1: Patient will demonstrate ability to improve depression and anxiety symptoms as evidenced by improved PHQ9 & GAD7 scores.       Objective #A (Patient Action)    Patient will Decrease frequency and intensity of feeling down, depressed, hopeless  Identify negative self-talk and behaviors: challenge core beliefs, myths, and actions  Decrease thoughts that you'd be better off dead or of suicide / self-harm  Status: New - Date: 1/28/2020 ; Improved and continued: 5/6/2020 ; Continued:8/11/2020    Intervention(s)  [facility] [place] will teach distress tolerance skills. Provide psycho-education on cognitive distortions and automatic thoughts and behaviors and ways to appropriately challenge and restructure thoughts.    Objective #B  Patient will increase coping strategies by 2  to more effectively manage current stressors  increase understanding of steps in the grief process  Status: New - Date: 1/28/2020; Improved and Continued: 5/6/2020; Continued:8/11/2020    Intervention(s)  [facility] [place] will teach mindfulness and relaxation strategies. Provide psycho-education on grief  process.    Patient has reviewed and agreed to the above plan.    Written by  KVNG Watson, [facility]

## 2020-09-22 NOTE — TELEPHONE ENCOUNTER
Pending Prescriptions:                       Disp   Refills    ARIPiprazole (ABILIFY) 2 MG tablet         30 tab*0        Sig: Take 1 tablet (2 mg) by mouth daily    busPIRone (BUSPAR) 10 MG tablet            120 ta*0        Sig: Take 2 tablets (20 mg) by mouth 2 times daily    Routing refill request to provider for review/approval because:  Labs out of range:    PHQ-9 score:    PHQ 5/7/2020   PHQ-9 Total Score 22   Q9: Thoughts of better off dead/self-harm past 2 weeks Not at all

## 2020-09-22 NOTE — PROGRESS NOTES
Clinic Care Coordination Contact    Follow Up Progress Note      Assessment: [facility] spoke with pt. States she is doing well. Pt continues to see [facility] weekly for virtual visits and her psychiatrist monthly virtual as well. Pt actually prefers the virtual she said.     Pt turned in the paperwork for Disability and is now just waiting to hear. Knows that it could be a year before processed/ knows a decision on if approved or not.     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 Pt was recently approved for unemployment benefit. Wonders if she needs to update disability on that.     Goals addressed this encounter:   Goals Addressed                 This Visit's Progress       Patient Stated      Financial Wellbeing (pt-stated)   60%     Goal Statement: I would like to start the process of applying for Disability now that MD feels my issues will cause me to be unable to work for 12 months or more    Date Goal set: 12/19/19  Barriers: struggling with mental health; recent surgery; pain  Strengths: Pt took steps to call [facility] back after getting messages; reaching out for assistance; been seeing [facility] - pt has applied for Disability  Date to Achieve By: 11/30/20  Patient expressed understanding of goal: yes  Action steps to achieve this goal:  1. I will continue to work with Disability specialists or the  I was assigned on Disability process.    2. I will read my MyChart message from [facility] and follow suggestions to help my anxiety  3. I will attend my appointments for counseling and psychiatry.   4. I will reach out to [facility] with resource needs or just to talk for support.                  Intervention/Education provided during outreach: Encouraged pt to reach out to the disability worker/specialist/ she is working with to see if needs to let them  know about unemployment. Pt is not yet approved for disability, is still in process. Likely would be when on disability that you have to update on income changes however could be prior. Let pt know am not able to answer that.      Outreach Frequency: monthly    Plan:   Pt will call if questions or resource needs arise.     Care Coordinator will follow up in 1 month.      MARK Salas   Primary Care Clinic- Social Work Care Coordinator  [facility]  9/22/2020 12:48 PM  [phone]

## 2020-09-23 RX ORDER — BUSPIRONE HYDROCHLORIDE 10 MG/1
20 TABLET ORAL 2 TIMES DAILY
Qty: 120 TABLET | Refills: 0 | Status: SHIPPED | OUTPATIENT
Start: 2020-09-23 | End: 2020-10-05 | Stop reason: DRUGHIGH

## 2020-09-23 NOTE — TELEPHONE ENCOUNTER
Patient should have enough Abilify (aripiprazole) to get to follow up-- last Rx written 9/9 for 30 day supply, next visit 10/5.    Buspar is due for refill next week.   Will send 1 month supply to pharmacy.      Silvia Hassan, RN    Nurse Liaison  [facility]

## 2020-09-24 ENCOUNTER — MYC REFILL (OUTPATIENT)
Dept: FAMILY MEDICINE | Facility: CLINIC | Age: 40
End: 2020-09-24

## 2020-09-24 ENCOUNTER — MYC MEDICAL ADVICE (OUTPATIENT)
Dept: FAMILY MEDICINE | Facility: CLINIC | Age: 40
End: 2020-09-24

## 2020-09-24 DIAGNOSIS — Z90.710 HISTORY OF ROBOT-ASSISTED LAPAROSCOPIC HYSTERECTOMY: ICD-10-CM

## 2020-09-24 DIAGNOSIS — G47.9 SLEEP DISORDER: ICD-10-CM

## 2020-09-24 DIAGNOSIS — M51.369 DEGENERATION OF LUMBAR INTERVERTEBRAL DISC: ICD-10-CM

## 2020-09-24 DIAGNOSIS — F41.8 MIXED ANXIETY DEPRESSIVE DISORDER: ICD-10-CM

## 2020-09-24 DIAGNOSIS — M50.30 DDD (DEGENERATIVE DISC DISEASE), CERVICAL: ICD-10-CM

## 2020-09-24 RX ORDER — MORPHINE SULFATE 15 MG/1
15 TABLET, FILM COATED, EXTENDED RELEASE ORAL EVERY 12 HOURS
Qty: 60 TABLET | Refills: 0 | Status: SHIPPED | OUTPATIENT
Start: 2020-09-24 | End: 2020-10-15

## 2020-09-24 RX ORDER — ALPRAZOLAM 2 MG
TABLET ORAL
Qty: 90 TABLET | Refills: 0 | Status: SHIPPED | OUTPATIENT
Start: 2020-09-24 | End: 2020-10-15

## 2020-09-24 RX ORDER — MORPHINE SULFATE 15 MG/1
15 TABLET, FILM COATED, EXTENDED RELEASE ORAL EVERY 12 HOURS
Qty: 15 TABLET | Refills: 0 | Status: CANCELLED | OUTPATIENT
Start: 2020-09-24

## 2020-09-24 RX ORDER — OXYCODONE HYDROCHLORIDE 5 MG/1
5-10 TABLET ORAL EVERY 6 HOURS PRN
Qty: 60 TABLET | Refills: 0 | Status: CANCELLED | OUTPATIENT
Start: 2020-09-24

## 2020-09-24 RX ORDER — ALPRAZOLAM 2 MG
TABLET ORAL
Qty: 30 TABLET | Refills: 0 | Status: CANCELLED | OUTPATIENT
Start: 2020-09-24

## 2020-09-24 RX ORDER — OXYCODONE HYDROCHLORIDE 5 MG/1
5-10 TABLET ORAL EVERY 6 HOURS PRN
Qty: 240 TABLET | Refills: 0 | Status: SHIPPED | OUTPATIENT
Start: 2020-09-24 | End: 2020-10-15

## 2020-10-02 NOTE — PROGRESS NOTES
"Mattie Banda is a 39 year old female who is being evaluated via a billable telephone visit.      The patient has been notified of following:     \"This telephone visit will be conducted via a call between you and your physician/provider. We have found that certain health care needs can be provided without the need for a physical exam.  This service lets us provide the care you need with a short phone conversation.  If a prescription is necessary we can send it directly to your pharmacy.  If lab work is needed we can place an order for that and you can then stop by our lab to have the test done at a later time.    Telephone visits are billed at different rates depending on your insurance coverage. During this emergency period, for some insurers they may be billed the same as an in-person visit.  Please reach out to your insurance provider with any questions.    If during the course of the call the physician/provider feels a telephone visit is not appropriate, you will not be charged for this service.\"    Patient has given verbal consent for Telephone visit?  Yes    What phone number would you like to be contacted at? [phone]    How would you like to obtain your AVS? [facility]        Outpatient Psychiatric Progress Note    Name: Mattie Banda   : 1980                    Primary Care Provider: Yuan Schofield MD   Therapist: KVNG Watson    PHQ-9 scores:  PHQ-9 SCORE 2020 3/4/2020 2020   PHQ-9 Total Score - - -   PHQ-9 Total Score [facility] - - 22 (Severe depression)   PHQ-9 Total Score 24 25 22       CRISTIANO-7 scores:  CRISTIANO-7 SCORE 2020 3/4/2020 2020   Total Score - - 20 (severe anxiety)   Total Score 20 20 20       Patient Identification:  Patient is a 39 year old,   White American female  who presents for return visit with me.  Patient is currently unemployed. Patient attended the phone/video session alone. Patient prefers to be called: \"Mattie.\"     Interim History:  I last " saw Mattie Banda for outpatient psychiatry Return Visit on 6/18/20. During that appointment, we:    Continue alprazolam per primary care provider.  It will continue to be the responsibility of your primary care provider to prescribe/refill alprazolam.    Continue Abilify 2 mg daily to augment Lexapro    Increase BuSpar to 20 mg twice daily for anxiety.  Take 15 mg twice daily for about 1 week, and then increase to 20 mg twice daily.    Continue Lexapro 20 mg daily for mood and anxiety    Continue trazodone 100 mg at bedtime as needed for sleep    Continue Topamax as prescribed by her primary care provider    Consider sleep study after Covid-19 restrictions to rule out central sleep apnea due to narcotic pain med use and concurrent benzodiazepine use.     Make sure to properly/safely dispose of any extra Xanax that you have at home.  It can be very dangerous to keep excess medications at home, particularly medications that can be very lethal if taken in excess. See below for more information.     10/5: Pt overall reports improvement in her sxs, both mood and anxiety. Still has some up and down days but mood a lot better. Some higher anxiety days. Will sometimes take all three Xanax in a day and sometimes will only take 1.5 xanax. Says it varies widely. Trazodone not helpful anymore. .Takes xanax every night to sleep. Increase in BusPar seems helpful and well-tolerated. Denies negative side effects. Feels very determined to get well this time around in therapy. Therapy going well and feels very good about current therapist. Denies any recent SI. No problematic drug or alcohol abuse.     Psychiatric ROS:  Mattie Banda reports mood has been: Improved  Anxiety has been: Overall improved, still some up-and-down days  Sleep has been: Overall improved some, still inconsistent at times  Marisol sxs: none  Psychosis sxs: none  ADHD/ADD sxs: see HPI  PTSD sxs: Better than last visit  PHQ9 and GAD7 scores were reviewed  today.   Medication side effects: Denies  Current stressors include: see HPI  Coping mechanisms and supports include: Therapy, Family, Hobbies and Friends    Current medications include:   Current Outpatient Medications   Medication Sig     acetaminophen (TYLENOL) 325 MG tablet Take 325-650 mg by mouth every 6 hours as needed for mild pain     albuterol (PROAIR HFA/PROVENTIL HFA/VENTOLIN HFA) 108 (90 Base) MCG/ACT inhaler INHALE 2 PUFFS INTO THE LUNGS EVERY 6 HOURS AS NEEDED FOR SHORTNESSOF BREATH / DYSPNEA OR WHEEZING     albuterol (PROVENTIL) (2.5 MG/3ML) 0.083% neb solution TAKE 1 VIAL (2.5 MG) BY NEBULIZATION EVERY 6 HOURS AS NEEDED FOR SHORTNESS OF BREATH / DYSPNEA OR WHEEZING     ALPRAZolam (XANAX) 2 MG tablet TAKE 1/2-1 TABLET (1-2MG) BY MOUTH 3 TIMES DAILY AS NEEDED FOR STRESS/SLEEP     ARIPiprazole (ABILIFY) 2 MG tablet Take 1 tablet (2 mg) by mouth daily     busPIRone (BUSPAR) 10 MG tablet Take 2 tablets (20 mg) by mouth 2 times daily     escitalopram (LEXAPRO) 20 MG tablet Take 1 tablet (20 mg) by mouth daily     Ibuprofen (ADVIL PO)      levothyroxine (SYNTHROID/LEVOTHROID) 112 MCG tablet TAKE 1 TABLET BY MOUTH EVERY DAY     lidocaine (LMX4) 4 % external cream Apply topically once as needed for mild pain     loratadine (CLARITIN) 10 MG tablet Take 10 mg by mouth daily     methocarbamol (ROBAXIN) 750 MG tablet TAKE 1 TABLET (750 MG) BY MOUTH 3 TIMES DAILY AS NEEDED FOR MUSCLE SPASMS     morphine (MS CONTIN) 15 MG CR tablet Take 1 tablet (15 mg) by mouth every 12 hours maximum 2 tablet(s) per day     NARCAN 4 MG/0.1ML nasal spray SPRAY 1 SPRAY (4 MG) INTO ONE NOSTRIL ALTERNATING NOSTRILS ONCE AS NEEDED FOR OPIOID REVERSAL EVERY 2-3 MINUTES UNTIL ASSISTANCE ARRIVES     oxyCODONE (ROXICODONE) 5 MG tablet Take 1-2 tablets (5-10 mg) by mouth every 6 hours as needed for severe pain (uses fewest possible tabs daily.)     phenazopyridine (AZO URINARY PAIN RELIEF) 95 MG tablet Take 190 mg by mouth 3 times daily      sulfaSALAzine (AZULFIDINE) 500 MG tablet Take 1 tablet (500 mg) by mouth 4 times daily May start at 2 daily.  Increase to 4 daily over 2-4 weeks if colitis symptoms not controlled.     sulfaSALAzine ER (AZULFIDINE EN) 500 MG EC tablet TAKE 1 TABLET (500 MG) BY MOUTH 4 TIMES DAILY (Patient not taking: Reported on 7/15/2020)     topiramate (TOPAMAX) 25 MG tablet TAKE 1 TABLET BY MOUTH 3 TIMES A DAY     traZODone (DESYREL) 100 MG tablet Take 100 mg by mouth At Bedtime Take 1 to 1.5 tablets at bedtime as needed     No current facility-administered medications for this visit.      The [place] Prescription Monitoring Program has been reviewed and there are no concerns about diversionary activity for controlled substances at this time. There is evidence of dependence.      Past Medical/Surgical History:  Past Medical History:   Diagnosis Date     Anxiety      Arthritis 2019     Breast disorder Not sure     COPD (chronic obstructive pulmonary disease) (H)      Depressive disorder      Mixed anxiety depressive disorder 2/8/2016     Papanicolaou smear of cervix with low grade squamous intraepithelial lesion (LGSIL)      Thyroid disease      Urinary tract infection, site not specified     Recurrent UTI's     Wounds and injuries 2000      has a past medical history of Anxiety, Arthritis (2019), Breast disorder (Not sure), COPD (chronic obstructive pulmonary disease) (H), Depressive disorder, Mixed anxiety depressive disorder (2/8/2016), Papanicolaou smear of cervix with low grade squamous intraepithelial lesion (LGSIL), Thyroid disease, Urinary tract infection, site not specified, and Wounds and injuries (2000). She also has no past medical history of Cancer (H), Cerebral infarction (H), Congestive heart failure (H), Diabetes (H), Heart disease, History of blood transfusion, Hypertension, or Uncomplicated asthma.    Social History:  Reviewed. No changes to social history except that noted above.     Vital Signs:   None.  This is phone/video visit.     Labs:  Most recent laboratory results reviewed and no new labs.     Review of Systems:  10 systems (general, cardiovascular, respiratory, eyes, ENT, endocrine, GI, , M/S, neurological) were reviewed. Most pertinent finding(s) is/are: chronic pain. The remaining systems are all unremarkable.    Mental Status Examination (limited as this is by phone/video):  Attitude:  Cooperative, pleasant  Oriented to:  person, place, time, and situation  Attention Span and Concentration:  normal  Speech:  clear, coherent, regular rate, rhythm, and volume  Language: intact  Mood:  anxious but better  Affect:  mood congruent, brighter  Associations:  no loose associations  Thought Process:  logical, linear and goal oriented  Thought Content:  no evidence of suicidal ideation or homicidal ideation, no evidence of psychotic thought, no auditory hallucinations present and no visual hallucinations present  Recent and Remote Memory:  Intact to interview. Not formally assessed. No amnesia.  Fund of Knowledge: appropriate  Insight:  fair  Judgment:  fair, adequate for safety  Impulse Control:  fair    Suicide Risk Assessment:  Today Mattie Banda reports no recent SI. However, there are notable risk factors for self-harm, including anxiety, loss of grandmother, comorbid medical condition of chronic pain, hopelessness, withdrawing and mood change. However, risk is mitigated by commitment to family, absence of past attempts, history of seeking help when needed, future oriented, identifies reasons to live including her kids and denies suicidal intent or plan.Therefore, based on all available evidence including the factors cited above, Mattie Banda does not appear to be at imminent risk for self-harm, does not meet criteria for a 72-hr hold, and therefore remains appropriate for ongoing outpatient level of care.  A thorough assessment of risk factors related to suicide and self-harm have been reviewed and  are noted above. The patient convincingly denies suicidality on several occasions. Local community safety resources printed and reviewed for patient to use if needed. There was no deceit detected, and the patient presented in a manner that was believable.     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  Patient is followed by Yuan Schofield MD for ongoing prescription of pain medication.  All refills should only be approved by this provider, or covering partner.    Medication(s): ms contin and oxydodone ir.   Maximum quantity per month: #60 ms contin, #180 oxycodone  Clinic visit frequency required: Q 3 months      Controlled substance agreement:  Encounter-Level CSA - 08/20/2018:    Controlled Substance Agreement - Scan on 6/5/19 CONTROLLED SUBSTANCE AGREEMENT (below)       Patient-Level CSA:    Controlled Substance Agreement - Opioid - Scan on 6/13/2019  6:05 PM  (below)    Controlled Substance Agreement - Opioid - Scan on 6/13/2019  6:03 PM: 06/05/2019 (below)         Pain Clinic evaluation in the past: Yes       Date/Location:  [facility] and will go there soon, 6/6/2019.    DIRE Total Score(s):    6/6/2019   Total Score 19       Last [facility] website verification:  10/25/2019 390   [email or web address]             DDD (degenerative disc disease), cervical 02/07/2018     Priority: Medium     Lymphocytic colitis 06/13/2017     Priority: Medium     Idiopathic peripheral neuropathy 01/10/2017     Priority: Medium     Hypothyroidism, unspecified type 01/10/2017     Priority: Medium     Migraine with aura and without status migrainosus, not intractable 12/27/2016     Priority: Medium     Tobacco use disorder 09/12/2016     Priority: Medium     Labial lesion 02/08/2016     Priority: Medium     Superior folds of the vulva/labia right side       S/P lumbar fusion and discectomy June 2015 06/28/2015     Priority: Medium     Degeneration of lumbar intervertebral disc 04/05/2014     Priority: Medium     newly added to list on 4/4//14  but present for last months         Psychosocial & Contextual Factors:  See HPI    Assessment:  Mattie Banda reports ongoing overall improvement of symptoms.  It seems overall her mood, motivation, and some other symptoms have improved. Anxiety still up and down some but improving overall and managing better with nonpharmacologic skills/strategies learned in therapy.  Continues to engage in psychotherapy.    She is agreeable to a dose increase of her buspirone to see if this will further help with her anxiety.  Last dose increase helpful.  She has been tolerating well. Discussed risks of serotonin syndrome.     I strongly recommended she continue to ensure excess medication is removed from her home as a safety precaution.  Fortunately her SI is greatly improved. As her mood and anxiety continues to improve and she is utilizing  more non-pharmacologic coping skills/strategies, I would recommend a very slow taper of her benzodiazepines by pcp if agreeable. Addiction medicine would be available to assist if needed (since on concomitant narcotics) and a referral would have to be placed. [facility] benzodiazepines in pts who are physiologically dependent on moderate to high doses.    Medication side effects and alternatives were reviewed. Health promotion activities recommended and reviewed today. All questions addressed. Education and counseling completed regarding risks and benefits of medications and psychotherapy options. Recommend therapy for additional support.     Treatment Plan:    Continue alprazolam per primary care provider.  It will continue to be the responsibility of your primary care provider to prescribe/refill alprazolam.  Provider can see my note for more details.    Continue Abilify 2 mg daily to augment Lexapro    Increase BuSpar to 30 mg twice daily for anxiety.  Take 25 mg twice daily for about 3-5 days, and then increase to 20 mg twice daily.    Continue Lexapro 20 mg daily for mood and anxiety    Discontinued/Stopped trazodone since no longer effective    Continue Topamax as prescribed by her primary care provider    Consider sleep study after Covid-19 restrictions to rule out central sleep apnea due to narcotic pain med use and concurrent benzodiazepine use.     Make sure to properly/safely dispose of any extra medication that you have at home.  It can be very dangerous to keep excess medications at home, particularly medications that can be very lethal if taken in excess. See below for more information.     Continue all other medications as reviewed per electronic medical record today.     Safety plan reviewed. To the Emergency Department as needed or call after hours crisis line at [phone] or [phone]. [place] Crisis Text Line. Text [place] to 062987 or Suicide LifeLine Chat:  suicidepreventionlifeline.org/chat    Continue therapy as planned.     Schedule an appointment with me in 8 weeks or sooner as needed. Call [facility] at [phone] to schedule.    Follow up with primary care provider as planned or for acute medical concerns.    Call the psychiatric nurse line with medication questions or concerns at [phone].    WebMDhart may be used to communicate with your provider, but this is not intended to be used for emergencies.    The following website is specific to the area you live for safe medication disposal if/when necessary.  Visit the website to find the nearest drop-off site to where you live:  [email or web address]    Serotonin syndrome (SS) has occurred with serotonergic antidepressants (eg, SSRIs, SNRIs), particularly when used in combination with other serotonergic agents (eg, triptans, TCAs, fentanyl, lithium, tramadol, buspirone, Neo's wort, tryptophan) or agents that impair metabolism of serotonin (eg, MAO inhibitors intended to treat psychiatric disorders, other MAO inhibitors [ie, linezolid and intravenous methylene blue]). Antipsychotic Agents may also enhance the serotonergic effect of Serotonin Modulators.Signs and symptoms include: agitation or restlessness, confusion, rapid heart rate and high blood pressure, dilated pupils, loss of muscle coordination or twitching muscles, heavy sweating, diarrhea, headaches, shivering and/or goose bumps.  Patient was educated on signs and symptoms of serotonin syndrome.  Symptoms typically occur within several hours of taking a new drug or increasing the dose.  Patient was notified to report symptoms immediately.    Administrative Billing:   Phone Call/Video Duration: 11 Minutes  Start: 1:20p  Stop: 1:31p    Time spent with patient was 11 minutes and greater than 50% of time or 6 minutes was spent in counseling and coordination of care regarding above diagnoses and treatment plan. Multiple  psychiatric diagnoses and medication change adding to complexity of case.      Patient Status:  Patient will continue to be seen for ongoing consultation and stabilization.    Signed:   Tiffany Fallon DO  [facility]

## 2020-10-05 ENCOUNTER — VIRTUAL VISIT (OUTPATIENT)
Dept: PSYCHOLOGY | Facility: CLINIC | Age: 40
End: 2020-10-05
Payer: COMMERCIAL

## 2020-10-05 ENCOUNTER — VIRTUAL VISIT (OUTPATIENT)
Dept: PSYCHIATRY | Facility: OTHER | Age: 40
End: 2020-10-05
Payer: COMMERCIAL

## 2020-10-05 DIAGNOSIS — F41.8 MIXED ANXIETY DEPRESSIVE DISORDER: ICD-10-CM

## 2020-10-05 DIAGNOSIS — F33.41 RECURRENT MAJOR DEPRESSIVE DISORDER, IN PARTIAL REMISSION (H): Primary | ICD-10-CM

## 2020-10-05 DIAGNOSIS — F41.1 GAD (GENERALIZED ANXIETY DISORDER): ICD-10-CM

## 2020-10-05 DIAGNOSIS — G47.00 INSOMNIA, UNSPECIFIED TYPE: ICD-10-CM

## 2020-10-05 DIAGNOSIS — F33.2 SEVERE EPISODE OF RECURRENT MAJOR DEPRESSIVE DISORDER, WITHOUT PSYCHOTIC FEATURES (H): ICD-10-CM

## 2020-10-05 DIAGNOSIS — F32.2 CURRENT SEVERE EPISODE OF MAJOR DEPRESSIVE DISORDER WITHOUT PSYCHOTIC FEATURES WITHOUT PRIOR EPISODE (H): Primary | ICD-10-CM

## 2020-10-05 PROCEDURE — 99214 OFFICE O/P EST MOD 30 MIN: CPT | Mod: 95 | Performed by: PSYCHIATRY & NEUROLOGY

## 2020-10-05 PROCEDURE — 90832 PSYTX W PT 30 MINUTES: CPT | Mod: 95 | Performed by: PSYCHOLOGIST

## 2020-10-05 RX ORDER — ARIPIPRAZOLE 2 MG/1
2 TABLET ORAL DAILY
Qty: 30 TABLET | Refills: 0 | Status: CANCELLED | OUTPATIENT
Start: 2020-10-05

## 2020-10-05 RX ORDER — ARIPIPRAZOLE 2 MG/1
2 TABLET ORAL DAILY
Qty: 90 TABLET | Refills: 0 | Status: SHIPPED | OUTPATIENT
Start: 2020-10-05 | End: 2020-12-17

## 2020-10-05 RX ORDER — ESCITALOPRAM OXALATE 20 MG/1
20 TABLET ORAL DAILY
Qty: 90 TABLET | Refills: 0 | Status: SHIPPED | OUTPATIENT
Start: 2020-10-05 | End: 2021-01-12

## 2020-10-05 RX ORDER — BUSPIRONE HYDROCHLORIDE 30 MG/1
30 TABLET ORAL 2 TIMES DAILY
Qty: 60 TABLET | Refills: 1 | Status: SHIPPED | OUTPATIENT
Start: 2020-10-05 | End: 2020-11-18

## 2020-10-05 NOTE — Clinical Note
Just FYI. No action needed.     -[facility], [place], operated by [facility]

## 2020-10-05 NOTE — PATIENT INSTRUCTIONS
Treatment Plan:    Continue alprazolam per primary care provider.  It will continue to be the responsibility of your primary care provider to prescribe/refill alprazolam.  Provider can see my note for more details.    Continue Abilify 2 mg daily to augment Lexapro    Increase BuSpar to 30 mg twice daily for anxiety.  Take 25 mg twice daily for about 3-5 days, and then increase to 20 mg twice daily.    Continue Lexapro 20 mg daily for mood and anxiety    Discontinued/Stopped trazodone since no longer effective    Continue Topamax as prescribed by her primary care provider    Consider sleep study after Covid-19 restrictions to rule out central sleep apnea due to narcotic pain med use and concurrent benzodiazepine use.     Make sure to properly/safely dispose of any extra medication that you have at home.  It can be very dangerous to keep excess medications at home, particularly medications that can be very lethal if taken in excess. See below for more information.     Continue all other medications as reviewed per electronic medical record today.     Safety plan reviewed. To the Emergency Department as needed or call after hours crisis line at [phone] or [phone]. [place] Crisis Text Line. Text [place] to 248642 or Suicide LifeLine Chat: suicidepreventionlifeline.org/chat    Continue therapy as planned.     Schedule an appointment with me in 8 weeks or sooner as needed. Call [facility] at [phone] to schedule.    Follow up with primary care provider as planned or for acute medical concerns.    Call the psychiatric nurse line with medication questions or concerns at [phone].    [employer]box may be used to communicate with your provider, but this is not intended to be used for emergencies.    The following website is specific to the area you live for safe medication disposal if/when necessary.  Visit the website to find the nearest drop-off site to where you  live:  [email or web address]    Serotonin syndrome (SS) has occurred with serotonergic antidepressants (eg, SSRIs, SNRIs), particularly when used in combination with other serotonergic agents (eg, triptans, TCAs, fentanyl, lithium, tramadol, buspirone, Neo's wort, tryptophan) or agents that impair metabolism of serotonin (eg, MAO inhibitors intended to treat psychiatric disorders, other MAO inhibitors [ie, linezolid and intravenous methylene blue]). Antipsychotic Agents may also enhance the serotonergic effect of Serotonin Modulators.Signs and symptoms include: agitation or restlessness, confusion, rapid heart rate and high blood pressure, dilated pupils, loss of muscle coordination or twitching muscles, heavy sweating, diarrhea, headaches, shivering and/or goose bumps.  Patient was educated on signs and symptoms of serotonin syndrome.  Symptoms typically occur within several hours of taking a new drug or increasing the dose.  Patient was notified to report symptoms immediately.

## 2020-10-05 NOTE — Clinical Note
Just FYI. No action needed. See impression and treatment plan for more details. Could consider benzo taper faina er anxiety continues to improve with the other meds. Us [facility] providers do not do benzo tapers on chronically dependent pts as our access is very limited (my follow-ups are booking over 8 weeks out) and we only see people for a short period of time (telma those who are also on narcotics -addiction medicine assists with such if pt needs to be referred). Hope that additional info is helpful.     Thanks!     -Tiffany  [facility]

## 2020-10-05 NOTE — PROGRESS NOTES
"Collaborative Care Psychiatry Service (CCPS)  October 05, 2020    Behavioral Health Clinician Progress Note    Patient Name: Mattie Banda is a 39 year old female who is being evaluated via a telephone visit.      The patient has been notified of the following:     \"We have found that certain health care needs can be provided without the need for a face to face visit.  This service lets us provide the care you need with a short phone conversation.      I will have full access to your [place] medical record during this entire phone call.   I will be taking notes for your medical record.     Since this is like an office visit, we will bill your insurance company for this service.  Please check with your medical insurance if this type of telephone visit/virtual care is covered.  You may be responsible for the cost of this service if insurance coverage is denied.      There are potential benefits and risks of telephone visits (e.g. limits to patient confidentiality) that differ from in-person visits.?  Confidentiality still applies for telephone services, and nobody will record the visit.  It is important to be in a quiet, private space that is free of distractions (including cell phone or other devices) during the visit.??     If during the course of the call I believe a telephone visit is not appropriate, you will not be charged for this service\"    Consent has been obtained for this service by care team member: yes.    Start time: 12:50pm     End time: 01:10pm         Service Type:  Consult Note      Service Location:   Phone call (patient / identified key support person reached)     Session Start Time: 12:50pm Session End Time: 01:10pm      Session Length: 16 - 37      Attendees: Patient    Visit Activities (Refresh list every visit): [facility] Only    Diagnostic Assessment Date: Behavioral DA - 1/1/2020 ([facility]); 5/07/2020 - Dr. Fallon  See Flowsheets for today's PHQ-9 and CRISTIANO-7 results  Previous " "PHQ-9:   PHQ-9 SCORE 1/7/2020 3/4/2020 5/7/2020   PHQ-9 Total Score - - -   PHQ-9 Total Score MyChart - - 22 (Severe depression)   PHQ-9 Total Score 24 25 22     Previous CRISTIANO-7:   CRISTIANO-7 SCORE 1/7/2020 3/4/2020 5/7/2020   Total Score - - 20 (severe anxiety)   Total Score 20 20 20     WHODAS 2.0 Total Score 1/7/2020   Total Score 36        DATA  Extended Session (60+ minutes): No  Interactive Complexity: No  Crisis: No    Medication Compliance:  Yes      Chemical Use Review:   Substance Use: Chemical use reviewed, no active concerns identified      Tobacco Use: No current tobacco use.       Current Stressors / Issues:  \"Things have been pretty good. No major ups and downs.\" She reported that things are stabilizing a bit for her. She noted that she takes her xanax every night for sleep and takes them as prescribed. She noted that her therapy is helping with her stability, feels less depressed, but still very anxious. She questioned if this is to be expected and how to understand her ongoing anxiety problems. Some education was provided about the expectations for the medications and how therapy will mitigate some of this as well. She indicated she understood. She added that she is nearly out of Abilify and needs a refill.      Changes in Health Issues:   None reported    Assessment: Current Emotional / Mental Status (status of significant symptoms):  Risk status (Self / Other harm or suicidal ideation)  Patient has had a history of suicidal ideation: in adolescence and suicide attempts: in adolescence  Patient denies current fears or concerns for personal safety.  Patient denies current or recent suicidal ideation or behaviors.  Patient denies current or recent homicidal ideation or behaviors.  Patient denies current or recent self injurious behavior or ideation.  Patient denies other safety concerns.  A safety and risk management plan has not been developed at this time, however patient was encouraged to call [facility] " Crisis / 911 should there be a change in any of these risk factors.    Appearance:   Unable to assess over the phone   Eye Contact:   Unable to assess over the phone   Psychomotor Behavior: Unable to assess over the phone   Attitude:   Cooperative   Orientation:   All  Speech   Rate / Production: Normal    Volume:  Normal   Mood:    Normal  Affect:    Unable to assess over the phone   Thought Content:  Clear   Thought Form:  Coherent  Logical   Insight:    Good     Diagnoses:  1. Current severe episode of major depressive disorder without psychotic features without prior episode (H)    2. CRISTIANO (generalized anxiety disorder)        Collateral Reports Completed:  Communicated with: Dr. Fallon    Plan: (Homework, other):  Patient was given information about behavioral services and encouraged to schedule a follow up appointment with the clinic [facility] [place] in conjunction with Dr. Fallon.  She was also given information about mental health symptoms and treatment options .  CD Recommendations: Maintain Sobriety.      Willem Walls PsyD, LP      Marco Walls PsyD  October 5, 2020

## 2020-10-05 NOTE — Clinical Note
Please call this patient to get them scheduled for a follow-up visit in 8 weeks. Please schedule with me and the [facility]. Thanks!

## 2020-10-06 ENCOUNTER — VIRTUAL VISIT (OUTPATIENT)
Dept: BEHAVIORAL HEALTH | Facility: CLINIC | Age: 40
End: 2020-10-06
Payer: COMMERCIAL

## 2020-10-06 DIAGNOSIS — F41.1 GAD (GENERALIZED ANXIETY DISORDER): Primary | ICD-10-CM

## 2020-10-06 DIAGNOSIS — F32.2 CURRENT SEVERE EPISODE OF MAJOR DEPRESSIVE DISORDER WITHOUT PSYCHOTIC FEATURES WITHOUT PRIOR EPISODE (H): ICD-10-CM

## 2020-10-06 PROCEDURE — 90832 PSYTX W PT 30 MINUTES: CPT | Mod: 95 | Performed by: MARRIAGE & FAMILY THERAPIST

## 2020-10-06 NOTE — PROGRESS NOTES
"[facility] Primary Care: Integrated Behavioral Health  October 6, 2020      Mattie Banda is a 39 year old female who is being evaluated via a telephone visit.      The patient has been notified of the following:     \"We have found that certain health care needs can be provided without the need for a face to face visit.  This service lets us provide the care you need with a short phone conversation.      I will have full access to your [place] medical record during this entire phone call.   I will be taking notes for your medical record.     Since this is like an office visit, we will bill your insurance company for this service.  Please check with your medical insurance if this type of telephone visit/virtual care is covered.  You may be responsible for the cost of this service if insurance coverage is denied.      There are potential benefits and risks of telephone visits (e.g. limits to patient confidentiality) that differ from in-person visits.?  Confidentiality still applies for telephone services, and nobody will record the visit.  It is important to be in a quiet, private space that is free of distractions (including cell phone or other devices) during the visit.??     If during the course of the call I believe a telephone visit is not appropriate, you will not be charged for this service\"    Consent has been obtained for this service by care team member: yes.    Behavioral Health Clinician Progress Note    Patient Name: Mattie Banda           Service Type:  Individual     Session Start Time: 1:02  Session End Time: 1:29        Session Length: 16 - 37      Attendees: Patient    Visit Activities (Refresh list every visit): [facility] Only    Diagnostic Assessment Date: 1/21/2020  Treatment Plan Review Date: 8/11/2020  See Flowsheets for today's PHQ-9 and CRISTIANO-7 results  Previous PHQ-9:   PHQ-9 SCORE 1/7/2020 3/4/2020 5/7/2020   PHQ-9 Total Score - - -   PHQ-9 Total Score MyChart - - 22 (Severe " depression)   PHQ-9 Total Score 24 25 22     Previous CRISTIANO-7:   CRISTIANO-7 SCORE 1/7/2020 3/4/2020 5/7/2020   Total Score - - 20 (severe anxiety)   Total Score 20 20 20       EMILIA LEVEL:  EMILIA Score (Last Two) 11/14/2012   EMILIA Raw Score 44   Activation Score 70.8   EMILIA Level 4     Clinical Global Impressions  First:  Considering your total clinical experience with this particular patient population, how severe are the patient's symptoms at this time?: 5 (5/6/2020  2:23 PM)      Most recent:  Compared to the patient's condition at the START of treatment, this patient's condition is: 3 (5/6/2020  2:23 PM)          DATA  Extended Session (60+ minutes): No  Interactive Complexity: No  Crisis: No  [facility] Patient: No    Treatment Objective(s) Addressed in This Session:  Target Behavior(s): disease management/lifestyle changes depression and anxiety    Depressed Mood: Increase interest, engagement, and pleasure in doing things  Decrease frequency and intensity of feeling down, depressed, hopeless  Improve quantity and quality of night time sleep / decrease daytime naps  Feel less tired and more energy during the day   Improve diet, appetite, mindful eating, and / or meal planning  Identify negative self-talk and behaviors: challenge core beliefs, myths, and actions  Improve concentration, focus, and mindfulness in daily activities   Decrease thoughts that you'd be better off dead or of suicide / self-harm  Anxiety: will experience a reduction in anxiety, will develop more effective coping skills to manage anxiety symptoms, will develop healthy cognitive patterns and beliefs and will increase ability to function adaptively    Current Stressors / Issues:  Patient expressed concern about her son. She states that his mood has changed and he is failing his classes in school.  She states that she has been talking with him and ensuring he is taking his medication. She states that she has grounded him for not attending his classes. She  "states that he ended up leaving the house and turned off his location. She states that they didn't know where he went and he later told his dad that he felt he was a \"disappointment to mom\". Patient identified feeling worried about him. She states that this seems so unlike him. Patient has felt more anxious as a result.    Patient processed the recent dynamics with her son. Validated and normalized patient's concerns. Reinforced and encouraged her to continue to talk with him and create a plan for how to address his grades. Encouraged self care.    Progress on Treatment Objective(s) / Homework:  Minimal progress - ACTION (Actively working towards change); Intervened by reinforcing change plan / affirming steps taken    Motivational Interviewing    MI Intervention: Expressed Empathy/Understanding, Supported Autonomy, Collaboration, Evocation, Permission to raise concern or advise, Open-ended questions, Reflections: simple and complex, Change talk (evoked) and Reframe     Change Talk Expressed by the Patient: Desire to change Ability to change Reasons to change Need to change Committment to change Activation Taking steps    Provider Response to Change Talk: E - Evoked more info from patient about behavior change, A - Affirmed patient's thoughts, decisions, or attempts at behavior change, R - Reflected patient's change talk and S - Summarized patient's change talk statements    Also provided psychoeducation about behavioral health condition, symptoms, and treatment options      Skills training    Explored skills useful to client in current situation    Skills include assertiveness, communication, conflict management, problem-solving, relaxation, etc.    Solution-Focused Therapy    Explored patterns in patient's relationships and discussed options for new behaviors    Explored patterns in patient's actions and choices and discussed options for new behaviors    Cognitive-behavioral Therapy    Discussed common cognitive " distortions, identified them in patient's life    Explored ways to challenge, replace, and act against these cognitions    Acceptance and Commitment Therapy    Explored and identified important values in patient's life    Discussed ways to commit to behavioral activation around these values    Psychodynamic psychotherapy    Discussed patient's emotional dynamics and issues and how they impact behaviors    Explored patient's history of relationships and how they impact present behaviors    Explored how to work with and make changes in these schemas and patterns    Behavioral Activation    Discussed steps patient can take to become more involved in meaningful activity    Identified barriers to these activities and explored possible solutions    Mindfulness-Based Strategies    Discussed skills based on development and application of mindfulness    Skills drawn from dialectical behavior therapy, mindfulness-based stress reduction, mindfulness-based cognitive therapy, etc.      Care Plan review completed: Yes    Medication Review:  Changes to psychiatric medications, see updated Medication List in EPIC.  Patient met with psychiatry yesterday.     Medication Compliance:  Yes    Changes in Health Issues:   None reported    Chemical Use Review:   Substance Use: Chemical use reviewed, no active concerns identified       Tobacco Use: No change in amount of tobacco use since last session.  Patient declined discussion at this time    Assessment: Current Emotional / Mental Status (status of significant symptoms):  Risk status (Self / Other harm or suicidal ideation)  Patient has had a history of suicidal ideation: in adolescense and suicide attempts: patient states that she tried multiple times around ages 15-16 by slitting wrists or overdose, and one overdose of alcohol; she denies ever being hospitalized for mental health and denies a history of self-injurious behavior, homicidal ideation, homicidal behavior and and other  safety concerns  Patient denies current fears or concerns for personal safety.  Patient denies current or recent suicidal ideation or behaviors.   Patient denies current or recent homicidal ideation or behaviors.  Patient denies current or recent self injurious behavior or ideation.  Patient denies other safety concerns.  A safety and risk management plan has been developed including: Patient consented to co-developed safety plan.  A safety and risk management plan was completed.  Patient agreed to use safety plan should any safety concerns arise.  A copy was given to the patient.      Appearance:   unable to assess due to phone visit   Eye Contact:   unable to assess due to phone visit   Psychomotor Behavior: unable to assess due to phone visit   Attitude:   Cooperative  Pleasant  Orientation:   All  Speech   Rate / Production: Normal    Volume:  Normal   Mood:    Anxious   Affect:    unable to assess due to phone visit   Thought Content:  Rumination   Thought Form:  Coherent  Logical   Insight:    Good     Diagnoses:  1. CRISTIANO (generalized anxiety disorder)    2. Current severe episode of major depressive disorder without psychotic features without prior episode (H)        Collateral Reports Completed:  Not Applicable    Plan: (Homework, other):  Patient was given information about behavioral services and encouraged to schedule a follow up appointment with the clinic [facility] in 1-2 weeks.  She was also given information about mental health symptoms and treatment options , Cognitive Behavioral Therapy skills to practice when experiencing anxiety and depression and deep Breathing Strategies to practice when experiencing anxiety and depression.  CD Recommendations: No indications of CD issues.       KVNG Watson, [facility]       ______________________________________________________________________    Integrated Primary Care Behavioral Health Treatment Plan    Patient's Name: Mattie Banda  Date Of Birth:  1980    Date: May 6, 2020    DSM-V Diagnoses: 296.33 (F33.2) Major Depressive Disorder, Recurrent Episode, Severe _ or 300.02 (F41.1) Generalized Anxiety Disorder  Psychosocial / Contextual Factors: medical issues,  and dating ex-, few friends  WHODAS: 36    Referral / Collaboration:  Referral to another professional/service is not indicated at this time..    Anticipated number of session or this episode of care: 8      MeasurableTreatment Goal(s) related to diagnosis / functional impairment(s)  Goal 1: Patient will demonstrate ability to improve depression and anxiety symptoms as evidenced by improved PHQ9 & GAD7 scores.       Objective #A (Patient Action)    Patient will Decrease frequency and intensity of feeling down, depressed, hopeless  Identify negative self-talk and behaviors: challenge core beliefs, myths, and actions  Decrease thoughts that you'd be better off dead or of suicide / self-harm  Status: New - Date: 1/28/2020 ; Improved and continued: 5/6/2020 ; Continued:8/11/2020    Intervention(s)  [facility] will teach distress tolerance skills. Provide psycho-education on cognitive distortions and automatic thoughts and behaviors and ways to appropriately challenge and restructure thoughts.    Objective #B  Patient will increase coping strategies by 2  to more effectively manage current stressors  increase understanding of steps in the grief process  Status: New - Date: 1/28/2020; Improved and Continued: 5/6/2020; Continued:8/11/2020    Intervention(s)  [facility] will teach mindfulness and relaxation strategies. Provide psycho-education on grief process.    Patient has reviewed and agreed to the above plan.    Written by  KVNG Watson, [facility]

## 2020-10-14 DIAGNOSIS — G89.29 CHRONIC BILATERAL LOW BACK PAIN WITH BILATERAL SCIATICA: ICD-10-CM

## 2020-10-14 DIAGNOSIS — E03.9 HYPOTHYROIDISM, UNSPECIFIED TYPE: ICD-10-CM

## 2020-10-14 DIAGNOSIS — G43.109 MIGRAINE WITH AURA AND WITHOUT STATUS MIGRAINOSUS, NOT INTRACTABLE: ICD-10-CM

## 2020-10-14 DIAGNOSIS — M54.41 CHRONIC BILATERAL LOW BACK PAIN WITH BILATERAL SCIATICA: ICD-10-CM

## 2020-10-14 DIAGNOSIS — M54.42 CHRONIC BILATERAL LOW BACK PAIN WITH BILATERAL SCIATICA: ICD-10-CM

## 2020-10-14 RX ORDER — LEVOTHYROXINE SODIUM 112 UG/1
TABLET ORAL
Qty: 30 TABLET | Refills: 0 | Status: SHIPPED | OUTPATIENT
Start: 2020-10-14 | End: 2020-11-18

## 2020-10-14 RX ORDER — TOPIRAMATE 25 MG/1
TABLET, FILM COATED ORAL
Qty: 90 TABLET | Refills: 2 | Status: SHIPPED | OUTPATIENT
Start: 2020-10-14 | End: 2021-01-12

## 2020-10-14 NOTE — TELEPHONE ENCOUNTER
"Requested Prescriptions   Pending Prescriptions Disp Refills     levothyroxine (SYNTHROID/LEVOTHROID) 112 MCG tablet [Pharmacy Med Name: LEVOTHYROXINE 112MCG TABLET] 90 tablet 1     Sig: TAKE 1 TABLET BY MOUTH EVERY DAY   Last Written Prescription Date:  4/20/2020  Last Fill Quantity: 90,  # refills: 1   Last office visit: 6/5/2020 with prescribing provider:     Future Office Visit:        Thyroid Protocol Failed - 10/14/2020  1:13 AM        Failed - Normal TSH on file in past 12 months     Recent Labs   Lab Test 11/04/19  1443   TSH 0.12*            Passed - Patient is 12 years or older        Passed - Recent (12 mo) or future (30 days) visit within the authorizing provider's specialty     Patient has had an office visit with the authorizing provider or a provider within the authorizing providers department within the previous 12 mos or has a future within next 30 days. See \"Patient Info\" tab in inbasket, or \"Choose Columns\" in Meds & Orders section of the refill encounter.            Passed - Medication is active on med list        Passed - No active pregnancy on record     If patient is pregnant or has had a positive pregnancy test, please check TSH.          Passed - No positive pregnancy test in past 12 months     If patient is pregnant or has had a positive pregnancy test, please check TSH.             topiramate (TOPAMAX) 25 MG tablet [Pharmacy Med Name: TOPIRAMATE 25MG TABLET] 90 tablet 1     Sig: TAKE 1 TABLET BY MOUTH 3 TIMES A DAY       Anti-Seizure Meds Protocol  Failed - 10/14/2020  1:13 AM        Failed - Review Authorizing provider's last note.      Refer to last progress notes: confirm request is for original authorizing provider (cannot be through other providers)          Passed - Recent (12 mo) or future (30 days) visit within the authorizing provider's specialty     Patient has had an office visit with the authorizing provider or a provider within the authorizing providers department within the " "previous 12 mos or has a future within next 30 days. See \"Patient Info\" tab in inbasket, or \"Choose Columns\" in Meds & Orders section of the refill encounter.            Passed - Normal CBC on file in past 26 months     Recent Labs   Lab Test 04/24/20  1246   WBC 10.5   RBC 4.30   HGB 12.8   HCT 40.6              Passed - Normal ALT or AST on file in past 26 months     Recent Labs   Lab Test 04/24/20  1246   ALT 11     Recent Labs   Lab Test 04/24/20  1246   AST 12           Passed - Normal platelet count on file in past 26 months     Recent Labs   Lab Test 04/24/20  1246                Passed - Medication is active on med list        Passed - No active pregnancy on record        Passed - No positive pregnancy test in last 12 months         Office visit 6/5/2020 continue with medication  Prescription approved per [facility] [place].  Estefanía Prado RN      "

## 2020-10-14 NOTE — TELEPHONE ENCOUNTER
levothyroxine (SYNTHROID/LEVOTHROID) 112 MCG tablet [Pharmacy Med Name: LEVOTHYROXINE 112MCG TABLET] 90 tablet 1    Sig: TAKE 1 TABLET BY MOUTH EVERY DAY   Routing refill request to provider for review/approval because:  Labs out of range:  TSH  Failed - Normal TSH on file in past 12 months   Recent Labs   Lab Test 11/04/19  1443   TSH 0.12*       T'd up 1 month for provider review.    Estefanía Prado RN

## 2020-10-16 ENCOUNTER — MYC MEDICAL ADVICE (OUTPATIENT)
Dept: FAMILY MEDICINE | Facility: CLINIC | Age: 40
End: 2020-10-16

## 2020-10-19 ENCOUNTER — VIRTUAL VISIT (OUTPATIENT)
Dept: FAMILY MEDICINE | Facility: CLINIC | Age: 40
End: 2020-10-19
Payer: COMMERCIAL

## 2020-10-19 DIAGNOSIS — I77.6 VASCULITIS (H): ICD-10-CM

## 2020-10-19 DIAGNOSIS — F32.2 CURRENT SEVERE EPISODE OF MAJOR DEPRESSIVE DISORDER WITHOUT PSYCHOTIC FEATURES WITHOUT PRIOR EPISODE (H): ICD-10-CM

## 2020-10-19 DIAGNOSIS — R11.2 NON-INTRACTABLE VOMITING WITH NAUSEA, UNSPECIFIED VOMITING TYPE: ICD-10-CM

## 2020-10-19 DIAGNOSIS — M50.30 DDD (DEGENERATIVE DISC DISEASE), CERVICAL: ICD-10-CM

## 2020-10-19 DIAGNOSIS — M47.819 SPONDYLARTHRITIS: Primary | ICD-10-CM

## 2020-10-19 DIAGNOSIS — G89.4 CHRONIC PAIN SYNDROME: ICD-10-CM

## 2020-10-19 DIAGNOSIS — F41.1 GAD (GENERALIZED ANXIETY DISORDER): ICD-10-CM

## 2020-10-19 DIAGNOSIS — M51.369 DEGENERATION OF LUMBAR INTERVERTEBRAL DISC: ICD-10-CM

## 2020-10-19 DIAGNOSIS — K52.832 LYMPHOCYTIC COLITIS: ICD-10-CM

## 2020-10-19 PROCEDURE — 99214 OFFICE O/P EST MOD 30 MIN: CPT | Mod: 95 | Performed by: FAMILY MEDICINE

## 2020-10-19 RX ORDER — MORPHINE SULFATE 15 MG/1
15 TABLET, FILM COATED, EXTENDED RELEASE ORAL EVERY 12 HOURS
Qty: 60 TABLET | Refills: 0 | Status: SHIPPED | OUTPATIENT
Start: 2020-10-30 | End: 2020-11-09

## 2020-10-19 RX ORDER — ONDANSETRON 4 MG/1
4 TABLET, FILM COATED ORAL EVERY 6 HOURS PRN
Qty: 20 TABLET | Refills: 1 | Status: SHIPPED | OUTPATIENT
Start: 2020-10-19

## 2020-10-19 NOTE — PROGRESS NOTES
"Mattie Banda is a 40 year old female who is being evaluated via a billable telephone visit.      The patient has been notified of following:     \"This telephone visit will be conducted via a call between you and your physician/provider. We have found that certain health care needs can be provided without the need for a physical exam.  This service lets us provide the care you need with a short phone conversation.  If a prescription is necessary we can send it directly to your pharmacy.  If lab work is needed we can place an order for that and you can then stop by our lab to have the test done at a later time.    Telephone visits are billed at different rates depending on your insurance coverage. During this emergency period, for some insurers they may be billed the same as an in-person visit.  Please reach out to your insurance provider with any questions.    If during the course of the call the physician/provider feels a telephone visit is not appropriate, you will not be charged for this service.\"    Patient has given verbal consent for Telephone visit?  Yes    What phone number would you like to be contacted at? [phone]    How would you like to obtain your AVS? [place]     Mattie Banda is a 40 year old female who presents via phone visit today for the following health issues:    Recheck medications, Refilling pain  medications.      HPI     Patient is working with Steph MORALES and advanced practice nurse for her mental health issues.  This is going well and suggestions made on mental health issues to reduce benzodiazepines and if at all possible.  She is being most open and most honest about her underlying mental health issues this time than ever in her life as she wants to make treatment work.    Sulfasalazine is only used intermittently when GI symptoms become the worst because of side effects.  Mesalamine was denied by insurance.  She has significant back pain, frequent severe limb pain, " little or no neck pain consistent with carotidynia, and all these aches and pains get better with prednisone.  She saw a rheumatology and has been considered for inflammatory disease with inflammatory bowel.  However COVID crisis yet and she has not had follow-up.  The initial visit relationship did not go well with rheumatology.  She would consider rheumatology again but would prefer a different rheumatologist.  Review of Systems   Constitutional, HEENT, cardiovascular, pulmonary, gi and gu systems are negative, except as otherwise noted.       Objective          Vitals:  No vitals were obtained today due to virtual visit.    healthy, alert, no distress and cooperative  PSYCH: Alert and oriented times 3; coherent speech, normal   rate and volume, able to articulate logical thoughts, able   to abstract reason, no tangential thoughts, no hallucinations   or delusions  Her affect is normal and with a positive attitude about current treatments.  RESP: No cough, no audible wheezing, able to talk in full sentences  Remainder of exam unable to be completed due to telephone visits    No results found for this or any previous visit (from the past 24 hour(s)).        Assessment/Plan:    Assessment & Plan     Spondylarthritis  Unsure if this is ankylosing spondylitis or just something else.  Rheumatology work-up has been relatively negative.  She does seem to have inflammatory bowel disease with lymphocytic colitis.  On review of possible medications sulfasalazine is a suggested one for all of these but patient has been having problems tolerating sulfasalazine.  Insurance denied mesalamine.  I am aware methotrexate is not officially indicated but we will try this at low doses once weekly with gradual increase in follow-up in about a month.  Laboratory will be tested at that time and including probably inflammatory markers again.  She did not blood well relationship wise with current rheumatologist.  Perhaps we would arrange  visit for a second opinion from a different rheumatologist.  We will see how she does.  Regarding pain medication, current dosing will be continued.  She denied talked about its current ability to make pain tolerable but not take it away.  I have told her that medications will not be increased.  Perhaps even tapering might be appropriate so that body loses its tolerance and medications may work again.  She is going through a lot of mental health issues at this time so that is not the plan.  - methotrexate 2.5 MG tablet; 2 tabs each week first 2 weeks, then 3 tabs weekly for next 2 weeks, then 4 weekly.    Vasculitis (H)  As noted seems there is an inflammatory component to this with all the blood vessel type issues she was having earlier this year  - methotrexate 2.5 MG tablet; 2 tabs each week first 2 weeks, then 3 tabs weekly for next 2 weeks, then 4 weekly.    Current severe episode of major depressive disorder without psychotic features without prior episode (H)  Working well with mental health both local counselor and advanced practice nurse for medication management.  Most recent recommendation was to work to eliminate benzodiazepines.  This will be a plan    Chronic pain syndrome  I think working on her mental health will be very important but she does have some underlying physical situations consistent with a systemic inflammatory problem.  Fixing water the other will help probably both issues.    CRISTIANO (generalized anxiety disorder)  Working with mental health providers    Lymphocytic colitis  Trying methotrexate instead of mesalamine or sulfasalazine which insurance does not cover the first and patient does not tolerate the second  - methotrexate 2.5 MG tablet; 2 tabs each week first 2 weeks, then 3 tabs weekly for next 2 weeks, then 4 weekly.    Non-intractable vomiting with nausea, unspecified vomiting type  Ondansetron in case she gets nausea from the medications.  - ondansetron (ZOFRAN) 4 MG tablet; Take  1 tablet (4 mg) by mouth every 6 hours as needed for nausea     Tobacco Cessation:   reports that she has been smoking cigarettes. She has a 20.00 pack-year smoking history. She has never used smokeless tobacco.  Tobacco Cessation Action Plan: Self help information given to patient         MEDICATIONS:        - Discontinue sulfasalazine for now       - Start taking methotrexate with side effects discussed       - Continue other medications without change    Return in about 4 weeks (around 11/16/2020) for MSK problem, mental health.    Yuan Schofield MD  [facility]    Phone call duration: 22 minutes 1:06 PM to 1:28 PM

## 2020-10-20 ENCOUNTER — VIRTUAL VISIT (OUTPATIENT)
Dept: BEHAVIORAL HEALTH | Facility: CLINIC | Age: 40
End: 2020-10-20
Payer: COMMERCIAL

## 2020-10-20 DIAGNOSIS — F41.1 GAD (GENERALIZED ANXIETY DISORDER): ICD-10-CM

## 2020-10-20 DIAGNOSIS — F32.2 CURRENT SEVERE EPISODE OF MAJOR DEPRESSIVE DISORDER WITHOUT PSYCHOTIC FEATURES WITHOUT PRIOR EPISODE (H): Primary | ICD-10-CM

## 2020-10-20 PROCEDURE — 90832 PSYTX W PT 30 MINUTES: CPT | Mod: 95 | Performed by: MARRIAGE & FAMILY THERAPIST

## 2020-10-20 NOTE — PROGRESS NOTES
"[facility] Primary Care: Integrated Behavioral Health  October 20, 2020      Mattie Banda is a 39 year old female who is being evaluated via a telephone visit.      The patient has been notified of the following:     \"We have found that certain health care needs can be provided without the need for a face to face visit.  This service lets us provide the care you need with a short phone conversation.      I will have full access to your [facility] [place] medical record during this entire phone call.   I will be taking notes for your medical record.     Since this is like an office visit, we will bill your insurance company for this service.  Please check with your medical insurance if this type of telephone visit/virtual care is covered.  You may be responsible for the cost of this service if insurance coverage is denied.      There are potential benefits and risks of telephone visits (e.g. limits to patient confidentiality) that differ from in-person visits.?  Confidentiality still applies for telephone services, and nobody will record the visit.  It is important to be in a quiet, private space that is free of distractions (including cell phone or other devices) during the visit.??     If during the course of the call I believe a telephone visit is not appropriate, you will not be charged for this service\"    Consent has been obtained for this service by care team member: yes.    Behavioral Health Clinician Progress Note    Patient Name: Mattie Banda           Service Type:  Individual     Session Start Time: 1:05  Session End Time: 1:35        Session Length: 16 - 37      Attendees: Patient    Visit Activities (Refresh list every visit): [facility] Only    Diagnostic Assessment Date: 1/21/2020  Treatment Plan Review Date: 8/11/2020  See Flowsheets for today's PHQ-9 and CRISTIANO-7 results  Previous PHQ-9:   PHQ-9 SCORE 1/7/2020 3/4/2020 5/7/2020   PHQ-9 Total Score - - -   PHQ-9 Total Score MyChart - - 22 (Severe " depression)   PHQ-9 Total Score 24 25 22     Previous CRISTIANO-7:   CRISTIANO-7 SCORE 1/7/2020 3/4/2020 5/7/2020   Total Score - - 20 (severe anxiety)   Total Score 20 20 20       EMILIA LEVEL:  EMILIA Score (Last Two) 11/14/2012   EMILIA Raw Score 44   Activation Score 70.8   EMILIA Level 4     Clinical Global Impressions  First:  Considering your total clinical experience with this particular patient population, how severe are the patient's symptoms at this time?: 5 (5/6/2020  2:23 PM)      Most recent:  Compared to the patient's condition at the START of treatment, this patient's condition is: 3 (5/6/2020  2:23 PM)          DATA  Extended Session (60+ minutes): No  Interactive Complexity: No  Crisis: No  [facility] Patient: No    Treatment Objective(s) Addressed in This Session:  Target Behavior(s): disease management/lifestyle changes depression and anxiety    Depressed Mood: Increase interest, engagement, and pleasure in doing things  Decrease frequency and intensity of feeling down, depressed, hopeless  Improve quantity and quality of night time sleep / decrease daytime naps  Feel less tired and more energy during the day   Improve diet, appetite, mindful eating, and / or meal planning  Identify negative self-talk and behaviors: challenge core beliefs, myths, and actions  Improve concentration, focus, and mindfulness in daily activities   Decrease thoughts that you'd be better off dead or of suicide / self-harm  Anxiety: will experience a reduction in anxiety, will develop more effective coping skills to manage anxiety symptoms, will develop healthy cognitive patterns and beliefs and will increase ability to function adaptively    Current Stressors / Issues:  Patient reports that her arthritis has been flaring up in places that are not typical for her. She met with her PCP yesterday and was prescribed a medication. Patient reports that she has learned that her PCP will be retiring. She states that this is her first doctor she has felt  "comfortable with and she feels sad learning this news.   Patient is working today. 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 Patient continues to try and obtain disability.   Patient stated that she had a \"shitty\" two weeks. She states that she had her birthday and her spouse had planned for a get together, however many people she cared for didn't show.  She identified this hurting her and causing her to feel she \"didn't matter\" to these people.   She states that finances are a stress right now. Her spouse is currently not working.     Patient processed recent events. Validated patient's feelings and discussed grief process with upcoming changes. Encouraged her to notice things that aren't changing.    Progress on Treatment Objective(s) / Homework:  Minimal progress - ACTION (Actively working towards change); Intervened by reinforcing change plan / affirming steps taken    Motivational Interviewing    MI Intervention: Expressed Empathy/Understanding, Supported Autonomy, Collaboration, Evocation, Permission to raise concern or advise, Open-ended questions, Reflections: simple and complex, Change talk (evoked) and Reframe     Change Talk Expressed by the Patient: Desire to change Ability to change Reasons to change Need to change Committment to change Activation Taking steps    Provider Response to Change Talk: E - Evoked more info from patient about behavior change, A - Affirmed patient's thoughts, decisions, or attempts at behavior change, R - Reflected patient's change talk and S - Summarized patient's change talk statements    Also provided psychoeducation about behavioral health condition, symptoms, and treatment options      Skills training    Explored skills useful to client in current situation    Skills include assertiveness, communication, conflict management, problem-solving, relaxation, etc.    Solution-Focused Therapy    Explored patterns in " patient's relationships and discussed options for new behaviors    Explored patterns in patient's actions and choices and discussed options for new behaviors    Cognitive-behavioral Therapy    Discussed common cognitive distortions, identified them in patient's life    Explored ways to challenge, replace, and act against these cognitions    Acceptance and Commitment Therapy    Explored and identified important values in patient's life    Discussed ways to commit to behavioral activation around these values    Psychodynamic psychotherapy    Discussed patient's emotional dynamics and issues and how they impact behaviors    Explored patient's history of relationships and how they impact present behaviors    Explored how to work with and make changes in these schemas and patterns    Behavioral Activation    Discussed steps patient can take to become more involved in meaningful activity    Identified barriers to these activities and explored possible solutions    Mindfulness-Based Strategies    Discussed skills based on development and application of mindfulness    Skills drawn from dialectical behavior therapy, mindfulness-based stress reduction, mindfulness-based cognitive therapy, etc.      Care Plan review completed: Yes    Medication Review:  No changes to current psychiatric medication(s)      Medication Compliance:  Yes    Changes in Health Issues:   Yes: arthritis, Associated Psychological Distress    Chemical Use Review:   Substance Use: Chemical use reviewed, no active concerns identified       Tobacco Use: No change in amount of tobacco use since last session.  Patient declined discussion at this time    Assessment: Current Emotional / Mental Status (status of significant symptoms):  Risk status (Self / Other harm or suicidal ideation)  Patient has had a history of suicidal ideation: in adolescense and suicide attempts: patient states that she tried multiple times around ages [age] by slitting wrists or  overdose, and one overdose of alcohol; she denies ever being hospitalized for mental health and denies a history of self-injurious behavior, homicidal ideation, homicidal behavior and and other safety concerns  Patient denies current fears or concerns for personal safety.  Patient denies current or recent suicidal ideation or behaviors.   Patient denies current or recent homicidal ideation or behaviors.  Patient denies current or recent self injurious behavior or ideation.  Patient denies other safety concerns.  A safety and risk management plan has been developed including: Patient consented to co-developed safety plan.  A safety and risk management plan was completed.  Patient agreed to use safety plan should any safety concerns arise.  A copy was given to the patient.      Appearance:   unable to assess due to phone visit   Eye Contact:   unable to assess due to phone visit   Psychomotor Behavior: unable to assess due to phone visit   Attitude:   Cooperative  Pleasant  Orientation:   All  Speech   Rate / Production: Normal    Volume:  Normal   Mood:    Anxious  Depressed   Affect:    unable to assess due to phone visit   Thought Content:  Rumination   Thought Form:  Coherent  Logical   Insight:    Good     Diagnoses:  1. Current severe episode of major depressive disorder without psychotic features without prior episode (H)    2. CRISTIANO (generalized anxiety disorder)        Collateral Reports Completed:  Not Applicable    Plan: (Homework, other):  Patient was given information about behavioral services and encouraged to schedule a follow up appointment with the clinic [facility] in 1-2 weeks.  She was also given information about mental health symptoms and treatment options , Cognitive Behavioral Therapy skills to practice when experiencing anxiety and depression and deep Breathing Strategies to practice when experiencing anxiety and depression.  CD Recommendations: No indications of CD issues.       KVNG Watson,  [facility]       ______________________________________________________________________    Integrated Primary Care Behavioral Health Treatment Plan    Patient's Name: Mattie Banda  YOB: 1980    Date: May 6, 2020    DSM-V Diagnoses: 296.33 (F33.2) Major Depressive Disorder, Recurrent Episode, Severe _ or 300.02 (F41.1) Generalized Anxiety Disorder  Psychosocial / Contextual Factors: medical issues,  and dating ex-, few friends  WHODAS: 36    Referral / Collaboration:  Referral to another professional/service is not indicated at this time..    Anticipated number of session or this episode of care: 8      MeasurableTreatment Goal(s) related to diagnosis / functional impairment(s)  Goal 1: Patient will demonstrate ability to improve depression and anxiety symptoms as evidenced by improved PHQ9 & GAD7 scores.       Objective #A (Patient Action)    Patient will Decrease frequency and intensity of feeling down, depressed, hopeless  Identify negative self-talk and behaviors: challenge core beliefs, myths, and actions  Decrease thoughts that you'd be better off dead or of suicide / self-harm  Status: New - Date: 1/28/2020 ; Improved and continued: 5/6/2020 ; Continued:8/11/2020    Intervention(s)  [facility] will teach distress tolerance skills. Provide psycho-education on cognitive distortions and automatic thoughts and behaviors and ways to appropriately challenge and restructure thoughts.    Objective #B  Patient will increase coping strategies by 2  to more effectively manage current stressors  increase understanding of steps in the grief process  Status: New - Date: 1/28/2020; Improved and Continued: 5/6/2020; Continued:8/11/2020    Intervention(s)  [facility] will teach mindfulness and relaxation strategies. Provide psycho-education on grief process.    Patient has reviewed and agreed to the above plan.    Written by  KVNG Watson, [facility]

## 2020-10-23 ENCOUNTER — PATIENT OUTREACH (OUTPATIENT)
Dept: CARE COORDINATION | Facility: CLINIC | Age: 40
End: 2020-10-23

## 2020-10-23 NOTE — PROGRESS NOTES
Clinic Care Coordination Contact  [facility]/Voicemail       Clinical Data: Care Coordinator Outreach  Outreach attempted x 1.  Left message on patient's voicemail with call back information and requested return call.  Plan: Care Coordinator sent care coordination introduction letter on 6/19/20 via [employer]. Care Coordinator will try to reach patient again in 5-7 business days.      MARK Salas   Primary Care Clinic- Social Work Care Coordinator  [facility]  10/23/2020 2:15 PM  [phone]

## 2020-10-23 NOTE — LETTER
[place] CARE COORDINATION  [facility]  [address] [place] [address]    November 4, 2020    Mattie Banda  [address] [facility] [id number]      Dear Mattie,      I have been attempting to reach you since our last contact. I would like to continue to work with you on the goals from our last conversation and provide the support you may need. I would appreciate if you would give me a call at [phone] and let me know if you would like to continue working together. I know that there are many things that can affect our ability to communicate and hope we can plan better moving forward.    If I don't hear from you will assume doing ok and I will call you again in a month.      All of us at [facility] are invested in your health and are here to assist you in meeting your goals.     Sincerely,      MARK Salas   Primary Care Clinic- Social Work Care Coordinator  [facility]   [phone]

## 2020-10-27 ENCOUNTER — VIRTUAL VISIT (OUTPATIENT)
Dept: BEHAVIORAL HEALTH | Facility: CLINIC | Age: 40
End: 2020-10-27
Payer: COMMERCIAL

## 2020-10-27 DIAGNOSIS — F32.2 CURRENT SEVERE EPISODE OF MAJOR DEPRESSIVE DISORDER WITHOUT PSYCHOTIC FEATURES WITHOUT PRIOR EPISODE (H): Primary | ICD-10-CM

## 2020-10-27 DIAGNOSIS — F41.1 GAD (GENERALIZED ANXIETY DISORDER): ICD-10-CM

## 2020-10-27 PROCEDURE — 90832 PSYTX W PT 30 MINUTES: CPT | Mod: 95 | Performed by: MARRIAGE & FAMILY THERAPIST

## 2020-10-27 NOTE — PROGRESS NOTES
"[facility] Primary Care: Integrated Behavioral Health  October 27, 2020      Mattie Banda is a 39 year old female who is being evaluated via a telephone visit.      The patient has been notified of the following:     \"We have found that certain health care needs can be provided without the need for a face to face visit.  This service lets us provide the care you need with a short phone conversation.      I will have full access to your [facility] medical record during this entire phone call.   I will be taking notes for your medical record.     Since this is like an office visit, we will bill your insurance company for this service.  Please check with your medical insurance if this type of telephone visit/virtual care is covered.  You may be responsible for the cost of this service if insurance coverage is denied.      There are potential benefits and risks of telephone visits (e.g. limits to patient confidentiality) that differ from in-person visits.?  Confidentiality still applies for telephone services, and nobody will record the visit.  It is important to be in a quiet, private space that is free of distractions (including cell phone or other devices) during the visit.??     If during the course of the call I believe a telephone visit is not appropriate, you will not be charged for this service\"    Consent has been obtained for this service by care team member: yes.    Behavioral Health Clinician Progress Note    Patient Name: Mattie Banda           Service Type:  Individual     Session Start Time: 1:03  Session End Time: 1:33        Session Length: 16 - 37      Attendees: Patient    Visit Activities (Refresh list every visit): [facility] [place] Only    Diagnostic Assessment Date: 1/21/2020  Treatment Plan Review Date: 8/11/2020  See Flowsheets for today's PHQ-9 and CRISTIANO-7 results  Previous PHQ-9:   PHQ-9 SCORE 1/7/2020 3/4/2020 5/7/2020   PHQ-9 Total Score - - -   PHQ-9 Total Score MyChart - - 22 (Severe " "depression)   PHQ-9 Total Score 24 25 22     Previous CRISTIANO-7:   CRISTIANO-7 SCORE 1/7/2020 3/4/2020 5/7/2020   Total Score - - 20 (severe anxiety)   Total Score 20 20 20       EMILIA LEVEL:  EMILIA Score (Last Two) 11/14/2012   EMILIA Raw Score 44   Activation Score 70.8   EMILIA Level 4     Clinical Global Impressions  First:  Considering your total clinical experience with this particular patient population, how severe are the patient's symptoms at this time?: 5 (5/6/2020  2:23 PM)      Most recent:  Compared to the patient's condition at the START of treatment, this patient's condition is: 3 (5/6/2020  2:23 PM)          DATA  Extended Session (60+ minutes): No  Interactive Complexity: No  Crisis: No  [facility] Patient: No    Treatment Objective(s) Addressed in This Session:  Target Behavior(s): disease management/lifestyle changes depression and anxiety    Depressed Mood: Increase interest, engagement, and pleasure in doing things  Decrease frequency and intensity of feeling down, depressed, hopeless  Improve quantity and quality of night time sleep / decrease daytime naps  Feel less tired and more energy during the day   Improve diet, appetite, mindful eating, and / or meal planning  Identify negative self-talk and behaviors: challenge core beliefs, myths, and actions  Improve concentration, focus, and mindfulness in daily activities   Decrease thoughts that you'd be better off dead or of suicide / self-harm  Anxiety: will experience a reduction in anxiety, will develop more effective coping skills to manage anxiety symptoms, will develop healthy cognitive patterns and beliefs and will increase ability to function adaptively    Current Stressors / Issues:  Patient reports that she was called into work and today is her last day. She reports that she had a few rough days where she felt more down. She states that she is unsure as to the reason for this. She states that she is \"climbing out of that\". She states that she secluded herself in " her garage during that time.   Patient identified that she is worried about her son's grades. She also has financial strain and had to borrow money from her grandfather and she feels bad about this. She talked about her spouse's reaction to her feeling more down and she feels he doesn't understand.     Patient reports that she is going to [place] to visit her brother. She is now feeling hesitant about going and states that she was worried about the possibility of getting into a car accident.       Patient processed recent events. Patient expressed concern and frustration regarding her son's grades in school. Reviewed what she has control over what she does not. Discussed communication with spouse and explored barriers to following through on things she says and ways to overcome this.    Progress on Treatment Objective(s) / Homework:  Minimal progress - ACTION (Actively working towards change); Intervened by reinforcing change plan / affirming steps taken    Motivational Interviewing    MI Intervention: Expressed Empathy/Understanding, Supported Autonomy, Collaboration, Evocation, Permission to raise concern or advise, Open-ended questions, Reflections: simple and complex, Change talk (evoked) and Reframe     Change Talk Expressed by the Patient: Desire to change Ability to change Reasons to change Need to change Committment to change Activation Taking steps    Provider Response to Change Talk: E - Evoked more info from patient about behavior change, A - Affirmed patient's thoughts, decisions, or attempts at behavior change, R - Reflected patient's change talk and S - Summarized patient's change talk statements    Also provided psychoeducation about behavioral health condition, symptoms, and treatment options    Skills training    Explored skills useful to client in current situation    Skills include assertiveness, communication, conflict management, problem-solving, relaxation, etc.    Solution-Focused  Therapy    Explored patterns in patient's relationships and discussed options for new behaviors    Explored patterns in patient's actions and choices and discussed options for new behaviors    Cognitive-behavioral Therapy    Discussed common cognitive distortions, identified them in patient's life    Explored ways to challenge, replace, and act against these cognitions    Acceptance and Commitment Therapy    Explored and identified important values in patient's life    Discussed ways to commit to behavioral activation around these values    Psychodynamic psychotherapy    Discussed patient's emotional dynamics and issues and how they impact behaviors    Explored patient's history of relationships and how they impact present behaviors    Explored how to work with and make changes in these schemas and patterns    Behavioral Activation    Discussed steps patient can take to become more involved in meaningful activity    Identified barriers to these activities and explored possible solutions    Mindfulness-Based Strategies    Discussed skills based on development and application of mindfulness    Skills drawn from dialectical behavior therapy, mindfulness-based stress reduction, mindfulness-based cognitive therapy, etc.      Care Plan review completed: Yes    Medication Review:  No changes to current psychiatric medication(s)      Medication Compliance:  Yes    Changes in Health Issues:   Yes: arthritis, Associated Psychological Distress    Chemical Use Review:   Substance Use: Chemical use reviewed, no active concerns identified       Tobacco Use: No change in amount of tobacco use since last session.  Patient declined discussion at this time    Assessment: Current Emotional / Mental Status (status of significant symptoms):  Risk status (Self / Other harm or suicidal ideation)  Patient has had a history of suicidal ideation: in adolescense and suicide attempts: patient states that she tried multiple times around ages  [age] by slitting wrists or overdose, and one overdose of alcohol; she denies ever being hospitalized for mental health and denies a history of self-injurious behavior, homicidal ideation, homicidal behavior and and other safety concerns  Patient denies current fears or concerns for personal safety.  Patient denies current or recent suicidal ideation or behaviors.   Patient denies current or recent homicidal ideation or behaviors.  Patient denies current or recent self injurious behavior or ideation.  Patient denies other safety concerns.  A safety and risk management plan has been developed including: Patient consented to co-developed safety plan.  A safety and risk management plan was completed.  Patient agreed to use safety plan should any safety concerns arise.  A copy was given to the patient.      Appearance:   unable to assess due to phone visit   Eye Contact:   unable to assess due to phone visit   Psychomotor Behavior: unable to assess due to phone visit   Attitude:   Cooperative  Pleasant  Orientation:   All  Speech   Rate / Production: Normal    Volume:  Normal   Mood:    Anxious  Depressed   Affect:    unable to assess due to phone visit   Thought Content:  Perservative  Rumination   Thought Form:  Coherent  Logical   Insight:    Good     Diagnoses:  1. Current severe episode of major depressive disorder without psychotic features without prior episode (H)    2. CRISTIANO (generalized anxiety disorder)        Collateral Reports Completed:  Not Applicable    Plan: (Homework, other):  Patient was given information about behavioral services and encouraged to schedule a follow up appointment with the clinic [facility] in 1-2 weeks.  She was also given information about mental health symptoms and treatment options , Cognitive Behavioral Therapy skills to practice when experiencing anxiety and depression and deep Breathing Strategies to practice when experiencing anxiety and depression.  CD Recommendations: No indications  of CD issues.       KVNG Watson, [facility]       ______________________________________________________________________    Integrated Primary Care Behavioral Health Treatment Plan    Patient's Name: Mattie Banda  YOB: 1980    Date: May 6, 2020    DSM-V Diagnoses: 296.33 (F33.2) Major Depressive Disorder, Recurrent Episode, Severe _ or 300.02 (F41.1) Generalized Anxiety Disorder  Psychosocial / Contextual Factors: medical issues,  and dating ex-, few friends  WHODAS: 36    Referral / Collaboration:  Referral to another professional/service is not indicated at this time..    Anticipated number of session or this episode of care: 8      MeasurableTreatment Goal(s) related to diagnosis / functional impairment(s)  Goal 1: Patient will demonstrate ability to improve depression and anxiety symptoms as evidenced by improved PHQ9 & GAD7 scores.       Objective #A (Patient Action)    Patient will Decrease frequency and intensity of feeling down, depressed, hopeless  Identify negative self-talk and behaviors: challenge core beliefs, myths, and actions  Decrease thoughts that you'd be better off dead or of suicide / self-harm  Status: New - Date: 1/28/2020 ; Improved and continued: 5/6/2020 ; Continued:8/11/2020    Intervention(s)  [facility] will teach distress tolerance skills. Provide psycho-education on cognitive distortions and automatic thoughts and behaviors and ways to appropriately challenge and restructure thoughts.    Objective #B  Patient will increase coping strategies by 2  to more effectively manage current stressors  increase understanding of steps in the grief process  Status: New - Date: 1/28/2020; Improved and Continued: 5/6/2020; Continued:8/11/2020    Intervention(s)  [facility] will teach mindfulness and relaxation strategies. Provide psycho-education on grief process.    Patient has reviewed and agreed to the above plan.    Written by  KVNG Watson, [facility]

## 2020-11-04 NOTE — PROGRESS NOTES
Clinic Care Coordination Contact  [facility]/Voicemail       Clinical Data: Care Coordinator Outreach  Outreach attempted x 2.  Left message on patient's voicemail with call back information and requested return call.  Plan: Care Coordinator sent care coordination introduction letter on 6/19/20 via [employer]. Will send another letter via [employer] today. Care Coordinator will try to reach patient again in 1 month.      MARK Salas   Primary Care Clinic- Social Work Care Coordinator  [facility]  11/4/2020 11:33 AM  [phone]

## 2020-11-09 ENCOUNTER — MYC REFILL (OUTPATIENT)
Dept: FAMILY MEDICINE | Facility: CLINIC | Age: 40
End: 2020-11-09

## 2020-11-09 ENCOUNTER — MYC MEDICAL ADVICE (OUTPATIENT)
Dept: FAMILY MEDICINE | Facility: CLINIC | Age: 40
End: 2020-11-09

## 2020-11-09 DIAGNOSIS — M51.369 DEGENERATION OF LUMBAR INTERVERTEBRAL DISC: ICD-10-CM

## 2020-11-09 DIAGNOSIS — F41.8 MIXED ANXIETY DEPRESSIVE DISORDER: ICD-10-CM

## 2020-11-09 DIAGNOSIS — M50.30 DDD (DEGENERATIVE DISC DISEASE), CERVICAL: ICD-10-CM

## 2020-11-09 DIAGNOSIS — Z90.710 HISTORY OF ROBOT-ASSISTED LAPAROSCOPIC HYSTERECTOMY: ICD-10-CM

## 2020-11-09 DIAGNOSIS — G47.9 SLEEP DISORDER: ICD-10-CM

## 2020-11-09 RX ORDER — METHOCARBAMOL 750 MG/1
750 TABLET, FILM COATED ORAL 3 TIMES DAILY PRN
Qty: 90 TABLET | Refills: 1 | Status: SHIPPED | OUTPATIENT
Start: 2020-11-09 | End: 2020-12-01

## 2020-11-09 RX ORDER — OXYCODONE HYDROCHLORIDE 5 MG/1
5-10 TABLET ORAL EVERY 6 HOURS PRN
Qty: 240 TABLET | Refills: 0 | Status: SHIPPED | OUTPATIENT
Start: 2020-11-09 | End: 2020-12-01

## 2020-11-09 RX ORDER — MORPHINE SULFATE 15 MG/1
15 TABLET, FILM COATED, EXTENDED RELEASE ORAL EVERY 12 HOURS
Qty: 60 TABLET | Refills: 0 | Status: SHIPPED | OUTPATIENT
Start: 2020-11-09 | End: 2020-12-01

## 2020-11-09 RX ORDER — ALPRAZOLAM 2 MG
TABLET ORAL
Qty: 90 TABLET | Refills: 0 | Status: SHIPPED | OUTPATIENT
Start: 2020-11-09 | End: 2020-12-01

## 2020-11-09 NOTE — TELEPHONE ENCOUNTER
methocarbamol (ROBAXIN) 750 MG tablet       Last Written Prescription Date:  9/16/20  Last Fill Quantity: 90,   # refills: 1  Last Office Visit: 10/19/20  Future Office visit:       Routing refill request to provider for review/approval because:  Drug not on the [facility], [facility] or [facility] refill protocol or controlled substance

## 2020-11-09 NOTE — TELEPHONE ENCOUNTER
oxyCODONE (ROXICODONE) 5 MG tablet           Last Written Prescription Date:  10/16/20  Last Fill Quantity: 240,   # refills: 0  Last Office Visit: 10/19/20  Future Office visit:       Routing refill request to provider for review/approval because:  Drug not on the [facility], [facility] or [facility] refill protocol or controlled substance        Morphine  Last Written Prescription Date:  10/30/20  Last Fill Quantity: 60,   # refills: 0  Last Office Visit: 10/19/20  Future Office visit:       Routing refill request to provider for review/approval because:  Drug not on the [facility], [facility] or [facility] refill protocol or controlled substance    ALPRAZolam (XANAX) 2 MG tablet           Last Written Prescription Date:  10/16/20  Last Fill Quantity: 90,   # refills: 0  Last Office Visit: 10/19/20  Future Office visit:       Routing refill request to provider for review/approval because:  Drug not on the [facility], [facility] or [facility] refill protocol or controlled substance

## 2020-11-10 RX ORDER — METHOCARBAMOL 750 MG/1
750 TABLET, FILM COATED ORAL 3 TIMES DAILY PRN
Qty: 90 TABLET | Refills: 1 | OUTPATIENT
Start: 2020-11-10

## 2020-11-10 NOTE — TELEPHONE ENCOUNTER
Prescription was sent 11/9/2020 for #90 with 1 refill.  Pharmacy notified via E-Prescribe refusal.     EFRAIN ManningN, RN  [facility]

## 2020-11-17 DIAGNOSIS — E03.9 HYPOTHYROIDISM, UNSPECIFIED TYPE: ICD-10-CM

## 2020-11-18 DIAGNOSIS — F41.1 GAD (GENERALIZED ANXIETY DISORDER): ICD-10-CM

## 2020-11-18 RX ORDER — BUSPIRONE HYDROCHLORIDE 30 MG/1
30 TABLET ORAL 2 TIMES DAILY
Qty: 60 TABLET | Refills: 1 | Status: SHIPPED | OUTPATIENT
Start: 2020-11-18 | End: 2021-01-12

## 2020-11-18 RX ORDER — LEVOTHYROXINE SODIUM 112 UG/1
TABLET ORAL
Qty: 30 TABLET | Refills: 0 | Status: SHIPPED | OUTPATIENT
Start: 2020-11-18 | End: 2020-12-17

## 2020-11-18 NOTE — TELEPHONE ENCOUNTER
Routing refill request to provider for review/approval because:  Labs out of range:  [facility]    Rehana Preciado RN

## 2020-11-24 ENCOUNTER — VIRTUAL VISIT (OUTPATIENT)
Dept: BEHAVIORAL HEALTH | Facility: CLINIC | Age: 40
End: 2020-11-24
Payer: COMMERCIAL

## 2020-11-24 DIAGNOSIS — F41.1 GAD (GENERALIZED ANXIETY DISORDER): ICD-10-CM

## 2020-11-24 DIAGNOSIS — F32.2 CURRENT SEVERE EPISODE OF MAJOR DEPRESSIVE DISORDER WITHOUT PSYCHOTIC FEATURES WITHOUT PRIOR EPISODE (H): Primary | ICD-10-CM

## 2020-11-24 PROCEDURE — 90832 PSYTX W PT 30 MINUTES: CPT | Mod: 95 | Performed by: MARRIAGE & FAMILY THERAPIST

## 2020-11-24 NOTE — PROGRESS NOTES
"[facility] Primary Care: Integrated Behavioral Health  November 24, 2020      Mattie Banda is a 39 year old female who is being evaluated via a telephone visit.      The patient has been notified of the following:     \"We have found that certain health care needs can be provided without the need for a face to face visit.  This service lets us provide the care you need with a short phone conversation.      I will have full access to your [facility] medical record during this entire phone call.   I will be taking notes for your medical record.     Since this is like an office visit, we will bill your insurance company for this service.  Please check with your medical insurance if this type of telephone visit/virtual care is covered.  You may be responsible for the cost of this service if insurance coverage is denied.      There are potential benefits and risks of telephone visits (e.g. limits to patient confidentiality) that differ from in-person visits.?  Confidentiality still applies for telephone services, and nobody will record the visit.  It is important to be in a quiet, private space that is free of distractions (including cell phone or other devices) during the visit.??     If during the course of the call I believe a telephone visit is not appropriate, you will not be charged for this service\"    Consent has been obtained for this service by care team member: yes.    Behavioral Health Clinician Progress Note    Patient Name: Mattie Banda           Service Type:  Individual     Session Start Time: 2:35  Session End Time: 3:04        Session Length: 16 - 37      Attendees: Patient    Visit Activities (Refresh list every visit): [facility] [place] Only    Diagnostic Assessment Date: 1/21/2020  Treatment Plan Review Date: 11/24/2020  See Flowsheets for today's PHQ-9 and CRISTIANO-7 results  Previous PHQ-9:   PHQ-9 SCORE 1/7/2020 3/4/2020 5/7/2020   PHQ-9 Total Score - - -   PHQ-9 Total Score MyChart - - 22 " (Severe depression)   PHQ-9 Total Score 24 25 22     Previous CRISTIANO-7:   2/8/2016 1/7/2020 3/4/2020 5/7/2020   16 20 20 20                   EMILIA LEVEL:  EMILIA Score (Last Two) 11/14/2012   EMILIA Raw Score 44   Activation Score 70.8   EMILIA Level 4     Clinical Global Impressions  First:  Considering your total clinical experience with this particular patient population, how severe are the patient's symptoms at this time?: 5 (11/24/2020  4:36 PM)      Most recent:  Compared to the patient's condition at the START of treatment, this patient's condition is: 3 (11/24/2020  4:36 PM)          DATA  Extended Session (60+ minutes): No  Interactive Complexity: No  Crisis: No  H Patient: No    Treatment Objective(s) Addressed in This Session:  Target Behavior(s): disease management/lifestyle changes depression and anxiety    Depressed Mood: Increase interest, engagement, and pleasure in doing things  Decrease frequency and intensity of feeling down, depressed, hopeless  Improve quantity and quality of night time sleep / decrease daytime naps  Feel less tired and more energy during the day   Improve diet, appetite, mindful eating, and / or meal planning  Identify negative self-talk and behaviors: challenge core beliefs, myths, and actions  Improve concentration, focus, and mindfulness in daily activities   Decrease thoughts that you'd be better off dead or of suicide / self-harm  Anxiety: will experience a reduction in anxiety, will develop more effective coping skills to manage anxiety symptoms, will develop healthy cognitive patterns and beliefs and will increase ability to function adaptively    Current Stressors / Issues:  Patient reports that she has been doing okay but she had some stress with having exposure to COVID. Patient states that her family was on quarantine and they tested negative.  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 She reports that shortly after starting she found him unresponsive.  She states that this has triggered memories of her Gran and she has had difficulty with sleep. She states that she has been feeling more tense. She did state that she had better sleep last night.  She continues to be concerned about her son and his grades in school. She states that his relationship with his girlfriend recently ended as well.     Patient talked about dynamics with a close friend. Patient states that she has had to pull away from this friend for a little bit. Patient talked about how she has been working on her relationship with her mom as well. She reflected that her mom has called her sober as well. Reinforced patient identifying boundaries and setting them.     Patient identified increased stress with her PCP retiring. She states that she worries about finding another provider that will be a good fit. Validated patient's feelings. Suggested she look at the website for potential options of who to transition care to.      Progress on Treatment Objective(s) / Homework:  Minimal progress - ACTION (Actively working towards change); Intervened by reinforcing change plan / affirming steps taken    Motivational Interviewing    MI Intervention: Expressed Empathy/Understanding, Supported Autonomy, Collaboration, Evocation, Permission to raise concern or advise, Open-ended questions, Reflections: simple and complex, Change talk (evoked) and Reframe     Change Talk Expressed by the Patient: Desire to change Ability to change Reasons to change Need to change Committment to change Activation Taking steps    Provider Response to Change Talk: E - Evoked more info from patient about behavior change, A - Affirmed patient's thoughts, decisions, or attempts at behavior change, R - Reflected patient's change talk and S - Summarized patient's change talk statements    Also provided psychoeducation about behavioral health condition, symptoms, and treatment options    Skills training    Explored skills useful to client in  "current situation    Skills include assertiveness, communication, conflict management, problem-solving, relaxation, etc.    Solution-Focused Therapy    Explored patterns in patient's relationships and discussed options for new behaviors    Explored patterns in patient's actions and choices and discussed options for new behaviors    Cognitive-behavioral Therapy    Discussed common cognitive distortions, identified them in patient's life    Explored ways to challenge, replace, and act against these cognitions    Acceptance and Commitment Therapy    Explored and identified important values in patient's life    Discussed ways to commit to behavioral activation around these values    Psychodynamic psychotherapy    Discussed patient's emotional dynamics and issues and how they impact behaviors    Explored patient's history of relationships and how they impact present behaviors    Explored how to work with and make changes in these schemas and patterns    Behavioral Activation    Discussed steps patient can take to become more involved in meaningful activity    Identified barriers to these activities and explored possible solutions    Mindfulness-Based Strategies    Discussed skills based on development and application of mindfulness    Skills drawn from dialectical behavior therapy, mindfulness-based stress reduction, mindfulness-based cognitive therapy, etc.      Care Plan review completed: Yes    Medication Review:  No changes to current psychiatric medication(s)      Medication Compliance:  Yes    Changes in Health Issues:   Yes: arthritis, Associated Psychological Distress    Chemical Use Review:   Substance Use: Chemical use reviewed, no active concerns identified       Tobacco Use: Yes, increase.  Patient reports frequency of use \"too much\". She states that due to stress this has increased due to anxiousness and boredom. She does not smoke in her home. Contemplation    Assessment: Current Emotional / Mental Status " (status of significant symptoms):  Risk status (Self / Other harm or suicidal ideation)  Patient has had a history of suicidal ideation: in adolescense and suicide attempts: patient states that she tried multiple times around ages [age] by slitting wrists or overdose, and one overdose of alcohol; she denies ever being hospitalized for mental health and denies a history of self-injurious behavior, homicidal ideation, homicidal behavior and and other safety concerns  Patient denies current fears or concerns for personal safety.  Patient denies current or recent suicidal ideation or behaviors.   Patient denies current or recent homicidal ideation or behaviors.  Patient denies current or recent self injurious behavior or ideation.  Patient denies other safety concerns.  A safety and risk management plan has been developed including: Patient consented to co-developed safety plan.  A safety and risk management plan was completed.  Patient agreed to use safety plan should any safety concerns arise.  A copy was given to the patient.      Appearance:   unable to assess due to phone visit   Eye Contact:   unable to assess due to phone visit   Psychomotor Behavior: unable to assess due to phone visit   Attitude:   Cooperative  Pleasant  Orientation:   All  Speech   Rate / Production: Normal    Volume:  Normal   Mood:    Anxious  Depressed   Affect:    unable to assess due to phone visit   Thought Content:  Perservative  Rumination   Thought Form:  Coherent  Logical   Insight:    Good     Diagnoses:  1. Current severe episode of major depressive disorder without psychotic features without prior episode (H)    2. CRISTIANO (generalized anxiety disorder)        Collateral Reports Completed:  Not Applicable    Plan: (Homework, other):  Patient was given information about behavioral services and encouraged to schedule a follow up appointment with the clinic [facility] [place] in 1-2 weeks.  She was also given information about mental health symptoms  and treatment options , Cognitive Behavioral Therapy skills to practice when experiencing anxiety and depression and deep Breathing Strategies to practice when experiencing anxiety and depression.  CD Recommendations: No indications of CD issues.  Patient will schedule a follow up with her PCP and consider who she would like to transition care to. Suggested that she look at the website for options.     KVNG Watson, [facility]       ______________________________________________________________________    Integrated Primary Care Behavioral Health Treatment Plan    Patient's Name: Mattie Banda  YOB: 1980    Date: November 24, 2020    DSM-V Diagnoses: 296.33 (F33.2) Major Depressive Disorder, Recurrent Episode, Severe _ or 300.02 (F41.1) Generalized Anxiety Disorder  Psychosocial / Contextual Factors: medical issues,  and dating ex-, few friends  WHODAS: 36    Referral / Collaboration:  Referral to another professional/service is not indicated at this time..    Anticipated number of session or this episode of care: 8      MeasurableTreatment Goal(s) related to diagnosis / functional impairment(s)  Goal 1: Patient will demonstrate ability to improve depression and anxiety symptoms as evidenced by improved PHQ9 & GAD7 scores.       Objective #A (Patient Action)    Patient will Decrease frequency and intensity of feeling down, depressed, hopeless  Identify negative self-talk and behaviors: challenge core beliefs, myths, and actions  Decrease thoughts that you'd be better off dead or of suicide / self-harm  Status: New - Date: 1/28/2020 ; Improved and continued: 5/6/2020 ; Continued:8/11/2020; Continued: 11/24/2020    Intervention(s)  [facility] will teach distress tolerance skills. Provide psycho-education on cognitive distortions and automatic thoughts and behaviors and ways to appropriately challenge and restructure thoughts.    Objective #B  Patient will increase coping strategies by 2  to  more effectively manage current stressors  increase understanding of steps in the grief process  Status: New - Date: 1/28/2020; Improved and Continued: 5/6/2020; Continued:8/11/2020; Continued: 11/24/2020    Intervention(s)  [facility] will teach mindfulness and relaxation strategies. Provide psycho-education on grief process.    Patient has reviewed and agreed to the above plan.    Written by  KVNG Watson, [facility]

## 2020-12-01 ENCOUNTER — MYC REFILL (OUTPATIENT)
Dept: FAMILY MEDICINE | Facility: CLINIC | Age: 40
End: 2020-12-01

## 2020-12-01 ENCOUNTER — VIRTUAL VISIT (OUTPATIENT)
Dept: BEHAVIORAL HEALTH | Facility: CLINIC | Age: 40
End: 2020-12-01
Payer: COMMERCIAL

## 2020-12-01 DIAGNOSIS — M47.819 SPONDYLARTHRITIS: ICD-10-CM

## 2020-12-01 DIAGNOSIS — I77.6 VASCULITIS (H): ICD-10-CM

## 2020-12-01 DIAGNOSIS — Z90.710 HISTORY OF ROBOT-ASSISTED LAPAROSCOPIC HYSTERECTOMY: ICD-10-CM

## 2020-12-01 DIAGNOSIS — F41.1 GAD (GENERALIZED ANXIETY DISORDER): ICD-10-CM

## 2020-12-01 DIAGNOSIS — M51.369 DEGENERATION OF LUMBAR INTERVERTEBRAL DISC: ICD-10-CM

## 2020-12-01 DIAGNOSIS — F41.8 MIXED ANXIETY DEPRESSIVE DISORDER: ICD-10-CM

## 2020-12-01 DIAGNOSIS — F32.2 CURRENT SEVERE EPISODE OF MAJOR DEPRESSIVE DISORDER WITHOUT PSYCHOTIC FEATURES WITHOUT PRIOR EPISODE (H): Primary | ICD-10-CM

## 2020-12-01 DIAGNOSIS — M50.30 DDD (DEGENERATIVE DISC DISEASE), CERVICAL: ICD-10-CM

## 2020-12-01 DIAGNOSIS — G47.9 SLEEP DISORDER: ICD-10-CM

## 2020-12-01 DIAGNOSIS — K52.832 LYMPHOCYTIC COLITIS: ICD-10-CM

## 2020-12-01 PROCEDURE — 90832 PSYTX W PT 30 MINUTES: CPT | Mod: 95 | Performed by: MARRIAGE & FAMILY THERAPIST

## 2020-12-01 NOTE — PROGRESS NOTES
"[facility]: Integrated Behavioral Health  December 1, 2020      Mattie Banda is a 39 year old female who is being evaluated via a telephone visit.      The patient has been notified of the following:     \"We have found that certain health care needs can be provided without the need for a face to face visit.  This service lets us provide the care you need with a short phone conversation.      I will have full access to your [facility] medical record during this entire phone call.   I will be taking notes for your medical record.     Since this is like an office visit, we will bill your insurance company for this service.  Please check with your medical insurance if this type of telephone visit/virtual care is covered.  You may be responsible for the cost of this service if insurance coverage is denied.      There are potential benefits and risks of telephone visits (e.g. limits to patient confidentiality) that differ from in-person visits.?  Confidentiality still applies for telephone services, and nobody will record the visit.  It is important to be in a quiet, private space that is free of distractions (including cell phone or other devices) during the visit.??     If during the course of the call I believe a telephone visit is not appropriate, you will not be charged for this service\"    Consent has been obtained for this service by care team member: yes.    Behavioral Health Clinician Progress Note    Patient Name: Mattie Banda           Service Type:  Individual     Session Start Time: 1:03  Session End Time: 1:29        Session Length: 16 - 37      Attendees: Patient    Visit Activities (Refresh list every visit): [facility] Only    Diagnostic Assessment Date: 1/21/2020  Treatment Plan Review Date: 11/24/2020  See Flowsheets for today's PHQ-9 and CRISTIANO-7 results  Previous PHQ-9:   PHQ-9 SCORE 1/7/2020 3/4/2020 5/7/2020   PHQ-9 Total Score - - -   PHQ-9 Total Score MyChart - - 22 " (Severe depression)   PHQ-9 Total Score 24 25 22     Previous CRISTIANO-7:   CRISTIANO-7 SCORE 1/7/2020 3/4/2020 5/7/2020   Total Score - - 20 (severe anxiety)   Total Score 20 20 20       EMILIA LEVEL:  EMILIA Score (Last Two) 11/14/2012   EMILIA Raw Score 44   Activation Score 70.8   EMILIA Level 4     Clinical Global Impressions  First:  Considering your total clinical experience with this particular patient population, how severe are the patient's symptoms at this time?: 5 (11/24/2020  4:36 PM)      Most recent:  Compared to the patient's condition at the START of treatment, this patient's condition is: 3 (11/24/2020  4:36 PM)          DATA  Extended Session (60+ minutes): No  Interactive Complexity: No  Crisis: No  [facility] Patient: No    Treatment Objective(s) Addressed in This Session:  Target Behavior(s): disease management/lifestyle changes depression and anxiety    Depressed Mood: Increase interest, engagement, and pleasure in doing things  Decrease frequency and intensity of feeling down, depressed, hopeless  Improve quantity and quality of night time sleep / decrease daytime naps  Feel less tired and more energy during the day   Improve diet, appetite, mindful eating, and / or meal planning  Identify negative self-talk and behaviors: challenge core beliefs, myths, and actions  Improve concentration, focus, and mindfulness in daily activities   Decrease thoughts that you'd be better off dead or of suicide / self-harm  Anxiety: will experience a reduction in anxiety, will develop more effective coping skills to manage anxiety symptoms, will develop healthy cognitive patterns and beliefs and will increase ability to function adaptively    Current Stressors / Issues:  Patient reports that her daughter has COVID19. She states that they have been around her so they are concerned about possibly getting it as well. Patient reports feeling more tired and easily worn out more than normal, lately. She states that last week was difficult with  "nightmares from her past. She has not had any this week, so far.  Patient reports increased conflict with her spouse regarding finances. Patient reflected that, right now, it's also different as they have not been around each other this long. She states that the mood at home among everyone is kind of \"blah\". Patient states that they have been playing some virtual reality games and this has been kind of fun for the family.  Patient states that she has been trying to look for job opportunities, however has had some difficulty finding something.   She is scheduled with her PCP next week. She talked about trying to figure out who she might transition to and feeling nervous about working with a new doctor, with her PCP retiring.       Processed recent events. Validated patient's feelings. Reinforced patient doing something for fun with her family. Encouraged patient to maintain some structure and activity as well as getting fresh air, daily.    Progress on Treatment Objective(s) / Homework:  Minimal progress - ACTION (Actively working towards change); Intervened by reinforcing change plan / affirming steps taken    Motivational Interviewing    MI Intervention: Expressed Empathy/Understanding, Supported Autonomy, Collaboration, Evocation, Permission to raise concern or advise, Open-ended questions, Reflections: simple and complex, Change talk (evoked) and Reframe     Change Talk Expressed by the Patient: Desire to change Ability to change Reasons to change Need to change Committment to change Activation Taking steps    Provider Response to Change Talk: E - Evoked more info from patient about behavior change, A - Affirmed patient's thoughts, decisions, or attempts at behavior change, R - Reflected patient's change talk and S - Summarized patient's change talk statements    Also provided psychoeducation about behavioral health condition, symptoms, and treatment options    Skills training    Explored skills useful to client " "in current situation    Skills include assertiveness, communication, conflict management, problem-solving, relaxation, etc.    Solution-Focused Therapy    Explored patterns in patient's relationships and discussed options for new behaviors    Explored patterns in patient's actions and choices and discussed options for new behaviors    Cognitive-behavioral Therapy    Discussed common cognitive distortions, identified them in patient's life    Explored ways to challenge, replace, and act against these cognitions    Acceptance and Commitment Therapy    Explored and identified important values in patient's life    Discussed ways to commit to behavioral activation around these values    Psychodynamic psychotherapy    Discussed patient's emotional dynamics and issues and how they impact behaviors    Explored patient's history of relationships and how they impact present behaviors    Explored how to work with and make changes in these schemas and patterns    Behavioral Activation    Discussed steps patient can take to become more involved in meaningful activity    Identified barriers to these activities and explored possible solutions    Mindfulness-Based Strategies    Discussed skills based on development and application of mindfulness    Skills drawn from dialectical behavior therapy, mindfulness-based stress reduction, mindfulness-based cognitive therapy, etc.      Care Plan review completed: Yes    Medication Review:  No changes to current psychiatric medication(s)      Medication Compliance:  Yes    Changes in Health Issues:   Yes: arthritis, Associated Psychological Distress    Chemical Use Review:   Substance Use: Chemical use reviewed, no active concerns identified       Tobacco Use: Yes, increase.  Patient reports frequency of use \"too much\". She states that due to stress this has increased due to anxiousness and boredom. She does not smoke in her home. Contemplation    Assessment: Current Emotional / Mental " Status (status of significant symptoms):  Risk status (Self / Other harm or suicidal ideation)  Patient has had a history of suicidal ideation: in adolescense and suicide attempts: patient states that she tried multiple times around ages [age] by slitting wrists or overdose, and one overdose of alcohol; she denies ever being hospitalized for mental health and denies a history of self-injurious behavior, homicidal ideation, homicidal behavior and and other safety concerns  Patient denies current fears or concerns for personal safety.  Patient denies current or recent suicidal ideation or behaviors.   Patient denies current or recent homicidal ideation or behaviors.  Patient denies current or recent self injurious behavior or ideation.  Patient denies other safety concerns.  A safety and risk management plan has been developed including: Patient consented to co-developed safety plan.  A safety and risk management plan was completed.  Patient agreed to use safety plan should any safety concerns arise.  A copy was given to the patient.      Appearance:   unable to assess due to phone visit   Eye Contact:   unable to assess due to phone visit   Psychomotor Behavior: unable to assess due to phone visit   Attitude:   Cooperative  Pleasant  Orientation:   All  Speech   Rate / Production: Normal    Volume:  Normal   Mood:    Anxious  Depressed   Affect:    unable to assess due to phone visit   Thought Content:  Perservative  Rumination   Thought Form:  Coherent  Logical   Insight:    Good     Diagnoses:  1. Current severe episode of major depressive disorder without psychotic features without prior episode (H)    2. CRISTIANO (generalized anxiety disorder)        Collateral Reports Completed:  Not Applicable    Plan: (Homework, other):  Patient was given information about behavioral services and encouraged to schedule a follow up appointment with the clinic [facility] in 1-2 weeks.  She was also given information about mental health  symptoms and treatment options , Cognitive Behavioral Therapy skills to practice when experiencing anxiety and depression and deep Breathing Strategies to practice when experiencing anxiety and depression.  CD Recommendations: No indications of CD issues.       KVNG Watson, [facility]       ______________________________________________________________________    Integrated Primary Care Behavioral Health Treatment Plan    Patient's Name: Mattie Banda  YOB: 1980    Date: November 24, 2020    DSM-V Diagnoses: 296.33 (F33.2) Major Depressive Disorder, Recurrent Episode, Severe _ or 300.02 (F41.1) Generalized Anxiety Disorder  Psychosocial / Contextual Factors: medical issues,  and dating ex-, few friends  WHODAS: 36    Referral / Collaboration:  Referral to another professional/service is not indicated at this time..    Anticipated number of session or this episode of care: 8      MeasurableTreatment Goal(s) related to diagnosis / functional impairment(s)  Goal 1: Patient will demonstrate ability to improve depression and anxiety symptoms as evidenced by improved PHQ9 & GAD7 scores.       Objective #A (Patient Action)    Patient will Decrease frequency and intensity of feeling down, depressed, hopeless  Identify negative self-talk and behaviors: challenge core beliefs, myths, and actions  Decrease thoughts that you'd be better off dead or of suicide / self-harm  Status: New - Date: 1/28/2020 ; Improved and continued: 5/6/2020 ; Continued:8/11/2020; Continued: 11/24/2020    Intervention(s)  [facility] will teach distress tolerance skills. Provide psycho-education on cognitive distortions and automatic thoughts and behaviors and ways to appropriately challenge and restructure thoughts.    Objective #B  Patient will increase coping strategies by 2  to more effectively manage current stressors  increase understanding of steps in the grief process  Status: New - Date: 1/28/2020; Improved and  Continued: 5/6/2020; Continued:8/11/2020; Continued: 11/24/2020    Intervention(s)  [facility] will teach mindfulness and relaxation strategies. Provide psycho-education on grief process.    Patient has reviewed and agreed to the above plan.    Written by  KVNG Watson, [facility]

## 2020-12-02 RX ORDER — MORPHINE SULFATE 15 MG/1
15 TABLET, FILM COATED, EXTENDED RELEASE ORAL EVERY 12 HOURS
Qty: 60 TABLET | Refills: 0 | Status: SHIPPED | OUTPATIENT
Start: 2020-12-02 | End: 2020-12-28

## 2020-12-02 RX ORDER — OXYCODONE HYDROCHLORIDE 5 MG/1
5-10 TABLET ORAL EVERY 6 HOURS PRN
Qty: 240 TABLET | Refills: 0 | Status: SHIPPED | OUTPATIENT
Start: 2020-12-02 | End: 2020-12-28

## 2020-12-02 RX ORDER — ALPRAZOLAM 2 MG
TABLET ORAL
Qty: 90 TABLET | Refills: 0 | Status: SHIPPED | OUTPATIENT
Start: 2020-12-02 | End: 2020-12-28

## 2020-12-02 RX ORDER — METHOCARBAMOL 750 MG/1
750 TABLET, FILM COATED ORAL 3 TIMES DAILY PRN
Qty: 90 TABLET | Refills: 1 | Status: SHIPPED | OUTPATIENT
Start: 2020-12-02 | End: 2021-02-02

## 2020-12-07 ENCOUNTER — PATIENT OUTREACH (OUTPATIENT)
Dept: CARE COORDINATION | Facility: CLINIC | Age: 40
End: 2020-12-07

## 2020-12-07 ENCOUNTER — VIRTUAL VISIT (OUTPATIENT)
Dept: FAMILY MEDICINE | Facility: CLINIC | Age: 40
End: 2020-12-07
Payer: COMMERCIAL

## 2020-12-07 DIAGNOSIS — F43.10 PTSD (POST-TRAUMATIC STRESS DISORDER): Primary | ICD-10-CM

## 2020-12-07 DIAGNOSIS — G89.4 CHRONIC PAIN SYNDROME: ICD-10-CM

## 2020-12-07 DIAGNOSIS — F32.2 CURRENT SEVERE EPISODE OF MAJOR DEPRESSIVE DISORDER WITHOUT PSYCHOTIC FEATURES WITHOUT PRIOR EPISODE (H): ICD-10-CM

## 2020-12-07 DIAGNOSIS — F41.1 GAD (GENERALIZED ANXIETY DISORDER): ICD-10-CM

## 2020-12-07 DIAGNOSIS — I77.6 VASCULITIS (H): ICD-10-CM

## 2020-12-07 PROCEDURE — 99214 OFFICE O/P EST MOD 30 MIN: CPT | Mod: 95 | Performed by: FAMILY MEDICINE

## 2020-12-07 ASSESSMENT — PATIENT HEALTH QUESTIONNAIRE - PHQ9: SUM OF ALL RESPONSES TO PHQ QUESTIONS 1-9: 19

## 2020-12-07 NOTE — PROGRESS NOTES
Clinic Care Coordination Contact  [facility]/Voicemail       Clinical Data: Care Coordinator Outreach  Outreach attempted x 3.  Left message on patient's voicemail with call back information and requested return call.  Plan: Care Coordinator sent another care coordination introduction letter on 11/4/20 via [employer]. Letting patient know been trying to reach her. Care Coordinator will do no further outreaches at this time.      MARK Salas   Primary Care Clinic- Social Work Care Coordinator  [facility]  12/7/2020 4:54 PM  [phone]
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
## 2020-12-07 NOTE — PATIENT INSTRUCTIONS
1.  Because of controlled substances, I suggest setting up an establish care visit as quickly as you can.  These names are listed in no particular order and include physicians and nonphysicians: Malgorzata Newton Hallberg, Mai, Aron Hernandez, Haydee.  Check on [facility] website and listen to the physician in their profile talk about their style of practice.  You may hear something that makes you think 1 over another would be more beneficial for you.  2.  If you already begin an attempt to reduce the number of short acting medications you use each week by five tablets.  This would show you were doing things in good daily when you establish care.  3.  Continue working with mental health providers.  This is one of the most important things you can do and show to future caregivers you are making an effort.  4.  Should you develop COVID symptoms that seem to be developing severely, do not hesitate to get in contact with medical care teams.  5.  Yes it will be tough work but I know you have the strength and resilience to do better.  It is very difficult when there are  social, financial and health problems and more all at once.  If every day you think of something positive, that is what keeps people going.  He was shown the ability to do so command.

## 2020-12-08 ENCOUNTER — VIRTUAL VISIT (OUTPATIENT)
Dept: BEHAVIORAL HEALTH | Facility: CLINIC | Age: 40
End: 2020-12-08
Payer: COMMERCIAL

## 2020-12-08 DIAGNOSIS — Z53.9 NO SHOW: Primary | ICD-10-CM

## 2020-12-08 NOTE — PROGRESS NOTES
[facility] Care: Integrated Behavioral Health  December 8, 2020    1:05-[facility] made attempt to reach patient for scheduled phone visit. LM for patient informing that this writer will try back in a few minutes.    1:18-[facility] made 2nd attempt to reach patient for scheduled phone visit. LM informing patient that they can reschedule their appointment by calling the clinic, at their convenience. Clinic number provided.    KVNG Watson, Behavioral Health Clinician        This patient was a no show for this scheduled appointment.

## 2020-12-14 NOTE — TELEPHONE ENCOUNTER
"Reason for Call:  Medication or medication refill:    Do you use a [facility]?  Name of the pharmacy and phone number for the current request:  [facility] - [phone]    Name of the medication requested: both Pain meds (morphine and Hydrocodone) and both Thyroid med, Lorazapam, Trazadone, antidepressant med    Other request: Pt states she was on vacation and got home last night and her \"med bag\" was gone. I told her Dr. Schofield returns tomorrow and she's wondering if another provider can approve these? She had her Alprazalam in her purse so she has that. Please call patient and advise. She states she called the airline this morning and they don't have it, her  is still on vacation and he looked around the room and there was nothing.     Can we leave a detailed message on this number? YES    Phone number patient can be reached at: Home number on file [phone] (home)    Best Time: anytime    Call taken on 5/7/2019 at 7:40 AM by Savita Garvin      " Airborne precautions...

## 2020-12-15 ENCOUNTER — VIRTUAL VISIT (OUTPATIENT)
Dept: BEHAVIORAL HEALTH | Facility: CLINIC | Age: 40
End: 2020-12-15
Payer: COMMERCIAL

## 2020-12-15 DIAGNOSIS — F41.1 GAD (GENERALIZED ANXIETY DISORDER): ICD-10-CM

## 2020-12-15 DIAGNOSIS — F32.2 CURRENT SEVERE EPISODE OF MAJOR DEPRESSIVE DISORDER WITHOUT PSYCHOTIC FEATURES WITHOUT PRIOR EPISODE (H): Primary | ICD-10-CM

## 2020-12-15 PROCEDURE — 90832 PSYTX W PT 30 MINUTES: CPT | Mod: 95 | Performed by: MARRIAGE & FAMILY THERAPIST

## 2020-12-15 NOTE — PROGRESS NOTES
"  [facility] Primary Care: Integrated Behavioral Health  December 15, 2020      Mattie Banda is a 39 year old female who is being evaluated via a telephone visit.      The patient has been notified of the following:     \"We have found that certain health care needs can be provided without the need for a face to face visit.  This service lets us provide the care you need with a short phone conversation.      I will have full access to your [facility] medical record during this entire phone call.   I will be taking notes for your medical record.     Since this is like an office visit, we will bill your insurance company for this service.  Please check with your medical insurance if this type of telephone visit/virtual care is covered.  You may be responsible for the cost of this service if insurance coverage is denied.      There are potential benefits and risks of telephone visits (e.g. limits to patient confidentiality) that differ from in-person visits.?  Confidentiality still applies for telephone services, and nobody will record the visit.  It is important to be in a quiet, private space that is free of distractions (including cell phone or other devices) during the visit.??     If during the course of the call I believe a telephone visit is not appropriate, you will not be charged for this service\"    Consent has been obtained for this service by care team member: yes.    Behavioral Health Clinician Progress Note    Patient Name: Mattie Banda           Service Type:  Individual     Session Start Time: 1:03  Session End Time: 1:37        Session Length: 16 - 37      Attendees: Patient    Visit Activities (Refresh list every visit): [facility] Only    Diagnostic Assessment Date: 1/21/2020  Treatment Plan Review Date: 11/24/2020  See Flowsheets for today's PHQ-9 and CRISTIANO-7 results  Previous PHQ-9:   PHQ-9 SCORE 3/4/2020 5/7/2020 12/7/2020   PHQ-9 Total Score - - -   PHQ-9 Total Score MyChart - 22 " "(Severe depression) -   PHQ-9 Total Score 25 22 19     Previous CRISTIANO-7:   CRISTIANO-7 SCORE 1/7/2020 3/4/2020 5/7/2020   Total Score - - 20 (severe anxiety)   Total Score 20 20 20       EMILIA LEVEL:  EMILIA Score (Last Two) 11/14/2012   EMILIA Raw Score 44   Activation Score 70.8   EMILIA Level 4     Clinical Global Impressions  First:  Considering your total clinical experience with this particular patient population, how severe are the patient's symptoms at this time?: 5 (11/24/2020  4:36 PM)      Most recent:  Compared to the patient's condition at the START of treatment, this patient's condition is: 3 (11/24/2020  4:36 PM)          DATA  Extended Session (60+ minutes): No  Interactive Complexity: No  Crisis: No  [facility] Patient: No    Treatment Objective(s) Addressed in This Session:  Target Behavior(s): disease management/lifestyle changes depression and anxiety    Depressed Mood: Increase interest, engagement, and pleasure in doing things  Decrease frequency and intensity of feeling down, depressed, hopeless  Improve quantity and quality of night time sleep / decrease daytime naps  Feel less tired and more energy during the day   Improve diet, appetite, mindful eating, and / or meal planning  Identify negative self-talk and behaviors: challenge core beliefs, myths, and actions  Improve concentration, focus, and mindfulness in daily activities   Decrease thoughts that you'd be better off dead or of suicide / self-harm  Anxiety: will experience a reduction in anxiety, will develop more effective coping skills to manage anxiety symptoms, will develop healthy cognitive patterns and beliefs and will increase ability to function adaptively    Current Stressors / Issues:  Patient reports that she has started her new [employer] job. She states that today is her first day and it is not what she expected. She was informed that she would be doing some light cleaning, however patient states that the patient is a \"hoarder\". She states that she is " being expected to lift things, and she has some restrictions per her PCP. Patient reports that her back is hurting, and she is two hours into her day. Patient states that she is trying to get in contact with the agency as she doesn't think she wants to keep this job. 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     Patient shared her experience of meeting with the doctor for disability determination. She expressed concerns about what might come of it and states that not much was done during that visit.    Patient reports that she is doing okay, regarding her depression, and that her anxiety has increased. Patient processed thoughts regarding options for work. Encouraged patient to weigh pros and cons and inform the [employer] company of her experience.    Progress on Treatment Objective(s) / Homework:  Minimal progress - ACTION (Actively working towards change); Intervened by reinforcing change plan / affirming steps taken    Motivational Interviewing    MI Intervention: Expressed Empathy/Understanding, Supported Autonomy, Collaboration, Evocation, Permission to raise concern or advise, Open-ended questions, Reflections: simple and complex, Change talk (evoked) and Reframe     Change Talk Expressed by the Patient: Desire to change Ability to change Reasons to change Need to change Committment to change Activation Taking steps    Provider Response to Change Talk: E - Evoked more info from patient about behavior change, A - Affirmed patient's thoughts, decisions, or attempts at behavior change, R - Reflected patient's change talk and S - Summarized patient's change talk statements    Also provided psychoeducation about behavioral health condition, symptoms, and treatment options    Skills training    Explored skills useful to client in current situation    Skills include assertiveness, communication,  "conflict management, problem-solving, relaxation, etc.    Solution-Focused Therapy    Explored patterns in patient's relationships and discussed options for new behaviors    Explored patterns in patient's actions and choices and discussed options for new behaviors    Cognitive-behavioral Therapy    Discussed common cognitive distortions, identified them in patient's life    Explored ways to challenge, replace, and act against these cognitions    Acceptance and Commitment Therapy    Explored and identified important values in patient's life    Discussed ways to commit to behavioral activation around these values    Psychodynamic psychotherapy    Discussed patient's emotional dynamics and issues and how they impact behaviors    Explored patient's history of relationships and how they impact present behaviors    Explored how to work with and make changes in these schemas and patterns    Behavioral Activation    Discussed steps patient can take to become more involved in meaningful activity    Identified barriers to these activities and explored possible solutions    Mindfulness-Based Strategies    Discussed skills based on development and application of mindfulness    Skills drawn from dialectical behavior therapy, mindfulness-based stress reduction, mindfulness-based cognitive therapy, etc.      Care Plan review completed: Yes    Medication Review:  No changes to current psychiatric medication(s)      Medication Compliance:  Yes    Changes in Health Issues:   Yes: arthritis, Associated Psychological Distress    Chemical Use Review:   Substance Use: Chemical use reviewed, no active concerns identified       Tobacco Use: Yes, increase.  Patient reports frequency of use \"too much\". She states that due to stress this has increased due to anxiousness and boredom. She does not smoke in her home. Contemplation    Assessment: Current Emotional / Mental Status (status of significant symptoms):  Risk status (Self / Other harm " or suicidal ideation)  Patient has had a history of suicidal ideation: in adolescense and suicide attempts: patient states that she tried multiple times around ages [age] by slitting wrists or overdose, and one overdose of alcohol; she denies ever being hospitalized for mental health and denies a history of self-injurious behavior, homicidal ideation, homicidal behavior and and other safety concerns  Patient denies current fears or concerns for personal safety.  Patient denies current or recent suicidal ideation or behaviors.   Patient denies current or recent homicidal ideation or behaviors.  Patient denies current or recent self injurious behavior or ideation.  Patient denies other safety concerns.  A safety and risk management plan has been developed including: patient co-developed a safety plan on 1/7/2020. She has a copy and agrees to use it as she needs to for suicidal thoughts      Appearance:   unable to assess due to phone visit   Eye Contact:   unable to assess due to phone visit   Psychomotor Behavior: unable to assess due to phone visit   Attitude:   Cooperative  Pleasant  Orientation:   All  Speech   Rate / Production: Normal    Volume:  Normal   Mood:    Anxious  Depressed   Affect:    unable to assess due to phone visit   Thought Content:  Perservative  Rumination   Thought Form:  Coherent  Logical   Insight:    Good     Diagnoses:  1. Current severe episode of major depressive disorder without psychotic features without prior episode (H)    2. CRISTIANO (generalized anxiety disorder)        Collateral Reports Completed:  Not Applicable    Plan: (Homework, other):  Patient was given information about behavioral services and encouraged to schedule a follow up appointment with the clinic [facility] in 1-2 weeks.  She was also given information about mental health symptoms and treatment options , Cognitive Behavioral Therapy skills to practice when experiencing anxiety and depression and deep Breathing Strategies to  practice when experiencing anxiety and depression.  CD Recommendations: No indications of CD issues.       KVNG Watson, [facility] [place]       ______________________________________________________________________    Integrated Primary Care Behavioral Health Treatment Plan    Patient's Name: Mattie Banda  YOB: 1980    Date: November 24, 2020    DSM-V Diagnoses: 296.33 (F33.2) Major Depressive Disorder, Recurrent Episode, Severe _ or 300.02 (F41.1) Generalized Anxiety Disorder  Psychosocial / Contextual Factors: medical issues,  and dating ex-, few friends  WHODAS: 36    Referral / Collaboration:  Referral to another professional/service is not indicated at this time..    Anticipated number of session or this episode of care: 8      MeasurableTreatment Goal(s) related to diagnosis / functional impairment(s)  Goal 1: Patient will demonstrate ability to improve depression and anxiety symptoms as evidenced by improved PHQ9 & GAD7 scores.       Objective #A (Patient Action)    Patient will Decrease frequency and intensity of feeling down, depressed, hopeless  Identify negative self-talk and behaviors: challenge core beliefs, myths, and actions  Decrease thoughts that you'd be better off dead or of suicide / self-harm  Status: New - Date: 1/28/2020 ; Improved and continued: 5/6/2020 ; Continued:8/11/2020; Continued: 11/24/2020    Intervention(s)  [facility] [place] will teach distress tolerance skills. Provide psycho-education on cognitive distortions and automatic thoughts and behaviors and ways to appropriately challenge and restructure thoughts.    Objective #B  Patient will increase coping strategies by 2  to more effectively manage current stressors  increase understanding of steps in the grief process  Status: New - Date: 1/28/2020; Improved and Continued: 5/6/2020; Continued:8/11/2020; Continued: 11/24/2020    Intervention(s)  [facility] [place] will teach mindfulness and relaxation strategies. Provide  psycho-education on grief process.    Patient has reviewed and agreed to the above plan.    Written by  KVNG Watson, [facility]

## 2020-12-17 DIAGNOSIS — E03.9 HYPOTHYROIDISM, UNSPECIFIED TYPE: ICD-10-CM

## 2020-12-17 DIAGNOSIS — F33.2 SEVERE EPISODE OF RECURRENT MAJOR DEPRESSIVE DISORDER, WITHOUT PSYCHOTIC FEATURES (H): ICD-10-CM

## 2020-12-17 RX ORDER — ARIPIPRAZOLE 2 MG/1
2 TABLET ORAL DAILY
Qty: 90 TABLET | Refills: 0 | Status: SHIPPED | OUTPATIENT
Start: 2020-12-17 | End: 2021-07-19

## 2020-12-17 RX ORDER — LEVOTHYROXINE SODIUM 112 UG/1
TABLET ORAL
Qty: 30 TABLET | Refills: 0 | Status: SHIPPED | OUTPATIENT
Start: 2020-12-17 | End: 2021-01-18

## 2020-12-17 NOTE — TELEPHONE ENCOUNTER
Routing refill request to provider for review/approval because:  Labs out of range:  TSH  Labs not current:  TSH    TSH   Date Value Ref Range Status   11/04/2019 0.12 (L) 0.40 - 4.00 mU/L Final     Last Written Prescription Date:  11/18/2020  Last Fill Quantity: 30,  # refills: 0   Last office visit: 6/5/2020 with prescribing provider:  12/7/2020 - virtual   Future Office Visit:      EFRAIN JacksonN, RN

## 2020-12-28 ENCOUNTER — MYC REFILL (OUTPATIENT)
Dept: FAMILY MEDICINE | Facility: CLINIC | Age: 40
End: 2020-12-28

## 2020-12-28 DIAGNOSIS — G47.9 SLEEP DISORDER: ICD-10-CM

## 2020-12-28 DIAGNOSIS — M50.30 DDD (DEGENERATIVE DISC DISEASE), CERVICAL: ICD-10-CM

## 2020-12-28 DIAGNOSIS — Z90.710 HISTORY OF ROBOT-ASSISTED LAPAROSCOPIC HYSTERECTOMY: ICD-10-CM

## 2020-12-28 DIAGNOSIS — F41.8 MIXED ANXIETY DEPRESSIVE DISORDER: ICD-10-CM

## 2020-12-28 DIAGNOSIS — M51.369 DEGENERATION OF LUMBAR INTERVERTEBRAL DISC: ICD-10-CM

## 2020-12-28 RX ORDER — OXYCODONE HYDROCHLORIDE 5 MG/1
5-10 TABLET ORAL EVERY 6 HOURS PRN
Qty: 240 TABLET | Refills: 0 | Status: SHIPPED | OUTPATIENT
Start: 2020-12-28 | End: 2021-01-26

## 2020-12-28 RX ORDER — ALPRAZOLAM 2 MG
TABLET ORAL
Qty: 90 TABLET | Refills: 0 | Status: SHIPPED | OUTPATIENT
Start: 2020-12-28 | End: 2021-01-26

## 2020-12-28 RX ORDER — MORPHINE SULFATE 15 MG/1
15 TABLET, FILM COATED, EXTENDED RELEASE ORAL EVERY 12 HOURS
Qty: 60 TABLET | Refills: 0 | Status: SHIPPED | OUTPATIENT
Start: 2020-12-28 | End: 2021-01-26

## 2020-12-28 NOTE — TELEPHONE ENCOUNTER
ALPRAZolam (XANAX) 2 MG tablet  Last Written Prescription Date:  12/02/2020  Last Fill Quantity: 90,   # refills: 0  Last Office Visit: 06/05/2020  Future Office visit:       Routing refill request to provider for review/approval because:  Drug not on the [facility], [facility] or [facility] refill protocol or controlled substance    morphine (MS CONTIN) 15 MG CR tablet      Last Written Prescription Date:  12/02/2020  Last Fill Quantity: 60,   # refills: 0  Last Office Visit: 06/05/2020  Future Office visit:       Routing refill request to provider for review/approval because:  Drug not on the [facility], [facility] or [facility] refill protocol or controlled substance     oxyCODONE (ROXICODONE) 5 MG tablet     Last Written Prescription Date:  12/02/2020  Last Fill Quantity: 240,   # refills: 0  Last Office Visit: 12/02/2020  Future Office visit:       Routing refill request to provider for review/approval because:  Drug not on the [facility], [facility] or [facility] refill protocol or controlled substance

## 2021-01-09 ENCOUNTER — HEALTH MAINTENANCE LETTER (OUTPATIENT)
Age: 41
End: 2021-01-09

## 2021-01-12 ENCOUNTER — VIRTUAL VISIT (OUTPATIENT)
Dept: BEHAVIORAL HEALTH | Facility: CLINIC | Age: 41
End: 2021-01-12
Payer: COMMERCIAL

## 2021-01-12 DIAGNOSIS — M54.42 CHRONIC BILATERAL LOW BACK PAIN WITH BILATERAL SCIATICA: ICD-10-CM

## 2021-01-12 DIAGNOSIS — G43.109 MIGRAINE WITH AURA AND WITHOUT STATUS MIGRAINOSUS, NOT INTRACTABLE: ICD-10-CM

## 2021-01-12 DIAGNOSIS — F41.8 MIXED ANXIETY DEPRESSIVE DISORDER: ICD-10-CM

## 2021-01-12 DIAGNOSIS — F41.1 GAD (GENERALIZED ANXIETY DISORDER): ICD-10-CM

## 2021-01-12 DIAGNOSIS — F32.2 CURRENT SEVERE EPISODE OF MAJOR DEPRESSIVE DISORDER WITHOUT PSYCHOTIC FEATURES WITHOUT PRIOR EPISODE (H): Primary | ICD-10-CM

## 2021-01-12 DIAGNOSIS — M54.41 CHRONIC BILATERAL LOW BACK PAIN WITH BILATERAL SCIATICA: ICD-10-CM

## 2021-01-12 DIAGNOSIS — G89.29 CHRONIC BILATERAL LOW BACK PAIN WITH BILATERAL SCIATICA: ICD-10-CM

## 2021-01-12 PROCEDURE — 90832 PSYTX W PT 30 MINUTES: CPT | Mod: 95 | Performed by: MARRIAGE & FAMILY THERAPIST

## 2021-01-12 RX ORDER — BUSPIRONE HYDROCHLORIDE 30 MG/1
30 TABLET ORAL 2 TIMES DAILY
Qty: 60 TABLET | Refills: 1 | Status: SHIPPED | OUTPATIENT
Start: 2021-01-12 | End: 2021-04-05

## 2021-01-12 RX ORDER — TOPIRAMATE 25 MG/1
TABLET, FILM COATED ORAL
Qty: 270 TABLET | Refills: 0 | Status: SHIPPED | OUTPATIENT
Start: 2021-01-12 | End: 2021-04-20

## 2021-01-12 RX ORDER — ESCITALOPRAM OXALATE 20 MG/1
20 TABLET ORAL DAILY
Qty: 90 TABLET | Refills: 0 | Status: SHIPPED | OUTPATIENT
Start: 2021-01-12

## 2021-01-12 NOTE — TELEPHONE ENCOUNTER
Pending Prescriptions:                       Disp   Refills    escitalopram (LEXAPRO) 20 MG tablet        90 tab*0        Sig: Take 1 tablet (20 mg) by mouth daily    busPIRone HCl (BUSPAR) 30 MG tablet        60 tab*1        Sig: Take 1 tablet (30 mg) by mouth 2 times daily    Routing refill request to provider for review/approval because:  Labs out of range:    PHQ-9 score:    PHQ 12/7/2020   PHQ-9 Total Score 19   Q9: Thoughts of better off dead/self-harm past 2 weeks Not at all                      Self

## 2021-01-12 NOTE — PROGRESS NOTES
"  [facility]: Integrated Behavioral Health  January 12, 2021      Mattie Banda is a 39 year old female who is being evaluated via a telephone visit.      The patient has been notified of the following:     \"We have found that certain health care needs can be provided without the need for a face to face visit.  This service lets us provide the care you need with a short phone conversation.      I will have full access to your [facility] medical record during this entire phone call.   I will be taking notes for your medical record.     Since this is like an office visit, we will bill your insurance company for this service.  Please check with your medical insurance if this type of telephone visit/virtual care is covered.  You may be responsible for the cost of this service if insurance coverage is denied.      There are potential benefits and risks of telephone visits (e.g. limits to patient confidentiality) that differ from in-person visits.?  Confidentiality still applies for telephone services, and nobody will record the visit.  It is important to be in a quiet, private space that is free of distractions (including cell phone or other devices) during the visit.??     If during the course of the call I believe a telephone visit is not appropriate, you will not be charged for this service\"    Consent has been obtained for this service by care team member: yes.    Behavioral Health Clinician Progress Note    Patient Name: Mattie Banda           Service Type:  Individual     Session Start Time: 1:03  Session End Time: 1:27        Session Length: 16 - 37      Attendees: Patient    Visit Activities (Refresh list every visit): [facility] Only    Diagnostic Assessment Date: 1/21/2020  Treatment Plan Review Date: 11/24/2020  See Flowsheets for today's PHQ-9 and CRISTIANO-7 results  Previous PHQ-9:   PHQ-9 SCORE 3/4/2020 5/7/2020 12/7/2020   PHQ-9 Total Score - - -   PHQ-9 Total Score MyChart - 22 " (Severe depression) -   PHQ-9 Total Score 25 22 19     Previous CRISTIANO-7:   CRISTIANO-7 SCORE 1/7/2020 3/4/2020 5/7/2020   Total Score - - 20 (severe anxiety)   Total Score 20 20 20       EMILIA LEVEL:  EMILIA Score (Last Two) 11/14/2012   EMILIA Raw Score 44   Activation Score 70.8   EMILIA Level 4     Clinical Global Impressions  First:  Considering your total clinical experience with this particular patient population, how severe are the patient's symptoms at this time?: 5 (11/24/2020  4:36 PM)      Most recent:  Compared to the patient's condition at the START of treatment, this patient's condition is: 3 (11/24/2020  4:36 PM)          DATA  Extended Session (60+ minutes): No  Interactive Complexity: No  Crisis: No  [facility] Patient: No    Treatment Objective(s) Addressed in This Session:  Target Behavior(s): disease management/lifestyle changes depression and anxiety    Depressed Mood: Increase interest, engagement, and pleasure in doing things  Decrease frequency and intensity of feeling down, depressed, hopeless  Improve quantity and quality of night time sleep / decrease daytime naps  Feel less tired and more energy during the day   Improve diet, appetite, mindful eating, and / or meal planning  Identify negative self-talk and behaviors: challenge core beliefs, myths, and actions  Improve concentration, focus, and mindfulness in daily activities   Decrease thoughts that you'd be better off dead or of suicide / self-harm  Anxiety: will experience a reduction in anxiety, will develop more effective coping skills to manage anxiety symptoms, will develop healthy cognitive patterns and beliefs and will increase ability to function adaptively    Current Stressors / Issues:  Patient reports that she is currently at work. She stated that she had thought she cancelled this appointment and then stated that she could talk for a little bit. Patient expressed frustration with the phone changes within the organization.   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 She states that she is able to help him as needed and likes the work.     She reports that she was denied SSDI.     Patient talked about situations with friends and how she has had to set boundaries and pull away from some friends. She states that she has one good support right now.    Patient processed recent dynamics with friends. Reinforced patient being assertive and setting boundaries. Encouraged self care.     Progress on Treatment Objective(s) / Homework:  Minimal progress - ACTION (Actively working towards change); Intervened by reinforcing change plan / affirming steps taken    Motivational Interviewing    MI Intervention: Expressed Empathy/Understanding, Supported Autonomy, Collaboration, Evocation, Permission to raise concern or advise, Open-ended questions, Reflections: simple and complex, Change talk (evoked) and Reframe     Change Talk Expressed by the Patient: Desire to change Ability to change Reasons to change Need to change Committment to change Activation Taking steps    Provider Response to Change Talk: E - Evoked more info from patient about behavior change, A - Affirmed patient's thoughts, decisions, or attempts at behavior change, R - Reflected patient's change talk and S - Summarized patient's change talk statements    Also provided psychoeducation about behavioral health condition, symptoms, and treatment options    Skills training    Explored skills useful to client in current situation    Skills include assertiveness, communication, conflict management, problem-solving, relaxation, etc.    Solution-Focused Therapy    Explored patterns in patient's relationships and discussed options for new behaviors    Explored patterns in patient's actions and choices and discussed options for new behaviors    Cognitive-behavioral Therapy    Discussed common cognitive distortions, identified them in patient's life    Explored  "ways to challenge, replace, and act against these cognitions    Acceptance and Commitment Therapy    Explored and identified important values in patient's life    Discussed ways to commit to behavioral activation around these values    Psychodynamic psychotherapy    Discussed patient's emotional dynamics and issues and how they impact behaviors    Explored patient's history of relationships and how they impact present behaviors    Explored how to work with and make changes in these schemas and patterns    Behavioral Activation    Discussed steps patient can take to become more involved in meaningful activity    Identified barriers to these activities and explored possible solutions    Mindfulness-Based Strategies    Discussed skills based on development and application of mindfulness    Skills drawn from dialectical behavior therapy, mindfulness-based stress reduction, mindfulness-based cognitive therapy, etc.      Care Plan review completed: Yes    Medication Review:  No changes to current psychiatric medication(s)      Medication Compliance:  Yes    Changes in Health Issues:   Yes: arthritis, Associated Psychological Distress    Chemical Use Review:   Substance Use: Chemical use reviewed, no active concerns identified       Tobacco Use: Yes, increase.  Patient reports frequency of use \"too much\". She states that due to stress this has increased due to anxiousness and boredom. She does not smoke in her home. Contemplation    Assessment: Current Emotional / Mental Status (status of significant symptoms):  Risk status (Self / Other harm or suicidal ideation)  Patient has had a history of suicidal ideation: in adolescense and suicide attempts: patient states that she tried multiple times around ages [age] by slitting wrists or overdose, and one overdose of alcohol; she denies ever being hospitalized for mental health and denies a history of self-injurious behavior, homicidal ideation, homicidal behavior and and other " safety concerns  Patient denies current fears or concerns for personal safety.  Patient denies current or recent suicidal ideation or behaviors.   Patient denies current or recent homicidal ideation or behaviors.  Patient denies current or recent self injurious behavior or ideation.  Patient denies other safety concerns.  A safety and risk management plan has been developed including: patient co-developed a safety plan on 1/7/2020. She has a copy and agrees to use it as she needs to for suicidal thoughts      Appearance:   unable to assess due to phone visit   Eye Contact:   unable to assess due to phone visit   Psychomotor Behavior: unable to assess due to phone visit   Attitude:   Cooperative  Pleasant  Orientation:   All  Speech   Rate / Production: Normal    Volume:  Normal   Mood:    Anxious  Depressed   Affect:    unable to assess due to phone visit   Thought Content:  Perservative  Rumination   Thought Form:  Coherent  Logical   Insight:    Good     Diagnoses:  1. Current severe episode of major depressive disorder without psychotic features without prior episode (H)    2. CRISTIANO (generalized anxiety disorder)        Collateral Reports Completed:  Not Applicable    Plan: (Homework, other):  Patient was given information about behavioral services and encouraged to schedule a follow up appointment with the clinic [facility] in 1-2 weeks.  She was also given information about mental health symptoms and treatment options , Cognitive Behavioral Therapy skills to practice when experiencing anxiety and depression and deep Breathing Strategies to practice when experiencing anxiety and depression.  CD Recommendations: No indications of CD issues.       KVNG Watson, [facility]       ______________________________________________________________________    Integrated Primary Care Behavioral Health Treatment Plan    Patient's Name: Mattie Banda  YOB: 1980    Date: November 24, 2020    DSM-V Diagnoses: 296.33  (F33.2) Major Depressive Disorder, Recurrent Episode, Severe _ or 300.02 (F41.1) Generalized Anxiety Disorder  Psychosocial / Contextual Factors: medical issues,  and dating ex-, few friends  WHODAS: 36    Referral / Collaboration:  Referral to another professional/service is not indicated at this time..    Anticipated number of session or this episode of care: 8      MeasurableTreatment Goal(s) related to diagnosis / functional impairment(s)  Goal 1: Patient will demonstrate ability to improve depression and anxiety symptoms as evidenced by improved PHQ9 & GAD7 scores.       Objective #A (Patient Action)    Patient will Decrease frequency and intensity of feeling down, depressed, hopeless  Identify negative self-talk and behaviors: challenge core beliefs, myths, and actions  Decrease thoughts that you'd be better off dead or of suicide / self-harm  Status: New - Date: 1/28/2020 ; Improved and continued: 5/6/2020 ; Continued:8/11/2020; Continued: 11/24/2020    Intervention(s)  [facility] will teach distress tolerance skills. Provide psycho-education on cognitive distortions and automatic thoughts and behaviors and ways to appropriately challenge and restructure thoughts.    Objective #B  Patient will increase coping strategies by 2  to more effectively manage current stressors  increase understanding of steps in the grief process  Status: New - Date: 1/28/2020; Improved and Continued: 5/6/2020; Continued:8/11/2020; Continued: 11/24/2020    Intervention(s)  [facility] will teach mindfulness and relaxation strategies. Provide psycho-education on grief process.    Patient has reviewed and agreed to the above plan.    Written by  KVNG Watson, [facility]

## 2021-01-19 ENCOUNTER — VIRTUAL VISIT (OUTPATIENT)
Dept: BEHAVIORAL HEALTH | Facility: CLINIC | Age: 41
End: 2021-01-19
Payer: COMMERCIAL

## 2021-01-19 DIAGNOSIS — F32.2 CURRENT SEVERE EPISODE OF MAJOR DEPRESSIVE DISORDER WITHOUT PSYCHOTIC FEATURES WITHOUT PRIOR EPISODE (H): Primary | ICD-10-CM

## 2021-01-19 DIAGNOSIS — F41.1 GAD (GENERALIZED ANXIETY DISORDER): ICD-10-CM

## 2021-01-19 PROCEDURE — 90832 PSYTX W PT 30 MINUTES: CPT | Mod: 95 | Performed by: MARRIAGE & FAMILY THERAPIST

## 2021-01-19 NOTE — PROGRESS NOTES
"  [facility]: Integrated Behavioral Health  January 19, 2021      Mattie Banda is a 39 year old female who is being evaluated via a telephone visit.      The patient has been notified of the following:     \"We have found that certain health care needs can be provided without the need for a face to face visit.  This service lets us provide the care you need with a short phone conversation.      I will have full access to your [facility] medical record during this entire phone call.   I will be taking notes for your medical record.     Since this is like an office visit, we will bill your insurance company for this service.  Please check with your medical insurance if this type of telephone visit/virtual care is covered.  You may be responsible for the cost of this service if insurance coverage is denied.      There are potential benefits and risks of telephone visits (e.g. limits to patient confidentiality) that differ from in-person visits.?  Confidentiality still applies for telephone services, and nobody will record the visit.  It is important to be in a quiet, private space that is free of distractions (including cell phone or other devices) during the visit.??     If during the course of the call I believe a telephone visit is not appropriate, you will not be charged for this service\"    Consent has been obtained for this service by care team member: yes.    Behavioral Health Clinician Progress Note    Patient Name: Mattie Banda           Service Type:  Individual     Session Start Time: 1:02  Session End Time: 1:30        Session Length: 16 - 37      Attendees: Patient    Visit Activities (Refresh list every visit): [facility] Only    Diagnostic Assessment Date: 1/21/2020  Treatment Plan Review Date: 11/24/2020  See Flowsheets for today's PHQ-9 and CRISTIANO-7 results  Previous PHQ-9:   PHQ-9 SCORE 3/4/2020 5/7/2020 12/7/2020   PHQ-9 Total Score - - -   PHQ-9 Total Score MyChart - 22 " (Severe depression) -   PHQ-9 Total Score 25 22 19     Previous CRISTIANO-7:   CRISTIANO-7 SCORE 1/7/2020 3/4/2020 5/7/2020   Total Score - - 20 (severe anxiety)   Total Score 20 20 20       EMILIA LEVEL:  EMILIA Score (Last Two) 11/14/2012   EMILIA Raw Score 44   Activation Score 70.8   EMILIA Level 4     Clinical Global Impressions  First:  Considering your total clinical experience with this particular patient population, how severe are the patient's symptoms at this time?: 5 (11/24/2020  4:36 PM)      Most recent:  Compared to the patient's condition at the START of treatment, this patient's condition is: 3 (11/24/2020  4:36 PM)          DATA  Extended Session (60+ minutes): No  Interactive Complexity: No  Crisis: No  [facility] Patient: No    Treatment Objective(s) Addressed in This Session:  Target Behavior(s): disease management/lifestyle changes depression and anxiety    Depressed Mood: Increase interest, engagement, and pleasure in doing things  Decrease frequency and intensity of feeling down, depressed, hopeless  Improve quantity and quality of night time sleep / decrease daytime naps  Feel less tired and more energy during the day   Improve diet, appetite, mindful eating, and / or meal planning  Identify negative self-talk and behaviors: challenge core beliefs, myths, and actions  Improve concentration, focus, and mindfulness in daily activities   Decrease thoughts that you'd be better off dead or of suicide / self-harm  Anxiety: will experience a reduction in anxiety, will develop more effective coping skills to manage anxiety symptoms, will develop healthy cognitive patterns and beliefs and will increase ability to function adaptively    Current Stressors / Issues:  Patient expressed frustration with her son. She states that he has not been doing well with school and he wants to remain on-line with school.     Patient reports that she went ice fishing with her spouse. She talked about how her spouse was considerate of her back pain  "and nervousness on the ice. She states that he will be gone for work the majority of next month.     Patient reports that her mood has been \"blah\". Reflected that it could be possible boredom. Encouraged patient to consider possible hobbies.     Patient states that she has been going and having coffee with a good friend. She has found this enjoyable.       Reinforced patient being assertive and clear on expectations with her son. It sounds like her spouse is in agreement on school as well.     Progress on Treatment Objective(s) / Homework:  Minimal progress - ACTION (Actively working towards change); Intervened by reinforcing change plan / affirming steps taken    Motivational Interviewing    MI Intervention: Expressed Empathy/Understanding, Supported Autonomy, Collaboration, Evocation, Permission to raise concern or advise, Open-ended questions, Reflections: simple and complex, Change talk (evoked) and Reframe     Change Talk Expressed by the Patient: Desire to change Ability to change Reasons to change Need to change Committment to change Activation Taking steps    Provider Response to Change Talk: E - Evoked more info from patient about behavior change, A - Affirmed patient's thoughts, decisions, or attempts at behavior change, R - Reflected patient's change talk and S - Summarized patient's change talk statements    Also provided psychoeducation about behavioral health condition, symptoms, and treatment options    Skills training    Explored skills useful to client in current situation    Skills include assertiveness, communication, conflict management, problem-solving, relaxation, etc.    Solution-Focused Therapy    Explored patterns in patient's relationships and discussed options for new behaviors    Explored patterns in patient's actions and choices and discussed options for new behaviors    Cognitive-behavioral Therapy    Discussed common cognitive distortions, identified them in patient's life    Explored " "ways to challenge, replace, and act against these cognitions    Acceptance and Commitment Therapy    Explored and identified important values in patient's life    Discussed ways to commit to behavioral activation around these values    Psychodynamic psychotherapy    Discussed patient's emotional dynamics and issues and how they impact behaviors    Explored patient's history of relationships and how they impact present behaviors    Explored how to work with and make changes in these schemas and patterns    Behavioral Activation    Discussed steps patient can take to become more involved in meaningful activity    Identified barriers to these activities and explored possible solutions    Mindfulness-Based Strategies    Discussed skills based on development and application of mindfulness    Skills drawn from dialectical behavior therapy, mindfulness-based stress reduction, mindfulness-based cognitive therapy, etc.      Care Plan review completed: Yes    Medication Review:  No changes to current psychiatric medication(s)      Medication Compliance:  Yes    Changes in Health Issues:   Yes: arthritis, Associated Psychological Distress    Chemical Use Review:   Substance Use: Chemical use reviewed, no active concerns identified       Tobacco Use: Yes, increase.  Patient reports frequency of use \"too much\". She states that due to stress this has increased due to anxiousness and boredom. She does not smoke in her home. Contemplation    Assessment: Current Emotional / Mental Status (status of significant symptoms):  Risk status (Self / Other harm or suicidal ideation)  Patient has had a history of suicidal ideation: in adolescense and suicide attempts: patient states that she tried multiple times around ages [age] by slitting wrists or overdose, and one overdose of alcohol; she denies ever being hospitalized for mental health and denies a history of self-injurious behavior, homicidal ideation, homicidal behavior and and other " safety concerns  Patient denies current fears or concerns for personal safety.  Patient denies current or recent suicidal ideation or behaviors.   Patient denies current or recent homicidal ideation or behaviors.  Patient denies current or recent self injurious behavior or ideation.  Patient denies other safety concerns.  A safety and risk management plan has been developed including: patient co-developed a safety plan on 1/7/2020. She has a copy and agrees to use it as she needs to for suicidal thoughts      Appearance:   unable to assess due to phone visit   Eye Contact:   unable to assess due to phone visit   Psychomotor Behavior: unable to assess due to phone visit   Attitude:   Cooperative  Pleasant  Orientation:   All  Speech   Rate / Production: Normal    Volume:  Normal   Mood:    Depressed   Affect:    unable to assess due to phone visit   Thought Content:  Clear  Rumination   Thought Form:  Coherent  Logical   Insight:    Good     Diagnoses:  No diagnosis found.    Collateral Reports Completed:  Not Applicable    Plan: (Homework, other):  Patient was given information about behavioral services and encouraged to schedule a follow up appointment with the clinic [facility] in 1-2 weeks.  She was also given information about mental health symptoms and treatment options , Cognitive Behavioral Therapy skills to practice when experiencing anxiety and depression and deep Breathing Strategies to practice when experiencing anxiety and depression.  CD Recommendations: No indications of CD issues.       KVNG Watson, [facility]       ______________________________________________________________________    Integrated Primary Care Behavioral Health Treatment Plan    Patient's Name: Mattie Banda  YOB: 1980    Date: November 24, 2020    DSM-V Diagnoses: 296.33 (F33.2) Major Depressive Disorder, Recurrent Episode, Severe _ or 300.02 (F41.1) Generalized Anxiety Disorder  Psychosocial / Contextual Factors:  medical issues,  and dating ex-, few friends  WHODAS: 36    Referral / Collaboration:  Referral to another professional/service is not indicated at this time..    Anticipated number of session or this episode of care: 8      MeasurableTreatment Goal(s) related to diagnosis / functional impairment(s)  Goal 1: Patient will demonstrate ability to improve depression and anxiety symptoms as evidenced by improved PHQ9 & GAD7 scores.       Objective #A (Patient Action)    Patient will Decrease frequency and intensity of feeling down, depressed, hopeless  Identify negative self-talk and behaviors: challenge core beliefs, myths, and actions  Decrease thoughts that you'd be better off dead or of suicide / self-harm  Status: New - Date: 1/28/2020 ; Improved and continued: 5/6/2020 ; Continued:8/11/2020; Continued: 11/24/2020    Intervention(s)  [facility] will teach distress tolerance skills. Provide psycho-education on cognitive distortions and automatic thoughts and behaviors and ways to appropriately challenge and restructure thoughts.    Objective #B  Patient will increase coping strategies by 2  to more effectively manage current stressors  increase understanding of steps in the grief process  Status: New - Date: 1/28/2020; Improved and Continued: 5/6/2020; Continued:8/11/2020; Continued: 11/24/2020    Intervention(s)  [facility] will teach mindfulness and relaxation strategies. Provide psycho-education on grief process.    Patient has reviewed and agreed to the above plan.    Written by  KVNG Watson, [facility]

## 2021-01-26 ENCOUNTER — MYC REFILL (OUTPATIENT)
Dept: FAMILY MEDICINE | Facility: CLINIC | Age: 41
End: 2021-01-26

## 2021-01-26 DIAGNOSIS — G47.9 SLEEP DISORDER: ICD-10-CM

## 2021-01-26 DIAGNOSIS — Z90.710 HISTORY OF ROBOT-ASSISTED LAPAROSCOPIC HYSTERECTOMY: ICD-10-CM

## 2021-01-26 DIAGNOSIS — M51.369 DEGENERATION OF LUMBAR INTERVERTEBRAL DISC: ICD-10-CM

## 2021-01-26 DIAGNOSIS — F41.8 MIXED ANXIETY DEPRESSIVE DISORDER: ICD-10-CM

## 2021-01-26 DIAGNOSIS — M50.30 DDD (DEGENERATIVE DISC DISEASE), CERVICAL: ICD-10-CM

## 2021-01-27 NOTE — TELEPHONE ENCOUNTER
Medication is being filled for 1 time refill only due to:  Patient needs to be seen because Needs to establish care with new provider for any further refills.     Routing to team to set up appointment for patient for establish care.    Xanax      Last Written Prescription Date:  12/28/2020  Last Fill Quantity: 90,   # refills: 0  Last Office Visit: 12/7/2020  Future Office visit:       Routing refill request to provider for review/approval because:  Drug not on the [facility], [facility] or [facility] refill protocol or controlled substance    Oxycodone      Last Written Prescription Date:  12/28/2020  Last Fill Quantity: 240,   # refills: 0  Last Office Visit: 12/7/2020  Future Office visit:       Routing refill request to provider for review/approval because:  Drug not on the [facility], [facility] or [facility] refill protocol or controlled substance    Morphine      Last Written Prescription Date:  12/28/2020  Last Fill Quantity: 60,   # refills: 0  Last Office Visit: 12/7/2020  Future Office visit:       Routing refill request to provider for review/approval because:  Drug not on the [facility], [facility] or [facility] refill protocol or controlled substance

## 2021-01-29 RX ORDER — OXYCODONE HYDROCHLORIDE 5 MG/1
5-10 TABLET ORAL EVERY 6 HOURS PRN
Qty: 100 TABLET | Refills: 0 | Status: SHIPPED | OUTPATIENT
Start: 2021-01-29 | End: 2021-07-19

## 2021-01-29 RX ORDER — MORPHINE SULFATE 15 MG/1
15 TABLET, FILM COATED, EXTENDED RELEASE ORAL EVERY 12 HOURS
Qty: 30 TABLET | Refills: 0 | Status: SHIPPED | OUTPATIENT
Start: 2021-01-29 | End: 2021-07-19

## 2021-01-29 RX ORDER — ALPRAZOLAM 2 MG
TABLET ORAL
Qty: 45 TABLET | Refills: 0 | Status: SHIPPED | OUTPATIENT
Start: 2021-01-29 | End: 2021-07-19

## 2021-01-29 NOTE — TELEPHONE ENCOUNTER
Please call let her know I refilled her medications for 2 weeks but she has to reduce the oxycodone to 7 a day instead of 8 a day.  She also should be getting the alprazolam from her psychiatrist.  She needs to establish care with a provider to wean off these medications.

## 2021-01-29 NOTE — TELEPHONE ENCOUNTER
I called this patient with the following per Dr. Sharp:  Please call let her know I refilled her medications for 2 weeks but she has to reduce the oxycodone to 7 a day instead of 8 a day.  She also should be getting the alprazolam from her psychiatrist.  She needs to establish care with a provider to wean off these medications.

## 2021-02-01 DIAGNOSIS — M51.369 DEGENERATION OF LUMBAR INTERVERTEBRAL DISC: ICD-10-CM

## 2021-02-02 ENCOUNTER — VIRTUAL VISIT (OUTPATIENT)
Dept: BEHAVIORAL HEALTH | Facility: CLINIC | Age: 41
End: 2021-02-02
Payer: COMMERCIAL

## 2021-02-02 DIAGNOSIS — F41.1 GAD (GENERALIZED ANXIETY DISORDER): ICD-10-CM

## 2021-02-02 DIAGNOSIS — F32.2 CURRENT SEVERE EPISODE OF MAJOR DEPRESSIVE DISORDER WITHOUT PSYCHOTIC FEATURES WITHOUT PRIOR EPISODE (H): Primary | ICD-10-CM

## 2021-02-02 PROCEDURE — 90832 PSYTX W PT 30 MINUTES: CPT | Mod: 95 | Performed by: MARRIAGE & FAMILY THERAPIST

## 2021-02-02 RX ORDER — METHOCARBAMOL 750 MG/1
750 TABLET, FILM COATED ORAL 3 TIMES DAILY PRN
Qty: 90 TABLET | Refills: 1 | Status: SHIPPED | OUTPATIENT
Start: 2021-02-02 | End: 2021-03-08

## 2021-02-02 NOTE — TELEPHONE ENCOUNTER
Requested Prescriptions   Pending Prescriptions Disp Refills     methocarbamol (ROBAXIN) 750 MG tablet [Pharmacy Med Name: METHOCARBAMOL 750MG TABLET] 90 tablet 1     Sig: TAKE 1 TABLET (750 MG) BY MOUTH 3 TIMES DAILY AS NEEDED FOR MUSCLE SPASMS     Last Written Prescription Date:  12/02/2020  Last Fill Quantity: 90,   # refills: 1  Last Office Visit: 12/07/2020  Future Office visit:       Routing refill request to provider for review/approval because:  Drug not on the [facility] or [facility] refill protocol or controlled substance    Routing to covering provider to sign. Dr Schofield no longer in clinic.     Bhavani Ochoa MA

## 2021-02-02 NOTE — PROGRESS NOTES
"  [facility] Primary Care: Integrated Behavioral Health  February 2, 2021      Mattie Banda is a 39 year old female who is being evaluated via a telephone visit.      The patient has been notified of the following:     \"We have found that certain health care needs can be provided without the need for a face to face visit.  This service lets us provide the care you need with a short phone conversation.      I will have full access to your [facility] medical record during this entire phone call.   I will be taking notes for your medical record.     Since this is like an office visit, we will bill your insurance company for this service.  Please check with your medical insurance if this type of telephone visit/virtual care is covered.  You may be responsible for the cost of this service if insurance coverage is denied.      There are potential benefits and risks of telephone visits (e.g. limits to patient confidentiality) that differ from in-person visits.?  Confidentiality still applies for telephone services, and nobody will record the visit.  It is important to be in a quiet, private space that is free of distractions (including cell phone or other devices) during the visit.??     If during the course of the call I believe a telephone visit is not appropriate, you will not be charged for this service\"    Consent has been obtained for this service by care team member: yes.    Behavioral Health Clinician Progress Note    Patient Name: Mattie Banda           Service Type:  Individual     Session Start Time: 3:40  Session End Time: 4:08        Session Length: 16 - 37      Attendees: Patient    Visit Activities (Refresh list every visit): [facility] Only    Diagnostic Assessment Date: 1/21/2020  Treatment Plan Review Date: 11/24/2020  See Flowsheets for today's PHQ-9 and CRISTIANO-7 results  Previous PHQ-9:   PHQ-9 SCORE 3/4/2020 5/7/2020 12/7/2020   PHQ-9 Total Score - - -   PHQ-9 Total Score MyChart - 22 " (Severe depression) -   PHQ-9 Total Score 25 22 19     Previous CRISTIANO-7:   CRISTIANO-7 SCORE 1/7/2020 3/4/2020 5/7/2020   Total Score - - 20 (severe anxiety)   Total Score 20 20 20       EMILIA LEVEL:  EMILIA Score (Last Two) 11/14/2012   EMILIA Raw Score 44   Activation Score 70.8   EMILIA Level 4     Clinical Global Impressions  First:  Considering your total clinical experience with this particular patient population, how severe are the patient's symptoms at this time?: 5 (11/24/2020  4:36 PM)      Most recent:  Compared to the patient's condition at the START of treatment, this patient's condition is: 3 (11/24/2020  4:36 PM)          DATA  Extended Session (60+ minutes): No  Interactive Complexity: No  Crisis: No  [facility] Patient: No    Treatment Objective(s) Addressed in This Session:  Target Behavior(s): disease management/lifestyle changes depression and anxiety    Depressed Mood: Increase interest, engagement, and pleasure in doing things  Decrease frequency and intensity of feeling down, depressed, hopeless  Improve quantity and quality of night time sleep / decrease daytime naps  Feel less tired and more energy during the day   Improve diet, appetite, mindful eating, and / or meal planning  Identify negative self-talk and behaviors: challenge core beliefs, myths, and actions  Improve concentration, focus, and mindfulness in daily activities   Decrease thoughts that you'd be better off dead or of suicide / self-harm  Anxiety: will experience a reduction in anxiety, will develop more effective coping skills to manage anxiety symptoms, will develop healthy cognitive patterns and beliefs and will increase ability to function adaptively    Current Stressors / Issues:  Patient expressed concern about her son. A  had recently stopped by the house because he had made a suicidal threat to a friend. She scheduled him a therapy appointment.  Patient states that she doesn't know what to do and feels bad that he is going through this.  Validated patient's feelings and reinforced him getting scheduled with counseling.   She reports that she is scheduled with a new doctor in [place] tomorrow to possibly establish care since her PCP retired. She processed a recent communication with another provider and she expressed concern about having to change her medications.     Processed her recent dialogue. Patient and this writer explored options for problem solving situation and advocating herself.     Progress on Treatment Objective(s) / Homework:  Minimal progress - ACTION (Actively working towards change); Intervened by reinforcing change plan / affirming steps taken    Motivational Interviewing    MI Intervention: Expressed Empathy/Understanding, Supported Autonomy, Collaboration, Evocation, Permission to raise concern or advise, Open-ended questions, Reflections: simple and complex, Change talk (evoked) and Reframe     Change Talk Expressed by the Patient: Desire to change Ability to change Reasons to change Need to change Committment to change Activation Taking steps    Provider Response to Change Talk: E - Evoked more info from patient about behavior change, A - Affirmed patient's thoughts, decisions, or attempts at behavior change, R - Reflected patient's change talk and S - Summarized patient's change talk statements    Also provided psychoeducation about behavioral health condition, symptoms, and treatment options    Skills training    Explored skills useful to client in current situation    Skills include assertiveness, communication, conflict management, problem-solving, relaxation, etc.    Solution-Focused Therapy    Explored patterns in patient's relationships and discussed options for new behaviors    Explored patterns in patient's actions and choices and discussed options for new behaviors    Cognitive-behavioral Therapy    Discussed common cognitive distortions, identified them in patient's life    Explored ways to challenge, replace,  "and act against these cognitions    Acceptance and Commitment Therapy    Explored and identified important values in patient's life    Discussed ways to commit to behavioral activation around these values    Psychodynamic psychotherapy    Discussed patient's emotional dynamics and issues and how they impact behaviors    Explored patient's history of relationships and how they impact present behaviors    Explored how to work with and make changes in these schemas and patterns    Behavioral Activation    Discussed steps patient can take to become more involved in meaningful activity    Identified barriers to these activities and explored possible solutions    Mindfulness-Based Strategies    Discussed skills based on development and application of mindfulness    Skills drawn from dialectical behavior therapy, mindfulness-based stress reduction, mindfulness-based cognitive therapy, etc.      Care Plan review completed: Yes    Medication Review:  No changes to current psychiatric medication(s)      Medication Compliance:  Yes    Changes in Health Issues:   Yes: arthritis, Associated Psychological Distress    Chemical Use Review:   Substance Use: Chemical use reviewed, no active concerns identified       Tobacco Use: Yes, increase.  Patient reports frequency of use \"too much\". She states that due to stress this has increased due to anxiousness and boredom. She does not smoke in her home. Contemplation    Assessment: Current Emotional / Mental Status (status of significant symptoms):  Risk status (Self / Other harm or suicidal ideation)  Patient has had a history of suicidal ideation: in adolescense and suicide attempts: patient states that she tried multiple times around ages [age] by slitting wrists or overdose, and one overdose of alcohol; she denies ever being hospitalized for mental health and denies a history of self-injurious behavior, homicidal ideation, homicidal behavior and and other safety concerns  Patient " denies current fears or concerns for personal safety.  Patient denies current or recent suicidal ideation or behaviors.   Patient denies current or recent homicidal ideation or behaviors.  Patient denies current or recent self injurious behavior or ideation.  Patient denies other safety concerns.  A safety and risk management plan has been developed including: patient co-developed a safety plan on 1/7/2020. She has a copy and agrees to use it as she needs to for suicidal thoughts      Appearance:   unable to assess due to phone visit   Eye Contact:   unable to assess due to phone visit   Psychomotor Behavior: unable to assess due to phone visit   Attitude:   Cooperative  Pleasant  Orientation:   All  Speech   Rate / Production: Normal    Volume:  Normal   Mood:    Anxious  Depressed   Affect:    unable to assess due to phone visit   Thought Content:  Clear  Rumination   Thought Form:  Coherent  Logical   Insight:    Good     Diagnoses:  1. Current severe episode of major depressive disorder without psychotic features without prior episode (H)    2. CRISTIANO (generalized anxiety disorder)        Collateral Reports Completed:  Not Applicable    Plan: (Homework, other):  Patient was given information about behavioral services and encouraged to schedule a follow up appointment with the clinic [facility] in 1-2 weeks.  She was also given information about mental health symptoms and treatment options , Cognitive Behavioral Therapy skills to practice when experiencing anxiety and depression and deep Breathing Strategies to practice when experiencing anxiety and depression.  CD Recommendations: No indications of CD issues.       KVNG Watson, [facility]       ______________________________________________________________________    Integrated Primary Care Behavioral Health Treatment Plan    Patient's Name: Mattie Banda  YOB: 1980    Date: November 24, 2020    DSM-V Diagnoses: 296.33 (F33.2) Major Depressive Disorder,  Recurrent Episode, Severe _ or 300.02 (F41.1) Generalized Anxiety Disorder  Psychosocial / Contextual Factors: medical issues,  and dating ex-, few friends  WHODAS: 36    Referral / Collaboration:  Referral to another professional/service is not indicated at this time..    Anticipated number of session or this episode of care: 8      MeasurableTreatment Goal(s) related to diagnosis / functional impairment(s)  Goal 1: Patient will demonstrate ability to improve depression and anxiety symptoms as evidenced by improved PHQ9 & GAD7 scores.       Objective #A (Patient Action)    Patient will Decrease frequency and intensity of feeling down, depressed, hopeless  Identify negative self-talk and behaviors: challenge core beliefs, myths, and actions  Decrease thoughts that you'd be better off dead or of suicide / self-harm  Status: New - Date: 1/28/2020 ; Improved and continued: 5/6/2020 ; Continued:8/11/2020; Continued: 11/24/2020    Intervention(s)  [facility] will teach distress tolerance skills. Provide psycho-education on cognitive distortions and automatic thoughts and behaviors and ways to appropriately challenge and restructure thoughts.    Objective #B  Patient will increase coping strategies by 2  to more effectively manage current stressors  increase understanding of steps in the grief process  Status: New - Date: 1/28/2020; Improved and Continued: 5/6/2020; Continued:8/11/2020; Continued: 11/24/2020    Intervention(s)  [facility] will teach mindfulness and relaxation strategies. Provide psycho-education on grief process.    Patient has reviewed and agreed to the above plan.    Written by  KVNG Watson, [facility]

## 2021-02-16 ENCOUNTER — VIRTUAL VISIT (OUTPATIENT)
Dept: BEHAVIORAL HEALTH | Facility: CLINIC | Age: 41
End: 2021-02-16
Payer: COMMERCIAL

## 2021-02-16 DIAGNOSIS — F41.1 GAD (GENERALIZED ANXIETY DISORDER): ICD-10-CM

## 2021-02-16 DIAGNOSIS — F32.2 CURRENT SEVERE EPISODE OF MAJOR DEPRESSIVE DISORDER WITHOUT PSYCHOTIC FEATURES WITHOUT PRIOR EPISODE (H): Primary | ICD-10-CM

## 2021-02-16 PROCEDURE — 90832 PSYTX W PT 30 MINUTES: CPT | Mod: 95 | Performed by: MARRIAGE & FAMILY THERAPIST

## 2021-02-16 NOTE — PROGRESS NOTES
"[facility] Primary [facility]: Integrated Behavioral Health  February 16, 2021      Mattie Banda is a 39 year old female who is being evaluated via a telephone visit.      The patient has been notified of the following:     \"We have found that certain health care needs can be provided without the need for a face to face visit.  This service lets us provide the care you need with a short phone conversation.      I will have full access to your [facility] medical record during this entire phone call.   I will be taking notes for your medical record.     Since this is like an office visit, we will bill your insurance company for this service.  Please check with your medical insurance if this type of telephone visit/virtual care is covered.  You may be responsible for the cost of this service if insurance coverage is denied.      There are potential benefits and risks of telephone visits (e.g. limits to patient confidentiality) that differ from in-person visits.?  Confidentiality still applies for telephone services, and nobody will record the visit.  It is important to be in a quiet, private space that is free of distractions (including cell phone or other devices) during the visit.??     If during the course of the call I believe a telephone visit is not appropriate, you will not be charged for this service\"    Consent has been obtained for this service by care team member: yes.    Behavioral Health Clinician Progress Note    Patient Name: Mattie Banda           Service Type:  Individual     Session Start Time: 1:02  Session End Time: 1:33        Session Length: 16 - 37      Attendees: Patient    Visit Activities (Refresh list every visit): [facility] Only    Diagnostic Assessment Date: 1/21/2020  Treatment Plan Review Date: 11/24/2020 due next visit  See Flowsheets for today's PHQ-9 and CRISTIANO-7 results  Previous PHQ-9:   PHQ-9 SCORE 3/4/2020 5/7/2020 12/7/2020   PHQ-9 Total Score - - -   PHQ-9 Total Score " MyChart - 22 (Severe depression) -   PHQ-9 Total Score 25 22 19     Previous CRISTIANO-7:   CRISTIANO-7 SCORE 1/7/2020 3/4/2020 5/7/2020   Total Score - - 20 (severe anxiety)   Total Score 20 20 20       EMILIA LEVEL:  EMILIA Score (Last Two) 11/14/2012   EMILIA Raw Score 44   Activation Score 70.8   EMILIA Level 4     Clinical Global Impressions  First:  Considering your total clinical experience with this particular patient population, how severe are the patient's symptoms at this time?: 5 (11/24/2020  4:36 PM)      Most recent:  Compared to the patient's condition at the START of treatment, this patient's condition is: 3 (11/24/2020  4:36 PM)          DATA  Extended Session (60+ minutes): No  Interactive Complexity: No  Crisis: No  [facility] Patient: No    Treatment Objective(s) Addressed in This Session:  Target Behavior(s): disease management/lifestyle changes depression and anxiety    Depressed Mood: Increase interest, engagement, and pleasure in doing things  Decrease frequency and intensity of feeling down, depressed, hopeless  Improve quantity and quality of night time sleep / decrease daytime naps  Feel less tired and more energy during the day   Improve diet, appetite, mindful eating, and / or meal planning  Identify negative self-talk and behaviors: challenge core beliefs, myths, and actions  Improve concentration, focus, and mindfulness in daily activities   Decrease thoughts that you'd be better off dead or of suicide / self-harm  Anxiety: will experience a reduction in anxiety, will develop more effective coping skills to manage anxiety symptoms, will develop healthy cognitive patterns and beliefs and will increase ability to function adaptively    Current Stressors / Issues:  Patient reports that 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 She states that she will start next week.   Patient reports that her son had a bad panic attack and they called the ambulance, last week. She states that his fingers  "cramped up and he had hyperventilated. Patient processed the situation and states that she broke down crying later when she was alone. Validated patient's feelings and reinforced her response to her son.     Patient states that she has a friend that she has heard might be hospitalized. Patient reflected that it's hard to see someone \"spiral out of control\". Discussed helping versus enabling and ways to set boundaries.    Progress on Treatment Objective(s) / Homework:  Minimal progress - ACTION (Actively working towards change); Intervened by reinforcing change plan / affirming steps taken    Motivational Interviewing    MI Intervention: Expressed Empathy/Understanding, Supported Autonomy, Collaboration, Evocation, Permission to raise concern or advise, Open-ended questions, Reflections: simple and complex, Change talk (evoked) and Reframe     Change Talk Expressed by the Patient: Desire to change Ability to change Reasons to change Need to change Committment to change Activation Taking steps    Provider Response to Change Talk: E - Evoked more info from patient about behavior change, A - Affirmed patient's thoughts, decisions, or attempts at behavior change, R - Reflected patient's change talk and S - Summarized patient's change talk statements    Also provided psychoeducation about behavioral health condition, symptoms, and treatment options    Skills training    Explored skills useful to client in current situation    Skills include assertiveness, communication, conflict management, problem-solving, relaxation, etc.    Solution-Focused Therapy    Explored patterns in patient's relationships and discussed options for new behaviors    Explored patterns in patient's actions and choices and discussed options for new behaviors    Cognitive-behavioral Therapy    Discussed common cognitive distortions, identified them in patient's life    Explored ways to challenge, replace, and act against these cognitions    Acceptance " "and Commitment Therapy    Explored and identified important values in patient's life    Discussed ways to commit to behavioral activation around these values    Psychodynamic psychotherapy    Discussed patient's emotional dynamics and issues and how they impact behaviors    Explored patient's history of relationships and how they impact present behaviors    Explored how to work with and make changes in these schemas and patterns    Behavioral Activation    Discussed steps patient can take to become more involved in meaningful activity    Identified barriers to these activities and explored possible solutions    Mindfulness-Based Strategies    Discussed skills based on development and application of mindfulness    Skills drawn from dialectical behavior therapy, mindfulness-based stress reduction, mindfulness-based cognitive therapy, etc.      Care Plan review completed: Yes    Medication Review:  No changes to current psychiatric medication(s)      Medication Compliance:  Yes    Changes in Health Issues:   Yes: arthritis, Associated Psychological Distress    Chemical Use Review:   Substance Use: Chemical use reviewed, no active concerns identified       Tobacco Use: Yes, increase.  Patient reports frequency of use \"too much\". She states that due to stress this has increased due to anxiousness and boredom. She does not smoke in her home. Contemplation    Assessment: Current Emotional / Mental Status (status of significant symptoms):  Risk status (Self / Other harm or suicidal ideation)  Patient has had a history of suicidal ideation: in adolescense and suicide attempts: patient states that she tried multiple times around ages [age] by slitting wrists or overdose, and one overdose of alcohol; she denies ever being hospitalized for mental health and denies a history of self-injurious behavior, homicidal ideation, homicidal behavior and and other safety concerns  Patient denies current fears or concerns for personal " safety.  Patient denies current or recent suicidal ideation or behaviors.   Patient denies current or recent homicidal ideation or behaviors.  Patient denies current or recent self injurious behavior or ideation.  Patient denies other safety concerns.  A safety and risk management plan has been developed including: patient co-developed a safety plan on 1/7/2020. She has a copy and agrees to use it as she needs to for suicidal thoughts      Appearance:   unable to assess due to phone visit   Eye Contact:   unable to assess due to phone visit   Psychomotor Behavior: unable to assess due to phone visit   Attitude:   Cooperative  Pleasant  Orientation:   All  Speech   Rate / Production: Normal    Volume:  Normal   Mood:    Anxious   Affect:    unable to assess due to phone visit   Thought Content:  Clear  Rumination   Thought Form:  Coherent  Logical   Insight:    Good     Diagnoses:  1. Current severe episode of major depressive disorder without psychotic features without prior episode (H)    2. CRISTIANO (generalized anxiety disorder)        Collateral Reports Completed:  Not Applicable    Plan: (Homework, other):  Patient was given information about behavioral services and encouraged to schedule a follow up appointment with the clinic [facility] in 2 weeks.  She was also given information about mental health symptoms and treatment options , Cognitive Behavioral Therapy skills to practice when experiencing anxiety and depression and deep Breathing Strategies to practice when experiencing anxiety and depression.  CD Recommendations: No indications of CD issues.       KVNG Watson, [facility]       ______________________________________________________________________    Integrated Primary Care Behavioral Health Treatment Plan    Patient's Name: Mattie Banda  YOB: 1980    Date: November 24, 2020    DSM-V Diagnoses: 296.33 (F33.2) Major Depressive Disorder, Recurrent Episode, Severe _ or 300.02 (F41.1) Generalized  Anxiety Disorder  Psychosocial / Contextual Factors: medical issues,  and dating ex-, few friends  WHODAS: 36    Referral / Collaboration:  Referral to another professional/service is not indicated at this time..    Anticipated number of session or this episode of care: 8      MeasurableTreatment Goal(s) related to diagnosis / functional impairment(s)  Goal 1: Patient will demonstrate ability to improve depression and anxiety symptoms as evidenced by improved PHQ9 & GAD7 scores.       Objective #A (Patient Action)    Patient will Decrease frequency and intensity of feeling down, depressed, hopeless  Identify negative self-talk and behaviors: challenge core beliefs, myths, and actions  Decrease thoughts that you'd be better off dead or of suicide / self-harm  Status: New - Date: 1/28/2020 ; Improved and continued: 5/6/2020 ; Continued:8/11/2020; Continued: 11/24/2020    Intervention(s)  [facility] will teach distress tolerance skills. Provide psycho-education on cognitive distortions and automatic thoughts and behaviors and ways to appropriately challenge and restructure thoughts.    Objective #B  Patient will increase coping strategies by 2  to more effectively manage current stressors  increase understanding of steps in the grief process  Status: New - Date: 1/28/2020; Improved and Continued: 5/6/2020; Continued:8/11/2020; Continued: 11/24/2020    Intervention(s)  [facility] will teach mindfulness and relaxation strategies. Provide psycho-education on grief process.    Patient has reviewed and agreed to the above plan.    Written by  KVNG Watson, [facility]

## 2021-02-17 DIAGNOSIS — Z90.710 HISTORY OF ROBOT-ASSISTED LAPAROSCOPIC HYSTERECTOMY: ICD-10-CM

## 2021-02-17 DIAGNOSIS — M51.369 DEGENERATION OF LUMBAR INTERVERTEBRAL DISC: ICD-10-CM

## 2021-02-17 DIAGNOSIS — G47.9 SLEEP DISORDER: ICD-10-CM

## 2021-02-17 DIAGNOSIS — M50.30 DDD (DEGENERATIVE DISC DISEASE), CERVICAL: ICD-10-CM

## 2021-02-17 DIAGNOSIS — F41.8 MIXED ANXIETY DEPRESSIVE DISORDER: ICD-10-CM

## 2021-02-17 NOTE — TELEPHONE ENCOUNTER
I refilled these once but have never seen her.  Appears she established care at outside facility.  I cannot fill these medications for her.  Would need a visit with a provider to do refills.

## 2021-02-17 NOTE — TELEPHONE ENCOUNTER
Requested Prescriptions   Pending Prescriptions Disp Refills     oxyCODONE (ROXICODONE) 5 MG tablet [Pharmacy Med Name: oxyCODONE HCl 5 MG Oral Tablet (Roxicodone)] 100 tablet      Sig: TAKE 1-2 TABLETS (5-10 MG) BY MOUTH EVERY 6 HOURS AS NEEDED FOR SEVERE PAIN (USES FEWEST POSSIBLE TABS DAILY.)     Last Written Prescription Date:  01/29/2021  Last Fill Quantity: 100,   # refills: 0  Last Office Visit: 12/07/2020  Future Office visit:       Routing refill request to provider for review/approval because:  Drug not on the G, P or  Health refill protocol or controlled substance              morphine (MS CONTIN) 15 MG CR tablet [Pharmacy Med Name: MORPHINE 15MG ER TABLET] 30 tablet      Sig: TAKE 1 TABLET (15 MG) BY MOUTH EVERY 12 HOURS MAXIMUM 2 TABLET(S) PER DAY     Last Written Prescription Date:  01/29/2021  Last Fill Quantity: 30,   # refills: 0  Last Office Visit: 12/07/2020  Future Office visit:       Routing refill request to provider for review/approval because:  Drug not on the [facility] or  Health refill protocol or controlled substance              ALPRAZolam (XANAX) 2 MG tablet [Pharmacy Med Name: ALPRAZOLAM 2MG TABLET] 45 tablet      Sig: TAKE 1/2-1 TABLET (1-2MG) BY MOUTH 3 TIMES DAILY AS NEEDED FOR STRESS/SLEEP     Last Written Prescription Date:  01/29/2021  Last Fill Quantity: 45,   # refills: 0  Last Office Visit: 12/07/2020  Future Office visit:       Routing refill request to provider for review/approval because:  Drug not on the [facility] or  [facility] refill protocol or controlled substance

## 2021-02-18 RX ORDER — ALPRAZOLAM 2 MG
TABLET ORAL
Qty: 45 TABLET | OUTPATIENT
Start: 2021-02-18

## 2021-02-18 RX ORDER — MORPHINE SULFATE 15 MG/1
15 TABLET, FILM COATED, EXTENDED RELEASE ORAL EVERY 12 HOURS
Qty: 30 TABLET | OUTPATIENT
Start: 2021-02-18

## 2021-02-18 RX ORDER — OXYCODONE HYDROCHLORIDE 5 MG/1
5-10 TABLET ORAL EVERY 6 HOURS PRN
Qty: 100 TABLET | OUTPATIENT
Start: 2021-02-18

## 2021-02-18 NOTE — TELEPHONE ENCOUNTER
Spoke with the pharmacy, they will route these Rx Refill requests to Dr Serrato at [facility] in [place].    Please refuse pended meds and close encounter.    Ruby Valverde XRO/

## 2021-02-22 DIAGNOSIS — E03.9 HYPOTHYROIDISM, UNSPECIFIED TYPE: ICD-10-CM

## 2021-02-23 RX ORDER — LEVOTHYROXINE SODIUM 112 UG/1
TABLET ORAL
Qty: 30 TABLET | Refills: 0 | Status: SHIPPED | OUTPATIENT
Start: 2021-02-23 | End: 2021-03-26

## 2021-02-23 NOTE — TELEPHONE ENCOUNTER
Routing refill request to provider for review/approval because:  Labs out of range:     TSH 0.12*         Heaven Duke RN

## 2021-03-02 ENCOUNTER — VIRTUAL VISIT (OUTPATIENT)
Dept: BEHAVIORAL HEALTH | Facility: CLINIC | Age: 41
End: 2021-03-02
Payer: COMMERCIAL

## 2021-03-02 DIAGNOSIS — F32.2 CURRENT SEVERE EPISODE OF MAJOR DEPRESSIVE DISORDER WITHOUT PSYCHOTIC FEATURES WITHOUT PRIOR EPISODE (H): ICD-10-CM

## 2021-03-02 DIAGNOSIS — F41.1 GAD (GENERALIZED ANXIETY DISORDER): Primary | ICD-10-CM

## 2021-03-02 PROCEDURE — 90832 PSYTX W PT 30 MINUTES: CPT | Mod: 95 | Performed by: MARRIAGE & FAMILY THERAPIST

## 2021-03-02 NOTE — PROGRESS NOTES
"[facility] Primary Care: Integrated Behavioral Health  March 2, 2021      Mattie Banda is a 39 year old female who is being evaluated via a telephone visit.      The patient has been notified of the following:     \"We have found that certain health care needs can be provided without the need for a face to face visit.  This service lets us provide the care you need with a short phone conversation.      I will have full access to your [facility] medical record during this entire phone call.   I will be taking notes for your medical record.     Since this is like an office visit, we will bill your insurance company for this service.  Please check with your medical insurance if this type of telephone visit/virtual care is covered.  You may be responsible for the cost of this service if insurance coverage is denied.      There are potential benefits and risks of telephone visits (e.g. limits to patient confidentiality) that differ from in-person visits.?  Confidentiality still applies for telephone services, and nobody will record the visit.  It is important to be in a quiet, private space that is free of distractions (including cell phone or other devices) during the visit.??     If during the course of the call I believe a telephone visit is not appropriate, you will not be charged for this service\"    Consent has been obtained for this service by care team member: yes.    Behavioral Health Clinician Progress Note    Patient Name: Mattie Banda           Service Type:  Individual     Session Start Time: 1:02  Session End Time: 1:29        Session Length: 16 - 37      Attendees: Patient    Visit Activities (Refresh list every visit): [facility] Only    Diagnostic Assessment Date: 1/21/2020  Treatment Plan Review Date: 3/2/2021  See Flowsheets for today's PHQ-9 and CRISTIANO-7 results  Previous PHQ-9:   PHQ-9 SCORE 3/4/2020 5/7/2020 12/7/2020   PHQ-9 Total Score - - -   PHQ-9 Total Score MyChart - 22 (Severe " depression) -   PHQ-9 Total Score 25 22 19     Previous CRISTIANO-7:   CRISTIANO-7 SCORE 1/7/2020 3/4/2020 5/7/2020   Total Score - - 20 (severe anxiety)   Total Score 20 20 20       EMILIA LEVEL:  EMILIA Score (Last Two) 11/14/2012   EMILIA Raw Score 44   Activation Score 70.8   EMILIA Level 4     Clinical Global Impressions  First:  Considering your total clinical experience with this particular patient population, how severe are the patient's symptoms at this time?: 5 (11/24/2020  4:36 PM)      Most recent:  Compared to the patient's condition at the START of treatment, this patient's condition is: 3 (11/24/2020  4:36 PM)          DATA  Extended Session (60+ minutes): No  Interactive Complexity: No  Crisis: No  [facility] Patient: No    Treatment Objective(s) Addressed in This Session:  Target Behavior(s): disease management/lifestyle changes depression and anxiety    Depressed Mood: Increase interest, engagement, and pleasure in doing things  Decrease frequency and intensity of feeling down, depressed, hopeless  Improve quantity and quality of night time sleep / decrease daytime naps  Feel less tired and more energy during the day   Improve diet, appetite, mindful eating, and / or meal planning  Identify negative self-talk and behaviors: challenge core beliefs, myths, and actions  Improve concentration, focus, and mindfulness in daily activities   Decrease thoughts that you'd be better off dead or of suicide / self-harm  Anxiety: will experience a reduction in anxiety, will develop more effective coping skills to manage anxiety symptoms, will develop healthy cognitive patterns and beliefs and will increase ability to function adaptively    Current Stressors / Issues:  Patient reports that she has started her new job and this is going well so far.   She continues to worries about her son and his mood. She states that she has also been having more nightmares about her Granny. She believes this is because her Granny's birthday is approaching.  "She finds herself feeling more anxious, as a result. She reports that she will have little crying spells, which can be triggered by the loss of her Granny. Reviewed sleep hygiene and encouraged patient to focus on that. Discussed how she can imagine or write a positive ending to her dreams. Reviewed grief stages and encouraged her to focus on what she gained in her relationship with her Cely.    Patient states that she has been working on setting boundaries and not letting people \"stomp all over me\".     Patient reports that she has established care with Dr. Hardin at [facility]. She plans to meet with psychiatry as needed, however her new PCP has taken over her medication management. Reinforced patient establishing care with a new provider. Reviewed role of this writer and work with patients being more brief and that it would be good to discuss a transition of care plan. Patient identified feeling anxious about changing therapists and agreed to this writer placing a referral for a therapist in the community. Reflected on patient's progress in the last year.    Progress on Treatment Objective(s) / Homework:  Satisfactory progress - ACTION (Actively working towards change); Intervened by reinforcing change plan / affirming steps taken    Motivational Interviewing    MI Intervention: Expressed Empathy/Understanding, Supported Autonomy, Collaboration, Evocation, Permission to raise concern or advise, Open-ended questions, Reflections: simple and complex, Change talk (evoked) and Reframe     Change Talk Expressed by the Patient: Desire to change Ability to change Reasons to change Need to change Committment to change Activation Taking steps    Provider Response to Change Talk: E - Evoked more info from patient about behavior change, A - Affirmed patient's thoughts, decisions, or attempts at behavior change, R - Reflected patient's change talk and S - Summarized patient's change talk " "statements    Also provided psychoeducation about behavioral health condition, symptoms, and treatment options    Skills training    Explored skills useful to client in current situation    Skills include assertiveness, communication, conflict management, problem-solving, relaxation, etc.    Solution-Focused Therapy    Explored patterns in patient's relationships and discussed options for new behaviors    Explored patterns in patient's actions and choices and discussed options for new behaviors    Cognitive-behavioral Therapy    Discussed common cognitive distortions, identified them in patient's life    Explored ways to challenge, replace, and act against these cognitions    Acceptance and Commitment Therapy    Explored and identified important values in patient's life    Discussed ways to commit to behavioral activation around these values    Psychodynamic psychotherapy    Discussed patient's emotional dynamics and issues and how they impact behaviors    Explored patient's history of relationships and how they impact present behaviors    Explored how to work with and make changes in these schemas and patterns    Behavioral Activation    Discussed steps patient can take to become more involved in meaningful activity    Identified barriers to these activities and explored possible solutions    Mindfulness-Based Strategies    Discussed skills based on development and application of mindfulness    Skills drawn from dialectical behavior therapy, mindfulness-based stress reduction, mindfulness-based cognitive therapy, etc.      Care Plan review completed: Yes    Medication Review:  No changes to current psychiatric medication(s)      Medication Compliance:  Yes    Changes in Health Issues:   Yes: arthritis, Associated Psychological Distress    Chemical Use Review:   Substance Use: Chemical use reviewed, no active concerns identified       Tobacco Use: Yes, increase.  Patient reports frequency of use \"too much\". She " states that due to stress this has increased due to anxiousness and boredom. She does not smoke in her home. Contemplation    Assessment: Current Emotional / Mental Status (status of significant symptoms):  Risk status (Self / Other harm or suicidal ideation)  Patient has had a history of suicidal ideation: in adolescense and suicide attempts: patient states that she tried multiple times around ages [age] by slitting wrists or overdose, and one overdose of alcohol; she denies ever being hospitalized for mental health and denies a history of self-injurious behavior, homicidal ideation, homicidal behavior and and other safety concerns  Patient denies current fears or concerns for personal safety.  Patient denies current or recent suicidal ideation or behaviors.   Patient denies current or recent homicidal ideation or behaviors.  Patient denies current or recent self injurious behavior or ideation.  Patient denies other safety concerns.  A safety and risk management plan has been developed including: patient co-developed a safety plan on 1/7/2020. She has a copy and agrees to use it as she needs to for suicidal thoughts      Appearance:   unable to assess due to phone visit   Eye Contact:   unable to assess due to phone visit   Psychomotor Behavior: unable to assess due to phone visit   Attitude:   Cooperative  Pleasant  Orientation:   All  Speech   Rate / Production: Normal    Volume:  Normal   Mood:    Anxious   Affect:    unable to assess due to phone visit   Thought Content:  Clear  Rumination   Thought Form:  Coherent  Logical   Insight:    Good     Diagnoses:  1. CRISTIANO (generalized anxiety disorder)    2. Current severe episode of major depressive disorder without psychotic features without prior episode (H)        Collateral Reports Completed:  Not Applicable    Plan: (Homework, other):  Patient was given information about behavioral services and encouraged to schedule a follow up appointment with the clinic [facility]  in 2 weeks.  She was also given information about mental health symptoms and treatment options , Cognitive Behavioral Therapy skills to practice when experiencing anxiety and depression and deep Breathing Strategies to practice when experiencing anxiety and depression.  CD Recommendations: No indications of CD issues.  Referral was placed for patient to be scheduled with a long-term therapist in the community. Patient understands that she can continue to meet with this writer for bridging sessions.      Steph Bridges LM, [facility] [place]       ______________________________________________________________________    Integrated Primary Care Behavioral Health Treatment Plan    Patient's Name: Mattie Banda  YOB: 1980    Date: March 2, 2021    DSM-V Diagnoses: 296.33 (F33.2) Major Depressive Disorder, Recurrent Episode, Severe _ or 300.02 (F41.1) Generalized Anxiety Disorder  Psychosocial / Contextual Factors: medical issues,  and dating ex-, few friends  WHODAS: 36    Referral / Collaboration:  Referral to another professional/service is not indicated at this time..    Anticipated number of session or this episode of care: 8      MeasurableTreatment Goal(s) related to diagnosis / functional impairment(s)  Goal 1: Patient will demonstrate ability to improve depression and anxiety symptoms as evidenced by improved PHQ9 & GAD7 scores.       Objective #A (Patient Action)    Patient will Decrease frequency and intensity of feeling down, depressed, hopeless  Identify negative self-talk and behaviors: challenge core beliefs, myths, and actions  Decrease thoughts that you'd be better off dead or of suicide / self-harm  Status: New - Date: 1/28/2020 ; Improved and continued: 5/6/2020 ; Continued:8/11/2020; Continued: 11/24/2020; Improved and Continued: 3/2/2021    Intervention(s)  [facility] [place] will teach distress tolerance skills. Provide psycho-education on cognitive distortions and automatic thoughts and  behaviors and ways to appropriately challenge and restructure thoughts.    Objective #B  Patient will increase coping strategies by 2  to more effectively manage current stressors  increase understanding of steps in the grief process  Status: New - Date: 1/28/2020; Improved and Continued: 5/6/2020; Continued:8/11/2020; Continued: 11/24/2020; Improved and Continued: 3/2/2021      Intervention(s)  [facility] will teach mindfulness and relaxation strategies. Provide psycho-education on grief process.    Patient has reviewed and agreed to the above plan.    Written by  KVNG Watson, [facility]

## 2021-03-04 DIAGNOSIS — M51.369 DEGENERATION OF LUMBAR INTERVERTEBRAL DISC: ICD-10-CM

## 2021-03-05 NOTE — TELEPHONE ENCOUNTER
Requested Prescriptions   Pending Prescriptions Disp Refills     methocarbamol (ROBAXIN) 750 MG tablet [Pharmacy Med Name: METHOCARBAMOL 750MG TABLET] 90 tablet 1     Sig: TAKE 1 TABLET (750 MG) BY MOUTH 3 TIMES DAILY AS NEEDED FOR MUSCLE SPASMS        Last Written Prescription Date:  02/02/2021  Last Fill Quantity: 90,   # refills: 1  Last Office Visit: 12/07/2020  Future Office visit:       Routing refill request to provider for review/approval because:  Drug not on the [facility], [facility] or [facility] refill protocol or controlled substance    Bhavani Ochoa MA

## 2021-03-08 RX ORDER — METHOCARBAMOL 750 MG/1
750 TABLET, FILM COATED ORAL 3 TIMES DAILY PRN
Qty: 90 TABLET | Refills: 1 | Status: SHIPPED | OUTPATIENT
Start: 2021-03-08 | End: 2021-08-10

## 2021-03-18 DIAGNOSIS — K52.832 LYMPHOCYTIC COLITIS: ICD-10-CM

## 2021-03-18 DIAGNOSIS — M47.819 SPONDYLARTHRITIS: ICD-10-CM

## 2021-03-18 DIAGNOSIS — I77.6 VASCULITIS (H): ICD-10-CM

## 2021-03-18 RX ORDER — METHOTREXATE 2.5 MG/1
TABLET ORAL
Qty: 32 TABLET | Refills: 0 | Status: SHIPPED | OUTPATIENT
Start: 2021-03-18 | End: 2021-05-18

## 2021-03-18 NOTE — TELEPHONE ENCOUNTER
Methotrexate      Last Written Prescription Date:  12/2/2020  Last Fill Quantity: 32,   # refills: 1  Last Office Visit: 12/7/2020  Future Office visit:       Routing refill request to provider for review/approval because:  Drug not on the [facility], [facility] or [facility] refill protocol or controlled substance

## 2021-03-24 DIAGNOSIS — E03.9 HYPOTHYROIDISM, UNSPECIFIED TYPE: ICD-10-CM

## 2021-03-25 ENCOUNTER — TELEPHONE (OUTPATIENT)
Dept: PSYCHIATRY | Facility: CLINIC | Age: 41
End: 2021-03-25

## 2021-03-25 NOTE — TELEPHONE ENCOUNTER
Attempted to reach pt by phone. LM with brief details regarding provider's response (see below) and left message with call back number if pt has follow up questions.

## 2021-03-25 NOTE — TELEPHONE ENCOUNTER
Pending Prescriptions:                       Disp   Refills    levothyroxine (SYNTHROID/LEVOTHROID) 112 M*30 tab*0        Sig: TAKE 1 TABLET (112 MCG) BY MOUTH DAILY. *LABS DUE    Routing refill request to provider for review/approval because:  Labs not current:    TSH   Date Value Ref Range Status   11/04/2019 0.12 (L) 0.40 - 4.00 mU/L Final

## 2021-03-25 NOTE — CONFIDENTIAL NOTE
Received request for Abilify refill.  Last visit 10/5/20 and pt was advised to follow-up in 8 weeks from that appt.  Per [facility] policy her care will be discharged back to primary care provider since she has not been seen in 6 months by this provider. Pt will need to obtain refills from her pcp. RN to please advise pt. Looks like per chart review that pt may have established care with a new pcp at [facility] in [place]. It is also [facility] policy that pt needs to have a [facility] pcp to be seen in [facility].     Tiffany Fallon,  on 3/25/2021 at 10:07 AM

## 2021-03-26 RX ORDER — LEVOTHYROXINE SODIUM 112 UG/1
TABLET ORAL
Qty: 30 TABLET | Refills: 0 | Status: SHIPPED | OUTPATIENT
Start: 2021-03-26 | End: 2021-04-23

## 2021-04-05 DIAGNOSIS — F41.1 GAD (GENERALIZED ANXIETY DISORDER): ICD-10-CM

## 2021-04-05 RX ORDER — BUSPIRONE HYDROCHLORIDE 30 MG/1
30 TABLET ORAL 2 TIMES DAILY
Qty: 60 TABLET | Refills: 1 | Status: SHIPPED | OUTPATIENT
Start: 2021-04-05

## 2021-04-08 ENCOUNTER — E-VISIT (OUTPATIENT)
Dept: URGENT CARE | Facility: CLINIC | Age: 41
End: 2021-04-08
Payer: COMMERCIAL

## 2021-04-08 DIAGNOSIS — Z20.822 CLOSE EXPOSURE TO 2019 NOVEL CORONAVIRUS: ICD-10-CM

## 2021-04-08 DIAGNOSIS — Z20.822 CLOSE EXPOSURE TO 2019 NOVEL CORONAVIRUS: Primary | ICD-10-CM

## 2021-04-08 PROCEDURE — U0005 INFEC AGEN DETEC AMPLI PROBE: HCPCS | Performed by: PHYSICIAN ASSISTANT

## 2021-04-08 PROCEDURE — U0003 INFECTIOUS AGENT DETECTION BY NUCLEIC ACID (DNA OR RNA); SEVERE ACUTE RESPIRATORY SYNDROME CORONAVIRUS 2 (SARS-COV-2) (CORONAVIRUS DISEASE [COVID-19]), AMPLIFIED PROBE TECHNIQUE, MAKING USE OF HIGH THROUGHPUT TECHNOLOGIES AS DESCRIBED BY CMS-2020-01-R: HCPCS | Performed by: PHYSICIAN ASSISTANT

## 2021-04-08 PROCEDURE — 99421 OL DIG E/M SVC 5-10 MIN: CPT | Performed by: PHYSICIAN ASSISTANT

## 2021-04-08 NOTE — PATIENT INSTRUCTIONS
"  Dear Mattie Banda,    Based on your exposure to COVID-19 (coronavirus), we would like to test you for this virus. I have placed an order for this test.The best time for testing is 5-7 days after the exposure.    How to schedule:  Go to your [facility] home page and scroll down to the section that says  You have an appointment that needs to be scheduled  and click the large green button that says  Schedule Now  and follow the steps to find the next available opening.     If you are unable to complete these [facility] scheduling steps, please call [phone] to schedule your testing.     Return to work/school/ guidance:   For people with high risk exposures outside the home    Please let your workplace manager and staffing office know when your quarantine ends.     We can not give you an exact date as it depends on the information below. You can calculate this on your own or work with your manager/staffing office to calculate this. (For example if you were exposed on 10/4, you would have to quarantine for 14 full days. That would be through 10/18. You could return on 10/19.)    Quarantine Guidelines:  Patients (\"contacts\") who have been in close prolonged contact of an infected person(s) (within six feet for at least 15 minutes within a 24 hour period), and remain asymptomatic should enter quarantine based on the following options:    14-day quarantine period (this remains the CDC recommendation for the greatest protection against spread of COVID-19) OR    Minimum 7-day quarantine with negative RT-PCR test collected on day 5 or later OR    10-day quarantine with no test  Quarantine Guideline exceptions are as follows:    People who have been fully vaccinated do not need to quarantine if the exposure was at least 2 weeks after the last vaccination. This includes vaccinated health care workers.    Not fully vaccinated and unvaccinated Individuals who work in health care, congregate care, or congregate living " should be off work for 14 days from their last date of exposure. Community activities for this group can be resumed based on options above. Fully vaccinated individuals in this group do not need to quarantine from work after exposure.    Not fully vaccinated and unvaccinated people whose high-risk exposure was a household member should always quarantine for 14 days from their last date of exposure. Fully vaccinated people in this category do not need to quarantine.    Not fully vaccinated or unvaccinated residents of congregate care and congregate living settings should always quarantine for 14 days from their last date of exposure. Fully vaccinated residents do not need to quarantine.  Note: If you have ongoing exposure to the covid positive person, this quarantine period may be more than 14 days. (For example, if you are continued to be exposed to your child who tested positive and cannot isolate from them, then the quarantine of 7-14 days can't start until your child is no longer contagious. This is typically 10 days from onset of the child's symptoms. So the total duration may be 17-24 days in this case.)    You should continue symptom monitoring until day 14 post-exposure. If you develop signs or symptoms of COVID-19, isolate and get tested (even if you have been tested already).    How to quarantine:   Stay home and away from others. Don't go to school or anywhere else. Generally quarantine means staying home from work but there are some exceptions to this. Please contact your workplace.  No hugging, kissing or shaking hands.  Don't let anyone visit.  Cover your mouth and nose with a mask, tissue or washcloth to avoid spreading germs.  Wash your hands and face often. Use soap and water.    What are the symptoms of COVID-19?  The most common symptoms are cough, fever and trouble breathing. Less common symptoms include headache, body aches, fatigue (feeling very tired), chills, sore throat, stuffy or runny nose,  diarrhea (loose poop), loss of taste or smell, belly pain, and nausea or vomiting (feeling sick to your stomach or throwing up).  After 14 days, if you have still don't have symptoms, you likely don't have this virus.  If you develop symptoms, follow these guidelines.  If you're normally healthy: Please start another eVisit.  If you have a serious health problem (like cancer, heart failure, an organ transplant or kidney disease): Call your specialty clinic. Let them know that you might have COVID-19.    Where can I get more information?  [facility] - About COVID-19: [email or web address]  CDC - What to Do If You're Sick: www.cdc.gov/coronavirus/2019-ncov/about/steps-when-sick.html  CDC - Ending Home Isolation: www.cdc.gov/coronavirus/2019-ncov/hcp/disposition-in-home-patients.html  CDC - Caring for Someone: www.cdc.gov/coronavirus/2019-ncov/if-you-are-sick/care-for-someone.html  [facility] clinical trials (COVID-19 research studies): clinicalaffairs.[facility]-clinical-trials  Below are the COVID-19 hotlines at the [place] Department of Health ([facility]). Interpreters are available.  For health questions: Call 999-149-8998 or 1-263.663.8751 (7 a.m. to 7 p.m.)  For questions about schools and childcare: Call 024-101-7341 or 1-848.408.3001 (7 a.m. to 7 p.m.)        April 8, 2021  RE:  Mattie Banda                                                                                                                   [address] [place] [id number]      To whom it may concern:    I evaluated Mattie Banda on April 8, 2021. Mattie Banda should be excused from work/school.    They should let their workplace manager and staffing office know when their quarantine ends.    We can not give an exact date as it depends on the information below. They can calculate this on their own or work with their manager/staffing office to calculate this. (For example if they were exposed on 10/04, they  "would have to quarantine for 14 full days. That would be through 10/18. They could return on 10/19.)    Quarantine Guidelines:    Patients (\"contacts\") who have been in close prolonged contact of an infected person(s) (within six feet for at least 15 minutes within a 24 hour period) and remain asymptomatic should enter quarantine based on the following options:      14-day quarantine period (this remains the CDC recommendation for the greatest protection against spread of COVID-19) OR    Minimum 7-day quarantine with negative RT-PCR test collected on day 5 or later OR    10-day quarantine with no test   Quarantine Guideline exceptions are as follows:    People who have been fully vaccinated do not need to quarantine if the exposure was at least 2 weeks after the last vaccination. This includes vaccinated health care workers.    Not fully vaccinated and unvaccinated Individuals who work in health care, congregate care, or congregate living should be off work for 14 days from their last date of exposure. Community activities for this group can be resumed based on options above. Fully vaccinated individuals in this group do not need to quarantine from work after exposure.    Not fully vaccinated and unvaccinated people whose high-risk exposure was a household member should always quarantine for 14 days from their last date of exposure. Fully vaccinated people in this category do not need to quarantine.    Not fully vaccinated or unvaccinated residents of congregate care and congregate living settings should always quarantine for 14 days from their last date of exposure. Fully vaccinated residents do not need to quarantine.    Note: If there is ongoing exposure to the covid positive person, this quarantine period may be longer than 14 days. (For example, if they are continually exposed to their child, who tested positive and cannot isolate from them, then the quarantine of 7-14 days can't start until their child is no " longer contagious. This is typically 10 days from onset to the child's symptoms. So the total duration may be 17-24 days in this case.)    Mattie Banda should continue symptom monitoring until day 14 post-exposure. If they develop signs or symptoms of COVID-19, they should isolate and get tested (even if they have been tested already).    Sincerely,  Sushant Arriaga PA-C

## 2021-04-09 LAB
SARS-COV-2 RNA RESP QL NAA+PROBE: NOT DETECTED
SPECIMEN SOURCE: NORMAL

## 2021-04-12 NOTE — TELEPHONE ENCOUNTER
Contacted patient to schedule MBB   Date: 6/21/19  Time: 130pm  Dr. Rubio    Instructed pt to have a  for procedure.   PAST SURGICAL HISTORY:  Shoulder arthritis Multiple surgeries on left shoulder

## 2021-04-19 DIAGNOSIS — M54.42 CHRONIC BILATERAL LOW BACK PAIN WITH BILATERAL SCIATICA: ICD-10-CM

## 2021-04-19 DIAGNOSIS — G89.29 CHRONIC BILATERAL LOW BACK PAIN WITH BILATERAL SCIATICA: ICD-10-CM

## 2021-04-19 DIAGNOSIS — M54.41 CHRONIC BILATERAL LOW BACK PAIN WITH BILATERAL SCIATICA: ICD-10-CM

## 2021-04-19 DIAGNOSIS — G43.109 MIGRAINE WITH AURA AND WITHOUT STATUS MIGRAINOSUS, NOT INTRACTABLE: ICD-10-CM

## 2021-04-20 RX ORDER — TOPIRAMATE 25 MG/1
TABLET, FILM COATED ORAL
Qty: 90 TABLET | Refills: 0 | Status: SHIPPED | OUTPATIENT
Start: 2021-04-20 | End: 2021-07-19

## 2021-04-20 NOTE — TELEPHONE ENCOUNTER
Routing refill request to provider for review/approval because:  Nora given x1 and patient did not follow up, please advise  Patient needs to be seen because:  Needs to establish care with a new provider    ZARA Manning, RN  [facility]

## 2021-04-23 DIAGNOSIS — E03.9 HYPOTHYROIDISM, UNSPECIFIED TYPE: ICD-10-CM

## 2021-04-23 RX ORDER — LEVOTHYROXINE SODIUM 112 UG/1
TABLET ORAL
Qty: 30 TABLET | Refills: 0 | Status: SHIPPED | OUTPATIENT
Start: 2021-04-23

## 2021-04-23 NOTE — TELEPHONE ENCOUNTER
Routing refill request to provider for review/approval because:  Labs not current:           Heaven Duke RN

## 2021-04-24 NOTE — TELEPHONE ENCOUNTER
Message from [employer]:  Original authorizing provider: Yuan Schofield MD [place] CADETGerald Banda would like a refill of the following medications:  oxyCODONE IR (ROXICODONE) 5 MG tablet [Yuan Schofield MD]    Preferred pharmacy: Other - I will  hard copy when ready. Thank you!    Comment:  I will pick you hard copies when ready. Thank you!   here for POON- is non compliant with meds

## 2021-05-03 DIAGNOSIS — F41.8 MIXED ANXIETY DEPRESSIVE DISORDER: ICD-10-CM

## 2021-05-03 RX ORDER — ESCITALOPRAM OXALATE 20 MG/1
20 TABLET ORAL DAILY
Qty: 90 TABLET | Refills: 0 | OUTPATIENT
Start: 2021-05-03

## 2021-05-16 DIAGNOSIS — I77.6 VASCULITIS (H): ICD-10-CM

## 2021-05-16 DIAGNOSIS — K52.832 LYMPHOCYTIC COLITIS: ICD-10-CM

## 2021-05-16 DIAGNOSIS — M47.819 SPONDYLARTHRITIS: ICD-10-CM

## 2021-05-17 NOTE — TELEPHONE ENCOUNTER
Requested Prescriptions   Pending Prescriptions Disp Refills     methotrexate 2.5 MG tablet [Pharmacy Med Name: METHOTREXATE 2.5MG TABLET] 32 tablet 0     Sig: TAKE 2 TABS BY MOUTH EACH WEEK FOR FIRST 2 WEEKS, THEN 3 TABS WEEKLYFOR 2 WEEKS, THEN 4 WEEKLY     Last Written Prescription Date:  03/18/2021  Last Fill Quantity: 32,   # refills: 0  Last Office Visit: 06/05/2020  Future Office visit:       Routing refill request to provider for review/approval because:  Drug not on the [facility], [facility] or [facility] refill protocol or controlled substance    Routing to covering provider to sign.     Bhavani Ochoa MA

## 2021-05-18 RX ORDER — METHOTREXATE 2.5 MG/1
4 TABLET ORAL WEEKLY
Qty: 48 TABLET | Refills: 0 | Status: SHIPPED | OUTPATIENT
Start: 2021-05-18 | End: 2021-08-16

## 2021-05-18 NOTE — TELEPHONE ENCOUNTER
Please call patient to clarify dosing. Last 3 refills have all had titration dosing.   Dipak Hubbard MD      Routing comment

## 2021-05-18 NOTE — TELEPHONE ENCOUNTER
Up to the 4 tabs weekly and would like dose to be higher, per patient that was the original plan with Dr. Alberto -  She reports it is starting to work on hands, which is why she would like to up the dose.    Tamra Driver on 5/18/2021 at 10:51 AM

## 2021-07-19 ENCOUNTER — HOSPITAL ENCOUNTER (EMERGENCY)
Facility: CLINIC | Age: 41
Discharge: HOME | End: 2021-07-19
Attending: NURSE PRACTITIONER | Admitting: NURSE PRACTITIONER
Payer: COMMERCIAL

## 2021-07-19 ENCOUNTER — APPOINTMENT (OUTPATIENT)
Dept: GENERAL RADIOLOGY | Facility: CLINIC | Age: 41
End: 2021-07-19
Attending: NURSE PRACTITIONER
Payer: COMMERCIAL

## 2021-07-19 VITALS
OXYGEN SATURATION: 100 % | BODY MASS INDEX: 28.98 KG/M2 | RESPIRATION RATE: 16 BRPM | WEIGHT: 185 LBS | SYSTOLIC BLOOD PRESSURE: 96 MMHG | HEART RATE: 59 BPM | TEMPERATURE: 98.3 F | DIASTOLIC BLOOD PRESSURE: 58 MMHG

## 2021-07-19 DIAGNOSIS — S93.492A SPRAIN OF ANTERIOR TALOFIBULAR LIGAMENT OF LEFT ANKLE, INITIAL ENCOUNTER: ICD-10-CM

## 2021-07-19 PROCEDURE — 99283 EMERGENCY DEPT VISIT LOW MDM: CPT | Performed by: NURSE PRACTITIONER

## 2021-07-19 PROCEDURE — 73610 X-RAY EXAM OF ANKLE: CPT | Mod: LT

## 2021-07-19 PROCEDURE — 99284 EMERGENCY DEPT VISIT MOD MDM: CPT | Performed by: NURSE PRACTITIONER

## 2021-07-19 RX ORDER — NALOXONE HYDROCHLORIDE 4 MG/.1ML
1 SPRAY NASAL PRN
COMMUNITY
Start: 2021-04-08 | End: 2022-04-08

## 2021-07-19 RX ORDER — MORPHINE SULFATE 30 MG/1
30 TABLET, FILM COATED, EXTENDED RELEASE ORAL AT BEDTIME
COMMUNITY
Start: 2021-07-06 | End: 2021-08-03

## 2021-07-19 RX ORDER — ALPRAZOLAM 1 MG
1-2 TABLET ORAL 3 TIMES DAILY PRN
COMMUNITY
Start: 2021-06-01

## 2021-07-19 RX ORDER — LEVOTHYROXINE SODIUM 25 UG/1
25 TABLET ORAL DAILY
COMMUNITY
Start: 2021-06-24 | End: 2022-06-24

## 2021-07-19 RX ORDER — ARIPIPRAZOLE 2 MG/1
2 TABLET ORAL DAILY
COMMUNITY
Start: 2021-05-25

## 2021-07-19 RX ORDER — ALPRAZOLAM 2 MG
1 TABLET ORAL 2 TIMES DAILY
COMMUNITY
Start: 2021-07-06

## 2021-07-19 RX ORDER — METHOCARBAMOL 500 MG/1
500 TABLET, FILM COATED ORAL 4 TIMES DAILY
COMMUNITY
Start: 2021-05-21

## 2021-07-19 RX ORDER — TOPIRAMATE 25 MG/1
25 TABLET, FILM COATED ORAL 3 TIMES DAILY
COMMUNITY
Start: 2021-07-12 | End: 2022-07-12

## 2021-07-19 RX ORDER — OXYCODONE HYDROCHLORIDE 10 MG/1
5-10 TABLET ORAL 4 TIMES DAILY PRN
COMMUNITY
Start: 2021-07-06 | End: 2024-04-10

## 2021-07-19 RX ORDER — IBUPROFEN 200 MG
800 TABLET ORAL
COMMUNITY

## 2021-07-19 RX ORDER — FUROSEMIDE 20 MG
20 TABLET ORAL
COMMUNITY
Start: 2021-06-09 | End: 2022-06-09

## 2021-07-19 NOTE — LETTER
July 19, 2021      To Whom It May Concern:      Mattie Banda was seen in our Emergency Department today, 07/19/21.    No work 7/19/2021 - 7/20/2021.    Sincerely,        SAUD Enriquez CNP

## 2021-07-20 NOTE — DISCHARGE INSTRUCTIONS
Non-weight bearing with crutches and cam walker boot.  Begin weight bearing as tolerated.  Wear cam walker boot for the 1-2 weeks. You may remove it at rest and during the night.  Elevate and rest your ankle as much as possible.  Ice 10-15 minutes at a time 3-4 times a day. (minimum of 60 minutes off).  Tylenol 650 mg every 4-6 hours as needed for pain. or  Ibuprofen 400-600 mg every 6-8 hours as needed for pain  (take with food, stop if causing stomach pains.)  Recheck with orthopedics if not improving in 1-2 weeks. [phone].

## 2021-07-20 NOTE — ED NOTES
EDT instructed pt on crutches and CAM boot.  Reviewed with pt, no additional questions or concerns.

## 2021-07-20 NOTE — ED PROVIDER NOTES
History     Chief Complaint   Patient presents with     Ankle Pain     HPI  Mattie Banda is a 40 year old female who presents emergency department for evaluation of left ankle pain.  Patient was wearing heels and rolled her ankle.  She felt a pop and then pain over the anterior aspect of her left ankle.  This occurred approximately 4-5 hours ago.  Patient applied an Ace wrap to the ankle.  She has been unable to bear weight on her left foot since the injury occurred.    Allergies:  Allergies   Allergen Reactions     Bee Venom Swelling     cellulitis     Bupropion Hcl Hives     Wellbutrin     Other [No Clinical Screening - See Comments]      Some metals, nickel  rash     Adhesive Tape Rash and Blisters       Problem List:    Patient Active Problem List    Diagnosis Date Noted     PTSD (post-traumatic stress disorder) 06/18/2020     Priority: Medium     Spondylarthritis 05/01/2020     Priority: Medium     Current severe episode of major depressive disorder without psychotic features without prior episode (H) 01/21/2020     Priority: Medium     CRISTIANO (generalized anxiety disorder) 01/21/2020     Priority: Medium     Vasculitis (H) 01/10/2020     Priority: Medium     Carotidynia 12/11/2019     Priority: Medium     Abnormal uterine bleeding 10/22/2019     Priority: Medium     Added automatically from request for surgery [id number]       Chronic pain syndrome 07/09/2018     Priority: Medium     Patient is very low risk to abuse  Pain medication.  Patient is followed by Yuan Schofield MD for ongoing prescription of pain medication.  All refills should only be approved by this provider, or covering partner.    Medication(s): ms contin and oxydodone ir.   Maximum quantity per month: #60 ms contin, #180 oxycodone  Clinic visit frequency required: Q 3 months      Controlled substance agreement:  Encounter-Level CSA - 08/20/2018:    Controlled Substance Agreement - Scan on 6/5/19 CONTROLLED SUBSTANCE AGREEMENT (below)        Patient-Level CSA:    Controlled Substance Agreement - Opioid - Scan on 6/13/2019  6:05 PM (below)    Controlled Substance Agreement - Opioid - Scan on 6/13/2019  6:03 PM: 06/05/2019 (below)         Pain Clinic evaluation in the past: Yes       Date/Location:   working with counselor to deal with many past traumas, 10/23/2020.    DIRE Total Score(s):  DIRE SCORE 10/23/2020   DIRE Total Score 19       Last [facility] website verification:  10/23/2020 450   [email or web address]             DDD (degenerative disc disease), cervical 02/07/2018     Priority: Medium     Lymphocytic colitis 06/13/2017     Priority: Medium     Idiopathic peripheral neuropathy 01/10/2017     Priority: Medium     Hypothyroidism, unspecified type 01/10/2017     Priority: Medium     Migraine with aura and without status migrainosus, not intractable 12/27/2016     Priority: Medium     Tobacco use disorder 09/12/2016     Priority: Medium     Labial lesion 02/08/2016     Priority: Medium     Superior folds of the vulva/labia right side       S/P lumbar fusion and discectomy June 2015 06/28/2015     Priority: Medium     Degeneration of lumbar intervertebral disc 04/05/2014     Priority: Medium     newly added to list on 4/4//14  but present for last months          Past Medical History:    Past Medical History:   Diagnosis Date     Anxiety      Arthritis 2019     Breast disorder Not sure     COPD (chronic obstructive pulmonary disease) (H)      Depressive disorder      Mixed anxiety depressive disorder 2/8/2016     Papanicolaou smear of cervix with low grade squamous intraepithelial lesion (LGSIL)      Thyroid disease      Urinary tract infection, site not specified      Wounds and injuries 2000       Past Surgical History:    Past Surgical History:   Procedure Laterality Date     ABDOMEN SURGERY  2015    For back surgery     BACK SURGERY  6/2015      BIOPSY  Don t remember     COLONOSCOPY  08/06/07     COLONOSCOPY  08/05/08      CYSTOSCOPY N/A 2/7/2020    Procedure: Cystoscopy;  Surgeon: Anabel Barber MD;  Location: [facility]     DAVINCI HYSTERECTOMY TOTAL, SALPINGECTOMY BILATERAL Bilateral 2/7/2020    Procedure: Davinci Total Laparoscopic Hysterectomy, Bilateral Salpingectomy, Exam Under Anesthesia;  Surgeon: Anabel Barber MD;  Location: [facility] OR     HC TOOTH EXTRACTION W/FORCEP      wisdom teeth removed     HYSTERECTOMY, PAP NO LONGER INDICATED       INJECT BLOCK MEDIAL BRANCH CERVICAL/THORACIC/LUMBAR N/A 6/21/2019    Procedure: BLOCK, NERVE, SPINAL, MEDIAL BRANCH lumbar 2, 3, 4;  Surgeon: Edgardo Rubio MD;  Location[facility] OR     INJECT EPIDURAL CERVICAL N/A 3/8/2018    Procedure: INJECT EPIDURAL CERVICAL;  cervical 6-7 interlaminar epidural steroid injection;  Surgeon: Edgardo Rubio MD;  Location[facility]     INJECT JOINT SACROILIAC Bilateral 6/12/2020    Procedure: INJECT JOINT SACROILIAC bilateral;  Surgeon: Edgardo Rubio MD;  [facility]     LAPAROSCOPIC TUBAL LIGATION  11/27/2012    Procedure: LAPAROSCOPIC TUBAL LIGATION;  Laparoscopic Bilateral tubal sterilization/ fulgaration;  Surgeon: Sarbjit Whittaker MD;  Location[facility]     ORTHOPEDIC SURGERY  6/2015     ZZHC COLONOSCOPY W BIOPSY  02/06/08       Family History:    Family History   Problem Relation Age of Onset     Hypertension Maternal Grandmother      Arthritis Maternal Grandmother      Cancer Maternal Grandmother         MGGM     Depression Maternal Grandmother      Lipids Maternal Grandmother      Osteoporosis Maternal Grandmother      Respiratory Maternal Grandmother         emphysema     Genitourinary Problems Maternal Grandmother         Kidney Failure, liver      Coronary Artery Disease Maternal Grandmother      Hyperlipidemia Maternal Grandmother      Anxiety Disorder Maternal Grandmother      Asthma Maternal Grandmother      Anesthesia Reaction Maternal Grandmother      Hypertension Maternal Grandfather      Heart Disease Maternal Grandfather          MI     Cardiovascular Maternal Grandfather      Cancer - colorectal Maternal Grandfather      Cancer Maternal Grandfather         [place] Lymphoma     Other Cancer Maternal Grandfather      Coronary Artery Disease Maternal Grandfather      Hypertension Paternal Grandmother      Arthritis Paternal Grandmother         RA     Osteoporosis Paternal Grandmother      Obesity Paternal Grandmother      Substance Abuse Mother         Alcoholic     Depression Mother      Hypertension Father      Hyperlipidemia Father      Mental Illness Father      Substance Abuse Father         Bio and Step dad have alcohol problems     Osteoporosis Father      Coronary Artery Disease Paternal Grandfather      Hypertension Paternal Grandfather      Breast Cancer Other         Maternal great aunt     Coronary Artery Disease Other      Diabetes Other      Thyroid Disease Maternal Aunt         two aunts     Breast Cancer Other      Colon Cancer Other      Prostate Cancer Other      Other Cancer Other      Other Cancer Other      Thyroid Disease Other      Anxiety Disorder Other      Substance Abuse Other      Depression Other      Mental Illness Other      Anxiety Disorder Maternal Half-Sister      Thyroid Disease Maternal Half-Sister      Thyroid Disease Other      Asthma Other      Thyroid Disease Other      Obesity Other      Coronary Artery Disease Other      Hypertension Other        Social History:  Marital Status:   [4]  Social History     Tobacco Use     Smoking status: Current Every Day Smoker     Packs/day: 1.00     Years: 20.00     Pack years: 20.00     Types: Cigarettes     Smokeless tobacco: Never Used   Substance Use Topics     Alcohol use: Yes     Alcohol/week: 0.0 standard drinks     Comment: Rarely     Drug use: No        Medications:    albuterol (PROAIR HFA/PROVENTIL HFA/VENTOLIN HFA) 108 (90 Base) MCG/ACT inhaler  ALPRAZolam (XANAX) 1 MG tablet  ALPRAZolam (XANAX) 2 MG tablet  ARIPiprazole (ABILIFY) 2 MG  tablet  busPIRone HCl (BUSPAR) 30 MG tablet  escitalopram (LEXAPRO) 20 MG tablet  furosemide (LASIX) 20 MG tablet  ibuprofen (ADVIL/MOTRIN) 200 MG tablet  levothyroxine (SYNTHROID/LEVOTHROID) 112 MCG tablet  levothyroxine (SYNTHROID/LEVOTHROID) 25 MCG tablet  loratadine (CLARITIN) 10 MG tablet  methocarbamol (ROBAXIN) 500 MG tablet  methotrexate sodium 2.5 MG TABS  morphine (MS CONTIN) 30 MG CR tablet  naloxone (NARCAN) 4 MG/0.1ML nasal spray  ondansetron (ZOFRAN) 4 MG tablet  oxyCODONE IR (ROXICODONE) 10 MG tablet  topiramate (TOPAMAX) 25 MG tablet  varenicline (CHANTIX STARTING MONTH PAK) 0.5 MG X 11 & 1 MG X 42 tablet  methocarbamol (ROBAXIN) 750 MG tablet  sulfaSALAzine (AZULFIDINE) 500 MG tablet          Review of Systems  As mentioned above in the history present illness. All other systems were reviewed and are negative.    Physical Exam   BP: 96/58  Pulse: 59  Temp: 98.3  F (36.8  C)  Resp: 16  Weight: 83.9 kg (185 lb)  SpO2: 100 %      Physical Exam  Appearance: Alert, oriented. Appears distressed.   CV: regular rate.  Resp: speaking in full sentences. No respiratory distress.  Left Ankle/Foot:  There is diffuse swelling and tenderness over the anterior ankle.  no swelling or tenderness over the lateral or medial malleolus.The fifth metatarsal is not tender. The ankle joint is intact without excessive opening on stressing. Normal pedal pulse. Foot is pink and warm. Cap refill < 2 seconds. Ipsilateral knee exam is normal.               ED Course        Procedures              Results for orders placed or performed during the hospital encounter of 07/19/21 (from the past 24 hour(s))   XR Ankle Left G/E 3 Views    Narrative    EXAM: XR ANKLE LEFT G/E 3 VIEWS  LOCATION: [facility]   DATE/TIME: 7/19/2021 8:41 PM    INDICATION: rolled ankle. pain across the anterior/mid ankle.  COMPARISON: None.      Impression    IMPRESSION: Normal joint spaces and alignment. No fracture.        Medications - No data to display    Assessments & Plan (with Medical Decision Making)   I discussed the imaging results with patient. Likely moderate ankle sprain. No joint laxity or other concerning exam findings.  I recommend RICE and crutches. Patient inquired about a walking boot which is reasonable given her inability to tolerate weight bearing. Pt was fitted with cam walker boot and crutches. She was provided a note for work.  Plan:  Non-weight bearing with crutches and cam walker boot.  Begin weight bearing as tolerated.  Wear cam walker boot for the 1-2 weeks. You may remove it at rest and during the night.  Elevate and rest your ankle as much as possible.  Ice 10-15 minutes at a time 3-4 times a day. (minimum of 60 minutes off).  Tylenol 650 mg every 4-6 hours as needed for pain. or  Ibuprofen 400-600 mg every 6-8 hours as needed for pain  (take with food, stop if causing stomach pains.)  Recheck with orthopedics if not improving in 1-2 weeks. [phone].        I have reviewed the nursing notes.    I have reviewed the findings, diagnosis, plan and need for follow up with the patient.      Discharge Medication List as of 7/19/2021  9:50 PM          Final diagnoses:   Sprain of anterior talofibular ligament of left ankle, initial encounter       7/19/2021   [facility] EMERGENCY DEPT     Ko, SAUD Olmos CNP  07/20/21 1043

## 2021-08-05 ENCOUNTER — OFFICE VISIT (OUTPATIENT)
Dept: FAMILY MEDICINE | Facility: CLINIC | Age: 41
End: 2021-08-05
Payer: COMMERCIAL

## 2021-08-05 VITALS
DIASTOLIC BLOOD PRESSURE: 60 MMHG | SYSTOLIC BLOOD PRESSURE: 92 MMHG | OXYGEN SATURATION: 97 % | RESPIRATION RATE: 14 BRPM | WEIGHT: 180 LBS | HEART RATE: 89 BPM | BODY MASS INDEX: 28.19 KG/M2 | TEMPERATURE: 97.2 F

## 2021-08-05 DIAGNOSIS — N39.9 URINARY PROBLEM IN FEMALE: Primary | ICD-10-CM

## 2021-08-05 DIAGNOSIS — N30.00 ACUTE CYSTITIS WITHOUT HEMATURIA: ICD-10-CM

## 2021-08-05 DIAGNOSIS — N39.0 ACUTE LOWER UTI: ICD-10-CM

## 2021-08-05 DIAGNOSIS — Z29.9 PROPHYLACTIC MEASURE: ICD-10-CM

## 2021-08-05 LAB
ALBUMIN UR-MCNC: NEGATIVE MG/DL
APPEARANCE UR: ABNORMAL
BACTERIA #/AREA URNS HPF: ABNORMAL /HPF
BILIRUB UR QL STRIP: NEGATIVE
COLOR UR AUTO: YELLOW
GLUCOSE UR STRIP-MCNC: NEGATIVE MG/DL
HGB UR QL STRIP: NEGATIVE
KETONES UR STRIP-MCNC: NEGATIVE MG/DL
LEUKOCYTE ESTERASE UR QL STRIP: ABNORMAL
MUCOUS THREADS #/AREA URNS LPF: PRESENT /LPF
NITRATE UR QL: NEGATIVE
PH UR STRIP: 5 [PH] (ref 5–7)
RBC URINE: 2 /HPF
SP GR UR STRIP: 1.01 (ref 1–1.03)
SQUAMOUS EPITHELIAL: 4 /HPF
UROBILINOGEN UR STRIP-MCNC: NORMAL MG/DL
WBC URINE: 32 /HPF

## 2021-08-05 PROCEDURE — 99213 OFFICE O/P EST LOW 20 MIN: CPT | Performed by: PHYSICIAN ASSISTANT

## 2021-08-05 PROCEDURE — 87186 SC STD MICRODIL/AGAR DIL: CPT | Performed by: PHYSICIAN ASSISTANT

## 2021-08-05 PROCEDURE — 81001 URINALYSIS AUTO W/SCOPE: CPT | Performed by: PHYSICIAN ASSISTANT

## 2021-08-05 PROCEDURE — 87086 URINE CULTURE/COLONY COUNT: CPT | Performed by: PHYSICIAN ASSISTANT

## 2021-08-05 RX ORDER — LANOLIN ALCOHOL/MO/W.PET/CERES
CREAM (GRAM) TOPICAL
COMMUNITY

## 2021-08-05 RX ORDER — LEVOTHYROXINE SODIUM 137 UG/1
TABLET ORAL
COMMUNITY
Start: 2021-08-02

## 2021-08-05 RX ORDER — CEPHALEXIN 500 MG/1
500 CAPSULE ORAL 2 TIMES DAILY
Qty: 14 CAPSULE | Refills: 0 | Status: SHIPPED | OUTPATIENT
Start: 2021-08-05 | End: 2021-08-12

## 2021-08-05 RX ORDER — MORPHINE SULFATE 30 MG/1
30 TABLET, FILM COATED, EXTENDED RELEASE ORAL
COMMUNITY
Start: 2021-07-21 | End: 2021-08-18

## 2021-08-05 RX ORDER — SULFAMETHOXAZOLE/TRIMETHOPRIM 800-160 MG
1 TABLET ORAL 2 TIMES DAILY
Qty: 6 TABLET | Refills: 0 | Status: SHIPPED | OUTPATIENT
Start: 2021-08-05 | End: 2021-08-05 | Stop reason: ALTCHOICE

## 2021-08-05 RX ORDER — FLUCONAZOLE 150 MG/1
150 TABLET ORAL ONCE
Qty: 1 TABLET | Refills: 0 | Status: SHIPPED | OUTPATIENT
Start: 2021-08-05 | End: 2021-08-05

## 2021-08-05 RX ORDER — LEVOTHYROXINE SODIUM 25 UG/1
12.5 TABLET ORAL
COMMUNITY
Start: 2021-08-02

## 2021-08-05 ASSESSMENT — ANXIETY QUESTIONNAIRES
7. FEELING AFRAID AS IF SOMETHING AWFUL MIGHT HAPPEN: NEARLY EVERY DAY
GAD7 TOTAL SCORE: 19
1. FEELING NERVOUS, ANXIOUS, OR ON EDGE: NEARLY EVERY DAY
IF YOU CHECKED OFF ANY PROBLEMS ON THIS QUESTIONNAIRE, HOW DIFFICULT HAVE THESE PROBLEMS MADE IT FOR YOU TO DO YOUR WORK, TAKE CARE OF THINGS AT HOME, OR GET ALONG WITH OTHER PEOPLE: EXTREMELY DIFFICULT
5. BEING SO RESTLESS THAT IT IS HARD TO SIT STILL: NEARLY EVERY DAY
2. NOT BEING ABLE TO STOP OR CONTROL WORRYING: NEARLY EVERY DAY
3. WORRYING TOO MUCH ABOUT DIFFERENT THINGS: NEARLY EVERY DAY
6. BECOMING EASILY ANNOYED OR IRRITABLE: SEVERAL DAYS

## 2021-08-05 ASSESSMENT — PATIENT HEALTH QUESTIONNAIRE - PHQ9
SUM OF ALL RESPONSES TO PHQ QUESTIONS 1-9: 12
5. POOR APPETITE OR OVEREATING: NEARLY EVERY DAY

## 2021-08-05 NOTE — PATIENT INSTRUCTIONS
Take full course of antibiotics- Bactrim twice daily for 7 day.  Urine culture pending, we will call you only if culture shows resistance and change of antibiotic is required or if no growth to stop antibiotics and to follow-up with your primary care provider.  Please follow up with primary care provider in 1 week for a repeat urine test as there was blood in your urine today.  May use over the counter AZO pain relief or Uristat (phenazopyridine) for urinary burning but do not use for 24 hours before future urine tests.  Drink plenty of fluids. Limit caffeine and alcohol as these are bladder irritants.  May take tylenol or ibuprofen as needed for discomfort.   If you develop any vomiting, high fevers or lower back pain, these can be signs of a kidney infection and you should be seen in urgent care or in the ER.  Prevention of future infections by drinking cranberry juice, urination after intercourse and wiping from front to back after using the toilet.  Please follow up with primary care provider if symptoms return, if you're not improving, worsening or new symptoms or for any adverse reactions to medications.

## 2021-08-05 NOTE — PROGRESS NOTES
Assessment & Plan     Urinary problem in female  - Urine Culture Aerobic Bacterial - lab collect; Future  - UA reflex to Microscopic - lab collect; Future  - Urine Culture Aerobic Bacterial - lab collect  - UA reflex to Microscopic - lab collect    Acute cystitis without hematuria  - cephALEXin (KEFLEX) 500 MG capsule; Take 1 capsule (500 mg) by mouth 2 times daily for 7 days  Dispense: 14 capsule; Refill: 0    Prophylactic measure  - fluconazole (DIFLUCAN) 150 MG tablet; Take 1 tablet (150 mg) by mouth once for 1 dose    Start Keflex twice daily for 7 days. UA with questionable infection- trace Leuks, few bacteria and 32 WBC on microscopy. Adjust plan as needed based on urine culture result.    20 minutes spent on the date of the encounter doing chart review, patient visit and documentation     Return in about 3 days (around 8/8/2021) for Recheck with primary care provider if not improved.    [facility] is a 40 year old who presents for the following health issues     HPI     Genitourinary - Female  Onset/Duration: 3 days  Description:   Painful urination (Dysuria): YES, after urinating           Frequency: YES  Blood in urine (Hematuria): YES- at the beginning  Delay in urine (Hesitency): no  Intensity: moderate  Progression of Symptoms:  worsening  Accompanying Signs & Symptoms:  Fever/chills: YES- fever first day  Flank pain: YES- x 1 day  Nausea and vomiting: YES- first day of sx  Vaginal symptoms: none  Abdominal/Pelvic Pain: YES- after urination  History:   History of frequent UTI s: YES  History of kidney stones: YES  Sexually Active: YES  Possibility of pregnancy: No  Precipitating or alleviating factors: None  Therapies tried and outcome: OTC advil or tylenol  and  Azo and cranberry pills     Mattie presents to the clinic today for evaluation of a possible UTI.  She notes that her  travels for work and they do not have  intercourse often but when they do she seems to get a UTI afterward.  The same symptoms began about 3 days ago.  She has been experiencing dysuria, frequency, visible hematuria, chills and subjective fever on the first day of her symptoms, abdominal pain with urination and bilateral flank pain, left greater than right.  She has no vaginal discharge.  This does not feel like previous kidney stones.  Her last urinary tract infection per epic was April 2020.    Review of Systems   ROS negative except as stated above.        Objective    LMP 02/01/2020   There is no height or weight on file to calculate BMI.  Physical Exam   GENERAL: healthy, alert and no distress  NECK: no adenopathy, no asymmetry, masses, or scars and thyroid normal to palpation  RESP: lungs clear to auscultation - no rales, rhonchi or wheezes  CV: regular rate and rhythm, normal S1 S2, no S3 or S4, no murmur, click or rub  ABDOMEN: soft, nontender  MS: no gross musculoskeletal defects noted, no edema  BACK: minimal left sided CVA tenderness to percussion, no paralumbar tenderness    No results found for any visits on 08/05/21.

## 2021-08-06 ASSESSMENT — ANXIETY QUESTIONNAIRES: GAD7 TOTAL SCORE: 19

## 2021-08-07 LAB — BACTERIA UR CULT: ABNORMAL

## 2021-08-09 DIAGNOSIS — M51.369 DEGENERATION OF LUMBAR INTERVERTEBRAL DISC: ICD-10-CM

## 2021-08-09 NOTE — RESULT ENCOUNTER NOTE
Urine culture positive with >100,000 colonies/mL of E. coli. Bacteria is sensitive to 1st gen cephalosporins and patient is currently taking Keflex. No change necessary.    Cleo Ramirez PA-C  [facility]

## 2021-08-09 NOTE — TELEPHONE ENCOUNTER
Requested Prescriptions   Pending Prescriptions Disp Refills     methocarbamol (ROBAXIN) 750 MG tablet [Pharmacy Med Name: METHOCARBAMOL 750MG TABLET] 90 tablet 1     Sig: TAKE 1 TABLET (750 MG) BY MOUTH 3 TIMES DAILY AS NEEDED FOR MUSCLE SPASMS     Last Written Prescription Date:  03/08/2021  Last Fill Quantity: 90,   # refills: 1  Last Office Visit: 12/07/2020  Future Office visit:       Routing refill request to provider for review/approval because:  Drug not on the [facility], [facility] or [facility] refill protocol or controlled substance

## 2021-08-10 RX ORDER — METHOCARBAMOL 750 MG/1
750 TABLET, FILM COATED ORAL 3 TIMES DAILY PRN
Qty: 90 TABLET | Refills: 1 | Status: SHIPPED | OUTPATIENT
Start: 2021-08-10 | End: 2021-11-02

## 2021-09-09 ENCOUNTER — TRANSFERRED RECORDS (OUTPATIENT)
Dept: HEALTH INFORMATION MANAGEMENT | Facility: CLINIC | Age: 41
End: 2021-09-09

## 2021-10-23 ENCOUNTER — HEALTH MAINTENANCE LETTER (OUTPATIENT)
Age: 41
End: 2021-10-23

## 2021-10-25 NOTE — LETTER
[address] [address]  Phone: [phone]  Fax: [phone]    August 18, 2020        Mattie Banda  [facility] [address] [facility] [id number]          To whom it may concern:    RE: Mattie Banda    Patient has degenerative lumbar and cervical spine issues.  It is my opinion she is able to lift 50 pounds and push/pull 100 pounds.    Please contact me for questions or concerns.      Sincerely,        Yuan Iain Schofield MD   no

## 2021-11-01 DIAGNOSIS — M51.369 DEGENERATION OF LUMBAR INTERVERTEBRAL DISC: ICD-10-CM

## 2021-11-02 RX ORDER — METHOCARBAMOL 750 MG/1
750 TABLET, FILM COATED ORAL 3 TIMES DAILY PRN
Qty: 90 TABLET | Refills: 0 | Status: SHIPPED | OUTPATIENT
Start: 2021-11-02 | End: 2022-01-25

## 2021-11-02 NOTE — TELEPHONE ENCOUNTER
Pending Prescriptions:                       Disp   Refills    methocarbamol (ROBAXIN) 750 MG tablet [Pha*90 tab*0        Sig: TAKE 1 TABLET (750 MG) BY MOUTH 3 TIMES DAILY AS           NEEDED FOR MUSCLE SPASMS    Routing refill request to provider for review/approval because:  Drug not on the [facility] refill protocol

## 2021-12-15 ENCOUNTER — HOSPITAL ENCOUNTER (EMERGENCY)
Facility: CLINIC | Age: 41
Discharge: HOME | End: 2021-12-15
Attending: NURSE PRACTITIONER | Admitting: NURSE PRACTITIONER
Payer: COMMERCIAL

## 2021-12-15 VITALS
OXYGEN SATURATION: 97 % | TEMPERATURE: 98.2 F | HEART RATE: 76 BPM | DIASTOLIC BLOOD PRESSURE: 45 MMHG | SYSTOLIC BLOOD PRESSURE: 113 MMHG | WEIGHT: 194 LBS | BODY MASS INDEX: 30.38 KG/M2 | RESPIRATION RATE: 18 BRPM

## 2021-12-15 DIAGNOSIS — N61.0 CELLULITIS OF RIGHT BREAST: ICD-10-CM

## 2021-12-15 DIAGNOSIS — N61.1 ABSCESS OF RIGHT BREAST: ICD-10-CM

## 2021-12-15 PROCEDURE — 10060 I&D ABSCESS SIMPLE/SINGLE: CPT | Performed by: NURSE PRACTITIONER

## 2021-12-15 PROCEDURE — 99284 EMERGENCY DEPT VISIT MOD MDM: CPT | Mod: 25 | Performed by: NURSE PRACTITIONER

## 2021-12-15 PROCEDURE — 87070 CULTURE OTHR SPECIMN AEROBIC: CPT | Performed by: NURSE PRACTITIONER

## 2021-12-15 RX ORDER — DOXYCYCLINE 100 MG/1
100 CAPSULE ORAL 2 TIMES DAILY
Qty: 28 CAPSULE | Refills: 0 | Status: SHIPPED | OUTPATIENT
Start: 2021-12-15 | End: 2021-12-15

## 2021-12-15 RX ORDER — OXYCODONE HYDROCHLORIDE 5 MG/1
5 TABLET ORAL EVERY 6 HOURS PRN
Qty: 6 TABLET | Refills: 0 | Status: SHIPPED | OUTPATIENT
Start: 2021-12-15

## 2021-12-15 RX ORDER — DOXYCYCLINE 100 MG/1
100 CAPSULE ORAL 2 TIMES DAILY
Qty: 20 CAPSULE | Refills: 0 | Status: SHIPPED | OUTPATIENT
Start: 2021-12-15 | End: 2021-12-25

## 2021-12-15 NOTE — ED TRIAGE NOTES
Pt presents with concerns of right breast infection.  Pt states that she has a fever and red ring that is increasing.  Pt states that the symptoms started yesterday.

## 2021-12-15 NOTE — DISCHARGE INSTRUCTIONS
Doxycycline 100 mg twice a day for 10 days.  Warm compresses 3 times a day.  Oxycodone 5 mg every 6 hours as needed for pain.  Do not drive or drink alcohol on this medication.  Return for increased fevers, redness, swelling, pain, or worse in any way.    Be sure to get your mammogram done soon.

## 2021-12-16 NOTE — ED PROVIDER NOTES
History     Chief Complaint   Patient presents with     Wound Check     HPI  Mattie Banda is a 41 year old female who presents with complaints of right breast abscess. Patient noted a small red bump on the inner part of her right breast several days ago. Over the last 24 hours she noted increased redness, swelling and tenderness. Chills but no objective fevers, stating that she normally runs 96 and 98 is a fever for her.  No prior breast problems. She is due for a mammogram.      Allergies:  Allergies   Allergen Reactions     Bee Venom Swelling     cellulitis     Bupropion Hcl Hives     Wellbutrin     Other [No Clinical Screening - See Comments]      Some metals, nickel  rash     Adhesive Tape Rash and Blisters       Problem List:    Patient Active Problem List    Diagnosis Date Noted     PTSD (post-traumatic stress disorder) 06/18/2020     Priority: Medium     Spondylarthritis 05/01/2020     Priority: Medium     Current severe episode of major depressive disorder without psychotic features without prior episode (H) 01/21/2020     Priority: Medium     CRISTIANO (generalized anxiety disorder) 01/21/2020     Priority: Medium     Vasculitis (H) 01/10/2020     Priority: Medium     Carotidynia 12/11/2019     Priority: Medium     Abnormal uterine bleeding 10/22/2019     Priority: Medium     Added automatically from request for surgery [id number]       Chronic pain syndrome 07/09/2018     Priority: Medium     Patient is very low risk to abuse  Pain medication.  Patient is followed by Yuan Schofield MD for ongoing prescription of pain medication.  All refills should only be approved by this provider, or covering partner.    Medication(s): ms contin and oxydodone ir.   Maximum quantity per month: #60 ms contin, #180 oxycodone  Clinic visit frequency required: Q 3 months      Controlled substance agreement:  Encounter-Level CSA - 08/20/2018:    Controlled Substance Agreement - Scan on 6/5/19 CONTROLLED SUBSTANCE AGREEMENT  (below)       Patient-Level CSA:    Controlled Substance Agreement - Opioid - Scan on 6/13/2019  6:05 PM (below)    Controlled Substance Agreement - Opioid - Scan on 6/13/2019  6:03 PM: 06/05/2019 (below)         Pain Clinic evaluation in the past: Yes       Date/Location:   working with counselor to deal with many past traumas, 10/23/2020.    DIRE Total Score(s):  DIRE SCORE 10/23/2020   DIRE Total Score 19       Last [facility] website verification:  10/23/2020 450   [email or web address]             DDD (degenerative disc disease), cervical 02/07/2018     Priority: Medium     Lymphocytic colitis 06/13/2017     Priority: Medium     Idiopathic peripheral neuropathy 01/10/2017     Priority: Medium     Hypothyroidism, unspecified type 01/10/2017     Priority: Medium     Migraine with aura and without status migrainosus, not intractable 12/27/2016     Priority: Medium     Tobacco use disorder 09/12/2016     Priority: Medium     Labial lesion 02/08/2016     Priority: Medium     Superior folds of the vulva/labia right side       S/P lumbar fusion and discectomy June 2015 06/28/2015     Priority: Medium     Degeneration of lumbar intervertebral disc 04/05/2014     Priority: Medium     newly added to list on 4/4//14  but present for last months          Past Medical History:    Past Medical History:   Diagnosis Date     Anxiety      Arthritis 2019     Breast disorder Not sure     COPD (chronic obstructive pulmonary disease) (H)      Depressive disorder      Mixed anxiety depressive disorder 2/8/2016     Papanicolaou smear of cervix with low grade squamous intraepithelial lesion (LGSIL)      Thyroid disease      Urinary tract infection, site not specified      Wounds and injuries 2000       Past Surgical History:    Past Surgical History:   Procedure Laterality Date     ABDOMEN SURGERY  2015    For back surgery     BACK SURGERY  6/2015      BIOPSY  Don t remember     COLONOSCOPY  08/06/07     COLONOSCOPY   08/05/08     CYSTOSCOPY N/A 2/7/2020    Procedure: Cystoscopy;  Surgeon: Anabel Barber MD;  [facility]: [facility]     DAVINCI HYSTERECTOMY TOTAL, SALPINGECTOMY BILATERAL Bilateral 2/7/2020    Procedure: Davinci Total Laparoscopic Hysterectomy, Bilateral Salpingectomy, Exam Under Anesthesia;  Surgeon: Anabel Barber MD;  Location: [facility] OR     HC TOOTH EXTRACTION W/FORCEP      wisdom teeth removed     HYSTERECTOMY, PAP NO LONGER INDICATED       INJECT BLOCK MEDIAL BRANCH CERVICAL/THORACIC/LUMBAR N/A 6/21/2019    Procedure: BLOCK, NERVE, SPINAL, MEDIAL BRANCH lumbar 2, 3, 4;  Surgeon: Edgardo Rubio MD;  Location[facility] OR     INJECT EPIDURAL CERVICAL N/A 3/8/2018    Procedure: INJECT EPIDURAL CERVICAL;  cervical 6-7 interlaminar epidural steroid injection;  Surgeon: Edgardo Rubio MD;  [facility]     INJECT JOINT SACROILIAC Bilateral 6/12/2020    Procedure: INJECT JOINT SACROILIAC bilateral;  Surgeon: Edgardo Rubio MD;  [facility]     LAPAROSCOPIC TUBAL LIGATION  11/27/2012    Procedure: LAPAROSCOPIC TUBAL LIGATION;  Laparoscopic Bilateral tubal sterilization/ fulgaration;  Surgeon: Sarbjit Whittaker MD;  Location: [facility]     ORTHOPEDIC SURGERY  6/2015     ZZHC COLONOSCOPY W BIOPSY  02/06/08       Family History:    Family History   Problem Relation Age of Onset     Hypertension Maternal Grandmother      Arthritis Maternal Grandmother      Cancer Maternal Grandmother         MGGM     Depression Maternal Grandmother      Lipids Maternal Grandmother      Osteoporosis Maternal Grandmother      Respiratory Maternal Grandmother         emphysema     Genitourinary Problems Maternal Grandmother         Kidney Failure, liver      Coronary Artery Disease Maternal Grandmother      Hyperlipidemia Maternal Grandmother      Anxiety Disorder Maternal Grandmother      Asthma Maternal Grandmother      Anesthesia Reaction Maternal Grandmother      Hypertension Maternal Grandfather      Heart Disease Maternal  Grandfather         MI     Cardiovascular Maternal Grandfather      Cancer - colorectal Maternal Grandfather      Cancer Maternal Grandfather         [place] Lymphoma     Other Cancer Maternal Grandfather      Coronary Artery Disease Maternal Grandfather      Hypertension Paternal Grandmother      Arthritis Paternal Grandmother         RA     Osteoporosis Paternal Grandmother      Obesity Paternal Grandmother      Substance Abuse Mother         Alcoholic     Depression Mother      Hypertension Father      Hyperlipidemia Father      Mental Illness Father      Substance Abuse Father         Bio and Step dad have alcohol problems     Osteoporosis Father      Coronary Artery Disease Paternal Grandfather      Hypertension Paternal Grandfather      Breast Cancer Other         Maternal great aunt     Coronary Artery Disease Other      Diabetes Other      Thyroid Disease Maternal Aunt         two aunts     Breast Cancer Other      Colon Cancer Other      Prostate Cancer Other      Other Cancer Other      Other Cancer Other      Thyroid Disease Other      Anxiety Disorder Other      Substance Abuse Other      Depression Other      Mental Illness Other      Anxiety Disorder Maternal Half-Sister      Thyroid Disease Maternal Half-Sister      Thyroid Disease Other      Asthma Other      Thyroid Disease Other      Obesity Other      Coronary Artery Disease Other      Hypertension Other        Social History:  Marital Status:   [4]  Social History     Tobacco Use     Smoking status: Current Every Day Smoker     Packs/day: 1.00     Years: 20.00     Pack years: 20.00     Types: Cigarettes     Smokeless tobacco: Never Used   Substance Use Topics     Alcohol use: Yes     Alcohol/week: 0.0 standard drinks     Comment: Rarely     Drug use: No        Medications:    doxycycline hyclate (VIBRAMYCIN) 100 MG capsule  oxyCODONE (ROXICODONE) 5 MG tablet  albuterol (PROAIR HFA/PROVENTIL HFA/VENTOLIN HFA) 108 (90 Base) MCG/ACT  inhaler  ALPRAZolam (XANAX) 1 MG tablet  ALPRAZolam (XANAX) 2 MG tablet  ARIPiprazole (ABILIFY) 2 MG tablet  busPIRone HCl (BUSPAR) 30 MG tablet  escitalopram (LEXAPRO) 20 MG tablet  folic acid (FOLVITE) 400 MCG tablet  furosemide (LASIX) 20 MG tablet  ibuprofen (ADVIL/MOTRIN) 200 MG tablet  levothyroxine (SYNTHROID/LEVOTHROID) 112 MCG tablet  levothyroxine (SYNTHROID/LEVOTHROID) 137 MCG tablet  levothyroxine (SYNTHROID/LEVOTHROID) 25 MCG tablet  levothyroxine (SYNTHROID/LEVOTHROID) 25 MCG tablet  loratadine (CLARITIN) 10 MG tablet  methocarbamol (ROBAXIN) 500 MG tablet  methocarbamol (ROBAXIN) 750 MG tablet  methotrexate sodium 2.5 MG TABS  naloxone (NARCAN) 4 MG/0.1ML nasal spray  ondansetron (ZOFRAN) 4 MG tablet  oxyCODONE IR (ROXICODONE) 10 MG tablet  sulfaSALAzine (AZULFIDINE) 500 MG tablet  topiramate (TOPAMAX) 25 MG tablet  varenicline (CHANTIX STARTING MONTH STEVE) 0.5 MG X 11 & 1 MG X 42 tablet          Review of Systems  As mentioned above in the history present illness. All other systems were reviewed and are negative.    Physical Exam   BP: 113/45  Pulse: 76  Temp: 98.2  F (36.8  C)  Resp: 18  Weight: 88 kg (194 lb)  SpO2: 97 %      Physical Exam  Constitutional:       General: She is not in acute distress.     Appearance: Normal appearance. She is well-developed. She is not ill-appearing.   HENT:      Head: Normocephalic and atraumatic.      Right Ear: External ear normal.      Left Ear: External ear normal.      Nose: Nose normal.      Mouth/Throat:      Mouth: Mucous membranes are moist.   Eyes:      Conjunctiva/sclera: Conjunctivae normal.   Cardiovascular:      Rate and Rhythm: Normal rate and regular rhythm.      Heart sounds: Normal heart sounds. No murmur heard.      Pulmonary:      Effort: Pulmonary effort is normal. No respiratory distress.      Breath sounds: Normal breath sounds.   Chest:   Breasts:      Right: Skin change (2 cm area of induration/fluctuance to the medial right breast and  surrounding erythema) and tenderness (medial right breast where there is an abscess) present.         Musculoskeletal:         General: Normal range of motion.   Skin:     General: Skin is warm and dry.      Findings: No rash.   Neurological:      General: No focal deficit present.      Mental Status: She is alert and oriented to person, place, and time.         ED Course          Dr. Lacho Duke assisted with bedside ultrasound of patient's right breast.  Able to note a abscess that appears to be superficial and not deep into the breast tissue.        [facility]    PROCEDURE: -Incision/Drainage    Date/Time: 12/16/2021 10:12 AM  Performed by: Joann Connell APRN CNP  Authorized by: Joann Connell APRN CNP     Risks, benefits and alternatives discussed.      LOCATION:      Type:  Abscess    Size:  2cm    Location:  Trunk    Trunk location:  R breast    PRE-PROCEDURE DETAILS:     Skin preparation:  Betadine    PROCEDURE TYPE:     Complexity:  Simple    ANESTHESIA (see MAR for exact dosages):     Anesthesia method:  Local infiltration    Local anesthetic:  Lidocaine 1% w/o epi    PROCEDURE DETAILS:     Needle aspiration: no      Incision types:  Stab incision    Incision depth:  Dermal    Scalpel blade:  11    Wound management:  Probed and deloculated    Drainage:  Purulent    Drainage amount:  Moderate    Wound treatment:  Wound left open    Packing materials:  None    PROCEDURE    Patient Tolerance:  Patient tolerated the procedure well with no immediate complications               No results found. However, due to the size of the patient record, not all encounters were searched. Please check Results Review for a complete set of results.    Medications - No data to display    Assessments & Plan (with Medical Decision Making)     Plan:  Doxycycline 100 mg twice a day for 10 days.  Warm compresses 3 times a day.  Oxycodone 5 mg every 6 hours as needed for pain.  Do not  drive or drink alcohol on this medication.  Return for increased fevers, redness, swelling, pain, or worse in any way.    Be sure to get your mammogram done soon.      Discharge Medication List as of 12/15/2021  5:18 PM      START taking these medications    Details   !! oxyCODONE (ROXICODONE) 5 MG tablet Take 1 tablet (5 mg) by mouth every 6 hours as needed for pain, Disp-6 tablet, R-0, E-Prescribe       !! - Potential duplicate medications found. Please discuss with provider.          Final diagnoses:   Abscess of right breast   Cellulitis of right breast       12/15/2021   [facility] EMERGENCY DEPT     Ko, SAUD Olmos CNP  12/16/21 1014

## 2021-12-19 LAB — BACTERIA ABSC ANAEROBE+AEROBE CULT: ABNORMAL

## 2021-12-19 NOTE — RESULT ENCOUNTER NOTE
Final abscess Aerobic Bacterial Culture (specimen - right breast) report on 12/19/21  [facility] Emergency Dept discharge antibiotic prescribed: Doxycycline 100 mg PO tablet, 1 tablet (100 mg) by mouth 2 times daily for 10 days  #1. Bacteria, Isolated in broth only Finegoldia magna.  Susceptibilities not routinely done  Incision and Drainage performed in the [place] ED: Yes  Recommendations in treatment per [facility] ED lab result culture protocol

## 2022-01-22 DIAGNOSIS — M51.369 DEGENERATION OF LUMBAR INTERVERTEBRAL DISC: ICD-10-CM

## 2022-01-25 RX ORDER — METHOCARBAMOL 750 MG/1
750 TABLET, FILM COATED ORAL 3 TIMES DAILY PRN
Qty: 90 TABLET | Refills: 0 | Status: SHIPPED | OUTPATIENT
Start: 2022-01-25

## 2022-02-12 ENCOUNTER — HEALTH MAINTENANCE LETTER (OUTPATIENT)
Age: 42
End: 2022-02-12

## 2022-02-17 PROBLEM — G89.4 CHRONIC PAIN SYNDROME: Status: ACTIVE | Noted: 2018-07-09

## 2022-05-03 DIAGNOSIS — R05.9 COUGH: ICD-10-CM

## 2022-05-03 DIAGNOSIS — J98.01 ACUTE BRONCHOSPASM: ICD-10-CM

## 2022-05-05 RX ORDER — ALBUTEROL SULFATE 90 UG/1
AEROSOL, METERED RESPIRATORY (INHALATION)
Refills: 3 | OUTPATIENT
Start: 2022-05-05

## 2022-05-05 NOTE — TELEPHONE ENCOUNTER
Pending Prescriptions:                       Disp   Refills    PROAIR  (90 Base) MCG/ACT inhaler [*       3        Sig: INHALE 2 PUFFS INTO THE LUNGS EVERY 6 HOURS AS NEEDED           FOR SHORTNESSOF BREATH / DYSPNEA OR WHEEZING    Routing refill request to provider for review/approval because:  Patient is not regularly seen at this clinic.    Lakesha Bain, EFRAINN, RN

## 2022-10-09 ENCOUNTER — HEALTH MAINTENANCE LETTER (OUTPATIENT)
Age: 42
End: 2022-10-09

## 2023-02-18 ENCOUNTER — HEALTH MAINTENANCE LETTER (OUTPATIENT)
Age: 43
End: 2023-02-18

## 2023-03-20 NOTE — TELEPHONE ENCOUNTER
Methotrexate      Last Written Prescription Date:  10/19/2020  Last Fill Quantity: 32,   # refills: 1  Last Office Visit: 10/19/2020  Future Office visit:       Routing refill request to provider for review/approval because:  Drug not on the [facility], [facility] or [facility] refill protocol or controlled substance    Robaxin      Last Written Prescription Date:  11/9/2020  Last Fill Quantity: 90,   # refills: 0  Last Office Visit: 10/19/2020  Future Office visit:       Routing refill request to provider for review/approval because:  Drug not on the [facility], [facility] or [facility] refill protocol or controlled substance    Xanax      Last Written Prescription Date:  11/9/2020  Last Fill Quantity: 90,   # refills: 0  Last Office Visit: 10/19/2020  Future Office visit:       Routing refill request to provider for review/approval because:  Drug not on the [facility], [facility] or [facility] refill protocol or controlled substance    Oxycodone      Last Written Prescription Date:  11/9/2020  Last Fill Quantity: 240,   # refills: 0  Last Office Visit: 10/19/2020  Future Office visit:       Routing refill request to provider for review/approval because:  Drug not on the [facility], [facility] or [facility] refill protocol or controlled substance    Morphine      Last Written Prescription Date:  11/9/2020  Last Fill Quantity: 60,   # refills: 0  Last Office Visit: 10/19/2020  Future Office visit:       Routing refill request to provider for review/approval because:  Drug not on the [facility], [facility] or [facility] refill protocol or controlled substance     Patient's last appointment was : 2/7/23  Patient's next appointment is :  5/8/23  Last refilled: 11/10/22  Pharmacy Verified    Lab Results   Component Value Date    LABA1C 5.1 12/19/2022     Lab Results   Component Value Date    CHOL 147 11/09/2022    TRIG 67 11/09/2022    HDL 86 11/16/2022    LDLCALC 44 11/16/2022     Lab Results   Component Value Date     12/19/2022    K 4.5 12/19/2022     12/19/2022    CO2 24 12/19/2022    BUN 19 12/19/2022    CREATININE 0.7 12/19/2022    GLUCOSE 99 12/19/2022    CALCIUM 10.4 12/19/2022    PROT 6.4 12/19/2022    LABALBU 4.6 12/19/2022    BILITOT 0.3 12/19/2022    ALKPHOS 149 (H) 12/19/2022    AST 51 (H) 12/19/2022    ALT 74 (H) 12/19/2022    LABGLOM >60 12/19/2022    GFRAA >60 01/18/2022     Lab Results   Component Value Date    TSH 2.470 11/16/2022    T4FREE 1.11 03/01/2022     Lab Results   Component Value Date    WBC 6.6 02/08/2023    HGB 12.9 02/08/2023    HCT 39.0 02/08/2023    .4 (H) 02/08/2023     02/08/2023

## 2023-09-08 NOTE — TELEPHONE ENCOUNTER
Make sure she is contacted and preop questions are asked appropriately.  She was on Dr. Duke's schedule for Friday for preop and this may actually be the best choice.  However for her planned procedure, I am comfortable updating in stating that she is okay for planned anesthesia if it does not work that she sees Dr. Duke.  Yuan Schofield MD   PACU

## 2024-02-05 NOTE — PROGRESS NOTES
April called back and was advised.  She will come in to the office shortly and have that done today.   [facility]: Integrated Behavioral Health  January 7, 2020      Behavioral Health Clinician Progress Note    Patient Name: Mattie Banda           Service Type:  Individual      Service Location:   Face to Face in Clinic     Session Start Time: 1:45  Session End Time: 2:43      Session Length: 53 - 60      Attendees: Patient    Visit Activities (Refresh list every visit): NEW and [facility] Only    Diagnostic Assessment Date: unable to complete due to patient distress  Treatment Plan Review Date: due 3rd visit  See Flowsheets for today's PHQ-9 and CRISTIANO-7 results  Previous PHQ-9:   PHQ-9 SCORE 10/25/2019 12/11/2019 1/7/2020   PHQ-9 Total Score - - -   PHQ-9 Total Score 20 24 24     Previous CRISTIANO-7:   CRISTIANO-7 SCORE 10/1/2019 10/25/2019 1/7/2020   Total Score - - -   Total Score 19 19 20       EMILIA LEVEL:  EMILIA Score (Last Two) 11/14/2012   EMILIA Raw Score 44   Activation Score 70.8   EMILIA Level 4       DATA  Extended Session (60+ minutes): PROLONGED SERVICE IN THE OUTPATIENT SETTING REQUIRING DIRECT (FACE-TO-FACE) PATIENT CONTACT BEYOND THE USUAL SERVICE:    - Longer session due to limited access to mental health appointments and necessity to address patient's distress / complexity    - High distress and under complex circumstances.  See Data section for details  Interactive Complexity: No  Crisis: No  [facility] Patient: No    Treatment Objective(s) Addressed in This Session:  Target Behavior(s): disease management/lifestyle changes depression and anxiety    Depressed Mood: Increase interest, engagement, and pleasure in doing things  Decrease frequency and intensity of feeling down, depressed, hopeless  Improve quantity and quality of night time sleep / decrease daytime naps  Feel less tired and more energy during the day   Improve diet, appetite, mindful eating, and / or meal planning  Identify negative self-talk and behaviors: challenge core beliefs, myths, and actions  Improve concentration, focus, and  "mindfulness in daily activities   Decrease thoughts that you'd be better off dead or of suicide / self-harm  Anxiety: will experience a reduction in anxiety, will develop more effective coping skills to manage anxiety symptoms, will develop healthy cognitive patterns and beliefs and will increase ability to function adaptively    Current Stressors / Issues:  Patient has not been working due to health issues. She has experienced more financial stress due to not being able to work.   Patient reports a history of depression, anxiety and panic attacks for many years.     Patient has met with counselors from childhood and older and hadn't always found it helpful. She more recently met with a therapist that was older and had a therapeutic approach that didn't really fit for her.     Her maternal grandmother \"Cley\"  8 months ago. This was a big loss and patient considered her grandmother to be her \"everything\" and she talked to her everyday. She believes that she was the only one that \"loved her unconditionally\".     Patient is , however she and her ex- are still in a relationship. She has started to open up to him more about her mental health. She has started opening up to more of her medical care team as well as family about her mental health symptoms. She is open to counseling and is also feeling more nervous about it at the same time. Validated patient's feelings and reinforced her willingness to come in and prioritize her mental health.    Progress on Treatment Objective(s) / Homework:  In development-first visit    Motivational Interviewing    MI Intervention: Expressed Empathy/Understanding, Supported Autonomy, Collaboration, Evocation, Permission to raise concern or advise, Open-ended questions, Reflections: simple and complex, Change talk (evoked) and Reframe     Change Talk Expressed by the Patient: Desire to change Ability to change Reasons to change Need to change Committment to change " "Activation Taking steps    Provider Response to Change Talk: E - Evoked more info from patient about behavior change, A - Affirmed patient's thoughts, decisions, or attempts at behavior change, R - Reflected patient's change talk and S - Summarized patient's change talk statements    Also provided psychoeducation about behavioral health condition, symptoms, and treatment options    Care Plan review completed: Yes    Medication Review:  No changes to current psychiatric medication(s)    Medication Compliance:  Yes    Changes in Health Issues:   Yes: multiple medical concerns, Associated Psychological Distress    Chemical Use Review:   Substance Use: Chemical use reviewed, no active concerns identified  Patient reports that her parents were alcoholics and patient drinks rarely, and she denies use of pot or illicit drugs     Tobacco Use: No change in amount of tobacco use since last session.  Patient declined discussion at this time    Assessment: Current Emotional / Mental Status (status of significant symptoms):  Risk status (Self / Other harm or suicidal ideation)  Patient has had a history of suicidal ideation: in adolescense and suicide attempts: patient states that she tried multiple times around ages [age] by slitting wrists or overdose, and one overdose of alcohol; she denies ever being hospitalized for mental health and denies a history of self-injurious behavior, homicidal ideation, homicidal behavior and and other safety concerns  Patient denies current fears or concerns for personal safety.  Patient reports the following current or recent suicidal ideation or behaviors: patient reports having thoughts of \"I'm such a burden\" \"I'm not a good wife\" \"my kids would be better off without me\". She states that she is able to see that her kids would not be better off without her and she doesn't want anyone else to take care of her kids. Patient denies any suicidal plan or intent.   Patient denies current or recent " homicidal ideation or behaviors.  Patient denies current or recent self injurious behavior or ideation.  Patient denies other safety concerns.  A safety and risk management plan has been developed including: Patient consented to co-developed safety plan.  A safety and risk management plan was completed.  Patient agreed to use safety plan should any safety concerns arise.  A copy was given to the patient.      Appearance:   Appropriate   Eye Contact:   Fair   Psychomotor Behavior: Normal   Attitude:   Cooperative   Orientation:   All  Speech   Rate / Production: Monotone    Volume:  Normal   Mood:    Anxious  Depressed   Affect:    Flat   Thought Content:  Clear  Perservative  Rumination   Thought Form:  Coherent  Logical   Insight:    Good     Diagnoses:  1. Depression, unspecified        Collateral Reports Completed:  Communicated with: PCP, PHQ9 and GAD7    Plan: (Homework, other):  Patient was given information about behavioral services and encouraged to schedule a follow up appointment with the clinic [facility] in 1 week.  She was also given information about mental health symptoms and treatment options  and Cognitive Behavioral Therapy skills to practice when experiencing anxiety and depression.  CD Recommendations: No indications of CD issues. Patient will follow safety plan for suicidal thoughts and worsening depression symptoms. KVNG Watson, [facility]

## 2024-03-16 ENCOUNTER — HEALTH MAINTENANCE LETTER (OUTPATIENT)
Age: 44
End: 2024-03-16

## 2024-03-20 ENCOUNTER — LAB REQUISITION (OUTPATIENT)
Dept: LAB | Facility: CLINIC | Age: 44
End: 2024-03-20

## 2024-03-20 PROCEDURE — 88304 TISSUE EXAM BY PATHOLOGIST: CPT | Performed by: PATHOLOGY

## 2024-03-25 LAB
PATH REPORT.COMMENTS IMP SPEC: NORMAL
PATH REPORT.COMMENTS IMP SPEC: NORMAL
PATH REPORT.FINAL DX SPEC: NORMAL
PATH REPORT.GROSS SPEC: NORMAL
PATH REPORT.MICROSCOPIC SPEC OTHER STN: NORMAL
PATH REPORT.RELEVANT HX SPEC: NORMAL
PHOTO IMAGE: NORMAL

## 2024-04-10 ENCOUNTER — HOSPITAL ENCOUNTER (EMERGENCY)
Facility: CLINIC | Age: 44
Discharge: HOME | End: 2024-04-10
Attending: STUDENT IN AN ORGANIZED HEALTH CARE EDUCATION/TRAINING PROGRAM | Admitting: STUDENT IN AN ORGANIZED HEALTH CARE EDUCATION/TRAINING PROGRAM
Payer: COMMERCIAL

## 2024-04-10 VITALS
SYSTOLIC BLOOD PRESSURE: 125 MMHG | OXYGEN SATURATION: 98 % | HEART RATE: 59 BPM | DIASTOLIC BLOOD PRESSURE: 78 MMHG | RESPIRATION RATE: 18 BRPM | WEIGHT: 198 LBS | TEMPERATURE: 97 F | BODY MASS INDEX: 31.01 KG/M2

## 2024-04-10 DIAGNOSIS — R51.9 INTRACTABLE HEADACHE, UNSPECIFIED CHRONICITY PATTERN, UNSPECIFIED HEADACHE TYPE: ICD-10-CM

## 2024-04-10 PROCEDURE — 250N000011 HC RX IP 250 OP 636: Performed by: STUDENT IN AN ORGANIZED HEALTH CARE EDUCATION/TRAINING PROGRAM

## 2024-04-10 PROCEDURE — 96375 TX/PRO/DX INJ NEW DRUG ADDON: CPT | Performed by: STUDENT IN AN ORGANIZED HEALTH CARE EDUCATION/TRAINING PROGRAM

## 2024-04-10 PROCEDURE — 258N000003 HC RX IP 258 OP 636: Performed by: STUDENT IN AN ORGANIZED HEALTH CARE EDUCATION/TRAINING PROGRAM

## 2024-04-10 PROCEDURE — 99284 EMERGENCY DEPT VISIT MOD MDM: CPT | Mod: 25 | Performed by: STUDENT IN AN ORGANIZED HEALTH CARE EDUCATION/TRAINING PROGRAM

## 2024-04-10 PROCEDURE — 99284 EMERGENCY DEPT VISIT MOD MDM: CPT | Performed by: STUDENT IN AN ORGANIZED HEALTH CARE EDUCATION/TRAINING PROGRAM

## 2024-04-10 PROCEDURE — 96361 HYDRATE IV INFUSION ADD-ON: CPT | Performed by: STUDENT IN AN ORGANIZED HEALTH CARE EDUCATION/TRAINING PROGRAM

## 2024-04-10 PROCEDURE — 96365 THER/PROPH/DIAG IV INF INIT: CPT | Performed by: STUDENT IN AN ORGANIZED HEALTH CARE EDUCATION/TRAINING PROGRAM

## 2024-04-10 RX ORDER — BUPRENORPHINE HYDROCHLORIDE, NALOXONE HYDROCHLORIDE 4; 1 MG/1; MG/1
1 FILM, SOLUBLE BUCCAL; SUBLINGUAL ONCE
COMMUNITY

## 2024-04-10 RX ORDER — MAGNESIUM SULFATE 1 G/100ML
1 INJECTION INTRAVENOUS ONCE
Status: COMPLETED | OUTPATIENT
Start: 2024-04-10 | End: 2024-04-10

## 2024-04-10 RX ORDER — SUMATRIPTAN 50 MG/1
50 TABLET, FILM COATED ORAL
COMMUNITY

## 2024-04-10 RX ORDER — DIPHENHYDRAMINE HYDROCHLORIDE 50 MG/ML
25 INJECTION INTRAMUSCULAR; INTRAVENOUS ONCE
Status: COMPLETED | OUTPATIENT
Start: 2024-04-10 | End: 2024-04-10

## 2024-04-10 RX ORDER — BUPRENORPHINE HYDROCHLORIDE, NALOXONE HYDROCHLORIDE 8; 2 MG/1; MG/1
1 FILM, SOLUBLE BUCCAL; SUBLINGUAL 2 TIMES DAILY
COMMUNITY
Start: 2022-10-07

## 2024-04-10 RX ORDER — PROCHLORPERAZINE MALEATE 10 MG
1 TABLET ORAL EVERY 8 HOURS PRN
COMMUNITY

## 2024-04-10 RX ORDER — KETOROLAC TROMETHAMINE 15 MG/ML
15 INJECTION, SOLUTION INTRAMUSCULAR; INTRAVENOUS ONCE
Status: COMPLETED | OUTPATIENT
Start: 2024-04-10 | End: 2024-04-10

## 2024-04-10 RX ORDER — CLOBETASOL PROPIONATE 0.5 MG/G
OINTMENT TOPICAL 2 TIMES DAILY
COMMUNITY

## 2024-04-10 RX ORDER — FLUTICASONE PROPIONATE 50 MCG
1 SPRAY, SUSPENSION (ML) NASAL 2 TIMES DAILY
COMMUNITY

## 2024-04-10 RX ORDER — HYDROMORPHONE HYDROCHLORIDE 4 MG/1
4 TABLET ORAL 3 TIMES DAILY
COMMUNITY

## 2024-04-10 RX ORDER — NALOXONE HYDROCHLORIDE 4 MG/.1ML
4 SPRAY NASAL
COMMUNITY

## 2024-04-10 RX ORDER — FLUCONAZOLE 150 MG/1
150 TABLET ORAL ONCE
COMMUNITY

## 2024-04-10 RX ORDER — IMIQUIMOD 12.5 MG/.25G
CREAM TOPICAL 2 TIMES DAILY
COMMUNITY

## 2024-04-10 RX ADMIN — KETOROLAC TROMETHAMINE 15 MG: 15 INJECTION, SOLUTION INTRAMUSCULAR; INTRAVENOUS at 20:37

## 2024-04-10 RX ADMIN — MAGNESIUM SULFATE HEPTAHYDRATE 1 G: 1 INJECTION, SOLUTION INTRAVENOUS at 20:39

## 2024-04-10 RX ADMIN — DIPHENHYDRAMINE HYDROCHLORIDE 25 MG: 50 INJECTION, SOLUTION INTRAMUSCULAR; INTRAVENOUS at 20:36

## 2024-04-10 RX ADMIN — PROCHLORPERAZINE EDISYLATE 10 MG: 5 INJECTION INTRAMUSCULAR; INTRAVENOUS at 20:40

## 2024-04-10 RX ADMIN — SODIUM CHLORIDE 1000 ML: 9 INJECTION, SOLUTION INTRAVENOUS at 20:39

## 2024-04-10 ASSESSMENT — COLUMBIA-SUICIDE SEVERITY RATING SCALE - C-SSRS
2. HAVE YOU ACTUALLY HAD ANY THOUGHTS OF KILLING YOURSELF IN THE PAST MONTH?: NO
1. IN THE PAST MONTH, HAVE YOU WISHED YOU WERE DEAD OR WISHED YOU COULD GO TO SLEEP AND NOT WAKE UP?: NO
6. HAVE YOU EVER DONE ANYTHING, STARTED TO DO ANYTHING, OR PREPARED TO DO ANYTHING TO END YOUR LIFE?: NO

## 2024-04-10 ASSESSMENT — ENCOUNTER SYMPTOMS
MYALGIAS: 0
NAUSEA: 0
VOMITING: 0
CHILLS: 0
RHINORRHEA: 0
APPETITE CHANGE: 0
COUGH: 0
SORE THROAT: 0
DYSURIA: 0
HEMATURIA: 0
HEADACHES: 1
DIZZINESS: 0
FEVER: 0
FATIGUE: 0
ABDOMINAL PAIN: 0
EYE REDNESS: 0
ACTIVITY CHANGE: 0
SHORTNESS OF BREATH: 0
ARTHRALGIAS: 0
NECK STIFFNESS: 0
DIARRHEA: 0

## 2024-04-10 ASSESSMENT — ACTIVITIES OF DAILY LIVING (ADL)
ADLS_ACUITY_SCORE: 37
ADLS_ACUITY_SCORE: 35
ADLS_ACUITY_SCORE: 37

## 2024-04-11 NOTE — DISCHARGE INSTRUCTIONS
Please follow-up with primary care doctor and consideration of neurology outpatient follow-up.  Please return if symptoms worsen or recur.

## 2024-04-11 NOTE — ED PROVIDER NOTES
History     Chief Complaint   Patient presents with    Headache     HPI  Mattie Banda is a 43 year old female presenting with sudden onset headache to the posterior aspect of her head rating to the front.  She is no vision change but does have sensitive to bright light.  She also notes mild nausea.  She notes this occurred daily for the past few weeks.  She had a surgical procedure on her abdomen and shortly afterward been having uncontrolled headaches.  She notes that she took her Imitrex but due to the sudden onset of the symptoms often is unable to control her headaches.  She saw her primary care physician in regards to this who did do neck injections on Monday with some improvement however it lasted for about 48 hours and her headaches again returned today.  She was seen in the ER on the 27th of last month with similar symptoms and had a CT of her head as well as her abdomen pelvis did not find any acute intracranial pathology or intra-abdominal pathology requiring further medical or surgical evaluation.  She was provided with migraine cocktail which significant proved her headaches and patient was discharged home.  Been provided with muscle relaxers as well as symptoms management at home.  There was recommendations to start high flow oxygen however patient had not had this done yet as there was concerns of possible cluster headaches versus atypical migraines she has no numbness or tingling of her extremities today any slurred speech or any vision changes concerning for any atypical findings today and that symptoms that she had previously were secondary to the after being provided with Zofran and 200 mcg of fentanyl.  She had an MRI completed on Friday of 3/29/2024 which was unremarkable for any acute findings.    Allergies:  Allergies   Allergen Reactions    Bee Venom Swelling     cellulitis    Bupropion Hcl Hives     Wellbutrin    Other [No Clinical Screening - See Comments]      Some metals, nickel  rash     Adhesive Tape Rash and Blisters       Problem List:    Patient Active Problem List    Diagnosis Date Noted    PTSD (post-traumatic stress disorder) 06/18/2020     Priority: Medium    Spondylarthritis 05/01/2020     Priority: Medium    Current severe episode of major depressive disorder without psychotic features without prior episode (H) 01/21/2020     Priority: Medium    CRISTIANO (generalized anxiety disorder) 01/21/2020     Priority: Medium    Vasculitis (H24) 01/10/2020     Priority: Medium    Carotidynia 12/11/2019     Priority: Medium    Abnormal uterine bleeding 10/22/2019     Priority: Medium     Added automatically from request for surgery [id number]      Chronic pain syndrome 07/09/2018     Priority: Medium     Patient is very low risk to abuse  Pain medication.  Patient is followed by Yuan Schofield MD for ongoing prescription of pain medication.  All refills should only be approved by this provider, or covering partner.    Medication(s): ms contin and oxydodone ir.   Maximum quantity per month: #60 ms contin, #180 oxycodone  Clinic visit frequency required: Q 3 months      Controlled substance agreement:  Encounter-Level CSA - 08/20/2018:    Controlled Substance Agreement - Scan on 6/5/19 CONTROLLED SUBSTANCE AGREEMENT (below)         Patient-Level CSA:    Controlled Substance Agreement - Opioid - Scan on 6/13/2019  6:05 PM (below)    Controlled Substance Agreement - Opioid - Scan on 6/13/2019  6:03 PM: 06/05/2019 (below)         Pain Clinic evaluation in the past: Yes       Date/Location:   working with counselor to deal with many past traumas, 10/23/2020.    DIRE Total Score(s):  DIRE SCORE 10/23/2020   DIRE Total Score 19       Last [facility] website verification:  10/23/2020 450   [email or web address]            DDD (degenerative disc disease), cervical 02/07/2018     Priority: Medium    Lymphocytic colitis 06/13/2017     Priority: Medium    Idiopathic peripheral neuropathy 01/10/2017      Priority: Medium    Hypothyroidism, unspecified type 01/10/2017     Priority: Medium    Migraine with aura and without status migrainosus, not intractable 12/27/2016     Priority: Medium    Tobacco use disorder 09/12/2016     Priority: Medium    Labial lesion 02/08/2016     Priority: Medium     Superior folds of the vulva/labia right side      S/P lumbar fusion and discectomy June 2015 06/28/2015     Priority: Medium    Degeneration of lumbar intervertebral disc 04/05/2014     Priority: Medium     newly added to list on 4/4//14  but present for last months          Past Medical History:    Past Medical History:   Diagnosis Date    Anxiety     Arthritis 2019    Breast disorder Not sure    COPD (chronic obstructive pulmonary disease) (H)     Depressive disorder     Mixed anxiety depressive disorder 2/8/2016    Papanicolaou smear of cervix with low grade squamous intraepithelial lesion (LGSIL)     Thyroid disease     Urinary tract infection, site not specified     Wounds and injuries 2000       Past Surgical History:    Past Surgical History:   Procedure Laterality Date    ABDOMEN SURGERY  2015    For back surgery    BACK SURGERY  6/2015     BIOPSY  Don t remember    COLONOSCOPY  08/06/07    COLONOSCOPY  08/05/08    CYSTOSCOPY N/A 2/7/2020    Procedure: Cystoscopy;  Surgeon: Anabel Barber MD;  Location: [facility]    DAVINCI HYSTERECTOMY TOTAL, SALPINGECTOMY BILATERAL Bilateral 2/7/2020    Procedure: Davinci Total Laparoscopic Hysterectomy, Bilateral Salpingectomy, Exam Under Anesthesia;  Surgeon: Anabel Barber MD;  Location: [facility] OR    HC TOOTH EXTRACTION W/FORCEP      wisdom teeth removed    HYSTERECTOMY, PAP NO LONGER INDICATED      INJECT BLOCK MEDIAL BRANCH CERVICAL/THORACIC/LUMBAR N/A 6/21/2019    Procedure: BLOCK, NERVE, SPINAL, MEDIAL BRANCH lumbar 2, 3, 4;  Surgeon: Edgardo Rubio MD;  Location[facility]    INJECT EPIDURAL CERVICAL N/A 3/8/2018    Procedure: INJECT EPIDURAL CERVICAL;  cervical 6-7 interlaminar  epidural steroid injection;  Surgeon: Edgardo Rubio MD;  Location[facility]    INJECT JOINT SACROILIAC Bilateral 6/12/2020    Procedure: INJECT JOINT SACROILIAC bilateral;  Surgeon: Edgardo Rubio MD;  [facility]    LAPAROSCOPIC TUBAL LIGATION  11/27/2012    Procedure: LAPAROSCOPIC TUBAL LIGATION;  Laparoscopic Bilateral tubal sterilization/ fulgaration;  Surgeon: Sarbjit Whittaker MD;  Location: [facility]    ORTHOPEDIC SURGERY  6/2015    ZZHC COLONOSCOPY W BIOPSY  02/06/08       Family History:    Family History   Problem Relation Age of Onset    Hypertension Maternal Grandmother     Arthritis Maternal Grandmother     Cancer Maternal Grandmother         MGGM    Depression Maternal Grandmother     Lipids Maternal Grandmother     Osteoporosis Maternal Grandmother     Respiratory Maternal Grandmother         emphysema    Genitourinary Problems Maternal Grandmother         Kidney Failure, liver     Coronary Artery Disease Maternal Grandmother     Hyperlipidemia Maternal Grandmother     Anxiety Disorder Maternal Grandmother     Asthma Maternal Grandmother     Anesthesia Reaction Maternal Grandmother     Hypertension Maternal Grandfather     Heart Disease Maternal Grandfather         MI    Cardiovascular Maternal Grandfather     Cancer - colorectal Maternal Grandfather     Cancer Maternal Grandfather         [place] Lymphoma    Other Cancer Maternal Grandfather     Coronary Artery Disease Maternal Grandfather     Hypertension Paternal Grandmother     Arthritis Paternal Grandmother         RA    Osteoporosis Paternal Grandmother     Obesity Paternal Grandmother     Substance Abuse Mother         Alcoholic    Depression Mother     Hypertension Father     Hyperlipidemia Father     Mental Illness Father     Substance Abuse Father         Bio and Step dad have alcohol problems    Osteoporosis Father     Coronary Artery Disease Paternal Grandfather     Hypertension Paternal Grandfather     Breast Cancer Other          Maternal great aunt    Coronary Artery Disease Other     Diabetes Other     Thyroid Disease Maternal Aunt         two aunts    Breast Cancer Other     Colon Cancer Other     Prostate Cancer Other     Other Cancer Other     Other Cancer Other     Thyroid Disease Other     Anxiety Disorder Other     Substance Abuse Other     Depression Other     Mental Illness Other     Anxiety Disorder Maternal Half-Sister     Thyroid Disease Maternal Half-Sister     Thyroid Disease Other     Asthma Other     Thyroid Disease Other     Obesity Other     Coronary Artery Disease Other     Hypertension Other        Social History:  Marital Status:   [4]  Social History     Tobacco Use    Smoking status: Every Day     Current packs/day: 1.00     Average packs/day: 1 pack/day for 20.0 years (20.0 ttl pk-yrs)     Types: Cigarettes    Smokeless tobacco: Never   Substance Use Topics    Alcohol use: Yes     Alcohol/week: 0.0 standard drinks of alcohol     Comment: Rarely    Drug use: No        Medications:    amitriptyline (ELAVIL) 25 MG tablet  SUBOXONE 8-2 MG per film  albuterol (PROAIR HFA/PROVENTIL HFA/VENTOLIN HFA) 108 (90 Base) MCG/ACT inhaler  ALPRAZolam (XANAX) 1 MG tablet  ALPRAZolam (XANAX) 2 MG tablet  ARIPiprazole (ABILIFY) 2 MG tablet  busPIRone HCl (BUSPAR) 30 MG tablet  clobetasol (TEMOVATE) 0.05 % external ointment  escitalopram (LEXAPRO) 20 MG tablet  fluconazole (DIFLUCAN) 150 MG tablet  fluticasone (FLONASE) 50 MCG/ACT nasal spray  folic acid (FOLVITE) 400 MCG tablet  furosemide (LASIX) 20 MG tablet  HYDROmorphone (DILAUDID) 4 MG tablet  ibuprofen (ADVIL/MOTRIN) 200 MG tablet  imiquimod (ALDARA) 5 % external cream  levothyroxine (SYNTHROID/LEVOTHROID) 112 MCG tablet  levothyroxine (SYNTHROID/LEVOTHROID) 137 MCG tablet  levothyroxine (SYNTHROID/LEVOTHROID) 25 MCG tablet  loratadine (CLARITIN) 10 MG tablet  methocarbamol (ROBAXIN) 500 MG tablet  methocarbamol (ROBAXIN) 750 MG tablet  methotrexate sodium 2.5 MG  TABS  morphine (MS CONTIN) 30 MG CR tablet  NARCAN 4 MG/0.1ML nasal spray  ondansetron (ZOFRAN) 4 MG tablet  oxyCODONE (ROXICODONE) 5 MG tablet  prochlorperazine (COMPAZINE) 10 MG tablet  SUBOXONE 4-1 MG per film  sulfaSALAzine (AZULFIDINE) 500 MG tablet  SUMAtriptan (IMITREX) 50 MG tablet  topiramate (TOPAMAX) 25 MG tablet          Review of Systems   Constitutional:  Negative for activity change, appetite change, chills, fatigue and fever.   HENT:  Negative for congestion, rhinorrhea and sore throat.    Eyes:  Negative for redness.   Respiratory:  Negative for cough and shortness of breath.    Cardiovascular:  Negative for chest pain.   Gastrointestinal:  Negative for abdominal pain, diarrhea, nausea and vomiting.   Genitourinary:  Negative for dysuria and hematuria.   Musculoskeletal:  Negative for arthralgias, myalgias and neck stiffness.   Skin:  Negative for rash.   Neurological:  Positive for headaches. Negative for dizziness.   All other systems reviewed and are negative.      Physical Exam   BP: (!) 152/102  Pulse: 59  Temp: 97  F (36.1  C)  Resp: 18  Weight: 89.8 kg (198 lb)  SpO2: 100 %      Physical Exam  Vitals and nursing note reviewed.   Constitutional:       General: She is not in acute distress.     Appearance: Normal appearance. She is well-developed. She is not ill-appearing, toxic-appearing or diaphoretic.   HENT:      Head: Normocephalic and atraumatic.   Eyes:      General: No scleral icterus.     Conjunctiva/sclera: Conjunctivae normal.   Cardiovascular:      Rate and Rhythm: Normal rate and regular rhythm.      Pulses: Normal pulses.      Heart sounds: Normal heart sounds.   Pulmonary:      Effort: Pulmonary effort is normal. No respiratory distress.   Abdominal:      General: Abdomen is flat.   Musculoskeletal:         General: Normal range of motion.      Cervical back: Normal range of motion and neck supple.      Right lower leg: No edema.      Left lower leg: No edema.   Skin:      General: Skin is warm and dry.      Findings: No rash.   Neurological:      General: No focal deficit present.      Mental Status: She is alert and oriented to person, place, and time. Mental status is at baseline.      Cranial Nerves: No cranial nerve deficit.      Motor: No weakness.   Psychiatric:         Mood and Affect: Mood normal.         ED Course        Procedures           No results found. However, due to the size of the patient record, not all encounters were searched. Please check Results Review for a complete set of results.    Medications   sodium chloride 0.9% BOLUS 1,000 mL (0 mLs Intravenous Stopped 4/10/24 2158)   prochlorperazine (COMPAZINE) injection 10 mg (10 mg Intravenous $Given 4/10/24 2040)   diphenhydrAMINE (BENADRYL) injection 25 mg (25 mg Intravenous $Given 4/10/24 2036)   ketorolac (TORADOL) injection 15 mg (15 mg Intravenous $Given 4/10/24 2037)   magnesium sulfate 1 g in 100 mL D5W intermittent infusion (0 g Intravenous Stopped 4/10/24 2109)       Assessments & Plan (with Medical Decision Making)     I have reviewed the nursing notes.    I have reviewed the findings, diagnosis, plan and need for follow up with the patient.      Medical Decision Making  43-year-old female presenting with sudden onset headache approximately 1-1/2 hours ago.  Is located posterior to the back of the neck and radiates up to her head.  She notes that she has had numerous of these over the past few weeks.  She has been taking Imitrex Xanax Benadryl and ibuprofen with Excedrin without any improvement of her headache over the past few weeks and actually saw her primary care that provide her with injections into the occipital area that did improve her discomfort for short period time however returned.    Patient provided with Toradol, magnesium, Benadryl, Compazine, IV fluids and after reevaluation after 1 hour patient's headache improved.  Patient has no new symptoms.  Her vitals remained stable.  Discussed  outpatient follow-up with neurology and her primary care for continued management.  Discussed returning to first notify attempting oxygen as this was noted in patient's previous evaluations that was not trialed here today.  Discussed returning if any symptoms present and patient discharged home    Discharge Medication List as of 4/10/2024  9:58 PM          Final diagnoses:   Intractable headache, unspecified chronicity pattern, unspecified headache type       4/10/2024   [facility] EMERGENCY DEPT       Jorje Medina MD  04/10/24 [id number]

## 2024-07-11 ENCOUNTER — APPOINTMENT (OUTPATIENT)
Dept: CT IMAGING | Facility: CLINIC | Age: 44
End: 2024-07-11
Attending: STUDENT IN AN ORGANIZED HEALTH CARE EDUCATION/TRAINING PROGRAM
Payer: COMMERCIAL

## 2024-07-11 ENCOUNTER — HOSPITAL ENCOUNTER (EMERGENCY)
Facility: CLINIC | Age: 44
Discharge: HOME | End: 2024-07-11
Attending: STUDENT IN AN ORGANIZED HEALTH CARE EDUCATION/TRAINING PROGRAM | Admitting: STUDENT IN AN ORGANIZED HEALTH CARE EDUCATION/TRAINING PROGRAM
Payer: COMMERCIAL

## 2024-07-11 VITALS
BODY MASS INDEX: 30.67 KG/M2 | RESPIRATION RATE: 20 BRPM | OXYGEN SATURATION: 95 % | HEIGHT: 66 IN | DIASTOLIC BLOOD PRESSURE: 59 MMHG | TEMPERATURE: 98 F | SYSTOLIC BLOOD PRESSURE: 89 MMHG | WEIGHT: 190.8 LBS | HEART RATE: 70 BPM

## 2024-07-11 DIAGNOSIS — G45.9 TIA (TRANSIENT ISCHEMIC ATTACK): ICD-10-CM

## 2024-07-11 DIAGNOSIS — E03.9 ACQUIRED HYPOTHYROIDISM: ICD-10-CM

## 2024-07-11 LAB
ALBUMIN SERPL BCG-MCNC: 3.9 G/DL (ref 3.5–5.2)
ALBUMIN UR-MCNC: NEGATIVE MG/DL
ALP SERPL-CCNC: 122 U/L (ref 40–150)
ALT SERPL W P-5'-P-CCNC: 19 U/L (ref 0–50)
ANION GAP SERPL CALCULATED.3IONS-SCNC: 9 MMOL/L (ref 7–15)
APPEARANCE UR: CLEAR
AST SERPL W P-5'-P-CCNC: 16 U/L (ref 0–45)
BASOPHILS # BLD AUTO: 0 10E3/UL (ref 0–0.2)
BASOPHILS NFR BLD AUTO: 0 %
BILIRUB SERPL-MCNC: <0.2 MG/DL
BILIRUB UR QL STRIP: NEGATIVE
BUN SERPL-MCNC: 13.5 MG/DL (ref 6–20)
CALCIUM SERPL-MCNC: 9.4 MG/DL (ref 8.6–10)
CHLORIDE SERPL-SCNC: 106 MMOL/L (ref 98–107)
COLOR UR AUTO: YELLOW
CREAT SERPL-MCNC: 1.07 MG/DL (ref 0.51–0.95)
D DIMER PPP FEU-MCNC: 0.45 UG/ML FEU (ref 0–0.5)
DEPRECATED HCO3 PLAS-SCNC: 24 MMOL/L (ref 22–29)
EGFRCR SERPLBLD CKD-EPI 2021: 66 ML/MIN/1.73M2
EOSINOPHIL # BLD AUTO: 0.3 10E3/UL (ref 0–0.7)
EOSINOPHIL NFR BLD AUTO: 3 %
ERYTHROCYTE [DISTWIDTH] IN BLOOD BY AUTOMATED COUNT: 14.1 % (ref 10–15)
GLUCOSE SERPL-MCNC: 100 MG/DL (ref 70–99)
GLUCOSE UR STRIP-MCNC: NEGATIVE MG/DL
HCT VFR BLD AUTO: 36.2 % (ref 35–47)
HGB BLD-MCNC: 11.8 G/DL (ref 11.7–15.7)
HGB UR QL STRIP: NEGATIVE
IMM GRANULOCYTES # BLD: 0 10E3/UL
IMM GRANULOCYTES NFR BLD: 0 %
KETONES UR STRIP-MCNC: NEGATIVE MG/DL
LEUKOCYTE ESTERASE UR QL STRIP: NEGATIVE
LYMPHOCYTES # BLD AUTO: 2.5 10E3/UL (ref 0.8–5.3)
LYMPHOCYTES NFR BLD AUTO: 26 %
MCH RBC QN AUTO: 30.7 PG (ref 26.5–33)
MCHC RBC AUTO-ENTMCNC: 32.6 G/DL (ref 31.5–36.5)
MCV RBC AUTO: 94 FL (ref 78–100)
MONOCYTES # BLD AUTO: 0.6 10E3/UL (ref 0–1.3)
MONOCYTES NFR BLD AUTO: 6 %
MUCOUS THREADS #/AREA URNS LPF: PRESENT /LPF
NEUTROPHILS # BLD AUTO: 6.2 10E3/UL (ref 1.6–8.3)
NEUTROPHILS NFR BLD AUTO: 64 %
NITRATE UR QL: NEGATIVE
NRBC # BLD AUTO: 0 10E3/UL
NRBC BLD AUTO-RTO: 0 /100
PH UR STRIP: 7 [PH] (ref 5–7)
PLATELET # BLD AUTO: 285 10E3/UL (ref 150–450)
POTASSIUM SERPL-SCNC: 4.3 MMOL/L (ref 3.4–5.3)
PROT SERPL-MCNC: 6.6 G/DL (ref 6.4–8.3)
RBC # BLD AUTO: 3.84 10E6/UL (ref 3.8–5.2)
RBC URINE: 0 /HPF
SODIUM SERPL-SCNC: 139 MMOL/L (ref 135–145)
SP GR UR STRIP: 1.03 (ref 1–1.03)
SQUAMOUS EPITHELIAL: 1 /HPF
T4 FREE SERPL-MCNC: 0.51 NG/DL (ref 0.9–1.7)
TROPONIN T SERPL HS-MCNC: 7 NG/L
TSH SERPL DL<=0.005 MIU/L-ACNC: 18.1 UIU/ML (ref 0.3–4.2)
UROBILINOGEN UR STRIP-MCNC: NORMAL MG/DL
WBC # BLD AUTO: 9.7 10E3/UL (ref 4–11)
WBC URINE: <1 /HPF

## 2024-07-11 PROCEDURE — 93005 ELECTROCARDIOGRAM TRACING: CPT | Performed by: STUDENT IN AN ORGANIZED HEALTH CARE EDUCATION/TRAINING PROGRAM

## 2024-07-11 PROCEDURE — 250N000013 HC RX MED GY IP 250 OP 250 PS 637: Performed by: STUDENT IN AN ORGANIZED HEALTH CARE EDUCATION/TRAINING PROGRAM

## 2024-07-11 PROCEDURE — 85379 FIBRIN DEGRADATION QUANT: CPT | Performed by: STUDENT IN AN ORGANIZED HEALTH CARE EDUCATION/TRAINING PROGRAM

## 2024-07-11 PROCEDURE — 99285 EMERGENCY DEPT VISIT HI MDM: CPT | Performed by: STUDENT IN AN ORGANIZED HEALTH CARE EDUCATION/TRAINING PROGRAM

## 2024-07-11 PROCEDURE — 250N000011 HC RX IP 250 OP 636: Performed by: STUDENT IN AN ORGANIZED HEALTH CARE EDUCATION/TRAINING PROGRAM

## 2024-07-11 PROCEDURE — 84443 ASSAY THYROID STIM HORMONE: CPT | Performed by: STUDENT IN AN ORGANIZED HEALTH CARE EDUCATION/TRAINING PROGRAM

## 2024-07-11 PROCEDURE — 36415 COLL VENOUS BLD VENIPUNCTURE: CPT | Performed by: STUDENT IN AN ORGANIZED HEALTH CARE EDUCATION/TRAINING PROGRAM

## 2024-07-11 PROCEDURE — 99285 EMERGENCY DEPT VISIT HI MDM: CPT | Mod: 25 | Performed by: STUDENT IN AN ORGANIZED HEALTH CARE EDUCATION/TRAINING PROGRAM

## 2024-07-11 PROCEDURE — 99285 EMERGENCY DEPT VISIT HI MDM: CPT

## 2024-07-11 PROCEDURE — 84439 ASSAY OF FREE THYROXINE: CPT | Performed by: STUDENT IN AN ORGANIZED HEALTH CARE EDUCATION/TRAINING PROGRAM

## 2024-07-11 PROCEDURE — 80053 COMPREHEN METABOLIC PANEL: CPT | Performed by: STUDENT IN AN ORGANIZED HEALTH CARE EDUCATION/TRAINING PROGRAM

## 2024-07-11 PROCEDURE — 250N000009 HC RX 250: Performed by: STUDENT IN AN ORGANIZED HEALTH CARE EDUCATION/TRAINING PROGRAM

## 2024-07-11 PROCEDURE — 85025 COMPLETE CBC W/AUTO DIFF WBC: CPT | Performed by: STUDENT IN AN ORGANIZED HEALTH CARE EDUCATION/TRAINING PROGRAM

## 2024-07-11 PROCEDURE — 84484 ASSAY OF TROPONIN QUANT: CPT | Performed by: STUDENT IN AN ORGANIZED HEALTH CARE EDUCATION/TRAINING PROGRAM

## 2024-07-11 PROCEDURE — 93010 ELECTROCARDIOGRAM REPORT: CPT | Performed by: STUDENT IN AN ORGANIZED HEALTH CARE EDUCATION/TRAINING PROGRAM

## 2024-07-11 PROCEDURE — 70496 CT ANGIOGRAPHY HEAD: CPT

## 2024-07-11 PROCEDURE — 70450 CT HEAD/BRAIN W/O DYE: CPT

## 2024-07-11 PROCEDURE — 81001 URINALYSIS AUTO W/SCOPE: CPT | Performed by: STUDENT IN AN ORGANIZED HEALTH CARE EDUCATION/TRAINING PROGRAM

## 2024-07-11 PROCEDURE — 93005 ELECTROCARDIOGRAM TRACING: CPT

## 2024-07-11 RX ORDER — CLOPIDOGREL BISULFATE 75 MG/1
300 TABLET ORAL ONCE
Status: COMPLETED | OUTPATIENT
Start: 2024-07-11 | End: 2024-07-11

## 2024-07-11 RX ORDER — CLOPIDOGREL BISULFATE 75 MG/1
75 TABLET ORAL DAILY
Qty: 30 TABLET | Refills: 0 | Status: SHIPPED | OUTPATIENT
Start: 2024-07-11 | End: 2024-08-10

## 2024-07-11 RX ORDER — IOPAMIDOL 755 MG/ML
500 INJECTION, SOLUTION INTRAVASCULAR ONCE
Status: COMPLETED | OUTPATIENT
Start: 2024-07-11 | End: 2024-07-11

## 2024-07-11 RX ADMIN — CLOPIDOGREL 300 MG: 75 TABLET ORAL at 16:12

## 2024-07-11 RX ADMIN — SODIUM CHLORIDE 67 ML: 9 INJECTION, SOLUTION INTRAVENOUS at 14:12

## 2024-07-11 RX ADMIN — IOPAMIDOL 67 ML: 755 INJECTION, SOLUTION INTRAVENOUS at 14:12

## 2024-07-11 ASSESSMENT — ENCOUNTER SYMPTOMS
SHORTNESS OF BREATH: 0
ABDOMINAL PAIN: 0
COUGH: 0
FEVER: 0

## 2024-07-11 ASSESSMENT — ACTIVITIES OF DAILY LIVING (ADL)
ADLS_ACUITY_SCORE: 37
ADLS_ACUITY_SCORE: 37
ADLS_ACUITY_SCORE: 35
ADLS_ACUITY_SCORE: 37
ADLS_ACUITY_SCORE: 35

## 2024-07-11 ASSESSMENT — COLUMBIA-SUICIDE SEVERITY RATING SCALE - C-SSRS
6. HAVE YOU EVER DONE ANYTHING, STARTED TO DO ANYTHING, OR PREPARED TO DO ANYTHING TO END YOUR LIFE?: NO
1. IN THE PAST MONTH, HAVE YOU WISHED YOU WERE DEAD OR WISHED YOU COULD GO TO SLEEP AND NOT WAKE UP?: NO
2. HAVE YOU ACTUALLY HAD ANY THOUGHTS OF KILLING YOURSELF IN THE PAST MONTH?: NO

## 2024-07-11 NOTE — ED TRIAGE NOTES
Pt reports she also has a rash to her left ankle area.      Triage Assessment (Adult)       Row Name 07/11/24 1156          Triage Assessment    Airway WDL WDL        Respiratory WDL    Respiratory WDL WDL

## 2024-07-11 NOTE — ED TRIAGE NOTES
Pt reports having a stroke a month ago. Two days ago pt reports having trouble getting her fork to her mouth and was dragging her right leg. States symptoms lasted about 4 hours. Pt was seen in UC today and told she probably had another stroke. Pt denies any symptoms at this time.      Triage Assessment (Adult)       Row Name 07/11/24 1156          Triage Assessment    Airway WDL WDL        Respiratory WDL    Respiratory WDL WDL

## 2024-07-11 NOTE — CONSULTS
"[facility]    Stroke Telephone Note    I was called by Jorej Medina on 07/11/24 regarding patient Mattie Banda. The patient is a 43 year old female with a past medical history of COPD, tobacco use, CRISTIANO, gout, hypothyroidism, depression, migraine, opioid dependence, who presents to the hospital with transient episode of right sided weakness that happened yesterday. Pt has a hx of stroke back in may of 2024. She reports having similar symptoms in the past and eventually seeking medical care due to severe headache. MRI at that time showed L BG sub acute infarct. CTA was reported as negative. Echo was without bubble.    Vitals  BP: 114/73   Pulse: 72   Resp: 20   Temp: 98  F (36.7  C)   Weight: 86.5 kg (190 lb 12.8 oz)    Imaging Findings  Pending    Impression  Transient R sided symptoms.     Recommendations  Obtain MRI brain with and without contrast  Reach out to stroke neurology team once MRI is completed.     3:23 PM  Update  MRI can not be completed due to pacemaker  CT head and CTA unremarkable for new acute findings  Suspicion of TIA. No Clear metabolic infectious etiology to explain recrudescence  Load plavix 300 mg once  Continue aspirin 81  Echo  LDL Hba1c  Repeat CT head in morning  PT OT SLP    My recommendations are based on the information provided over the phone by Mattie Banda's in-person providers. They are not intended to replace the clinical judgment of her in-person providers. I was not requested to personally see or examine the patient at this time.     Renny Morris MD  Vascular Neurology    To page me or covering stroke neurology team member, click here: [email or web address]  Choose \"On Call\" tab at top, then select \"NEUROLOGY/ALL SITES\" from middle drop-down box, press Enter, then look for \"stroke\" or \"telestroke\" for your site.         "

## 2024-07-11 NOTE — DISCHARGE INSTRUCTIONS
You are seen today for your concerns in regards to your symptoms you had 2 days ago.  This was concerning for a TIA.  He recently had a stroke therefore did consult neurology who recommended CT imaging and MRI however with your pacemaker we do not have MRI abilities here.  In that case we did discuss your case with stroke neurology at [facility] who noted that they will place a outpatient MRI for you and follow-up with you in clinic.  I do recommend that you continue taking aspirin and Plavix as prescribed.  Please return to the emergency department if you start having recurrence of her symptoms as this is often a preceding situation to a larger stroke.

## 2024-07-11 NOTE — ED PROVIDER NOTES
History     Chief Complaint   Patient presents with    Rash     HPI  Mattie Banda is a 43 year old female from urgent care secondary to concerns of TIA.  Patient noticed right arm or right leg heaviness approximately 2 days ago that resolved shortly after initiating symptoms.  She also has some noted possible drooling.  She was presented with similar symptoms at the end of May and was found to have an acute ischemic stroke and discharged with outpatient follow-up with neurology.  She has a past medical history of anemia abnormal uterine bleeding, cholelithiasis, chronic fatigue/chronic pain syndrome, COPD, depression, various degenerative disc disease, general anxiety disorder and otherwise other complicated past medical history.  She is currently taking a daily dose of aspirin.  She also has a current pacemaker in place secondary to history of what seems to be bradycardia.  She smokes daily.    Allergies:  Allergies   Allergen Reactions    Bee Venom Swelling     cellulitis    Bupropion Hcl Hives     Wellbutrin    Other [No Clinical Screening - See Comments]      Some metals, nickel  rash    Adhesive Tape Rash and Blisters       Problem List:    Patient Active Problem List    Diagnosis Date Noted    PTSD (post-traumatic stress disorder) 06/18/2020     Priority: Medium    Spondylarthritis 05/01/2020     Priority: Medium    Current severe episode of major depressive disorder without psychotic features without prior episode (H) 01/21/2020     Priority: Medium    CRITSIANO (generalized anxiety disorder) 01/21/2020     Priority: Medium    Vasculitis (H24) 01/10/2020     Priority: Medium    Carotidynia 12/11/2019     Priority: Medium    Abnormal uterine bleeding 10/22/2019     Priority: Medium     Added automatically from request for surgery [id number]      Chronic pain syndrome 07/09/2018     Priority: Medium     Patient is very low risk to abuse  Pain medication.  Patient is followed by Yuan Schofield MD for ongoing  prescription of pain medication.  All refills should only be approved by this provider, or covering partner.    Medication(s): ms contin and oxydodone ir.   Maximum quantity per month: #60 ms contin, #180 oxycodone  Clinic visit frequency required: Q 3 months      Controlled substance agreement:  Encounter-Level CSA - 08/20/2018:    Controlled Substance Agreement - Scan on 6/5/19 CONTROLLED SUBSTANCE AGREEMENT (below)         Patient-Level CSA:    Controlled Substance Agreement - Opioid - Scan on 6/13/2019  6:05 PM (below)    Controlled Substance Agreement - Opioid - Scan on 6/13/2019  6:03 PM: 06/05/2019 (below)         Pain Clinic evaluation in the past: Yes       Date/Location:   working with counselor to deal with many past traumas, 10/23/2020.    DIRE Total Score(s):  DIRE SCORE 10/23/2020   DIRE Total Score 19       Last [facility] website verification:  10/23/2020 450   [email or web address]            DDD (degenerative disc disease), cervical 02/07/2018     Priority: Medium    Lymphocytic colitis 06/13/2017     Priority: Medium    Idiopathic peripheral neuropathy 01/10/2017     Priority: Medium    Hypothyroidism, unspecified type 01/10/2017     Priority: Medium    Migraine with aura and without status migrainosus, not intractable 12/27/2016     Priority: Medium    Tobacco use disorder 09/12/2016     Priority: Medium    Labial lesion 02/08/2016     Priority: Medium     Superior folds of the vulva/labia right side      S/P lumbar fusion and discectomy June 2015 06/28/2015     Priority: Medium    Degeneration of lumbar intervertebral disc 04/05/2014     Priority: Medium     newly added to list on 4/4//14  but present for last months          Past Medical History:    Past Medical History:   Diagnosis Date    Anxiety     Arthritis 2019    Breast disorder Not sure    COPD (chronic obstructive pulmonary disease) (H)     Depressive disorder     Mixed anxiety depressive disorder 2/8/2016    Papanicolaou  smear of cervix with low grade squamous intraepithelial lesion (LGSIL)     Thyroid disease     Urinary tract infection, site not specified     Wounds and injuries 2000       Past Surgical History:    Past Surgical History:   Procedure Laterality Date    ABDOMEN SURGERY  2015    For back surgery    BACK SURGERY  6/2015     BIOPSY  Don t remember    COLONOSCOPY  08/06/07    COLONOSCOPY  08/05/08    CYSTOSCOPY N/A 2/7/2020    Procedure: Cystoscopy;  Surgeon: Anabel Barber MD;  Location: [facility] OR    DAVINCI HYSTERECTOMY TOTAL, SALPINGECTOMY BILATERAL Bilateral 2/7/2020    Procedure: Davinci Total Laparoscopic Hysterectomy, Bilateral Salpingectomy, Exam Under Anesthesia;  Surgeon: Anabel Barber MD;  Location: [facility] OR    HC TOOTH EXTRACTION W/FORCEP      wisdom teeth removed    HYSTERECTOMY, PAP NO LONGER INDICATED      INJECT BLOCK MEDIAL BRANCH CERVICAL/THORACIC/LUMBAR N/A 6/21/2019    Procedure: BLOCK, NERVE, SPINAL, MEDIAL BRANCH lumbar 2, 3, 4;  Surgeon: Edgardo Rubio MD;  Location: [facility]    INJECT EPIDURAL CERVICAL N/A 3/8/2018    Procedure: INJECT EPIDURAL CERVICAL;  cervical 6-7 interlaminar epidural steroid injection;  Surgeon: Edgardo Rubio MD;  Location[facility]    INJECT JOINT SACROILIAC Bilateral 6/12/2020    Procedure: INJECT JOINT SACROILIAC bilateral;  Surgeon: Edgardo Rubio MD;  Location[facility]    LAPAROSCOPIC TUBAL LIGATION  11/27/2012    Procedure: LAPAROSCOPIC TUBAL LIGATION;  Laparoscopic Bilateral tubal sterilization/ fulgaration;  Surgeon: Sarbjit Whittaker MD;  Location: [facility] OR    ORTHOPEDIC SURGERY  6/2015    [facility] COLONOSCOPY W BIOPSY  02/06/08       Family History:    Family History   Problem Relation Age of Onset    Hypertension Maternal Grandmother     Arthritis Maternal Grandmother     Cancer Maternal Grandmother         MGGM    Depression Maternal Grandmother     Lipids Maternal Grandmother     Osteoporosis Maternal Grandmother     Respiratory Maternal Grandmother          emphysema    Genitourinary Problems Maternal Grandmother         Kidney Failure, liver     Coronary Artery Disease Maternal Grandmother     Hyperlipidemia Maternal Grandmother     Anxiety Disorder Maternal Grandmother     Asthma Maternal Grandmother     Anesthesia Reaction Maternal Grandmother     Hypertension Maternal Grandfather     Heart Disease Maternal Grandfather         MI    Cardiovascular Maternal Grandfather     Cancer - colorectal Maternal Grandfather     Cancer Maternal Grandfather         [place] Lymphoma    Other Cancer Maternal Grandfather     Coronary Artery Disease Maternal Grandfather     Hypertension Paternal Grandmother     Arthritis Paternal Grandmother         RA    Osteoporosis Paternal Grandmother     Obesity Paternal Grandmother     Substance Abuse Mother         Alcoholic    Depression Mother     Hypertension Father     Hyperlipidemia Father     Mental Illness Father     Substance Abuse Father         Bio and Step dad have alcohol problems    Osteoporosis Father     Coronary Artery Disease Paternal Grandfather     Hypertension Paternal Grandfather     Breast Cancer Other         Maternal great aunt    Coronary Artery Disease Other     Diabetes Other     Thyroid Disease Maternal Aunt         two aunts    Breast Cancer Other     Colon Cancer Other     Prostate Cancer Other     Other Cancer Other     Other Cancer Other     Thyroid Disease Other     Anxiety Disorder Other     Substance Abuse Other     Depression Other     Mental Illness Other     Anxiety Disorder Maternal Half-Sister     Thyroid Disease Maternal Half-Sister     Thyroid Disease Other     Asthma Other     Thyroid Disease Other     Obesity Other     Coronary Artery Disease Other     Hypertension Other        Social History:  Marital Status:   [4]  Social History     Tobacco Use    Smoking status: Every Day     Current packs/day: 1.00     Average packs/day: 1 pack/day for 20.0 years (20.0 ttl pk-yrs)     Types: Cigarettes     Smokeless tobacco: Never   Substance Use Topics    Alcohol use: Yes     Alcohol/week: 0.0 standard drinks of alcohol     Comment: Rarely    Drug use: Yes     Types: Marijuana        Medications:    clopidogrel (PLAVIX) 75 MG tablet  albuterol (PROAIR HFA/PROVENTIL HFA/VENTOLIN HFA) 108 (90 Base) MCG/ACT inhaler  ALPRAZolam (XANAX) 1 MG tablet  ALPRAZolam (XANAX) 2 MG tablet  amitriptyline (ELAVIL) 25 MG tablet  ARIPiprazole (ABILIFY) 2 MG tablet  busPIRone HCl (BUSPAR) 30 MG tablet  clobetasol (TEMOVATE) 0.05 % external ointment  escitalopram (LEXAPRO) 20 MG tablet  fluconazole (DIFLUCAN) 150 MG tablet  fluticasone (FLONASE) 50 MCG/ACT nasal spray  folic acid (FOLVITE) 400 MCG tablet  furosemide (LASIX) 20 MG tablet  HYDROmorphone (DILAUDID) 4 MG tablet  ibuprofen (ADVIL/MOTRIN) 200 MG tablet  imiquimod (ALDARA) 5 % external cream  levothyroxine (SYNTHROID/LEVOTHROID) 112 MCG tablet  levothyroxine (SYNTHROID/LEVOTHROID) 137 MCG tablet  levothyroxine (SYNTHROID/LEVOTHROID) 25 MCG tablet  loratadine (CLARITIN) 10 MG tablet  methocarbamol (ROBAXIN) 500 MG tablet  methocarbamol (ROBAXIN) 750 MG tablet  methotrexate sodium 2.5 MG TABS  morphine (MS CONTIN) 30 MG CR tablet  NARCAN 4 MG/0.1ML nasal spray  ondansetron (ZOFRAN) 4 MG tablet  oxyCODONE (ROXICODONE) 5 MG tablet  prochlorperazine (COMPAZINE) 10 MG tablet  SUBOXONE 4-1 MG per film  SUBOXONE 8-2 MG per film  sulfaSALAzine (AZULFIDINE) 500 MG tablet  SUMAtriptan (IMITREX) 50 MG tablet  topiramate (TOPAMAX) 25 MG tablet          Review of Systems   Constitutional:  Negative for fever.   Respiratory:  Negative for cough and shortness of breath.    Cardiovascular:  Negative for chest pain.   Gastrointestinal:  Negative for abdominal pain.   Skin:  Negative for rash.   Neurological:         History of right arm and right leg weakness   All other systems reviewed and are negative.      Physical Exam   BP: 114/73  Pulse: 72  Temp: 98  F (36.7  C)  Resp: 20  Height:  "167.6 cm (5' 6\")  Weight: 86.5 kg (190 lb 12.8 oz)  SpO2: 100 %      Physical Exam  Vitals and nursing note reviewed.   Constitutional:       General: She is not in acute distress.     Appearance: Normal appearance. She is obese. She is not ill-appearing, toxic-appearing or diaphoretic.   HENT:      Head: Normocephalic and atraumatic.      Mouth/Throat:      Mouth: Mucous membranes are moist.   Eyes:      General: No scleral icterus.     Conjunctiva/sclera: Conjunctivae normal.   Cardiovascular:      Rate and Rhythm: Normal rate.      Heart sounds: Normal heart sounds.   Pulmonary:      Effort: Pulmonary effort is normal. No respiratory distress.      Breath sounds: Normal breath sounds.   Abdominal:      General: Abdomen is flat.   Musculoskeletal:      Cervical back: Neck supple.   Skin:     General: Skin is warm.      Findings: No rash.   Neurological:      General: No focal deficit present.      Mental Status: She is alert and oriented to person, place, and time. Mental status is at baseline.   Psychiatric:         Mood and Affect: Mood normal.         ED Course        Procedures         EKG self interpreted at 1:27 PM.  Normal sinus rhythm at 60 bpm.  MT interval 180, QTc of 428 and QRS at 96.  T wave inversions in leads V1 through V3 with some mild flattening in V4 3 and aVF.  Similar EKG to previous evaluated on 05/13/2024.    Critical Care time:  was 45 minutes for this patient excluding procedures.         Results for orders placed or performed during the hospital encounter of 07/11/24 (from the past 24 hour(s))   CBC with platelets differential    Narrative    The following orders were created for panel order CBC with platelets differential.  Procedure                               Abnormality         Status                     ---------                               -----------         ------                     CBC with platelets and d...[[id number]]                      Final result             "     Please view results for these tests on the individual orders.   Comprehensive metabolic panel   Result Value Ref Range    Sodium 139 135 - 145 mmol/L    Potassium 4.3 3.4 - 5.3 mmol/L    Carbon Dioxide (CO2) 24 22 - 29 mmol/L    Anion Gap 9 7 - 15 mmol/L    Urea Nitrogen 13.5 6.0 - 20.0 mg/dL    Creatinine 1.07 (H) 0.51 - 0.95 mg/dL    GFR Estimate 66 >60 mL/min/1.73m2    Calcium 9.4 8.6 - 10.0 mg/dL    Chloride 106 98 - 107 mmol/L    Glucose 100 (H) 70 - 99 mg/dL    Alkaline Phosphatase 122 40 - 150 U/L    AST 16 0 - 45 U/L    ALT 19 0 - 50 U/L    Protein Total 6.6 6.4 - 8.3 g/dL    Albumin 3.9 3.5 - 5.2 g/dL    Bilirubin Total <0.2 <=1.2 mg/dL   D dimer quantitative   Result Value Ref Range    D-Dimer Quantitative 0.45 0.00 - 0.50 ug/mL FEU    Narrative    This D-dimer assay is intended for use in conjunction with a clinical pretest probability assessment model to exclude pulmonary embolism (PE) and deep venous thrombosis (DVT) in outpatients suspected of PE or DVT. The cut-off value is 0.50 ug/mL FEU.   TSH with free T4 reflex   Result Value Ref Range    TSH 18.10 (H) 0.30 - 4.20 uIU/mL   Troponin T, High Sensitivity   Result Value Ref Range    Troponin T, High Sensitivity 7 <=14 ng/L   CBC with platelets and differential   Result Value Ref Range    WBC Count 9.7 4.0 - 11.0 10e3/uL    RBC Count 3.84 3.80 - 5.20 10e6/uL    Hemoglobin 11.8 11.7 - 15.7 g/dL    Hematocrit 36.2 35.0 - 47.0 %    MCV 94 78 - 100 fL    MCH 30.7 26.5 - 33.0 pg    MCHC 32.6 31.5 - 36.5 g/dL    RDW 14.1 10.0 - 15.0 %    Platelet Count 285 150 - 450 10e3/uL    % Neutrophils 64 %    % Lymphocytes 26 %    % Monocytes 6 %    % Eosinophils 3 %    % Basophils 0 %    % Immature Granulocytes 0 %    NRBCs per 100 WBC 0 <1 /100    Absolute Neutrophils 6.2 1.6 - 8.3 10e3/uL    Absolute Lymphocytes 2.5 0.8 - 5.3 10e3/uL    Absolute Monocytes 0.6 0.0 - 1.3 10e3/uL    Absolute Eosinophils 0.3 0.0 - 0.7 10e3/uL    Absolute Basophils 0.0 0.0 - 0.2  10e3/uL    Absolute Immature Granulocytes 0.0 <=0.4 10e3/uL    Absolute NRBCs 0.0 10e3/uL   T4 free   Result Value Ref Range    Free T4 0.51 (L) 0.90 - 1.70 ng/dL   Head CT w/o contrast    Narrative    CT SCAN OF THE HEAD WITHOUT CONTRAST   7/11/2024 2:27 PM     HISTORY: right arm and leg heaviness - recent posterior stroke    TECHNIQUE:  Axial images of the head and coronal reformations without  IV contrast material. Radiation dose for this scan was reduced using  automated exposure control, adjustment of the mA and/or kV according  to patient size, or iterative reconstruction technique.    COMPARISON: None.    FINDINGS: Remote lacunar infarct in the left basal ganglia  posterolaterally. Remote lacunar infarct in the right caudate head. No  acute intracranial hemorrhage. No hydrocephalus or extra-axial  hemorrhage.    Osseous calvarium is intact. Paranasal sinuses are clear. Mastoids are  clear. Visualized orbits are unremarkable.      Impression    IMPRESSION:   No acute intracranial hemorrhage. Remote lacunar  infarcts in the bilateral basal ganglia as above.      JAVIER SALES MD         SYSTEM ID:  [id number]   CTA Head Neck with Contrast    Narrative    CT ANGIOGRAM OF THE HEAD AND NECK WITH CONTRAST  7/11/2024 2:34 PM     HISTORY: right sided heaviness 2 days ago, recent stroke at Posterior    TECHNIQUE:  CT angiography with an injection of Isovue-370, 67 mL IV  with scans through the head and neck. Images were transferred to a  separate 3-D workstation where multiplanar reformations and 3-D images  were created. Estimates of carotid stenoses are made relative to the  distal internal carotid artery diameters except as noted. Radiation  dose for this scan was reduced using automated exposure control,  adjustment of the mA and/or kV according to patient size, or iterative  reconstruction technique.    COMPARISON: CT brain from the same day..     CT ANGIOGRAM HEAD FINDINGS: The petrous and cavernous portions of  the  internal carotid arteries are unremarkable. The anterior and middle  cerebral arterial distributions are unremarkable. The vertebrobasilar  system is unremarkable. Posterior cerebral arterial distributions are  unremarkable.    CT ANGIOGRAM NECK FINDINGS:     There is a two-vessel aortic arch. The common carotid arteries are  unremarkable bilaterally. There is no significant stenosis at the  origins of the internal carotid arteries. The origin of the right  vertebral artery and left vertebral artery are unremarkable. Normal  appearance to the vertebral arteries throughout their course in the  neck. The cervical internal carotid arteries are unremarkable.        Impression    IMPRESSION: No large vessel occlusion findings intracranially. No  hemodynamically significant stenosis in the neck.        JAVIER SALES MD         SYSTEM ID:  [id number]   UA with Microscopic reflex to Culture    Specimen: Urine, Clean Catch   Result Value Ref Range    Color Urine Yellow Colorless, Straw, Light Yellow, Yellow    Appearance Urine Clear Clear    Glucose Urine Negative Negative mg/dL    Bilirubin Urine Negative Negative    Ketones Urine Negative Negative mg/dL    Specific Gravity Urine 1.030 1.003 - 1.035    Blood Urine Negative Negative    pH Urine 7.0 5.0 - 7.0    Protein Albumin Urine Negative Negative mg/dL    Urobilinogen Urine Normal Normal, 2.0 mg/dL    Nitrite Urine Negative Negative    Leukocyte Esterase Urine Negative Negative    Mucus Urine Present (A) None Seen /LPF    RBC Urine 0 <=2 /HPF    WBC Urine <1 <=5 /HPF    Squamous Epithelials Urine 1 <=1 /HPF    Narrative    Urine Culture not indicated     *Note: Due to a large number of results and/or encounters for the requested time period, some results have not been displayed. A complete set of results can be found in Results Review.       Medications   clopidogrel (PLAVIX) tablet 300 mg (has no administration in time range)   iopamidol (ISOVUE-370) solution 500 mL  (67 mLs Intravenous $Given 7/11/24 1412)   sodium chloride 0.9 % bag 100ml for CT scan flush use (67 mLs As instructed $Given 7/11/24 1412)       Assessments & Plan (with Medical Decision Making)     I have reviewed the nursing notes.    I have reviewed the findings, diagnosis, plan and need for follow up with the patient.    Medical Decision Making  43-year-old female presenting with complex medical history with associated right arm and right leg heaviness 2 days ago that had resolved.  History of recent stroke in May with similar symptoms.  Patient sent from urgent care.  Discussed case with urgent care staff.  Vitals are stable on arrival.  NIHSS score 0 per patient's evaluation.  No other significant complaints at this time aside from lower leg swelling that is improved at this point.  History of DVT.  Patient does have a pacemaker.  Therefore no capability of MRI here.  However did order CT CT angio head and neck which were reassuring for no acute findings.  Discussed case with neurostroke recommending admission overnight for repeat evaluation in the morning.    Otherwise lab work showing a mildly elevated TSH of 18 and a free T4 of 0.51 likely contributing to patient's already being treated with levothyroxine.  Her troponin was 7 with EKGs nonspecific for ACS.  Her D-dimer 0.45 less likely associate with any PE.  Her CMP and BMP are otherwise unremarkable.  Urine is negative.    Discussed admission for observation.  Neuro evaluation.  Provided patient loading dose of Plavix already taking 81 mg of aspirin.    Patient refusing admission noting that she wants to go through [facility].  She does not want be admitted either.  Discussed case with stroke neurology who noted they will evaluate patient's chart from her previous stroke and will order an outpatient MRI and follow-up with patient in clinic.  Patient is happy with this plan.  I did discuss the importance of admission and observation secondary to larger  strokes often occurring after small strokes and patient is at high risk.  Patient understands risk and she has complete capacity to understand recommendations and risks of leaving without an MRI and or evaluation/observation stay at the hospital.  She is very happy with the plan at this time and again at this time no other surgical or medical emergency present that requires further evaluation.  Patient provided with medications and prescription and discharged home with instructions.  New Prescriptions    CLOPIDOGREL (PLAVIX) 75 MG TABLET    Take 1 tablet (75 mg) by mouth daily for 30 days       Final diagnoses:   TIA (transient ischemic attack)   Acquired hypothyroidism       7/11/2024   [facility] EMERGENCY DEPT       Jorje Medina MD  07/11/24 1460
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
## 2024-09-17 NOTE — TELEPHONE ENCOUNTER
Spoke with patient's son, verbal permission in chart. Patient is scheduled to see Dr. Aguilar on 9/24/2024. No further questions or concerns at this time.       Napoleon states that she is on the portable O2 tank but when she moves around her oxygen goes into the 80's. Napoleon also states that patient is having swelling in her legs again that she may need to go back on Lasix. He states that when patient gets low oxygen or has bad neuropathy, she refuses to go to ED for further evaluation. Writer did schedule patient. Will talk to Dr. Aguilar to discuss further.    We received a Refill authorization the pharmacy doesn't have the Sulfasalazine Delayed release.  They would like to know if the Immediate Release would be alright.  If so they do need a new order sent to them.     FRANCIS CrabtreeA

## 2024-10-30 ASSESSMENT — ANXIETY QUESTIONNAIRES: GAD7 TOTAL SCORE: 16

## 2025-01-03 ENCOUNTER — HOSPITAL ENCOUNTER (EMERGENCY)
Facility: CLINIC | Age: 45
Discharge: HOME | End: 2025-01-03
Attending: FAMILY MEDICINE | Admitting: FAMILY MEDICINE
Payer: COMMERCIAL

## 2025-01-03 ENCOUNTER — APPOINTMENT (OUTPATIENT)
Dept: GENERAL RADIOLOGY | Facility: CLINIC | Age: 45
End: 2025-01-03
Attending: FAMILY MEDICINE
Payer: COMMERCIAL

## 2025-01-03 VITALS
WEIGHT: 205 LBS | HEART RATE: 68 BPM | BODY MASS INDEX: 33.09 KG/M2 | DIASTOLIC BLOOD PRESSURE: 71 MMHG | TEMPERATURE: 98 F | RESPIRATION RATE: 16 BRPM | SYSTOLIC BLOOD PRESSURE: 95 MMHG | OXYGEN SATURATION: 97 %

## 2025-01-03 DIAGNOSIS — M54.50 ACUTE EXACERBATION OF CHRONIC LOW BACK PAIN: ICD-10-CM

## 2025-01-03 DIAGNOSIS — G89.29 ACUTE EXACERBATION OF CHRONIC LOW BACK PAIN: ICD-10-CM

## 2025-01-03 PROCEDURE — 96372 THER/PROPH/DIAG INJ SC/IM: CPT | Performed by: FAMILY MEDICINE

## 2025-01-03 PROCEDURE — 99284 EMERGENCY DEPT VISIT MOD MDM: CPT | Mod: 25 | Performed by: FAMILY MEDICINE

## 2025-01-03 PROCEDURE — 250N000011 HC RX IP 250 OP 636: Performed by: FAMILY MEDICINE

## 2025-01-03 PROCEDURE — 99284 EMERGENCY DEPT VISIT MOD MDM: CPT | Performed by: FAMILY MEDICINE

## 2025-01-03 PROCEDURE — 72100 X-RAY EXAM L-S SPINE 2/3 VWS: CPT

## 2025-01-03 RX ORDER — KETOROLAC TROMETHAMINE 30 MG/ML
60 INJECTION, SOLUTION INTRAMUSCULAR; INTRAVENOUS ONCE
Status: COMPLETED | OUTPATIENT
Start: 2025-01-03 | End: 2025-01-03

## 2025-01-03 RX ORDER — TIZANIDINE HYDROCHLORIDE 4 MG/1
4 CAPSULE, GELATIN COATED ORAL 3 TIMES DAILY
Qty: 15 CAPSULE | Refills: 0 | Status: SHIPPED | OUTPATIENT
Start: 2025-01-03

## 2025-01-03 RX ADMIN — KETOROLAC TROMETHAMINE 60 MG: 30 INJECTION, SOLUTION INTRAMUSCULAR at 06:37

## 2025-01-03 ASSESSMENT — ACTIVITIES OF DAILY LIVING (ADL)
ADLS_ACUITY_SCORE: 48
ADLS_ACUITY_SCORE: 46

## 2025-01-03 ASSESSMENT — COLUMBIA-SUICIDE SEVERITY RATING SCALE - C-SSRS
1. IN THE PAST MONTH, HAVE YOU WISHED YOU WERE DEAD OR WISHED YOU COULD GO TO SLEEP AND NOT WAKE UP?: NO
2. HAVE YOU ACTUALLY HAD ANY THOUGHTS OF KILLING YOURSELF IN THE PAST MONTH?: NO
6. HAVE YOU EVER DONE ANYTHING, STARTED TO DO ANYTHING, OR PREPARED TO DO ANYTHING TO END YOUR LIFE?: NO

## 2025-01-03 NOTE — ED TRIAGE NOTES
"Fell at home this morning and having back pain. Hx of disc issues and back surgery. States today her pain feels different, it \"feels like it's in the front region\".     Triage Assessment (Adult)       Row Name 01/03/25 0603          Triage Assessment    Airway WDL WDL        Respiratory WDL    Respiratory WDL WDL        Skin Circulation/Temperature WDL    Skin Circulation/Temperature WDL WDL        Cardiac WDL    Cardiac WDL WDL        Peripheral/Neurovascular WDL    Peripheral Neurovascular WDL WDL        Cognitive/Neuro/Behavioral WDL    Cognitive/Neuro/Behavioral WDL WDL                     "
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
## 2025-01-03 NOTE — DISCHARGE INSTRUCTIONS
1.  Your x-ray did not show any fractures, I am thinking this could be more of a muscle strain from your fall that is causing your symptoms to feel different.  We are will try this muscle relaxant to see if this may be works better.

## 2025-02-14 ENCOUNTER — APPOINTMENT (OUTPATIENT)
Dept: CT IMAGING | Facility: CLINIC | Age: 45
End: 2025-02-14
Attending: FAMILY MEDICINE
Payer: COMMERCIAL

## 2025-02-14 ENCOUNTER — HOSPITAL ENCOUNTER (EMERGENCY)
Facility: CLINIC | Age: 45
Discharge: HOME | End: 2025-02-15
Attending: FAMILY MEDICINE | Admitting: FAMILY MEDICINE
Payer: COMMERCIAL

## 2025-02-14 DIAGNOSIS — R42 VERTIGO: ICD-10-CM

## 2025-02-14 DIAGNOSIS — R20.2 PARESTHESIA OF LEFT ARM: ICD-10-CM

## 2025-02-14 DIAGNOSIS — R29.90 STROKE-LIKE SYMPTOMS: ICD-10-CM

## 2025-02-14 DIAGNOSIS — R51.9 ACUTE NONINTRACTABLE HEADACHE, UNSPECIFIED HEADACHE TYPE: ICD-10-CM

## 2025-02-14 DIAGNOSIS — R79.89 ELEVATED TROPONIN: ICD-10-CM

## 2025-02-14 LAB
ANION GAP SERPL CALCULATED.3IONS-SCNC: 12 MMOL/L (ref 7–15)
APTT PPP: 30 SECONDS (ref 22–38)
ATRIAL RATE - MUSE: 60 BPM
BASOPHILS # BLD AUTO: 0 10E3/UL (ref 0–0.2)
BASOPHILS NFR BLD AUTO: 0 %
BUN SERPL-MCNC: 17.6 MG/DL (ref 6–20)
CALCIUM SERPL-MCNC: 9.3 MG/DL (ref 8.8–10.4)
CHLORIDE SERPL-SCNC: 102 MMOL/L (ref 98–107)
CREAT SERPL-MCNC: 0.99 MG/DL (ref 0.51–0.95)
DIASTOLIC BLOOD PRESSURE - MUSE: NORMAL MMHG
EGFRCR SERPLBLD CKD-EPI 2021: 72 ML/MIN/1.73M2
EOSINOPHIL # BLD AUTO: 0.1 10E3/UL (ref 0–0.7)
EOSINOPHIL NFR BLD AUTO: 2 %
ERYTHROCYTE [DISTWIDTH] IN BLOOD BY AUTOMATED COUNT: 14.7 % (ref 10–15)
ETHANOL SERPL-MCNC: <0.01 G/DL
GLUCOSE BLDC GLUCOMTR-MCNC: 158 MG/DL (ref 70–99)
GLUCOSE SERPL-MCNC: 138 MG/DL (ref 70–99)
HCG SERPL QL: NEGATIVE
HCO3 SERPL-SCNC: 23 MMOL/L (ref 22–29)
HCT VFR BLD AUTO: 33.9 % (ref 35–47)
HGB BLD-MCNC: 10.9 G/DL (ref 11.7–15.7)
HOLD SPECIMEN: NORMAL
IMM GRANULOCYTES # BLD: 0 10E3/UL
IMM GRANULOCYTES NFR BLD: 0 %
INR PPP: 1.05 (ref 0.85–1.15)
INTERPRETATION ECG - MUSE: NORMAL
LYMPHOCYTES # BLD AUTO: 1.7 10E3/UL (ref 0.8–5.3)
LYMPHOCYTES NFR BLD AUTO: 23 %
MCH RBC QN AUTO: 30.6 PG (ref 26.5–33)
MCHC RBC AUTO-ENTMCNC: 32.2 G/DL (ref 31.5–36.5)
MCV RBC AUTO: 95 FL (ref 78–100)
MONOCYTES # BLD AUTO: 0.6 10E3/UL (ref 0–1.3)
MONOCYTES NFR BLD AUTO: 8 %
NEUTROPHILS # BLD AUTO: 5.1 10E3/UL (ref 1.6–8.3)
NEUTROPHILS NFR BLD AUTO: 67 %
NRBC # BLD AUTO: 0 10E3/UL
NRBC BLD AUTO-RTO: 0 /100
P AXIS - MUSE: NORMAL DEGREES
PLATELET # BLD AUTO: 260 10E3/UL (ref 150–450)
POTASSIUM SERPL-SCNC: 3.8 MMOL/L (ref 3.4–5.3)
PR INTERVAL - MUSE: 250 MS
QRS DURATION - MUSE: 88 MS
QT - MUSE: 422 MS
QTC - MUSE: 422 MS
R AXIS - MUSE: 16 DEGREES
RBC # BLD AUTO: 3.56 10E6/UL (ref 3.8–5.2)
SODIUM SERPL-SCNC: 137 MMOL/L (ref 135–145)
SYSTOLIC BLOOD PRESSURE - MUSE: NORMAL MMHG
T AXIS - MUSE: -5 DEGREES
TROPONIN T SERPL HS-MCNC: 55 NG/L
TROPONIN T SERPL HS-MCNC: 62 NG/L
VENTRICULAR RATE- MUSE: 60 BPM
WBC # BLD AUTO: 7.5 10E3/UL (ref 4–11)

## 2025-02-14 PROCEDURE — G0508 CRIT CARE TELEHEA CONSULT 60: HCPCS | Performed by: STUDENT IN AN ORGANIZED HEALTH CARE EDUCATION/TRAINING PROGRAM

## 2025-02-14 PROCEDURE — 82962 GLUCOSE BLOOD TEST: CPT

## 2025-02-14 PROCEDURE — 99291 CRITICAL CARE FIRST HOUR: CPT | Performed by: FAMILY MEDICINE

## 2025-02-14 PROCEDURE — 250N000011 HC RX IP 250 OP 636: Performed by: FAMILY MEDICINE

## 2025-02-14 PROCEDURE — 80048 BASIC METABOLIC PNL TOTAL CA: CPT | Performed by: FAMILY MEDICINE

## 2025-02-14 PROCEDURE — 85610 PROTHROMBIN TIME: CPT | Performed by: FAMILY MEDICINE

## 2025-02-14 PROCEDURE — 36415 COLL VENOUS BLD VENIPUNCTURE: CPT | Performed by: FAMILY MEDICINE

## 2025-02-14 PROCEDURE — 85049 AUTOMATED PLATELET COUNT: CPT | Performed by: FAMILY MEDICINE

## 2025-02-14 PROCEDURE — 84484 ASSAY OF TROPONIN QUANT: CPT | Performed by: FAMILY MEDICINE

## 2025-02-14 PROCEDURE — 99291 CRITICAL CARE FIRST HOUR: CPT | Mod: 25

## 2025-02-14 PROCEDURE — 70450 CT HEAD/BRAIN W/O DYE: CPT

## 2025-02-14 PROCEDURE — 82077 ASSAY SPEC XCP UR&BREATH IA: CPT | Performed by: FAMILY MEDICINE

## 2025-02-14 PROCEDURE — 93005 ELECTROCARDIOGRAM TRACING: CPT

## 2025-02-14 PROCEDURE — 85730 THROMBOPLASTIN TIME PARTIAL: CPT | Performed by: FAMILY MEDICINE

## 2025-02-14 PROCEDURE — 250N000013 HC RX MED GY IP 250 OP 250 PS 637: Performed by: FAMILY MEDICINE

## 2025-02-14 PROCEDURE — 250N000009 HC RX 250: Performed by: FAMILY MEDICINE

## 2025-02-14 PROCEDURE — 84703 CHORIONIC GONADOTROPIN ASSAY: CPT | Performed by: FAMILY MEDICINE

## 2025-02-14 PROCEDURE — 70496 CT ANGIOGRAPHY HEAD: CPT

## 2025-02-14 PROCEDURE — 93010 ELECTROCARDIOGRAM REPORT: CPT | Performed by: FAMILY MEDICINE

## 2025-02-14 RX ORDER — IOPAMIDOL 755 MG/ML
500 INJECTION, SOLUTION INTRAVASCULAR ONCE
Status: DISCONTINUED | OUTPATIENT
Start: 2025-02-14 | End: 2025-02-15 | Stop reason: HOSPADM

## 2025-02-14 RX ORDER — MECLIZINE HYDROCHLORIDE 25 MG/1
25 TABLET ORAL ONCE
Status: COMPLETED | OUTPATIENT
Start: 2025-02-14 | End: 2025-02-14

## 2025-02-14 RX ORDER — IOPAMIDOL 755 MG/ML
67 INJECTION, SOLUTION INTRAVASCULAR ONCE
Status: COMPLETED | OUTPATIENT
Start: 2025-02-14 | End: 2025-02-14

## 2025-02-14 RX ADMIN — MECLIZINE HYDROCHLORIDE 25 MG: 25 TABLET ORAL at 22:19

## 2025-02-14 RX ADMIN — IOPAMIDOL 67 ML: 755 INJECTION, SOLUTION INTRAVENOUS at 20:29

## 2025-02-14 RX ADMIN — SODIUM CHLORIDE 100 ML: 9 INJECTION, SOLUTION INTRAVENOUS at 20:28

## 2025-02-14 ASSESSMENT — COLUMBIA-SUICIDE SEVERITY RATING SCALE - C-SSRS
1. IN THE PAST MONTH, HAVE YOU WISHED YOU WERE DEAD OR WISHED YOU COULD GO TO SLEEP AND NOT WAKE UP?: YES
2. HAVE YOU ACTUALLY HAD ANY THOUGHTS OF KILLING YOURSELF IN THE PAST MONTH?: NO
6. HAVE YOU EVER DONE ANYTHING, STARTED TO DO ANYTHING, OR PREPARED TO DO ANYTHING TO END YOUR LIFE?: YES

## 2025-02-14 ASSESSMENT — ACTIVITIES OF DAILY LIVING (ADL)
ADLS_ACUITY_SCORE: 46

## 2025-02-14 NOTE — Clinical Note
Mattie Banda was seen and treated in our emergency department on 2/14/2025.  She may return to work on 02/17/2025.       If you have any questions or concerns, please don't hesitate to call.      Kingsley Novak MD

## 2025-02-15 VITALS
WEIGHT: 197.3 LBS | BODY MASS INDEX: 31.71 KG/M2 | RESPIRATION RATE: 18 BRPM | DIASTOLIC BLOOD PRESSURE: 81 MMHG | OXYGEN SATURATION: 98 % | TEMPERATURE: 98.1 F | HEART RATE: 60 BPM | SYSTOLIC BLOOD PRESSURE: 116 MMHG | HEIGHT: 66 IN

## 2025-02-15 LAB
AMPHETAMINES UR QL SCN: ABNORMAL
ANION GAP SERPL CALCULATED.3IONS-SCNC: 8 MMOL/L (ref 7–15)
BARBITURATES UR QL SCN: ABNORMAL
BASOPHILS # BLD AUTO: 0 10E3/UL (ref 0–0.2)
BASOPHILS NFR BLD AUTO: 0 %
BENZODIAZ UR QL SCN: ABNORMAL
BUN SERPL-MCNC: 14.2 MG/DL (ref 6–20)
BZE UR QL SCN: ABNORMAL
CALCIUM SERPL-MCNC: 9.3 MG/DL (ref 8.8–10.4)
CANNABINOIDS UR QL SCN: ABNORMAL
CHLORIDE SERPL-SCNC: 103 MMOL/L (ref 98–107)
CREAT SERPL-MCNC: 0.9 MG/DL (ref 0.51–0.95)
EGFRCR SERPLBLD CKD-EPI 2021: 80 ML/MIN/1.73M2
EOSINOPHIL # BLD AUTO: 0 10E3/UL (ref 0–0.7)
EOSINOPHIL NFR BLD AUTO: 0 %
ERYTHROCYTE [DISTWIDTH] IN BLOOD BY AUTOMATED COUNT: 14.8 % (ref 10–15)
FENTANYL UR QL: ABNORMAL
GLUCOSE SERPL-MCNC: 165 MG/DL (ref 70–99)
HCO3 SERPL-SCNC: 24 MMOL/L (ref 22–29)
HCT VFR BLD AUTO: 38.2 % (ref 35–47)
HGB BLD-MCNC: 12.3 G/DL (ref 11.7–15.7)
IMM GRANULOCYTES # BLD: 0 10E3/UL
IMM GRANULOCYTES NFR BLD: 0 %
LYMPHOCYTES # BLD AUTO: 0.9 10E3/UL (ref 0.8–5.3)
LYMPHOCYTES NFR BLD AUTO: 11 %
MCH RBC QN AUTO: 30.6 PG (ref 26.5–33)
MCHC RBC AUTO-ENTMCNC: 32.2 G/DL (ref 31.5–36.5)
MCV RBC AUTO: 95 FL (ref 78–100)
MONOCYTES # BLD AUTO: 0.2 10E3/UL (ref 0–1.3)
MONOCYTES NFR BLD AUTO: 2 %
NEUTROPHILS # BLD AUTO: 6.9 10E3/UL (ref 1.6–8.3)
NEUTROPHILS NFR BLD AUTO: 86 %
NRBC # BLD AUTO: 0 10E3/UL
NRBC BLD AUTO-RTO: 0 /100
OPIATES UR QL SCN: ABNORMAL
PCP QUAL URINE (ROCHE): ABNORMAL
PLATELET # BLD AUTO: 297 10E3/UL (ref 150–450)
POTASSIUM SERPL-SCNC: 4.5 MMOL/L (ref 3.4–5.3)
RBC # BLD AUTO: 4.02 10E6/UL (ref 3.8–5.2)
SODIUM SERPL-SCNC: 135 MMOL/L (ref 135–145)
TROPONIN T SERPL HS-MCNC: 32 NG/L
WBC # BLD AUTO: 8 10E3/UL (ref 4–11)

## 2025-02-15 PROCEDURE — 85025 COMPLETE CBC W/AUTO DIFF WBC: CPT | Performed by: STUDENT IN AN ORGANIZED HEALTH CARE EDUCATION/TRAINING PROGRAM

## 2025-02-15 PROCEDURE — 82565 ASSAY OF CREATININE: CPT | Performed by: STUDENT IN AN ORGANIZED HEALTH CARE EDUCATION/TRAINING PROGRAM

## 2025-02-15 PROCEDURE — 250N000013 HC RX MED GY IP 250 OP 250 PS 637: Performed by: FAMILY MEDICINE

## 2025-02-15 PROCEDURE — 250N000012 HC RX MED GY IP 250 OP 636 PS 637: Performed by: FAMILY MEDICINE

## 2025-02-15 PROCEDURE — 36415 COLL VENOUS BLD VENIPUNCTURE: CPT | Performed by: STUDENT IN AN ORGANIZED HEALTH CARE EDUCATION/TRAINING PROGRAM

## 2025-02-15 PROCEDURE — 84484 ASSAY OF TROPONIN QUANT: CPT | Performed by: STUDENT IN AN ORGANIZED HEALTH CARE EDUCATION/TRAINING PROGRAM

## 2025-02-15 PROCEDURE — 80307 DRUG TEST PRSMV CHEM ANLYZR: CPT | Performed by: FAMILY MEDICINE

## 2025-02-15 RX ORDER — TOPIRAMATE 100 MG/1
100 TABLET, FILM COATED ORAL DAILY
COMMUNITY

## 2025-02-15 RX ORDER — ALPRAZOLAM 0.5 MG
1 TABLET ORAL ONCE
Status: COMPLETED | OUTPATIENT
Start: 2025-02-15 | End: 2025-02-15

## 2025-02-15 RX ORDER — METHOCARBAMOL 500 MG/1
500 TABLET, FILM COATED ORAL 2 TIMES DAILY PRN
COMMUNITY

## 2025-02-15 RX ORDER — ASPIRIN 325 MG/1
325 TABLET, FILM COATED ORAL DAILY
COMMUNITY

## 2025-02-15 RX ORDER — GABAPENTIN 400 MG/1
800 CAPSULE ORAL 3 TIMES DAILY
COMMUNITY

## 2025-02-15 RX ORDER — ARMODAFINIL 150 MG/1
150 TABLET ORAL EVERY MORNING
COMMUNITY

## 2025-02-15 RX ORDER — ALPRAZOLAM 0.5 MG
2 TABLET ORAL
Status: DISCONTINUED | OUTPATIENT
Start: 2025-02-15 | End: 2025-02-15 | Stop reason: HOSPADM

## 2025-02-15 RX ORDER — PREDNISONE 20 MG/1
40 TABLET ORAL ONCE
Status: COMPLETED | OUTPATIENT
Start: 2025-02-15 | End: 2025-02-15

## 2025-02-15 RX ORDER — PANTOPRAZOLE SODIUM 40 MG/1
40 TABLET, DELAYED RELEASE ORAL DAILY
COMMUNITY

## 2025-02-15 RX ORDER — BUPRENORPHINE AND NALOXONE 12; 3 MG/1; MG/1
1 FILM, SOLUBLE BUCCAL; SUBLINGUAL 2 TIMES DAILY
COMMUNITY

## 2025-02-15 RX ORDER — ROSUVASTATIN CALCIUM 10 MG/1
10 TABLET, COATED ORAL DAILY
COMMUNITY

## 2025-02-15 RX ORDER — METHYLPREDNISOLONE 4 MG/1
TABLET ORAL
Qty: 21 TABLET | Refills: 0 | Status: SHIPPED | OUTPATIENT
Start: 2025-02-15

## 2025-02-15 RX ORDER — FAMOTIDINE 40 MG/1
40 TABLET, FILM COATED ORAL DAILY
COMMUNITY

## 2025-02-15 RX ORDER — DOCUSATE SODIUM 100 MG/1
100-200 CAPSULE, LIQUID FILLED ORAL 2 TIMES DAILY PRN
COMMUNITY

## 2025-02-15 RX ORDER — AMOXICILLIN 250 MG
2 CAPSULE ORAL 2 TIMES DAILY
COMMUNITY

## 2025-02-15 RX ORDER — OXYCODONE HYDROCHLORIDE 10 MG/1
10 TABLET ORAL DAILY PRN
COMMUNITY

## 2025-02-15 RX ORDER — CHOLECALCIFEROL (VITAMIN D3) 50 MCG
1 TABLET ORAL DAILY
COMMUNITY

## 2025-02-15 RX ADMIN — ALPRAZOLAM 1 MG: 0.5 TABLET ORAL at 04:33

## 2025-02-15 RX ADMIN — ALPRAZOLAM 2 MG: 0.5 TABLET ORAL at 01:42

## 2025-02-15 RX ADMIN — PREDNISONE 40 MG: 20 TABLET ORAL at 01:42

## 2025-02-15 ASSESSMENT — ACTIVITIES OF DAILY LIVING (ADL)
ADLS_ACUITY_SCORE: 46

## 2025-02-15 NOTE — ED TRIAGE NOTES
Comes in with increased slurred speech and left side weaker.  Hx of strokes      Triage Assessment (Adult)       Row Name 02/14/25 2011          Triage Assessment    Airway WDL WDL        Respiratory WDL    Respiratory WDL WDL        Skin Circulation/Temperature WDL    Skin Circulation/Temperature WDL WDL        Cardiac WDL    Cardiac WDL WDL        Cognitive/Neuro/Behavioral WDL    Cognitive/Neuro/Behavioral WDL WDL

## 2025-02-15 NOTE — ED PROVIDER NOTES
Patient was signed out to me at shift change.  Please see original note for details regarding presentation.  In brief, this is a 44-year-old female with complex medical history including anxiety, chronic pain presenting for headache, left-sided weakness.  Patient is chronically on Suboxone and has also been taking multiple doses of Benadryl for an itching sensation recently.  She was evaluated by stroke neurology and is already taking aspirin.  We unfortunately do not have MRI capabilities over the weekend and current plan is to have repeat head CT performed at the 24-hour evelia.  Otherwise, patient has been on multiple wait lists for transfer in order to obtain MRI, further stroke workup.    10:30 AM: I was contacted by stroke neurology.  Patient has been doing much better throughout the day today, does not have any speech changes, and strength/sensation is entirely normal in the left arm.  They feel very confidently that symptoms from last night are due to medication reaction, polypharmacy, and no longer feel that the patient needs to stay for repeat CT or transfer for the sake of an MRI.  She is already on aspirin due to previous CVA and they recommended continuation of that.    I had a lengthy conversation with the patient about her symptoms yesterday and recommendations from stroke neurology.  It sounds like she has been taking fairly high doses of Benadryl over the past few days due to some hives and itching.  With this new medication and her baseline Suboxone/benzodiazepine use I think the combination likely resulted in her speech changes and weakness.  Some of the numbness she is describing of her left arm sounds like it could be cervical radiculopathy or nerve impingement.  I agree with neurology that she is safe for discharge home and follow-up with her existing neurology team.  We will prescribe her a short course of steroids which should provide some benefit both for the radicular type numbness and also  the hives.  Advised decreased dose of Benadryl and more strict compliance with prescribed dosages of her other meds.    Labs Ordered and Resulted from Time of ED Arrival to Time of ED Departure   BASIC METABOLIC PANEL - Abnormal       Result Value    Sodium 137      Potassium 3.8      Chloride 102      Carbon Dioxide (CO2) 23      Anion Gap 12      Urea Nitrogen 17.6      Creatinine 0.99 (*)     GFR Estimate 72      Calcium 9.3      Glucose 138 (*)    TROPONIN T, HIGH SENSITIVITY - Abnormal    Troponin T, High Sensitivity 55 (*)    CBC WITH PLATELETS AND DIFFERENTIAL - Abnormal    WBC Count 7.5      RBC Count 3.56 (*)     Hemoglobin 10.9 (*)     Hematocrit 33.9 (*)     MCV 95      MCH 30.6      MCHC 32.2      RDW 14.7      Platelet Count 260      % Neutrophils 67      % Lymphocytes 23      % Monocytes 8      % Eosinophils 2      % Basophils 0      % Immature Granulocytes 0      NRBCs per 100 WBC 0      Absolute Neutrophils 5.1      Absolute Lymphocytes 1.7      Absolute Monocytes 0.6      Absolute Eosinophils 0.1      Absolute Basophils 0.0      Absolute Immature Granulocytes 0.0      Absolute NRBCs 0.0     GLUCOSE BY METER - Abnormal    GLUCOSE BY METER POCT 158 (*)    TROPONIN T, HIGH SENSITIVITY - Abnormal    Troponin T, High Sensitivity 62 (*)    URINE DRUG SCREEN PANEL - Abnormal    Amphetamines Urine Screen Negative      Barbituates Urine Screen Negative      Benzodiazepine Urine Screen Positive (*)     Cannabinoids Urine Screen Negative      Cocaine Urine Screen Negative      Fentanyl Qual Urine Screen Negative      Opiates Urine Screen Negative      PCP Urine Screen Negative     BASIC METABOLIC PANEL - Abnormal    Sodium 135      Potassium 4.5      Chloride 103      Carbon Dioxide (CO2) 24      Anion Gap 8      Urea Nitrogen 14.2      Creatinine 0.90      GFR Estimate 80      Calcium 9.3      Glucose 165 (*)    TROPONIN T, HIGH SENSITIVITY - Abnormal    Troponin T, High Sensitivity 32 (*)    INR - Normal     INR 1.05     PARTIAL THROMBOPLASTIN TIME - Normal    aPTT 30     HCG QUALITATIVE PREGNANCY - Normal    hCG Serum Qualitative Negative     ETHYL ALCOHOL LEVEL - Normal    Alcohol ethyl <0.01     CBC WITH PLATELETS AND DIFFERENTIAL    WBC Count 8.0      RBC Count 4.02      Hemoglobin 12.3      Hematocrit 38.2      MCV 95      MCH 30.6      MCHC 32.2      RDW 14.8      Platelet Count 297      % Neutrophils 86      % Lymphocytes 11      % Monocytes 2      % Eosinophils 0      % Basophils 0      % Immature Granulocytes 0      NRBCs per 100 WBC 0      Absolute Neutrophils 6.9      Absolute Lymphocytes 0.9      Absolute Monocytes 0.2      Absolute Eosinophils 0.0      Absolute Basophils 0.0      Absolute Immature Granulocytes 0.0      Absolute NRBCs 0.0       CTA Head Neck with Contrast   Final Result   IMPRESSION:    HEAD CTA:    1.  No evidence of pathologic vascular occlusion or saccular aneurysm within the visualized intracranial circulation by CT angiography.      NECK CTA:   1.  No evidence of hemodynamically significant stenosis within either internal carotid artery within the neck.      Dr. Mckoy discussed the results with Dr. Fontana on 2/14/2025 8:55 PM CST by telephone.      CT Head w/o Contrast   Final Result   IMPRESSION:     1.  No evidence of acute intracranial hemorrhage or mass effect.        Final diagnoses:   Stroke-like symptoms   Paresthesia of left arm   Vertigo   Acute nonintractable headache, unspecified headache type   Elevated troponin        Kingsley Noavk MD  02/15/25 [id number]

## 2025-02-15 NOTE — ED PROVIDER NOTES
History     Chief Complaint   Patient presents with    Stroke Symptoms     HPI  Mattie Banda is a 44 year old female who states she is got a history of previous stroke, presents with a 1-1/2-hour history of new onset headache, left-sided weakness and speech changes.  Patient states that she was driving home when she felt like things were not quite right when the headache started to come on.  She then noticed that her left arm seemed weak and then family noticed that her speech seemed a little bit off.  Speech is not normally like this.  She states that she worked all day today and did not have any problems.  Patient states that does seem like the room is spinning and moving, it is worse when her eyes are open and she is having to keep her eyes closed.  Patient does admit to having a history of migraines but states this does not feel like any of her migraines before, this feels like when she had her stroke symptoms last year.  Patient denies any recent trauma.  Patient is on a number of medications for chronic pain and psychiatric stuff but nothing new today.  She did take a Suboxone this evening.  She had a Benadryl earlier today.    Allergies:  Allergies   Allergen Reactions    Bee Venom Swelling     cellulitis    Bupropion Hcl Hives     Wellbutrin    Other [No Clinical Screening - See Comments]      Some metals, nickel  rash    Adhesive Tape Rash and Blisters       Problem List:    Patient Active Problem List    Diagnosis Date Noted    PTSD (post-traumatic stress disorder) 06/18/2020     Priority: Medium    Spondylarthritis 05/01/2020     Priority: Medium    Current severe episode of major depressive disorder without psychotic features without prior episode (H) 01/21/2020     Priority: Medium    CRISTIANO (generalized anxiety disorder) 01/21/2020     Priority: Medium    Vasculitis 01/10/2020     Priority: Medium    Carotidynia 12/11/2019     Priority: Medium    Abnormal uterine bleeding 10/22/2019     Priority:  Medium     Added automatically from request for surgery [id number]      Chronic pain syndrome 07/09/2018     Priority: Medium     Patient is very low risk to abuse  Pain medication.  Patient is followed by Yuan Schofield MD for ongoing prescription of pain medication.  All refills should only be approved by this provider, or covering partner.    Medication(s): ms contin and oxydodone ir.   Maximum quantity per month: #60 ms contin, #180 oxycodone  Clinic visit frequency required: Q 3 months      Controlled substance agreement:  Encounter-Level CSA - 08/20/2018:    Controlled Substance Agreement - Scan on 6/5/19 CONTROLLED SUBSTANCE AGREEMENT (below)         Patient-Level CSA:    Controlled Substance Agreement - Opioid - Scan on 6/13/2019  6:05 PM (below)    Controlled Substance Agreement - Opioid - Scan on 6/13/2019  6:03 PM: 06/05/2019 (below)         Pain Clinic evaluation in the past: Yes       Date/Location:   working with counselor to deal with many past traumas, 10/23/2020.    DIRE Total Score(s):  DIRE SCORE 10/23/2020   DIRE Total Score 19       Last [facility] website verification:  10/23/2020 450   [email or web address]            DDD (degenerative disc disease), cervical 02/07/2018     Priority: Medium    Lymphocytic colitis 06/13/2017     Priority: Medium    Idiopathic peripheral neuropathy 01/10/2017     Priority: Medium    Hypothyroidism, unspecified type 01/10/2017     Priority: Medium    Migraine with aura and without status migrainosus, not intractable 12/27/2016     Priority: Medium    Tobacco use disorder 09/12/2016     Priority: Medium    Labial lesion 02/08/2016     Priority: Medium     Superior folds of the vulva/labia right side      S/P lumbar fusion and discectomy June 2015 06/28/2015     Priority: Medium    Degeneration of lumbar intervertebral disc 04/05/2014     Priority: Medium     newly added to list on 4/4//14  but present for last months          Past Medical History:     Past Medical History:   Diagnosis Date    Anxiety     Arthritis 2019    Breast disorder Not sure    COPD (chronic obstructive pulmonary disease) (H)     Depressive disorder     Mixed anxiety depressive disorder 2/8/2016    Papanicolaou smear of cervix with low grade squamous intraepithelial lesion (LGSIL)     Thyroid disease     Urinary tract infection, site not specified     Wounds and injuries 2000       Past Surgical History:    Past Surgical History:   Procedure Laterality Date    ABDOMEN SURGERY  2015    For back surgery    BACK SURGERY  6/2015     BIOPSY  Don t remember    COLONOSCOPY  08/06/07    COLONOSCOPY  08/05/08    CYSTOSCOPY N/A 2/7/2020    Procedure: Cystoscopy;  Surgeon: Anabel Barber MD;  Location: [facility] OR    DAVINCI HYSTERECTOMY TOTAL, SALPINGECTOMY BILATERAL Bilateral 2/7/2020    Procedure: Davinci Total Laparoscopic Hysterectomy, Bilateral Salpingectomy, Exam Under Anesthesia;  Surgeon: Anabel Barber MD;  Location: [facility] OR    HC TOOTH EXTRACTION W/FORCEP      wisdom teeth removed    HYSTERECTOMY, PAP NO LONGER INDICATED      INJECT BLOCK MEDIAL BRANCH CERVICAL/THORACIC/LUMBAR N/A 6/21/2019    Procedure: BLOCK, NERVE, SPINAL, MEDIAL BRANCH lumbar 2, 3, 4;  Surgeon: Edgardo Rubio MD;  Location: [facility] OR    INJECT EPIDURAL CERVICAL N/A 3/8/2018    Procedure: INJECT EPIDURAL CERVICAL;  cervical 6-7 interlaminar epidural steroid injection;  Surgeon: Edgardo Rubio MD;  Location[facility]    INJECT JOINT SACROILIAC Bilateral 6/12/2020    Procedure: INJECT JOINT SACROILIAC bilateral;  Surgeon: Edgardo Rubio MD;  Location: [facility]    IR LUMBAR PUNCTURE  12/18/2024    LAPAROSCOPIC TUBAL LIGATION  11/27/2012    Procedure: LAPAROSCOPIC TUBAL LIGATION;  Laparoscopic Bilateral tubal sterilization/ fulgaration;  Surgeon: Sarbjit Whittaker MD;  Location:  OR    ORTHOPEDIC SURGERY  6/2015    [facility] COLONOSCOPY W BIOPSY  02/06/08       Family History:    Family History   Problem Relation Age of Onset     Hypertension Maternal Grandmother     Arthritis Maternal Grandmother     Cancer Maternal Grandmother         MGGM    Depression Maternal Grandmother     Lipids Maternal Grandmother     Osteoporosis Maternal Grandmother     Respiratory Maternal Grandmother         emphysema    Genitourinary Problems Maternal Grandmother         Kidney Failure, liver     Coronary Artery Disease Maternal Grandmother     Hyperlipidemia Maternal Grandmother     Anxiety Disorder Maternal Grandmother     Asthma Maternal Grandmother     Anesthesia Reaction Maternal Grandmother     Hypertension Maternal Grandfather     Heart Disease Maternal Grandfather         MI    Cardiovascular Maternal Grandfather     Cancer - colorectal Maternal Grandfather     Cancer Maternal Grandfather         [place] Lymphoma    Other Cancer Maternal Grandfather     Coronary Artery Disease Maternal Grandfather     Hypertension Paternal Grandmother     Arthritis Paternal Grandmother         RA    Osteoporosis Paternal Grandmother     Obesity Paternal Grandmother     Substance Abuse Mother         Alcoholic    Depression Mother     Hypertension Father     Hyperlipidemia Father     Mental Illness Father     Substance Abuse Father         Bio and Step dad have alcohol problems    Osteoporosis Father     Coronary Artery Disease Paternal Grandfather     Hypertension Paternal Grandfather     Breast Cancer Other         Maternal great aunt    Coronary Artery Disease Other     Diabetes Other     Thyroid Disease Maternal Aunt         two aunts    Breast Cancer Other     Colon Cancer Other     Prostate Cancer Other     Other Cancer Other     Other Cancer Other     Thyroid Disease Other     Anxiety Disorder Other     Substance Abuse Other     Depression Other     Mental Illness Other     Anxiety Disorder Maternal Half-Sister     Thyroid Disease Maternal Half-Sister     Thyroid Disease Other     Asthma Other     Thyroid Disease Other     Obesity Other     Coronary Artery  Disease Other     Hypertension Other        Social History:  Marital Status:   [4]  Social History     Tobacco Use    Smoking status: Every Day     Current packs/day: 1.00     Average packs/day: 1 pack/day for 20.0 years (20.0 ttl pk-yrs)     Types: Cigarettes    Smokeless tobacco: Never   Substance Use Topics    Alcohol use: Yes     Alcohol/week: 0.0 standard drinks of alcohol     Comment: Rarely    Drug use: Yes     Types: Marijuana        Medications:    albuterol (PROAIR HFA/PROVENTIL HFA/VENTOLIN HFA) 108 (90 Base) MCG/ACT inhaler  ALPRAZolam (XANAX) 1 MG tablet  ALPRAZolam (XANAX) 2 MG tablet  amitriptyline (ELAVIL) 25 MG tablet  ARIPiprazole (ABILIFY) 2 MG tablet  busPIRone HCl (BUSPAR) 30 MG tablet  clobetasol (TEMOVATE) 0.05 % external ointment  clopidogrel (PLAVIX) 75 MG tablet  escitalopram (LEXAPRO) 20 MG tablet  fluconazole (DIFLUCAN) 150 MG tablet  fluticasone (FLONASE) 50 MCG/ACT nasal spray  folic acid (FOLVITE) 400 MCG tablet  furosemide (LASIX) 20 MG tablet  HYDROmorphone (DILAUDID) 4 MG tablet  ibuprofen (ADVIL/MOTRIN) 200 MG tablet  imiquimod (ALDARA) 5 % external cream  levothyroxine (SYNTHROID/LEVOTHROID) 112 MCG tablet  levothyroxine (SYNTHROID/LEVOTHROID) 137 MCG tablet  levothyroxine (SYNTHROID/LEVOTHROID) 25 MCG tablet  loratadine (CLARITIN) 10 MG tablet  methocarbamol (ROBAXIN) 500 MG tablet  methocarbamol (ROBAXIN) 750 MG tablet  methotrexate sodium 2.5 MG TABS  morphine (MS CONTIN) 30 MG CR tablet  NARCAN 4 MG/0.1ML nasal spray  ondansetron (ZOFRAN) 4 MG tablet  oxyCODONE (ROXICODONE) 5 MG tablet  prochlorperazine (COMPAZINE) 10 MG tablet  SUBOXONE 4-1 MG per film  SUBOXONE 8-2 MG per film  sulfaSALAzine (AZULFIDINE) 500 MG tablet  SUMAtriptan (IMITREX) 50 MG tablet  tiZANidine (ZANAFLEX) 4 MG capsule  topiramate (TOPAMAX) 25 MG tablet          Review of Systems   All other systems reviewed and are negative.      Physical Exam   BP: 130/87  Pulse: 64  Temp: 98.1  F (36.7  " C)  Resp: 20  Height: 167.6 cm (5' 6\")  Weight: 89.5 kg (197 lb 4.8 oz)  SpO2: 99 %      Physical Exam  Vitals and nursing note reviewed.   Constitutional:       General: She is not in acute distress.     Appearance: She is well-developed. She is not diaphoretic.   HENT:      Head: Normocephalic and atraumatic.      Nose: Nose normal.      Mouth/Throat:      Pharynx: No oropharyngeal exudate.   Eyes:      Conjunctiva/sclera: Conjunctivae normal.   Cardiovascular:      Rate and Rhythm: Normal rate and regular rhythm.      Heart sounds: Normal heart sounds. No murmur heard.     No friction rub.   Pulmonary:      Effort: Pulmonary effort is normal. No respiratory distress.      Breath sounds: Normal breath sounds. No stridor. No wheezing or rales.   Abdominal:      General: Bowel sounds are normal. There is no distension.      Palpations: Abdomen is soft. There is no mass.      Tenderness: There is no abdominal tenderness. There is no guarding.   Musculoskeletal:         General: No tenderness. Normal range of motion.      Cervical back: Normal range of motion and neck supple.   Skin:     General: Skin is warm and dry.      Capillary Refill: Capillary refill takes less than 2 seconds.      Findings: No erythema.   Neurological:      Mental Status: She is alert and oriented to person, place, and time.      Comments: National [employer] Stroke Scale  Exam Interval: Baseline   Score   Level of consciousness: (0)   Alert, keenly responsive   LOC questions: (0)   Answers both questions correctly   LOC commands: (0)   Performs both tasks correctly   Best gaze: (0)   Normal   Visual: (0)   No visual loss   Facial palsy: (0)   Normal symmetrical movements   Motor arm (left): (1)   Drift   Motor arm (right): (0)   No drift   Motor leg (left): (0)   No drift   Motor leg (right): (0)   No drift   Limb ataxia: (0)   Absent   Sensory: (0)   Normal- no sensory loss   Best language: (0)   Normal- no aphasia   " Dysarthria: (0)   Normal   Extinction and inattention: (0)   No abnormality      Total Score:  1       Psychiatric:         Judgment: Judgment normal.         ED Course        Procedures              EKG Interpretation:      Interpreted by Davion Fontana MD  Time reviewed: 920pm  Symptoms at time of EKG: None   Rhythm: normal sinus   Rate: Normal  Axis: Normal  Ectopy: none  Conduction: normal  ST Segments/ T Waves: T wave inversion III, aVF, V1, V2, and V3  Q Waves: none  Comparison to prior: No old EKG available    Clinical Impression: no acute changes and non-specific EKG            Critical Care time:  was 45 minutes for this patient excluding procedures.    Results for orders placed or performed during the hospital encounter of 02/14/25 (from the past 24 hours)   Glucose by meter   Result Value Ref Range    GLUCOSE BY METER POCT 158 (H) 70 - 99 mg/dL   CBC with Platelets & Differential    Narrative    The following orders were created for panel order CBC with Platelets & Differential.  Procedure                               Abnormality         Status                     ---------                               -----------         ------                     CBC with platelets and d...[[id number]]  Abnormal            Final result                 Please view results for these tests on the individual orders.   Basic metabolic panel   Result Value Ref Range    Sodium 137 135 - 145 mmol/L    Potassium 3.8 3.4 - 5.3 mmol/L    Chloride 102 98 - 107 mmol/L    Carbon Dioxide (CO2) 23 22 - 29 mmol/L    Anion Gap 12 7 - 15 mmol/L    Urea Nitrogen 17.6 6.0 - 20.0 mg/dL    Creatinine 0.99 (H) 0.51 - 0.95 mg/dL    GFR Estimate 72 >60 mL/min/1.73m2    Calcium 9.3 8.8 - 10.4 mg/dL    Glucose 138 (H) 70 - 99 mg/dL   INR   Result Value Ref Range    INR 1.05 0.85 - 1.15   Partial thromboplastin time   Result Value Ref Range    aPTT 30 22 - 38 Seconds   Troponin T, High Sensitivity   Result Value Ref Range    Troponin T,  High Sensitivity 55 (H) <=14 ng/L   CBC with platelets and differential   Result Value Ref Range    WBC Count 7.5 4.0 - 11.0 10e3/uL    RBC Count 3.56 (L) 3.80 - 5.20 10e6/uL    Hemoglobin 10.9 (L) 11.7 - 15.7 g/dL    Hematocrit 33.9 (L) 35.0 - 47.0 %    MCV 95 78 - 100 fL    MCH 30.6 26.5 - 33.0 pg    MCHC 32.2 31.5 - 36.5 g/dL    RDW 14.7 10.0 - 15.0 %    Platelet Count 260 150 - 450 10e3/uL    % Neutrophils 67 %    % Lymphocytes 23 %    % Monocytes 8 %    % Eosinophils 2 %    % Basophils 0 %    % Immature Granulocytes 0 %    NRBCs per 100 WBC 0 <1 /100    Absolute Neutrophils 5.1 1.6 - 8.3 10e3/uL    Absolute Lymphocytes 1.7 0.8 - 5.3 10e3/uL    Absolute Monocytes 0.6 0.0 - 1.3 10e3/uL    Absolute Eosinophils 0.1 0.0 - 0.7 10e3/uL    Absolute Basophils 0.0 0.0 - 0.2 10e3/uL    Absolute Immature Granulocytes 0.0 <=0.4 10e3/uL    Absolute NRBCs 0.0 10e3/uL   Alcohol level blood   Result Value Ref Range    Alcohol ethyl <0.01 <=0.01 g/dL   [place] Draw    Narrative    The following orders were created for panel order [place] Draw.  Procedure                               Abnormality         Status                     ---------                               -----------         ------                     Extra Heparinized Syringe[[id number]]                        Final result                 Please view results for these tests on the individual orders.   Extra Heparinized Syringe   Result Value Ref Range    Hold Specimen     CT Head w/o Contrast    Narrative    EXAM: CT HEAD W/O CONTRAST  LOCATION: [facility]  DATE: 2/14/2025    INDICATION: Code Stroke to evaluate for potential thrombolysis and thrombectomy. PLEASE READ IMMEDIATELY.  COMPARISON: None.  TECHNIQUE: Routine CT Head without IV contrast. Multiplanar reformats. Dose reduction techniques were used.    FINDINGS:  INTRACRANIAL CONTENTS: No evidence of acute intracranial hemorrhage or mass effect. Brain attenuation and morphology  are normal. The ventricles and sulci are normal for age. Normal gray-white matter differentiation. The basilar cisterns are patent.    VISUALIZED ORBITS/SINUSES/MASTOIDS: The globes are unremarkable. The partially imaged paranasal sinuses, mastoid air cells and middle ear cavities are unremarkable.     BONES/SOFT TISSUES: The visualized skull base and calvarium are unremarkable.      Impression    IMPRESSION:    1.  No evidence of acute intracranial hemorrhage or mass effect.   CTA Head Neck with Contrast    Narrative    EXAM: CTA HEAD NECK W CONTRAST  LOCATION: [facility]  DATE: 2/14/2025    INDICATION: Code Stroke to evaluate for potential thrombolysis and thrombectomy. PLEASE READ IMMEDIATELY.  COMPARISON: None.  CONTRAST: 67 mL Isovue-370  TECHNIQUE: Head and neck CT angiogram with IV contrast. Noncontrast head CT followed by axial helical CT images of the head and neck vessels obtained during the arterial phase of intravenous contrast administration. Axial 2D reconstructed images and   multiplanar 3D MIP reconstructed images of the head and neck vessels were performed by the technologist. Dose reduction techniques were used. All stenosis measurements made according to NASCET criteria unless otherwise specified.    FINDINGS:   HEAD CTA:  Examination of the anterior circulation demonstrates the bilateral internal carotid and proximal aspects of the anterior middle cerebral arteries. The anterior communicating artery is demonstrated.    Examination of the posterior circulation demonstrates flow within the distal vertebral, basilar, and proximal aspects of the posterior cerebral arteries. The right and left posterior communicating arteries are not demonstrated with certainty.    No evidence of pathologic vascular occlusion or saccular aneurysm within the visualized intracranial circulation by CT angiography.    NECK CTA:  The aortic arch has normal branching configuration. There is  flow within the brachiocephalic, common carotid, proximal aspects of the subclavian arteries.    The right common carotid artery. Examination of the right carotid bulb demonstrates no evidence of hemodynamically significant stenosis within the right internal carotid artery within the neck.    The left common carotid artery is patent. Examination of the left carotid bulb demonstrates no evidence of hemodynamically significant stenosis within the left internal carotid artery within the neck.    The right and left vertebral arteries are patent.    The parotid, submandibular and thyroid glands are unremarkable.    No evidence of cervical lymphadenopathy by size or morphologic criteria.    No suspicious osseous lesions.    The partially imaged lung apices are unremarkable.      Impression    IMPRESSION:   HEAD CTA:   1.  No evidence of pathologic vascular occlusion or saccular aneurysm within the visualized intracranial circulation by CT angiography.    NECK CTA:  1.  No evidence of hemodynamically significant stenosis within either internal carotid artery within the neck.    Dr. Mckoy discussed the results with Dr. Fontana on 2/14/2025 8:55 PM CST by telephone.   hCG Qualitative Pregnancy   Result Value Ref Range    hCG Serum Qualitative Negative Negative   EKG 12-lead, tracing only   Result Value Ref Range    Systolic Blood Pressure  mmHg    Diastolic Blood Pressure  mmHg    Ventricular Rate 60 BPM    Atrial Rate 60 BPM    AR Interval 250 ms    QRS Duration 88 ms     ms    QTc 422 ms    P Axis  degrees    R AXIS 16 degrees    T Axis -5 degrees    Interpretation ECG       Atrial-paced rhythm with prolonged AV conduction  ST & T wave abnormality, consider anterior ischemia  Abnormal ECG  No previous ECGs available  Confirmed by SEE ED PROVIDER NOTE FOR, ECG INTERPRETATION ([id number]),  Tiffanie Robbins ([id number]) on 2/14/2025 10:31:38 PM     Troponin T, High Sensitivity   Result Value Ref Range    Troponin T, High  Sensitivity 62 (H) <=14 ng/L   Urine Drug Screen    Narrative    The following orders were created for panel order Urine Drug Screen.  Procedure                               Abnormality         Status                     ---------                               -----------         ------                     Urine Drug Screen Panel[[id number]]      Abnormal            Final result                 Please view results for these tests on the individual orders.   Urine Drug Screen Panel   Result Value Ref Range    Amphetamines Urine Screen Negative Screen Negative    Barbituates Urine Screen Negative Screen Negative    Benzodiazepine Urine Screen Positive (A) Screen Negative    Cannabinoids Urine Screen Negative Screen Negative    Cocaine Urine Screen Negative Screen Negative    Fentanyl Qual Urine Screen Negative Screen Negative    Opiates Urine Screen Negative Screen Negative    PCP Urine Screen Negative Screen Negative     *Note: Due to a large number of results and/or encounters for the requested time period, some results have not been displayed. A complete set of results can be found in Results Review.       Medications   iopamidol (ISOVUE-370) solution 500 mL ( Intravenous Canceled Entry 2/14/25 2253)   sodium chloride 0.9 % bag 100 mL for CT scan flush use ( Intravenous Canceled Entry 2/14/25 2253)   ALPRAZolam (XANAX) tablet 2 mg (2 mg Oral $Given 2/15/25 0142)   iopamidol (ISOVUE-370) solution 67 mL (67 mLs Intravenous $Given 2/14/25 2029)     And   sodium chloride 0.9 % bag for CT scan flush use (100 mLs As instructed $Given 2/14/25 2028)   meclizine (ANTIVERT) tablet 25 mg (25 mg Oral $Given 2/14/25 2219)   predniSONE (DELTASONE) tablet 40 mg (40 mg Oral $Given 2/15/25 0142)   ALPRAZolam (XANAX) tablet 1 mg (1 mg Oral $Given 2/15/25 0433)     This is a 44-year-old female who presents with a history of a previous stroke with a 1 and half history of a new onset headache and some left-sided weakness and some  speech changes.  She had been feeling ill throughout the day though with some diarrhea and other symptoms.  Because of possible symptoms starting and window, a tier 1 code stroke was called and I discussed the case with stroke neuro.  They saw the patient via video feed.  After seeing the patient and reviewing the CT and CT angiogram which came back normal they recommended to de-escalate the code stroke.  They are not sure if this is a new stroke or if this could be something more infectious or metabolic in nature.  They agreed with my assessment that it almost sounds like she is drunk or on some type of medication that is contributing to the symptoms.  What they are recommending is that we continue on her aspirin and they do not think we need to transfer her to get another MRI but would recommend a repeat CT scan in 24 hours.  Patient's initial troponin was somewhat elevated, she does not admit to any chest pain though.  Going to go ahead and get an EKG and we will see what the second 2-hour troponin is to see with the delta is.  11 PM: Still waiting for the second troponin to come back but did get some more information.  Patient apparently had taken 10 Benadryl throughout the day today.  This certainly would explain why her speech seemed more like she was drugged versus stroke or other process like that.  That could also explain the severe dizziness.  Unfortunately I given her some meclizine before I had found this out, we will continue to monitor.  1 AM: Patient's second troponin did come back and went up just slightly by 7 points, so pretty much the same.  Patient continues to have no chest pain symptoms so I do not think this is likely cardiac in nature.  Will need to just continue to trend these troponins and monitor for symptoms.  6 AM: Patient is doing well this morning, had no new neurological effects, speech has cleared significantly.  Patient's biggest issue overnight was she was very anxious and  requested a couple doses of Xanax to help her sleep.  Think a lot of her symptoms are from the excessive Benadryl that she was taking during the day but per stroke neurology's recommendations will continue to monitor and try and find bed placement to continue the stroke workup and repeat imaging here today.  Patient will be signed out at change of shift while awaiting disposition as no beds are still been able to be found    Assessments & Plan (with Medical Decision Making)  Strokelike symptoms, paresthesias left arm, vertigo, headache, elevated troponin     I have reviewed the nursing notes.    I have reviewed the findings, diagnosis, plan and need for follow up with the patient.      New Prescriptions    No medications on file       Final diagnoses:   Stroke-like symptoms   Paresthesia of left arm   Vertigo   Acute nonintractable headache, unspecified headache type   Elevated troponin       2/14/2025   [facility] EMERGENCY DEPT       Davion Fontana MD  02/15/25 [id number]

## 2025-02-15 NOTE — ED TRIAGE NOTES
INITIAL RN STROKE SCREEN    Symptoms:Left sided weakness, Slurred speech and Altered Mental status.    Last Known Well (time,date):02/14/25, 1900    IF LAST KNOWN WELL IS < 24 HOURS SEND TO CT STAT.

## 2025-02-15 NOTE — CONSULTS
"[facility]     Stroke Code Note          History of Present Illness     Chief Complaint: Stroke Symptoms      Mattie Banda is a 44 year old  female with past medical history of COPD, tobacco use, CRISTIANO, gout, hypothyroidism, depression, migraine, opioid dependence  presents with slurred speech, N/V, L sided paresthesia and headache    Mattie states that she started to have nausea and vomiting this morning around 9, later at 7 PM she had headache and her speech was slurred.    On admission, /87, CT head neg for acute hemorrhage, CTA head and neck was neg for any LVO.           Past Medical History     Stroke risk factors: schemic stroke, TIA, Migraine    Preadmission antithrombotic regimen: Plavix per chart    Modified Mayfield Score (Pre-morbid)    -  1                   Assessment and Plan       1.  HA/Slurred speech, N/V, L sided paresthesia     Intravenous Thrombolysis  Not given due to:   - unclear or unfavorable risk-benefit profile for extended window thrombolysis beyond the conventional 4.5 hour time window     Endovascular Treatment  Not initiated due to absence of proximal vessel occlusion     Plan:  Urine tox screen  Blood alcohol level  Continue PTA ASA  Repeat CT head in 24 hrs      ___________________________________________________________________    The Stroke Staff is Dr. Lantigua.    Foster Simpson MD  Vascular Neurology Fellow    To page me or covering stroke neurology team member, click here: AMCOM  Choose \"On Call\" tab at top, then select \"NEUROLOGY/ALL SITES\" from middle drop-down box, press Enter, then look for \"stroke\" or \"telestroke\" for your site.  ___________________________________________________________________        Imaging/Labs   (personally reviewed )    CT head: No hemorrhage       HEAD CTA:   1.  No evidence of pathologic vascular occlusion or saccular aneurysm within the visualized intracranial circulation by CT angiography.   "   NECK CTA:  1.  No evidence of hemodynamically significant stenosis within either internal carotid artery within the neck.            Physical Examination     BP: 130/87   Pulse: 64   Resp: 20   Temp: 98.1  F (36.7  C)   Temp src: Oral   SpO2: 99 %   O2 Device: None (Room air)   Weight: 89.5 kg (197 lb 4.8 oz)    Wt Readings from Last 2 Encounters:   02/14/25 89.5 kg (197 lb 4.8 oz)   01/03/25 93 kg (205 lb)       Neuro Exam  Mental Status:  alert, oriented x 3, follows commands, dysarthric speech  Cranial Nerves:  visual fields intact unable to count fingers due to blurry vision, EOMI with normal smooth pursuit, facial sensation intact and symmetric (tested by nurse), facial movements symmetric, hearing not formally tested but intact to conversation  Motor:  no abnormal movements, able to move all limbs antigravity spontaneously with no signs of hemiparesis observed  Reflexes:  unable to test (telestroke)  Sensory:   Paresthesia in LUE and LLE  Coordination:  normal finger-to-nose and heel-to-shin bilaterally without dysmetria  Station/Gait:  unable to test due to telestroke        Stroke Scales       NIHSS  1a. Level of Consciousness 0-->Alert, keenly responsive   1b. LOC Questions 0-->Answers both questions correctly   1c. LOC Commands 0-->Performs both tasks correctly   2.   Best Gaze 0-->Normal   3.   Visual 2-->Complete hemianopia   4.   Facial Palsy 0-->Normal symmetrical movements   5a. Motor Arm, Left 0-->No drift, limb holds 90 (or 45) degrees for full 10 secs   5b. Motor Arm, Right 0-->No drift, limb holds 90 (or 45) degrees for full 10 secs   6a. Motor Leg, Left 2-->Some effort against gravity, leg falls to bed by 5 secs, but has some effort against gravity   6b. Motor Leg, right 2-->Some effort against gravity, leg falls to bed by 5 secs, but has some effort against gravity   7.   Limb Ataxia 0-->Absent   8.   Sensory 1-->Mild-to-moderate sensory loss, patient feels pinprick is less sharp or is dull on  the affected side, or there is a loss of superficial pain with pinprick, but patient is aware of being touched   9.   Best Language 0-->No aphasia, normal   10. Dysarthria 1-->Mild-to-moderate dysarthria, patient slurs at least some words and, at worst, can be understood with some difficulty   11. Extinction and Inattention  0-->No abnormality   Total 8 (02/14/25 2058)            Labs     CBC  Lab Results   Component Value Date    HGB 10.9 (L) 02/14/2025    HCT 33.9 (L) 02/14/2025    WBC 7.5 02/14/2025     02/14/2025       BMP  Lab Results   Component Value Date     02/14/2025    POTASSIUM 3.8 02/14/2025    CHLORIDE 102 02/14/2025    CO2 23 02/14/2025    BUN 17.6 02/14/2025    CR 0.99 (H) 02/14/2025     (H) 02/14/2025    WAQAS 9.3 02/14/2025       INR  INR   Date Value Ref Range Status   02/14/2025 1.05 0.85 - 1.15 Final   12/05/2019 1.02 0.86 - 1.14 Final   07/20/2008 1.05 0.86 - 1.14 Final       Data   Stroke Code Data  (for stroke code with tele)  Stroke code activated 02/14/25 2015   First stroke provider response 02/14/25 2015   Video start time 02/14/25 2037   Video end time 02/14/25 2058   Last known normal 02/14/25  0900   Time of discovery (or onset of symptoms)  02/14/25  0900   Head CT read by Stroke Neuro Provider 02/14/25 2037   Was stroke code de-escalated?             Telestroke Service Details  Type of service telemedicine diagnostic assessment of acute neurological changes   Reason telemedicine is appropriate patient requires assessment with a specialist for diagnosis and treatment of neurological symptoms   Mode of transmission secure interactive audio and video communication per Mary Ann   Originating site (patient location) [facility]    Distant site (provider location) Provider remote site        Clinically Significant Risk Factors Present on Admission                 # Drug Induced Platelet Defect: home medication list includes an  "antiplatelet medication        # Anemia: based on hgb <11       # Obesity: Estimated body mass index is 31.85 kg/m  as calculated from the following:    Height as of this encounter: 1.676 m (5' 6\").    Weight as of this encounter: 89.5 kg (197 lb 4.8 oz).                 "

## 2025-02-15 NOTE — ED NOTES
Patient brought back from bathroom by another RN. Told that RN that she forgot to mention she took 10 benadryl today. Will update the provider.

## 2025-02-15 NOTE — DISCHARGE INSTRUCTIONS
Your testing since arrival has been reassuring overall.  The initial concern was that you may have had a new stroke and it was recommended that you stay for observation/repeat head CT.    Fortunately it seems like your symptoms have resolved and the stroke neurology team did not feel the repeat imaging was necessary at this point.    I agree that at least some of your symptoms are related to medications you have been taking, probably the addition of Benadryl over the last few days.    The numbness in your arm sounds like it could be a nerve related issue as well.  For this reason we will prescribe a short course of steroids.  Take these as instructed until entirely gone.    You should cut down on the amount of Benadryl, no more than 1 tablet at a time or 4 tablets total for the day.  Take your other medications as instructed.    Follow-up with your neurologist for reevaluation.  Continue your aspirin daily.    Return to the emergency department sooner for any new or worsening symptoms.

## 2025-03-22 ENCOUNTER — HEALTH MAINTENANCE LETTER (OUTPATIENT)
Age: 45
End: 2025-03-22

## 2025-04-07 NOTE — LETTER
"    1/10/2019         RE: Mattie Banda  [address] [facility] [address]        Dear Colleague,    Thank you for referring your patient, Mattie Banda, to the [facility]. Please see a copy of my visit note below.    Sports Medicine Clinic Visit - Interim History January 10, 2019    Initial Visit Date 1/8/2019    PCP: Yuan Schofield    Mattie Banda is a 38 year old female who is seen in follow up for acute left hip pain. Since last visit on 1/8/2019 patient has not had any changes in her pain.  She continues to have pain over the anterior/lateral hip and down the lateral thigh. She notes that she has had more constant \"lightning bolt\" sensations down the thigh.  She rates the pain at a 7/10 currently.  Symptoms are relieved with nothing.  Symptoms are worsened by changing positions while lying down. She has tomorrow through Sunday off and returns to work on Monday.     Review of Systems  Musculoskeletal: as above  Remainder of review of systems is negative including constitutional, eyes, ENT, CV, pulmonary, GI, , endocrine, skin, hematologic, and neurologic except as noted in HPI or medical history.    History reviewed. No pertinent past surgical/medical/family/social history other than as mentioned in HPI.    Patient Active Problem List   Diagnosis     Contraceptive management     CARDIOVASCULAR SCREENING; LDL GOAL LESS THAN 160     Degeneration of lumbar intervertebral disc     S/P lumbar fusion and discectomy June 2015     Low grade squamous intraepithelial lesion on cytologic smear of cervix (LGSIL)     Labial lesion     Mixed anxiety depressive disorder     Tobacco use disorder     Idiopathic peripheral neuropathy     Hypothyroidism, unspecified type     Migraine with aura and without status migrainosus, not intractable     Tinea versicolor     Lymphocytic colitis     Excessive or frequent menstruation     DDD (degenerative disc disease), cervical     Chronic pain syndrome " Rx Refill Note  Do you want patient to continue this medication  Requested Prescriptions     Pending Prescriptions Disp Refills    pantoprazole (PROTONIX) 40 MG EC tablet [Pharmacy Med Name: PANTOPRAZOLE SOD DR 40 MG TAB] 30 tablet 0     Sig: TAKE 1 TABLET BY MOUTH EVERY MORNING BEFORE BREAKFAST      Last office visit with prescribing clinician: 3/11/2025   Last telemedicine visit with prescribing clinician: Visit date not found   Next office visit with prescribing clinician: Visit date not found                         Would you like a call back once the refill request has been completed: [] Yes [] No    If the office needs to give you a call back, can they leave a voicemail: [] Yes [] No    Hadley Stoll, KESHAV  04/07/25, 14:23 EDT       Past Medical History:   Diagnosis Date     Papanicolaou smear of cervix with low grade squamous intraepithelial lesion (LGSIL)      Thyroid disease      Urinary tract infection, site not specified     Recurrent UTI's     Past Surgical History:   Procedure Laterality Date     BACK SURGERY  6/2015      COLONOSCOPY  08/06/07     COLONOSCOPY  08/05/08      COLONOSCOPY W BIOPSY  02/06/08      TOOTH EXTRACTION W/FORCEP      wisdom teeth removed     INJECT EPIDURAL CERVICAL N/A 3/8/2018    Procedure: INJECT EPIDURAL CERVICAL;  cervical 6-7 interlaminar epidural steroid injection;  Surgeon: Edgardo Rubio MD;  Location: [facility] OR     LAPAROSCOPIC TUBAL LIGATION  11/27/2012    Procedure: LAPAROSCOPIC TUBAL LIGATION;  Laparoscopic Bilateral tubal sterilization/ fulgaration;  Surgeon: Sarbjit Whittaker MD;  Location: [facility] OR     Family History   Problem Relation Age of Onset     Hypertension Maternal Grandmother      Arthritis Maternal Grandmother      Cancer Maternal Grandmother         MGGM     Depression Maternal Grandmother      Lipids Maternal Grandmother      Osteoporosis Maternal Grandmother      Respiratory Maternal Grandmother         emphysema     Genitourinary Problems Maternal Grandmother         Kidney Failure, liver      Hypertension Maternal Grandfather      Heart Disease Maternal Grandfather         MI     Cardiovascular Maternal Grandfather      Cancer - colorectal Maternal Grandfather      Cancer Maternal Grandfather         [place]od[place] Lymphoma     Hypertension Paternal Grandmother      Arthritis Paternal Grandmother         RA     Breast Cancer Other         Maternal great aunt     Thyroid Disease Maternal Aunt         two aunts     Diabetes No family hx of      Social History     Socioeconomic History     Marital status:      Spouse name: Darien     Number of children: 1     Years of education: 12     Highest education level: Not on file   Social Needs     Financial resource strain: Not on  "file     Food insecurity - worry: Not on file     Food insecurity - inability: Not on file     Transportation needs - medical: Not on file     Transportation needs - non-medical: Not on file   Occupational History     Occupation: Unit Worker     Comment: [employer]   Tobacco Use     Smoking status: Current Every Day Smoker     Packs/day: 1.00     Years: 8.00     Pack years: 8.00     Types: Cigarettes     Smokeless tobacco: Never Used   Substance and Sexual Activity     Alcohol use: Yes     Alcohol/week: 0.0 oz     Comment: 1/mo     Drug use: No     Sexual activity: Yes     Partners: Male   Other Topics Concern     Parent/sibling w/ CABG, MI or angioplasty before 65F 55M? 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    Current Outpatient Medications   Medication     albuterol (2.5 MG/3ML) 0.083% neb solution     escitalopram (LEXAPRO) 20 MG tablet     levothyroxine (SYNTHROID/LEVOTHROID) 200 MCG tablet     LORazepam (ATIVAN) 1 MG tablet     mesalamine (ASACOL HD) 800 MG EC tablet     morphine (MS CONTIN) 15 MG CR tablet     oxyCODONE (ROXICODONE) 5 MG tablet     risperiDONE (RISPERDAL) 1 MG tablet     traZODone (DESYREL) 50 MG tablet     VENTOLIN  (90 Base) MCG/ACT Inhaler     No current facility-administered medications for this visit.      Allergies   Allergen Reactions     Bupropion Hcl      Wellbutrin         Objective:  BP 94/64   Ht 1.678 m (5' 6.06\")   Wt 81.9 kg (180 lb 8 oz)   BMI 29.08 kg/m       General: Alert and in no distress    Head: " Normocephalic, atraumatic  Eyes: no scleral icterus or conjunctival erythema   Oropharynx:  Mucous membranes moist  Skin: no erythema, petechiae, or jaundice  CV: regular rhythm by palpation, 2+ distal pulses  Resp: normal respiratory effort without conversational dyspnea   Psych: normal mood and affect    Gait: Antalgic    Radiology:  Independent visualization of images performed and reviewed with Mattie.    MR LEFT HIP WITHOUT CONTRAST  1/8/2019 3:39 PM     HISTORY:  Two-month history of left hip pain with elevated white blood  cell count. No specific injury. Evaluate for septic arthritis.     TECHNIQUE:  Multiplanar, multisequence without contrast.     COMPARISON: None.     FINDINGS:   Osseous and Cartilaginous Structures:  No acute-appearing bony  abnormality. In particular, the left hip shows no evidence for  fracture, bone marrow edema or significant degenerative change. The  right hip also shows no significant degenerative change. However,  there is some mild bone marrow edema about the sacroiliac joints  bilaterally which may be related to sacroiliitis (images 14 and 15 of  series 5 and images 24 and 25 of series 3). Metallic artifact from  prior anterior interbody fusion at the lumbosacral junction. The  remainder of the bony structures are unremarkable.      Acetabular Labrum: No juxtaacetabular cyst.  No obvious labral tear is  appreciated, allowing for the large FOV technique.  If indicated  clinically, MR arthrography would be considered the study of choice in  this regard.     Hip joint space:  No significant joint effusion.      Trochanteric and Iliopsoas Bursae: A trace amount of fluid in the  right trochanteric bursa could indicate trochanteric bursitis, and  clinical correlation is recommended. No fluid in the left trochanteric  bursa or iliopsoas bursa bilaterally.     Common Hamstring Tendon: No evidence of tear or significant  tendinosis.     Additional Findings: Muscles and other soft tissues  around the hips  and pelvis appear normal.  The gluteus medius and minimus tendons  appear unremarkable.      Internal Pelvic Structures: No acute-appearing abnormality or free  fluid.                                                                      IMPRESSION:   1. There are no MRI findings to suggest septic arthritis of the left  hip. In particular, there is no evidence for joint space effusion or  bone marrow edema.  2. Possible bilateral sacroiliitis.  3. Trace amount of fluid in the right trochanteric bursa could  indicate mild bursitis, and clinical correlation is recommended.     MOLLY DONOVAN MD    Large Joint Injection/Arthocentesis: L greater trochanteric bursa  Date/Time: 1/10/2019 12:04 PM  Performed by: Sierra Mcmillan MD  Authorized by: Sierra Mcmillan MD     Indications:  Pain  Needle Size:  22 G  Guidance: landmark guided    Approach:  Lateral  Location:  Hip  Site:  L greater trochanteric bursa  Medications:  3 mL lidocaine 1 %; 3 mL bupivacaine (PF) 0.5 %; 40 mg triamcinolone 40 MG/ML  Outcome:  Tolerated well, no immediate complications  Procedure discussed: discussed risks, benefits, and alternatives    Consent Given by:  Patient            Assessment:  1. Acute hip pain, left    2. Greater trochanteric bursitis of left hip        Plan:  Discussed the assessment with the patient and developed a plan together:  -Steroid injection performed today.  Take it easy over the next few days. Keep in mind that the steroid may take up to 3 days to start working and up to 2 weeks to reach maximal effect.  Ice 15-20 minutes as needed for soreness.  Patient's preferred over the counter medication as needed for pain as directed on packaging.  -Updated work letter provided.    -Follow Up: As needed if symptoms fail to improve or worsen. Please call with any questions or concerns.     Noy Mcmillan MD, [facility] Sports Medicine  [facility]        Again, thank you for  allowing me to participate in the care of your patient.        Sincerely,        Sierra Mcmillan MD

## 2025-06-10 ENCOUNTER — OFFICE VISIT (OUTPATIENT)
Dept: URGENT CARE | Facility: URGENT CARE | Age: 45
End: 2025-06-10
Payer: COMMERCIAL

## 2025-06-10 VITALS
HEIGHT: 65 IN | DIASTOLIC BLOOD PRESSURE: 78 MMHG | WEIGHT: 178 LBS | HEART RATE: 60 BPM | BODY MASS INDEX: 29.66 KG/M2 | OXYGEN SATURATION: 99 % | RESPIRATION RATE: 16 BRPM | SYSTOLIC BLOOD PRESSURE: 123 MMHG | TEMPERATURE: 98.2 F

## 2025-06-10 DIAGNOSIS — L01.00 IMPETIGO: ICD-10-CM

## 2025-06-10 DIAGNOSIS — R60.0 PERIPHERAL EDEMA: Primary | ICD-10-CM

## 2025-06-10 PROCEDURE — 99204 OFFICE O/P NEW MOD 45 MIN: CPT | Performed by: PHYSICIAN ASSISTANT

## 2025-06-10 RX ORDER — MUPIROCIN 2 %
OINTMENT (GRAM) TOPICAL
Qty: 22 G | Refills: 0 | Status: SHIPPED | OUTPATIENT
Start: 2025-06-10

## 2025-06-10 RX ORDER — FUROSEMIDE 20 MG/1
20 TABLET ORAL DAILY
Qty: 7 TABLET | Refills: 0 | Status: SHIPPED | OUTPATIENT
Start: 2025-06-10

## 2025-06-10 RX ORDER — CEPHALEXIN 500 MG/1
500 CAPSULE ORAL 3 TIMES DAILY
Qty: 30 CAPSULE | Refills: 0 | Status: SHIPPED | OUTPATIENT
Start: 2025-06-10 | End: 2025-06-20

## 2025-06-10 NOTE — LETTER
Iesha 10, 2025      Mattie Banda  [address] [facility] [address]        To Whom It May Concern:    Mattie Banda  was seen and treated in clinic. Please excuse from work 6/10/25 and 6/11/25.            Sincerely,        Jenifer Wang PA-C    Electronically signed

## 2025-06-11 NOTE — PROGRESS NOTES
"  Assessment & Plan     Peripheral edema  Patient requesting a refill of lasix, reviewed hospital labs and potasium was within normal limits. Sent 7 tablets to the pharmacy until she can get in to see her primary care provider. Discussed in detail symptoms that would warrant emergent evaluation in the ED. Patient agrees with plan.     - furosemide (LASIX) 20 MG tablet; Take 1 tablet (20 mg) by mouth daily.    Impetigo    - mupirocin (BACTROBAN) 2 % external ointment; Apply to affected area 2-3 times daily x 7-14 days  - cephALEXin (KEFLEX) 500 MG capsule; Take 1 capsule (500 mg) by mouth 3 times daily for 10 days.          BMI  Estimated body mass index is 29.62 kg/m  as calculated from the following:    Height as of this encounter: 1.651 m (5' 5\").    Weight as of this encounter: 80.7 kg (178 lb).                   Subjective   Chief Complaint   Patient presents with    Edema     Swelling in pt's feet and both legs x 5 days.  Recently admitted to [facility] because of the swelling and discharged 3 days ago. That's where she goes for her heart DR. Betts of stroke about a year ago.    Derm Problem     Got a rash on her left cheek and part of her nose while she was in the hospital, she mentioned it to staff and they did nothing.             6/10/2025     7:12 PM   Additional Questions   Roomed by Yani WALLACE         6/10/2025   Forms   Any forms needing to be completed Yes     HPI        Swelling     Patient reports she was recently hospitalized for swelling which did improve at discharge. She continue to have peripheral edema and has been prescribed lasix in the past. She is requesting lasix to help with swelling. She does not have an appointment scheduled with primary care provider yet. She denies chest pain, shortness of breath, or difficulty breathing.       Patient also reports several crusty lesions around her nose that are spreading. She has had impetigo in the past and is concerned it is starting again. " "                  Objective    /78   Pulse 60   Temp 98.2  F (36.8  C) (Tympanic)   Resp 16   Ht 1.651 m (5' 5\")   Wt 80.7 kg (178 lb)   LMP 2020   SpO2 99%   BMI 29.62 kg/m    Body mass index is 29.62 kg/m .    Physical Exam  Constitutional:       Appearance: She is well-developed.   HENT:      Head: Normocephalic.      Right Ear: Tympanic membrane and ear canal normal.      Left Ear: Tympanic membrane and ear canal normal.      Nose:      Comments: Several small crusted lesions on each side of nose and a few scattered red lesions started on chin and cheek.   Eyes:      Conjunctiva/sclera: Conjunctivae normal.   Cardiovascular:      Rate and Rhythm: Normal rate.      Heart sounds: Normal heart sounds.   Pulmonary:      Effort: Pulmonary effort is normal.      Breath sounds: Normal breath sounds.   Musculoskeletal:      Right lower le+ Pitting Edema present.      Left lower le+ Pitting Edema present.   Skin:     General: Skin is warm and dry.      Findings: No rash.   Psychiatric:         Behavior: Behavior normal.                    Signed Electronically by: Jenifer Wang PA-C    "

## 2025-06-11 NOTE — PROGRESS NOTES
Urgent Care Clinic Visit    Chief Complaint   Patient presents with    Edema     Swelling in pt's feet and both legs x 5 days.  Recently admitted to [facility] because of the swelling and discharged 3 days ago. That's where she goes for her heart DR. Betts of stroke about a year ago.    Derm Problem     Got a rash on her left cheek and part of her nose while she was in the hospital, she mentioned it to staff and they did nothing.                 6/10/2025     7:12 PM   Additional Questions   Roomed by Yani WALLACE         6/10/2025   Forms   Any forms needing to be completed Yes

## (undated) DEVICE — NDL ECLIPSE 18GA 1.5"

## (undated) DEVICE — DAVINCI SI DRAPE ACCESSORY KIT 3-ARM 420290

## (undated) DEVICE — DAVINCI S FCP BIPOLAR FENESTRATED 420205

## (undated) DEVICE — ADAPTER DRAPE ALLY AU-AD

## (undated) DEVICE — SOL NACL 0.9% IRRIG 1000ML BOTTLE 2F7124

## (undated) DEVICE — DAVINCI S CANNULA SEAL 8.5-13MM 420206

## (undated) DEVICE — ADH SKIN CLOSURE PREMIERPRO EXOFIN 1.0ML 3470

## (undated) DEVICE — LINEN ORTHO PACK 5446

## (undated) DEVICE — ENDO POUCH UNIVERSAL RETRIEVAL SYSTEM INZII 5MM CD003

## (undated) DEVICE — TRAY SINGLE DOSE EPIDURAL ANESTHESIA

## (undated) DEVICE — TUBING SMOKE EVAC PNEUVIEW 9660-XE

## (undated) DEVICE — GLOVE PROTEXIS W/NEU-THERA 7.5  2D73TE75

## (undated) DEVICE — STABILIZER ARCH KOH-EFFICIENT 3.5CM KC-ARCH-35

## (undated) DEVICE — TUBING EXTENSION SET MICROBORE 21CM LL 6N8374

## (undated) DEVICE — DAVINCI OBTURATOR 8MM BLADELESS 420023

## (undated) DEVICE — SYR 05ML LL W/O NDL

## (undated) DEVICE — COVER CAMERA IN-LIGHT DISP LT-C02

## (undated) DEVICE — DAVINCI S NDL DRIVER MEGA SUTURE CUT 420309

## (undated) DEVICE — SYR 03ML LL W/O NDL

## (undated) DEVICE — NDL COUNTER 20CT 31142493

## (undated) DEVICE — SU WND CLOSURE VLOC 180 ABS 2-0 12" GS-21 VLOCL0315

## (undated) DEVICE — NDL COUNTER 10CT

## (undated) DEVICE — Device

## (undated) DEVICE — SYR 10ML LL W/O NDL

## (undated) DEVICE — SUCTION MANIFOLD DORNOCH ULTRA CART UL-CL500

## (undated) DEVICE — PREP CHLORAPREP 26ML TINTED ORANGE  260815

## (undated) DEVICE — GOWN IMPERVIOUS SPECIALTY XLG/XLONG 32474

## (undated) DEVICE — ENDO TROCAR FIRST ENTRY KII FIOS ADV FIX 05X100MM CFF03

## (undated) DEVICE — UTERINE MANIPULATOR RUMI 6.7MMX8CM UMB678

## (undated) DEVICE — NDL SPINAL 22GA 3.5" QUINCKE 405181

## (undated) DEVICE — GLOVE PROTEXIS W/NEU-THERA 7.0  2D73TE70

## (undated) DEVICE — TRAY PROCEDURE SUPPORT PAIN MANAGEMENT 332114

## (undated) DEVICE — NDL SPINAL 25GA 4.69" QUINCKE 405234

## (undated) DEVICE — DAVINCI HOT SHEARS TIP COVER  400180

## (undated) DEVICE — DRAPE WARMER 66X44" ORS-300

## (undated) DEVICE — SYR 10ML PERFIX LL 332152

## (undated) DEVICE — DAVINCI S MONOPOLAR SCISSORS PRECURVED HOT SHEARS 420179

## (undated) DEVICE — SU MONOCRYL 4-0 PS-2 18" UND Y496G

## (undated) DEVICE — LINEN TOWEL PACK X5 5464

## (undated) DEVICE — SOL WATER IRRIG 1000ML BOTTLE 2F7114

## (undated) RX ORDER — ACETAMINOPHEN 325 MG/1
TABLET ORAL
Status: DISPENSED
Start: 2020-02-07

## (undated) RX ORDER — FENTANYL CITRATE 50 UG/ML
INJECTION, SOLUTION INTRAMUSCULAR; INTRAVENOUS
Status: DISPENSED
Start: 2020-02-07

## (undated) RX ORDER — KETAMINE HCL IN 0.9 % NACL 50 MG/5 ML
SYRINGE (ML) INTRAVENOUS
Status: DISPENSED
Start: 2020-02-07

## (undated) RX ORDER — CEFAZOLIN SODIUM 1 G/3ML
INJECTION, POWDER, FOR SOLUTION INTRAMUSCULAR; INTRAVENOUS
Status: DISPENSED
Start: 2020-02-07

## (undated) RX ORDER — OXYCODONE HYDROCHLORIDE 5 MG/1
TABLET ORAL
Status: DISPENSED
Start: 2020-02-07

## (undated) RX ORDER — PHENAZOPYRIDINE HYDROCHLORIDE 200 MG/1
TABLET, FILM COATED ORAL
Status: DISPENSED
Start: 2020-02-07

## (undated) RX ORDER — CEFAZOLIN SODIUM 2 G/100ML
INJECTION, SOLUTION INTRAVENOUS
Status: DISPENSED
Start: 2020-02-07

## (undated) RX ORDER — HYDROMORPHONE HYDROCHLORIDE 1 MG/ML
INJECTION, SOLUTION INTRAMUSCULAR; INTRAVENOUS; SUBCUTANEOUS
Status: DISPENSED
Start: 2020-02-07